# Patient Record
Sex: FEMALE | Race: WHITE | NOT HISPANIC OR LATINO | Employment: OTHER | URBAN - METROPOLITAN AREA
[De-identification: names, ages, dates, MRNs, and addresses within clinical notes are randomized per-mention and may not be internally consistent; named-entity substitution may affect disease eponyms.]

---

## 2017-08-25 ENCOUNTER — CONVERSION ENCOUNTER (OUTPATIENT)
Dept: GENERAL RADIOLOGY | Facility: HOSPITAL | Age: 72
End: 2017-08-25

## 2018-08-27 ENCOUNTER — CONVERSION ENCOUNTER (OUTPATIENT)
Dept: GENERAL RADIOLOGY | Facility: HOSPITAL | Age: 73
End: 2018-08-27

## 2019-01-02 ENCOUNTER — HOSPITAL ENCOUNTER (OUTPATIENT)
Dept: GENERAL RADIOLOGY | Facility: HOSPITAL | Age: 74
Discharge: HOME | End: 2019-01-02
Attending: INTERNAL MEDICINE

## 2019-01-21 ENCOUNTER — OFFICE VISIT CONVERTED (OUTPATIENT)
Dept: SURGERY | Facility: CLINIC | Age: 74
End: 2019-01-21
Attending: PHYSICIAN ASSISTANT

## 2019-01-21 ENCOUNTER — HOSPITAL ENCOUNTER (OUTPATIENT)
Dept: SURGERY | Facility: CLINIC | Age: 74
Discharge: HOME | End: 2019-01-21
Attending: PHYSICIAN ASSISTANT

## 2019-01-21 ENCOUNTER — CONVERSION ENCOUNTER (OUTPATIENT)
Dept: SURGERY | Facility: CLINIC | Age: 74
End: 2019-01-21

## 2019-01-23 LAB — BACTERIA UR CULT: NORMAL

## 2019-02-04 ENCOUNTER — HOSPITAL ENCOUNTER (OUTPATIENT)
Dept: LAB | Facility: HOSPITAL | Age: 74
Discharge: HOME | End: 2019-02-04
Attending: PHYSICIAN ASSISTANT

## 2019-02-04 LAB
APPEARANCE UR: CLEAR
BILIRUB UR QL: NEGATIVE
COLOR UR: YELLOW
CONV COLLECTION SOURCE (UA): NORMAL
CONV UROBILINOGEN IN URINE BY AUTOMATED TEST STRIP: 0.2 {EHRLICHU}/DL (ref 0.1–1)
GLUCOSE UR QL: NEGATIVE MG/DL
HGB UR QL STRIP: NEGATIVE
KETONES UR QL STRIP: NEGATIVE MG/DL
LEUKOCYTE ESTERASE UR QL STRIP: NEGATIVE
NITRITE UR QL STRIP: NEGATIVE
PH UR STRIP.AUTO: 6 [PH] (ref 5–8)
PROT UR QL: NEGATIVE MG/DL
SP GR UR: 1.01 (ref 1–1.03)

## 2019-03-19 ENCOUNTER — HOSPITAL ENCOUNTER (OUTPATIENT)
Dept: LAB | Facility: HOSPITAL | Age: 74
Discharge: HOME | End: 2019-03-19
Attending: INTERNAL MEDICINE

## 2019-03-19 LAB
25(OH)D3 SERPL-MCNC: 38.4 NG/ML (ref 30–100)
APPEARANCE UR: CLEAR
BASOPHILS # BLD AUTO: 0.05 10*3/UL (ref 0–0.2)
BASOPHILS NFR BLD AUTO: 0.7 % (ref 0–3)
BILIRUB UR QL: NEGATIVE
CHOLEST SERPL-MCNC: 121 MG/DL (ref 107–200)
CHOLEST/HDLC SERPL: 2.7 {RATIO} (ref 3–6)
COLOR UR: YELLOW
CONV ABS IMM GRAN: 0.06 10*3/UL (ref 0–0.2)
CONV COLLECTION SOURCE (UA): NORMAL
CONV CREATININE URINE, RANDOM: 48.6 MG/DL (ref 10–300)
CONV IMMATURE GRAN: 0.8 % (ref 0–1.8)
CONV MICROALBUM.,U,RANDOM: <12 MG/L (ref 0–20)
CONV UROBILINOGEN IN URINE BY AUTOMATED TEST STRIP: 0.2 {EHRLICHU}/DL (ref 0.1–1)
DEPRECATED RDW RBC AUTO: 57.4 FL (ref 36.4–46.3)
EOSINOPHIL # BLD AUTO: 0.19 10*3/UL (ref 0–0.7)
EOSINOPHIL # BLD AUTO: 2.6 % (ref 0–7)
ERYTHROCYTE [DISTWIDTH] IN BLOOD BY AUTOMATED COUNT: 15.9 % (ref 11.7–14.4)
EST. AVERAGE GLUCOSE BLD GHB EST-MCNC: 128 MG/DL
FOLATE SERPL-MCNC: 16.3 NG/ML (ref 4.8–20)
GLUCOSE UR QL: NEGATIVE MG/DL
HBA1C MFR BLD: 6.1 % (ref 3.5–5.7)
HBA1C MFR BLD: 9.9 G/DL (ref 12–16)
HCT VFR BLD AUTO: 32.4 % (ref 37–47)
HDLC SERPL-MCNC: 45 MG/DL (ref 40–60)
HGB UR QL STRIP: NEGATIVE
KETONES UR QL STRIP: NEGATIVE MG/DL
LDLC SERPL CALC-MCNC: 54 MG/DL (ref 70–100)
LEUKOCYTE ESTERASE UR QL STRIP: NEGATIVE
LYMPHOCYTES # BLD AUTO: 1.9 10*3/UL (ref 1–5)
MAGNESIUM SERPL-MCNC: 1.59 MG/DL (ref 1.6–2.3)
MCH RBC QN AUTO: 29.8 PG (ref 27–31)
MCHC RBC AUTO-ENTMCNC: 30.6 G/DL (ref 33–37)
MCV RBC AUTO: 97.6 FL (ref 81–99)
MICROALBUMIN/CREAT UR: 24.7 MG/G{CRE} (ref 0–35)
MONOCYTES # BLD AUTO: 0.93 10*3/UL (ref 0.2–1.2)
MONOCYTES NFR BLD AUTO: 12.6 % (ref 3–10)
NEUTROPHILS # BLD AUTO: 4.27 10*3/UL (ref 2–8)
NEUTROPHILS NFR BLD AUTO: 57.6 % (ref 30–85)
NITRITE UR QL STRIP: NEGATIVE
NRBC CBCN: 0 % (ref 0–0.7)
PH UR STRIP.AUTO: 6.5 [PH] (ref 5–8)
PLATELET # BLD AUTO: 262 10*3/UL (ref 130–400)
PMV BLD AUTO: 11.8 FL (ref 9.4–12.3)
PROT UR QL: NEGATIVE MG/DL
RBC # BLD AUTO: 3.32 10*6/UL (ref 4.2–5.4)
SP GR UR: 1.01 (ref 1–1.03)
TRIGL SERPL-MCNC: 111 MG/DL (ref 40–150)
URATE SERPL-MCNC: 5 MG/DL (ref 2.5–7.5)
VARIANT LYMPHS NFR BLD MANUAL: 25.7 % (ref 20–45)
VIT B12 SERPL-MCNC: 503 PG/ML (ref 211–911)
VLDLC SERPL-MCNC: 22 MG/DL (ref 5–37)
WBC # BLD AUTO: 7.4 10*3/UL (ref 4.8–10.8)

## 2019-03-22 ENCOUNTER — HOSPITAL ENCOUNTER (OUTPATIENT)
Dept: LAB | Facility: HOSPITAL | Age: 74
Discharge: HOME | End: 2019-03-22
Attending: INTERNAL MEDICINE

## 2019-03-22 LAB
ALBUMIN SERPL-MCNC: 4.2 G/DL (ref 3.5–5)
ALBUMIN/GLOB SERPL: 1.4 {RATIO} (ref 1.4–2.6)
ALP SERPL-CCNC: 39 U/L (ref 43–160)
ALT SERPL-CCNC: 12 U/L (ref 10–40)
ANION GAP SERPL CALC-SCNC: 20 MMOL/L (ref 8–19)
AST SERPL-CCNC: 17 U/L (ref 15–50)
BILIRUB SERPL-MCNC: 0.34 MG/DL (ref 0.2–1.3)
BUN SERPL-MCNC: 22 MG/DL (ref 5–25)
BUN/CREAT SERPL: 21 {RATIO} (ref 6–20)
CALCIUM SERPL-MCNC: 9.2 MG/DL (ref 8.7–10.4)
CHLORIDE SERPL-SCNC: 102 MMOL/L (ref 99–111)
CONV CO2: 19 MMOL/L (ref 22–32)
CONV TOTAL PROTEIN: 7.1 G/DL (ref 6.3–8.2)
CREAT UR-MCNC: 1.04 MG/DL (ref 0.5–0.9)
GFR SERPLBLD BASED ON 1.73 SQ M-ARVRAT: 53 ML/MIN/{1.73_M2}
GLOBULIN UR ELPH-MCNC: 2.9 G/DL (ref 2–3.5)
GLUCOSE SERPL-MCNC: 106 MG/DL (ref 65–99)
OSMOLALITY SERPL CALC.SUM OF ELEC: 286 MOSM/KG (ref 273–304)
POTASSIUM SERPL-SCNC: 4.7 MMOL/L (ref 3.5–5.3)
SODIUM SERPL-SCNC: 136 MMOL/L (ref 135–147)

## 2019-04-03 ENCOUNTER — HOSPITAL ENCOUNTER (OUTPATIENT)
Dept: CARDIOLOGY | Facility: HOSPITAL | Age: 74
Discharge: HOME | End: 2019-04-03
Attending: INTERNAL MEDICINE

## 2019-06-20 ENCOUNTER — HOSPITAL ENCOUNTER (OUTPATIENT)
Dept: LAB | Facility: HOSPITAL | Age: 74
Discharge: HOME | End: 2019-06-20
Attending: INTERNAL MEDICINE

## 2019-06-20 LAB
25(OH)D3 SERPL-MCNC: 36.7 NG/ML (ref 30–100)
ALBUMIN SERPL-MCNC: 4.4 G/DL (ref 3.5–5)
ALBUMIN/GLOB SERPL: 1.5 {RATIO} (ref 1.4–2.6)
ALP SERPL-CCNC: 47 U/L (ref 43–160)
ALT SERPL-CCNC: 13 U/L (ref 10–40)
ANION GAP SERPL CALC-SCNC: 17 MMOL/L (ref 8–19)
AST SERPL-CCNC: 17 U/L (ref 15–50)
BASOPHILS # BLD AUTO: 0.05 10*3/UL (ref 0–0.2)
BASOPHILS NFR BLD AUTO: 0.6 % (ref 0–3)
BILIRUB SERPL-MCNC: 0.4 MG/DL (ref 0.2–1.3)
BUN SERPL-MCNC: 33 MG/DL (ref 5–25)
BUN/CREAT SERPL: 28 {RATIO} (ref 6–20)
CALCIUM SERPL-MCNC: 9.4 MG/DL (ref 8.7–10.4)
CHLORIDE SERPL-SCNC: 100 MMOL/L (ref 99–111)
CHOLEST SERPL-MCNC: 127 MG/DL (ref 107–200)
CHOLEST/HDLC SERPL: 2.5 {RATIO} (ref 3–6)
CONV ABS IMM GRAN: 0.07 10*3/UL (ref 0–0.2)
CONV CO2: 22 MMOL/L (ref 22–32)
CONV IMMATURE GRAN: 0.8 % (ref 0–1.8)
CONV TOTAL PROTEIN: 7.3 G/DL (ref 6.3–8.2)
CREAT UR-MCNC: 1.2 MG/DL (ref 0.5–0.9)
DEPRECATED RDW RBC AUTO: 59.6 FL (ref 36.4–46.3)
EOSINOPHIL # BLD AUTO: 0.14 10*3/UL (ref 0–0.7)
EOSINOPHIL # BLD AUTO: 1.6 % (ref 0–7)
ERYTHROCYTE [DISTWIDTH] IN BLOOD BY AUTOMATED COUNT: 16.8 % (ref 11.7–14.4)
EST. AVERAGE GLUCOSE BLD GHB EST-MCNC: 123 MG/DL
FERRITIN SERPL-MCNC: 587 NG/ML (ref 10–200)
FOLATE SERPL-MCNC: 15.6 NG/ML (ref 4.8–20)
GFR SERPLBLD BASED ON 1.73 SQ M-ARVRAT: 44 ML/MIN/{1.73_M2}
GLOBULIN UR ELPH-MCNC: 2.9 G/DL (ref 2–3.5)
GLUCOSE SERPL-MCNC: 116 MG/DL (ref 65–99)
HBA1C MFR BLD: 10.1 G/DL (ref 12–16)
HBA1C MFR BLD: 5.9 % (ref 3.5–5.7)
HCT VFR BLD AUTO: 32.5 % (ref 37–47)
HDLC SERPL-MCNC: 51 MG/DL (ref 40–60)
IRON SATN MFR SERPL: 14 % (ref 20–55)
IRON SERPL-MCNC: 63 UG/DL (ref 60–170)
LDLC SERPL CALC-MCNC: 62 MG/DL (ref 70–100)
LYMPHOCYTES # BLD AUTO: 1.74 10*3/UL (ref 1–5)
MAGNESIUM SERPL-MCNC: 1.94 MG/DL (ref 1.6–2.3)
MCH RBC QN AUTO: 30.4 PG (ref 27–31)
MCHC RBC AUTO-ENTMCNC: 31.1 G/DL (ref 33–37)
MCV RBC AUTO: 97.9 FL (ref 81–99)
MONOCYTES # BLD AUTO: 0.94 10*3/UL (ref 0.2–1.2)
MONOCYTES NFR BLD AUTO: 11 % (ref 3–10)
NEUTROPHILS # BLD AUTO: 5.62 10*3/UL (ref 2–8)
NEUTROPHILS NFR BLD AUTO: 65.7 % (ref 30–85)
NRBC CBCN: 0 % (ref 0–0.7)
OSMOLALITY SERPL CALC.SUM OF ELEC: 286 MOSM/KG (ref 273–304)
PLATELET # BLD AUTO: 266 10*3/UL (ref 130–400)
PMV BLD AUTO: 10.7 FL (ref 9.4–12.3)
POTASSIUM SERPL-SCNC: 4.5 MMOL/L (ref 3.5–5.3)
RBC # BLD AUTO: 3.32 10*6/UL (ref 4.2–5.4)
SODIUM SERPL-SCNC: 134 MMOL/L (ref 135–147)
T4 FREE SERPL-MCNC: 1.4 NG/DL (ref 0.9–1.8)
TIBC SERPL-MCNC: 440 UG/DL (ref 245–450)
TRANSFERRIN SERPL-MCNC: 308 MG/DL (ref 250–380)
TRIGL SERPL-MCNC: 72 MG/DL (ref 40–150)
TSH SERPL-ACNC: 2.33 M[IU]/L (ref 0.27–4.2)
VARIANT LYMPHS NFR BLD MANUAL: 20.3 % (ref 20–45)
VIT B12 SERPL-MCNC: 554 PG/ML (ref 211–911)
VLDLC SERPL-MCNC: 14 MG/DL (ref 5–37)
WBC # BLD AUTO: 8.56 10*3/UL (ref 4.8–10.8)

## 2019-09-03 ENCOUNTER — HOSPITAL ENCOUNTER (OUTPATIENT)
Dept: GENERAL RADIOLOGY | Facility: HOSPITAL | Age: 74
Discharge: HOME | End: 2019-09-03
Attending: INTERNAL MEDICINE

## 2019-11-07 ENCOUNTER — HOSPITAL ENCOUNTER (OUTPATIENT)
Dept: LAB | Facility: HOSPITAL | Age: 74
Discharge: HOME | End: 2019-11-07
Attending: INTERNAL MEDICINE

## 2019-11-07 LAB
25(OH)D3 SERPL-MCNC: 38.8 NG/ML (ref 30–100)
ALBUMIN SERPL-MCNC: 4.5 G/DL (ref 3.5–5)
ALBUMIN/GLOB SERPL: 1.6 {RATIO} (ref 1.4–2.6)
ALP SERPL-CCNC: 45 U/L (ref 43–160)
ALT SERPL-CCNC: 14 U/L (ref 10–40)
ANION GAP SERPL CALC-SCNC: 21 MMOL/L (ref 8–19)
AST SERPL-CCNC: 20 U/L (ref 15–50)
BASOPHILS # BLD AUTO: 0.06 10*3/UL (ref 0–0.2)
BASOPHILS NFR BLD AUTO: 0.8 % (ref 0–3)
BILIRUB SERPL-MCNC: 0.44 MG/DL (ref 0.2–1.3)
BUN SERPL-MCNC: 22 MG/DL (ref 5–25)
BUN/CREAT SERPL: 22 {RATIO} (ref 6–20)
CALCIUM SERPL-MCNC: 10.1 MG/DL (ref 8.7–10.4)
CHLORIDE SERPL-SCNC: 100 MMOL/L (ref 99–111)
CHOLEST SERPL-MCNC: 130 MG/DL (ref 107–200)
CHOLEST/HDLC SERPL: 2.6 {RATIO} (ref 3–6)
CONV ABS IMM GRAN: 0.04 10*3/UL (ref 0–0.2)
CONV CO2: 19 MMOL/L (ref 22–32)
CONV IMMATURE GRAN: 0.5 % (ref 0–1.8)
CONV TOTAL PROTEIN: 7.4 G/DL (ref 6.3–8.2)
CREAT UR-MCNC: 1 MG/DL (ref 0.5–0.9)
DEPRECATED RDW RBC AUTO: 60.2 FL (ref 36.4–46.3)
EOSINOPHIL # BLD AUTO: 0.19 10*3/UL (ref 0–0.7)
EOSINOPHIL # BLD AUTO: 2.5 % (ref 0–7)
ERYTHROCYTE [DISTWIDTH] IN BLOOD BY AUTOMATED COUNT: 17.3 % (ref 11.7–14.4)
ERYTHROCYTE [SEDIMENTATION RATE] IN BLOOD: 25 MM/H (ref 0–30)
EST. AVERAGE GLUCOSE BLD GHB EST-MCNC: 131 MG/DL
FERRITIN SERPL-MCNC: 591 NG/ML (ref 10–200)
FOLATE SERPL-MCNC: 15 NG/ML (ref 4.8–20)
GFR SERPLBLD BASED ON 1.73 SQ M-ARVRAT: 55 ML/MIN/{1.73_M2}
GLOBULIN UR ELPH-MCNC: 2.9 G/DL (ref 2–3.5)
GLUCOSE SERPL-MCNC: 109 MG/DL (ref 65–99)
HBA1C MFR BLD: 6.2 % (ref 3.5–5.7)
HCT VFR BLD AUTO: 34.2 % (ref 37–47)
HDLC SERPL-MCNC: 50 MG/DL (ref 40–60)
HGB BLD-MCNC: 10.8 G/DL (ref 12–16)
IRON SATN MFR SERPL: 25 % (ref 20–55)
IRON SERPL-MCNC: 111 UG/DL (ref 60–170)
LDLC SERPL CALC-MCNC: 54 MG/DL (ref 70–100)
LYMPHOCYTES # BLD AUTO: 2.21 10*3/UL (ref 1–5)
LYMPHOCYTES NFR BLD AUTO: 29.5 % (ref 20–45)
MCH RBC QN AUTO: 30.3 PG (ref 27–31)
MCHC RBC AUTO-ENTMCNC: 31.6 G/DL (ref 33–37)
MCV RBC AUTO: 96.1 FL (ref 81–99)
MONOCYTES # BLD AUTO: 0.88 10*3/UL (ref 0.2–1.2)
MONOCYTES NFR BLD AUTO: 11.7 % (ref 3–10)
NEUTROPHILS # BLD AUTO: 4.12 10*3/UL (ref 2–8)
NEUTROPHILS NFR BLD AUTO: 55 % (ref 30–85)
NRBC CBCN: 0.3 % (ref 0–0.7)
OSMOLALITY SERPL CALC.SUM OF ELEC: 284 MOSM/KG (ref 273–304)
PLATELET # BLD AUTO: 253 10*3/UL (ref 130–400)
PMV BLD AUTO: 11.8 FL (ref 9.4–12.3)
POTASSIUM SERPL-SCNC: 5 MMOL/L (ref 3.5–5.3)
RBC # BLD AUTO: 3.56 10*6/UL (ref 4.2–5.4)
SODIUM SERPL-SCNC: 135 MMOL/L (ref 135–147)
T4 FREE SERPL-MCNC: 1.4 NG/DL (ref 0.9–1.8)
TIBC SERPL-MCNC: 443 UG/DL (ref 245–450)
TRANSFERRIN SERPL-MCNC: 310 MG/DL (ref 250–380)
TRIGL SERPL-MCNC: 129 MG/DL (ref 40–150)
TSH SERPL-ACNC: 1.88 M[IU]/L (ref 0.27–4.2)
VIT B12 SERPL-MCNC: 808 PG/ML (ref 211–911)
VLDLC SERPL-MCNC: 26 MG/DL (ref 5–37)
WBC # BLD AUTO: 7.5 10*3/UL (ref 4.8–10.8)

## 2020-05-19 ENCOUNTER — HOSPITAL ENCOUNTER (OUTPATIENT)
Dept: LAB | Facility: HOSPITAL | Age: 75
Discharge: HOME | End: 2020-05-19
Attending: INTERNAL MEDICINE

## 2020-05-19 LAB
25(OH)D3 SERPL-MCNC: 34 NG/ML (ref 30–100)
ALBUMIN SERPL-MCNC: 4.4 G/DL (ref 3.5–5)
ALBUMIN/GLOB SERPL: 1.5 {RATIO} (ref 1.4–2.6)
ALP SERPL-CCNC: 46 U/L (ref 43–160)
ALT SERPL-CCNC: 11 U/L (ref 10–40)
ANION GAP SERPL CALC-SCNC: 24 MMOL/L (ref 8–19)
APPEARANCE UR: CLEAR
AST SERPL-CCNC: 17 U/L (ref 15–50)
BASOPHILS # BLD AUTO: 0.06 10*3/UL (ref 0–0.2)
BASOPHILS NFR BLD AUTO: 0.7 % (ref 0–3)
BILIRUB SERPL-MCNC: 0.4 MG/DL (ref 0.2–1.3)
BILIRUB UR QL: NEGATIVE
BUN SERPL-MCNC: 26 MG/DL (ref 5–25)
BUN/CREAT SERPL: 22 {RATIO} (ref 6–20)
CALCIUM SERPL-MCNC: 9.7 MG/DL (ref 8.7–10.4)
CHLORIDE SERPL-SCNC: 104 MMOL/L (ref 99–111)
COLOR UR: YELLOW
CONV ABS IMM GRAN: 0.09 10*3/UL (ref 0–0.2)
CONV CO2: 17 MMOL/L (ref 22–32)
CONV COLLECTION SOURCE (UA): NORMAL
CONV CREATININE URINE, RANDOM: 60.8 MG/DL (ref 10–300)
CONV IMMATURE GRAN: 1 % (ref 0–1.8)
CONV MICROALBUM.,U,RANDOM: 66 MG/L (ref 0–20)
CONV TOTAL PROTEIN: 7.3 G/DL (ref 6.3–8.2)
CONV UROBILINOGEN IN URINE BY AUTOMATED TEST STRIP: 0.2 {EHRLICHU}/DL (ref 0.1–1)
CREAT UR-MCNC: 1.19 MG/DL (ref 0.5–0.9)
DEPRECATED RDW RBC AUTO: 60.8 FL (ref 36.4–46.3)
EOSINOPHIL # BLD AUTO: 0.17 10*3/UL (ref 0–0.7)
EOSINOPHIL # BLD AUTO: 1.9 % (ref 0–7)
ERYTHROCYTE [DISTWIDTH] IN BLOOD BY AUTOMATED COUNT: 17.6 % (ref 11.7–14.4)
EST. AVERAGE GLUCOSE BLD GHB EST-MCNC: 137 MG/DL
FERRITIN SERPL-MCNC: 497 NG/ML (ref 10–200)
FOLATE SERPL-MCNC: 14.5 NG/ML (ref 4.8–20)
GFR SERPLBLD BASED ON 1.73 SQ M-ARVRAT: 44 ML/MIN/{1.73_M2}
GLOBULIN UR ELPH-MCNC: 2.9 G/DL (ref 2–3.5)
GLUCOSE SERPL-MCNC: 131 MG/DL (ref 65–99)
GLUCOSE UR QL: NEGATIVE MG/DL
HBA1C MFR BLD: 6.4 % (ref 3.5–5.7)
HCT VFR BLD AUTO: 31.1 % (ref 37–47)
HGB BLD-MCNC: 9.6 G/DL (ref 12–16)
HGB UR QL STRIP: NEGATIVE
IRON SATN MFR SERPL: 20 % (ref 20–55)
IRON SERPL-MCNC: 82 UG/DL (ref 60–170)
KETONES UR QL STRIP: NEGATIVE MG/DL
LEUKOCYTE ESTERASE UR QL STRIP: NEGATIVE
LYMPHOCYTES # BLD AUTO: 2.08 10*3/UL (ref 1–5)
LYMPHOCYTES NFR BLD AUTO: 23.1 % (ref 20–45)
MCH RBC QN AUTO: 29.6 PG (ref 27–31)
MCHC RBC AUTO-ENTMCNC: 30.9 G/DL (ref 33–37)
MCV RBC AUTO: 96 FL (ref 81–99)
MICROALBUMIN/CREAT UR: 108.6 MG/G{CRE} (ref 0–35)
MONOCYTES # BLD AUTO: 1.06 10*3/UL (ref 0.2–1.2)
MONOCYTES NFR BLD AUTO: 11.8 % (ref 3–10)
NEUTROPHILS # BLD AUTO: 5.56 10*3/UL (ref 2–8)
NEUTROPHILS NFR BLD AUTO: 61.5 % (ref 30–85)
NITRITE UR QL STRIP: NEGATIVE
NRBC CBCN: 0.3 % (ref 0–0.7)
OSMOLALITY SERPL CALC.SUM OF ELEC: 297 MOSM/KG (ref 273–304)
PH UR STRIP.AUTO: 5.5 [PH] (ref 5–8)
PLATELET # BLD AUTO: 260 10*3/UL (ref 130–400)
PMV BLD AUTO: 11.2 FL (ref 9.4–12.3)
POTASSIUM SERPL-SCNC: 4.5 MMOL/L (ref 3.5–5.3)
PROT UR QL: NEGATIVE MG/DL
RBC # BLD AUTO: 3.24 10*6/UL (ref 4.2–5.4)
SODIUM SERPL-SCNC: 140 MMOL/L (ref 135–147)
SP GR UR: 1.01 (ref 1–1.03)
T4 FREE SERPL-MCNC: 1.3 NG/DL (ref 0.9–1.8)
TIBC SERPL-MCNC: 403 UG/DL (ref 245–450)
TRANSFERRIN SERPL-MCNC: 282 MG/DL (ref 250–380)
TSH SERPL-ACNC: 2.11 M[IU]/L (ref 0.27–4.2)
VIT B12 SERPL-MCNC: 595 PG/ML (ref 211–911)
WBC # BLD AUTO: 9.02 10*3/UL (ref 4.8–10.8)

## 2020-05-21 LAB — BACTERIA UR CULT: NORMAL

## 2020-08-06 ENCOUNTER — HOSPITAL ENCOUNTER (OUTPATIENT)
Dept: LAB | Facility: HOSPITAL | Age: 75
Discharge: HOME | End: 2020-08-06
Attending: INTERNAL MEDICINE

## 2020-08-06 LAB
APPEARANCE UR: ABNORMAL
BILIRUB UR QL: NEGATIVE
COLOR UR: YELLOW
CONV BACTERIA: ABNORMAL
CONV COLLECTION SOURCE (UA): ABNORMAL
CONV HYALINE CASTS IN URINE MICRO: ABNORMAL /[LPF]
CONV UROBILINOGEN IN URINE BY AUTOMATED TEST STRIP: 0.2 {EHRLICHU}/DL (ref 0.1–1)
GLUCOSE UR QL: NEGATIVE MG/DL
HGB UR QL STRIP: NEGATIVE
KETONES UR QL STRIP: NEGATIVE MG/DL
LEUKOCYTE ESTERASE UR QL STRIP: ABNORMAL
NITRITE UR QL STRIP: POSITIVE
PH UR STRIP.AUTO: 5 [PH] (ref 5–8)
PROT UR QL: 30 MG/DL
RBC #/AREA URNS HPF: ABNORMAL /[HPF]
SP GR UR: 1.02 (ref 1–1.03)
SQUAMOUS SPT QL MICRO: ABNORMAL /[HPF]
WBC #/AREA URNS HPF: ABNORMAL /[HPF]

## 2020-08-08 LAB
AMOXICILLIN+CLAV SUSC ISLT: >=32
AMPICILLIN SUSC ISLT: >=32
AMPICILLIN+SULBAC SUSC ISLT: >=32
BACTERIA UR CULT: ABNORMAL
CEFAZOLIN SUSC ISLT: <=4
CEFEPIME SUSC ISLT: <=1
CEFTAZIDIME SUSC ISLT: <=1
CEFTRIAXONE SUSC ISLT: <=1
CEFUROXIME ORAL SUSC ISLT: 16
CEFUROXIME PARENTER SUSC ISLT: 16
CIPROFLOXACIN SUSC ISLT: >=4
ERTAPENEM SUSC ISLT: <=0.5
GENTAMICIN SUSC ISLT: <=1
LEVOFLOXACIN SUSC ISLT: >=8
NITROFURANTOIN SUSC ISLT: <=16
TETRACYCLINE SUSC ISLT: 2
TMP SMX SUSC ISLT: <=20
TOBRAMYCIN SUSC ISLT: <=1

## 2020-09-17 ENCOUNTER — HOSPITAL ENCOUNTER (OUTPATIENT)
Dept: LAB | Facility: HOSPITAL | Age: 75
Discharge: HOME | End: 2020-09-17
Attending: INTERNAL MEDICINE

## 2020-09-17 LAB
25(OH)D3 SERPL-MCNC: 30 NG/ML (ref 30–100)
ALBUMIN SERPL-MCNC: 4.3 G/DL (ref 3.5–5)
ALBUMIN/GLOB SERPL: 1.7 {RATIO} (ref 1.4–2.6)
ALP SERPL-CCNC: 38 U/L (ref 43–160)
ALT SERPL-CCNC: 14 U/L (ref 10–40)
ANION GAP SERPL CALC-SCNC: 17 MMOL/L (ref 8–19)
AST SERPL-CCNC: 18 U/L (ref 15–50)
BASOPHILS # BLD AUTO: 0.05 10*3/UL (ref 0–0.2)
BASOPHILS NFR BLD AUTO: 0.6 % (ref 0–3)
BILIRUB SERPL-MCNC: 0.47 MG/DL (ref 0.2–1.3)
BUN SERPL-MCNC: 23 MG/DL (ref 5–25)
BUN/CREAT SERPL: 21 {RATIO} (ref 6–20)
CALCIUM SERPL-MCNC: 9.6 MG/DL (ref 8.7–10.4)
CHLORIDE SERPL-SCNC: 101 MMOL/L (ref 99–111)
CHOLEST SERPL-MCNC: 127 MG/DL (ref 107–200)
CHOLEST/HDLC SERPL: 2.5 {RATIO} (ref 3–6)
CONV ABS IMM GRAN: 0.06 10*3/UL (ref 0–0.2)
CONV CO2: 24 MMOL/L (ref 22–32)
CONV IMMATURE GRAN: 0.7 % (ref 0–1.8)
CONV TOTAL PROTEIN: 6.9 G/DL (ref 6.3–8.2)
CREAT UR-MCNC: 1.08 MG/DL (ref 0.5–0.9)
DEPRECATED RDW RBC AUTO: 62.4 FL (ref 36.4–46.3)
EOSINOPHIL # BLD AUTO: 0.23 10*3/UL (ref 0–0.7)
EOSINOPHIL # BLD AUTO: 2.7 % (ref 0–7)
ERYTHROCYTE [DISTWIDTH] IN BLOOD BY AUTOMATED COUNT: 17.7 % (ref 11.7–14.4)
EST. AVERAGE GLUCOSE BLD GHB EST-MCNC: 134 MG/DL
FOLATE SERPL-MCNC: 18.9 NG/ML (ref 4.8–20)
GFR SERPLBLD BASED ON 1.73 SQ M-ARVRAT: 50 ML/MIN/{1.73_M2}
GLOBULIN UR ELPH-MCNC: 2.6 G/DL (ref 2–3.5)
GLUCOSE SERPL-MCNC: 128 MG/DL (ref 65–99)
HBA1C MFR BLD: 6.3 % (ref 3.5–5.7)
HCT VFR BLD AUTO: 31.4 % (ref 37–47)
HDLC SERPL-MCNC: 51 MG/DL (ref 40–60)
HGB BLD-MCNC: 10.2 G/DL (ref 12–16)
LDLC SERPL CALC-MCNC: 57 MG/DL (ref 70–100)
LYMPHOCYTES # BLD AUTO: 2.41 10*3/UL (ref 1–5)
LYMPHOCYTES NFR BLD AUTO: 28.8 % (ref 20–45)
MAGNESIUM SERPL-MCNC: 1.53 MG/DL (ref 1.6–2.3)
MCH RBC QN AUTO: 31 PG (ref 27–31)
MCHC RBC AUTO-ENTMCNC: 32.5 G/DL (ref 33–37)
MCV RBC AUTO: 95.4 FL (ref 81–99)
MONOCYTES # BLD AUTO: 1.08 10*3/UL (ref 0.2–1.2)
MONOCYTES NFR BLD AUTO: 12.9 % (ref 3–10)
NEUTROPHILS # BLD AUTO: 4.54 10*3/UL (ref 2–8)
NEUTROPHILS NFR BLD AUTO: 54.3 % (ref 30–85)
NRBC CBCN: 0.6 % (ref 0–0.7)
OSMOLALITY SERPL CALC.SUM OF ELEC: 291 MOSM/KG (ref 273–304)
PLATELET # BLD AUTO: 268 10*3/UL (ref 130–400)
PMV BLD AUTO: 10.8 FL (ref 9.4–12.3)
POTASSIUM SERPL-SCNC: 4.4 MMOL/L (ref 3.5–5.3)
RBC # BLD AUTO: 3.29 10*6/UL (ref 4.2–5.4)
SODIUM SERPL-SCNC: 138 MMOL/L (ref 135–147)
T4 FREE SERPL-MCNC: 1.6 NG/DL (ref 0.9–1.8)
TRIGL SERPL-MCNC: 93 MG/DL (ref 40–150)
TSH SERPL-ACNC: 1.38 M[IU]/L (ref 0.27–4.2)
VIT B12 SERPL-MCNC: 721 PG/ML (ref 211–911)
VLDLC SERPL-MCNC: 19 MG/DL (ref 5–37)
WBC # BLD AUTO: 8.37 10*3/UL (ref 4.8–10.8)

## 2021-01-07 ENCOUNTER — HOSPITAL ENCOUNTER (OUTPATIENT)
Dept: LAB | Facility: HOSPITAL | Age: 76
Discharge: HOME | End: 2021-01-07
Attending: INTERNAL MEDICINE

## 2021-01-07 LAB
25(OH)D3 SERPL-MCNC: 32.1 NG/ML (ref 30–100)
BASOPHILS # BLD AUTO: 0.06 10*3/UL (ref 0–0.2)
BASOPHILS NFR BLD AUTO: 0.6 % (ref 0–3)
CHOLEST SERPL-MCNC: 111 MG/DL (ref 107–200)
CHOLEST/HDLC SERPL: 2.3 {RATIO} (ref 3–6)
CONV ABS IMM GRAN: 0.11 10*3/UL (ref 0–0.2)
CONV IMMATURE GRAN: 1.1 % (ref 0–1.8)
DEPRECATED RDW RBC AUTO: 60.6 FL (ref 36.4–46.3)
EOSINOPHIL # BLD AUTO: 0.15 10*3/UL (ref 0–0.7)
EOSINOPHIL # BLD AUTO: 1.5 % (ref 0–7)
ERYTHROCYTE [DISTWIDTH] IN BLOOD BY AUTOMATED COUNT: 17.8 % (ref 11.7–14.4)
FERRITIN SERPL-MCNC: 555 NG/ML (ref 10–200)
HCT VFR BLD AUTO: 31.9 % (ref 37–47)
HDLC SERPL-MCNC: 48 MG/DL (ref 40–60)
HGB BLD-MCNC: 10.3 G/DL (ref 12–16)
IRON SATN MFR SERPL: 25 % (ref 20–55)
IRON SERPL-MCNC: 89 UG/DL (ref 60–170)
LDLC SERPL CALC-MCNC: 44 MG/DL (ref 70–100)
LYMPHOCYTES # BLD AUTO: 1.79 10*3/UL (ref 1–5)
LYMPHOCYTES NFR BLD AUTO: 18.4 % (ref 20–45)
MAGNESIUM SERPL-MCNC: 1.47 MG/DL (ref 1.6–2.3)
MCH RBC QN AUTO: 30.5 PG (ref 27–31)
MCHC RBC AUTO-ENTMCNC: 32.3 G/DL (ref 33–37)
MCV RBC AUTO: 94.4 FL (ref 81–99)
MONOCYTES # BLD AUTO: 1.22 10*3/UL (ref 0.2–1.2)
MONOCYTES NFR BLD AUTO: 12.6 % (ref 3–10)
NEUTROPHILS # BLD AUTO: 6.38 10*3/UL (ref 2–8)
NEUTROPHILS NFR BLD AUTO: 65.8 % (ref 30–85)
NRBC CBCN: 0.9 % (ref 0–0.7)
PLATELET # BLD AUTO: 374 10*3/UL (ref 130–400)
PMV BLD AUTO: 10.5 FL (ref 9.4–12.3)
RBC # BLD AUTO: 3.38 10*6/UL (ref 4.2–5.4)
TIBC SERPL-MCNC: 358 UG/DL (ref 245–450)
TRANSFERRIN SERPL-MCNC: 250 MG/DL (ref 250–380)
TRIGL SERPL-MCNC: 95 MG/DL (ref 40–150)
VLDLC SERPL-MCNC: 19 MG/DL (ref 5–37)
WBC # BLD AUTO: 9.71 10*3/UL (ref 4.8–10.8)

## 2021-01-13 ENCOUNTER — HOSPITAL ENCOUNTER (OUTPATIENT)
Dept: GENERAL RADIOLOGY | Facility: HOSPITAL | Age: 76
Discharge: HOME | End: 2021-01-13
Attending: INTERNAL MEDICINE

## 2021-04-07 ENCOUNTER — HOSPITAL ENCOUNTER (OUTPATIENT)
Dept: LAB | Facility: HOSPITAL | Age: 76
Discharge: HOME | End: 2021-04-07
Attending: INTERNAL MEDICINE

## 2021-04-07 LAB
25(OH)D3 SERPL-MCNC: 32.2 NG/ML (ref 30–100)
ALBUMIN SERPL-MCNC: 4.2 G/DL (ref 3.5–5)
ALBUMIN/GLOB SERPL: 1.6 {RATIO} (ref 1.4–2.6)
ALP SERPL-CCNC: 58 U/L (ref 43–160)
ALT SERPL-CCNC: 16 U/L (ref 10–40)
ANION GAP SERPL CALC-SCNC: 19 MMOL/L (ref 8–19)
AST SERPL-CCNC: 19 U/L (ref 15–50)
BASOPHILS # BLD AUTO: 0.05 10*3/UL (ref 0–0.2)
BASOPHILS NFR BLD AUTO: 0.7 % (ref 0–3)
BILIRUB SERPL-MCNC: 0.5 MG/DL (ref 0.2–1.3)
BUN SERPL-MCNC: 19 MG/DL (ref 5–25)
BUN/CREAT SERPL: 24 {RATIO} (ref 6–20)
CALCIUM SERPL-MCNC: 9.4 MG/DL (ref 8.7–10.4)
CHLORIDE SERPL-SCNC: 97 MMOL/L (ref 99–111)
CHOLEST SERPL-MCNC: 106 MG/DL (ref 107–200)
CHOLEST/HDLC SERPL: 2.1 {RATIO} (ref 3–6)
CONV ABS IMM GRAN: 0.02 10*3/UL (ref 0–0.2)
CONV CO2: 20 MMOL/L (ref 22–32)
CONV IMMATURE GRAN: 0.3 % (ref 0–1.8)
CONV TOTAL PROTEIN: 6.8 G/DL (ref 6.3–8.2)
CREAT UR-MCNC: 0.8 MG/DL (ref 0.5–0.9)
DEPRECATED RDW RBC AUTO: 59.2 FL (ref 36.4–46.3)
EOSINOPHIL # BLD AUTO: 0.18 10*3/UL (ref 0–0.7)
EOSINOPHIL # BLD AUTO: 2.5 % (ref 0–7)
ERYTHROCYTE [DISTWIDTH] IN BLOOD BY AUTOMATED COUNT: 17.3 % (ref 11.7–14.4)
EST. AVERAGE GLUCOSE BLD GHB EST-MCNC: 120 MG/DL
FOLATE SERPL-MCNC: 18.3 NG/ML (ref 4.8–20)
GFR SERPLBLD BASED ON 1.73 SQ M-ARVRAT: >60 ML/MIN/{1.73_M2}
GLOBULIN UR ELPH-MCNC: 2.6 G/DL (ref 2–3.5)
GLUCOSE SERPL-MCNC: 90 MG/DL (ref 65–99)
HBA1C MFR BLD: 5.8 % (ref 3.5–5.7)
HCT VFR BLD AUTO: 33.1 % (ref 37–47)
HDLC SERPL-MCNC: 51 MG/DL (ref 40–60)
HGB BLD-MCNC: 10.8 G/DL (ref 12–16)
LDLC SERPL CALC-MCNC: 40 MG/DL (ref 70–100)
LYMPHOCYTES # BLD AUTO: 2.36 10*3/UL (ref 1–5)
LYMPHOCYTES NFR BLD AUTO: 32.2 % (ref 20–45)
MAGNESIUM SERPL-MCNC: 1.76 MG/DL (ref 1.6–2.3)
MCH RBC QN AUTO: 30.1 PG (ref 27–31)
MCHC RBC AUTO-ENTMCNC: 32.6 G/DL (ref 33–37)
MCV RBC AUTO: 92.2 FL (ref 81–99)
MONOCYTES # BLD AUTO: 1.03 10*3/UL (ref 0.2–1.2)
MONOCYTES NFR BLD AUTO: 14 % (ref 3–10)
NEUTROPHILS # BLD AUTO: 3.7 10*3/UL (ref 2–8)
NEUTROPHILS NFR BLD AUTO: 50.3 % (ref 30–85)
NRBC CBCN: 0 % (ref 0–0.7)
OSMOLALITY SERPL CALC.SUM OF ELEC: 274 MOSM/KG (ref 273–304)
PLATELET # BLD AUTO: 283 10*3/UL (ref 130–400)
PMV BLD AUTO: 10.5 FL (ref 9.4–12.3)
POTASSIUM SERPL-SCNC: 4.5 MMOL/L (ref 3.5–5.3)
RBC # BLD AUTO: 3.59 10*6/UL (ref 4.2–5.4)
SODIUM SERPL-SCNC: 131 MMOL/L (ref 135–147)
T4 FREE SERPL-MCNC: 1.5 NG/DL (ref 0.9–1.8)
TRIGL SERPL-MCNC: 73 MG/DL (ref 40–150)
TSH SERPL-ACNC: 2.06 M[IU]/L (ref 0.27–4.2)
URATE SERPL-MCNC: 5.7 MG/DL (ref 2.5–7.5)
VIT B12 SERPL-MCNC: 380 PG/ML (ref 211–911)
VLDLC SERPL-MCNC: 15 MG/DL (ref 5–37)
WBC # BLD AUTO: 7.34 10*3/UL (ref 4.8–10.8)

## 2021-05-16 VITALS — BODY MASS INDEX: 37.65 KG/M2 | WEIGHT: 226 LBS | HEIGHT: 65 IN | RESPIRATION RATE: 19 BRPM

## 2021-05-22 ENCOUNTER — TRANSCRIBE ORDERS (OUTPATIENT)
Dept: ADMINISTRATIVE | Facility: HOSPITAL | Age: 76
End: 2021-05-22

## 2021-05-22 DIAGNOSIS — Z12.31 VISIT FOR SCREENING MAMMOGRAM: Primary | ICD-10-CM

## 2021-07-06 ENCOUNTER — TRANSCRIBE ORDERS (OUTPATIENT)
Dept: LAB | Facility: HOSPITAL | Age: 76
End: 2021-07-06

## 2021-07-06 ENCOUNTER — LAB (OUTPATIENT)
Dept: LAB | Facility: HOSPITAL | Age: 76
End: 2021-07-06

## 2021-07-06 DIAGNOSIS — R53.83 FATIGUE, UNSPECIFIED TYPE: ICD-10-CM

## 2021-07-06 DIAGNOSIS — R53.83 FATIGUE, UNSPECIFIED TYPE: Primary | ICD-10-CM

## 2021-07-06 LAB — HEMOCCULT STL QL IA: NEGATIVE

## 2021-07-06 PROCEDURE — 82274 ASSAY TEST FOR BLOOD FECAL: CPT

## 2021-07-28 PROBLEM — I10 HYPERTENSION: Status: ACTIVE | Noted: 2021-07-28

## 2021-07-28 PROBLEM — M79.609 PAIN IN SOFT TISSUES OF LIMB: Status: ACTIVE | Noted: 2021-07-28

## 2021-07-28 PROBLEM — M25.50 PAIN IN JOINT, MULTIPLE SITES: Status: ACTIVE | Noted: 2021-07-28

## 2021-07-28 PROBLEM — G89.4 CHRONIC PAIN SYNDROME: Status: ACTIVE | Noted: 2021-07-28

## 2021-07-28 PROBLEM — N32.9 BLADDER DISORDER: Status: ACTIVE | Noted: 2021-07-28

## 2021-07-28 PROBLEM — M19.90 ARTHRITIS: Status: ACTIVE | Noted: 2021-07-28

## 2021-07-28 PROBLEM — E11.9 DIABETES: Status: ACTIVE | Noted: 2021-07-28

## 2021-07-28 PROBLEM — R06.02 SHORTNESS OF BREATH: Status: ACTIVE | Noted: 2021-07-28

## 2021-07-28 PROBLEM — F32.A DEPRESSION: Status: ACTIVE | Noted: 2021-07-28

## 2021-07-28 PROBLEM — K21.9 GASTRIC REFLUX: Status: ACTIVE | Noted: 2021-07-28

## 2021-07-28 PROBLEM — E11.9 DIABETES MELLITUS, TYPE II: Status: ACTIVE | Noted: 2021-07-28

## 2021-07-28 RX ORDER — FERROUS SULFATE 325(65) MG
65 TABLET ORAL DAILY
COMMUNITY
End: 2021-08-18

## 2021-07-28 RX ORDER — LEVOTHYROXINE SODIUM 0.07 MG/1
0.07 TABLET ORAL DAILY
COMMUNITY
End: 2022-07-11

## 2021-07-28 RX ORDER — ATORVASTATIN CALCIUM 10 MG/1
TABLET, FILM COATED ORAL
COMMUNITY
End: 2021-08-18

## 2021-07-28 RX ORDER — CELECOXIB 200 MG/1
CAPSULE ORAL
COMMUNITY
End: 2021-08-18

## 2021-07-28 RX ORDER — DIPHENOXYLATE HYDROCHLORIDE AND ATROPINE SULFATE 2.5; .025 MG/1; MG/1
1 TABLET ORAL DAILY
COMMUNITY
End: 2022-04-20

## 2021-07-28 RX ORDER — DILTIAZEM HYDROCHLORIDE 120 MG/1
CAPSULE, EXTENDED RELEASE ORAL
COMMUNITY
End: 2021-08-18

## 2021-07-28 RX ORDER — LOSARTAN POTASSIUM AND HYDROCHLOROTHIAZIDE 25; 100 MG/1; MG/1
TABLET ORAL
COMMUNITY
End: 2022-05-31

## 2021-07-28 RX ORDER — ATORVASTATIN CALCIUM 20 MG/1
20 TABLET, FILM COATED ORAL DAILY
COMMUNITY
End: 2022-04-20 | Stop reason: ALTCHOICE

## 2021-07-28 RX ORDER — LOSARTAN POTASSIUM 50 MG/1
50 TABLET ORAL DAILY
COMMUNITY
End: 2021-08-18

## 2021-07-28 RX ORDER — GABAPENTIN 300 MG/1
300 CAPSULE ORAL 3 TIMES DAILY
COMMUNITY
End: 2021-08-18

## 2021-07-28 RX ORDER — ASPIRIN 81 MG/1
TABLET ORAL
COMMUNITY
End: 2021-08-18

## 2021-07-28 RX ORDER — BUPROPION HYDROCHLORIDE 75 MG/1
300 TABLET ORAL DAILY
COMMUNITY
End: 2021-08-18

## 2021-07-28 RX ORDER — UBIDECARENONE 200 MG
CAPSULE ORAL
COMMUNITY
End: 2021-08-18

## 2021-07-28 RX ORDER — FENOFIBRATE 120 MG/1
TABLET ORAL
COMMUNITY
End: 2021-08-18

## 2021-07-28 RX ORDER — HYDROCODONE BITARTRATE AND ACETAMINOPHEN 7.5; 325 MG/1; MG/1
TABLET ORAL
COMMUNITY
End: 2021-08-18

## 2021-08-09 RX ORDER — METFORMIN HYDROCHLORIDE 500 MG/1
TABLET, EXTENDED RELEASE ORAL
Qty: 270 TABLET | Refills: 4 | Status: SHIPPED | OUTPATIENT
Start: 2021-08-09 | End: 2023-01-19

## 2021-08-11 ENCOUNTER — TRANSCRIBE ORDERS (OUTPATIENT)
Dept: LAB | Facility: HOSPITAL | Age: 76
End: 2021-08-11

## 2021-08-11 ENCOUNTER — LAB (OUTPATIENT)
Dept: LAB | Facility: HOSPITAL | Age: 76
End: 2021-08-11

## 2021-08-11 DIAGNOSIS — E11.9 DIABETES MELLITUS WITHOUT COMPLICATION (HCC): Primary | ICD-10-CM

## 2021-08-11 DIAGNOSIS — E55.9 VITAMIN D DEFICIENCY: ICD-10-CM

## 2021-08-11 DIAGNOSIS — I10 HYPERTENSION, UNSPECIFIED TYPE: ICD-10-CM

## 2021-08-11 DIAGNOSIS — Z79.899 ENCOUNTER FOR LONG-TERM (CURRENT) USE OF OTHER MEDICATIONS: ICD-10-CM

## 2021-08-11 DIAGNOSIS — D51.1 VITAMIN B12 DEFICIENCY ANEMIA DUE TO MALABSORPTION WITH PROTEINURIA: ICD-10-CM

## 2021-08-11 DIAGNOSIS — E11.9 DIABETES MELLITUS WITHOUT COMPLICATION (HCC): ICD-10-CM

## 2021-08-11 DIAGNOSIS — E83.42 HYPOMAGNESEMIA: ICD-10-CM

## 2021-08-11 DIAGNOSIS — E03.9 HYPOTHYROIDISM, UNSPECIFIED TYPE: ICD-10-CM

## 2021-08-11 LAB
25(OH)D3 SERPL-MCNC: 32.5 NG/ML (ref 30–100)
ALBUMIN SERPL-MCNC: 4.1 G/DL (ref 3.5–5.2)
ALBUMIN UR-MCNC: 14.8 MG/DL
ALBUMIN/GLOB SERPL: 1.5 G/DL
ALP SERPL-CCNC: 55 U/L (ref 39–117)
ALT SERPL W P-5'-P-CCNC: 15 U/L (ref 1–33)
ANION GAP SERPL CALCULATED.3IONS-SCNC: 10.3 MMOL/L (ref 5–15)
AST SERPL-CCNC: 15 U/L (ref 1–32)
BACTERIA UR QL AUTO: NORMAL /HPF
BASOPHILS # BLD AUTO: 0.04 10*3/MM3 (ref 0–0.2)
BASOPHILS NFR BLD AUTO: 0.6 % (ref 0–1.5)
BILIRUB SERPL-MCNC: 0.5 MG/DL (ref 0–1.2)
BILIRUB UR QL STRIP: NEGATIVE
BUN SERPL-MCNC: 25 MG/DL (ref 8–23)
BUN/CREAT SERPL: 26.6 (ref 7–25)
CALCIUM SPEC-SCNC: 8.8 MG/DL (ref 8.6–10.5)
CHLORIDE SERPL-SCNC: 97 MMOL/L (ref 98–107)
CHOLEST SERPL-MCNC: 105 MG/DL (ref 0–200)
CLARITY UR: CLEAR
CO2 SERPL-SCNC: 22.7 MMOL/L (ref 22–29)
COLOR UR: YELLOW
CREAT SERPL-MCNC: 0.94 MG/DL (ref 0.57–1)
DEPRECATED RDW RBC AUTO: 55 FL (ref 37–54)
EOSINOPHIL # BLD AUTO: 0.13 10*3/MM3 (ref 0–0.4)
EOSINOPHIL NFR BLD AUTO: 2 % (ref 0.3–6.2)
ERYTHROCYTE [DISTWIDTH] IN BLOOD BY AUTOMATED COUNT: 16.7 % (ref 12.3–15.4)
FOLATE SERPL-MCNC: 14.5 NG/ML (ref 4.78–24.2)
GFR SERPL CREATININE-BSD FRML MDRD: 58 ML/MIN/1.73
GLOBULIN UR ELPH-MCNC: 2.7 GM/DL
GLUCOSE SERPL-MCNC: 98 MG/DL (ref 65–99)
GLUCOSE UR STRIP-MCNC: NEGATIVE MG/DL
HBA1C MFR BLD: 5.3 % (ref 4.8–5.6)
HCT VFR BLD AUTO: 29.3 % (ref 34–46.6)
HDLC SERPL-MCNC: 53 MG/DL (ref 40–60)
HGB BLD-MCNC: 9.8 G/DL (ref 12–15.9)
HGB UR QL STRIP.AUTO: NEGATIVE
HYALINE CASTS UR QL AUTO: NORMAL /LPF
IMM GRANULOCYTES # BLD AUTO: 0.02 10*3/MM3 (ref 0–0.05)
IMM GRANULOCYTES NFR BLD AUTO: 0.3 % (ref 0–0.5)
KETONES UR QL STRIP: NEGATIVE
LDLC SERPL CALC-MCNC: 39 MG/DL (ref 0–100)
LDLC/HDLC SERPL: 0.77 {RATIO}
LEUKOCYTE ESTERASE UR QL STRIP.AUTO: NEGATIVE
LYMPHOCYTES # BLD AUTO: 1.63 10*3/MM3 (ref 0.7–3.1)
LYMPHOCYTES NFR BLD AUTO: 24.7 % (ref 19.6–45.3)
MAGNESIUM SERPL-MCNC: 1.9 MG/DL (ref 1.6–2.4)
MCH RBC QN AUTO: 30.5 PG (ref 26.6–33)
MCHC RBC AUTO-ENTMCNC: 33.4 G/DL (ref 31.5–35.7)
MCV RBC AUTO: 91.3 FL (ref 79–97)
MONOCYTES # BLD AUTO: 0.89 10*3/MM3 (ref 0.1–0.9)
MONOCYTES NFR BLD AUTO: 13.5 % (ref 5–12)
NEUTROPHILS NFR BLD AUTO: 3.9 10*3/MM3 (ref 1.7–7)
NEUTROPHILS NFR BLD AUTO: 58.9 % (ref 42.7–76)
NITRITE UR QL STRIP: NEGATIVE
NRBC BLD AUTO-RTO: 0 /100 WBC (ref 0–0.2)
PH UR STRIP.AUTO: 6.5 [PH] (ref 5–8)
PLATELET # BLD AUTO: 250 10*3/MM3 (ref 140–450)
PMV BLD AUTO: 10.8 FL (ref 6–12)
POTASSIUM SERPL-SCNC: 4.4 MMOL/L (ref 3.5–5.2)
PROT SERPL-MCNC: 6.8 G/DL (ref 6–8.5)
PROT UR QL STRIP: ABNORMAL
RBC # BLD AUTO: 3.21 10*6/MM3 (ref 3.77–5.28)
RBC # UR: NORMAL /HPF
REF LAB TEST METHOD: NORMAL
SODIUM SERPL-SCNC: 130 MMOL/L (ref 136–145)
SP GR UR STRIP: 1.01 (ref 1–1.03)
SQUAMOUS #/AREA URNS HPF: NORMAL /HPF
T4 FREE SERPL-MCNC: 1.5 NG/DL (ref 0.93–1.7)
TRIGL SERPL-MCNC: 55 MG/DL (ref 0–150)
TSH SERPL DL<=0.05 MIU/L-ACNC: 1.88 UIU/ML (ref 0.27–4.2)
UROBILINOGEN UR QL STRIP: ABNORMAL
VIT B12 BLD-MCNC: >2000 PG/ML (ref 211–946)
VLDLC SERPL-MCNC: 13 MG/DL (ref 5–40)
WBC # BLD AUTO: 6.61 10*3/MM3 (ref 3.4–10.8)
WBC UR QL AUTO: NORMAL /HPF

## 2021-08-11 PROCEDURE — 84443 ASSAY THYROID STIM HORMONE: CPT

## 2021-08-11 PROCEDURE — 83735 ASSAY OF MAGNESIUM: CPT

## 2021-08-11 PROCEDURE — 82306 VITAMIN D 25 HYDROXY: CPT

## 2021-08-11 PROCEDURE — 82607 VITAMIN B-12: CPT

## 2021-08-11 PROCEDURE — 83036 HEMOGLOBIN GLYCOSYLATED A1C: CPT

## 2021-08-11 PROCEDURE — 80061 LIPID PANEL: CPT

## 2021-08-11 PROCEDURE — 81001 URINALYSIS AUTO W/SCOPE: CPT

## 2021-08-11 PROCEDURE — 82746 ASSAY OF FOLIC ACID SERUM: CPT

## 2021-08-11 PROCEDURE — 36415 COLL VENOUS BLD VENIPUNCTURE: CPT

## 2021-08-11 PROCEDURE — 85025 COMPLETE CBC W/AUTO DIFF WBC: CPT

## 2021-08-11 PROCEDURE — 82043 UR ALBUMIN QUANTITATIVE: CPT

## 2021-08-11 PROCEDURE — 80053 COMPREHEN METABOLIC PANEL: CPT

## 2021-08-11 PROCEDURE — 84439 ASSAY OF FREE THYROXINE: CPT

## 2021-08-17 PROBLEM — Z87.891 HX OF SMOKING: Status: ACTIVE | Noted: 2021-08-17

## 2021-08-17 PROBLEM — E88.810 METABOLIC SYNDROME: Status: ACTIVE | Noted: 2021-08-17

## 2021-08-17 PROBLEM — L71.9 ACNE ROSACEA: Status: ACTIVE | Noted: 2021-08-17

## 2021-08-17 PROBLEM — N39.3 SUI (STRESS URINARY INCONTINENCE, FEMALE): Status: ACTIVE | Noted: 2021-08-17

## 2021-08-17 PROBLEM — E53.8 B12 DEFICIENCY: Status: ACTIVE | Noted: 2021-08-17

## 2021-08-17 PROBLEM — E78.5 HYPERLIPIDEMIA: Status: ACTIVE | Noted: 2021-08-17

## 2021-08-17 PROBLEM — N39.0 UTI (URINARY TRACT INFECTION): Status: ACTIVE | Noted: 2021-08-17

## 2021-08-17 PROBLEM — E55.9 VITAMIN D DEFICIENCY: Status: ACTIVE | Noted: 2021-08-17

## 2021-08-17 PROBLEM — E88.81 METABOLIC SYNDROME: Status: ACTIVE | Noted: 2021-08-17

## 2021-08-18 ENCOUNTER — OFFICE VISIT (OUTPATIENT)
Dept: INTERNAL MEDICINE | Facility: CLINIC | Age: 76
End: 2021-08-18

## 2021-08-18 VITALS
HEART RATE: 64 BPM | RESPIRATION RATE: 20 BRPM | WEIGHT: 201 LBS | BODY MASS INDEX: 34.31 KG/M2 | HEIGHT: 64 IN | TEMPERATURE: 97.5 F | DIASTOLIC BLOOD PRESSURE: 60 MMHG | SYSTOLIC BLOOD PRESSURE: 144 MMHG

## 2021-08-18 DIAGNOSIS — G89.29 CHRONIC MIDLINE LOW BACK PAIN WITHOUT SCIATICA: ICD-10-CM

## 2021-08-18 DIAGNOSIS — E78.5 HYPERLIPIDEMIA, UNSPECIFIED HYPERLIPIDEMIA TYPE: ICD-10-CM

## 2021-08-18 DIAGNOSIS — E53.8 B12 DEFICIENCY: Primary | ICD-10-CM

## 2021-08-18 DIAGNOSIS — E11.9 TYPE 2 DIABETES MELLITUS WITHOUT COMPLICATION, UNSPECIFIED WHETHER LONG TERM INSULIN USE (HCC): ICD-10-CM

## 2021-08-18 DIAGNOSIS — E11.9 TYPE 2 DIABETES MELLITUS WITHOUT COMPLICATION, WITHOUT LONG-TERM CURRENT USE OF INSULIN (HCC): ICD-10-CM

## 2021-08-18 DIAGNOSIS — Z79.899 ENCOUNTER FOR LONG-TERM (CURRENT) USE OF OTHER MEDICATIONS: ICD-10-CM

## 2021-08-18 DIAGNOSIS — E55.9 VITAMIN D DEFICIENCY: ICD-10-CM

## 2021-08-18 DIAGNOSIS — M54.50 CHRONIC MIDLINE LOW BACK PAIN WITHOUT SCIATICA: ICD-10-CM

## 2021-08-18 DIAGNOSIS — D50.8 IRON DEFICIENCY ANEMIA SECONDARY TO INADEQUATE DIETARY IRON INTAKE: ICD-10-CM

## 2021-08-18 DIAGNOSIS — E83.42 HYPOMAGNESEMIA: ICD-10-CM

## 2021-08-18 DIAGNOSIS — I10 HYPERTENSION, UNSPECIFIED TYPE: ICD-10-CM

## 2021-08-18 DIAGNOSIS — E03.9 HYPOTHYROIDISM, UNSPECIFIED TYPE: ICD-10-CM

## 2021-08-18 DIAGNOSIS — F33.40 RECURRENT MAJOR DEPRESSIVE DISORDER, IN REMISSION (HCC): ICD-10-CM

## 2021-08-18 PROCEDURE — 99214 OFFICE O/P EST MOD 30 MIN: CPT | Performed by: INTERNAL MEDICINE

## 2021-08-18 RX ORDER — MONTELUKAST SODIUM 10 MG/1
10 TABLET ORAL DAILY
COMMUNITY
Start: 2021-06-14 | End: 2022-06-20

## 2021-08-18 RX ORDER — DILTIAZEM HYDROCHLORIDE 240 MG/1
240 CAPSULE, COATED, EXTENDED RELEASE ORAL EVERY EVENING
COMMUNITY
Start: 2021-08-09 | End: 2022-02-07

## 2021-08-18 RX ORDER — HYDRALAZINE HYDROCHLORIDE 25 MG/1
25 TABLET, FILM COATED ORAL 3 TIMES DAILY
COMMUNITY
End: 2022-07-05

## 2021-08-18 RX ORDER — BUPROPION HYDROCHLORIDE 300 MG/1
300 TABLET ORAL DAILY
COMMUNITY
Start: 2021-08-09 | End: 2022-05-09

## 2021-08-18 NOTE — ASSESSMENT & PLAN NOTE
Hemoglobin hematocrit have come down slightly.  Stool 4 months ago was negative for occult blood x1  Last colonoscopy was about 10 years ago.  Patient is 75 years old.  B12 and folate are normal.  Assessment:  Anemia rule out iron deficiency due to inadequate dietary iron.  Plan: Repeat CBC iron TIBC ferritin and stools for occult blood x3.  May need colonoscopy plus/minus EGD

## 2021-08-18 NOTE — ASSESSMENT & PLAN NOTE
Mood is doing well on Wellbutrin  mg daily.  Assessment: Recurrent depression and at least partial remission and actually doing relatively well.  Plan: Continue Wellbutrin XL 3 mg daily

## 2021-08-18 NOTE — ASSESSMENT & PLAN NOTE
Hypertension is well controlled.  She sometimes finds it hard to take hydralazine 25 mg 3 times daily.  This morning her blood pressure initially was 160 systolic but now is down in the 140 range after she took her medicines.  Assessment hypertension: Adequately controlled.  Plan: Continue current meds

## 2021-08-18 NOTE — PROGRESS NOTES
"Chief Complaint  Labs Only    Subjective      Ann-Marie Hughes presents to [facility]  History of Present Illness  Patient remains on Nutrisystem diet.  Is added more fruits and vegetables since her and her garden.  Feeling well.  No falls.  No chest pain or shortness of breath.  Mood is good.  Objective   Vital Signs:   /60 (BP Location: Left arm, Patient Position: Sitting, Cuff Size: Large Adult)   Pulse 64   Temp 97.5 °F (36.4 °C) (Temporal)   Resp 20   Ht 162.6 cm (64\")   Wt 91.2 kg (201 lb)   BMI 34.50 kg/m²     Physical Exam  Vitals and nursing note reviewed.   Constitutional:       Appearance: Normal appearance.   HENT:      Head: Normocephalic.   Eyes:      Extraocular Movements: Extraocular movements intact.      Conjunctiva/sclera: Conjunctivae normal.   Cardiovascular:      Rate and Rhythm: Normal rate and regular rhythm.      Heart sounds: Normal heart sounds. No murmur heard.     Pulmonary:      Effort: Pulmonary effort is normal.      Breath sounds: Normal breath sounds. No wheezing or rales.   Abdominal:      General: Bowel sounds are normal.      Palpations: Abdomen is soft.      Tenderness: There is no abdominal tenderness. There is no guarding.   Musculoskeletal:         General: No swelling. Normal range of motion.   Skin:     General: Skin is warm and dry.   Neurological:      General: No focal deficit present.      Mental Status: She is alert. Mental status is at baseline.   Psychiatric:         Mood and Affect: Mood normal.         Thought Content: Thought content normal.        Result Review :  The following data was reviewed by: Moriah Michelle MD on 08/18/2021:  CMP    CMP 9/17/20 4/7/21 8/11/21   Glucose   98   Glucose 128 (A) 90    BUN 23 19 25 (A)   Creatinine 1.08 (A) 0.80 0.94   eGFR Non African Am   58 (A)   Sodium 138 131 (A) 130 (A)   Potassium 4.4 4.5 4.4   Chloride 101 97 (A) 97 (A)   Calcium 9.6 9.4 8.8   Albumin 4.3 4.2 4.10 "   Total Bilirubin 0.47 0.50 0.5   Alkaline Phosphatase 38 (A) 58 55   AST (SGOT) 18 19 15   ALT (SGPT) 14 16 15   (A) Abnormal value            CBC    CBC 1/7/21 4/7/21 8/11/21   WBC 9.71 7.34 6.61   RBC 3.38 (A) 3.59 (A) 3.21 (A)   Hemoglobin 10.3 (A) 10.8 (A) 9.8 (A)   Hematocrit 31.9 (A) 33.1 (A) 29.3 (A)   MCV 94.4 92.2 91.3   MCH 30.5 30.1 30.5   MCHC 32.3 (A) 32.6 (A) 33.4   RDW 17.8 (A) 17.3 (A) 16.7 (A)   Platelets 374 283 250   (A) Abnormal value            CBC w/diff    CBC w/Diff 1/7/21 4/7/21 8/11/21   WBC 9.71 7.34 6.61   RBC 3.38 (A) 3.59 (A) 3.21 (A)   Hemoglobin 10.3 (A) 10.8 (A) 9.8 (A)   Hematocrit 31.9 (A) 33.1 (A) 29.3 (A)   MCV 94.4 92.2 91.3   MCH 30.5 30.1 30.5   MCHC 32.3 (A) 32.6 (A) 33.4   RDW 17.8 (A) 17.3 (A) 16.7 (A)   Platelets 374 283 250   Neutrophil Rel % 65.8 50.3 58.9   Immature Granulocyte Rel %   0.3   Lymphocyte Rel % 18.4 (A) 32.2 24.7   Monocyte Rel % 12.6 (A) 14.0 (A) 13.5 (A)   Eosinophil Rel % 1.5 2.5 2.0   Basophil Rel % 0.6 0.7 0.6   (A) Abnormal value            Lipid Panel    Lipid Panel 1/7/21 4/7/21 8/11/21   Total Cholesterol   105   Total Cholesterol 111 106 (A)    Triglycerides 95 73 55   HDL Cholesterol 48 51 53   VLDL Cholesterol 19 15 13   LDL Cholesterol  44 (A) 40 (A) 39   LDL/HDL Ratio   0.77   (A) Abnormal value       Comments are available for some flowsheets but are not being displayed.           TSH    TSH 9/17/20 4/7/21 8/11/21   TSH 1.380 2.060 1.880           Most Recent A1C    HGBA1C Most Recent 8/11/21   Hemoglobin A1C 5.30           Microalbumin    Microalbumin 8/11/21   Microalbumin, Urine 14.8           UA    Urinalysis 8/11/21 8/11/21    0828 0828   Squamous Epithelial Cells, UA  0-2   Specific Gravity, UA 1.010    Ketones, UA Negative    Blood, UA Negative    Leukocytes, UA Negative    Nitrite, UA Negative    RBC, UA  0-2   WBC, UA  0-2   Bacteria, UA  None Seen                         Assessment and Plan   Diagnoses and all orders for this  visit:    1. B12 deficiency (Primary)  Assessment & Plan:  B12 level was low last time and she is on 1000 mcg daily.  Now B12 levels greater than 2000.  Assessment: B12 supplement slightly over done.  Plan: Reduce B12 1000 mcg to MWF.    Orders:  -     Vitamin B12; Future  -     Folate; Future    2. Encounter for long-term (current) use of other medications  -     CBC w AUTO Differential; Future    3. Hypertension, unspecified type  Assessment & Plan:  Hypertension is well controlled.  She sometimes finds it hard to take hydralazine 25 mg 3 times daily.  This morning her blood pressure initially was 160 systolic but now is down in the 140 range after she took her medicines.  Assessment hypertension: Adequately controlled.  Plan: Continue current meds    Orders:  -     Comprehensive metabolic panel; Future    4. Type 2 diabetes mellitus without complication, unspecified whether long term insulin use (CMS/[facility])  -     Hemoglobin A1c; Future    5. Hyperlipidemia, unspecified hyperlipidemia type  Assessment & Plan:  Hyperlipidemia is excellent.  Continue on atorvastatin 10 mg daily.    Orders:  -     Lipid panel; Future    6. Hypomagnesemia  -     Magnesium; Future    7. Hypothyroidism, unspecified type  Assessment & Plan:  Patient's thyroid functions are in the normal range.  She remains on Synthroid.  Assessment: Adequate control.  Plan continue Synthroid 0.075 mg daily    Orders:  -     TSH+Free T4; Future    8. Vitamin D deficiency  -     Vitamin D 25 hydroxy; Future    9. Type 2 diabetes mellitus without complication, without long-term current use of insulin (CMS/[facility])  Assessment & Plan:  Patient's blood sugar has been doing well at home.  Her hemoglobin A1c is in the 5 range here which is the best she has been in a couple years.  She is lost about 30 pounds over the last year.  She is on Nutrisystem diet.  She is been eating fresh vegetables [place].  Assessment: Diabetes type 2 much better controlled with  weight loss and diet control.  Plan: Continue Metformin extended release but reduced to just 500 mg daily.  Continue Nutrisystem and diet control.  May even be able to come off Metformin eventually.      10. Recurrent major depressive disorder, in remission (CMS/HCC)  Assessment & Plan:  Mood is doing well on Wellbutrin  mg daily.  Assessment: Recurrent depression and at least partial remission and actually doing relatively well.  Plan: Continue Wellbutrin XL 3 mg daily      11. Chronic midline low back pain without sciatica  Assessment & Plan:  Patient's low back pain is improved since she has lost weight.  Not needing Norco or gabapentin.  Chronic low back pain status post LS spine surgery.  Controlled pain.            Follow Up No follow-ups on file.  Patient was given instructions and counseling regarding her condition or for health maintenance advice. Please see specific information pulled into the AVS if appropriate.

## 2021-08-18 NOTE — ASSESSMENT & PLAN NOTE
Patient's blood sugar has been doing well at home.  Her hemoglobin A1c is in the 5 range here which is the best she has been in a couple years.  She is lost about 30 pounds over the last year.  She is on Nutrisystem diet.  She is been eating fresh vegetables [place].  Assessment: Diabetes type 2 much better controlled with weight loss and diet control.  Plan: Continue Metformin extended release but reduced to just 500 mg daily.  Continue Nutrisystem and diet control.  May even be able to come off Metformin eventually.

## 2021-08-18 NOTE — ASSESSMENT & PLAN NOTE
B12 level was low last time and she is on 1000 mcg daily.  Now B12 levels greater than 2000.  Assessment: B12 supplement slightly over done.  Plan: Reduce B12 1000 mcg to MWF.

## 2021-08-18 NOTE — ASSESSMENT & PLAN NOTE
Patient's low back pain is improved since she has lost weight.  Not needing Norco or gabapentin.  Chronic low back pain status post LS spine surgery.  Controlled pain.

## 2021-08-18 NOTE — ASSESSMENT & PLAN NOTE
Patient's thyroid functions are in the normal range.  She remains on Synthroid.  Assessment: Adequate control.  Plan continue Synthroid 0.075 mg daily

## 2021-09-09 ENCOUNTER — APPOINTMENT (OUTPATIENT)
Dept: MAMMOGRAPHY | Facility: HOSPITAL | Age: 76
End: 2021-09-09

## 2021-10-11 RX ORDER — ATORVASTATIN CALCIUM 10 MG/1
TABLET, FILM COATED ORAL
Qty: 90 TABLET | Refills: 3 | Status: SHIPPED | OUTPATIENT
Start: 2021-10-11 | End: 2022-10-17

## 2021-12-15 ENCOUNTER — LAB (OUTPATIENT)
Dept: LAB | Facility: HOSPITAL | Age: 76
End: 2021-12-15

## 2021-12-15 DIAGNOSIS — E03.9 HYPOTHYROIDISM, UNSPECIFIED TYPE: ICD-10-CM

## 2021-12-15 DIAGNOSIS — E55.9 VITAMIN D DEFICIENCY: ICD-10-CM

## 2021-12-15 DIAGNOSIS — I10 HYPERTENSION, UNSPECIFIED TYPE: ICD-10-CM

## 2021-12-15 DIAGNOSIS — D50.8 IRON DEFICIENCY ANEMIA SECONDARY TO INADEQUATE DIETARY IRON INTAKE: ICD-10-CM

## 2021-12-15 DIAGNOSIS — E11.9 TYPE 2 DIABETES MELLITUS WITHOUT COMPLICATION, UNSPECIFIED WHETHER LONG TERM INSULIN USE (HCC): ICD-10-CM

## 2021-12-15 DIAGNOSIS — E83.42 HYPOMAGNESEMIA: ICD-10-CM

## 2021-12-15 DIAGNOSIS — E53.8 B12 DEFICIENCY: ICD-10-CM

## 2021-12-15 DIAGNOSIS — E78.5 HYPERLIPIDEMIA, UNSPECIFIED HYPERLIPIDEMIA TYPE: ICD-10-CM

## 2021-12-15 LAB
25(OH)D3 SERPL-MCNC: 36.7 NG/ML
ALBUMIN SERPL-MCNC: 4.5 G/DL (ref 3.5–5.2)
ALBUMIN/GLOB SERPL: 1.6 G/DL
ALP SERPL-CCNC: 63 U/L (ref 39–117)
ALT SERPL W P-5'-P-CCNC: 12 U/L (ref 1–33)
ANION GAP SERPL CALCULATED.3IONS-SCNC: 10 MMOL/L (ref 5–15)
AST SERPL-CCNC: 18 U/L (ref 1–32)
BASOPHILS # BLD AUTO: 0.06 10*3/MM3 (ref 0–0.2)
BASOPHILS NFR BLD AUTO: 0.8 % (ref 0–1.5)
BILIRUB SERPL-MCNC: 0.6 MG/DL (ref 0–1.2)
BUN SERPL-MCNC: 23 MG/DL (ref 8–23)
BUN/CREAT SERPL: 28.8 (ref 7–25)
CALCIUM SPEC-SCNC: 9.7 MG/DL (ref 8.6–10.5)
CHLORIDE SERPL-SCNC: 98 MMOL/L (ref 98–107)
CHOLEST SERPL-MCNC: 120 MG/DL (ref 0–200)
CO2 SERPL-SCNC: 24 MMOL/L (ref 22–29)
CREAT SERPL-MCNC: 0.8 MG/DL (ref 0.57–1)
DEPRECATED RDW RBC AUTO: 56.5 FL (ref 37–54)
EOSINOPHIL # BLD AUTO: 0.15 10*3/MM3 (ref 0–0.4)
EOSINOPHIL NFR BLD AUTO: 1.9 % (ref 0.3–6.2)
ERYTHROCYTE [DISTWIDTH] IN BLOOD BY AUTOMATED COUNT: 17 % (ref 12.3–15.4)
FERRITIN SERPL-MCNC: 517 NG/ML (ref 13–150)
FOLATE SERPL-MCNC: 14.9 NG/ML (ref 4.78–24.2)
GFR SERPL CREATININE-BSD FRML MDRD: 70 ML/MIN/1.73
GLOBULIN UR ELPH-MCNC: 2.9 GM/DL
GLUCOSE SERPL-MCNC: 97 MG/DL (ref 65–99)
HBA1C MFR BLD: 5.24 % (ref 4.8–5.6)
HCT VFR BLD AUTO: 31.8 % (ref 34–46.6)
HDLC SERPL-MCNC: 53 MG/DL (ref 40–60)
HGB BLD-MCNC: 10.4 G/DL (ref 12–15.9)
IMM GRANULOCYTES # BLD AUTO: 0.03 10*3/MM3 (ref 0–0.05)
IMM GRANULOCYTES NFR BLD AUTO: 0.4 % (ref 0–0.5)
IRON 24H UR-MRATE: 95 MCG/DL (ref 37–145)
IRON SATN MFR SERPL: 27 % (ref 20–50)
LDLC SERPL CALC-MCNC: 52 MG/DL (ref 0–100)
LDLC/HDLC SERPL: 1 {RATIO}
LYMPHOCYTES # BLD AUTO: 2.04 10*3/MM3 (ref 0.7–3.1)
LYMPHOCYTES NFR BLD AUTO: 26.2 % (ref 19.6–45.3)
MAGNESIUM SERPL-MCNC: 1.8 MG/DL (ref 1.6–2.4)
MCH RBC QN AUTO: 30.1 PG (ref 26.6–33)
MCHC RBC AUTO-ENTMCNC: 32.7 G/DL (ref 31.5–35.7)
MCV RBC AUTO: 92.2 FL (ref 79–97)
MONOCYTES # BLD AUTO: 1.03 10*3/MM3 (ref 0.1–0.9)
MONOCYTES NFR BLD AUTO: 13.2 % (ref 5–12)
NEUTROPHILS NFR BLD AUTO: 4.49 10*3/MM3 (ref 1.7–7)
NEUTROPHILS NFR BLD AUTO: 57.5 % (ref 42.7–76)
NRBC BLD AUTO-RTO: 0.1 /100 WBC (ref 0–0.2)
PLATELET # BLD AUTO: 265 10*3/MM3 (ref 140–450)
PMV BLD AUTO: 10.3 FL (ref 6–12)
POTASSIUM SERPL-SCNC: 4.6 MMOL/L (ref 3.5–5.2)
PROT SERPL-MCNC: 7.4 G/DL (ref 6–8.5)
RBC # BLD AUTO: 3.45 10*6/MM3 (ref 3.77–5.28)
SODIUM SERPL-SCNC: 132 MMOL/L (ref 136–145)
T4 FREE SERPL-MCNC: 1.42 NG/DL (ref 0.93–1.7)
TIBC SERPL-MCNC: 346 MCG/DL (ref 298–536)
TRANSFERRIN SERPL-MCNC: 232 MG/DL (ref 200–360)
TRIGL SERPL-MCNC: 71 MG/DL (ref 0–150)
TSH SERPL DL<=0.05 MIU/L-ACNC: 1.32 UIU/ML (ref 0.27–4.2)
VIT B12 BLD-MCNC: >2000 PG/ML (ref 211–946)
VLDLC SERPL-MCNC: 15 MG/DL (ref 5–40)
WBC NRBC COR # BLD: 7.8 10*3/MM3 (ref 3.4–10.8)

## 2021-12-15 PROCEDURE — 82607 VITAMIN B-12: CPT

## 2021-12-15 PROCEDURE — 84466 ASSAY OF TRANSFERRIN: CPT

## 2021-12-15 PROCEDURE — 82728 ASSAY OF FERRITIN: CPT

## 2021-12-15 PROCEDURE — 84439 ASSAY OF FREE THYROXINE: CPT

## 2021-12-15 PROCEDURE — 80053 COMPREHEN METABOLIC PANEL: CPT

## 2021-12-15 PROCEDURE — 85025 COMPLETE CBC W/AUTO DIFF WBC: CPT

## 2021-12-15 PROCEDURE — 83735 ASSAY OF MAGNESIUM: CPT

## 2021-12-15 PROCEDURE — 36415 COLL VENOUS BLD VENIPUNCTURE: CPT

## 2021-12-15 PROCEDURE — 82746 ASSAY OF FOLIC ACID SERUM: CPT

## 2021-12-15 PROCEDURE — 80061 LIPID PANEL: CPT

## 2021-12-15 PROCEDURE — 83036 HEMOGLOBIN GLYCOSYLATED A1C: CPT

## 2021-12-15 PROCEDURE — 83540 ASSAY OF IRON: CPT

## 2021-12-15 PROCEDURE — 84443 ASSAY THYROID STIM HORMONE: CPT

## 2021-12-15 PROCEDURE — 82306 VITAMIN D 25 HYDROXY: CPT

## 2021-12-20 ENCOUNTER — OFFICE VISIT (OUTPATIENT)
Dept: INTERNAL MEDICINE | Facility: CLINIC | Age: 76
End: 2021-12-20

## 2021-12-20 VITALS
SYSTOLIC BLOOD PRESSURE: 146 MMHG | RESPIRATION RATE: 18 BRPM | DIASTOLIC BLOOD PRESSURE: 78 MMHG | HEIGHT: 64 IN | HEART RATE: 56 BPM | TEMPERATURE: 97.5 F | BODY MASS INDEX: 34.28 KG/M2 | WEIGHT: 200.8 LBS

## 2021-12-20 DIAGNOSIS — I70.0 ARTERIOSCLEROSIS OF ABDOMINAL AORTA (HCC): ICD-10-CM

## 2021-12-20 DIAGNOSIS — Z23 NEED FOR INFLUENZA VACCINATION: ICD-10-CM

## 2021-12-20 DIAGNOSIS — R79.89 ELEVATED FERRITIN: ICD-10-CM

## 2021-12-20 DIAGNOSIS — E83.42 HYPOMAGNESEMIA: ICD-10-CM

## 2021-12-20 DIAGNOSIS — E11.9 TYPE 2 DIABETES MELLITUS WITHOUT COMPLICATION, UNSPECIFIED WHETHER LONG TERM INSULIN USE (HCC): ICD-10-CM

## 2021-12-20 DIAGNOSIS — E53.8 B12 DEFICIENCY: ICD-10-CM

## 2021-12-20 DIAGNOSIS — Z13.9 SCREENING DUE: Primary | ICD-10-CM

## 2021-12-20 DIAGNOSIS — Z79.899 ENCOUNTER FOR LONG-TERM (CURRENT) USE OF OTHER MEDICATIONS: ICD-10-CM

## 2021-12-20 DIAGNOSIS — E03.9 HYPOTHYROIDISM, UNSPECIFIED TYPE: ICD-10-CM

## 2021-12-20 DIAGNOSIS — I10 PRIMARY HYPERTENSION: ICD-10-CM

## 2021-12-20 DIAGNOSIS — E56.9 VITAMIN DEFICIENCY: ICD-10-CM

## 2021-12-20 DIAGNOSIS — E55.9 VITAMIN D DEFICIENCY: ICD-10-CM

## 2021-12-20 DIAGNOSIS — E61.1 IRON DEFICIENCY: ICD-10-CM

## 2021-12-20 DIAGNOSIS — E11.9 TYPE 2 DIABETES MELLITUS WITHOUT COMPLICATION, WITHOUT LONG-TERM CURRENT USE OF INSULIN (HCC): ICD-10-CM

## 2021-12-20 DIAGNOSIS — E78.5 HYPERLIPIDEMIA, UNSPECIFIED HYPERLIPIDEMIA TYPE: ICD-10-CM

## 2021-12-20 PROCEDURE — G0008 ADMIN INFLUENZA VIRUS VAC: HCPCS | Performed by: INTERNAL MEDICINE

## 2021-12-20 PROCEDURE — 90662 IIV NO PRSV INCREASED AG IM: CPT | Performed by: INTERNAL MEDICINE

## 2021-12-20 PROCEDURE — 99214 OFFICE O/P EST MOD 30 MIN: CPT | Performed by: INTERNAL MEDICINE

## 2021-12-21 NOTE — PROGRESS NOTES
"Chief Complaint  Annual Exam and Gas (over the last month )    Subjective      Ann-Marie Hughes presents to [facility]  History of Present Illness  Patient does complain of flatus.  We discussed that and she is going to eliminate cruciferous vegetables which she has a lot of in her diet.  We talked about the different food groups that can cause that.  Probiotic did not help.  Diabetes mellitus type 2 controlled.  Situational depression controlled.  Hyperlipidemia.  Hypertension.  Hypothyroidism.  Chronic back pain and previous back surgery.  History of smoking in the distant past.  DJD.  History of iron deficiency due to inadequate dietary intake.  KAREN.  Obesity but has lost weight.  DJD.  Objective   Vital Signs:   /78 (BP Location: Left arm, Patient Position: Sitting, Cuff Size: Adult)   Pulse 56   Temp 97.5 °F (36.4 °C) (Temporal)   Resp 18   Ht 162.6 cm (64\")   Wt 91.1 kg (200 lb 12.8 oz)   BMI 34.47 kg/m²     Physical Exam  Vitals and nursing note reviewed.   Constitutional:       Appearance: Normal appearance.   HENT:      Head: Normocephalic.   Eyes:      Extraocular Movements: Extraocular movements intact.      Conjunctiva/sclera: Conjunctivae normal.   Cardiovascular:      Rate and Rhythm: Normal rate and regular rhythm.      Heart sounds: Normal heart sounds. No murmur heard.      Pulmonary:      Effort: Pulmonary effort is normal.      Breath sounds: Normal breath sounds. No wheezing or rales.   Abdominal:      General: Bowel sounds are normal.      Palpations: Abdomen is soft.      Tenderness: There is no abdominal tenderness. There is no guarding.   Musculoskeletal:         General: Deformity present. No swelling.   Skin:     General: Skin is warm and dry.   Neurological:      General: No focal deficit present.      Mental Status: She is alert. Mental status is at baseline.   Psychiatric:         Mood and Affect: Mood normal.         Thought Content: " Thought content normal.        Result Review :  The following data was reviewed by: Moriah Michelle MD on 12/20/2021:  CMP    CMP 4/7/21 8/11/21 12/15/21   Glucose 90 98 97   BUN 19 25 (A) 23   Creatinine 0.80 0.94 0.80   eGFR Non African Am  58 (A) 70   Sodium 131 (A) 130 (A) 132 (A)   Potassium 4.5 4.4 4.6   Chloride 97 (A) 97 (A) 98   Calcium 9.4 8.8 9.7   Albumin 4.2 4.10 4.50   Total Bilirubin 0.50 0.5 0.6   Alkaline Phosphatase 58 55 63   AST (SGOT) 19 15 18   ALT (SGPT) 16 15 12   (A) Abnormal value            CBC    CBC 4/7/21 8/11/21 12/15/21   WBC 7.34 6.61 7.80   RBC 3.59 (A) 3.21 (A) 3.45 (A)   Hemoglobin 10.8 (A) 9.8 (A) 10.4 (A)   Hematocrit 33.1 (A) 29.3 (A) 31.8 (A)   MCV 92.2 91.3 92.2   MCH 30.1 30.5 30.1   MCHC 32.6 (A) 33.4 32.7   RDW 17.3 (A) 16.7 (A) 17.0 (A)   Platelets 283 250 265   (A) Abnormal value            Lipid Panel    Lipid Panel 4/7/21 8/11/21 12/15/21   Total Cholesterol  105 120   Total Cholesterol 106 (A)     Triglycerides 73 55 71   HDL Cholesterol 51 53 53   VLDL Cholesterol 15 13 15   LDL Cholesterol  40 (A) 39 52   LDL/HDL Ratio  0.77 1.00   (A) Abnormal value       Comments are available for some flowsheets but are not being displayed.           TSH    TSH 4/7/21 8/11/21 12/15/21   TSH 2.060 1.880 1.320           Most Recent A1C    HGBA1C Most Recent 12/15/21   Hemoglobin A1C 5.24               A1C Last 3 Results    HGBA1C Last 3 Results 4/7/21 8/11/21 12/15/21   Hemoglobin A1C 5.8 (A) 5.30 5.24   (A) Abnormal value       Comments are available for some flowsheets but are not being displayed.           Microalbumin    Microalbumin 8/11/21   Microalbumin, Urine 14.8           UA    Urinalysis 8/11/21 8/11/21    0828 0828   Squamous Epithelial Cells, UA  0-2   Specific Gravity, UA 1.010    Ketones, UA Negative    Blood, UA Negative    Leukocytes, UA Negative    Nitrite, UA Negative    RBC, UA  0-2   WBC, UA  0-2   Bacteria, UA  None Seen                         Assessment and  Plan   Diagnoses and all orders for this visit:    1. Screening due (Primary)  -     Hepatitis C antibody; Future    2. B12 deficiency  -     Vitamin B12; Future  -     Folate; Future    3. Primary hypertension  Assessment & Plan:  Assessment: Hypertension adequately controlled.  Does well at home also.  Plan: Continue Cardizem CD 34 mg daily, Apresoline 25 mg 3 times daily, Hyzaar 100/25 daily    Orders:  -     Comprehensive metabolic panel; Future    4. Encounter for long-term (current) use of other medications  -     CBC w AUTO Differential; Future    5. Type 2 diabetes mellitus without complication, unspecified whether long term insulin use (HCC)  -     Hemoglobin A1c; Future    6. Hyperlipidemia, unspecified hyperlipidemia type  Assessment & Plan:  Hyperlipidemia adequately controlled.  History of atherosclerotic vascular disease seen on CT scan of the abdomen.  Plan: Continue atorvastatin 10 mg at at bedtime.  Continue aspirin 81 mg daily.  Lipid panel 4 months    Orders:  -     Lipid panel; Future    7. Hypomagnesemia  -     Magnesium; Future    8. Iron deficiency  -     Iron and TIBC; Future  -     Ferritin; Future    9. Vitamin deficiency  -     Vitamin D 25 hydroxy; Future    10. Hypothyroidism, unspecified type  -     TSH+Free T4; Future    11. Need for influenza vaccination  -     Fluzone High-Dose 65+yrs    12. Elevated ferritin  -     Hemochromatosis Mutation; Future    13. Vitamin D deficiency  -     Vitamin D 25 hydroxy; Future    14. Type 2 diabetes mellitus without complication, without long-term current use of insulin (HCC)  Assessment & Plan:  Type 2 diabetes mellitus well controlled.  Continue Metformin  mg the morning 200 mg at evening meal.  Hemoglobin A1c in 4 months.      15. Arteriosclerosis of abdominal aorta (HCC)  Assessment & Plan:  Atherosclerosis noted of the abdominal aorta on CT scan.  Assessment: Atherosclerosis.  Plan: Continue control of hypertension hyperlipidemia diabetes  mellitus.  Continue aspirin 80 mg daily          Follow Up Return in about 4 months (around 4/20/2022).  Patient was given instructions and counseling regarding her condition or for health maintenance advice. Please see specific information pulled into the AVS if appropriate.

## 2021-12-21 NOTE — ASSESSMENT & PLAN NOTE
Atherosclerosis noted of the abdominal aorta on CT scan.  Assessment: Atherosclerosis.  Plan: Continue control of hypertension hyperlipidemia diabetes mellitus.  Continue aspirin 80 mg daily

## 2021-12-21 NOTE — ASSESSMENT & PLAN NOTE
Assessment: Hypertension adequately controlled.  Does well at home also.  Plan: Continue Cardizem CD 34 mg daily, Apresoline 25 mg 3 times daily, Hyzaar 100/25 daily

## 2021-12-21 NOTE — ASSESSMENT & PLAN NOTE
Type 2 diabetes mellitus well controlled.  Continue Metformin  mg the morning 200 mg at evening meal.  Hemoglobin A1c in 4 months.

## 2021-12-21 NOTE — ASSESSMENT & PLAN NOTE
Hyperlipidemia adequately controlled.  History of atherosclerotic vascular disease seen on CT scan of the abdomen.  Plan: Continue atorvastatin 10 mg at at bedtime.  Continue aspirin 81 mg daily.  Lipid panel 4 months

## 2022-02-07 RX ORDER — DILTIAZEM HYDROCHLORIDE 240 MG/1
CAPSULE, COATED, EXTENDED RELEASE ORAL
Qty: 90 CAPSULE | Refills: 3 | Status: SHIPPED | OUTPATIENT
Start: 2022-02-07 | End: 2023-02-27

## 2022-04-13 ENCOUNTER — LAB (OUTPATIENT)
Dept: LAB | Facility: HOSPITAL | Age: 77
End: 2022-04-13

## 2022-04-13 DIAGNOSIS — Z79.899 ENCOUNTER FOR LONG-TERM (CURRENT) USE OF OTHER MEDICATIONS: ICD-10-CM

## 2022-04-13 DIAGNOSIS — E83.42 HYPOMAGNESEMIA: ICD-10-CM

## 2022-04-13 DIAGNOSIS — E78.5 HYPERLIPIDEMIA, UNSPECIFIED HYPERLIPIDEMIA TYPE: ICD-10-CM

## 2022-04-13 DIAGNOSIS — E55.9 VITAMIN D DEFICIENCY: ICD-10-CM

## 2022-04-13 DIAGNOSIS — R79.89 ELEVATED FERRITIN: ICD-10-CM

## 2022-04-13 DIAGNOSIS — E11.9 TYPE 2 DIABETES MELLITUS WITHOUT COMPLICATION, UNSPECIFIED WHETHER LONG TERM INSULIN USE: ICD-10-CM

## 2022-04-13 DIAGNOSIS — E56.9 VITAMIN DEFICIENCY: ICD-10-CM

## 2022-04-13 DIAGNOSIS — E03.9 HYPOTHYROIDISM, UNSPECIFIED TYPE: ICD-10-CM

## 2022-04-13 DIAGNOSIS — E61.1 IRON DEFICIENCY: ICD-10-CM

## 2022-04-13 DIAGNOSIS — E53.8 B12 DEFICIENCY: ICD-10-CM

## 2022-04-13 DIAGNOSIS — Z13.9 SCREENING DUE: ICD-10-CM

## 2022-04-13 DIAGNOSIS — I10 PRIMARY HYPERTENSION: ICD-10-CM

## 2022-04-13 LAB
25(OH)D3 SERPL-MCNC: 27.9 NG/ML (ref 30–100)
ALBUMIN SERPL-MCNC: 4.3 G/DL (ref 3.5–5.2)
ALBUMIN/GLOB SERPL: 1.7 G/DL
ALP SERPL-CCNC: 59 U/L (ref 39–117)
ALT SERPL W P-5'-P-CCNC: 15 U/L (ref 1–33)
ANION GAP SERPL CALCULATED.3IONS-SCNC: 9 MMOL/L (ref 5–15)
AST SERPL-CCNC: 20 U/L (ref 1–32)
BASOPHILS # BLD AUTO: 0.06 10*3/MM3 (ref 0–0.2)
BASOPHILS NFR BLD AUTO: 0.8 % (ref 0–1.5)
BILIRUB SERPL-MCNC: 0.6 MG/DL (ref 0–1.2)
BUN SERPL-MCNC: 21 MG/DL (ref 8–23)
BUN/CREAT SERPL: 26.3 (ref 7–25)
CALCIUM SPEC-SCNC: 9.9 MG/DL (ref 8.6–10.5)
CHLORIDE SERPL-SCNC: 99 MMOL/L (ref 98–107)
CHOLEST SERPL-MCNC: 123 MG/DL (ref 0–200)
CO2 SERPL-SCNC: 23 MMOL/L (ref 22–29)
CREAT SERPL-MCNC: 0.8 MG/DL (ref 0.57–1)
DEPRECATED RDW RBC AUTO: 57.9 FL (ref 37–54)
EGFRCR SERPLBLD CKD-EPI 2021: 76 ML/MIN/1.73
EOSINOPHIL # BLD AUTO: 0.12 10*3/MM3 (ref 0–0.4)
EOSINOPHIL NFR BLD AUTO: 1.5 % (ref 0.3–6.2)
ERYTHROCYTE [DISTWIDTH] IN BLOOD BY AUTOMATED COUNT: 17.2 % (ref 12.3–15.4)
FERRITIN SERPL-MCNC: 460 NG/ML (ref 13–150)
FOLATE SERPL-MCNC: 14.9 NG/ML (ref 4.78–24.2)
GLOBULIN UR ELPH-MCNC: 2.5 GM/DL
GLUCOSE SERPL-MCNC: 110 MG/DL (ref 65–99)
HBA1C MFR BLD: 6 % (ref 4.8–5.6)
HCT VFR BLD AUTO: 30.6 % (ref 34–46.6)
HCV AB SER DONR QL: NORMAL
HDLC SERPL-MCNC: 50 MG/DL (ref 40–60)
HGB BLD-MCNC: 9.9 G/DL (ref 12–15.9)
IMM GRANULOCYTES # BLD AUTO: 0.03 10*3/MM3 (ref 0–0.05)
IMM GRANULOCYTES NFR BLD AUTO: 0.4 % (ref 0–0.5)
IRON 24H UR-MRATE: 111 MCG/DL (ref 37–145)
IRON SATN MFR SERPL: 33 % (ref 20–50)
LDLC SERPL CALC-MCNC: 56 MG/DL (ref 0–100)
LDLC/HDLC SERPL: 1.11 {RATIO}
LYMPHOCYTES # BLD AUTO: 1.94 10*3/MM3 (ref 0.7–3.1)
LYMPHOCYTES NFR BLD AUTO: 24.3 % (ref 19.6–45.3)
MAGNESIUM SERPL-MCNC: 1.9 MG/DL (ref 1.6–2.4)
MCH RBC QN AUTO: 30.5 PG (ref 26.6–33)
MCHC RBC AUTO-ENTMCNC: 32.4 G/DL (ref 31.5–35.7)
MCV RBC AUTO: 94.2 FL (ref 79–97)
MONOCYTES # BLD AUTO: 0.94 10*3/MM3 (ref 0.1–0.9)
MONOCYTES NFR BLD AUTO: 11.8 % (ref 5–12)
NEUTROPHILS NFR BLD AUTO: 4.88 10*3/MM3 (ref 1.7–7)
NEUTROPHILS NFR BLD AUTO: 61.2 % (ref 42.7–76)
NRBC BLD AUTO-RTO: 0.5 /100 WBC (ref 0–0.2)
PLATELET # BLD AUTO: 264 10*3/MM3 (ref 140–450)
PMV BLD AUTO: 11.2 FL (ref 6–12)
POTASSIUM SERPL-SCNC: 4.5 MMOL/L (ref 3.5–5.2)
PROT SERPL-MCNC: 6.8 G/DL (ref 6–8.5)
RBC # BLD AUTO: 3.25 10*6/MM3 (ref 3.77–5.28)
SODIUM SERPL-SCNC: 131 MMOL/L (ref 136–145)
T4 FREE SERPL-MCNC: 1.32 NG/DL (ref 0.93–1.7)
TIBC SERPL-MCNC: 332 MCG/DL (ref 298–536)
TRANSFERRIN SERPL-MCNC: 223 MG/DL (ref 200–360)
TRIGL SERPL-MCNC: 87 MG/DL (ref 0–150)
TSH SERPL DL<=0.05 MIU/L-ACNC: 2.39 UIU/ML (ref 0.27–4.2)
VIT B12 BLD-MCNC: 1879 PG/ML (ref 211–946)
VLDLC SERPL-MCNC: 17 MG/DL (ref 5–40)
WBC NRBC COR # BLD: 7.97 10*3/MM3 (ref 3.4–10.8)

## 2022-04-13 PROCEDURE — 83540 ASSAY OF IRON: CPT

## 2022-04-13 PROCEDURE — 82728 ASSAY OF FERRITIN: CPT

## 2022-04-13 PROCEDURE — 82306 VITAMIN D 25 HYDROXY: CPT

## 2022-04-13 PROCEDURE — 84439 ASSAY OF FREE THYROXINE: CPT

## 2022-04-13 PROCEDURE — 82746 ASSAY OF FOLIC ACID SERUM: CPT

## 2022-04-13 PROCEDURE — 36415 COLL VENOUS BLD VENIPUNCTURE: CPT

## 2022-04-13 PROCEDURE — 83036 HEMOGLOBIN GLYCOSYLATED A1C: CPT

## 2022-04-13 PROCEDURE — 80061 LIPID PANEL: CPT

## 2022-04-13 PROCEDURE — 86803 HEPATITIS C AB TEST: CPT

## 2022-04-13 PROCEDURE — 83735 ASSAY OF MAGNESIUM: CPT

## 2022-04-13 PROCEDURE — 82607 VITAMIN B-12: CPT

## 2022-04-13 PROCEDURE — 84443 ASSAY THYROID STIM HORMONE: CPT

## 2022-04-13 PROCEDURE — 85025 COMPLETE CBC W/AUTO DIFF WBC: CPT

## 2022-04-13 PROCEDURE — 84466 ASSAY OF TRANSFERRIN: CPT

## 2022-04-13 PROCEDURE — 80053 COMPREHEN METABOLIC PANEL: CPT

## 2022-04-18 LAB — HFE GENE MUT ANL BLD/T: NORMAL

## 2022-04-20 ENCOUNTER — OFFICE VISIT (OUTPATIENT)
Dept: INTERNAL MEDICINE | Facility: CLINIC | Age: 77
End: 2022-04-20

## 2022-04-20 VITALS
BODY MASS INDEX: 34.76 KG/M2 | SYSTOLIC BLOOD PRESSURE: 137 MMHG | DIASTOLIC BLOOD PRESSURE: 60 MMHG | OXYGEN SATURATION: 97 % | HEART RATE: 55 BPM | HEIGHT: 64 IN | WEIGHT: 203.6 LBS | TEMPERATURE: 95.3 F

## 2022-04-20 DIAGNOSIS — E53.8 B12 DEFICIENCY: Primary | ICD-10-CM

## 2022-04-20 DIAGNOSIS — E03.9 HYPOTHYROIDISM, UNSPECIFIED TYPE: ICD-10-CM

## 2022-04-20 DIAGNOSIS — M21.70 LEG LENGTH DISCREPANCY: ICD-10-CM

## 2022-04-20 DIAGNOSIS — E61.1 IRON DEFICIENCY: ICD-10-CM

## 2022-04-20 DIAGNOSIS — E87.1 HYPONATREMIA: ICD-10-CM

## 2022-04-20 DIAGNOSIS — Z79.899 ENCOUNTER FOR LONG-TERM (CURRENT) USE OF OTHER MEDICATIONS: ICD-10-CM

## 2022-04-20 DIAGNOSIS — E83.42 HYPOMAGNESEMIA: ICD-10-CM

## 2022-04-20 DIAGNOSIS — E78.5 HYPERLIPIDEMIA, UNSPECIFIED HYPERLIPIDEMIA TYPE: ICD-10-CM

## 2022-04-20 DIAGNOSIS — J30.2 SEASONAL ALLERGIES: ICD-10-CM

## 2022-04-20 DIAGNOSIS — E55.9 VITAMIN D DEFICIENCY: ICD-10-CM

## 2022-04-20 DIAGNOSIS — I10 PRIMARY HYPERTENSION: ICD-10-CM

## 2022-04-20 DIAGNOSIS — R93.89 INFILTRATE NOTED ON IMAGING STUDY: ICD-10-CM

## 2022-04-20 DIAGNOSIS — E56.9 VITAMIN DEFICIENCY: ICD-10-CM

## 2022-04-20 DIAGNOSIS — E11.9 TYPE 2 DIABETES MELLITUS WITHOUT COMPLICATION, UNSPECIFIED WHETHER LONG TERM INSULIN USE: ICD-10-CM

## 2022-04-20 PROCEDURE — 99214 OFFICE O/P EST MOD 30 MIN: CPT | Performed by: INTERNAL MEDICINE

## 2022-04-20 NOTE — PROGRESS NOTES
"Chief Complaint  Labs Only (PATIENT STATES THAT THIS IS A ROUTINE VISIT, SHE STATES THAT OVERALL HER HEALTH IS GOOD AND SHE HAS NO NEW ISSUES.)    Subjective  Patient has been doing well.    Ann-Marie Hughes presents to [facility]  History of Present Illness  76-year-old lady who wants to skip mammogram this year because of difficulty getting it done last year.  We discussed that she should probably get next year if not sooner.  Her colonoscopy was 2011 and was normal.  She does not want to repeat that.  Diabetes mellitus type 2.  Hyperlipidemia.  Hypertension.  Depression doing well.  Leg length discrepancy.  Previous back surgery.  Has bibasilar infiltrates rule out interstitial lung disease.  Chest x-ray last year has some mild infiltrates at the bases.  Check a chest x-ray at today.  Osteoarthritis.  KAREN.  History of UTI occasionally.  Obesity but has lost weight and maintain it for several years.  B12 malabsorption.  Vitamin D malabsorption.  Hyponatremia due to excess water intake.  Does have chronic mild anemia that has not changed.  I do not believe that this needs work-up at this time.  We have discussed  Objective   Vital Signs:   /60 (BP Location: Left arm, Patient Position: Sitting, Cuff Size: Adult)   Pulse 55   Temp 95.3 °F (35.2 °C)   Ht 162.6 cm (64.02\")   Wt 92.4 kg (203 lb 9.6 oz)   SpO2 97%   BMI 34.93 kg/m²     Physical Exam   Result Review :               I reviewed all of her labs.  Hemoglobin A1c is 6.0.  Lipid panel is well controlled.  Renal function is normal.  CBC is normal except for mild anemia.  Normal white count platelets.  TFTs are normal.     Assessment and Plan   Diagnoses and all orders for this visit:    1. B12 deficiency (Primary)  Assessment & Plan:  Vitamin B12 level is now supranormal every other day.  Plan: Decrease vitamin B12 to 1000 MWF.  B12 level next visit.    Orders:  -     Vitamin B12; Future  -     Folate; " Future    2. Primary hypertension  Assessment & Plan:  Assessment: Hypertension well controlled.  Plan: Continue Cardizem  mg daily, hydralazine 25 mg 3 times daily, Hyzaar 100-25 daily    Orders:  -     Comprehensive metabolic panel; Future    3. Encounter for long-term (current) use of other medications  -     CBC w AUTO Differential; Future    4. Type 2 diabetes mellitus without complication, unspecified whether long term insulin use (HCC)  Assessment & Plan:  Hemoglobin A1c came up to 6.0.  I asked her to decrease metformin to 500 mg twice daily instead of 3 tablets a day last visit  Assessment: Type 2 diabetes mellitus doing well.  No diabetic retinopathy or nephropathy.  Plan: Continue weight control, diet control, metformin  mg twice daily.  Hemoglobin A1c and CMP next visit.  Urinalysis and urine for microalbumin next visit      Orders:  -     Hemoglobin A1c; Future  -     Urinalysis With Microscopic - Urine, Clean Catch; Future  -     Microalbumin / Creatinine Urine Ratio - Urine, Clean Catch; Future    5. Hyperlipidemia, unspecified hyperlipidemia type  Assessment & Plan:  Assessment: Hyperlipidemia well controlled.  Plan: Continue Lipitor 10 mg at bedtime.  Lipid panel on return to the office in 4 months    Orders:  -     Lipid panel; Future    6. Hypomagnesemia  -     Magnesium; Future    7. Iron deficiency  -     Iron and TIBC; Future    8. Vitamin deficiency    9. Hypothyroidism, unspecified type  Assessment & Plan:  TFTs are normal.  Assessment: Hypothyroid on adequate supplement.  Plan: Continue Synthroid 75 mcg daily.    Orders:  -     TSH+Free T4; Future    10. Infiltrate noted on imaging study  -     XR Chest PA & Lateral; Future    11. Vitamin D deficiency  Assessment & Plan:  Vitamin D level is a little lower than normal.  She is on 1000 units daily.  Assessment: Vitamin D deficiency.  Plan: Increase vitamin D to 2000 units daily.  Take with food.    Orders:  -     Vitamin D 25  hydroxy; Future    12. Seasonal allergies  Assessment & Plan:  Mild seasonal allergies.  She restarted Singulair 10 mg daily with good results this spring.      13. Hyponatremia  Assessment & Plan:  Has had mild hyponatremia for a year or more.  She does drink green tea and approximately 6 glasses of water a day.  Assessment: Hyponatremia probably due to excess fluid intake.  We discussed.  Plan: Decrease water to 4 or 5 glasses a day.  CMP next visit.      14. Leg length discrepancy  Assessment & Plan:  Right leg is shorter than the left and she has a limp.  She also had back surgery in the distant past.  Uses Rollator.  Has had no falls.  Assessment: Right leg shorter than the left.  Plan: Suggest that she see tab movement brace to get a shoe lift on the right.          Follow Up Return in about 4 months (around 8/20/2022).  Patient was given instructions and counseling regarding her condition or for health maintenance advice. Please see specific information pulled into the AVS if appropriate.

## 2022-04-20 NOTE — ASSESSMENT & PLAN NOTE
Hemoglobin A1c came up to 6.0.  I asked her to decrease metformin to 500 mg twice daily instead of 3 tablets a day last visit  Assessment: Type 2 diabetes mellitus doing well.  No diabetic retinopathy or nephropathy.  Plan: Continue weight control, diet control, metformin  mg twice daily.  Hemoglobin A1c and CMP next visit.  Urinalysis and urine for microalbumin next visit

## 2022-04-20 NOTE — ASSESSMENT & PLAN NOTE
TFTs are normal.  Assessment: Hypothyroid on adequate supplement.  Plan: Continue Synthroid 75 mcg daily.

## 2022-04-20 NOTE — ASSESSMENT & PLAN NOTE
Has had mild hyponatremia for a year or more.  She does drink green tea and approximately 6 glasses of water a day.  Assessment: Hyponatremia probably due to excess fluid intake.  We discussed.  Plan: Decrease water to 4 or 5 glasses a day.  CMP next visit.

## 2022-04-20 NOTE — ASSESSMENT & PLAN NOTE
Assessment: Hypertension well controlled.  Plan: Continue Cardizem  mg daily, hydralazine 25 mg 3 times daily, Hyzaar 100-25 daily

## 2022-04-20 NOTE — ASSESSMENT & PLAN NOTE
Vitamin B12 level is now supranormal every other day.  Plan: Decrease vitamin B12 to 1000 MWF.  B12 level next visit.

## 2022-04-20 NOTE — ASSESSMENT & PLAN NOTE
Vitamin D level is a little lower than normal.  She is on 1000 units daily.  Assessment: Vitamin D deficiency.  Plan: Increase vitamin D to 2000 units daily.  Take with food.

## 2022-04-20 NOTE — ASSESSMENT & PLAN NOTE
Right leg is shorter than the left and she has a limp.  She also had back surgery in the distant past.  Uses Rollator.  Has had no falls.  Assessment: Right leg shorter than the left.  Plan: Suggest that she see tab movement brace to get a shoe lift on the right.

## 2022-04-20 NOTE — ASSESSMENT & PLAN NOTE
Assessment: Hyperlipidemia well controlled.  Plan: Continue Lipitor 10 mg at bedtime.  Lipid panel on return to the office in 4 months

## 2022-05-09 RX ORDER — BUPROPION HYDROCHLORIDE 300 MG/1
TABLET ORAL
Qty: 90 TABLET | Refills: 1 | Status: SHIPPED | OUTPATIENT
Start: 2022-05-09 | End: 2022-11-07 | Stop reason: SDUPTHER

## 2022-05-31 RX ORDER — LOSARTAN POTASSIUM AND HYDROCHLOROTHIAZIDE 25; 100 MG/1; MG/1
TABLET ORAL
Qty: 90 TABLET | Refills: 3 | Status: SHIPPED | OUTPATIENT
Start: 2022-05-31

## 2022-06-20 RX ORDER — MONTELUKAST SODIUM 10 MG/1
TABLET ORAL
Qty: 90 TABLET | Refills: 3 | Status: SHIPPED | OUTPATIENT
Start: 2022-06-20

## 2022-07-05 RX ORDER — HYDRALAZINE HYDROCHLORIDE 25 MG/1
TABLET, FILM COATED ORAL
Qty: 270 TABLET | Refills: 3 | Status: SHIPPED | OUTPATIENT
Start: 2022-07-05 | End: 2022-09-27

## 2022-07-11 RX ORDER — LEVOTHYROXINE SODIUM 75 MCG
TABLET ORAL
Qty: 90 TABLET | Refills: 3 | Status: SHIPPED | OUTPATIENT
Start: 2022-07-11

## 2022-08-17 ENCOUNTER — LAB (OUTPATIENT)
Dept: LAB | Facility: HOSPITAL | Age: 77
End: 2022-08-17

## 2022-08-17 DIAGNOSIS — Z79.899 ENCOUNTER FOR LONG-TERM (CURRENT) USE OF OTHER MEDICATIONS: ICD-10-CM

## 2022-08-17 DIAGNOSIS — E03.9 HYPOTHYROIDISM, UNSPECIFIED TYPE: ICD-10-CM

## 2022-08-17 DIAGNOSIS — I10 PRIMARY HYPERTENSION: ICD-10-CM

## 2022-08-17 DIAGNOSIS — E55.9 VITAMIN D DEFICIENCY: ICD-10-CM

## 2022-08-17 DIAGNOSIS — E11.9 TYPE 2 DIABETES MELLITUS WITHOUT COMPLICATION, UNSPECIFIED WHETHER LONG TERM INSULIN USE: ICD-10-CM

## 2022-08-17 DIAGNOSIS — E61.1 IRON DEFICIENCY: ICD-10-CM

## 2022-08-17 DIAGNOSIS — E83.42 HYPOMAGNESEMIA: ICD-10-CM

## 2022-08-17 DIAGNOSIS — E53.8 B12 DEFICIENCY: ICD-10-CM

## 2022-08-17 DIAGNOSIS — E78.5 HYPERLIPIDEMIA, UNSPECIFIED HYPERLIPIDEMIA TYPE: ICD-10-CM

## 2022-08-17 LAB
25(OH)D3 SERPL-MCNC: 61.8 NG/ML (ref 30–100)
ALBUMIN SERPL-MCNC: 4.2 G/DL (ref 3.5–5.2)
ALBUMIN UR-MCNC: 74.4 MG/DL
ALBUMIN/GLOB SERPL: 1.6 G/DL
ALP SERPL-CCNC: 62 U/L (ref 39–117)
ALT SERPL W P-5'-P-CCNC: 11 U/L (ref 1–33)
ANION GAP SERPL CALCULATED.3IONS-SCNC: 12 MMOL/L (ref 5–15)
AST SERPL-CCNC: 16 U/L (ref 1–32)
BACTERIA UR QL AUTO: ABNORMAL /HPF
BASOPHILS # BLD AUTO: 0.07 10*3/MM3 (ref 0–0.2)
BASOPHILS NFR BLD AUTO: 0.7 % (ref 0–1.5)
BILIRUB SERPL-MCNC: 0.5 MG/DL (ref 0–1.2)
BILIRUB UR QL STRIP: NEGATIVE
BUN SERPL-MCNC: 20 MG/DL (ref 8–23)
BUN/CREAT SERPL: 23.3 (ref 7–25)
CALCIUM SPEC-SCNC: 9.4 MG/DL (ref 8.6–10.5)
CHLORIDE SERPL-SCNC: 100 MMOL/L (ref 98–107)
CHOLEST SERPL-MCNC: 115 MG/DL (ref 0–200)
CLARITY UR: CLEAR
CO2 SERPL-SCNC: 22 MMOL/L (ref 22–29)
COLOR UR: YELLOW
CREAT SERPL-MCNC: 0.86 MG/DL (ref 0.57–1)
CREAT UR-MCNC: 68.2 MG/DL
DEPRECATED RDW RBC AUTO: 57.3 FL (ref 37–54)
EGFRCR SERPLBLD CKD-EPI 2021: 69.7 ML/MIN/1.73
EOSINOPHIL # BLD AUTO: 0.25 10*3/MM3 (ref 0–0.4)
EOSINOPHIL NFR BLD AUTO: 2.5 % (ref 0.3–6.2)
ERYTHROCYTE [DISTWIDTH] IN BLOOD BY AUTOMATED COUNT: 16.9 % (ref 12.3–15.4)
FOLATE SERPL-MCNC: 16.5 NG/ML (ref 4.78–24.2)
GLOBULIN UR ELPH-MCNC: 2.6 GM/DL
GLUCOSE SERPL-MCNC: 105 MG/DL (ref 65–99)
GLUCOSE UR STRIP-MCNC: NEGATIVE MG/DL
HBA1C MFR BLD: 5.7 % (ref 4.8–5.6)
HCT VFR BLD AUTO: 29.7 % (ref 34–46.6)
HDLC SERPL-MCNC: 53 MG/DL (ref 40–60)
HGB BLD-MCNC: 9.6 G/DL (ref 12–15.9)
HGB UR QL STRIP.AUTO: NEGATIVE
HYALINE CASTS UR QL AUTO: ABNORMAL /LPF
IMM GRANULOCYTES # BLD AUTO: 0.05 10*3/MM3 (ref 0–0.05)
IMM GRANULOCYTES NFR BLD AUTO: 0.5 % (ref 0–0.5)
IRON 24H UR-MRATE: 85 MCG/DL (ref 37–145)
IRON SATN MFR SERPL: 24 % (ref 20–50)
KETONES UR QL STRIP: NEGATIVE
LDLC SERPL CALC-MCNC: 46 MG/DL (ref 0–100)
LDLC/HDLC SERPL: 0.88 {RATIO}
LEUKOCYTE ESTERASE UR QL STRIP.AUTO: ABNORMAL
LYMPHOCYTES # BLD AUTO: 2.17 10*3/MM3 (ref 0.7–3.1)
LYMPHOCYTES NFR BLD AUTO: 21.4 % (ref 19.6–45.3)
MAGNESIUM SERPL-MCNC: 1.6 MG/DL (ref 1.6–2.4)
MCH RBC QN AUTO: 30.8 PG (ref 26.6–33)
MCHC RBC AUTO-ENTMCNC: 32.3 G/DL (ref 31.5–35.7)
MCV RBC AUTO: 95.2 FL (ref 79–97)
MICROALBUMIN/CREAT UR: 1090.9 MG/G
MONOCYTES # BLD AUTO: 1.32 10*3/MM3 (ref 0.1–0.9)
MONOCYTES NFR BLD AUTO: 13 % (ref 5–12)
NEUTROPHILS NFR BLD AUTO: 6.3 10*3/MM3 (ref 1.7–7)
NEUTROPHILS NFR BLD AUTO: 61.9 % (ref 42.7–76)
NITRITE UR QL STRIP: NEGATIVE
NRBC BLD AUTO-RTO: 0.3 /100 WBC (ref 0–0.2)
PH UR STRIP.AUTO: 6 [PH] (ref 5–8)
PLATELET # BLD AUTO: 276 10*3/MM3 (ref 140–450)
PMV BLD AUTO: 10.2 FL (ref 6–12)
POTASSIUM SERPL-SCNC: 4.6 MMOL/L (ref 3.5–5.2)
PROT SERPL-MCNC: 6.8 G/DL (ref 6–8.5)
PROT UR QL STRIP: ABNORMAL
RBC # BLD AUTO: 3.12 10*6/MM3 (ref 3.77–5.28)
RBC # UR STRIP: ABNORMAL /HPF
REF LAB TEST METHOD: ABNORMAL
SODIUM SERPL-SCNC: 134 MMOL/L (ref 136–145)
SP GR UR STRIP: 1.02 (ref 1–1.03)
SQUAMOUS #/AREA URNS HPF: ABNORMAL /HPF
T4 FREE SERPL-MCNC: 1.27 NG/DL (ref 0.93–1.7)
TIBC SERPL-MCNC: 349 MCG/DL (ref 298–536)
TRANSFERRIN SERPL-MCNC: 234 MG/DL (ref 200–360)
TRIGL SERPL-MCNC: 77 MG/DL (ref 0–150)
TSH SERPL DL<=0.05 MIU/L-ACNC: 2.08 UIU/ML (ref 0.27–4.2)
UROBILINOGEN UR QL STRIP: ABNORMAL
VIT B12 BLD-MCNC: 1180 PG/ML (ref 211–946)
VLDLC SERPL-MCNC: 16 MG/DL (ref 5–40)
WBC # UR STRIP: ABNORMAL /HPF
WBC NRBC COR # BLD: 10.16 10*3/MM3 (ref 3.4–10.8)

## 2022-08-17 PROCEDURE — 84466 ASSAY OF TRANSFERRIN: CPT

## 2022-08-17 PROCEDURE — 80053 COMPREHEN METABOLIC PANEL: CPT

## 2022-08-17 PROCEDURE — 82570 ASSAY OF URINE CREATININE: CPT

## 2022-08-17 PROCEDURE — 82043 UR ALBUMIN QUANTITATIVE: CPT

## 2022-08-17 PROCEDURE — 82607 VITAMIN B-12: CPT

## 2022-08-17 PROCEDURE — 36415 COLL VENOUS BLD VENIPUNCTURE: CPT

## 2022-08-17 PROCEDURE — 84443 ASSAY THYROID STIM HORMONE: CPT

## 2022-08-17 PROCEDURE — 80061 LIPID PANEL: CPT

## 2022-08-17 PROCEDURE — 84439 ASSAY OF FREE THYROXINE: CPT

## 2022-08-17 PROCEDURE — 82306 VITAMIN D 25 HYDROXY: CPT

## 2022-08-17 PROCEDURE — 83735 ASSAY OF MAGNESIUM: CPT

## 2022-08-17 PROCEDURE — 81001 URINALYSIS AUTO W/SCOPE: CPT

## 2022-08-17 PROCEDURE — 85025 COMPLETE CBC W/AUTO DIFF WBC: CPT

## 2022-08-17 PROCEDURE — 83540 ASSAY OF IRON: CPT

## 2022-08-17 PROCEDURE — 83036 HEMOGLOBIN GLYCOSYLATED A1C: CPT

## 2022-08-17 PROCEDURE — 82746 ASSAY OF FOLIC ACID SERUM: CPT

## 2022-08-21 PROBLEM — E11.65 TYPE 2 DIABETES MELLITUS WITH HYPERGLYCEMIA: Status: ACTIVE | Noted: 2022-08-21

## 2022-08-21 PROBLEM — F32.9 REACTIVE DEPRESSION: Status: ACTIVE | Noted: 2022-08-21

## 2022-08-22 ENCOUNTER — OFFICE VISIT (OUTPATIENT)
Dept: INTERNAL MEDICINE | Facility: CLINIC | Age: 77
End: 2022-08-22

## 2022-08-22 VITALS
HEART RATE: 62 BPM | RESPIRATION RATE: 20 BRPM | OXYGEN SATURATION: 97 % | DIASTOLIC BLOOD PRESSURE: 70 MMHG | SYSTOLIC BLOOD PRESSURE: 172 MMHG | WEIGHT: 203.4 LBS | BODY MASS INDEX: 34.72 KG/M2 | HEIGHT: 64 IN | TEMPERATURE: 98 F

## 2022-08-22 DIAGNOSIS — E66.9 OBESITY (BMI 30.0-34.9): ICD-10-CM

## 2022-08-22 DIAGNOSIS — D50.8 IRON DEFICIENCY ANEMIA SECONDARY TO INADEQUATE DIETARY IRON INTAKE: ICD-10-CM

## 2022-08-22 DIAGNOSIS — F32.9 REACTIVE DEPRESSION: ICD-10-CM

## 2022-08-22 DIAGNOSIS — M21.70 LEG LENGTH DISCREPANCY: ICD-10-CM

## 2022-08-22 DIAGNOSIS — E56.9 VITAMIN DEFICIENCY: ICD-10-CM

## 2022-08-22 DIAGNOSIS — E11.65 TYPE 2 DIABETES MELLITUS WITH HYPERGLYCEMIA, WITHOUT LONG-TERM CURRENT USE OF INSULIN: Primary | ICD-10-CM

## 2022-08-22 DIAGNOSIS — I10 PRIMARY HYPERTENSION: ICD-10-CM

## 2022-08-22 DIAGNOSIS — E78.00 PURE HYPERCHOLESTEROLEMIA: ICD-10-CM

## 2022-08-22 DIAGNOSIS — E53.8 B12 DEFICIENCY: ICD-10-CM

## 2022-08-22 DIAGNOSIS — E55.9 VITAMIN D DEFICIENCY: ICD-10-CM

## 2022-08-22 PROCEDURE — 99214 OFFICE O/P EST MOD 30 MIN: CPT | Performed by: INTERNAL MEDICINE

## 2022-08-22 RX ORDER — DILTIAZEM HYDROCHLORIDE 120 MG/1
CAPSULE, COATED, EXTENDED RELEASE ORAL
Qty: 90 CAPSULE | Refills: 1 | Status: SHIPPED | OUTPATIENT
Start: 2022-08-22

## 2022-08-22 NOTE — PROGRESS NOTES
"CHIEF COMPLAINT  Ann-Marie Hughes presents to [facility] to Labs Only (Routine office visit with labs. Pt states overall wellness. No concerns at this time. )     HPI    76-year-old pleasant lady here for 4 month check up-  Patient now does not drive -- drove her to this visit.    No specific concerns except wondering if she could get off her Wellbutrin.  However patient has had problems with depression for years and was told by Dr. Michelle that she may need to continue on it currently patient is stable no feelings of depression.    She last saw Dr Michelle then with the following assessment-\"wants to skip mammogram this year because of difficulty getting it done last year.  We discussed that she should probably get next year if not sooner.  Her colonoscopy was 2011 and was normal.  She does not want to repeat that.  Diabetes mellitus type 2.  Hyperlipidemia.  Hypertension.  Depression doing well.  Leg length discrepancy.  Previous back surgery.  Has bibasilar infiltrates rule out interstitial lung disease.  Chest x-ray last year has some mild infiltrates at the bases.  Check a chest x-ray at today.  Osteoarthritis.  KAREN.  History of UTI occasionally.  Obesity but has lost weight and maintain it for several years.  B12 malabsorption.  Vitamin D malabsorption.  Hyponatremia due to excess water intake.  Does have chronic mild anemia that has not changed.  I do not believe that this needs work-up at this time.  We have discussed\"  Records reviewed, labs reviewed, medications reviewed in detail,. Plan of care is as follows below.    Past History:  Allergies: Penicillins   Medical History: has a past medical history of Acne rosacea (8/17/2021), Arteriosclerosis of abdominal aorta (HCC), B12 deficiency (8/17/2021), DJD (degenerative joint disease), smoking (8/17/2021), Hyperlipidemia (8/17/2021), Hypertension, Hypothyroidism, Metabolic syndrome (8/17/2021), KAREN (stress urinary " "incontinence, female) (8/17/2021), UTI (urinary tract infection) (8/17/2021), Vitamin D deficiency (8/17/2021), and Vitamin D deficiency.   Surgical History: has a past surgical history that includes Colonoscopy (2011).   Family History: family history is not on file.   Social History: reports that she quit smoking about 46 years ago. Her smoking use included cigarettes. She has a 12.00 pack-year smoking history. She has never used smokeless tobacco. She reports that she does not drink alcohol and does not use drugs.  Social History     Social History Narrative   • Not on file         OBJECTIVE  Vital Signs  Vitals:    08/22/22 1118 08/22/22 1159   BP: 158/73 172/70   BP Location: Left arm    Patient Position: Sitting Sitting   Cuff Size: Large Adult Adult   Pulse: 62    Resp: 20    Temp: 98 °F (36.7 °C)    TempSrc: Temporal    SpO2: 97%    Weight: 92.3 kg (203 lb 6.4 oz)    Height: 162.6 cm (64.02\")       Body mass index is 34.89 kg/m².    Review of Systems -nocturia x 1, no HA, no depression or anxiety    Physical Exam  Vitals and nursing note reviewed.   Constitutional:       Appearance: Normal appearance. She is obese.   HENT:      Head: Normocephalic and atraumatic.   Cardiovascular:      Rate and Rhythm: Normal rate and regular rhythm.   Pulmonary:      Effort: Pulmonary effort is normal.      Breath sounds: Normal breath sounds.   Abdominal:      Palpations: Abdomen is soft.   Musculoskeletal:      Cervical back: Normal range of motion and neck supple.   Neurological:      General: No focal deficit present.      Mental Status: She is alert and oriented to person, place, and time.         RESULTS REVIEW  No results found for: PROBNP, BNP  CMP    CMP 12/15/21 4/13/22 8/17/22   Glucose 97 110 (A) 105 (A)   BUN 23 21 20   Creatinine 0.80 0.80 0.86   eGFR Non African Am 70     Sodium 132 (A) 131 (A) 134 (A)   Potassium 4.6 4.5 4.6   Chloride 98 99 100   Calcium 9.7 9.9 9.4   Albumin 4.50 4.30 4.20   Total " Bilirubin 0.6 0.6 0.5   Alkaline Phosphatase 63 59 62   AST (SGOT) 18 20 16   ALT (SGPT) 12 15 11   (A) Abnormal value            CBC w/diff    CBC w/Diff 12/15/21 4/13/22 8/17/22   WBC 7.80 7.97 10.16   RBC 3.45 (A) 3.25 (A) 3.12 (A)   Hemoglobin 10.4 (A) 9.9 (A) 9.6 (A)   Hematocrit 31.8 (A) 30.6 (A) 29.7 (A)   MCV 92.2 94.2 95.2   MCH 30.1 30.5 30.8   MCHC 32.7 32.4 32.3   RDW 17.0 (A) 17.2 (A) 16.9 (A)   Platelets 265 264 276   Neutrophil Rel % 57.5 61.2 61.9   Immature Granulocyte Rel % 0.4 0.4 0.5   Lymphocyte Rel % 26.2 24.3 21.4   Monocyte Rel % 13.2 (A) 11.8 13.0 (A)   Eosinophil Rel % 1.9 1.5 2.5   Basophil Rel % 0.8 0.8 0.7   (A) Abnormal value             Lipid Panel    Lipid Panel 12/15/21 4/13/22 8/17/22   Total Cholesterol 120 123 115   Triglycerides 71 87 77   HDL Cholesterol 53 50 53   VLDL Cholesterol 15 17 16   LDL Cholesterol  52 56 46   LDL/HDL Ratio 1.00 1.11 0.88            Lab Results   Component Value Date    TSH 2.080 08/17/2022    TSH 2.390 04/13/2022    TSH 1.320 12/15/2021      Lab Results   Component Value Date    FREET4 1.27 08/17/2022    FREET4 1.32 04/13/2022    FREET4 1.42 12/15/2021      A1C Last 3 Results    HGBA1C Last 3 Results 12/15/21 4/13/22 8/17/22   Hemoglobin A1C 5.24 6.00 (A) 5.70 (A)   (A) Abnormal value                No Images in the past 120 days found..              ASSESSMENT & PLAN  Diagnoses and all orders for this visit:    1. Type 2 diabetes mellitus with hyperglycemia, without long-term current use of insulin (HCC) (Primary)  Comments:  If A1c is still good at the next visit and less than 6 may consider decreasing metformin XR to 500 mg once a day.  Assessment & Plan:  Hemoglobin A1c down to 5.7  Now on metformin XR  500 mg twice daily instead of 3 tablets a day  Assessment: Type 2 diabetes mellitus doing well.  No diabetic retinopathy or nephropathy.  Plan: Continue weight control, diet control, metformin  mg twice daily.  Hemoglobin A1c and CMP next  visit.  Urinalysis and urine for microalbumin next visit       Orders:  -     TSH+Free T4; Future  -     Comprehensive Metabolic Panel; Future  -     Iron Profile; Future  -     Ferritin; Future  -     Hemoglobin A1c; Future    2. Primary hypertension  Comments:  Diltiazem 120 mg in the evening continue to 40 mg in the a.m..  Assessment & Plan:  Not controlled-blood pressure at home systolic in the 160s-occasional 170s.  No headaches taking hydralazine 25 mg only twice a day because forgetting to take the lunchtime dose.  Plan.  Will increase diltiazem CD continue 240 mg daily but will add 120 mg capsule to take in the evening stop hydralazine at this time.  Continue losartan-HCTZ 100-25 mg once daily    Orders:  -     Vitamin B12; Future  -     Folate; Future  -     TSH+Free T4; Future  -     CBC & Differential; Future  -     Comprehensive Metabolic Panel; Future  -     Iron Profile; Future  -     Ferritin; Future  -     dilTIAZem CD (Cardizem CD) 120 MG 24 hr capsule; Take 1 capsule by mouth in the morning continue with the diltiazem CD turn 40 mg p.o. every a.m.  Dispense: 90 capsule; Refill: 1  -     Hemoglobin A1c; Future    3. Reactive depression  Comments:  stable-lives with -3 kids nearby-taper wellbutrin XL 300mg to every other day  Assessment & Plan:  Currently stable no symptoms of depression no SI no HI.  Patient has had depression for years currently wondering if she can get off the Wellbutrin XL.  Will have patient try taking Wellbutrin XL 1 tablet every other day and follow-up in 4 weeks if currently stable will consider giving prescription for Wellbutrin  mg once daily.    Orders:  -     TSH+Free T4; Future  -     Comprehensive Metabolic Panel; Future  -     Iron Profile; Future  -     Ferritin; Future  -     Hemoglobin A1c; Future    4. B12 deficiency  Assessment & Plan:  B12 levels greater than 1100 can decrease vitamin B12 2500 mcg from 3 times a week to 2 times per week    Orders:  -      TSH+Free T4; Future  -     Comprehensive Metabolic Panel; Future  -     Iron Profile; Future  -     Ferritin; Future  -     Hemoglobin A1c; Future    5. Pure hypercholesterolemia  Comments:  LDL <60-can take lipitor 10 mg MWFSat-no muscle cramps  Orders:  -     TSH+Free T4; Future  -     Comprehensive Metabolic Panel; Future  -     Lipid Panel; Future  -     Iron Profile; Future  -     Ferritin; Future  -     Hemoglobin A1c; Future    6. Iron deficiency anemia secondary to inadequate dietary iron intake  -     TSH+Free T4; Future  -     Comprehensive Metabolic Panel; Future  -     Iron Profile; Future  -     Ferritin; Future  -     Hemoglobin A1c; Future    7. Leg length discrepancy  Comments:  s/p 4 surgeries on back-go to orthotics dept   Assessment & Plan:  Right leg is shorter than the left and she has a limp.  She also had back surgery in the distant past.  Uses Rollator.  Has had no falls.  Assessment: Right leg shorter than the left.  Plan: Suggest that she  get a shoe lift on the right.       Orders:  -     TSH+Free T4; Future  -     Comprehensive Metabolic Panel; Future  -     Iron Profile; Future  -     Ferritin; Future  -     Hemoglobin A1c; Future    8. Vitamin D deficiency  Assessment & Plan:  Vitamin D level is a little lower than normal.  She is on 1000 units daily.  Assessment: Vitamin D deficiency.  Plan: Increase vitamin D to 2000 units daily.  Take with food.       Orders:  -     Vitamin D 25 Hydroxy; Future  -     TSH+Free T4; Future  -     Comprehensive Metabolic Panel; Future  -     Iron Profile; Future  -     Ferritin; Future  -     Hemoglobin A1c; Future    9. Vitamin deficiency  -     TSH+Free T4; Future  -     Comprehensive Metabolic Panel; Future  -     Iron Profile; Future  -     Ferritin; Future  -     Hemoglobin A1c; Future    10. Obesity (BMI 30.0-34.9)    Plan  Due for mammogram this year-unable to stand for long-  Declined repeating another colonoscopy-pt deferred  again    Patient Instructions   Add diltiazem  20 mg in the evening  Continue metformin XR 5 mg 1 twice a day  Can take Lipitor 10 mg Monday Wednesday Friday and Saturday  Can decrease vitamin B12 2500 mcg 2 times per week instead of 3  Can taper Wellbutrin  mg to 1 tablet every other day and monitor for any symptoms-then give prescription 450 mcg once daily at next visit.  Patient wants to try and get off this medication  Up-to-date with COVID vaccines  Go to orthotics in the front of this building and try to get a shoe lift.  On the right foot    FOLLOW UP  Return in about 4 weeks (around 9/19/2022) for Medicare Wellness.   Schedule appointment for 4-month checkup with labs prior    Patient was given instructions and counseling regarding her condition or for health maintenance advice. Please see specific information pulled into the AVS if appropriate.

## 2022-08-22 NOTE — ASSESSMENT & PLAN NOTE
Hemoglobin A1c down to 5.7  Now on metformin XR  500 mg twice daily instead of 3 tablets a day  Assessment: Type 2 diabetes mellitus doing well.  No diabetic retinopathy or nephropathy.  Plan: Continue weight control, diet control, metformin  mg twice daily.  Hemoglobin A1c and CMP next visit.  Urinalysis and urine for microalbumin next visit

## 2022-08-22 NOTE — ASSESSMENT & PLAN NOTE
Currently stable no symptoms of depression no SI no HI.  Patient has had depression for years currently wondering if she can get off the Wellbutrin XL.  Will have patient try taking Wellbutrin XL 1 tablet every other day and follow-up in 4 weeks if currently stable will consider giving prescription for Wellbutrin  mg once daily.

## 2022-08-22 NOTE — ASSESSMENT & PLAN NOTE
Right leg is shorter than the left and she has a limp.  She also had back surgery in the distant past.  Uses Rollator.  Has had no falls.  Assessment: Right leg shorter than the left.  Plan: Suggest that she  get a shoe lift on the right.

## 2022-08-22 NOTE — ASSESSMENT & PLAN NOTE
B12 levels greater than 1100 can decrease vitamin B12 2500 mcg from 3 times a week to 2 times per week

## 2022-08-22 NOTE — PATIENT INSTRUCTIONS
Add diltiazem  20 mg in the evening  Continue metformin XR 5 mg 1 twice a day  Can take Lipitor 10 mg Monday Wednesday Friday and Saturday  Can decrease vitamin B12 2500 mcg 2 times per week instead of 3  Can taper Wellbutrin  mg to 1 tablet every other day and monitor for any symptoms-then give prescription 450 mcg once daily at next visit.  Patient wants to try and get off this medication

## 2022-08-22 NOTE — ASSESSMENT & PLAN NOTE
Not controlled-blood pressure at home systolic in the 160s-occasional 170s.  No headaches taking hydralazine 25 mg only twice a day because forgetting to take the lunchtime dose.  Plan.  Will increase diltiazem CD continue 240 mg daily but will add 120 mg capsule to take in the evening stop hydralazine at this time.  Continue losartan-HCTZ 100-25 mg once daily

## 2022-09-26 PROBLEM — E11.65 TYPE 2 DIABETES MELLITUS WITH HYPERGLYCEMIA: Status: ACTIVE | Noted: 2021-07-28

## 2022-09-26 NOTE — ASSESSMENT & PLAN NOTE
Not controlled .BP at home with systolic blood pressure in the 160s over 80s .patient's hydralazine was stopped because she was forgetting to take the noontime dose and only taking it twice a day.  Assessment hypertension-needs better control plan-continue diltiazem  mg and 120 mg daily.losartan-HCTZ 100-25 mg once daily.  Add amlodipine 5 mg once a day.    Monitor for lower extremity swelling side effect.  Goal is blood pressure less than 140/90

## 2022-09-26 NOTE — PROGRESS NOTES
The ABCs of the Annual Wellness Visit  Subsequent Medicare Wellness Visit    Chief Complaint   Patient presents with   • Medicare Wellness-Initial Visit     MEDICARE wellness       Subjective    History of Present Illness:  Ann-Marie Hughes is a 76 y.o. female who presents for a Subsequent Medicare Wellness Visit.    Past medical history significant for depression patient at last visit was interested in trying to get off the Wellbutrin, also wanted to skip her mammogram because of difficulty getting transportation.-And compromises to get next year, colonoscopy in 2011 was normal and patient does not want to repeat that.  Other medical problems are diabetes, hyperlipidemia, hypertension, depression, leg length discrepancy,Previous back surgery.  Has bibasilar infiltrates rule out interstitial lung disease.  Chest x-ray last year has some mild infiltrates at the bases.  Check a chest x-ray at today.  Osteoarthritis.  KAREN.  History of UTI occasionally.  Obesity but has lost weight and maintain it for several years.  B12 malabsorption.  Vitamin D malabsorption.  Hyponatremia due to excess water intake.  Does have chronic mild anemia that has not changed.     The following portions of the patient's history were reviewed and   updated as appropriate: allergies, current medications, past family history, past medical history, past social history, past surgical history and problem list.    Compared to one year ago, the patient feels her physical   health is the same.    Compared to one year ago, the patient feels her mental   health is the same.    Recent Hospitalizations:  She was not admitted to the hospital during the last year.       Current Medical Providers:  Patient Care Team:  Roseanna Hernandez MD as PCP - General (Internal Medicine)    Outpatient Medications Prior to Visit   Medication Sig Dispense Refill   • Aspirin Buf,CaCarb-MgCarb-MgO, 81 MG tablet Take 81 mg by mouth Daily.     • atorvastatin  (LIPITOR) 10 MG tablet TAKE 1 TABLET BY MOUTH EVERY DAY 90 tablet 3   • AZO-CRANBERRY PO Take  by mouth Daily.     • buPROPion XL (WELLBUTRIN XL) 300 MG 24 hr tablet TAKE 1 TABLET BY MOUTH EVERY DAY 90 tablet 1   • Cholecalciferol 25 MCG (1000 UT) capsule Vitamin D3 1,000 unit oral capsule take 1 capsule by oral route daily   Active     • cyanocobalamin (VITAMIN B-12) 2500 MCG tablet tablet Take 2,500 mcg by mouth Daily.     • dilTIAZem CD (Cardizem CD) 120 MG 24 hr capsule Take 1 capsule by mouth in the morning continue with the diltiazem CD turn 40 mg p.o. every a.m. 90 capsule 1   • dilTIAZem CD (CARDIZEM CD) 240 MG 24 hr capsule TAKE 1 CAPSULE BY MOUTH EVERY EVENING 90 capsule 3   • Lactobacillus (AZO COMPLETE FEMININE BALANCE PO) Take  by mouth Daily. 2 a day     • losartan-hydrochlorothiazide (HYZAAR) 100-25 MG per tablet TAKE 1 TABLET BY MOUTH EVERY DAY 90 tablet 3   • MAGNESIUM PO Take  by mouth Daily. 2 a day     • metFORMIN ER (GLUCOPHAGE-XR) 500 MG 24 hr tablet TAKE 1 TABLET BY MOUTH EVERY MORNING AND 2 TABLETS AT SUPPER (Patient taking differently: Take 500 mg by mouth Every Night.) 270 tablet 4   • montelukast (SINGULAIR) 10 MG tablet TAKE 1 TABLET BY MOUTH EVERY DAY 90 tablet 3   • Probiotic Product (PROBIOTIC PO) Take  by mouth Daily.     • Synthroid 75 MCG tablet TAKE 1 TABLET BY MOUTH EVERY DAY 90 tablet 3   • TURMERIC PO Take  by mouth Daily.     • hydrALAZINE (APRESOLINE) 25 MG tablet TAKE 1 TABLET BY MOUTH THREE TIMES A  tablet 3     No facility-administered medications prior to visit.       No opioid medication identified on active medication list. I have reviewed chart for other potential  high risk medication/s and harmful drug interactions in the elderly.          Aspirin is on active medication list. Aspirin use is indicated based on review of current medical condition/s. Pros and cons of this therapy have been discussed today. Benefits of this medication outweigh potential harm.   "Patient has been encouraged to continue taking this medication.  .      Patient Active Problem List   Diagnosis   • Arthritis   • Bilateral leg pain   • Bladder disorder   • Chronic pain syndrome   • Depression   • Diabetes mellitus, type II (HCC)   • Type 2 diabetes mellitus with hyperglycemia (HCC)   • Gastric reflux   • Herniated disc   • Hypertension   • Hypothyroidism   • Low back pain   • Pain in joint, multiple sites   • Pain in soft tissues of limb   • Shortness of breath   • Hyperlipidemia   • B12 deficiency   • Vitamin D deficiency   • UTI (urinary tract infection)   • Metabolic syndrome   • Acne rosacea   • Hx of smoking   • Stress incontinence of urine   • Arteriosclerosis of abdominal aorta (HCC)   • Iron deficiency anemia secondary to inadequate dietary iron intake   • Seasonal allergies   • Hyponatremia   • Leg length discrepancy   • Type 2 diabetes mellitus with hyperglycemia (HCC)   • Reactive depression   • Encounter for subsequent annual wellness visit (AWV) in Medicare patient     Advance Care Planning  Advance Directive is on file.  ACP discussion was held with the patient during this visit. Patient has an advance directive in EMR which is still valid.      Review of systems significant for no constipation no lower extremity edema no nausea or vomiting, slight fatigue, and urine incontinence     Objective    Vitals:    09/27/22 1052 09/27/22 1208   BP: 158/72 162/70   BP Location: Left arm Right arm   Patient Position: Sitting Sitting   Cuff Size: Large Adult Large Adult   Pulse: 60    Resp: 20    Temp: 97.5 °F (36.4 °C)    TempSrc: Temporal    SpO2: 97%    Weight: 89.2 kg (196 lb 9.6 oz)    Height: 162.6 cm (64.02\")    PainSc: 0-No pain      Estimated body mass index is 33.73 kg/m² as calculated from the following:    Height as of this encounter: 162.6 cm (64.02\").    Weight as of this encounter: 89.2 kg (196 lb 9.6 oz).    BMI is >= 30 and <35. (Class 1 Obesity). The following options were " offered after discussion;: weight loss educational material (shared in after visit summary), exercise counseling/recommendations and nutrition counseling/recommendations      Does the patient have evidence of cognitive impairment? No    Physical Exam  Vitals and nursing note reviewed.   Constitutional:       Appearance: Normal appearance. She is obese.   HENT:      Head: Normocephalic and atraumatic.   Cardiovascular:      Rate and Rhythm: Normal rate and regular rhythm.   Pulmonary:      Effort: Pulmonary effort is normal.      Breath sounds: Normal breath sounds.   Abdominal:      Palpations: Abdomen is soft.   Musculoskeletal:      Cervical back: Normal range of motion and neck supple.      Right lower leg: No edema.      Left lower leg: No edema.   Neurological:      General: No focal deficit present.      Mental Status: She is alert and oriented to person, place, and time.       Lab Results   Component Value Date    TRIG 77 2022    HDL 53 2022    LDL 46 2022    VLDL 16 2022    HGBA1C 5.70 (H) 2022            HEALTH RISK ASSESSMENT    Smoking Status:  Social History     Tobacco Use   Smoking Status Former Smoker   • Packs/day: 1.00   • Years: 12.00   • Pack years: 12.00   • Types: Cigarettes   • Quit date:    • Years since quittin.7   Smokeless Tobacco Never Used     Alcohol Consumption:  Social History     Substance and Sexual Activity   Alcohol Use Never     Fall Risk Screen:    KOREY Fall Risk Assessment was completed, and patient is at LOW risk for falls.Assessment completed on:2022    Depression Screening:  PHQ-2/PHQ-9 Depression Screening 2022   Retired PHQ-9 Total Score -   Retired Total Score -   Little Interest or Pleasure in Doing Things 0-->not at all   Feeling Down, Depressed or Hopeless 0-->not at all   PHQ-9: Brief Depression Severity Measure Score 0       Health Habits and Functional and Cognitive Screening:  Functional & Cognitive Status 2022    Do you have difficulty preparing food and eating? No   Do you have difficulty bathing yourself, getting dressed or grooming yourself? No   Do you have difficulty using the toilet? No   Do you have difficulty moving around from place to place? Yes   Do you have trouble with steps or getting out of a bed or a chair? Yes   Current Diet Well Balanced Diet        Current Diet Comment OVERALL BALANCED    Dental Exam Up to date   Eye Exam Up to date   Exercise (times per week) 0 times per week   Current Exercises Include No Regular Exercise   Do you need help using the phone?  No   Are you deaf or do you have serious difficulty hearing?  Yes   Do you need help with transportation? Yes   Do you need help shopping? Yes   Do you need help preparing meals?  No   Do you need help with housework?  Yes   Do you need help with laundry? No   Do you need help taking your medications? No   Do you need help managing money? No   Do you ever drive or ride in a car without wearing a seat belt? No   Have you felt unusual stress, anger or loneliness in the last month? No   Who do you live with? Spouse   If you need help, do you have trouble finding someone available to you? No   Have you been bothered in the last four weeks by sexual problems? No   Do you have difficulty concentrating, remembering or making decisions? No       Age-appropriate Screening Schedule:  Refer to the list below for future screening recommendations based on patient's age, sex and/or medical conditions. Orders for these recommended tests are listed in the plan section. The patient has been provided with a written plan.    Health Maintenance   Topic Date Due   • TDAP/TD VACCINES (1 - Tdap) Never done   • ZOSTER VACCINE (2 of 3) 11/26/2013   • DXA SCAN  01/02/2021   • DIABETIC EYE EXAM  06/14/2022   • INFLUENZA VACCINE  10/01/2022   • HEMOGLOBIN A1C  02/17/2023   • LIPID PANEL  08/17/2023   • URINE MICROALBUMIN  08/17/2023              Assessment & Plan   CMS  Preventative Services Quick Reference  Risk Factors Identified During Encounter  Immunizations Discussed/Encouraged (specific Immunizations; Tdap and Shingrix  Obesity/Overweight   The above risks/problems have been discussed with the patient.  Follow up actions/plans if indicated are seen below in the Assessment/Plan Section.  Pertinent information has been shared with the patient in the After Visit Summary.    Diagnoses and all orders for this visit:    1. Encounter for subsequent annual wellness visit (AWV) in Medicare patient (Primary)  Comments:  DEXA scan normal done in 2019  Declined any further mammograms or colonoscopies.  Try Ditropan XL for urinary incontinence  Recommend Tdap and Shingrix vaccines  Assessment & Plan:  Add amlodipine 5 mg for better blood pressure control      2. Primary hypertension  Assessment & Plan:  Not controlled .BP at home with systolic blood pressure in the 160s over 80s .patient's hydralazine was stopped because she was forgetting to take the noontime dose and only taking it twice a day.  Assessment hypertension-needs better control plan-continue diltiazem  mg and 120 mg daily.losartan-HCTZ 100-25 mg once daily.  Add amlodipine 5 mg once a day.    Monitor for lower extremity swelling side effect.  Goal is blood pressure less than 140/90    Orders:  -     amLODIPine (NORVASC) 5 MG tablet; Take 1 tablet by mouth Daily.  Dispense: 90 tablet; Refill: 1    3. Stress incontinence of urine  Comments:  Try Ditropan XL 10 mg once daily monitor for dry mouth.  Also do Kegel exercises if that if not better consider urology consult  Orders:  -     oxybutynin XL (Ditropan XL) 10 MG 24 hr tablet; Take 1 tablet by mouth Daily.  Dispense: 90 tablet; Refill: 1    4. Type 2 diabetes mellitus with hyperglycemia, without long-term current use of insulin ([facility])  Assessment & Plan:  Hemoglobin A1c down to 5.7  Now on metformin XR  500 mg twice daily instead of 3 tablets a day  Assessment: Type 2  diabetes mellitus doing well.  No diabetic retinopathy or nephropathy.  Plan: Continue weight control, diet control, metformin  mg twice daily.  Hemoglobin A1c and CMP next visit.       Orders:  -     glucose blood test strip; USE TEST STRIP ONCE DAILY IN THE MORNING TO MONITOR BLOOD SUGAR.  Dispense: 200 each; Refill: 3    5. Iron deficiency anemia secondary to inadequate dietary iron intake  Assessment & Plan:  Hemoglobin hematocrit have come down slightly.  Stool 4 months ago was negative for occult blood x1  Last colonoscopy was about 10 years ago.  Patient is 75 years old.  B12 and folate are normal.  Assessment:  Anemia rule out iron deficiency due to inadequate dietary iron.  Plan:Hemoccult cards-neg for blood-pt declines repeat cscope-we will her CBC and iron studies and if declines will send to hematology.      6. Pure hypercholesterolemia  Assessment & Plan:  Assessment: Hyperlipidemia well controlled.  Plan: Continue Lipitor 10 mg at bedtime.  Lipid panel on return to the office in 4 months      7. Other specified hypothyroidism  Assessment & Plan:  TFTs are normal.  Assessment: Hypothyroid on adequate supplement.  Plan: Continue Synthroid 75 mcg daily.      8. Obesity (BMI 30-39.9)    Plan  DEXA scan normal done in 2019  Declined any further mammograms or colonoscopies  Try Ditropan XL for urinary incontinence  Recommend Tdap and Shingrix vaccines at pharmacy  Follow Up:   Return for Next scheduled follow up.     An After Visit Summary and PPPS were made available to the patient.

## 2022-09-27 ENCOUNTER — OFFICE VISIT (OUTPATIENT)
Dept: INTERNAL MEDICINE | Facility: CLINIC | Age: 77
End: 2022-09-27

## 2022-09-27 VITALS
TEMPERATURE: 97.5 F | HEART RATE: 60 BPM | BODY MASS INDEX: 33.57 KG/M2 | SYSTOLIC BLOOD PRESSURE: 162 MMHG | OXYGEN SATURATION: 97 % | WEIGHT: 196.6 LBS | HEIGHT: 64 IN | RESPIRATION RATE: 20 BRPM | DIASTOLIC BLOOD PRESSURE: 70 MMHG

## 2022-09-27 DIAGNOSIS — D50.8 IRON DEFICIENCY ANEMIA SECONDARY TO INADEQUATE DIETARY IRON INTAKE: ICD-10-CM

## 2022-09-27 DIAGNOSIS — E11.65 TYPE 2 DIABETES MELLITUS WITH HYPERGLYCEMIA, WITHOUT LONG-TERM CURRENT USE OF INSULIN: ICD-10-CM

## 2022-09-27 DIAGNOSIS — I10 PRIMARY HYPERTENSION: ICD-10-CM

## 2022-09-27 DIAGNOSIS — E78.00 PURE HYPERCHOLESTEROLEMIA: ICD-10-CM

## 2022-09-27 DIAGNOSIS — E03.8 OTHER SPECIFIED HYPOTHYROIDISM: ICD-10-CM

## 2022-09-27 DIAGNOSIS — E66.9 OBESITY (BMI 30-39.9): ICD-10-CM

## 2022-09-27 DIAGNOSIS — N39.3 STRESS INCONTINENCE OF URINE: ICD-10-CM

## 2022-09-27 DIAGNOSIS — Z00.00 ENCOUNTER FOR SUBSEQUENT ANNUAL WELLNESS VISIT (AWV) IN MEDICARE PATIENT: Primary | ICD-10-CM

## 2022-09-27 PROCEDURE — 1159F MED LIST DOCD IN RCRD: CPT | Performed by: INTERNAL MEDICINE

## 2022-09-27 PROCEDURE — 1126F AMNT PAIN NOTED NONE PRSNT: CPT | Performed by: INTERNAL MEDICINE

## 2022-09-27 PROCEDURE — 1170F FXNL STATUS ASSESSED: CPT | Performed by: INTERNAL MEDICINE

## 2022-09-27 PROCEDURE — G0439 PPPS, SUBSEQ VISIT: HCPCS | Performed by: INTERNAL MEDICINE

## 2022-09-27 PROCEDURE — 99214 OFFICE O/P EST MOD 30 MIN: CPT | Performed by: INTERNAL MEDICINE

## 2022-09-27 RX ORDER — OXYBUTYNIN CHLORIDE 10 MG/1
10 TABLET, EXTENDED RELEASE ORAL DAILY
Qty: 90 TABLET | Refills: 1 | Status: SHIPPED | OUTPATIENT
Start: 2022-09-27 | End: 2023-02-23

## 2022-09-27 RX ORDER — AMLODIPINE BESYLATE 5 MG/1
5 TABLET ORAL DAILY
Qty: 90 TABLET | Refills: 1 | Status: SHIPPED | OUTPATIENT
Start: 2022-09-27 | End: 2023-03-20

## 2022-09-27 NOTE — ASSESSMENT & PLAN NOTE
Hemoglobin A1c down to 5.7  Now on metformin XR  500 mg twice daily instead of 3 tablets a day  Assessment: Type 2 diabetes mellitus doing well.  No diabetic retinopathy or nephropathy.  Plan: Continue weight control, diet control, metformin  mg twice daily.  Hemoglobin A1c and CMP next visit.

## 2022-09-27 NOTE — PATIENT INSTRUCTIONS
DEXA scan normal done in 2019  Declined any further mammograms or colonoscopies  Try Ditropan XL for urinary incontinence  Recommend Tdap and Shingrix vaccines at pharmacy  Start amlodipine 5 mg once daily goal is blood pressure less than 140/90

## 2022-09-27 NOTE — ASSESSMENT & PLAN NOTE
Hemoglobin hematocrit have come down slightly.  Stool 4 months ago was negative for occult blood x1  Last colonoscopy was about 10 years ago.  Patient is 75 years old.  B12 and folate are normal.  Assessment:  Anemia rule out iron deficiency due to inadequate dietary iron.  Plan:Hemoccult cards-neg for blood-pt declines repeat cscope-we will her CBC and iron studies and if declines will send to hematology.

## 2022-10-17 RX ORDER — ATORVASTATIN CALCIUM 10 MG/1
TABLET, FILM COATED ORAL
Qty: 90 TABLET | Refills: 0 | Status: SHIPPED | OUTPATIENT
Start: 2022-10-17 | End: 2023-01-16

## 2022-11-07 RX ORDER — BUPROPION HYDROCHLORIDE 300 MG/1
300 TABLET ORAL DAILY
Qty: 90 TABLET | Refills: 1 | Status: SHIPPED | OUTPATIENT
Start: 2022-11-07

## 2023-01-11 ENCOUNTER — LAB (OUTPATIENT)
Dept: LAB | Facility: HOSPITAL | Age: 78
End: 2023-01-11
Payer: MEDICARE

## 2023-01-11 DIAGNOSIS — E11.65 TYPE 2 DIABETES MELLITUS WITH HYPERGLYCEMIA, WITHOUT LONG-TERM CURRENT USE OF INSULIN: ICD-10-CM

## 2023-01-11 DIAGNOSIS — I10 PRIMARY HYPERTENSION: ICD-10-CM

## 2023-01-11 DIAGNOSIS — M21.70 LEG LENGTH DISCREPANCY: ICD-10-CM

## 2023-01-11 DIAGNOSIS — D50.8 IRON DEFICIENCY ANEMIA SECONDARY TO INADEQUATE DIETARY IRON INTAKE: ICD-10-CM

## 2023-01-11 DIAGNOSIS — E55.9 VITAMIN D DEFICIENCY: ICD-10-CM

## 2023-01-11 DIAGNOSIS — E56.9 VITAMIN DEFICIENCY: ICD-10-CM

## 2023-01-11 DIAGNOSIS — E53.8 B12 DEFICIENCY: ICD-10-CM

## 2023-01-11 DIAGNOSIS — F32.9 REACTIVE DEPRESSION: ICD-10-CM

## 2023-01-11 DIAGNOSIS — E78.00 PURE HYPERCHOLESTEROLEMIA: ICD-10-CM

## 2023-01-11 LAB
25(OH)D3 SERPL-MCNC: 46.6 NG/ML (ref 30–100)
ALBUMIN SERPL-MCNC: 4.4 G/DL (ref 3.5–5.2)
ALBUMIN/GLOB SERPL: 1.7 G/DL
ALP SERPL-CCNC: 59 U/L (ref 39–117)
ALT SERPL W P-5'-P-CCNC: 12 U/L (ref 1–33)
ANION GAP SERPL CALCULATED.3IONS-SCNC: 13 MMOL/L (ref 5–15)
AST SERPL-CCNC: 19 U/L (ref 1–32)
BASOPHILS # BLD AUTO: 0.07 10*3/MM3 (ref 0–0.2)
BASOPHILS NFR BLD AUTO: 0.8 % (ref 0–1.5)
BILIRUB SERPL-MCNC: 0.7 MG/DL (ref 0–1.2)
BUN SERPL-MCNC: 33 MG/DL (ref 8–23)
BUN/CREAT SERPL: 36.3 (ref 7–25)
CALCIUM SPEC-SCNC: 9.6 MG/DL (ref 8.6–10.5)
CHLORIDE SERPL-SCNC: 105 MMOL/L (ref 98–107)
CHOLEST SERPL-MCNC: 140 MG/DL (ref 0–200)
CO2 SERPL-SCNC: 23 MMOL/L (ref 22–29)
CREAT SERPL-MCNC: 0.91 MG/DL (ref 0.57–1)
DEPRECATED RDW RBC AUTO: 59.9 FL (ref 37–54)
EGFRCR SERPLBLD CKD-EPI 2021: 64.7 ML/MIN/1.73
EOSINOPHIL # BLD AUTO: 0.15 10*3/MM3 (ref 0–0.4)
EOSINOPHIL NFR BLD AUTO: 1.6 % (ref 0.3–6.2)
ERYTHROCYTE [DISTWIDTH] IN BLOOD BY AUTOMATED COUNT: 17.3 % (ref 12.3–15.4)
FERRITIN SERPL-MCNC: 508 NG/ML (ref 13–150)
FOLATE SERPL-MCNC: 14.3 NG/ML (ref 4.78–24.2)
GLOBULIN UR ELPH-MCNC: 2.6 GM/DL
GLUCOSE SERPL-MCNC: 119 MG/DL (ref 65–99)
HBA1C MFR BLD: 6.2 % (ref 4.8–5.6)
HCT VFR BLD AUTO: 29.7 % (ref 34–46.6)
HDLC SERPL-MCNC: 56 MG/DL (ref 40–60)
HGB BLD-MCNC: 9.9 G/DL (ref 12–15.9)
IMM GRANULOCYTES # BLD AUTO: 0.05 10*3/MM3 (ref 0–0.05)
IMM GRANULOCYTES NFR BLD AUTO: 0.5 % (ref 0–0.5)
IRON 24H UR-MRATE: 153 MCG/DL (ref 37–145)
IRON SATN MFR SERPL: 42 % (ref 20–50)
LDLC SERPL CALC-MCNC: 64 MG/DL (ref 0–100)
LDLC/HDLC SERPL: 1.11 {RATIO}
LYMPHOCYTES # BLD AUTO: 2.4 10*3/MM3 (ref 0.7–3.1)
LYMPHOCYTES NFR BLD AUTO: 26.3 % (ref 19.6–45.3)
MCH RBC QN AUTO: 32.1 PG (ref 26.6–33)
MCHC RBC AUTO-ENTMCNC: 33.3 G/DL (ref 31.5–35.7)
MCV RBC AUTO: 96.4 FL (ref 79–97)
MONOCYTES # BLD AUTO: 0.99 10*3/MM3 (ref 0.1–0.9)
MONOCYTES NFR BLD AUTO: 10.8 % (ref 5–12)
NEUTROPHILS NFR BLD AUTO: 5.48 10*3/MM3 (ref 1.7–7)
NEUTROPHILS NFR BLD AUTO: 60 % (ref 42.7–76)
NRBC BLD AUTO-RTO: 0.8 /100 WBC (ref 0–0.2)
PLATELET # BLD AUTO: 271 10*3/MM3 (ref 140–450)
PMV BLD AUTO: 11.1 FL (ref 6–12)
POTASSIUM SERPL-SCNC: 4.4 MMOL/L (ref 3.5–5.2)
PROT SERPL-MCNC: 7 G/DL (ref 6–8.5)
RBC # BLD AUTO: 3.08 10*6/MM3 (ref 3.77–5.28)
SODIUM SERPL-SCNC: 141 MMOL/L (ref 136–145)
T4 FREE SERPL-MCNC: 1.31 NG/DL (ref 0.93–1.7)
TIBC SERPL-MCNC: 362 MCG/DL (ref 298–536)
TRANSFERRIN SERPL-MCNC: 243 MG/DL (ref 200–360)
TRIGL SERPL-MCNC: 110 MG/DL (ref 0–150)
TSH SERPL DL<=0.05 MIU/L-ACNC: 1.94 UIU/ML (ref 0.27–4.2)
VIT B12 BLD-MCNC: 984 PG/ML (ref 211–946)
VLDLC SERPL-MCNC: 20 MG/DL (ref 5–40)
WBC NRBC COR # BLD: 9.14 10*3/MM3 (ref 3.4–10.8)

## 2023-01-11 PROCEDURE — 84466 ASSAY OF TRANSFERRIN: CPT

## 2023-01-11 PROCEDURE — 85025 COMPLETE CBC W/AUTO DIFF WBC: CPT

## 2023-01-11 PROCEDURE — 82306 VITAMIN D 25 HYDROXY: CPT

## 2023-01-11 PROCEDURE — 84439 ASSAY OF FREE THYROXINE: CPT

## 2023-01-11 PROCEDURE — 83036 HEMOGLOBIN GLYCOSYLATED A1C: CPT

## 2023-01-11 PROCEDURE — 36415 COLL VENOUS BLD VENIPUNCTURE: CPT

## 2023-01-11 PROCEDURE — 80053 COMPREHEN METABOLIC PANEL: CPT

## 2023-01-11 PROCEDURE — 84443 ASSAY THYROID STIM HORMONE: CPT

## 2023-01-11 PROCEDURE — 82607 VITAMIN B-12: CPT

## 2023-01-11 PROCEDURE — 83540 ASSAY OF IRON: CPT

## 2023-01-11 PROCEDURE — 80061 LIPID PANEL: CPT

## 2023-01-11 PROCEDURE — 82746 ASSAY OF FOLIC ACID SERUM: CPT

## 2023-01-11 PROCEDURE — 82728 ASSAY OF FERRITIN: CPT

## 2023-01-16 ENCOUNTER — TELEPHONE (OUTPATIENT)
Dept: INTERNAL MEDICINE | Facility: CLINIC | Age: 78
End: 2023-01-16

## 2023-01-16 RX ORDER — ATORVASTATIN CALCIUM 10 MG/1
TABLET, FILM COATED ORAL
Qty: 90 TABLET | Refills: 0 | Status: SHIPPED | OUTPATIENT
Start: 2023-01-16

## 2023-01-19 RX ORDER — METFORMIN HYDROCHLORIDE 500 MG/1
TABLET, EXTENDED RELEASE ORAL
Qty: 270 TABLET | Refills: 4 | Status: SHIPPED | OUTPATIENT
Start: 2023-01-19

## 2023-02-23 ENCOUNTER — OFFICE VISIT (OUTPATIENT)
Dept: INTERNAL MEDICINE | Facility: CLINIC | Age: 78
End: 2023-02-23
Payer: MEDICARE

## 2023-02-23 VITALS
BODY MASS INDEX: 34.15 KG/M2 | RESPIRATION RATE: 18 BRPM | OXYGEN SATURATION: 99 % | WEIGHT: 200 LBS | HEIGHT: 64 IN | SYSTOLIC BLOOD PRESSURE: 165 MMHG | HEART RATE: 68 BPM | DIASTOLIC BLOOD PRESSURE: 72 MMHG | TEMPERATURE: 98 F

## 2023-02-23 DIAGNOSIS — I10 PRIMARY HYPERTENSION: Primary | ICD-10-CM

## 2023-02-23 DIAGNOSIS — E55.9 VITAMIN D DEFICIENCY: ICD-10-CM

## 2023-02-23 DIAGNOSIS — N39.3 STRESS INCONTINENCE OF URINE: ICD-10-CM

## 2023-02-23 DIAGNOSIS — E53.8 B12 DEFICIENCY: ICD-10-CM

## 2023-02-23 DIAGNOSIS — E03.8 OTHER SPECIFIED HYPOTHYROIDISM: ICD-10-CM

## 2023-02-23 DIAGNOSIS — F32.9 REACTIVE DEPRESSION: ICD-10-CM

## 2023-02-23 DIAGNOSIS — E78.00 PURE HYPERCHOLESTEROLEMIA: ICD-10-CM

## 2023-02-23 DIAGNOSIS — E11.65 TYPE 2 DIABETES MELLITUS WITH HYPERGLYCEMIA, WITHOUT LONG-TERM CURRENT USE OF INSULIN: ICD-10-CM

## 2023-02-23 PROCEDURE — 99214 OFFICE O/P EST MOD 30 MIN: CPT

## 2023-02-23 NOTE — ASSESSMENT & PLAN NOTE
Stable. A1C is less than 6.5%.  Patient is on metformin xr 500mg 1 tab in the morning and 2 in the evening.  Recommend healthy diet, weight, exercise as tolerated.  Continue current medication regimen at this time.

## 2023-02-23 NOTE — PROGRESS NOTES
Chief Complaint  Establish Care (Pt states that she is being seen to establish care with Rochelle. She states that she was seeing Dr. Brown. Pt states that over all she is doing well. )    History of Present Illness  SUBJECTIVE  Ann-Marie Hughes presents to [facility] to establish care with myself.  Medical history reviewed.  Health Maintenance   Topic Date Due   • TDAP/TD VACCINES (1 - Tdap) Never done   • ZOSTER VACCINE (2 of 3) 11/26/2013   • DXA SCAN  01/02/2021   • COVID-19 Vaccine (4 - Booster for Moderna series) 02/11/2022   • INFLUENZA VACCINE  08/01/2022   • DIABETIC EYE EXAM  06/27/2023   • HEMOGLOBIN A1C  07/11/2023   • URINE MICROALBUMIN  08/17/2023   • ANNUAL WELLNESS VISIT  09/27/2023   • LIPID PANEL  01/11/2024   • HEPATITIS C SCREENING  Completed   • Pneumococcal Vaccine 65+  Completed     Declines DEXA scan  She states she did get her flu vaccine.  COVID-19 booster-patient will go to [place] so she can get Moderna as we only have Pfizer.      Past Medical History:   Diagnosis Date   • Acne rosacea 8/17/2021    DR.JEFF MOON    • Arteriosclerosis of abdominal aorta (HCC)    • B12 deficiency 8/17/2021   • DJD (degenerative joint disease)    • Hx of smoking 8/17/2021    QUIT IN 1976 AT AGE 31   • Hyperlipidemia 8/17/2021   • Hypertension    • Hypothyroidism    • Metabolic syndrome 8/17/2021   • KAREN (stress urinary incontinence, female) 8/17/2021   • UTI (urinary tract infection) 8/17/2021   • Vitamin D deficiency 8/17/2021   • Vitamin D deficiency       History reviewed. No pertinent family history.   Past Surgical History:   Procedure Laterality Date   • COLONOSCOPY  2011        Current Outpatient Medications:   •  amLODIPine (NORVASC) 5 MG tablet, Take 1 tablet by mouth Daily., Disp: 90 tablet, Rfl: 1  •  Aspirin Buf,CaCarb-MgCarb-MgO, 81 MG tablet, Take 81 mg by mouth Daily., Disp: , Rfl:   •  atorvastatin (LIPITOR) 10 MG tablet, TAKE 1 TABLET BY MOUTH EVERY  "DAY, Disp: 90 tablet, Rfl: 0  •  AZO-CRANBERRY PO, Take  by mouth Daily., Disp: , Rfl:   •  buPROPion XL (WELLBUTRIN XL) 300 MG 24 hr tablet, Take 1 tablet by mouth Daily., Disp: 90 tablet, Rfl: 1  •  Cholecalciferol 25 MCG (1000 UT) capsule, Vitamin D3 1,000 unit oral capsule take 1 capsule by oral route daily   Active, Disp: , Rfl:   •  cyanocobalamin (VITAMIN B-12) 2500 MCG tablet tablet, Take 2,500 mcg by mouth Daily., Disp: , Rfl:   •  dilTIAZem CD (Cardizem CD) 120 MG 24 hr capsule, Take 1 capsule by mouth in the morning continue with the diltiazem CD turn 40 mg p.o. every a.m., Disp: 90 capsule, Rfl: 1  •  dilTIAZem CD (CARDIZEM CD) 240 MG 24 hr capsule, TAKE 1 CAPSULE BY MOUTH EVERY EVENING, Disp: 90 capsule, Rfl: 3  •  glucose blood test strip, USE TEST STRIP ONCE DAILY IN THE MORNING TO MONITOR BLOOD SUGAR., Disp: 200 each, Rfl: 3  •  Lactobacillus (AZO COMPLETE FEMININE BALANCE PO), Take  by mouth Daily. 2 a day, Disp: , Rfl:   •  losartan-hydrochlorothiazide (HYZAAR) 100-25 MG per tablet, TAKE 1 TABLET BY MOUTH EVERY DAY, Disp: 90 tablet, Rfl: 3  •  MAGNESIUM PO, Take  by mouth Daily. 2 a day, Disp: , Rfl:   •  metFORMIN ER (GLUCOPHAGE-XR) 500 MG 24 hr tablet, TAKE 1 TABLET BY MOUTH EVERY MORNING AND 2 TABLETS AT SUPPER, Disp: 270 tablet, Rfl: 4  •  montelukast (SINGULAIR) 10 MG tablet, TAKE 1 TABLET BY MOUTH EVERY DAY, Disp: 90 tablet, Rfl: 3  •  Probiotic Product (PROBIOTIC PO), Take  by mouth Daily., Disp: , Rfl:   •  Synthroid 75 MCG tablet, TAKE 1 TABLET BY MOUTH EVERY DAY, Disp: 90 tablet, Rfl: 3  •  TURMERIC PO, Take  by mouth Daily., Disp: , Rfl:   •  Mirabegron ER (Myrbetriq) 25 MG tablet sustained-release 24 hour 24 hr tablet, Take 1 tablet by mouth Daily., Disp: 90 tablet, Rfl: 1    OBJECTIVE  Vital Signs:   /72 (BP Location: Left arm)   Pulse 68   Temp 98 °F (36.7 °C) (Temporal)   Resp 18   Ht 162.6 cm (64.02\")   Wt 90.7 kg (200 lb)   LMP  (LMP Unknown)   SpO2 99%   BMI 34.31 " "kg/m²    Estimated body mass index is 34.31 kg/m² as calculated from the following:    Height as of this encounter: 162.6 cm (64.02\").    Weight as of this encounter: 90.7 kg (200 lb).     Wt Readings from Last 3 Encounters:   02/23/23 90.7 kg (200 lb)   09/27/22 89.2 kg (196 lb 9.6 oz)   08/22/22 92.3 kg (203 lb 6.4 oz)     BP Readings from Last 3 Encounters:   02/23/23 165/72   09/27/22 162/70   08/22/22 172/70       Physical Exam  Vitals and nursing note reviewed.   Constitutional:       Appearance: Normal appearance.   HENT:      Head: Normocephalic and atraumatic.   Eyes:      Extraocular Movements: Extraocular movements intact.      Conjunctiva/sclera: Conjunctivae normal.   Cardiovascular:      Rate and Rhythm: Normal rate and regular rhythm.      Heart sounds: Murmur heard.   Pulmonary:      Effort: Pulmonary effort is normal.      Breath sounds: Normal breath sounds.   Abdominal:      General: Abdomen is flat. Bowel sounds are normal. There is no distension.      Palpations: Abdomen is soft. There is no mass.      Tenderness: There is no abdominal tenderness. There is no right CVA tenderness, left CVA tenderness, guarding or rebound.      Hernia: No hernia is present.   Musculoskeletal:         General: Normal range of motion.      Cervical back: Normal range of motion.   Skin:     General: Skin is warm and dry.   Neurological:      General: No focal deficit present.      Mental Status: She is alert and oriented to person, place, and time. Mental status is at baseline.   Psychiatric:         Mood and Affect: Mood normal.         Behavior: Behavior normal.         Thought Content: Thought content normal.         Judgment: Judgment normal.          Result Review    CMP    CMP 4/13/22 8/17/22 1/11/23   Glucose 110 (A) 105 (A) 119 (A)   BUN 21 20 33 (A)   Creatinine 0.80 0.86 0.91   eGFR 76.0 69.7 64.7   Sodium 131 (A) 134 (A) 141   Potassium 4.5 4.6 4.4   Chloride 99 100 105   Calcium 9.9 9.4 9.6   Total " Protein 6.8 6.8 7.0   Albumin 4.30 4.20 4.4   Globulin 2.5 2.6 2.6   Total Bilirubin 0.6 0.5 0.7   Alkaline Phosphatase 59 62 59   AST (SGOT) 20 16 19   ALT (SGPT) 15 11 12   Albumin/Globulin Ratio 1.7 1.6 1.7   BUN/Creatinine Ratio 26.3 (A) 23.3 36.3 (A)   Anion Gap 9.0 12.0 13.0   (A) Abnormal value       Comments are available for some flowsheets but are not being displayed.           CBC w/diff    CBC w/Diff 4/13/22 8/17/22 1/11/23   WBC 7.97 10.16 9.14   RBC 3.25 (A) 3.12 (A) 3.08 (A)   Hemoglobin 9.9 (A) 9.6 (A) 9.9 (A)   Hematocrit 30.6 (A) 29.7 (A) 29.7 (A)   MCV 94.2 95.2 96.4   MCH 30.5 30.8 32.1   MCHC 32.4 32.3 33.3   RDW 17.2 (A) 16.9 (A) 17.3 (A)   Platelets 264 276 271   Neutrophil Rel % 61.2 61.9 60.0   Immature Granulocyte Rel % 0.4 0.5 0.5   Lymphocyte Rel % 24.3 21.4 26.3   Monocyte Rel % 11.8 13.0 (A) 10.8   Eosinophil Rel % 1.5 2.5 1.6   Basophil Rel % 0.8 0.7 0.8   (A) Abnormal value            Lipid Panel    Lipid Panel 4/13/22 8/17/22 1/11/23   Total Cholesterol 123 115 140   Triglycerides 87 77 110   HDL Cholesterol 50 53 56   VLDL Cholesterol 17 16 20   LDL Cholesterol  56 46 64   LDL/HDL Ratio 1.11 0.88 1.11           TSH    TSH 4/13/22 8/17/22 1/11/23   TSH 2.390 2.080 1.940           Lab Results   Component Value Date    WUDP17GE 46.6 01/11/2023     Lab Results   Component Value Date    FREET4 1.31 01/11/2023     Lab Results   Component Value Date    NTIECYQC72 984 (H) 01/11/2023     Lab Results   Component Value Date    RBCUA 0-2 08/17/2022    WBCUA 13-20 (A) 08/17/2022    BACTERIA None Seen 08/17/2022    SQUAMEPIUA 3-6 (A) 08/17/2022    HYALCASTU 0-2 08/17/2022    METHODOLOGY Automated Microscopy 08/17/2022    COLORU Yellow 08/17/2022    CLARITYU Clear 08/17/2022    PHUR 6.0 08/17/2022    SPECGRAVUR 1.016 08/17/2022    GLUCOSEU Negative 08/17/2022    KETONESU Negative 08/17/2022    BILIRUBINUR Negative 08/17/2022    BLOODU Negative 08/17/2022    PROTEINUA 100 mg/dL (2+) (A) 08/17/2022     LEUKOCYTESUR Moderate (2+) (A) 08/17/2022    NITRITEU Negative 08/17/2022    UROBILINOGEN 1.0 E.U./dL 08/17/2022       No Images in the past 120 days found..     The above data has been reviewed by LONA Rubi 02/23/2023 10:12 EST.          Patient Care Team:  Rochelle Brown APRN as PCP - General (Internal Medicine)         ASSESSMENT & PLAN    Diagnoses and all orders for this visit:    1. Primary hypertension (Primary)  Assessment & Plan:  Blood pressure has been elevated.  Blood pressure is 165/72 today in the office.  Patient states she has not been eating the healthiest or as active.  She is currently on diltiazem 360 mg daily, losartan-HCTZ 100- 25 mg daily and amlodipine once daily.  Patient does have chronic lower extremity swelling.  Patient states that she is not short of breath.  Would recommend patient to cut back on her salt intake, monitor blood pressure at home.  If readings are above 140/90 patient to let me know.    Orders:  -     CBC & Differential; Future  -     Comprehensive Metabolic Panel; Future  -     Lipid Panel; Future  -     Urinalysis With Microscopic - Urine, Clean Catch; Future  -     TSH Rfx On Abnormal To Free T4; Future  -     Magnesium; Future    2. Pure hypercholesterolemia  Assessment & Plan:  Lipids are very well controlled.  Continue current medication regimen including lipitor 10 mg qhs.  Recommend healthy diet and exercise as tolerated    Orders:  -     CBC & Differential; Future  -     Comprehensive Metabolic Panel; Future  -     Lipid Panel; Future  -     Urinalysis With Microscopic - Urine, Clean Catch; Future  -     TSH Rfx On Abnormal To Free T4; Future  -     Magnesium; Future    3. Vitamin D deficiency  Assessment & Plan:  Stable with supplementation.  Continue current.    Orders:  -     Vitamin D 25 hydroxy; Future    4. Type 2 diabetes mellitus with hyperglycemia, without long-term current use of insulin (HCC)  Assessment & Plan:  Stable. A1C is less than  6.5%.  Patient is on metformin xr 500mg 1 tab in the morning and 2 in the evening.  Recommend healthy diet, weight, exercise as tolerated.  Continue current medication regimen at this time.      Orders:  -     Hemoglobin A1c; Future    5. Reactive depression  Assessment & Plan:  Patient's depression is well controlled with Wellbutrin.  She has been on Wellbutrin for several years.  She feels like she is at a good dose and would like to just keep it the same.  Continue current regimen.      6. B12 deficiency  Assessment & Plan:  Adequate      7. Other specified hypothyroidism  Assessment & Plan:  Thyroid function tests are normal.  Continue current thyroid dose.      8. Stress incontinence of urine  Assessment & Plan:  She states that the ditropan caused lower extremity swelling. She is still having some stress incontinence.  We will add in myrbetriq.       Other orders  -     Mirabegron ER (Myrbetriq) 25 MG tablet sustained-release 24 hour 24 hr tablet; Take 1 tablet by mouth Daily.  Dispense: 90 tablet; Refill: 1       Tobacco Use: Medium Risk   • Smoking Tobacco Use: Former   • Smokeless Tobacco Use: Never   • Passive Exposure: Not on file       Follow Up     Return in about 4 months (around 6/23/2023).    Please note that portions of this note were completed with a voice recognition program.    Patient was given instructions and counseling regarding her condition or for health maintenance advice. Please see specific information pulled into the AVS if appropriate.   I have reviewed information obtained and documented by others and I have confirmed the accuracy of this documented note.    LONA Rubi

## 2023-02-23 NOTE — ASSESSMENT & PLAN NOTE
Lipids are very well controlled.  Continue current medication regimen including lipitor 10 mg qhs.  Recommend healthy diet and exercise as tolerated

## 2023-02-23 NOTE — ASSESSMENT & PLAN NOTE
Blood pressure has been elevated.  Blood pressure is 165/72 today in the office.  Patient states she has not been eating the healthiest or as active.  She is currently on diltiazem 360 mg daily, losartan-HCTZ 100- 25 mg daily and amlodipine once daily.  Patient does have chronic lower extremity swelling.  Patient states that she is not short of breath.  Would recommend patient to cut back on her salt intake, monitor blood pressure at home.  If readings are above 140/90 patient to let me know.

## 2023-02-23 NOTE — ASSESSMENT & PLAN NOTE
She states that the ditropan caused lower extremity swelling. She is still having some stress incontinence.  We will add in myrbetriq.

## 2023-02-23 NOTE — ASSESSMENT & PLAN NOTE
Patient's depression is well controlled with Wellbutrin.  She has been on Wellbutrin for several years.  She feels like she is at a good dose and would like to just keep it the same.  Continue current regimen.

## 2023-02-27 RX ORDER — DILTIAZEM HYDROCHLORIDE 240 MG/1
CAPSULE, COATED, EXTENDED RELEASE ORAL
Qty: 90 CAPSULE | Refills: 3 | Status: SHIPPED | OUTPATIENT
Start: 2023-02-27

## 2023-03-20 DIAGNOSIS — I10 PRIMARY HYPERTENSION: ICD-10-CM

## 2023-03-20 RX ORDER — AMLODIPINE BESYLATE 5 MG/1
TABLET ORAL
Qty: 90 TABLET | Refills: 0 | Status: SHIPPED | OUTPATIENT
Start: 2023-03-20

## 2023-04-18 RX ORDER — ATORVASTATIN CALCIUM 10 MG/1
TABLET, FILM COATED ORAL
Qty: 90 TABLET | Refills: 0 | Status: SHIPPED | OUTPATIENT
Start: 2023-04-18 | End: 2023-04-19 | Stop reason: SDUPTHER

## 2023-04-19 RX ORDER — ATORVASTATIN CALCIUM 10 MG/1
10 TABLET, FILM COATED ORAL DAILY
Qty: 90 TABLET | Refills: 1 | Status: SHIPPED | OUTPATIENT
Start: 2023-04-19

## 2023-04-19 NOTE — TELEPHONE ENCOUNTER
Rx Refill Note  Requested Prescriptions      No prescriptions requested or ordered in this encounter      Last office visit with prescribing clinician: 2/23/2023   Last telemedicine visit with prescribing clinician: 6/23/2023   Next office visit with prescribing clinician: 6/23/2023                         Would you like a call back once the refill request has been completed: [] Yes [] No    If the office needs to give you a call back, can they leave a voicemail: [] Yes [] No    Jennifer Witt MA  04/19/23, 15:22 EDT

## 2023-05-23 RX ORDER — BUPROPION HYDROCHLORIDE 300 MG/1
300 TABLET ORAL DAILY
Qty: 90 TABLET | Refills: 1 | Status: SHIPPED | OUTPATIENT
Start: 2023-05-23

## 2023-05-23 RX ORDER — LOSARTAN POTASSIUM AND HYDROCHLOROTHIAZIDE 25; 100 MG/1; MG/1
TABLET ORAL
Qty: 90 TABLET | Refills: 3 | Status: SHIPPED | OUTPATIENT
Start: 2023-05-23

## 2023-05-30 ENCOUNTER — APPOINTMENT (OUTPATIENT)
Dept: GENERAL RADIOLOGY | Facility: HOSPITAL | Age: 78
End: 2023-05-30
Payer: MEDICARE

## 2023-05-30 ENCOUNTER — HOSPITAL ENCOUNTER (EMERGENCY)
Facility: HOSPITAL | Age: 78
Discharge: HOME | End: 2023-05-30
Attending: EMERGENCY MEDICINE
Payer: MEDICARE

## 2023-05-30 VITALS
OXYGEN SATURATION: 94 % | DIASTOLIC BLOOD PRESSURE: 80 MMHG | BODY MASS INDEX: 30.9 KG/M2 | TEMPERATURE: 98.4 F | RESPIRATION RATE: 13 BRPM | HEART RATE: 79 BPM | SYSTOLIC BLOOD PRESSURE: 156 MMHG | HEIGHT: 64 IN | WEIGHT: 181 LBS

## 2023-05-30 DIAGNOSIS — J06.9 UPPER RESPIRATORY TRACT INFECTION, UNSPECIFIED TYPE: Primary | ICD-10-CM

## 2023-05-30 LAB
ALBUMIN SERPL-MCNC: 4.1 G/DL (ref 3.5–5.2)
ALBUMIN/GLOB SERPL: 1.5 G/DL
ALP SERPL-CCNC: 61 U/L (ref 39–117)
ALT SERPL W P-5'-P-CCNC: 8 U/L (ref 1–33)
ANION GAP SERPL CALCULATED.3IONS-SCNC: 12.4 MMOL/L (ref 5–15)
AST SERPL-CCNC: 14 U/L (ref 1–32)
BASOPHILS # BLD AUTO: 0.08 10*3/MM3 (ref 0–0.2)
BASOPHILS NFR BLD AUTO: 0.6 % (ref 0–1.5)
BILIRUB SERPL-MCNC: 0.6 MG/DL (ref 0–1.2)
BUN SERPL-MCNC: 15 MG/DL (ref 8–23)
BUN/CREAT SERPL: 19.5 (ref 7–25)
CALCIUM SPEC-SCNC: 9.6 MG/DL (ref 8.6–10.5)
CHLORIDE SERPL-SCNC: 100 MMOL/L (ref 98–107)
CO2 SERPL-SCNC: 24.6 MMOL/L (ref 22–29)
CREAT SERPL-MCNC: 0.77 MG/DL (ref 0.57–1)
DEPRECATED RDW RBC AUTO: 65.4 FL (ref 37–54)
EGFRCR SERPLBLD CKD-EPI 2021: 79.1 ML/MIN/1.73
EOSINOPHIL # BLD AUTO: 0.1 10*3/MM3 (ref 0–0.4)
EOSINOPHIL NFR BLD AUTO: 0.8 % (ref 0.3–6.2)
ERYTHROCYTE [DISTWIDTH] IN BLOOD BY AUTOMATED COUNT: 18.6 % (ref 12.3–15.4)
FLUAV AG NPH QL: NEGATIVE
FLUBV AG NPH QL IA: NEGATIVE
GEN 5 2HR TROPONIN T REFLEX: 55 NG/L
GLOBULIN UR ELPH-MCNC: 2.8 GM/DL
GLUCOSE SERPL-MCNC: 124 MG/DL (ref 65–99)
HCT VFR BLD AUTO: 31.1 % (ref 34–46.6)
HGB BLD-MCNC: 10.2 G/DL (ref 12–15.9)
HOLD SPECIMEN: NORMAL
HOLD SPECIMEN: NORMAL
IMM GRANULOCYTES # BLD AUTO: 0.09 10*3/MM3 (ref 0–0.05)
IMM GRANULOCYTES NFR BLD AUTO: 0.7 % (ref 0–0.5)
LYMPHOCYTES # BLD AUTO: 1.7 10*3/MM3 (ref 0.7–3.1)
LYMPHOCYTES NFR BLD AUTO: 13.4 % (ref 19.6–45.3)
MCH RBC QN AUTO: 31.9 PG (ref 26.6–33)
MCHC RBC AUTO-ENTMCNC: 32.8 G/DL (ref 31.5–35.7)
MCV RBC AUTO: 97.2 FL (ref 79–97)
MONOCYTES # BLD AUTO: 1.21 10*3/MM3 (ref 0.1–0.9)
MONOCYTES NFR BLD AUTO: 9.6 % (ref 5–12)
NEUTROPHILS NFR BLD AUTO: 74.9 % (ref 42.7–76)
NEUTROPHILS NFR BLD AUTO: 9.48 10*3/MM3 (ref 1.7–7)
NRBC BLD AUTO-RTO: 0.5 /100 WBC (ref 0–0.2)
NT-PROBNP SERPL-MCNC: 6067 PG/ML (ref 0–1800)
PLATELET # BLD AUTO: 286 10*3/MM3 (ref 140–450)
PMV BLD AUTO: 9.8 FL (ref 6–12)
POTASSIUM SERPL-SCNC: 3.9 MMOL/L (ref 3.5–5.2)
PROT SERPL-MCNC: 6.9 G/DL (ref 6–8.5)
RBC # BLD AUTO: 3.2 10*6/MM3 (ref 3.77–5.28)
S PYO AG THROAT QL: NEGATIVE
SODIUM SERPL-SCNC: 137 MMOL/L (ref 136–145)
TROPONIN T DELTA: 0 NG/L
TROPONIN T SERPL HS-MCNC: 55 NG/L
WBC NRBC COR # BLD: 12.66 10*3/MM3 (ref 3.4–10.8)
WHOLE BLOOD HOLD COAG: NORMAL
WHOLE BLOOD HOLD SPECIMEN: NORMAL

## 2023-05-30 PROCEDURE — 87081 CULTURE SCREEN ONLY: CPT | Performed by: EMERGENCY MEDICINE

## 2023-05-30 PROCEDURE — 87635 SARS-COV-2 COVID-19 AMP PRB: CPT

## 2023-05-30 PROCEDURE — 96374 THER/PROPH/DIAG INJ IV PUSH: CPT

## 2023-05-30 PROCEDURE — 71045 X-RAY EXAM CHEST 1 VIEW: CPT

## 2023-05-30 PROCEDURE — 99284 EMERGENCY DEPT VISIT MOD MDM: CPT

## 2023-05-30 PROCEDURE — 36415 COLL VENOUS BLD VENIPUNCTURE: CPT

## 2023-05-30 PROCEDURE — 94640 AIRWAY INHALATION TREATMENT: CPT

## 2023-05-30 PROCEDURE — 87804 INFLUENZA ASSAY W/OPTIC: CPT

## 2023-05-30 PROCEDURE — 83880 ASSAY OF NATRIURETIC PEPTIDE: CPT

## 2023-05-30 PROCEDURE — 84484 ASSAY OF TROPONIN QUANT: CPT | Performed by: EMERGENCY MEDICINE

## 2023-05-30 PROCEDURE — 80053 COMPREHEN METABOLIC PANEL: CPT

## 2023-05-30 PROCEDURE — 93005 ELECTROCARDIOGRAM TRACING: CPT | Performed by: EMERGENCY MEDICINE

## 2023-05-30 PROCEDURE — 87880 STREP A ASSAY W/OPTIC: CPT | Performed by: EMERGENCY MEDICINE

## 2023-05-30 PROCEDURE — 85025 COMPLETE CBC W/AUTO DIFF WBC: CPT

## 2023-05-30 PROCEDURE — 93005 ELECTROCARDIOGRAM TRACING: CPT

## 2023-05-30 PROCEDURE — 25010000002 FUROSEMIDE PER 20 MG: Performed by: EMERGENCY MEDICINE

## 2023-05-30 RX ORDER — FUROSEMIDE 10 MG/ML
40 INJECTION INTRAMUSCULAR; INTRAVENOUS ONCE
Status: COMPLETED | OUTPATIENT
Start: 2023-05-30 | End: 2023-05-30

## 2023-05-30 RX ORDER — ALBUTEROL SULFATE 90 UG/1
2 AEROSOL, METERED RESPIRATORY (INHALATION) EVERY 4 HOURS PRN
Qty: 18 G | Refills: 0 | Status: SHIPPED | OUTPATIENT
Start: 2023-05-30

## 2023-05-30 RX ORDER — IPRATROPIUM BROMIDE AND ALBUTEROL SULFATE 2.5; .5 MG/3ML; MG/3ML
3 SOLUTION RESPIRATORY (INHALATION) ONCE
Status: COMPLETED | OUTPATIENT
Start: 2023-05-30 | End: 2023-05-30

## 2023-05-30 RX ORDER — PREDNISONE 50 MG/1
50 TABLET ORAL DAILY
Qty: 5 TABLET | Refills: 0 | Status: SHIPPED | OUTPATIENT
Start: 2023-05-30

## 2023-05-30 RX ORDER — SODIUM CHLORIDE 0.9 % (FLUSH) 0.9 %
10 SYRINGE (ML) INJECTION AS NEEDED
Status: DISCONTINUED | OUTPATIENT
Start: 2023-05-30 | End: 2023-05-31 | Stop reason: HOSPADM

## 2023-05-30 RX ORDER — AZITHROMYCIN 250 MG/1
TABLET, FILM COATED ORAL
Qty: 6 TABLET | Refills: 0 | Status: SHIPPED | OUTPATIENT
Start: 2023-05-30

## 2023-05-30 RX ADMIN — IPRATROPIUM BROMIDE AND ALBUTEROL SULFATE 3 ML: .5; 2.5 SOLUTION RESPIRATORY (INHALATION) at 21:08

## 2023-05-30 RX ADMIN — FUROSEMIDE 40 MG: 10 INJECTION, SOLUTION INTRAMUSCULAR; INTRAVENOUS at 21:07

## 2023-05-30 NOTE — ED NOTES
Added pt on 2 L nasal cannula due to the fact that she was 89-90 RA. Pt tolerating well. 95% on 2L currently.

## 2023-05-30 NOTE — ED PROVIDER NOTES
Time: 6:10 PM EDT  Date of encounter:  5/30/2023  Independent Historian/Clinical History and Information was obtained by:   Patient  Chief Complaint   Patient presents with    Shortness of Breath    Cough       History is limited by: N/A    History of Present Illness:  Patient is a 77 y.o. year old female who presents to the emergency department for evaluation of cough, shortness of breath for 1 week.  Patient denies chest pain, fever/chills.  LONA Clayton    HPI    Patient Care Team  Primary Care Provider: Rochelle Brown APRN    Past Medical History:     Allergies   Allergen Reactions    Penicillins Palpitations     Past Medical History:   Diagnosis Date    Acne rosacea 8/17/2021    DR.JEFF MOON     Arteriosclerosis of abdominal aorta     B12 deficiency 8/17/2021    DJD (degenerative joint disease)     Hx of smoking 8/17/2021    QUIT IN 1976 AT AGE 31    Hyperlipidemia 8/17/2021    Hypertension     Hypothyroidism     Metabolic syndrome 8/17/2021    KAREN (stress urinary incontinence, female) 8/17/2021    UTI (urinary tract infection) 8/17/2021    Vitamin D deficiency 8/17/2021    Vitamin D deficiency      Past Surgical History:   Procedure Laterality Date    COLONOSCOPY  2011     History reviewed. No pertinent family history.    Home Medications:  Prior to Admission medications    Medication Sig Start Date End Date Taking? Authorizing Provider   amLODIPine (NORVASC) 5 MG tablet TAKE 1 TABLET BY MOUTH EVERY DAY 3/20/23   Roseanna Hernandez MD   Aspirin Buf,CaCarb-MgCarb-MgO, 81 MG tablet Take 81 mg by mouth Daily.    Provider, MD Kanwal   atorvastatin (LIPITOR) 10 MG tablet Take 1 tablet by mouth Daily. 4/19/23   Rochelle Brown APRN   AZO-CRANBERRY PO Take  by mouth Daily.    Provider, MD Kanwal   buPROPion XL (WELLBUTRIN XL) 300 MG 24 hr tablet TAKE 1 TABLET BY MOUTH DAILY 5/23/23   Rochelle Brown APRN   Cholecalciferol 25 MCG (1000 UT) capsule Vitamin D3 1,000 unit oral capsule take 1  capsule by oral route daily   Active    Kanwal Orozco MD   cyanocobalamin (VITAMIN B-12) 2500 MCG tablet tablet Take 2,500 mcg by mouth Daily.    Kanwal Orozco MD   dilTIAZem CD (Cardizem CD) 120 MG 24 hr capsule Take 1 capsule by mouth in the morning continue with the diltiazem CD turn 40 mg p.o. every a.m. 22   Roseanna Hernandez MD   dilTIAZem CD (CARDIZEM CD) 240 MG 24 hr capsule TAKE 1 CAPSULE BY MOUTH EVERY EVENING 23   Rochelle Brown APRN   glucose blood test strip USE TEST STRIP ONCE DAILY IN THE MORNING TO MONITOR BLOOD SUGAR. 22   Roseanna Hernandez MD   Lactobacillus (AZO COMPLETE FEMININE BALANCE PO) Take  by mouth Daily. 2 a day    Kanwal Orozco MD   losartan-hydrochlorothiazide (HYZAAR) 100-25 MG per tablet TAKE 1 TABLET BY MOUTH EVERY DAY 23   Rochelle Brown APRN   MAGNESIUM PO Take  by mouth Daily. 2 a day    Kanwal Orozco MD   metFORMIN ER (GLUCOPHAGE-XR) 500 MG 24 hr tablet TAKE 1 TABLET BY MOUTH EVERY MORNING AND 2 TABLETS AT SUPPER 23   Roseanna Hernandez MD   Mirabegron ER (Myrbetriq) 25 MG tablet sustained-release 24 hour 24 hr tablet Take 1 tablet by mouth Daily. 23   Rochelle Brown APRN   montelukast (SINGULAIR) 10 MG tablet TAKE 1 TABLET BY MOUTH EVERY DAY 22   Moriah Michelle MD   Probiotic Product (PROBIOTIC PO) Take  by mouth Daily.    Kanwal Orozco MD   Synthroid 75 MCG tablet TAKE 1 TABLET BY MOUTH EVERY DAY 22   Moriah Michelle MD   TURMERIC PO Take  by mouth Daily.    Kanwal Orozco MD        Social History:   Social History     Tobacco Use    Smoking status: Former     Packs/day: 1.00     Years: 12.00     Pack years: 12.00     Types: Cigarettes     Quit date:      Years since quittin.4    Smokeless tobacco: Never   Vaping Use    Vaping Use: Never used   Substance Use Topics    Alcohol use: Never    Drug use: Never         Review of Systems:  Review  "of Systems   Respiratory:  Positive for cough and shortness of breath.       Physical Exam:  /80   Pulse 79   Temp 98.4 °F (36.9 °C) (Oral)   Resp 13   Ht 162.6 cm (64\")   Wt 82.1 kg (181 lb)   LMP  (LMP Unknown)   SpO2 94%   BMI 31.07 kg/m²     Physical Exam  HENT:      Head: Normocephalic.      Mouth/Throat:      Mouth: Mucous membranes are moist.   Eyes:      Pupils: Pupils are equal, round, and reactive to light.   Pulmonary:      Effort: Pulmonary effort is normal.   Abdominal:      General: There is no distension.   Musculoskeletal:      Cervical back: Neck supple.   Skin:     General: Skin is warm and dry.   Neurological:      General: No focal deficit present.      Mental Status: She is alert and oriented to person, place, and time.   Psychiatric:         Mood and Affect: Mood normal.         Behavior: Behavior normal.                Procedures:  Procedures      Medical Decision Making:      Comorbidities that affect care:    Hypertension, Thyroid Disease    External Notes reviewed:    Previous Clinic Note: seen by new PCP on 2/23/23      The following orders were placed and all results were independently analyzed by me:  Orders Placed This Encounter   Procedures    COVID-19,APTIMA PANTHER(LUCIO),[facility], NP/OP SWAB IN UTM/VTM/SALINE TRANSPORT MEDIA,24 HR TAT - Swab, Nasal Cavity    Rapid Strep A Screen - Swab, Throat    Influenza Antigen, Rapid - Swab, Nasopharynx    Beta Strep Culture, Throat - Swab, Throat    XR Chest 1 View    [place] Draw    Comprehensive Metabolic Panel    BNP    Single High Sensitivity Troponin T    CBC Auto Differential    High Sensitivity Troponin T 2Hr    Undress & Gown    Continuous Pulse Oximetry    Vital Signs    Check Pulse Oximetry while ambulating    ECG 12 Lead ED Triage Standing Order; SOA    CBC & Differential    Green Top (Gel)    Lavender Top    Gold Top - SST    Light Blue Top       Medications Given in the Emergency Department:  Medications "   furosemide (LASIX) injection 40 mg (40 mg Intravenous Given 5/30/23 2107)   ipratropium-albuterol (DUO-NEB) nebulizer solution 3 mL (3 mL Nebulization Given 5/30/23 2108)        ED Course:    The patient was initially evaluated in the triage area where orders were placed. The patient was later dispositioned by Les Moreno DO.      The patient was advised to stay for completion of workup which includes but is not limited to communication of labs and radiological results, reassessment and plan. The patient was advised that leaving prior to disposition by a provider could result in critical findings that are not communicated to the patient.     ED Course as of 06/05/23 1639   Mon Jun 05, 2023   1614 EKG:    Rhythm: NSR  Rate: 87  Intervals: normal   T-wave: non-specific chnges  ST Segment: non-specific    EKG Comparison: not available    Interpreted by me   [NL]      ED Course User Index  [NL] Les Moreno DO       Labs:    Lab Results (last 24 hours)       ** No results found for the last 24 hours. **             Imaging:    No Radiology Exams Resulted Within Past 24 Hours      Differential Diagnosis and Discussion:      Dyspnea: Differential diagnosis includes but is not limited to metabolic acidosis, neurological disorders, psychogenic, asthma, pneumothorax, upper airway obstruction, COPD, pneumonia, noncardiogenic pulmonary edema, interstitial lung disease, anemia, congestive heart failure, and pulmonary embolism    All labs were reviewed and interpreted by me.  All X-rays impressions were independently interpreted by me.  EKG was interpreted by me.    MDM   Patient stable for discharge.        Patient Care Considerations:        Consultants/Shared Management Plan:    None    Social Determinants of Health:    Patient is independent, reliable, and has access to care.       Disposition and Care Coordination:    Discharged: The patient is suitable and stable for discharge with no need for consideration of  observation or admission.    I have explained discharge medications and the need for follow up with the patient/caretakers. This was also printed in the discharge instructions. Patient was discharged with the following medications and follow up:      Medication List        New Prescriptions      albuterol sulfate  (90 Base) MCG/ACT inhaler  Commonly known as: PROVENTIL HFA;VENTOLIN HFA;PROAIR HFA  Inhale 2 puffs Every 4 (Four) Hours As Needed for Wheezing.     azithromycin 250 MG tablet  Commonly known as: Zithromax Z-Saulo  Take 2 tablets by mouth on day 1, then 1 tablet daily on days 2-5     predniSONE 50 MG tablet  Commonly known as: DELTASONE  Take 1 tablet by mouth Daily.               Where to Get Your Medications        These medications were sent to [facility]/pharmacy #[id number] - [place], [place] - [address] - [phone]  - [phone]  [address] [place] [address] [place] [address]      Hours: 24-hours Phone: [phone]   albuterol sulfate  (90 Base) MCG/ACT inhaler  azithromycin 250 MG tablet  predniSONE 50 MG tablet      Rochelle Brown, LONA  [address]  [address]  [facility] [id number] [phone]    Schedule an appointment as soon as possible for a visit          Final diagnoses:   Upper respiratory tract infection, unspecified type        ED Disposition       ED Disposition   Discharge    Condition   Stable    Comment   --               This medical record created using voice recognition software.           Les Moreno DO  06/05/23 [id number]

## 2023-05-31 LAB
QT INTERVAL: 406 MS
SARS-COV-2 RNA RESP QL NAA+PROBE: NOT DETECTED

## 2023-05-31 NOTE — ED NOTES
Ambulated patient approximately  75 ft. Patients O2 stayed between 92-95%. Patient stated that she felt fine and was not short of breath during this time.

## 2023-06-01 LAB — BACTERIA SPEC AEROBE CULT: NORMAL

## 2023-06-12 DIAGNOSIS — I10 PRIMARY HYPERTENSION: ICD-10-CM

## 2023-06-13 RX ORDER — AMLODIPINE BESYLATE 5 MG/1
TABLET ORAL
Qty: 90 TABLET | Refills: 1 | Status: SHIPPED | OUTPATIENT
Start: 2023-06-13

## 2023-06-15 ENCOUNTER — LAB (OUTPATIENT)
Dept: LAB | Facility: HOSPITAL | Age: 78
End: 2023-06-15
Payer: MEDICARE

## 2023-06-15 DIAGNOSIS — E55.9 VITAMIN D DEFICIENCY: ICD-10-CM

## 2023-06-15 DIAGNOSIS — E78.00 PURE HYPERCHOLESTEROLEMIA: ICD-10-CM

## 2023-06-15 DIAGNOSIS — I10 PRIMARY HYPERTENSION: ICD-10-CM

## 2023-06-15 DIAGNOSIS — E11.65 TYPE 2 DIABETES MELLITUS WITH HYPERGLYCEMIA, WITHOUT LONG-TERM CURRENT USE OF INSULIN: ICD-10-CM

## 2023-06-15 LAB
25(OH)D3 SERPL-MCNC: 49.2 NG/ML (ref 30–100)
ALBUMIN SERPL-MCNC: 4.1 G/DL (ref 3.5–5.2)
ALBUMIN/GLOB SERPL: 1.4 G/DL
ALP SERPL-CCNC: 51 U/L (ref 39–117)
ALT SERPL W P-5'-P-CCNC: 14 U/L (ref 1–33)
ANION GAP SERPL CALCULATED.3IONS-SCNC: 10.4 MMOL/L (ref 5–15)
AST SERPL-CCNC: 14 U/L (ref 1–32)
BACTERIA UR QL AUTO: ABNORMAL /HPF
BASOPHILS # BLD AUTO: 0.06 10*3/MM3 (ref 0–0.2)
BASOPHILS NFR BLD AUTO: 0.4 % (ref 0–1.5)
BILIRUB SERPL-MCNC: 0.7 MG/DL (ref 0–1.2)
BILIRUB UR QL STRIP: NEGATIVE
BUN SERPL-MCNC: 20 MG/DL (ref 8–23)
BUN/CREAT SERPL: 22 (ref 7–25)
CALCIUM SPEC-SCNC: 10.1 MG/DL (ref 8.6–10.5)
CHLORIDE SERPL-SCNC: 102 MMOL/L (ref 98–107)
CHOLEST SERPL-MCNC: 122 MG/DL (ref 0–200)
CLARITY UR: CLEAR
CO2 SERPL-SCNC: 24.6 MMOL/L (ref 22–29)
COLOR UR: YELLOW
CREAT SERPL-MCNC: 0.91 MG/DL (ref 0.57–1)
DEPRECATED RDW RBC AUTO: 58.1 FL (ref 37–54)
EGFRCR SERPLBLD CKD-EPI 2021: 64.7 ML/MIN/1.73
EOSINOPHIL # BLD AUTO: 0.1 10*3/MM3 (ref 0–0.4)
EOSINOPHIL NFR BLD AUTO: 0.7 % (ref 0.3–6.2)
ERYTHROCYTE [DISTWIDTH] IN BLOOD BY AUTOMATED COUNT: 17.2 % (ref 12.3–15.4)
GLOBULIN UR ELPH-MCNC: 3 GM/DL
GLUCOSE SERPL-MCNC: 126 MG/DL (ref 65–99)
GLUCOSE UR STRIP-MCNC: NEGATIVE MG/DL
HBA1C MFR BLD: 6.1 % (ref 4.8–5.6)
HCT VFR BLD AUTO: 30.2 % (ref 34–46.6)
HDLC SERPL-MCNC: 62 MG/DL (ref 40–60)
HGB BLD-MCNC: 9.9 G/DL (ref 12–15.9)
HGB UR QL STRIP.AUTO: NEGATIVE
HYALINE CASTS UR QL AUTO: ABNORMAL /LPF
IMM GRANULOCYTES # BLD AUTO: 0.08 10*3/MM3 (ref 0–0.05)
IMM GRANULOCYTES NFR BLD AUTO: 0.6 % (ref 0–0.5)
KETONES UR QL STRIP: NEGATIVE
LDLC SERPL CALC-MCNC: 43 MG/DL (ref 0–100)
LDLC/HDLC SERPL: 0.68 {RATIO}
LEUKOCYTE ESTERASE UR QL STRIP.AUTO: ABNORMAL
LYMPHOCYTES # BLD AUTO: 2.07 10*3/MM3 (ref 0.7–3.1)
LYMPHOCYTES NFR BLD AUTO: 14.4 % (ref 19.6–45.3)
MAGNESIUM SERPL-MCNC: 1.9 MG/DL (ref 1.6–2.4)
MCH RBC QN AUTO: 30.9 PG (ref 26.6–33)
MCHC RBC AUTO-ENTMCNC: 32.8 G/DL (ref 31.5–35.7)
MCV RBC AUTO: 94.4 FL (ref 79–97)
MONOCYTES # BLD AUTO: 1.49 10*3/MM3 (ref 0.1–0.9)
MONOCYTES NFR BLD AUTO: 10.4 % (ref 5–12)
NEUTROPHILS NFR BLD AUTO: 10.58 10*3/MM3 (ref 1.7–7)
NEUTROPHILS NFR BLD AUTO: 73.5 % (ref 42.7–76)
NITRITE UR QL STRIP: NEGATIVE
NRBC BLD AUTO-RTO: 0.2 /100 WBC (ref 0–0.2)
PH UR STRIP.AUTO: 6.5 [PH] (ref 5–8)
PLATELET # BLD AUTO: 266 10*3/MM3 (ref 140–450)
PMV BLD AUTO: 11.4 FL (ref 6–12)
POTASSIUM SERPL-SCNC: 4.9 MMOL/L (ref 3.5–5.2)
PROT SERPL-MCNC: 7.1 G/DL (ref 6–8.5)
PROT UR QL STRIP: ABNORMAL
RBC # BLD AUTO: 3.2 10*6/MM3 (ref 3.77–5.28)
RBC # UR STRIP: ABNORMAL /HPF
REF LAB TEST METHOD: ABNORMAL
SODIUM SERPL-SCNC: 137 MMOL/L (ref 136–145)
SP GR UR STRIP: 1.02 (ref 1–1.03)
SQUAMOUS #/AREA URNS HPF: ABNORMAL /HPF
TRIGL SERPL-MCNC: 88 MG/DL (ref 0–150)
TSH SERPL DL<=0.05 MIU/L-ACNC: 1.13 UIU/ML (ref 0.27–4.2)
UROBILINOGEN UR QL STRIP: ABNORMAL
VLDLC SERPL-MCNC: 17 MG/DL (ref 5–40)
WBC # UR STRIP: ABNORMAL /HPF
WBC NRBC COR # BLD: 14.38 10*3/MM3 (ref 3.4–10.8)

## 2023-06-15 PROCEDURE — 83735 ASSAY OF MAGNESIUM: CPT

## 2023-06-15 PROCEDURE — 80053 COMPREHEN METABOLIC PANEL: CPT

## 2023-06-15 PROCEDURE — 81001 URINALYSIS AUTO W/SCOPE: CPT

## 2023-06-15 PROCEDURE — 84443 ASSAY THYROID STIM HORMONE: CPT

## 2023-06-15 PROCEDURE — 36415 COLL VENOUS BLD VENIPUNCTURE: CPT

## 2023-06-15 PROCEDURE — 85025 COMPLETE CBC W/AUTO DIFF WBC: CPT

## 2023-06-15 PROCEDURE — 82306 VITAMIN D 25 HYDROXY: CPT

## 2023-06-15 PROCEDURE — 80061 LIPID PANEL: CPT

## 2023-06-15 PROCEDURE — 83036 HEMOGLOBIN GLYCOSYLATED A1C: CPT

## 2023-06-28 PROBLEM — E66.2 CLASS 1 OBESITY WITH ALVEOLAR HYPOVENTILATION, SERIOUS COMORBIDITY, AND BODY MASS INDEX (BMI) OF 32.0 TO 32.9 IN ADULT: Status: ACTIVE | Noted: 2023-06-28

## 2023-06-28 PROBLEM — I50.31 ACUTE DIASTOLIC CHF (CONGESTIVE HEART FAILURE): Status: ACTIVE | Noted: 2023-06-28

## 2023-06-28 PROBLEM — E66.811 CLASS 1 OBESITY WITH ALVEOLAR HYPOVENTILATION, SERIOUS COMORBIDITY, AND BODY MASS INDEX (BMI) OF 32.0 TO 32.9 IN ADULT: Status: ACTIVE | Noted: 2023-06-28

## 2023-07-12 PROBLEM — I50.32 CHRONIC HEART FAILURE WITH PRESERVED EJECTION FRACTION: Status: ACTIVE | Noted: 2023-06-28

## 2023-07-12 PROBLEM — I25.118 CORONARY ARTERY DISEASE OF NATIVE ARTERY OF NATIVE HEART WITH STABLE ANGINA PECTORIS: Status: ACTIVE | Noted: 2023-07-12

## 2023-07-12 PROBLEM — I50.20 HEART FAILURE WITH REDUCED EJECTION FRACTION: Status: ACTIVE | Noted: 2023-06-28

## 2023-07-14 PROCEDURE — 80053 COMPREHEN METABOLIC PANEL: CPT

## 2023-07-14 PROCEDURE — 83880 ASSAY OF NATRIURETIC PEPTIDE: CPT

## 2023-07-16 ENCOUNTER — HOSPITAL ENCOUNTER (INPATIENT)
Facility: HOSPITAL | Age: 78
LOS: 2 days | Discharge: ANOTHER HOSPITAL | DRG: 280 | End: 2023-07-18
Attending: EMERGENCY MEDICINE | Admitting: HOSPITALIST
Payer: MEDICARE

## 2023-07-16 ENCOUNTER — APPOINTMENT (OUTPATIENT)
Dept: GENERAL RADIOLOGY | Facility: HOSPITAL | Age: 78
DRG: 280 | End: 2023-07-16
Payer: MEDICARE

## 2023-07-16 DIAGNOSIS — Z78.9 DECREASED ACTIVITIES OF DAILY LIVING (ADL): ICD-10-CM

## 2023-07-16 DIAGNOSIS — R07.9 CHEST PAIN, UNSPECIFIED TYPE: Primary | ICD-10-CM

## 2023-07-16 DIAGNOSIS — R07.2 PRECORDIAL PAIN: ICD-10-CM

## 2023-07-16 LAB
ALBUMIN SERPL-MCNC: 3.8 G/DL (ref 3.5–5.2)
ALBUMIN/GLOB SERPL: 1.5 G/DL
ALP SERPL-CCNC: 45 U/L (ref 39–117)
ALT SERPL W P-5'-P-CCNC: 12 U/L (ref 1–33)
ANION GAP SERPL CALCULATED.3IONS-SCNC: 10.9 MMOL/L (ref 5–15)
AST SERPL-CCNC: 19 U/L (ref 1–32)
BASOPHILS # BLD AUTO: 0.03 10*3/MM3 (ref 0–0.2)
BASOPHILS NFR BLD AUTO: 0.3 % (ref 0–1.5)
BILIRUB SERPL-MCNC: 0.5 MG/DL (ref 0–1.2)
BUN SERPL-MCNC: 35 MG/DL (ref 8–23)
BUN/CREAT SERPL: 25.5 (ref 7–25)
CALCIUM SPEC-SCNC: 8.7 MG/DL (ref 8.6–10.5)
CHLORIDE SERPL-SCNC: 104 MMOL/L (ref 98–107)
CHOLEST SERPL-MCNC: 97 MG/DL (ref 0–200)
CO2 SERPL-SCNC: 21.1 MMOL/L (ref 22–29)
CREAT SERPL-MCNC: 1.37 MG/DL (ref 0.57–1)
DEPRECATED RDW RBC AUTO: 66.9 FL (ref 37–54)
EGFRCR SERPLBLD CKD-EPI 2021: 39.6 ML/MIN/1.73
EOSINOPHIL # BLD AUTO: 0.13 10*3/MM3 (ref 0–0.4)
EOSINOPHIL NFR BLD AUTO: 1.5 % (ref 0.3–6.2)
ERYTHROCYTE [DISTWIDTH] IN BLOOD BY AUTOMATED COUNT: 18.8 % (ref 12.3–15.4)
FERRITIN SERPL-MCNC: 541.4 NG/ML (ref 13–150)
FOLATE SERPL-MCNC: 19 NG/ML (ref 4.78–24.2)
GEN 5 2HR TROPONIN T REFLEX: 82 NG/L
GLOBULIN UR ELPH-MCNC: 2.6 GM/DL
GLUCOSE BLDC GLUCOMTR-MCNC: 115 MG/DL (ref 70–99)
GLUCOSE BLDC GLUCOMTR-MCNC: 119 MG/DL (ref 70–99)
GLUCOSE BLDC GLUCOMTR-MCNC: 141 MG/DL (ref 70–99)
GLUCOSE SERPL-MCNC: 97 MG/DL (ref 65–99)
HCT VFR BLD AUTO: 27.6 % (ref 34–46.6)
HDLC SERPL-MCNC: 43 MG/DL (ref 40–60)
HEMOCCULT STL QL IA: NEGATIVE
HGB BLD-MCNC: 9.2 G/DL (ref 12–15.9)
HOLD SPECIMEN: NORMAL
HOLD SPECIMEN: NORMAL
IMM GRANULOCYTES # BLD AUTO: 0.02 10*3/MM3 (ref 0–0.05)
IMM GRANULOCYTES NFR BLD AUTO: 0.2 % (ref 0–0.5)
INR PPP: 1.21 (ref 0.86–1.15)
IRON 24H UR-MRATE: 100 MCG/DL (ref 37–145)
IRON SATN MFR SERPL: 34 % (ref 20–50)
LDLC SERPL CALC-MCNC: 38 MG/DL (ref 0–100)
LDLC/HDLC SERPL: 0.91 {RATIO}
LIPASE SERPL-CCNC: 20 U/L (ref 13–60)
LYMPHOCYTES # BLD AUTO: 2.46 10*3/MM3 (ref 0.7–3.1)
LYMPHOCYTES NFR BLD AUTO: 27.9 % (ref 19.6–45.3)
MAGNESIUM SERPL-MCNC: 1.6 MG/DL (ref 1.6–2.4)
MAGNESIUM SERPL-MCNC: 1.6 MG/DL (ref 1.6–2.4)
MAGNESIUM SERPL-MCNC: 2.1 MG/DL (ref 1.6–2.4)
MCH RBC QN AUTO: 32.1 PG (ref 26.6–33)
MCHC RBC AUTO-ENTMCNC: 33.3 G/DL (ref 31.5–35.7)
MCV RBC AUTO: 96.2 FL (ref 79–97)
MONOCYTES # BLD AUTO: 1.05 10*3/MM3 (ref 0.1–0.9)
MONOCYTES NFR BLD AUTO: 11.9 % (ref 5–12)
NEUTROPHILS NFR BLD AUTO: 5.12 10*3/MM3 (ref 1.7–7)
NEUTROPHILS NFR BLD AUTO: 58.2 % (ref 42.7–76)
NRBC BLD AUTO-RTO: 0 /100 WBC (ref 0–0.2)
NT-PROBNP SERPL-MCNC: 6199 PG/ML (ref 0–1800)
PHOSPHATE SERPL-MCNC: 4 MG/DL (ref 2.5–4.5)
PLATELET # BLD AUTO: 229 10*3/MM3 (ref 140–450)
PMV BLD AUTO: 9.9 FL (ref 6–12)
POTASSIUM SERPL-SCNC: 4.5 MMOL/L (ref 3.5–5.2)
PROT SERPL-MCNC: 6.4 G/DL (ref 6–8.5)
PROTHROMBIN TIME: 15.4 SECONDS (ref 11.8–14.9)
QT INTERVAL: 434 MS
QT INTERVAL: 439 MS
RBC # BLD AUTO: 2.87 10*6/MM3 (ref 3.77–5.28)
RETICS # AUTO: 0.03 10*6/MM3 (ref 0.02–0.13)
RETICS/RBC NFR AUTO: 1.12 % (ref 0.7–1.9)
SODIUM SERPL-SCNC: 136 MMOL/L (ref 136–145)
TIBC SERPL-MCNC: 291 MCG/DL (ref 298–536)
TRANSFERRIN SERPL-MCNC: 195 MG/DL (ref 200–360)
TRIGL SERPL-MCNC: 75 MG/DL (ref 0–150)
TROPONIN T DELTA: -3 NG/L
TROPONIN T SERPL HS-MCNC: 85 NG/L
VIT B12 BLD-MCNC: 855 PG/ML (ref 211–946)
VLDLC SERPL-MCNC: 16 MG/DL (ref 5–40)
WBC NRBC COR # BLD: 8.81 10*3/MM3 (ref 3.4–10.8)
WHOLE BLOOD HOLD COAG: NORMAL
WHOLE BLOOD HOLD SPECIMEN: NORMAL

## 2023-07-16 PROCEDURE — 83735 ASSAY OF MAGNESIUM: CPT | Performed by: FAMILY MEDICINE

## 2023-07-16 PROCEDURE — 84100 ASSAY OF PHOSPHORUS: CPT | Performed by: HOSPITALIST

## 2023-07-16 PROCEDURE — 84466 ASSAY OF TRANSFERRIN: CPT | Performed by: HOSPITALIST

## 2023-07-16 PROCEDURE — 82948 REAGENT STRIP/BLOOD GLUCOSE: CPT

## 2023-07-16 PROCEDURE — 25010000002 FUROSEMIDE PER 20 MG: Performed by: EMERGENCY MEDICINE

## 2023-07-16 PROCEDURE — 25010000002 ENOXAPARIN PER 10 MG: Performed by: HOSPITALIST

## 2023-07-16 PROCEDURE — 99222 1ST HOSP IP/OBS MODERATE 55: CPT | Performed by: INTERNAL MEDICINE

## 2023-07-16 PROCEDURE — 83540 ASSAY OF IRON: CPT | Performed by: HOSPITALIST

## 2023-07-16 PROCEDURE — 84484 ASSAY OF TROPONIN QUANT: CPT | Performed by: EMERGENCY MEDICINE

## 2023-07-16 PROCEDURE — 83690 ASSAY OF LIPASE: CPT | Performed by: EMERGENCY MEDICINE

## 2023-07-16 PROCEDURE — 93010 ELECTROCARDIOGRAM REPORT: CPT | Performed by: INTERNAL MEDICINE

## 2023-07-16 PROCEDURE — 93005 ELECTROCARDIOGRAM TRACING: CPT

## 2023-07-16 PROCEDURE — 25010000002 MAGNESIUM SULFATE 2 GM/50ML SOLUTION: Performed by: HOSPITALIST

## 2023-07-16 PROCEDURE — 83880 ASSAY OF NATRIURETIC PEPTIDE: CPT | Performed by: EMERGENCY MEDICINE

## 2023-07-16 PROCEDURE — 82274 ASSAY TEST FOR BLOOD FECAL: CPT | Performed by: HOSPITALIST

## 2023-07-16 PROCEDURE — 85045 AUTOMATED RETICULOCYTE COUNT: CPT | Performed by: HOSPITALIST

## 2023-07-16 PROCEDURE — 85025 COMPLETE CBC W/AUTO DIFF WBC: CPT | Performed by: EMERGENCY MEDICINE

## 2023-07-16 PROCEDURE — 85610 PROTHROMBIN TIME: CPT | Performed by: HOSPITALIST

## 2023-07-16 PROCEDURE — 82746 ASSAY OF FOLIC ACID SERUM: CPT | Performed by: HOSPITALIST

## 2023-07-16 PROCEDURE — 71045 X-RAY EXAM CHEST 1 VIEW: CPT

## 2023-07-16 PROCEDURE — 99285 EMERGENCY DEPT VISIT HI MDM: CPT

## 2023-07-16 PROCEDURE — 82728 ASSAY OF FERRITIN: CPT | Performed by: HOSPITALIST

## 2023-07-16 PROCEDURE — 83735 ASSAY OF MAGNESIUM: CPT | Performed by: EMERGENCY MEDICINE

## 2023-07-16 PROCEDURE — 83735 ASSAY OF MAGNESIUM: CPT | Performed by: HOSPITALIST

## 2023-07-16 PROCEDURE — 80053 COMPREHEN METABOLIC PANEL: CPT | Performed by: EMERGENCY MEDICINE

## 2023-07-16 PROCEDURE — 82607 VITAMIN B-12: CPT | Performed by: HOSPITALIST

## 2023-07-16 PROCEDURE — 80061 LIPID PANEL: CPT | Performed by: HOSPITALIST

## 2023-07-16 PROCEDURE — 99223 1ST HOSP IP/OBS HIGH 75: CPT | Performed by: HOSPITALIST

## 2023-07-16 PROCEDURE — 93005 ELECTROCARDIOGRAM TRACING: CPT | Performed by: EMERGENCY MEDICINE

## 2023-07-16 RX ORDER — DEXTROSE MONOHYDRATE 25 G/50ML
25 INJECTION, SOLUTION INTRAVENOUS
Status: DISCONTINUED | OUTPATIENT
Start: 2023-07-16 | End: 2023-07-18 | Stop reason: HOSPADM

## 2023-07-16 RX ORDER — SODIUM CHLORIDE 9 MG/ML
40 INJECTION, SOLUTION INTRAVENOUS AS NEEDED
Status: DISCONTINUED | OUTPATIENT
Start: 2023-07-16 | End: 2023-07-18 | Stop reason: HOSPADM

## 2023-07-16 RX ORDER — MAGNESIUM SULFATE HEPTAHYDRATE 40 MG/ML
2 INJECTION, SOLUTION INTRAVENOUS ONCE
Status: COMPLETED | OUTPATIENT
Start: 2023-07-16 | End: 2023-07-16

## 2023-07-16 RX ORDER — LEVOTHYROXINE SODIUM 0.07 MG/1
75 TABLET ORAL
Status: DISCONTINUED | OUTPATIENT
Start: 2023-07-16 | End: 2023-07-18 | Stop reason: HOSPADM

## 2023-07-16 RX ORDER — ONDANSETRON 4 MG/1
4 TABLET, FILM COATED ORAL EVERY 6 HOURS PRN
Status: DISCONTINUED | OUTPATIENT
Start: 2023-07-16 | End: 2023-07-17 | Stop reason: SDUPTHER

## 2023-07-16 RX ORDER — CARVEDILOL 6.25 MG/1
6.25 TABLET ORAL 2 TIMES DAILY
Status: DISCONTINUED | OUTPATIENT
Start: 2023-07-16 | End: 2023-07-18 | Stop reason: HOSPADM

## 2023-07-16 RX ORDER — BUPROPION HYDROCHLORIDE 150 MG/1
300 TABLET ORAL DAILY
Status: DISCONTINUED | OUTPATIENT
Start: 2023-07-16 | End: 2023-07-18 | Stop reason: HOSPADM

## 2023-07-16 RX ORDER — BISACODYL 5 MG/1
5 TABLET, DELAYED RELEASE ORAL DAILY PRN
Status: DISCONTINUED | OUTPATIENT
Start: 2023-07-16 | End: 2023-07-18 | Stop reason: HOSPADM

## 2023-07-16 RX ORDER — SODIUM CHLORIDE 9 MG/ML
75 INJECTION, SOLUTION INTRAVENOUS CONTINUOUS
Status: DISCONTINUED | OUTPATIENT
Start: 2023-07-16 | End: 2023-07-16

## 2023-07-16 RX ORDER — ALBUTEROL SULFATE 90 UG/1
2 AEROSOL, METERED RESPIRATORY (INHALATION) EVERY 4 HOURS PRN
Status: DISCONTINUED | OUTPATIENT
Start: 2023-07-16 | End: 2023-07-18 | Stop reason: HOSPADM

## 2023-07-16 RX ORDER — MONTELUKAST SODIUM 10 MG/1
10 TABLET ORAL DAILY
Status: DISCONTINUED | OUTPATIENT
Start: 2023-07-16 | End: 2023-07-18 | Stop reason: HOSPADM

## 2023-07-16 RX ORDER — NICOTINE POLACRILEX 4 MG
15 LOZENGE BUCCAL
Status: DISCONTINUED | OUTPATIENT
Start: 2023-07-16 | End: 2023-07-18 | Stop reason: HOSPADM

## 2023-07-16 RX ORDER — SODIUM CHLORIDE 0.9 % (FLUSH) 0.9 %
10 SYRINGE (ML) INJECTION EVERY 12 HOURS SCHEDULED
Status: DISCONTINUED | OUTPATIENT
Start: 2023-07-16 | End: 2023-07-18 | Stop reason: HOSPADM

## 2023-07-16 RX ORDER — INSULIN LISPRO 100 [IU]/ML
2-9 INJECTION, SOLUTION INTRAVENOUS; SUBCUTANEOUS
Status: DISCONTINUED | OUTPATIENT
Start: 2023-07-16 | End: 2023-07-18 | Stop reason: HOSPADM

## 2023-07-16 RX ORDER — ACETAMINOPHEN 160 MG/5ML
650 SOLUTION ORAL EVERY 4 HOURS PRN
Status: DISCONTINUED | OUTPATIENT
Start: 2023-07-16 | End: 2023-07-17 | Stop reason: SDUPTHER

## 2023-07-16 RX ORDER — FUROSEMIDE 10 MG/ML
60 INJECTION INTRAMUSCULAR; INTRAVENOUS ONCE
Status: COMPLETED | OUTPATIENT
Start: 2023-07-16 | End: 2023-07-16

## 2023-07-16 RX ORDER — NICOTINE POLACRILEX 4 MG
15 LOZENGE BUCCAL
Status: DISCONTINUED | OUTPATIENT
Start: 2023-07-16 | End: 2023-07-16

## 2023-07-16 RX ORDER — ENOXAPARIN SODIUM 100 MG/ML
40 INJECTION SUBCUTANEOUS DAILY
Status: DISCONTINUED | OUTPATIENT
Start: 2023-07-16 | End: 2023-07-18 | Stop reason: HOSPADM

## 2023-07-16 RX ORDER — ASPIRIN 81 MG/1
81 TABLET ORAL DAILY
COMMUNITY

## 2023-07-16 RX ORDER — ONDANSETRON 2 MG/ML
4 INJECTION INTRAMUSCULAR; INTRAVENOUS EVERY 6 HOURS PRN
Status: DISCONTINUED | OUTPATIENT
Start: 2023-07-16 | End: 2023-07-17 | Stop reason: SDUPTHER

## 2023-07-16 RX ORDER — ASPIRIN 81 MG/1
324 TABLET, CHEWABLE ORAL ONCE
Status: DISCONTINUED | OUTPATIENT
Start: 2023-07-16 | End: 2023-07-18 | Stop reason: HOSPADM

## 2023-07-16 RX ORDER — POLYETHYLENE GLYCOL 3350 17 G/17G
17 POWDER, FOR SOLUTION ORAL DAILY PRN
Status: DISCONTINUED | OUTPATIENT
Start: 2023-07-16 | End: 2023-07-18 | Stop reason: HOSPADM

## 2023-07-16 RX ORDER — METFORMIN HYDROCHLORIDE 500 MG/1
1000 TABLET, EXTENDED RELEASE ORAL
COMMUNITY

## 2023-07-16 RX ORDER — SODIUM CHLORIDE 0.9 % (FLUSH) 0.9 %
10 SYRINGE (ML) INJECTION AS NEEDED
Status: DISCONTINUED | OUTPATIENT
Start: 2023-07-16 | End: 2023-07-18 | Stop reason: HOSPADM

## 2023-07-16 RX ORDER — ATORVASTATIN CALCIUM 10 MG/1
10 TABLET, FILM COATED ORAL DAILY
Status: DISCONTINUED | OUTPATIENT
Start: 2023-07-16 | End: 2023-07-18 | Stop reason: HOSPADM

## 2023-07-16 RX ORDER — AMOXICILLIN 250 MG
2 CAPSULE ORAL 2 TIMES DAILY
Status: DISCONTINUED | OUTPATIENT
Start: 2023-07-16 | End: 2023-07-18 | Stop reason: HOSPADM

## 2023-07-16 RX ORDER — ACETAMINOPHEN 650 MG/1
650 SUPPOSITORY RECTAL EVERY 4 HOURS PRN
Status: DISCONTINUED | OUTPATIENT
Start: 2023-07-16 | End: 2023-07-17 | Stop reason: SDUPTHER

## 2023-07-16 RX ORDER — DEXTROSE MONOHYDRATE 25 G/50ML
25 INJECTION, SOLUTION INTRAVENOUS
Status: DISCONTINUED | OUTPATIENT
Start: 2023-07-16 | End: 2023-07-16

## 2023-07-16 RX ORDER — BISACODYL 10 MG
10 SUPPOSITORY, RECTAL RECTAL DAILY PRN
Status: DISCONTINUED | OUTPATIENT
Start: 2023-07-16 | End: 2023-07-18 | Stop reason: HOSPADM

## 2023-07-16 RX ORDER — ASPIRIN 81 MG/1
81 TABLET ORAL DAILY
Status: DISCONTINUED | OUTPATIENT
Start: 2023-07-17 | End: 2023-07-18 | Stop reason: HOSPADM

## 2023-07-16 RX ORDER — ACETAMINOPHEN 325 MG/1
650 TABLET ORAL EVERY 4 HOURS PRN
Status: DISCONTINUED | OUTPATIENT
Start: 2023-07-16 | End: 2023-07-17 | Stop reason: SDUPTHER

## 2023-07-16 RX ORDER — INSULIN LISPRO 100 [IU]/ML
3-14 INJECTION, SOLUTION INTRAVENOUS; SUBCUTANEOUS
Status: DISCONTINUED | OUTPATIENT
Start: 2023-07-16 | End: 2023-07-16

## 2023-07-16 RX ADMIN — ENOXAPARIN SODIUM 40 MG: 100 INJECTION SUBCUTANEOUS at 14:54

## 2023-07-16 RX ADMIN — MAGNESIUM SULFATE IN WATER 2 G: 40 INJECTION, SOLUTION INTRAVENOUS at 14:54

## 2023-07-16 RX ADMIN — MONTELUKAST 10 MG: 10 TABLET, FILM COATED ORAL at 14:54

## 2023-07-16 RX ADMIN — SACUBITRIL AND VALSARTAN 1 TABLET: 49; 51 TABLET, FILM COATED ORAL at 20:53

## 2023-07-16 RX ADMIN — Medication 10 ML: at 20:53

## 2023-07-16 RX ADMIN — EMPAGLIFLOZIN 10 MG: 10 TABLET, FILM COATED ORAL at 14:54

## 2023-07-16 RX ADMIN — CARVEDILOL 6.25 MG: 6.25 TABLET, FILM COATED ORAL at 14:55

## 2023-07-16 RX ADMIN — BUPROPION HYDROCHLORIDE 300 MG: 150 TABLET, EXTENDED RELEASE ORAL at 14:54

## 2023-07-16 RX ADMIN — ATORVASTATIN CALCIUM 10 MG: 10 TABLET, FILM COATED ORAL at 14:55

## 2023-07-16 RX ADMIN — LEVOTHYROXINE SODIUM 75 MCG: 75 TABLET ORAL at 14:55

## 2023-07-16 RX ADMIN — FUROSEMIDE 60 MG: 10 INJECTION, SOLUTION INTRAMUSCULAR; INTRAVENOUS at 08:34

## 2023-07-16 RX ADMIN — CARVEDILOL 6.25 MG: 6.25 TABLET, FILM COATED ORAL at 20:53

## 2023-07-16 RX ADMIN — Medication 10 ML: at 14:55

## 2023-07-16 NOTE — ED PROVIDER NOTES
Time: 3:00 PM EDT  Date of encounter:  7/16/2023  Independent Historian/Clinical History and Information was obtained by:   Patient    History is limited by: N/A    Chief Complaint: chest pain      History of Present Illness:  Patient is a 77 y.o. year old female who presents to the emergency department for evaluation of chest pain.  Patient denies fever and chills.  Patient has no shortness of breath.  Patient denies cough hemoptysis.  Chest pain is described as substernal pressure.  Patient reports nausea with no emesis.  Patient has no leg pain or swelling.  Patient denies abdominal pain.    HPI    Patient Care Team  Primary Care Provider: Rochelle Brown APRN    Past Medical History:     Allergies   Allergen Reactions    Penicillins Palpitations     Past Medical History:   Diagnosis Date    Acne rosacea 8/17/2021    DR.JEFF MOON     Arteriosclerosis of abdominal aorta     B12 deficiency 8/17/2021    DJD (degenerative joint disease)     Hx of smoking 8/17/2021    QUIT IN 1976 AT AGE 31    Hyperlipidemia 8/17/2021    Hypertension     Hypothyroidism     Metabolic syndrome 8/17/2021    KAREN (stress urinary incontinence, female) 8/17/2021    UTI (urinary tract infection) 8/17/2021    Vitamin D deficiency 8/17/2021    Vitamin D deficiency      Past Surgical History:   Procedure Laterality Date    COLONOSCOPY  2011     History reviewed. No pertinent family history.    Home Medications:  Prior to Admission medications    Medication Sig Start Date End Date Taking? Authorizing Provider   albuterol sulfate  (90 Base) MCG/ACT inhaler Inhale 2 puffs Every 4 (Four) Hours As Needed for Wheezing. 5/30/23  Yes Les Moreno,    amLODIPine (NORVASC) 5 MG tablet Take 1 tablet by mouth Daily As Needed (For systolic blood pressure 140 or greater). 6/28/23  Yes Valerie Valera MD   aspirin 81 MG EC tablet Take 1 tablet by mouth Daily.   Yes Provider, MD Kanwal   atorvastatin (LIPITOR) 10 MG tablet Take 1 tablet by  mouth Daily. 4/19/23  Yes Rochelle Brown APRN   AZO-CRANBERRY PO Take 1 tablet by mouth Daily.   Yes Kanwal Orozco MD   buPROPion XL (WELLBUTRIN XL) 300 MG 24 hr tablet TAKE 1 TABLET BY MOUTH DAILY 5/23/23  Yes Rochelle Brown APRN   carvedilol (COREG) 6.25 MG tablet Take 1 tablet by mouth 2 (Two) Times a Day. 6/28/23  Yes Valerie Valera MD   furosemide (Lasix) 40 MG tablet Take 1 tablet by mouth Daily. 7/14/23  Yes Rochelle Brown APRN   metFORMIN ER (GLUCOPHAGE-XR) 500 MG 24 hr tablet TAKE 1 TABLET BY MOUTH EVERY MORNING AND 2 TABLETS AT SUPPER  Patient taking differently: 1 tablet Daily With Breakfast. 1/19/23  Yes Roseanna Hernandez MD   metFORMIN ER (GLUCOPHAGE-XR) 500 MG 24 hr tablet Take 2 tablets by mouth Daily With Dinner.   Yes Kanwal Orozco MD   montelukast (SINGULAIR) 10 MG tablet Take 1 tablet by mouth Daily. 7/5/23  Yes Rochelle Brown APRN   Probiotic Product (PROBIOTIC PO) Take 1 capsule by mouth Daily.   Yes Kanwal Orozco MD   sacubitril-valsartan (Entresto)  MG tablet Take 1 tablet by mouth 2 (Two) Times a Day. 7/12/23  Yes Valerie Valera MD   spironolactone (ALDACTONE) 25 MG tablet Take 1 tablet by mouth Daily. 6/28/23  Yes Valerie Valera MD   Synthroid 75 MCG tablet Take 1 tablet by mouth Daily. 7/10/23  Yes Rochelle Brown APRN   dapagliflozin Propanediol (Farxiga) 10 MG tablet Take 10 mg by mouth Daily. 7/12/23   Valerie Valera MD   Aspirin Buf,CaCarb-MgCarb-MgO, 81 MG tablet Take 81 mg by mouth Daily.  7/16/23  Kanwal Orozco MD   Cholecalciferol 25 MCG (1000 UT) capsule Vitamin D3 1,000 unit oral capsule take 1 capsule by oral route daily   Active  Patient not taking: Reported on 7/14/2023 7/16/23  Kanwal Orozco MD   cyanocobalamin (VITAMIN B-12) 2500 MCG tablet tablet Take 1 tablet by mouth 2 (Two) Times a Week.  Patient not taking: Reported on 7/14/2023 7/16/23  Provider, Kanwal, MD   glucose blood test strip USE TEST STRIP ONCE  "DAILY IN THE MORNING TO MONITOR BLOOD SUGAR. 22  Roseanna Hernandez MD        Social History:   Social History     Tobacco Use    Smoking status: Former     Packs/day: 1.00     Years: 12.00     Pack years: 12.00     Types: Cigarettes     Quit date:      Years since quittin.5    Smokeless tobacco: Never   Vaping Use    Vaping Use: Never used   Substance Use Topics    Alcohol use: Never    Drug use: Never         Review of Systems:  Review of Systems   Constitutional:  Negative for chills and fever.   HENT:  Negative for congestion, rhinorrhea and sore throat.    Eyes:  Negative for pain and visual disturbance.   Respiratory:  Negative for apnea, cough, chest tightness and shortness of breath.    Cardiovascular:  Positive for chest pain. Negative for palpitations.   Gastrointestinal:  Negative for abdominal pain, diarrhea, nausea and vomiting.   Genitourinary:  Negative for difficulty urinating and dysuria.   Musculoskeletal:  Negative for joint swelling and myalgias.   Skin:  Negative for color change.   Neurological:  Negative for seizures and headaches.   Psychiatric/Behavioral: Negative.     All other systems reviewed and are negative.     Physical Exam:  /70 (BP Location: Right arm, Patient Position: Sitting)   Pulse 71   Temp 97.9 °F (36.6 °C) (Oral)   Resp 20   Ht 162.6 cm (64\")   Wt 92.1 kg (203 lb 0.7 oz)   LMP  (LMP Unknown)   SpO2 100%   BMI 34.85 kg/m²     Physical Exam  Vitals and nursing note reviewed.   Constitutional:       General: She is not in acute distress.     Appearance: Normal appearance. She is not toxic-appearing.   HENT:      Head: Normocephalic and atraumatic.      Jaw: There is normal jaw occlusion.   Eyes:      General: Lids are normal.      Extraocular Movements: Extraocular movements intact.      Conjunctiva/sclera: Conjunctivae normal.      Pupils: Pupils are equal, round, and reactive to light.   Cardiovascular:      Rate and Rhythm: " Normal rate and regular rhythm.      Pulses: Normal pulses.      Heart sounds: Normal heart sounds.   Pulmonary:      Effort: Pulmonary effort is normal. No respiratory distress.      Breath sounds: Normal breath sounds. No wheezing or rhonchi.   Abdominal:      General: Abdomen is flat.      Palpations: Abdomen is soft.      Tenderness: There is no abdominal tenderness. There is no guarding or rebound.   Musculoskeletal:         General: Normal range of motion.      Cervical back: Normal range of motion and neck supple.      Right lower leg: No edema.      Left lower leg: No edema.   Skin:     General: Skin is warm and dry.   Neurological:      Mental Status: She is alert and oriented to person, place, and time. Mental status is at baseline.   Psychiatric:         Mood and Affect: Mood normal.                Procedures:  Procedures      Medical Decision Making:      Comorbidities that affect care:    Diabetes, Hypertension    External Notes reviewed:    Previous Clinic Note: Patient was seen by Dr. Valera for diastolic congestive heart failure.      The following orders were placed and all results were independently analyzed by me:  Orders Placed This Encounter   Procedures    XR Chest 1 View    Parachute Draw    High Sensitivity Troponin T    Comprehensive Metabolic Panel    Lipase    BNP    Magnesium    CBC Auto Differential    High Sensitivity Troponin T 2Hr    Basic Metabolic Panel    Magnesium    Phosphorus    Protime-INR    Ferritin    Iron Profile    Reticulocytes    Vitamin B12    Folate    Occult Blood, Fecal By Immunoassay - Stool, Per Rectum    Lipid Panel    NPO Diet NPO Type: Strict NPO    Diet: Diabetic Diets; Consistent Carbohydrate; Texture: Regular Texture (IDDSI 7); Fluid Consistency: Thin (IDDSI 0)    Undress & Gown    Continuous Pulse Oximetry    Vital Signs    Intake & Output    Weigh Patient    Oral Care    Saline Lock & Maintain IV Access    Code Status and Medical Interventions:    Inpatient  Hospitalist Consult    Inpatient Case Management  Consult    Inpatient Cardiology Consult    OT Consult: Eval & Treat    PT Consult: Eval & Treat As Tolerated; Discharge Placement Assessment    Oxygen Therapy- Nasal Cannula; Titrate 1-6 LPM Per SpO2; 90 - 95%    POC Glucose 4x Daily AC & at Bedtime    ECG 12 Lead ED Triage Standing Order; Chest Pain    ECG 12 Lead ED Triage Standing Order; Chest Pain    Insert Peripheral IV    Insert Peripheral IV    Inpatient Admission    CBC & Differential    Green Top (Gel)    Lavender Top    Gold Top - SST    Light Blue Top    CBC & Differential       Medications Given in the Emergency Department:  Medications   sodium chloride 0.9 % flush 10 mL (has no administration in time range)   aspirin chewable tablet 324 mg (324 mg Oral Not Given 7/16/23 0722)   sodium chloride 0.9 % flush 10 mL (10 mL Intravenous Given 7/16/23 1455)   sodium chloride 0.9 % flush 10 mL (has no administration in time range)   sodium chloride 0.9 % infusion 40 mL (has no administration in time range)   sennosides-docusate (PERICOLACE) 8.6-50 MG per tablet 2 tablet (has no administration in time range)     And   polyethylene glycol (MIRALAX) packet 17 g (has no administration in time range)     And   bisacodyl (DULCOLAX) EC tablet 5 mg (has no administration in time range)     And   bisacodyl (DULCOLAX) suppository 10 mg (has no administration in time range)   Enoxaparin Sodium (LOVENOX) syringe 40 mg (40 mg Subcutaneous Given 7/16/23 9364)   Potassium Replacement - Follow Nurse / BPA Driven Protocol (has no administration in time range)   Magnesium Standard Dose Replacement - Follow Nurse / BPA Driven Protocol (has no administration in time range)   Phosphorus Replacement - Follow Nurse / BPA Driven Protocol (has no administration in time range)   Calcium Replacement - Follow Nurse / BPA Driven Protocol (has no administration in time range)   acetaminophen (TYLENOL) tablet 650 mg (has no  administration in time range)     Or   acetaminophen (TYLENOL) 160 MG/5ML solution 650 mg (has no administration in time range)     Or   acetaminophen (TYLENOL) suppository 650 mg (has no administration in time range)   ondansetron (ZOFRAN) tablet 4 mg (has no administration in time range)     Or   ondansetron (ZOFRAN) injection 4 mg (has no administration in time range)   albuterol sulfate HFA (PROVENTIL HFA;VENTOLIN HFA;PROAIR HFA) inhaler 2 puff (has no administration in time range)   atorvastatin (LIPITOR) tablet 10 mg (10 mg Oral Given 7/16/23 1455)   buPROPion XL (WELLBUTRIN XL) 24 hr tablet 300 mg (300 mg Oral Given 7/16/23 1454)   carvedilol (COREG) tablet 6.25 mg (6.25 mg Oral Given 7/16/23 1455)   dextrose (GLUTOSE) oral gel 15 g (has no administration in time range)   dextrose (D50W) (25 g/50 mL) IV injection 25 g (has no administration in time range)   Insulin Lispro (humaLOG) injection 2-9 Units (has no administration in time range)   montelukast (SINGULAIR) tablet 10 mg (10 mg Oral Given 7/16/23 1454)   levothyroxine (SYNTHROID, LEVOTHROID) tablet 75 mcg (75 mcg Oral Given 7/16/23 1455)   aspirin EC tablet 81 mg (has no administration in time range)   sacubitril-valsartan (ENTRESTO) 49-51 MG tablet 1 tablet (has no administration in time range)   furosemide (LASIX) injection 60 mg (60 mg Intravenous Given 7/16/23 0834)   magnesium sulfate 2g/50 mL (PREMIX) infusion (2 g Intravenous New Bag 7/16/23 1454)        ED Course:         Labs:    Lab Results (last 24 hours)       Procedure Component Value Units Date/Time    High Sensitivity Troponin T [[id number]]  (Abnormal) Collected: 07/16/23 0721    Specimen: Blood Updated: 07/16/23 0806     HS Troponin T 85 ng/L     Narrative:      High Sensitive Troponin T Reference Range:  <10.0 ng/L- Negative Female for AMI  <15.0 ng/L- Negative Male for AMI  >=10 - Abnormal Female indicating possible myocardial injury.  >=15 - Abnormal Male indicating possible  myocardial injury.   Clinicians would have to utilize clinical acumen, EKG, Troponin, and serial changes to determine if it is an Acute Myocardial Infarction or myocardial injury due to an underlying chronic condition.         CBC & Differential [[id number]]  (Abnormal) Collected: 07/16/23 0721    Specimen: Blood Updated: 07/16/23 0730    Narrative:      The following orders were created for panel order CBC & Differential.  Procedure                               Abnormality         Status                     ---------                               -----------         ------                     CBC Auto Differential[[id number]]        Abnormal            Final result                 Please view results for these tests on the individual orders.    Comprehensive Metabolic Panel [[id number]]  (Abnormal) Collected: 07/16/23 0721    Specimen: Blood Updated: 07/16/23 0807     Glucose 97 mg/dL      BUN 35 mg/dL      Creatinine 1.37 mg/dL      Sodium 136 mmol/L      Potassium 4.5 mmol/L      Comment: Slight hemolysis detected by analyzer. Results may be affected.        Chloride 104 mmol/L      CO2 21.1 mmol/L      Calcium 8.7 mg/dL      Total Protein 6.4 g/dL      Albumin 3.8 g/dL      ALT (SGPT) 12 U/L      AST (SGOT) 19 U/L      Comment: Slight hemolysis detected by analyzer. Results may be affected.        Alkaline Phosphatase 45 U/L      Total Bilirubin 0.5 mg/dL      Globulin 2.6 gm/dL      A/G Ratio 1.5 g/dL      BUN/Creatinine Ratio 25.5     Anion Gap 10.9 mmol/L      eGFR 39.6 mL/min/1.73     Narrative:      GFR Normal >60  Chronic Kidney Disease <60  Kidney Failure <15    The GFR formula is only valid for adults with stable renal function between ages 18 and 70.    Lipase [[id number]]  (Normal) Collected: 07/16/23 0721    Specimen: Blood Updated: 07/16/23 0755     Lipase 20 U/L     BNP [[id number]]  (Abnormal) Collected: 07/16/23 0721    Specimen: Blood Updated: 07/16/23 0752     proBNP 6,199.0 pg/mL     Narrative:       Among patients with dyspnea, NT-proBNP is highly sensitive for the detection of acute congestive heart failure. In addition NT-proBNP of <300 pg/ml effectively rules out acute congestive heart failure with 99% negative predictive value.      Magnesium [[id number]]  (Normal) Collected: 07/16/23 0721    Specimen: Blood Updated: 07/16/23 0807     Magnesium 1.6 mg/dL     CBC Auto Differential [[id number]]  (Abnormal) Collected: 07/16/23 0721    Specimen: Blood Updated: 07/16/23 0730     WBC 8.81 10*3/mm3      RBC 2.87 10*6/mm3      Hemoglobin 9.2 g/dL      Hematocrit 27.6 %      MCV 96.2 fL      MCH 32.1 pg      MCHC 33.3 g/dL      RDW 18.8 %      RDW-SD 66.9 fl      MPV 9.9 fL      Platelets 229 10*3/mm3      Neutrophil % 58.2 %      Lymphocyte % 27.9 %      Monocyte % 11.9 %      Eosinophil % 1.5 %      Basophil % 0.3 %      Immature Grans % 0.2 %      Neutrophils, Absolute 5.12 10*3/mm3      Lymphocytes, Absolute 2.46 10*3/mm3      Monocytes, Absolute 1.05 10*3/mm3      Eosinophils, Absolute 0.13 10*3/mm3      Basophils, Absolute 0.03 10*3/mm3      Immature Grans, Absolute 0.02 10*3/mm3      nRBC 0.0 /100 WBC     High Sensitivity Troponin T 2Hr [[id number]]  (Abnormal) Collected: 07/16/23 0919    Specimen: Blood Updated: 07/16/23 1003     HS Troponin T 82 ng/L      Troponin T Delta -3 ng/L     Narrative:      High Sensitive Troponin T Reference Range:  <10.0 ng/L- Negative Female for AMI  <15.0 ng/L- Negative Male for AMI  >=10 - Abnormal Female indicating possible myocardial injury.  >=15 - Abnormal Male indicating possible myocardial injury.   Clinicians would have to utilize clinical acumen, EKG, Troponin, and serial changes to determine if it is an Acute Myocardial Infarction or myocardial injury due to an underlying chronic condition.         Ferritin [[id number]]  (Abnormal) Collected: 07/16/23 0919    Specimen: Blood Updated: 07/16/23 1126     Ferritin 541.40 ng/mL     Narrative:      <12 ng/mL usually  associated with Iron Deficiency Anemia. Above normal range levels may be due to Hepatic and/or Chronic Inflammatory Disease.  Results may be falsely decreased if patient taking Biotin.      Iron Profile [[id number]]  (Abnormal) Collected: 07/16/23 0919    Specimen: Blood Updated: 07/16/23 1121     Iron 100 mcg/dL      Iron Saturation (TSAT) 34 %      Transferrin 195 mg/dL      TIBC 291 mcg/dL     Lipid Panel [[id number]] Collected: 07/16/23 0919    Specimen: Blood Updated: 07/16/23 1121     Total Cholesterol 97 mg/dL      Triglycerides 75 mg/dL      HDL Cholesterol 43 mg/dL      LDL Cholesterol  38 mg/dL      VLDL Cholesterol 16 mg/dL      LDL/HDL Ratio 0.91    Narrative:      Cholesterol Reference Ranges  (U.S. Department of Health and Human Services ATP III Classifications)    Desirable          <200 mg/dL  Borderline High    200-239 mg/dL  High Risk          >240 mg/dL      Triglyceride Reference Ranges  (U.S. Department of Health and Human Services ATP III Classifications)    Normal           <150 mg/dL  Borderline High  150-199 mg/dL  High             200-499 mg/dL  Very High        >500 mg/dL    HDL Reference Ranges  (U.S. Department of Health and Human Services ATP III Classifications)    Low     <40 mg/dl (major risk factor for CHD)  High    >60 mg/dl ('negative' risk factor for CHD)        LDL Reference Ranges  (U.S. Department of Health and Human Services ATP III Classifications)    Optimal          <100 mg/dL  Near Optimal     100-129 mg/dL  Borderline High  130-159 mg/dL  High             160-189 mg/dL  Very High        >189 mg/dL    Magnesium [[id number]]  (Normal) Collected: 07/16/23 1154    Specimen: Blood Updated: 07/16/23 1228     Magnesium 1.6 mg/dL     Phosphorus [[id number]]  (Normal) Collected: 07/16/23 1154    Specimen: Blood Updated: 07/16/23 1228     Phosphorus 4.0 mg/dL     Protime-INR [[id number]]  (Abnormal) Collected: 07/16/23 1154    Specimen: Blood Updated: 07/16/23 1217     Protime 15.4  Seconds      INR 1.21    Narrative:      Suggested Therapeutic Ranges For Oral Anticoagulant Therapy:  Level of Therapy                      INR Target Range  Standard Dose                            2.0-3.0  High Dose                                2.5-3.5  Patients not receiving anticoagulant  Therapy Normal Range                     0.86-1.15    Reticulocytes [[id number]]  (Normal) Collected: 07/16/23 1154    Specimen: Blood Updated: 07/16/23 1206     Reticulocyte % 1.12 %      Reticulocyte Absolute 0.0344 10*6/mm3     Vitamin B12 [[id number]] Collected: 07/16/23 1154    Specimen: Blood Updated: 07/16/23 1202    Folate [[id number]] Collected: 07/16/23 1154    Specimen: Blood Updated: 07/16/23 1202             Imaging:    XR Chest 1 View    Result Date: 7/16/2023  PROCEDURE: XR CHEST 1 VW  COMPARISON: [facility], CR, CHEST PA/AP & LAT 2V, 1/13/2021, 15:19.  [facility], CR, XR CHEST 1 VW, 5/30/2023, 18:45.  INDICATIONS: Chest pain, tightness  FINDINGS:  The heart is normal in size.  The lungs are well-expanded and free of infiltrates.  Bony structures appear intact.  Metal plate in the lower cervical spine and cervicothoracic junction is consistent with anterior fusion.        No active cardiopulmonary disease is seen.       MILLA WILSON MD       Electronically Signed and Approved By: MILLA WILSON MD on 7/16/2023 at 7:34                Differential Diagnosis and Discussion:    Chest Pain:  Based on the patient's signs and symptoms, I considered aortic dissection, myocardial infaction, pulmonary embolism, cardiac tamponade, pericarditis, pneumothorax, musculoskeletal chest pain and other differential diagnosis as an etiology of the patient's chest pain.     All labs were reviewed and interpreted by me.  All X-rays impressions were independently interpreted by me.  EKG was interpreted by me.    MDM    The patient´s CBC that was reviewed and interpreted by me shows no abnormalities  of critical concern. Of note, there is no anemia requiring a blood transfusion and the platelet count is acceptable.  The patient´s CMP that was reviewed and interpretted by me shows no abnormalities of critical concern. Of note, the patient´s sodium and potassium are acceptable. The patient´s liver enzymes are unremarkable. The patient´s renal function (creatinine) is preserved. The patient has a normal anion gap.  Troponin is negative.  Chest x-ray is negative for acute infiltrate.    Decision making regarding admission:    This 77-year-old female patient should be admitted to the hospital due to experiencing chest pain, a symptom that could indicate a serious heart condition such as angina or a heart attack. Given the potential severity of these conditions, it is crucial to promptly evaluate and manage her symptoms.    Hospital admission is necessary to provide immediate medical attention, perform diagnostic tests, closely monitor her condition, and administer appropriate treatments. The patient is also scheduled for a cardiac catheterization, a procedure that allows doctors to visualize the coronary arteries and assess the heart's function. This procedure can help identify blockages or other abnormalities that could be causing her chest pain.    Performing a cardiac catheterization requires a hospital setting due to the need for specialized equipment and personnel, as well as the potential for complications that may require immediate intervention. Furthermore, the patient will need to be monitored post-procedure to ensure her safety and the effectiveness of the treatment.        Patient Care Considerations:    PERC: I used the PERC score to risk stratify the patient for PE and a CT of the chest was considered but ultimately not indicated in today's visit.      Consultants/Shared Management Plan:    Hospitalist: I have discussed the case with Dr. Lua who agrees to accept the patient for admission.    Social  Determinants of Health:    Patient is independent, reliable, and has access to care.       Disposition and Care Coordination:    Admit:   Through independent evaluation of the patient's history, physical, and imperical data, the patient meets criteria for observation/admission to the hospital.        Final diagnoses:   Chest pain, unspecified type        ED Disposition       ED Disposition   Decision to Admit    Condition   --    Comment   Level of Care: Telemetry [5]   Diagnosis: Chest pain [133080]   Admitting Physician: AMRIT THOMAS [[id number]]   Attending Physician: AMRIT THOMAS [[id number]]   Certification: I Certify That Inpatient Hospital Services Are Medically Necessary For Greater Than 2 Midnights                 This medical record created using voice recognition software.             Bridgett Blancas MD  07/16/23 1015

## 2023-07-16 NOTE — CONSULTS
Cardiology Consult Note  [facility] 5TH FLOOR SURGICAL TELEMETRY UNIT          Patient Identification:  Ann-Marie Hughes      [id number]  77 y.o.        female  1945           Reason for Consultation: Chest pain    PCP: Rochelle Brown APRN    History of Present Illness:     Patient is a 77-year-old with recent diagnosis of new systolic congestive heart failure with an abnormal stress test, essential hypertension, dyslipidemia, who came in due to an episode that happened last night in which she felt like her body was vibrating this was also associated with some chest tightness in the substernal epigastric area the symptoms were intermittent but persistently happening overnight and so she came in this morning.  The patient's description of the chest pain is somewhat vague in nature she states when she got here and they started working on her her symptoms resolved.  She was not noted to have any obvious dysrhythmias so far on telemetry and her baseline EKG was unchanged.  Patient has been started on multiple heart failure medications recently to report some issues with Entresto costs in addition due to concerns over side effects from Farxiga she had not yet started on this    Past History:  Past Medical History:   Diagnosis Date    Acne rosacea 8/17/2021    DR.JEFF MOON     Arteriosclerosis of abdominal aorta     B12 deficiency 8/17/2021    DJD (degenerative joint disease)     Hx of smoking 8/17/2021    QUIT IN 1976 AT AGE 31    Hyperlipidemia 8/17/2021    Hypertension     Hypothyroidism     Metabolic syndrome 8/17/2021    KAREN (stress urinary incontinence, female) 8/17/2021    UTI (urinary tract infection) 8/17/2021    Vitamin D deficiency 8/17/2021    Vitamin D deficiency      Past Surgical History:   Procedure Laterality Date    COLONOSCOPY  2011     Allergies   Allergen Reactions    Penicillins Palpitations     Social History     Socioeconomic History    Marital status:    Tobacco  Use    Smoking status: Former     Packs/day: 1.00     Years: 12.00     Pack years: 12.00     Types: Cigarettes     Quit date:      Years since quittin.5    Smokeless tobacco: Never   Vaping Use    Vaping Use: Never used   Substance and Sexual Activity    Alcohol use: Never    Drug use: Never    Sexual activity: Defer     History reviewed. No pertinent family history.    Medications:  Prior to Admission medications    Medication Sig Start Date End Date Taking? Authorizing Provider   albuterol sulfate  (90 Base) MCG/ACT inhaler Inhale 2 puffs Every 4 (Four) Hours As Needed for Wheezing. 23  Yes Les Moreno DO   amLODIPine (NORVASC) 5 MG tablet Take 1 tablet by mouth Daily As Needed (For systolic blood pressure 140 or greater). 23  Yes Valerie Valera MD   aspirin 81 MG EC tablet Take 1 tablet by mouth Daily.   Yes ProviderKanwal MD   atorvastatin (LIPITOR) 10 MG tablet Take 1 tablet by mouth Daily. 23  Yes Rochelle Brown APRN   AZO-CRANBERRY PO Take 1 tablet by mouth Daily.   Yes Kanwal Orozco MD   buPROPion XL (WELLBUTRIN XL) 300 MG 24 hr tablet TAKE 1 TABLET BY MOUTH DAILY 23  Yes Rochelle Brown APRN   carvedilol (COREG) 6.25 MG tablet Take 1 tablet by mouth 2 (Two) Times a Day. 23  Yes Valerie Valera MD   furosemide (Lasix) 40 MG tablet Take 1 tablet by mouth Daily. 23  Yes Rochelle Brown APRN   metFORMIN ER (GLUCOPHAGE-XR) 500 MG 24 hr tablet TAKE 1 TABLET BY MOUTH EVERY MORNING AND 2 TABLETS AT SUPPER  Patient taking differently: 1 tablet Daily With Breakfast. 23  Yes Roseanna Hernandez MD   metFORMIN ER (GLUCOPHAGE-XR) 500 MG 24 hr tablet Take 2 tablets by mouth Daily With Dinner.   Yes Kanwal rOozco MD   montelukast (SINGULAIR) 10 MG tablet Take 1 tablet by mouth Daily. 23  Yes Rochelle Brown APRN   Probiotic Product (PROBIOTIC PO) Take 1 capsule by mouth Daily.   Yes Kanwal Orozco MD   sacubitril-valsartan  (Entresto)  MG tablet Take 1 tablet by mouth 2 (Two) Times a Day. 7/12/23  Yes Valerie Valera MD   spironolactone (ALDACTONE) 25 MG tablet Take 1 tablet by mouth Daily. 6/28/23  Yes Valerie Valera MD   Synthroid 75 MCG tablet Take 1 tablet by mouth Daily. 7/10/23  Yes Rochelle Brown APRN   dapagliflozin Propanediol (Farxiga) 10 MG tablet Take 10 mg by mouth Daily. 7/12/23   Valerie Valera MD   Aspirin Buf,CaCarb-MgCarb-MgO, 81 MG tablet Take 81 mg by mouth Daily.  7/16/23  Kanwal Orozco MD   Cholecalciferol 25 MCG (1000 UT) capsule Vitamin D3 1,000 unit oral capsule take 1 capsule by oral route daily   Active  Patient not taking: Reported on 7/14/2023 7/16/23  Kanwal Orozco MD   cyanocobalamin (VITAMIN B-12) 2500 MCG tablet tablet Take 1 tablet by mouth 2 (Two) Times a Week.  Patient not taking: Reported on 7/14/2023 7/16/23  Kanwal Orozco MD   glucose blood test strip USE TEST STRIP ONCE DAILY IN THE MORNING TO MONITOR BLOOD SUGAR. 9/27/22 7/16/23  Roseanna Hernandez MD      Current medications:  aspirin, 324 mg, Oral, Once  [START ON 7/17/2023] aspirin, 81 mg, Oral, Daily  atorvastatin, 10 mg, Oral, Daily  buPROPion XL, 300 mg, Oral, Daily  carvedilol, 6.25 mg, Oral, BID  enoxaparin, 40 mg, Subcutaneous, Daily  insulin lispro, 2-9 Units, Subcutaneous, 4x Daily AC & at Bedtime  levothyroxine, 75 mcg, Oral, Q AM  magnesium sulfate, 2 g, Intravenous, Once  montelukast, 10 mg, Oral, Daily  senna-docusate sodium, 2 tablet, Oral, BID  sodium chloride, 10 mL, Intravenous, Q12H      Current IV drips:       Review of Systems   Constitutional: Negative for chills, fever and weight loss.   HENT:  Negative for congestion and nosebleeds.    Cardiovascular:  Positive for chest pain. Negative for orthopnea and paroxysmal nocturnal dyspnea.   Respiratory:  Negative for cough and shortness of breath.    Endocrine: Negative for cold intolerance and heat intolerance.   Skin:  Negative  "for rash.   Musculoskeletal:  Negative for back pain and muscle weakness.   Gastrointestinal:  Negative for abdominal pain, nausea and vomiting.   Genitourinary:  Negative for dysuria and nocturia.   Neurological:  Negative for dizziness and light-headedness.   Psychiatric/Behavioral:  Negative for altered mental status and hallucinations.        Physical Exam    /70 (BP Location: Right arm, Patient Position: Sitting)   Pulse 71   Temp 97.9 °F (36.6 °C) (Oral)   Resp 20   Ht 162.6 cm (64\")   Wt 92.1 kg (203 lb 0.7 oz)   LMP  (LMP Unknown)   SpO2 100%   BMI 34.85 kg/m²  Body mass index is 34.85 kg/m².   Oxygen saturation   @FLOWAN(10::1)@ SpO2  Min: 98 %  Max: 100 %    General Appearance:   no acute distress  Alert and oriented x3  HENT:   lips not cyanotic  Atraumatic  Neck:  thyroid not enlarged  supple  Respiratory:  no respiratory distress  normal breath sounds  no rales  Cardiovascular:  no jugular venous distention  regular rhythm  apical impulse normal  S1 normal, S2 normal  no S3, no S4   no murmur  no rub, no thrill  no carotid bruit  pedal pulses normal  lower extremity edema: none    Gastrointestinal:   bowel sounds normal  non-tender  no hepatomegaly, no splenomegaly  Musculoskeletal:  no clubbing of fingers.   normocephalic, head atraumatic  Skin:   warm, dry  No rashes  Neuro/Psychiatric:  normal mood and affect  judgement and insight appropriate      Cardiographics:     ECG  (personally reviewed)    Telemetry:  (personally reviewed) normal sinus rhythm   Results for orders placed in visit on 07/06/23    Adult Transthoracic Echo Complete W/ Cont if Necessary Per Protocol    Interpretation Summary    Left ventricular systolic function is moderately decreased. Left ventricular ejection fraction appears to be 36 - 40%.    The left ventricular cavity is borderline dilated.    The following left ventricular wall segments are hypokinetic: mid inferolateral and mid inferior.    Left ventricular " diastolic function is consistent with (grade I) impaired relaxation.    Mild aortic valve stenosis is present.    Moderate mitral valve regurgitation is present.    Left ventricular global strain is abnormal at -9%     Results for orders placed during the hospital encounter of 07/10/23    Stress Test With Myocardial Perfusion One Day    Interpretation Summary    Diaphragmatic attenuation artifact is present.    Myocardial perfusion imaging indicates a large-sized, severe area of ischemia located in the inferior wall and lateral wall.    Abnormal LV wall motion consistent with severe hypokinesis of the lateral and inferior wall.    Left ventricular ejection fraction is moderately reduced (Calculated EF = 37%).    Impressions are consistent with a high risk study.      No results found for this or any previous visit.      Cardiolite (Tc-99m Sestamibi) stress test   Lab Review:       CBC          5/30/2023    18:20 6/15/2023    10:05 7/16/2023    07:21   CBC   WBC 12.66  14.38  8.81    RBC 3.20  3.20  2.87    Hemoglobin 10.2  9.9  9.2    Hematocrit 31.1  30.2  27.6    MCV 97.2  94.4  96.2    MCH 31.9  30.9  32.1    MCHC 32.8  32.8  33.3    RDW 18.6  17.2  18.8    Platelets 286  266  229        CMP          6/23/2023    08:47 7/14/2023    09:15 7/16/2023    07:21   CMP   Glucose 120  94  97    BUN 19  24  35    Creatinine 0.90  1.13  1.37    EGFR 65.6  49.9  39.6    Sodium 140  141  136    Potassium 4.7  5.3  4.5    Chloride 105  109  104    Calcium 9.6  9.3  8.7    Total Protein  6.4  6.4    Albumin  3.8  3.8    Globulin  2.6  2.6    Total Bilirubin  0.6  0.5    Alkaline Phosphatase  40  45    AST (SGOT)  17  19    ALT (SGPT)  10  12    Albumin/Globulin Ratio  1.5  1.5    BUN/Creatinine Ratio 21.1  21.2  25.5    Anion Gap 12.8  10.9  10.9         CARDIAC LABS:      Lab 07/16/23  1154 07/16/23  0919 07/16/23  0721 07/14/23  0915   PROBNP  --   --  6,199.0* 7,769.0*   HSTROP T  --  82* 85*  --    PROTIME 15.4*  --   --    --    INR 1.21*  --   --   --       No results found for: DIGOXIN   Lab Results   Component Value Date    TSH 1.130 06/15/2023     Results from last 7 days   Lab Units 07/16/23  0919   CHOLESTEROL mg/dL 97   HDL CHOL mg/dL 43     No results found for: POCTROP  No components found for: DDIMERQUAN  Lab Results   Component Value Date    MG 1.6 07/16/2023             CARDIAC LABS:      Lab 07/16/23  1154 07/16/23  0919 07/16/23  0721 07/14/23  0915   PROBNP  --   --  6,199.0* 7,769.0*   HSTROP T  --  82* 85*  --    PROTIME 15.4*  --   --   --    INR 1.21*  --   --   --         Imaging:  CXR    CONCLUSION: Airspace disease left lung base worrisome for pneumonia.      Assessment:    Chest pain      Chest pain somewhat atypical in nature troponins were mildly abnormal did not trend up and her EKG does not show acute changes.  Suspect troponin elevation more from chronic renal failure and type II patient though does have new onset of CHF and an abnormal stress test have been scheduled for heart catheterization would not be unreasonable to proceed while patient is here as long as her renal function remained stable and there is no evidence of infection    HFrEF acute on chronic increased proBNP level does have some left infiltrate feel this may be more heart failure related to pneumonia given her lack of fever cough and normal white blood cell count recommend a dose of IV Lasix today we will repeat labs in the morning to see how her kidney functions      Plan:  1.  Lasix 20 mg IV x1  2.  Continue with her home Coreg 6.25 twice daily  3.  We will decrease her home Entresto to 49/51 twice daily for now given mildly increased creatinine level  4.  Hold on Farxiga as she did not get started on this as an outpatient  5.  Possible left heart catheterization tomorrow      Thank you for allowing us to share in Ann-Marie Leanne Ellen   care.            Roderick Brand MD   7/16/2023    14:18 EDT

## 2023-07-16 NOTE — PLAN OF CARE
Goal Outcome Evaluation:      Pt admitted to floor. Given one run of Magnesium. Up with 1 assist. No concerns at this time.  Naomi Nicholas RN

## 2023-07-16 NOTE — H&P
[facility]  Date: [facility]   Patient Name: Ann-Marie Hughes  : 1945  MRN: [id number]  Primary Care Physician:  Rochelle Brown APRN  Date of admission: 2023    Subjective chest pain  Subjective     Chief Complaint: Chest pain    HPI: Patient is a 77-year-old female who was recently diagnosed with heart failure (mixed-EF 36% also has a diastolic grade 1 dysfunction).  She follows with Dr. Valera.  She is currently on Entresto.  She is supposed to have a heart cath on Thursday.  However,  patient came into the ER with chest pain.    On arrival to the ED, patient's temperature 97.8, pulse of 62, respiratory rate of 22, blood pressure 145/66, and she saturating 98% on room air.  Chest x-ray shows no cardiopulmonary disease today.    On labs, patient has a hemoglobin of 9.2.  Magnesium of 1.6.  proBNP of 6199.  Her creatinine is 1.37.  Troponin is 85.  Ferritin is 541.  Trans ferritin is 195.  TIBC is 291.    Personal History     Past Medical History:  Past Medical History:   Diagnosis Date    Acne rosacea 2021    DR.JEFF MOON     Arteriosclerosis of abdominal aorta     B12 deficiency 2021    DJD (degenerative joint disease)     Hx of smoking 2021    QUIT IN  AT AGE 31    Hyperlipidemia 2021    Hypertension     Hypothyroidism     Metabolic syndrome 2021    KAREN (stress urinary incontinence, female) 2021    UTI (urinary tract infection) 2021    Vitamin D deficiency 2021    Vitamin D deficiency          Past Surgical History:  Past Surgical History:   Procedure Laterality Date    COLONOSCOPY          Family History:   Breast Cancer-related family history is not on file.      Social History:   Social History     Socioeconomic History    Marital status:    Tobacco Use    Smoking status: Former     Packs/day: 1.00     Years: 12.00     Pack years: 12.00     Types: Cigarettes     Quit date:      Years since  quittin.5    Smokeless tobacco: Never   Vaping Use    Vaping Use: Never used   Substance and Sexual Activity    Alcohol use: Never    Drug use: Never    Sexual activity: Defer         Home Medications:  Aspirin Buf(CaCarb-MgCarb-MgO), Cholecalciferol, Cranberry, Probiotic Product, albuterol sulfate HFA, amLODIPine, atorvastatin, buPROPion XL, carvedilol, cyanocobalamin, dapagliflozin Propanediol, furosemide, glucose blood, levothyroxine, metFORMIN ER, montelukast, sacubitril-valsartan, and spironolactone    Allergies:  Allergies   Allergen Reactions    Penicillins Palpitations       Review of Systems   All systems were reviewed and negative except for: chest pain     Objective   Objective     Vitals:   Temp:  [97.8 °F (36.6 °C)] 97.8 °F (36.6 °C)  Heart Rate:  [60-62] 60  Resp:  [22] 22  BP: (133-145)/(63-66) 133/63    Physical Exam    Constitutional: Awake, alert, no acute distress   Eyes: Pupils equal, sclerae anicteric, no conjunctival injection   HENT: NCAT, mucous membranes moist   Neck: Supple, no thyromegaly, no lymphadenopathy, trachea midline   Respiratory: Clear to auscultation bilaterally, nonlabored respirations    Cardiovascular: RRR, no murmurs, rubs, or gallops, palpable pedal pulses bilaterally   Gastrointestinal: Positive bowel sounds, soft, nontender, nondistended   Musculoskeletal: ++ edema lower extremities    Psychiatric: Appropriate affect, cooperative   Neurologic: Oriented x 3, strength symmetric in all extremities, Cranial Nerves grossly intact to confrontation, speech clear   Skin: No rashes     Result Review    Result Review:  I have personally reviewed the results from the time of this admission to 2023 11:04 EDT and agree with these findings:  [x]  Laboratory  []  Microbiology  [x]  Radiology  []  EKG/Telemetry   []  Cardiology/Vascular   []  Pathology  [x]  Old records  []  Other:      Assessment & Plan   Assessment / Plan   Assessment/Plan:   #1 chest pain  -Patient on  aspirin, Lipitor, beta-blocker.  As needed, nitro.  -Troponin elevated; however flat; delta T -3.  -Plans for cardiac cath.  N.p.o. at midnight.  -Cardiology consulted.      #2 anemia  -We will check FOBT, iron panel, B12, folic acid.    #3 mild CHF exacerbation  -We will need to continue Lasix after cardiac cath.  -Goal-directed care: Patient on Entresto.  Has not yet started Farxiga because of cost.  Continue Lasix after cardiac cath.  Continue beta-blocker.    #4 DAVID  -Patient's baseline creatinine is 0.90.  Today, creatinine is 1.37.  Patient received IV Lasix in the ER.  We will hold off further diuresis because patient will be getting contrast for cath tomorrow.    -Entresto dose decreased    #5 hypothyroidism continue Synthroid    #6 allergic rhinitis continue montelukast    #7 non-insulin-dependent diabetes  -Hold metformin because patient will have cardiac cath.  -Insulin sliding scale    #8 replace magnesium    #9 depression continue wellbutrin.       DVT prophylaxis:  Medical DVT prophylaxis orders are present.    CODE STATUS:    Level Of Support Discussed With: Patient  Code Status (Patient has no pulse and is not breathing): CPR (Attempt to Resuscitate)  Medical Interventions (Patient has pulse or is breathing): Full Support      Admission Status:  I believe this patient meets inpatient status.    Electronically signed by Akira Lua DO, 07/16/23, 11:04 AM EDT.

## 2023-07-17 PROBLEM — I50.22 CHRONIC HFREF (HEART FAILURE WITH REDUCED EJECTION FRACTION): Status: ACTIVE | Noted: 2023-07-17

## 2023-07-17 PROBLEM — I21.4 NSTEMI (NON-ST ELEVATED MYOCARDIAL INFARCTION): Status: ACTIVE | Noted: 2023-07-17

## 2023-07-17 LAB
ANION GAP SERPL CALCULATED.3IONS-SCNC: 11.9 MMOL/L (ref 5–15)
BASOPHILS # BLD AUTO: 0.06 10*3/MM3 (ref 0–0.2)
BASOPHILS NFR BLD AUTO: 0.7 % (ref 0–1.5)
BUN SERPL-MCNC: 32 MG/DL (ref 8–23)
BUN/CREAT SERPL: 24.8 (ref 7–25)
CALCIUM SPEC-SCNC: 8.7 MG/DL (ref 8.6–10.5)
CHLORIDE SERPL-SCNC: 106 MMOL/L (ref 98–107)
CO2 SERPL-SCNC: 22.1 MMOL/L (ref 22–29)
CREAT SERPL-MCNC: 1.29 MG/DL (ref 0.57–1)
DEPRECATED RDW RBC AUTO: 65.3 FL (ref 37–54)
EGFRCR SERPLBLD CKD-EPI 2021: 42.6 ML/MIN/1.73
EOSINOPHIL # BLD AUTO: 0.12 10*3/MM3 (ref 0–0.4)
EOSINOPHIL NFR BLD AUTO: 1.4 % (ref 0.3–6.2)
ERYTHROCYTE [DISTWIDTH] IN BLOOD BY AUTOMATED COUNT: 18.7 % (ref 12.3–15.4)
GLUCOSE BLDC GLUCOMTR-MCNC: 101 MG/DL (ref 70–99)
GLUCOSE BLDC GLUCOMTR-MCNC: 102 MG/DL (ref 70–99)
GLUCOSE BLDC GLUCOMTR-MCNC: 113 MG/DL (ref 70–99)
GLUCOSE BLDC GLUCOMTR-MCNC: 85 MG/DL (ref 70–99)
GLUCOSE SERPL-MCNC: 95 MG/DL (ref 65–99)
HCT VFR BLD AUTO: 29.7 % (ref 34–46.6)
HGB BLD-MCNC: 9.9 G/DL (ref 12–15.9)
IMM GRANULOCYTES # BLD AUTO: 0.02 10*3/MM3 (ref 0–0.05)
IMM GRANULOCYTES NFR BLD AUTO: 0.2 % (ref 0–0.5)
INR PPP: 1.25 (ref 0.86–1.15)
LYMPHOCYTES # BLD AUTO: 2.99 10*3/MM3 (ref 0.7–3.1)
LYMPHOCYTES NFR BLD AUTO: 34.8 % (ref 19.6–45.3)
MAGNESIUM SERPL-MCNC: 2 MG/DL (ref 1.6–2.4)
MCH RBC QN AUTO: 31.8 PG (ref 26.6–33)
MCHC RBC AUTO-ENTMCNC: 33.3 G/DL (ref 31.5–35.7)
MCV RBC AUTO: 95.5 FL (ref 79–97)
MONOCYTES # BLD AUTO: 1.17 10*3/MM3 (ref 0.1–0.9)
MONOCYTES NFR BLD AUTO: 13.6 % (ref 5–12)
NEUTROPHILS NFR BLD AUTO: 4.22 10*3/MM3 (ref 1.7–7)
NEUTROPHILS NFR BLD AUTO: 49.3 % (ref 42.7–76)
NRBC BLD AUTO-RTO: 0 /100 WBC (ref 0–0.2)
PHOSPHATE SERPL-MCNC: 3.9 MG/DL (ref 2.5–4.5)
PLATELET # BLD AUTO: 227 10*3/MM3 (ref 140–450)
PMV BLD AUTO: 11 FL (ref 6–12)
POTASSIUM SERPL-SCNC: 3.8 MMOL/L (ref 3.5–5.2)
PROTHROMBIN TIME: 15.8 SECONDS (ref 11.8–14.9)
RBC # BLD AUTO: 3.11 10*6/MM3 (ref 3.77–5.28)
SODIUM SERPL-SCNC: 140 MMOL/L (ref 136–145)
WBC NRBC COR # BLD: 8.58 10*3/MM3 (ref 3.4–10.8)

## 2023-07-17 PROCEDURE — 25010000002 MIDAZOLAM PER 1MG: Performed by: INTERNAL MEDICINE

## 2023-07-17 PROCEDURE — 80048 BASIC METABOLIC PNL TOTAL CA: CPT | Performed by: HOSPITALIST

## 2023-07-17 PROCEDURE — 97165 OT EVAL LOW COMPLEX 30 MIN: CPT

## 2023-07-17 PROCEDURE — 25510000001 IOPAMIDOL PER 1 ML: Performed by: INTERNAL MEDICINE

## 2023-07-17 PROCEDURE — 84100 ASSAY OF PHOSPHORUS: CPT | Performed by: HOSPITALIST

## 2023-07-17 PROCEDURE — 85025 COMPLETE CBC W/AUTO DIFF WBC: CPT | Performed by: HOSPITALIST

## 2023-07-17 PROCEDURE — 36415 COLL VENOUS BLD VENIPUNCTURE: CPT | Performed by: HOSPITALIST

## 2023-07-17 PROCEDURE — 99152 MOD SED SAME PHYS/QHP 5/>YRS: CPT | Performed by: INTERNAL MEDICINE

## 2023-07-17 PROCEDURE — 94761 N-INVAS EAR/PLS OXIMETRY MLT: CPT

## 2023-07-17 PROCEDURE — B2111ZZ FLUOROSCOPY OF MULTIPLE CORONARY ARTERIES USING LOW OSMOLAR CONTRAST: ICD-10-PCS | Performed by: INTERNAL MEDICINE

## 2023-07-17 PROCEDURE — 93458 L HRT ARTERY/VENTRICLE ANGIO: CPT | Performed by: INTERNAL MEDICINE

## 2023-07-17 PROCEDURE — 0 NITROGLYCERIN 100-5 MCG/ML-% SOLUTION: Performed by: INTERNAL MEDICINE

## 2023-07-17 PROCEDURE — 4A023N7 MEASUREMENT OF CARDIAC SAMPLING AND PRESSURE, LEFT HEART, PERCUTANEOUS APPROACH: ICD-10-PCS | Performed by: INTERNAL MEDICINE

## 2023-07-17 PROCEDURE — C1769 GUIDE WIRE: HCPCS | Performed by: INTERNAL MEDICINE

## 2023-07-17 PROCEDURE — 99232 SBSQ HOSP IP/OBS MODERATE 35: CPT | Performed by: INTERNAL MEDICINE

## 2023-07-17 PROCEDURE — 83735 ASSAY OF MAGNESIUM: CPT | Performed by: HOSPITALIST

## 2023-07-17 PROCEDURE — 25010000002 FENTANYL CITRATE (PF) 50 MCG/ML SOLUTION: Performed by: INTERNAL MEDICINE

## 2023-07-17 PROCEDURE — 94799 UNLISTED PULMONARY SVC/PX: CPT

## 2023-07-17 PROCEDURE — 99239 HOSP IP/OBS DSCHRG MGMT >30: CPT | Performed by: INTERNAL MEDICINE

## 2023-07-17 PROCEDURE — C1894 INTRO/SHEATH, NON-LASER: HCPCS | Performed by: INTERNAL MEDICINE

## 2023-07-17 PROCEDURE — 82948 REAGENT STRIP/BLOOD GLUCOSE: CPT

## 2023-07-17 PROCEDURE — 85610 PROTHROMBIN TIME: CPT | Performed by: HOSPITALIST

## 2023-07-17 RX ORDER — SODIUM CHLORIDE 9 MG/ML
100 INJECTION, SOLUTION INTRAVENOUS CONTINUOUS
Status: ACTIVE | OUTPATIENT
Start: 2023-07-17 | End: 2023-07-17

## 2023-07-17 RX ORDER — ONDANSETRON 4 MG/1
4 TABLET, FILM COATED ORAL EVERY 6 HOURS PRN
Status: DISCONTINUED | OUTPATIENT
Start: 2023-07-17 | End: 2023-07-18 | Stop reason: HOSPADM

## 2023-07-17 RX ORDER — ACETAMINOPHEN 325 MG/1
650 TABLET ORAL EVERY 4 HOURS PRN
Status: DISCONTINUED | OUTPATIENT
Start: 2023-07-17 | End: 2023-07-18 | Stop reason: HOSPADM

## 2023-07-17 RX ORDER — ONDANSETRON 2 MG/ML
4 INJECTION INTRAMUSCULAR; INTRAVENOUS EVERY 6 HOURS PRN
Status: DISCONTINUED | OUTPATIENT
Start: 2023-07-17 | End: 2023-07-18 | Stop reason: HOSPADM

## 2023-07-17 RX ORDER — NITROGLYCERIN 0.4 MG/1
0.4 TABLET SUBLINGUAL
Status: DISCONTINUED | OUTPATIENT
Start: 2023-07-17 | End: 2023-07-18 | Stop reason: HOSPADM

## 2023-07-17 RX ORDER — VERAPAMIL HYDROCHLORIDE 2.5 MG/ML
INJECTION, SOLUTION INTRAVENOUS
Status: DISCONTINUED | OUTPATIENT
Start: 2023-07-17 | End: 2023-07-17 | Stop reason: HOSPADM

## 2023-07-17 RX ORDER — LIDOCAINE HYDROCHLORIDE 20 MG/ML
INJECTION, SOLUTION INFILTRATION; PERINEURAL
Status: DISCONTINUED | OUTPATIENT
Start: 2023-07-17 | End: 2023-07-17 | Stop reason: HOSPADM

## 2023-07-17 RX ORDER — MORPHINE SULFATE 2 MG/ML
1 INJECTION, SOLUTION INTRAMUSCULAR; INTRAVENOUS EVERY 4 HOURS PRN
Status: DISCONTINUED | OUTPATIENT
Start: 2023-07-17 | End: 2023-07-18 | Stop reason: HOSPADM

## 2023-07-17 RX ORDER — SODIUM CHLORIDE 9 MG/ML
75 INJECTION, SOLUTION INTRAVENOUS CONTINUOUS
Status: DISCONTINUED | OUTPATIENT
Start: 2023-07-17 | End: 2023-07-18 | Stop reason: HOSPADM

## 2023-07-17 RX ORDER — FENTANYL CITRATE 50 UG/ML
INJECTION, SOLUTION INTRAMUSCULAR; INTRAVENOUS
Status: DISCONTINUED | OUTPATIENT
Start: 2023-07-17 | End: 2023-07-17 | Stop reason: HOSPADM

## 2023-07-17 RX ORDER — MIDAZOLAM HYDROCHLORIDE 2 MG/2ML
INJECTION, SOLUTION INTRAMUSCULAR; INTRAVENOUS
Status: DISCONTINUED | OUTPATIENT
Start: 2023-07-17 | End: 2023-07-17 | Stop reason: HOSPADM

## 2023-07-17 RX ORDER — SPIRONOLACTONE 25 MG/1
25 TABLET ORAL DAILY
Status: DISCONTINUED | OUTPATIENT
Start: 2023-07-17 | End: 2023-07-18 | Stop reason: HOSPADM

## 2023-07-17 RX ORDER — NALOXONE HCL 0.4 MG/ML
0.4 VIAL (ML) INJECTION
Status: DISCONTINUED | OUTPATIENT
Start: 2023-07-17 | End: 2023-07-18 | Stop reason: HOSPADM

## 2023-07-17 RX ADMIN — CARVEDILOL 6.25 MG: 6.25 TABLET, FILM COATED ORAL at 09:14

## 2023-07-17 RX ADMIN — SODIUM CHLORIDE 75 ML/HR: 9 INJECTION, SOLUTION INTRAVENOUS at 09:18

## 2023-07-17 RX ADMIN — Medication 10 ML: at 09:15

## 2023-07-17 RX ADMIN — ASPIRIN 81 MG: 81 TABLET, COATED ORAL at 09:12

## 2023-07-17 RX ADMIN — LEVOTHYROXINE SODIUM 75 MCG: 75 TABLET ORAL at 05:20

## 2023-07-17 RX ADMIN — CARVEDILOL 6.25 MG: 6.25 TABLET, FILM COATED ORAL at 20:51

## 2023-07-17 RX ADMIN — ATORVASTATIN CALCIUM 10 MG: 10 TABLET, FILM COATED ORAL at 09:13

## 2023-07-17 RX ADMIN — MONTELUKAST 10 MG: 10 TABLET, FILM COATED ORAL at 09:14

## 2023-07-17 RX ADMIN — BUPROPION HYDROCHLORIDE 300 MG: 150 TABLET, EXTENDED RELEASE ORAL at 09:13

## 2023-07-17 RX ADMIN — SACUBITRIL AND VALSARTAN 1 TABLET: 49; 51 TABLET, FILM COATED ORAL at 09:13

## 2023-07-17 RX ADMIN — SACUBITRIL AND VALSARTAN 1 TABLET: 49; 51 TABLET, FILM COATED ORAL at 20:51

## 2023-07-17 RX ADMIN — SPIRONOLACTONE 25 MG: 25 TABLET ORAL at 10:01

## 2023-07-17 RX ADMIN — Medication 10 ML: at 20:52

## 2023-07-17 NOTE — PRE-SEDATION DOCUMENTATION
Sedation Plan    ASA 3     Mallampati class: II.    Risks, benefits, and alternatives discussed with patient.

## 2023-07-17 NOTE — PROGRESS NOTES
CARDIOLOGY  INPATIENT PROGRESS NOTE                [facility] 5TH FLOOR SURGICAL TELEMETRY UNIT    7/17/2023      PATIENT IDENTIFICATION:   Name:  Ann-Marie Hughes      MRN:  [id number]     77 y.o.  female                 SUBJECTIVE:   Patient with no events overnight no complaints this morning.  OBJECTIVE:  Vitals:    07/16/23 1921 07/16/23 2309 07/17/23 0329 07/17/23 0700   BP: 133/73 125/57 131/52 137/88   BP Location: Right arm Right arm Right arm Right arm   Patient Position: Lying Lying Lying Lying   Pulse: 69 63 56 67   Resp: 16 16 16 18   Temp: 98.1 °F (36.7 °C) 97.7 °F (36.5 °C) 97.6 °F (36.4 °C) 98.3 °F (36.8 °C)   TempSrc: Oral Oral Oral Oral   SpO2: 98% 96% 98% 96%   Weight:       Height:               Body mass index is 34.85 kg/m².    Intake/Output Summary (Last 24 hours) at 7/17/2023 0920  Last data filed at 7/17/2023 0529  Gross per 24 hour   Intake 360 ml   Output 1300 ml   Net -940 ml       Telemetry: Normal sinus rhythm    Physical Exam  General Appearance:   no acute distress  Alert and oriented x3  HENT:   lips not cyanotic  Atraumatic  Neck:  No jvd   supple  Respiratory:  no respiratory distress  normal breath sounds  no rales  Cardiovascular:    regular rhythm  no S3, no S4   no murmur  no rub  lower extremity edema: none    Skin:   warm, dry  No rashes      Allergies   Allergen Reactions    Penicillins Palpitations     Scheduled meds:  aspirin, 324 mg, Oral, Once  aspirin, 81 mg, Oral, Daily  atorvastatin, 10 mg, Oral, Daily  buPROPion XL, 300 mg, Oral, Daily  carvedilol, 6.25 mg, Oral, BID  enoxaparin, 40 mg, Subcutaneous, Daily  insulin lispro, 2-9 Units, Subcutaneous, 4x Daily AC & at Bedtime  levothyroxine, 75 mcg, Oral, Q AM  montelukast, 10 mg, Oral, Daily  sacubitril-valsartan, 1 tablet, Oral, Q12H  senna-docusate sodium, 2 tablet, Oral, BID  sodium chloride, 10 mL, Intravenous, Q12H      IV meds:                      sodium chloride, 75 mL/hr, Last Rate: 75 mL/hr  (07/17/23 0918)      Data Review:  CBC          6/15/2023    10:05 7/16/2023    07:21 7/17/2023    04:16   CBC   WBC 14.38  8.81  8.58    RBC 3.20  2.87  3.11    Hemoglobin 9.9  9.2  9.9    Hematocrit 30.2  27.6  29.7    MCV 94.4  96.2  95.5    MCH 30.9  32.1  31.8    MCHC 32.8  33.3  33.3    RDW 17.2  18.8  18.7    Platelets 266  229  227      CMP          7/14/2023    09:15 7/16/2023    07:21 7/17/2023    04:16   CMP   Glucose 94  97  95    BUN 24  35  32    Creatinine 1.13  1.37  1.29    EGFR 49.9  39.6  42.6    Sodium 141  136  140    Potassium 5.3  4.5  3.8    Chloride 109  104  106    Calcium 9.3  8.7  8.7    Total Protein 6.4  6.4     Albumin 3.8  3.8     Globulin 2.6  2.6     Total Bilirubin 0.6  0.5     Alkaline Phosphatase 40  45     AST (SGOT) 17  19     ALT (SGPT) 10  12     Albumin/Globulin Ratio 1.5  1.5     BUN/Creatinine Ratio 21.2  25.5  24.8    Anion Gap 10.9  10.9  11.9       CARDIAC LABS:      Lab 07/17/23  0416 07/16/23  1154 07/16/23  0919 07/16/23  0721 07/14/23  0915   PROBNP  --   --   --  6,199.0* 7,769.0*   HSTROP T  --   --  82* 85*  --    PROTIME 15.8* 15.4*  --   --   --    INR 1.25* 1.21*  --   --   --         No results found for: DIGOXIN   Lab Results   Component Value Date    TSH 1.130 06/15/2023     Results from last 7 days   Lab Units 07/16/23  0919   CHOLESTEROL mg/dL 97   HDL CHOL mg/dL 43     No results found for: POCTROP  Lab Results   Component Value Date    TROPONINT 82 (C) 07/16/2023   (  Lab Results   Component Value Date    MG 2.0 07/17/2023     Results for orders placed in visit on 07/06/23    Adult Transthoracic Echo Complete W/ Cont if Necessary Per Protocol    Interpretation Summary    Left ventricular systolic function is moderately decreased. Left ventricular ejection fraction appears to be 36 - 40%.    The left ventricular cavity is borderline dilated.    The following left ventricular wall segments are hypokinetic: mid inferolateral and mid inferior.    Left  ventricular diastolic function is consistent with (grade I) impaired relaxation.    Mild aortic valve stenosis is present.    Moderate mitral valve regurgitation is present.    Left ventricular global strain is abnormal at -9%           ASSESSMENT:    Chest pain    Chest pain atypical symptoms with mild nontrending troponins previous chest test showing defect consistent with ischemic heart disease and new onset of CHF patient had already been set up for evaluation with heart catheterization recommend to go ahead and proceeding with this discussed respites alternatives including risk of bleeding, infection, kidney failure, death, heart attack, and stroke patient was proceeding    HFrEF acute on chronic we will restart her Aldactone therapy today and monitor her blood pressure and renal function      PLAN:  1.  Aldactone 25 mg once a day  2.  Continue with her current dose of Coreg and Entresto  3.  Left heart catheterization today  4.  Repeat BMP in am            Roderick Brand MD  7/17/2023    09:20 EDT

## 2023-07-17 NOTE — THERAPY EVALUATION
Patient Name: Ann-Marie Hughes  : 1945    MRN: [id number]                              Today's Date: 2023       Admit Date: 2023    Visit Dx:     ICD-10-CM ICD-9-CM   1. Chest pain, unspecified type  R07.9 786.50   2. Precordial pain  R07.2 786.51   3. Decreased activities of daily living (ADL)  Z78.9 V49.89     Patient Active Problem List   Diagnosis    Arthritis    Bilateral leg pain    Bladder disorder    Chronic pain syndrome    Depression    Diabetes mellitus, type II    Type 2 diabetes mellitus with hyperglycemia    Gastric reflux    Herniated disc    Hypertension    Hypothyroidism    Low back pain    Pain in joint, multiple sites    Pain in soft tissues of limb    Shortness of breath    Hyperlipidemia    B12 deficiency    Vitamin D deficiency    UTI (urinary tract infection)    Metabolic syndrome    Acne rosacea    Hx of smoking    Stress incontinence of urine    Arteriosclerosis of abdominal aorta    Iron deficiency anemia secondary to inadequate dietary iron intake    Seasonal allergies    Hyponatremia    Leg length discrepancy    Type 2 diabetes mellitus with hyperglycemia    Reactive depression    Encounter for subsequent annual wellness visit (AWV) in Medicare patient    Class 1 obesity with alveolar hypoventilation, serious comorbidity, and body mass index (BMI) of 32.0 to 32.9 in adult    Heart failure with reduced ejection fraction    Coronary artery disease of native artery of native heart with stable angina pectoris    Chest pain    Chronic HFrEF (heart failure with reduced ejection fraction)    NSTEMI (non-ST elevated myocardial infarction)     Past Medical History:   Diagnosis Date    Acne rosacea 2021    DR.JEFF MOON     Arteriosclerosis of abdominal aorta     B12 deficiency 2021    DJD (degenerative joint disease)     Hx of smoking 2021    QUIT IN  AT AGE 31    Hyperlipidemia 2021    Hypertension     Hypothyroidism     Metabolic syndrome  8/17/2021    KAREN (stress urinary incontinence, female) 8/17/2021    UTI (urinary tract infection) 8/17/2021    Vitamin D deficiency 8/17/2021    Vitamin D deficiency      Past Surgical History:   Procedure Laterality Date    COLONOSCOPY 2011      General Information       Row Name 07/17/23 1304          OT Time and Intention    Document Type evaluation  -     Mode of Treatment individual therapy;occupational therapy  -       Row Name 07/17/23 1304          General Information    Patient Profile Reviewed yes  -     Prior Level of Function --  (I) with ADLs, ambulated with a rollator, has a step over tub with shower chair/grab bars/handheld shower head, elevated commode, stands to groom, and no home O2.  -     Existing Precautions/Restrictions no known precautions/restrictions  -     Barriers to Rehab none identified  -       Row Name 07/17/23 1304          Occupational Profile    Reason for Services/Referral (Occupational Profile) Patient is a 77 year old female admitted to [facility] for chest pain on July 16th, 2023. Occupational therapy consulted due to recent decline in ADLs/functional transfers. No previous occupational therapy services for current condition.  -       Row Name 07/17/23 1304          Living Environment    People in Home spouse  -       Row Name 07/17/23 1304          Home Main Entrance    Number of Stairs, Main Entrance three  -LF     Stair Railings, Main Entrance railings on both sides of stairs  -       Row Name 07/17/23 1304          Cognition    Orientation Status (Cognition) oriented x 4  -       Row Name 07/17/23 1304          Safety Issues, Functional Mobility    Impairments Affecting Function (Mobility) balance;endurance/activity tolerance  -               User Key  (r) = Recorded By, (t) = Taken By, (c) = Cosigned By      Initials Name Provider Type     Jess Belle OT Occupational Therapist                     Mobility/ADL's       Row Name  07/17/23 1306          Bed Mobility    Bed Mobility supine-sit;sit-supine  -LF     Supine-Sit [place] (Bed Mobility) standby assist  -LF     Sit-Supine [place] (Bed Mobility) standby assist  -LF     Bed Mobility, Safety Issues decreased use of arms for pushing/pulling;decreased use of legs for bridging/pushing  -       Row Name 07/17/23 1306          Transfers    Transfers sit-stand transfer;stand-sit transfer  -       Row Name 07/17/23 1306          Sit-Stand Transfer    Sit-Stand [place] (Transfers) contact guard  -LF     Assistive Device (Sit-Stand Transfers) walker, front-wheeled  -LF       Row Name 07/17/23 1306          Stand-Sit Transfer    Stand-Sit [place] (Transfers) contact guard  -LF     Assistive Device (Stand-Sit Transfers) walker, front-wheeled  -LF       Row Name 07/17/23 1306          Activities of Daily Living    BADL Assessment/Intervention bathing;upper body dressing;lower body dressing;grooming;feeding;toileting  -       Row Name 07/17/23 1306          Bathing Assessment/Intervention    [place] Level (Bathing) bathing skills;upper body;set up;lower body;minimum assist (75% patient effort)  -       Row Name 07/17/23 1306          Upper Body Dressing Assessment/Training    [place] Level (Upper Body Dressing) upper body dressing skills;set up  -       Row Name 07/17/23 1306          Lower Body Dressing Assessment/Training    [place] Level (Lower Body Dressing) lower body dressing skills;minimum assist (75% patient effort)  -       Row Name 07/17/23 1306          Grooming Assessment/Training    [place] Level (Grooming) grooming skills;set up  -       Row Name 07/17/23 1306          Self-Feeding Assessment/Training    [place] Level (Feeding) feeding skills;set up  -       Row Name 07/17/23 1306          Toileting Assessment/Training    [place] Level (Toileting) toileting skills;minimum assist (75% patient effort)  -                User Key  (r) = Recorded By, (t) = Taken By, (c) = Cosigned By      Initials Name Provider Type     Jess Belle OT Occupational Therapist                   Obj/Interventions       [facility] Name 07/17/23 1308          Sensory Assessment (Somatosensory)    Sensory Assessment (Somatosensory) UE sensation intact  -LF       Row Name 07/17/23 1308          Vision Assessment/Intervention    Visual Impairment/Limitations WFL;corrective lenses full-time  -LF       [facility] Name 07/17/23 1308          Range of Motion Comprehensive    General Range of Motion bilateral upper extremity ROM WFL  -LF       Row Name 07/17/23 1308          Strength Comprehensive (MMT)    Comment, General Manual Muscle Testing (MMT) Assessment 4/5 BUEs  [facility] Name 07/17/23 1308          Motor Skills    Motor Skills coordination;functional endurance  -LF     Coordination WFL  -LF     Functional Endurance Fair  -LF       Row Name 07/17/23 1308          Balance    Balance Assessment sitting dynamic balance;standing dynamic balance  -LF     Dynamic Sitting Balance supervision  -LF     Position, Sitting Balance unsupported;sitting edge of bed  -LF     Dynamic Standing Balance contact guard  -LF     Position/Device Used, Standing Balance supported;walker, front-wheeled  -LF               User Key  (r) = Recorded By, (t) = Taken By, (c) = Cosigned By      Initials Name Provider Type    LF Jess Belle OT Occupational Therapist                   Goals/Plan       Row Name 07/17/23 1322          Bed Mobility Goal 1 (OT)    Activity/Assistive Device (Bed Mobility Goal 1, OT) bed mobility activities, all  -LF     [place] Level/Cues Needed (Bed Mobility Goal 1, OT) modified independence  -LF     Time Frame (Bed Mobility Goal 1, OT) long term goal (LTG);10 days  [facility] Name 07/17/23 1322          Transfer Goal 1 (OT)    Activity/Assistive Device (Transfer Goal 1, OT) transfers, all;walker, rolling  -LF     [place] Level/Cues Needed  (Transfer Goal 1, OT) modified independence  -LF     Time Frame (Transfer Goal 1, OT) long term goal (LTG);10 days  -       Row Name 07/17/23 1322          Bathing Goal 1 (OT)    Activity/Device (Bathing Goal 1, OT) bathing skills, all  -LF     [place] Level/Cues Needed (Bathing Goal 1, OT) modified independence  -LF     Time Frame (Bathing Goal 1, OT) long term goal (LTG);10 days  -       Row Name 07/17/23 1322          Dressing Goal 1 (OT)    Activity/Device (Dressing Goal 1, OT) dressing skills, all  -LF     [place]/Cues Needed (Dressing Goal 1, OT) modified independence  -LF     Time Frame (Dressing Goal 1, OT) long term goal (LTG);10 days  -       Row Name 07/17/23 1322          Toileting Goal 1 (OT)    Activity/Device (Toileting Goal 1, OT) toileting skills, all  -LF     [place] Level/Cues Needed (Toileting Goal 1, OT) modified independence  -LF     Time Frame (Toileting Goal 1, OT) long term goal (LTG);10 days  -LF       Row Name 07/17/23 1322          Problem Specific Goal 1 (OT)    Problem Specific Goal 1 (OT) Patient will demonstrate good endurance to support ADLs/functional transfers.  -LF     Time Frame (Problem Specific Goal 1, OT) long term goal (LTG);10 days  -LF       Row Name 07/17/23 1322          Therapy Assessment/Plan (OT)    Planned Therapy Interventions (OT) activity tolerance training;patient/caregiver education/training;BADL retraining;functional balance retraining;occupation/activity based interventions;strengthening exercise;transfer/mobility retraining  -               User Key  (r) = Recorded By, (t) = Taken By, (c) = Cosigned By      Initials Name Provider Type    LF Jess Belle OT Occupational Therapist                   Clinical Impression       Row Name 07/17/23 1321          Pain Assessment    Additional Documentation Pain Scale: FACES Pre/Post-Treatment (Group)  -LF       Row Name 07/17/23 1321          Pain Scale: FACES Pre/Post-Treatment    Pain:  FACES Scale, Pretreatment 0-->no hurt  -LF     Posttreatment Pain Rating 0-->no hurt  -LF       Row Name 07/17/23 1321          Plan of Care Review    Plan of Care Reviewed With patient;spouse  -LF     Progress no change  -     Outcome Evaluation Patient presents with limitations in self-care, functional transfers, balance, and endurance. She would benefit from continued skilled occupational therapy services to maximize independence with ADLs/functional transfers.  -       Row Name 07/17/23 1321          Therapy Assessment/Plan (OT)    Patient/Family Therapy Goal Statement (OT) To maximize independence.  -     Rehab Potential (OT) good, to achieve stated therapy goals  -     Criteria for Skilled Therapeutic Interventions Met (OT) yes;skilled treatment is necessary;meets criteria  -     Therapy Frequency (OT) 5 times/wk  -       Row Name 07/17/23 1321          Therapy Plan Review/Discharge Plan (OT)    Anticipated Discharge Disposition (OT) home with assist  -       Row Name 07/17/23 1321          Vital Signs    O2 Delivery Pre Treatment room air  -LF     O2 Delivery Intra Treatment room air  -LF     O2 Delivery Post Treatment room air  -LF               User Key  (r) = Recorded By, (t) = Taken By, (c) = Cosigned By      Initials Name Provider Type    LF Jess Belle, OT Occupational Therapist                   Outcome Measures       Row Name 07/17/23 1323          How much help from another is currently needed...    Putting on and taking off regular lower body clothing? 3  -LF     Bathing (including washing, rinsing, and drying) 3  -LF     Toileting (which includes using toilet bed pan or urinal) 3  -LF     Putting on and taking off regular upper body clothing 4  -LF     Taking care of personal grooming (such as brushing teeth) 4  -LF     Eating meals 4  -LF     AM-PAC 6 Clicks Score (OT) 21  -LF       Row Name 07/17/23 1323          Functional Assessment    Outcome Measure Options AM-PAC 6 Clicks  Daily Activity (OT);Optimal Instrument  -LF       Row Name 07/17/23 1323          Optimal Instrument    Optimal Instrument Optimal - 3  -LF     Bending/Stooping 2  -LF     Standing 2  -LF     Reaching 1  -LF     From the list, choose the 3 activities you would most like to be able to do without any difficulty Bending/stooping;Reaching;Standing  -LF     Total Score Optimal - 3 5  -LF               User Key  (r) = Recorded By, (t) = Taken By, (c) = Cosigned By      Initials Name Provider Type     Jess Belle OT Occupational Therapist                    Occupational Therapy Education       Title: PT OT SLP Therapies (Done)       Topic: Occupational Therapy (Done)       Point: ADL training (Done)       Description:   Instruct learner(s) on proper safety adaptation and remediation techniques during self care or transfers.   Instruct in proper use of assistive devices.                  Learning Progress Summary             Patient Acceptance, E,TB, VU by LF at 7/17/2023 1325   Significant Other Acceptance, E,TB, VU by LF at 7/17/2023 1325                         Point: Precautions (Done)       Description:   Instruct learner(s) on prescribed precautions during self-care and functional transfers.                  Learning Progress Summary             Patient Acceptance, E,TB, VU by LF at 7/17/2023 1325   Significant Other Acceptance, E,TB, VU by LF at 7/17/2023 1325                         Point: Body mechanics (Done)       Description:   Instruct learner(s) on proper positioning and spine alignment during self-care, functional mobility activities and/or exercises.                  Learning Progress Summary             Patient Acceptance, E,TB, VU by LF at 7/17/2023 1325   Significant Other Acceptance, E,TB, VU by LF at 7/17/2023 1325                                         User Key       Initials Effective Dates Name Provider Type Discipline    LF 06/16/21 -  Jess Belle OT Occupational Therapist OT                   OT Recommendation and Plan  Planned Therapy Interventions (OT): activity tolerance training, patient/caregiver education/training, BADL retraining, functional balance retraining, occupation/activity based interventions, strengthening exercise, transfer/mobility retraining  Therapy Frequency (OT): 5 times/wk  Plan of Care Review  Plan of Care Reviewed With: patient, spouse  Progress: no change  Outcome Evaluation: Patient presents with limitations in self-care, functional transfers, balance, and endurance. She would benefit from continued skilled occupational therapy services to maximize independence with ADLs/functional transfers.     Time Calculation:    Time Calculation- OT       Row Name 07/17/23 1325             Time Calculation- OT    OT Received On 07/17/23  -LF      OT Goal Re-Cert Due Date 07/26/23  -LF         Untimed Charges    OT Eval/Re-eval Minutes 34  -LF         Total Minutes    Untimed Charges Total Minutes 34  -LF       Total Minutes 34  -LF                User Key  (r) = Recorded By, (t) = Taken By, (c) = Cosigned By      Initials Name Provider Type    LF Jess Belle OT Occupational Therapist                  Therapy Charges for Today       Code Description Service Date Service Provider Modifiers Qty    [id number] HC OT EVAL LOW COMPLEXITY 3 7/17/2023 Jess Belle OT GO 1                 Jess Belle OT  7/17/2023

## 2023-07-17 NOTE — H&P
Name: Ann-Marie Hughes ADMIT: (Not on file)   : 1945  PCP: Rochelle Brown APRN    MRN: [id number] LOS: 0 days   AGE/SEX: 77 y.o. female  ROOM: Room/bed info not found     Subjective     History of Present Illness  Patient is a 77 y.o. female patient with a history of hypertension, hyperlipidemia, and hypothyroidism, who presented to the emergency department at [facility] on  with complaints of chest discomfort. She follows with Dr. 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 She had a stress test for similar complaints of vague chest discomfort on 7/10 which showed a large-sized, severe area of ischemia in the inferior wall and lateral wall with abnormal LV wall motion consistent with severe hypokinesis of the lateral and inferior wall, and a LVEF of 37%. Her echocardiogram on  showed moderate mitral valve regurgitation with eccentric jet. She was scheduled for a cardiac cath this Thursday, but given her worsening symptoms she underwent LHC today which revealed severe, multivessel coronary artery disease with a subtotal occlusion of mRCA, 95% stenosis of the mLCX, and 70% of the mLAD.    Past Medical History:   Diagnosis Date    Acne rosacea 2021    DR.JEFF MOON     Arteriosclerosis of abdominal aorta     B12 deficiency 2021    DJD (degenerative joint disease)     Hx of smoking 2021    QUIT IN  AT AGE 31    Hyperlipidemia 2021    Hypertension     Hypothyroidism     Metabolic syndrome 2021    KAREN (stress urinary incontinence, female) 2021    UTI (urinary tract infection) 2021    Vitamin D deficiency 2021    Vitamin D deficiency      Past Surgical History:   Procedure Laterality Date    COLONOSCOPY       No family history on file.  Social History     Tobacco Use    Smoking status: Former     Packs/day: 1.00     Years:  12.00     Pack years: 12.00     Types: Cigarettes     Quit date:      Years since quittin.5    Smokeless tobacco: Never   Vaping Use    Vaping Use: Never used   Substance Use Topics    Alcohol use: Never    Drug use: Never     Medications Prior to Admission   Medication Sig Dispense Refill Last Dose    albuterol sulfate  (90 Base) MCG/ACT inhaler Inhale 2 puffs Every 4 (Four) Hours As Needed for Wheezing. 18 g 0     amLODIPine (NORVASC) 5 MG tablet Take 1 tablet by mouth Daily As Needed (For systolic blood pressure 140 or greater). 90 tablet 1     aspirin 81 MG EC tablet Take 1 tablet by mouth Daily.       atorvastatin (LIPITOR) 10 MG tablet Take 1 tablet by mouth Daily. 90 tablet 1     AZO-CRANBERRY PO Take 1 tablet by mouth Daily.       buPROPion XL (WELLBUTRIN XL) 300 MG 24 hr tablet TAKE 1 TABLET BY MOUTH DAILY 90 tablet 1     carvedilol (COREG) 6.25 MG tablet Take 1 tablet by mouth 2 (Two) Times a Day. 180 tablet 3     furosemide (Lasix) 40 MG tablet Take 1 tablet by mouth Daily. 30 tablet 0     metFORMIN ER (GLUCOPHAGE-XR) 500 MG 24 hr tablet TAKE 1 TABLET BY MOUTH EVERY MORNING AND 2 TABLETS AT SUPPER (Patient taking differently: 1 tablet Daily With Breakfast.) 270 tablet 4     metFORMIN ER (GLUCOPHAGE-XR) 500 MG 24 hr tablet Take 2 tablets by mouth Daily With Dinner.       montelukast (SINGULAIR) 10 MG tablet Take 1 tablet by mouth Daily. 90 tablet 3     Probiotic Product (PROBIOTIC PO) Take 1 capsule by mouth Daily.       sacubitril-valsartan (Entresto)  MG tablet Take 1 tablet by mouth 2 (Two) Times a Day. 180 tablet 3     spironolactone (ALDACTONE) 25 MG tablet Take 1 tablet by mouth Daily. 90 tablet 3     Synthroid 75 MCG tablet Take 1 tablet by mouth Daily. 90 tablet 3     dapagliflozin Propanediol (Farxiga) 10 MG tablet Take 10 mg by mouth Daily. 30 tablet 3      Allergies:  Penicillins    Review of Systems   Constitutional:  Positive for activity change.   HENT:  Negative for  dental problem.    Respiratory:  Positive for shortness of breath.    Cardiovascular:  Positive for chest pain and leg swelling.   Musculoskeletal:  Positive for arthralgias and back pain.      Objective    Vital Signs  Temp:  [97.6 °F (36.4 °C)-98.3 °F (36.8 °C)] 98.3 °F (36.8 °C)  Heart Rate:  [56-79] 72  Resp:  [16-24] 24  BP: (125-174)/(52-88) 146/67  SpO2:  [96 %-100 %] 99 %  on  Flow (L/min):  [2] 2;   Device (Oxygen Therapy): room air  There is no height or weight on file to calculate BMI.    Physical Exam  Constitutional:       General: She is not in acute distress.     Appearance: She is obese.   HENT:      Head: Normocephalic.      Nose: Nose normal.      Mouth/Throat:      Mouth: Mucous membranes are dry.   Eyes:      Pupils: Pupils are equal, round, and reactive to light.   Cardiovascular:      Rate and Rhythm: Normal rate and regular rhythm.      Pulses: Normal pulses.   Pulmonary:      Effort: Pulmonary effort is normal.      Breath sounds: Normal breath sounds.   Abdominal:      General: Abdomen is flat. Bowel sounds are normal.      Palpations: Abdomen is soft.   Musculoskeletal:         General: Normal range of motion.      Cervical back: Normal range of motion.   Skin:     General: Skin is warm and dry.      Capillary Refill: Capillary refill takes 2 to 3 seconds.   Neurological:      General: No focal deficit present.      Mental Status: She is alert and oriented to person, place, and time.   Psychiatric:         Mood and Affect: Mood normal.       Results Review:  I reviewed the patient's new clinical results.    Assessment & Plan    -Severe multivessel CAD  -Moderate MR  -Ischemic cardiomyopathy---EF 36%  -HTN  -HLD  -DM II  -Hypothyroidism   -Former tobacco abuse     Dr. Alexander has reviewed the case and recommends surgical CABG and possible MVR. Preop orders placed. She is scheduled for second case on Thursday with Dr. Alexander. Hold heparin on call to OR.    I discussed the  patients findings and my recommendations with patient.      Emi Britt, APRN  07/18/23  09:30

## 2023-07-17 NOTE — CASE MANAGEMENT/SOCIAL WORK
Inpatient 7/16/23-DC 7/17/23/Met    Patient to transfer to [facility] for cardiothoracic surgery

## 2023-07-17 NOTE — PLAN OF CARE
Goal Outcome Evaluation:  Plan of Care Reviewed With: patient, spouse        Progress: no change  Outcome Evaluation: Patient presents with limitations in self-care, functional transfers, balance, and endurance. She would benefit from continued skilled occupational therapy services to maximize independence with ADLs/functional transfers.      Anticipated Discharge Disposition (OT): home with assist

## 2023-07-17 NOTE — H&P (VIEW-ONLY)
Name: Ann-Marie Hughes ADMIT: (Not on file)   : 1945  PCP: Rochelle Brown APRN    MRN: [id number] LOS: 0 days   AGE/SEX: 77 y.o. female  ROOM: Room/bed info not found     Subjective     History of Present Illness  Patient is a 77 y.o. female patient with a history of hypertension, hyperlipidemia, and hypothyroidism, who presented to the emergency department at [facility] on  with complaints of chest discomfort. She follows with Dr. 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 She had a stress test for similar complaints of vague chest discomfort on 7/10 which showed a large-sized, severe area of ischemia in the inferior wall and lateral wall with abnormal LV wall motion consistent with severe hypokinesis of the lateral and inferior wall, and a LVEF of 37%. Her echocardiogram on  showed moderate mitral valve regurgitation with eccentric jet. She was scheduled for a cardiac cath this Thursday, but given her worsening symptoms she underwent LHC today which revealed severe, multivessel coronary artery disease with a subtotal occlusion of mRCA, 95% stenosis of the mLCX, and 70% of the mLAD.    Past Medical History:   Diagnosis Date    Acne rosacea 2021    DR.JEFF MOON     Arteriosclerosis of abdominal aorta     B12 deficiency 2021    DJD (degenerative joint disease)     Hx of smoking 2021    QUIT IN  AT AGE 31    Hyperlipidemia 2021    Hypertension     Hypothyroidism     Metabolic syndrome 2021    KAREN (stress urinary incontinence, female) 2021    UTI (urinary tract infection) 2021    Vitamin D deficiency 2021    Vitamin D deficiency      Past Surgical History:   Procedure Laterality Date    COLONOSCOPY       No family history on file.  Social History     Tobacco Use    Smoking status: Former     Packs/day: 1.00     Years:  12.00     Pack years: 12.00     Types: Cigarettes     Quit date:      Years since quittin.5    Smokeless tobacco: Never   Vaping Use    Vaping Use: Never used   Substance Use Topics    Alcohol use: Never    Drug use: Never     Medications Prior to Admission   Medication Sig Dispense Refill Last Dose    albuterol sulfate  (90 Base) MCG/ACT inhaler Inhale 2 puffs Every 4 (Four) Hours As Needed for Wheezing. 18 g 0     amLODIPine (NORVASC) 5 MG tablet Take 1 tablet by mouth Daily As Needed (For systolic blood pressure 140 or greater). 90 tablet 1     aspirin 81 MG EC tablet Take 1 tablet by mouth Daily.       atorvastatin (LIPITOR) 10 MG tablet Take 1 tablet by mouth Daily. 90 tablet 1     AZO-CRANBERRY PO Take 1 tablet by mouth Daily.       buPROPion XL (WELLBUTRIN XL) 300 MG 24 hr tablet TAKE 1 TABLET BY MOUTH DAILY 90 tablet 1     carvedilol (COREG) 6.25 MG tablet Take 1 tablet by mouth 2 (Two) Times a Day. 180 tablet 3     furosemide (Lasix) 40 MG tablet Take 1 tablet by mouth Daily. 30 tablet 0     metFORMIN ER (GLUCOPHAGE-XR) 500 MG 24 hr tablet TAKE 1 TABLET BY MOUTH EVERY MORNING AND 2 TABLETS AT SUPPER (Patient taking differently: 1 tablet Daily With Breakfast.) 270 tablet 4     metFORMIN ER (GLUCOPHAGE-XR) 500 MG 24 hr tablet Take 2 tablets by mouth Daily With Dinner.       montelukast (SINGULAIR) 10 MG tablet Take 1 tablet by mouth Daily. 90 tablet 3     Probiotic Product (PROBIOTIC PO) Take 1 capsule by mouth Daily.       sacubitril-valsartan (Entresto)  MG tablet Take 1 tablet by mouth 2 (Two) Times a Day. 180 tablet 3     spironolactone (ALDACTONE) 25 MG tablet Take 1 tablet by mouth Daily. 90 tablet 3     Synthroid 75 MCG tablet Take 1 tablet by mouth Daily. 90 tablet 3     dapagliflozin Propanediol (Farxiga) 10 MG tablet Take 10 mg by mouth Daily. 30 tablet 3      Allergies:  Penicillins    Review of Systems   Constitutional:  Positive for activity change.   HENT:  Negative for  dental problem.    Respiratory:  Positive for shortness of breath.    Cardiovascular:  Positive for chest pain and leg swelling.   Musculoskeletal:  Positive for arthralgias and back pain.      Objective    Vital Signs  Temp:  [97.6 øF (36.4 øC)-98.3 øF (36.8 øC)] 98.3 øF (36.8 øC)  Heart Rate:  [56-79] 72  Resp:  [16-24] 24  BP: (125-174)/(52-88) 146/67  SpO2:  [96 %-100 %] 99 %  on  Flow (L/min):  [2] 2;   Device (Oxygen Therapy): room air  There is no height or weight on file to calculate BMI.    Physical Exam  Constitutional:       General: She is not in acute distress.     Appearance: She is obese.   HENT:      Head: Normocephalic.      Nose: Nose normal.      Mouth/Throat:      Mouth: Mucous membranes are dry.   Eyes:      Pupils: Pupils are equal, round, and reactive to light.   Cardiovascular:      Rate and Rhythm: Normal rate and regular rhythm.      Pulses: Normal pulses.   Pulmonary:      Effort: Pulmonary effort is normal.      Breath sounds: Normal breath sounds.   Abdominal:      General: Abdomen is flat. Bowel sounds are normal.      Palpations: Abdomen is soft.   Musculoskeletal:         General: Normal range of motion.      Cervical back: Normal range of motion.   Skin:     General: Skin is warm and dry.      Capillary Refill: Capillary refill takes 2 to 3 seconds.   Neurological:      General: No focal deficit present.      Mental Status: She is alert and oriented to person, place, and time.   Psychiatric:         Mood and Affect: Mood normal.       Results Review:  I reviewed the patient's new clinical results.    Assessment & Plan    -Severe multivessel CAD  -Moderate MR  -Ischemic cardiomyopathy---EF 36%  -HTN  -HLD  -DM II  -Hypothyroidism   -Former tobacco abuse     Dr. Alexander has reviewed the case and recommends surgical CABG and possible MVR. Preop orders placed. She is scheduled for second case on Thursday with Dr. Alexander. Hold heparin on call to OR.    I discussed the  patients findings and my recommendations with patient.      Emi Britt, APRN  07/18/23  09:30

## 2023-07-17 NOTE — PLAN OF CARE
Goal Outcome Evaluation:      Education complete. Patient is discharging to [facility].   Filomean Louis RN

## 2023-07-17 NOTE — SIGNIFICANT NOTE
07/17/23 0935   Plan   Plan ARBEN, RN met with patient and spouse at bedside.  Patient will have a heart cath this am.  Patient reports being on Entresto that cost $318.00 monthly.  Reports good family support.  Spouse provides transportation and can assist patient is needed.  Facesheet verified.  Will continue to monitor for possible discharge needs.  [facility]

## 2023-07-17 NOTE — DISCHARGE SUMMARY
[facility]         HOSPITALIST  DISCHARGE SUMMARY    Patient Name: Ann-Marie Hughes  : 1945  MRN: [id number]    Date of Admission: 2023  Date of Discharge:  2023    Primary Care Physician: Rochelle Brown APRN    Consults       Date and Time Order Name Status Description    2023 12:35 PM Inpatient Cardiology Consult      2023 10:53 AM Inpatient Hospitalist Consult              Active and Resolved Hospital Problems:  Active Hospital Problems    Diagnosis POA    **Chest pain [R07.9] Yes    Chronic HFrEF (heart failure with reduced ejection fraction) [I50.22] Unknown    NSTEMI (non-ST elevated myocardial infarction) [I21.4] Unknown      Resolved Hospital Problems   No resolved problems to display.       Hospital Course     Hospital Course:  Ann-Marie Hughes is a 77 y.o. female with history of systolic congestive heart failure, abnormal stress test hypertension, hyperlipidemia who came to the ER for chest tightness.  Patient's EKG was unremarkable her troponins were slightly elevated.  Patient was admitted to hospital for cardiology work-up.  Patient did undergo cardiac cath today which showed severe multivessel disease therefore patient will need urgent transfer to [facility] for cardiothoracic surgery.  This has been arranged by cardiology with Dr. Reyes.  Patient to discharge today in stable condition.      Day of Discharge     Vital Signs:  Temp:  [97.6 °F (36.4 °C)-98.3 °F (36.8 °C)] 98.3 °F (36.8 °C)  Heart Rate:  [56-79] 72  Resp:  [16-24] 24  BP: (125-174)/(52-88) 146/67  Flow (L/min):  [2] 2  Physical Exam:          Constitutional: Awake, alert, no acute distress resting in bed post cath               Eyes: Pupils equal, sclerae anicteric, no conjunctival injection              HENT: NCAT, mucous membranes moist              Neck: Supple,               Respiratory: Clear to auscultation bilaterally, nonlabored respirations                Cardiovascular: RRR, no murmurs, rubs, or gallops, palpable pedal pulses bilaterally              Gastrointestinal: Positive bowel sounds, soft, nontender, nondistended              Musculoskeletal: Full rom               Psychiatric: Appropriate affect, cooperative              Neurologic: Oriented x 3, strength symmetric in all extremities, Cranial Nerves grossly intact to confrontation, speech       Discharge Details        Discharge Medications        Changes to Medications        Instructions Start Date   metFORMIN  MG 24 hr tablet  Commonly known as: GLUCOPHAGE-XR  What changed: Another medication with the same name was changed. Make sure you understand how and when to take each.   1,000 mg, Oral, Daily With Dinner      metFORMIN  MG 24 hr tablet  Commonly known as: GLUCOPHAGE-XR  What changed: See the new instructions.   TAKE 1 TABLET BY MOUTH EVERY MORNING AND 2 TABLETS AT SUPPER             Continue These Medications        Instructions Start Date   albuterol sulfate  (90 Base) MCG/ACT inhaler  Commonly known as: PROVENTIL HFA;VENTOLIN HFA;PROAIR HFA   2 puffs, Inhalation, Every 4 Hours PRN      amLODIPine 5 MG tablet  Commonly known as: NORVASC   5 mg, Oral, Daily PRN      aspirin 81 MG EC tablet   81 mg, Oral, Daily      atorvastatin 10 MG tablet  Commonly known as: LIPITOR   10 mg, Oral, Daily      AZO-CRANBERRY PO   1 tablet, Oral, Daily      buPROPion  MG 24 hr tablet  Commonly known as: WELLBUTRIN XL   300 mg, Oral, Daily      carvedilol 6.25 MG tablet  Commonly known as: COREG   6.25 mg, Oral, 2 Times Daily      Entresto  MG tablet  Generic drug: sacubitril-valsartan   1 tablet, Oral, 2 Times Daily      Farxiga 10 MG tablet  Generic drug: dapagliflozin Propanediol   10 mg, Oral, Daily      furosemide 40 MG tablet  Commonly known as: Lasix   40 mg, Oral, Daily      montelukast 10 MG tablet  Commonly known as: SINGULAIR   10 mg, Oral, Daily      PROBIOTIC PO   1 capsule,  Oral, Daily      spironolactone 25 MG tablet  Commonly known as: ALDACTONE   25 mg, Oral, Daily      Synthroid 75 MCG tablet  Generic drug: levothyroxine   75 mcg, Oral, Daily               Allergies   Allergen Reactions    Penicillins Palpitations       Discharge Disposition:  Short Term Hospital (DC - External)    Diet:  Hospital:  Diet Order   Procedures    Diet: Diabetic Diets; Consistent Carbohydrate; Texture: Regular Texture (IDDSI 7); Fluid Consistency: Thin (IDDSI 0)             CODE STATUS:  Code Status and Medical Interventions:   Ordered at: 23 1102     Level Of Support Discussed With:    Patient     Code Status (Patient has no pulse and is not breathing):    CPR (Attempt to Resuscitate)     Medical Interventions (Patient has pulse or is breathing):    Full Support         Future Appointments   Date Time Provider Department Center   2023  9:00 AM Valerie Valera MD [facility]RT[facility] None   2023 10:15 AM Romario Valdez DO [facility] PC [facility]           Pertinent  and/or Most Recent Results     PROCEDURES:   Results for orders placed during the hospital encounter of 23    Cardiac Catheterization/Vascular Study    [facility]  CARDIAC CATHETERIZATION PROCEDURE REPORT    Patient: Ann-Marie Hughes  : 1945  MRN: [id number]    Procedure Date:  23    Referring Physician:  Zuleyka Brand MD    Interventional Cardiologist:  Dinh Andres MD    Indication:  Angina pectoris, other  Cardiomyopathy with LV ejection fraction of 30 to 35%  Positive stress test showing large inferolateral wall ischemia    Clinical Presentation:  Ms. Hughes was recently diagnosed with cardiomyopathy and congestive heart failure with LV ejection fraction 30 to 35%.  She had a SPECT stress test done recently which showed large inferolateral wall ischemia.  Overnight, she came because of chest pain.  She was noted to have mildly elevated troponin.  Today, she was brought to the Cath  Lab to identify ischemic culprits.    Procedure performed:  Left heart catheterization  Selective coronary angiography      Access Sites:  Right radial artery    Findings:  1. Coronary Artery Anatomy:  Dominance: Right  Left Main: Long segment which is free of any stenosis.  Left Anterior Descending Artery: Medium caliber vessel giving rise to various diagonal and septal  branches.  Proximal LAD is free of any stenosis.  Mid LAD has a focal lesion which is angiographically 70% severity.  Some calcifications noted in this lesion.  Further down LAD has nonobstructive lesion which is angiographically 40% severity with calcification.  First large diagonal branch has a 50% stenosis in its ostium.  Distal and apical LAD are free of any stenosis.  Left Circumflex Artery: Nondominant vessel giving rise to 1 large OM branch.  Proximal left circumflex artery has luminal irregularities with calcification.  Mid left circumflex was artery has a 95% focal lesion which compensates blood supply to the terminal OM branch.  Right Coronary Artery: Large dominant vessel giving rise to right PDA and PLV branches.  Proximal RCA has a focal lesion which is angiographically 80% severity.  Mid RCA is subtotally occluded with a lesion angiographically 99% severity with heavy calcification.  ANEUDY I flow is noted in distal RCA, PDA and PLV branches.  Some branches of the PDA were noted to be filled by left-to-right collaterals.    2. Hemodynamics:  The opening aortic pressure is 128/61 with a mean of 85 mmHg.  The left ventricular end-diastolic pressure is 7 mmHg.  There was no gradient across aortic valve on pullback  The closing aortic pressure is 143/54 with a mean of 89 mmHg    3. Left Ventriculogram: Not performed due to chronic kidney disease      Conclusions:  Multivessel coronary artery disease including subtotal occlusion of mid right coronary artery, 95% stenosis of the mid left circumflex artery and 70% lesion involving  mid LAD artery  Ischemic cardiomyopathy LV ejection fraction of 30 to 35% per echocardiogram with normal LVEDP.    Recommendations:  We will discuss angiograms with cardiothoracic surgeons to consider coronary artery bypass grafting due to multivessel coronary artery disease, decreased LV systolic function and diabetes mellitus      Procedure Details:  Informed consent was obtained with an explanation of the risks, benefits and alternatives of the procedure. The patient was brought to the Cardiac Catheterization Laboratory and was prepped and draped in a standard sterile fashion. Moderate sedation with Fentanyl and Versed was administered by the circulating nurse. Lidocaine 2% was used to anesthetize the right radial artery and a 5/6 Slender sheath was placed.  A 5 F TGR catheter was then advanced over a 0.035 guidewire into the ascending aorta.  This catheter was used to engage the right and left coronary arteries with diagnostic angiography obtained in all appropriate projections by injection of non-ionic contrast.  The same catheter was advanced over a wire into the left ventricle.  Left ventricular hemodynamics were documented.  Left ventriculography was not performed due to chronic kidney disease, to conserve the amount of contrast dye used.  Gradient across aortic valve as documented by pullback technique.  The catheter was then removed over a guidewire. The radial artery sheath was removed without difficulty and TR Band was placed over the access site with excellent hemostasis.    Patient tolerated the procedure well without any complications, and transferred to the post procedure area for recovery in a stable condition.    Cumulative fluoroscopy time: 4 min    Cumulative air kerma: 695 mGy    Total amount of contrast used: 50 ml of Isovue      Complications:  None.    Estimated Blood Loss:  Minimal.    Dinh Andres MD    07/17/23  11:54 EDT      LAB RESULTS:      Lab 07/17/23  0416 07/16/23  1154  07/16/23  0721   WBC 8.58  --  8.81   HEMOGLOBIN 9.9*  --  9.2*   HEMATOCRIT 29.7*  --  27.6*   PLATELETS 227  --  229   NEUTROS ABS 4.22  --  5.12   IMMATURE GRANS (ABS) 0.02  --  0.02   LYMPHS ABS 2.99  --  2.46   MONOS ABS 1.17*  --  1.05*   EOS ABS 0.12  --  0.13   MCV 95.5  --  96.2   PROTIME 15.8* 15.4*  --          Lab 07/17/23  0416 07/16/23  2209 07/16/23  1154 07/16/23 0721 07/14/23  0915   SODIUM 140  --   --  136 141   POTASSIUM 3.8  --   --  4.5 5.3*   CHLORIDE 106  --   --  104 109*   CO2 22.1  --   --  21.1* 21.1*   ANION GAP 11.9  --   --  10.9 10.9   BUN 32*  --   --  35* 24*   CREATININE 1.29*  --   --  1.37* 1.13*   EGFR 42.6*  --   --  39.6* 49.9*   GLUCOSE 95  --   --  97 94   CALCIUM 8.7  --   --  8.7 9.3   MAGNESIUM 2.0 2.1 1.6 1.6  --    PHOSPHORUS 3.9  --  4.0  --   --          Lab 07/16/23 0721 07/14/23  0915   TOTAL PROTEIN 6.4 6.4   ALBUMIN 3.8 3.8   GLOBULIN 2.6 2.6   ALT (SGPT) 12 10   AST (SGOT) 19 17   BILIRUBIN 0.5 0.6   ALK PHOS 45 40   LIPASE 20  --          Lab 07/17/23  0416 07/16/23  1154 07/16/23  0919 07/16/23 0721 07/14/23  0915   PROBNP  --   --   --  6,199.0* 7,769.0*   HSTROP T  --   --  82* 85*  --    PROTIME 15.8* 15.4*  --   --   --    INR 1.25* 1.21*  --   --   --          Lab 07/16/23  0919   CHOLESTEROL 97   LDL CHOL 38   HDL CHOL 43   TRIGLYCERIDES 75         Lab 07/16/23  1154 07/16/23  0919   IRON  --  100   IRON SATURATION (TSAT)  --  34   TIBC  --  291*   TRANSFERRIN  --  195*   FERRITIN  --  541.40*   FOLATE 19.00  --    VITAMIN B 12 855  --          Brief Urine Lab Results  (Last result in the past 365 days)        Color   Clarity   Blood   Leuk Est   Nitrite   Protein   CREAT   Urine HCG        06/15/23 1010 Yellow   Clear   Negative   Trace   Negative   >=300 mg/dL (3+)                 Microbiology Results (last 10 days)       ** No results found for the last 240 hours. **            XR Chest 1 View    Result Date: 7/16/2023  Impression:   No active  cardiopulmonary disease is seen.       MILLA WILSON MD       Electronically Signed and Approved By: MILLA WILSON MD on 7/16/2023 at 7:34                      Results for orders placed in visit on 07/06/23    Adult Transthoracic Echo Complete W/ Cont if Necessary Per Protocol    Interpretation Summary    Left ventricular systolic function is moderately decreased. Left ventricular ejection fraction appears to be 36 - 40%.    The left ventricular cavity is borderline dilated.    The following left ventricular wall segments are hypokinetic: mid inferolateral and mid inferior.    Left ventricular diastolic function is consistent with (grade I) impaired relaxation.    Mild aortic valve stenosis is present.    Moderate mitral valve regurgitation is present.    Left ventricular global strain is abnormal at -9%      Labs Pending at Discharge:        Time spent on Discharge including face to face service:  more than 35 minutes    Electronically signed by Enrique Kaba DO, 07/17/23, 12:34 PM EDT.

## 2023-07-18 ENCOUNTER — APPOINTMENT (OUTPATIENT)
Dept: CARDIOLOGY | Facility: HOSPITAL | Age: 78
DRG: 236 | End: 2023-07-18
Payer: MEDICARE

## 2023-07-18 ENCOUNTER — HOSPITAL ENCOUNTER (INPATIENT)
Facility: HOSPITAL | Age: 78
LOS: 10 days | Discharge: HOME HEALTH | DRG: 236 | End: 2023-07-28
Attending: THORACIC SURGERY (CARDIOTHORACIC VASCULAR SURGERY) | Admitting: THORACIC SURGERY (CARDIOTHORACIC VASCULAR SURGERY)
Payer: MEDICARE

## 2023-07-18 VITALS
SYSTOLIC BLOOD PRESSURE: 160 MMHG | BODY MASS INDEX: 34.66 KG/M2 | TEMPERATURE: 97.9 F | DIASTOLIC BLOOD PRESSURE: 71 MMHG | OXYGEN SATURATION: 96 % | HEIGHT: 64 IN | HEART RATE: 76 BPM | RESPIRATION RATE: 18 BRPM | WEIGHT: 203.04 LBS

## 2023-07-18 DIAGNOSIS — I25.118 CORONARY ARTERY DISEASE OF NATIVE ARTERY OF NATIVE HEART WITH STABLE ANGINA PECTORIS: ICD-10-CM

## 2023-07-18 DIAGNOSIS — I50.22 CHRONIC HFREF (HEART FAILURE WITH REDUCED EJECTION FRACTION): ICD-10-CM

## 2023-07-18 DIAGNOSIS — N18.31 CHRONIC KIDNEY DISEASE, STAGE 3A: ICD-10-CM

## 2023-07-18 DIAGNOSIS — Z95.1 S/P CABG (CORONARY ARTERY BYPASS GRAFT): ICD-10-CM

## 2023-07-18 DIAGNOSIS — R07.9 CHEST PAIN, UNSPECIFIED TYPE: ICD-10-CM

## 2023-07-18 DIAGNOSIS — R93.1 ABNORMAL FINDINGS ON DIAGNOSTIC IMAGING OF HEART AND CORONARY CIRCULATION: ICD-10-CM

## 2023-07-18 DIAGNOSIS — I21.4 NSTEMI (NON-ST ELEVATED MYOCARDIAL INFARCTION): Primary | ICD-10-CM

## 2023-07-18 PROBLEM — I25.10 CORONARY HEART DISEASE: Status: ACTIVE | Noted: 2023-07-18

## 2023-07-18 LAB
ABO GROUP BLD: NORMAL
ALBUMIN SERPL-MCNC: 3.3 G/DL (ref 3.5–5.2)
ALBUMIN/GLOB SERPL: 1.4 G/DL
ALP SERPL-CCNC: 39 U/L (ref 39–117)
ALT SERPL W P-5'-P-CCNC: 13 U/L (ref 1–33)
ANION GAP SERPL CALCULATED.3IONS-SCNC: 10 MMOL/L (ref 5–15)
ANION GAP SERPL CALCULATED.3IONS-SCNC: 14.2 MMOL/L (ref 5–15)
APTT PPP: 107.3 SECONDS (ref 22.7–35.4)
APTT PPP: 33.6 SECONDS (ref 22.7–35.4)
APTT PPP: 36.5 SECONDS (ref 22.7–35.4)
ARTERIAL PATENCY WRIST A: POSITIVE
AST SERPL-CCNC: 19 U/L (ref 1–32)
ATMOSPHERIC PRESS: 753.9 MMHG
BACTERIA UR QL AUTO: NORMAL /HPF
BASE EXCESS BLDA CALC-SCNC: -2.4 MMOL/L (ref 0–2)
BASOPHILS # BLD AUTO: 0.06 10*3/MM3 (ref 0–0.2)
BASOPHILS NFR BLD AUTO: 0.6 % (ref 0–1.5)
BDY SITE: ABNORMAL
BH CV XLRA MEAS - DIST GSV CALF DIST LEFT: 0.15 CM
BH CV XLRA MEAS - DIST GSV CALF DIST RIGHT: 0.15 CM
BH CV XLRA MEAS - DIST GSV THIGH DIST LEFT: 0.31 CM
BH CV XLRA MEAS - DIST GSV THIGH DIST RIGHT: 0.23 CM
BH CV XLRA MEAS - DIST LSV CALF DIST LEFT: 0.09 CM
BH CV XLRA MEAS - DIST LSV CALF DIST RIGHT: 0.19 CM
BH CV XLRA MEAS - GSV ANKLE DIST LEFT: 0.17 CM
BH CV XLRA MEAS - GSV ANKLE DIST RIGHT: 0.14 CM
BH CV XLRA MEAS - GSV KNEE DIST LEFT: 0.27 CM
BH CV XLRA MEAS - GSV KNEE DIST RIGHT: 0.21 CM
BH CV XLRA MEAS - GSV ORIGIN DIST LEFT: 0.55 CM
BH CV XLRA MEAS - GSV ORIGIN DIST RIGHT: 0.72 CM
BH CV XLRA MEAS - MID GSV CALF LEFT: 0.19 CM
BH CV XLRA MEAS - MID GSV CALF RIGHT: 0.18 CM
BH CV XLRA MEAS - MID GSV THIGH  LEFT: 0.31 CM
BH CV XLRA MEAS - MID GSV THIGH  RIGHT: 0.25 CM
BH CV XLRA MEAS - MID LSV CALF DIST LEFT: 0.16 CM
BH CV XLRA MEAS - MID LSV CALF DIST RIGHT: 0.16 CM
BH CV XLRA MEAS - PROX GSV CALF DIST LEFT: 0.24 CM
BH CV XLRA MEAS - PROX GSV CALF DIST RIGHT: 0.25 CM
BH CV XLRA MEAS - PROX GSV THIGH  LEFT: 0.22 CM
BH CV XLRA MEAS - PROX GSV THIGH  RIGHT: 0.3 CM
BH CV XLRA MEAS - PROX LSV CALF DIST LEFT: 0.14 CM
BH CV XLRA MEAS - PROX LSV CALF DIST RIGHT: 0.12 CM
BH CV XLRA MEAS LEFT DIST CCA EDV: 10.7 CM/SEC
BH CV XLRA MEAS LEFT DIST CCA PSV: 42.6 CM/SEC
BH CV XLRA MEAS LEFT DIST ICA EDV: -24.6 CM/SEC
BH CV XLRA MEAS LEFT DIST ICA PSV: -80.1 CM/SEC
BH CV XLRA MEAS LEFT ICA/CCA RATIO: 2
BH CV XLRA MEAS LEFT MID ICA EDV: -22.6 CM/SEC
BH CV XLRA MEAS LEFT MID ICA PSV: -85 CM/SEC
BH CV XLRA MEAS LEFT PROX CCA EDV: 7.4 CM/SEC
BH CV XLRA MEAS LEFT PROX CCA PSV: 58.4 CM/SEC
BH CV XLRA MEAS LEFT PROX ECA EDV: 3.5 CM/SEC
BH CV XLRA MEAS LEFT PROX ECA PSV: 38.7 CM/SEC
BH CV XLRA MEAS LEFT PROX ICA EDV: 23.9 CM/SEC
BH CV XLRA MEAS LEFT PROX ICA PSV: 64.7 CM/SEC
BH CV XLRA MEAS LEFT PROX SCLA PSV: 114.5 CM/SEC
BH CV XLRA MEAS LEFT VERTEBRAL A EDV: -8.4 CM/SEC
BH CV XLRA MEAS LEFT VERTEBRAL A PSV: -34.1 CM/SEC
BH CV XLRA MEAS RIGHT DIST CCA EDV: 11 CM/SEC
BH CV XLRA MEAS RIGHT DIST CCA PSV: 33.9 CM/SEC
BH CV XLRA MEAS RIGHT DIST ICA EDV: -26 CM/SEC
BH CV XLRA MEAS RIGHT DIST ICA PSV: -90.4 CM/SEC
BH CV XLRA MEAS RIGHT ICA/CCA RATIO: 2.94
BH CV XLRA MEAS RIGHT MID ICA EDV: -31.1 CM/SEC
BH CV XLRA MEAS RIGHT MID ICA PSV: -91.3 CM/SEC
BH CV XLRA MEAS RIGHT PROX CCA EDV: -9.1 CM/SEC
BH CV XLRA MEAS RIGHT PROX CCA PSV: -43.6 CM/SEC
BH CV XLRA MEAS RIGHT PROX ECA PSV: 132.7 CM/SEC
BH CV XLRA MEAS RIGHT PROX ICA EDV: 32.1 CM/SEC
BH CV XLRA MEAS RIGHT PROX ICA PSV: 99.6 CM/SEC
BH CV XLRA MEAS RIGHT PROX SCLA PSV: 99.4 CM/SEC
BH CV XLRA MEAS RIGHT VERTEBRAL A EDV: -16.1 CM/SEC
BH CV XLRA MEAS RIGHT VERTEBRAL A PSV: -62.3 CM/SEC
BILIRUB SERPL-MCNC: 0.6 MG/DL (ref 0–1.2)
BILIRUB UR QL STRIP: NEGATIVE
BLD GP AB SCN SERPL QL: NEGATIVE
BUN SERPL-MCNC: 22 MG/DL (ref 8–23)
BUN SERPL-MCNC: 22 MG/DL (ref 8–23)
BUN/CREAT SERPL: 21.8 (ref 7–25)
BUN/CREAT SERPL: 22.4 (ref 7–25)
CALCIUM SPEC-SCNC: 8.9 MG/DL (ref 8.6–10.5)
CALCIUM SPEC-SCNC: 9 MG/DL (ref 8.6–10.5)
CHLORIDE SERPL-SCNC: 108 MMOL/L (ref 98–107)
CHLORIDE SERPL-SCNC: 109 MMOL/L (ref 98–107)
CHOLEST SERPL-MCNC: 93 MG/DL (ref 0–200)
CLARITY UR: CLEAR
CLOSE TME COLL+ADP + EPINEP PNL BLD: 94 % (ref 86–100)
CO2 SERPL-SCNC: 17.8 MMOL/L (ref 22–29)
CO2 SERPL-SCNC: 21 MMOL/L (ref 22–29)
COLOR UR: YELLOW
CREAT SERPL-MCNC: 0.98 MG/DL (ref 0.57–1)
CREAT SERPL-MCNC: 1.01 MG/DL (ref 0.57–1)
DEPRECATED RDW RBC AUTO: 59.8 FL (ref 37–54)
EGFRCR SERPLBLD CKD-EPI 2021: 57.1 ML/MIN/1.73
EGFRCR SERPLBLD CKD-EPI 2021: 59.2 ML/MIN/1.73
EOSINOPHIL # BLD AUTO: 0.15 10*3/MM3 (ref 0–0.4)
EOSINOPHIL NFR BLD AUTO: 1.4 % (ref 0.3–6.2)
ERYTHROCYTE [DISTWIDTH] IN BLOOD BY AUTOMATED COUNT: 17.5 % (ref 12.3–15.4)
GLOBULIN UR ELPH-MCNC: 2.3 GM/DL
GLUCOSE BLDC GLUCOMTR-MCNC: 103 MG/DL (ref 70–130)
GLUCOSE BLDC GLUCOMTR-MCNC: 122 MG/DL (ref 70–130)
GLUCOSE BLDC GLUCOMTR-MCNC: 123 MG/DL (ref 70–130)
GLUCOSE BLDC GLUCOMTR-MCNC: 194 MG/DL (ref 70–130)
GLUCOSE SERPL-MCNC: 94 MG/DL (ref 65–99)
GLUCOSE SERPL-MCNC: 97 MG/DL (ref 65–99)
GLUCOSE UR STRIP-MCNC: ABNORMAL MG/DL
HBA1C MFR BLD: 5.9 % (ref 4.8–5.6)
HCO3 BLDA-SCNC: 21.6 MMOL/L (ref 22–28)
HCT VFR BLD AUTO: 28.5 % (ref 34–46.6)
HDLC SERPL-MCNC: 39 MG/DL (ref 40–60)
HGB BLD-MCNC: 9.7 G/DL (ref 12–15.9)
HGB UR QL STRIP.AUTO: NEGATIVE
HYALINE CASTS UR QL AUTO: NORMAL /LPF
IMM GRANULOCYTES # BLD AUTO: 0.05 10*3/MM3 (ref 0–0.05)
IMM GRANULOCYTES NFR BLD AUTO: 0.5 % (ref 0–0.5)
INHALED O2 CONCENTRATION: 21 %
INR PPP: 1.16 (ref 0.9–1.1)
KETONES UR QL STRIP: ABNORMAL
LDLC SERPL CALC-MCNC: 39 MG/DL (ref 0–100)
LDLC/HDLC SERPL: 1.02 {RATIO}
LEUKOCYTE ESTERASE UR QL STRIP.AUTO: NEGATIVE
LYMPHOCYTES # BLD AUTO: 2.82 10*3/MM3 (ref 0.7–3.1)
LYMPHOCYTES NFR BLD AUTO: 26.4 % (ref 19.6–45.3)
MAGNESIUM SERPL-MCNC: 2 MG/DL (ref 1.6–2.4)
MCH RBC QN AUTO: 32.1 PG (ref 26.6–33)
MCHC RBC AUTO-ENTMCNC: 34 G/DL (ref 31.5–35.7)
MCV RBC AUTO: 94.4 FL (ref 79–97)
MODALITY: ABNORMAL
MONOCYTES # BLD AUTO: 1.34 10*3/MM3 (ref 0.1–0.9)
MONOCYTES NFR BLD AUTO: 12.6 % (ref 5–12)
NEUTROPHILS NFR BLD AUTO: 58.5 % (ref 42.7–76)
NEUTROPHILS NFR BLD AUTO: 6.25 10*3/MM3 (ref 1.7–7)
NITRITE UR QL STRIP: NEGATIVE
NRBC BLD AUTO-RTO: 0 /100 WBC (ref 0–0.2)
NT-PROBNP SERPL-MCNC: ABNORMAL PG/ML (ref 0–1800)
O2 A-A PPRESDIFF RESPIRATORY: 0.6 MMHG
PCO2 BLDA: 33.1 MM HG (ref 35–45)
PH BLDA: 7.42 PH UNITS (ref 7.35–7.45)
PH UR STRIP.AUTO: 5.5 [PH] (ref 5–8)
PLATELET # BLD AUTO: 226 10*3/MM3 (ref 140–450)
PMV BLD AUTO: 10.5 FL (ref 6–12)
PO2 BLDA: 68.6 MM HG (ref 80–100)
POTASSIUM SERPL-SCNC: 4.2 MMOL/L (ref 3.5–5.2)
POTASSIUM SERPL-SCNC: 4.2 MMOL/L (ref 3.5–5.2)
PROT SERPL-MCNC: 5.6 G/DL (ref 6–8.5)
PROT UR QL STRIP: ABNORMAL
PROTHROMBIN TIME: 15 SECONDS (ref 11.7–14.2)
RBC # BLD AUTO: 3.02 10*6/MM3 (ref 3.77–5.28)
RBC # UR STRIP: NORMAL /HPF
REF LAB TEST METHOD: NORMAL
RH BLD: POSITIVE
SAO2 % BLDCOA: 94.1 % (ref 92–99)
SODIUM SERPL-SCNC: 140 MMOL/L (ref 136–145)
SODIUM SERPL-SCNC: 140 MMOL/L (ref 136–145)
SP GR UR STRIP: 1.02 (ref 1–1.03)
SQUAMOUS #/AREA URNS HPF: NORMAL /HPF
T&S EXPIRATION DATE: NORMAL
TOTAL RATE: 18 BREATHS/MINUTE
TRIGL SERPL-MCNC: 71 MG/DL (ref 0–150)
UROBILINOGEN UR QL STRIP: ABNORMAL
VLDLC SERPL-MCNC: 15 MG/DL (ref 5–40)
WBC # UR STRIP: NORMAL /HPF
WBC NRBC COR # BLD: 10.67 10*3/MM3 (ref 3.4–10.8)

## 2023-07-18 PROCEDURE — 93970 EXTREMITY STUDY: CPT

## 2023-07-18 PROCEDURE — 86850 RBC ANTIBODY SCREEN: CPT

## 2023-07-18 PROCEDURE — 86920 COMPATIBILITY TEST SPIN: CPT

## 2023-07-18 PROCEDURE — 82803 BLOOD GASES ANY COMBINATION: CPT

## 2023-07-18 PROCEDURE — 86922 COMPATIBILITY TEST ANTIGLOB: CPT

## 2023-07-18 PROCEDURE — 80061 LIPID PANEL: CPT

## 2023-07-18 PROCEDURE — 85610 PROTHROMBIN TIME: CPT | Performed by: THORACIC SURGERY (CARDIOTHORACIC VASCULAR SURGERY)

## 2023-07-18 PROCEDURE — 85025 COMPLETE CBC W/AUTO DIFF WBC: CPT

## 2023-07-18 PROCEDURE — 85576 BLOOD PLATELET AGGREGATION: CPT

## 2023-07-18 PROCEDURE — 25010000002 HEPARIN (PORCINE) 25000-0.45 UT/250ML-% SOLUTION: Performed by: THORACIC SURGERY (CARDIOTHORACIC VASCULAR SURGERY)

## 2023-07-18 PROCEDURE — 82948 REAGENT STRIP/BLOOD GLUCOSE: CPT

## 2023-07-18 PROCEDURE — 86900 BLOOD TYPING SEROLOGIC ABO: CPT

## 2023-07-18 PROCEDURE — 93880 EXTRACRANIAL BILAT STUDY: CPT

## 2023-07-18 PROCEDURE — 85730 THROMBOPLASTIN TIME PARTIAL: CPT | Performed by: THORACIC SURGERY (CARDIOTHORACIC VASCULAR SURGERY)

## 2023-07-18 PROCEDURE — 36600 WITHDRAWAL OF ARTERIAL BLOOD: CPT

## 2023-07-18 PROCEDURE — 83880 ASSAY OF NATRIURETIC PEPTIDE: CPT

## 2023-07-18 PROCEDURE — 86901 BLOOD TYPING SEROLOGIC RH(D): CPT

## 2023-07-18 PROCEDURE — 83735 ASSAY OF MAGNESIUM: CPT

## 2023-07-18 PROCEDURE — 63710000001 INSULIN LISPRO (HUMAN) PER 5 UNITS: Performed by: THORACIC SURGERY (CARDIOTHORACIC VASCULAR SURGERY)

## 2023-07-18 PROCEDURE — 83036 HEMOGLOBIN GLYCOSYLATED A1C: CPT

## 2023-07-18 PROCEDURE — 81001 URINALYSIS AUTO W/SCOPE: CPT

## 2023-07-18 PROCEDURE — 80053 COMPREHEN METABOLIC PANEL: CPT

## 2023-07-18 RX ORDER — CHLORHEXIDINE GLUCONATE 500 MG/1
1 CLOTH TOPICAL EVERY 12 HOURS
Status: DISCONTINUED | OUTPATIENT
Start: 2023-07-18 | End: 2023-07-20

## 2023-07-18 RX ORDER — SODIUM CHLORIDE 0.9 % (FLUSH) 0.9 %
10 SYRINGE (ML) INJECTION AS NEEDED
Status: DISCONTINUED | OUTPATIENT
Start: 2023-07-18 | End: 2023-07-20 | Stop reason: HOSPADM

## 2023-07-18 RX ORDER — ACETAMINOPHEN 325 MG/1
650 TABLET ORAL EVERY 4 HOURS PRN
Status: DISCONTINUED | OUTPATIENT
Start: 2023-07-18 | End: 2023-07-20

## 2023-07-18 RX ORDER — ASPIRIN 81 MG/1
81 TABLET ORAL DAILY
Status: DISCONTINUED | OUTPATIENT
Start: 2023-07-18 | End: 2023-07-20

## 2023-07-18 RX ORDER — NICOTINE POLACRILEX 4 MG
15 LOZENGE BUCCAL
Status: DISCONTINUED | OUTPATIENT
Start: 2023-07-18 | End: 2023-07-18 | Stop reason: SDUPTHER

## 2023-07-18 RX ORDER — LEVOTHYROXINE SODIUM 0.07 MG/1
75 TABLET ORAL
Status: DISCONTINUED | OUTPATIENT
Start: 2023-07-18 | End: 2023-07-28 | Stop reason: HOSPADM

## 2023-07-18 RX ORDER — BISACODYL 5 MG/1
5 TABLET, DELAYED RELEASE ORAL DAILY PRN
Status: DISCONTINUED | OUTPATIENT
Start: 2023-07-18 | End: 2023-07-20

## 2023-07-18 RX ORDER — INSULIN LISPRO 100 [IU]/ML
2-9 INJECTION, SOLUTION INTRAVENOUS; SUBCUTANEOUS
Status: DISCONTINUED | OUTPATIENT
Start: 2023-07-18 | End: 2023-07-20

## 2023-07-18 RX ORDER — HEPARIN SODIUM 10000 [USP'U]/100ML
12 INJECTION, SOLUTION INTRAVENOUS
Status: DISCONTINUED | OUTPATIENT
Start: 2023-07-18 | End: 2023-07-20

## 2023-07-18 RX ORDER — MONTELUKAST SODIUM 10 MG/1
10 TABLET ORAL DAILY
Status: DISCONTINUED | OUTPATIENT
Start: 2023-07-18 | End: 2023-07-20

## 2023-07-18 RX ORDER — CHLORHEXIDINE GLUCONATE 0.12 MG/ML
15 RINSE ORAL EVERY 12 HOURS SCHEDULED
Status: COMPLETED | OUTPATIENT
Start: 2023-07-18 | End: 2023-07-20

## 2023-07-18 RX ORDER — SODIUM CHLORIDE 9 MG/ML
40 INJECTION, SOLUTION INTRAVENOUS AS NEEDED
Status: DISCONTINUED | OUTPATIENT
Start: 2023-07-18 | End: 2023-07-20 | Stop reason: HOSPADM

## 2023-07-18 RX ORDER — SODIUM CHLORIDE 9 MG/ML
40 INJECTION, SOLUTION INTRAVENOUS AS NEEDED
Status: DISCONTINUED | OUTPATIENT
Start: 2023-07-18 | End: 2023-07-20

## 2023-07-18 RX ORDER — SODIUM CHLORIDE 0.9 % (FLUSH) 0.9 %
10 SYRINGE (ML) INJECTION EVERY 12 HOURS SCHEDULED
Status: DISCONTINUED | OUTPATIENT
Start: 2023-07-18 | End: 2023-07-20

## 2023-07-18 RX ORDER — CARVEDILOL 6.25 MG/1
6.25 TABLET ORAL 2 TIMES DAILY
Status: DISCONTINUED | OUTPATIENT
Start: 2023-07-18 | End: 2023-07-19

## 2023-07-18 RX ORDER — NICOTINE POLACRILEX 4 MG
15 LOZENGE BUCCAL
Status: DISCONTINUED | OUTPATIENT
Start: 2023-07-18 | End: 2023-07-20

## 2023-07-18 RX ORDER — ONDANSETRON 2 MG/ML
4 INJECTION INTRAMUSCULAR; INTRAVENOUS EVERY 6 HOURS PRN
Status: DISCONTINUED | OUTPATIENT
Start: 2023-07-18 | End: 2023-07-20

## 2023-07-18 RX ORDER — CEFAZOLIN SODIUM 2 G/100ML
2000 INJECTION, SOLUTION INTRAVENOUS ONCE
Status: DISCONTINUED | OUTPATIENT
Start: 2023-07-18 | End: 2023-07-18

## 2023-07-18 RX ORDER — CEFAZOLIN SODIUM 2 G/100ML
2000 INJECTION, SOLUTION INTRAVENOUS
Status: COMPLETED | OUTPATIENT
Start: 2023-07-20 | End: 2023-07-20

## 2023-07-18 RX ORDER — ATORVASTATIN CALCIUM 20 MG/1
10 TABLET, FILM COATED ORAL DAILY
Status: DISCONTINUED | OUTPATIENT
Start: 2023-07-18 | End: 2023-07-20

## 2023-07-18 RX ORDER — BUPROPION HYDROCHLORIDE 300 MG/1
300 TABLET ORAL DAILY
Status: DISCONTINUED | OUTPATIENT
Start: 2023-07-18 | End: 2023-07-20

## 2023-07-18 RX ORDER — NITROGLYCERIN 0.4 MG/1
0.4 TABLET SUBLINGUAL
Status: DISCONTINUED | OUTPATIENT
Start: 2023-07-18 | End: 2023-07-20

## 2023-07-18 RX ORDER — AMOXICILLIN 250 MG
2 CAPSULE ORAL 2 TIMES DAILY
Status: DISCONTINUED | OUTPATIENT
Start: 2023-07-18 | End: 2023-07-20

## 2023-07-18 RX ORDER — POLYETHYLENE GLYCOL 3350 17 G/17G
17 POWDER, FOR SOLUTION ORAL DAILY PRN
Status: DISCONTINUED | OUTPATIENT
Start: 2023-07-18 | End: 2023-07-20

## 2023-07-18 RX ORDER — BISACODYL 10 MG
10 SUPPOSITORY, RECTAL RECTAL DAILY PRN
Status: DISCONTINUED | OUTPATIENT
Start: 2023-07-18 | End: 2023-07-20

## 2023-07-18 RX ORDER — DEXTROSE MONOHYDRATE 25 G/50ML
25 INJECTION, SOLUTION INTRAVENOUS
Status: DISCONTINUED | OUTPATIENT
Start: 2023-07-18 | End: 2023-07-20

## 2023-07-18 RX ORDER — CHLORHEXIDINE GLUCONATE 500 MG/1
1 CLOTH TOPICAL EVERY 12 HOURS
Status: DISCONTINUED | OUTPATIENT
Start: 2023-07-18 | End: 2023-07-18

## 2023-07-18 RX ORDER — AMLODIPINE BESYLATE 5 MG/1
5 TABLET ORAL DAILY PRN
Status: DISCONTINUED | OUTPATIENT
Start: 2023-07-18 | End: 2023-07-20

## 2023-07-18 RX ORDER — CHLORHEXIDINE GLUCONATE 0.12 MG/ML
15 RINSE ORAL EVERY 12 HOURS SCHEDULED
Status: DISCONTINUED | OUTPATIENT
Start: 2023-07-18 | End: 2023-07-18

## 2023-07-18 RX ORDER — IBUPROFEN 600 MG/1
1 TABLET ORAL
Status: DISCONTINUED | OUTPATIENT
Start: 2023-07-18 | End: 2023-07-20

## 2023-07-18 RX ORDER — SODIUM CHLORIDE 0.9 % (FLUSH) 0.9 %
10 SYRINGE (ML) INJECTION EVERY 12 HOURS SCHEDULED
Status: DISCONTINUED | OUTPATIENT
Start: 2023-07-18 | End: 2023-07-20 | Stop reason: HOSPADM

## 2023-07-18 RX ORDER — ALBUTEROL SULFATE 2.5 MG/3ML
2.5 SOLUTION RESPIRATORY (INHALATION) EVERY 6 HOURS PRN
Status: DISCONTINUED | OUTPATIENT
Start: 2023-07-18 | End: 2023-07-20

## 2023-07-18 RX ORDER — SODIUM CHLORIDE 0.9 % (FLUSH) 0.9 %
10 SYRINGE (ML) INJECTION AS NEEDED
Status: DISCONTINUED | OUTPATIENT
Start: 2023-07-18 | End: 2023-07-20

## 2023-07-18 RX ADMIN — ATORVASTATIN CALCIUM 10 MG: 20 TABLET, FILM COATED ORAL at 08:42

## 2023-07-18 RX ADMIN — BUPROPION HYDROCHLORIDE 300 MG: 300 TABLET, EXTENDED RELEASE ORAL at 08:42

## 2023-07-18 RX ADMIN — Medication 10 ML: at 08:44

## 2023-07-18 RX ADMIN — EMPAGLIFLOZIN 25 MG: 25 TABLET, FILM COATED ORAL at 08:43

## 2023-07-18 RX ADMIN — INSULIN LISPRO 2 UNITS: 100 INJECTION, SOLUTION INTRAVENOUS; SUBCUTANEOUS at 21:36

## 2023-07-18 RX ADMIN — MONTELUKAST SODIUM 10 MG: 10 TABLET, FILM COATED ORAL at 08:42

## 2023-07-18 RX ADMIN — HEPARIN SODIUM 12 UNITS/KG/HR: 10000 INJECTION, SOLUTION INTRAVENOUS at 06:24

## 2023-07-18 RX ADMIN — ACETAMINOPHEN 650 MG: 325 TABLET ORAL at 05:09

## 2023-07-18 RX ADMIN — CARVEDILOL 6.25 MG: 12.5 TABLET, FILM COATED ORAL at 08:43

## 2023-07-18 RX ADMIN — LEVOTHYROXINE SODIUM 75 MCG: 0.07 TABLET ORAL at 08:43

## 2023-07-18 RX ADMIN — CARVEDILOL 6.25 MG: 12.5 TABLET, FILM COATED ORAL at 20:27

## 2023-07-18 RX ADMIN — ASPIRIN 81 MG: 81 TABLET, COATED ORAL at 08:43

## 2023-07-18 NOTE — CASE MANAGEMENT/SOCIAL WORK
Discharge Planning Assessment  [facility]     Patient Name: Ann-Marie Hughes  MRN: [id number]  Today's Date: 7/18/2023    Admit Date: 7/18/2023    Plan: Pending CABG 7/19/2023   Discharge Needs Assessment       Row Name 07/18/23 1240       Living Environment    People in Home spouse    Name(s) of People in Home Dinh Hughes  [phone]    Current Living Arrangements home    Potentially Unsafe Housing Conditions none    Primary Care Provided by self    Provides Primary Care For no one    Family Caregiver if Needed spouse    Family Caregiver Names Dinh Hughes  [phone]    Quality of Family Relationships supportive       Resource/Environmental Concerns    Resource/Environmental Concerns none    Transportation Concerns none       Transition Planning    Patient/Family Anticipates Transition to home with family;home with help/services    Patient/Family Anticipated Services at Transition home health care    Transportation Anticipated family or friend will provide       Discharge Needs Assessment    Readmission Within the Last 30 Days no previous admission in last 30 days    Equipment Currently Used at Home rollator;glucometer    Concerns to be Addressed discharge planning    Anticipated Changes Related to Illness inability to care for self    Provided Post Acute Provider List? N/A    N/A Provider List Comment Will need to evaluate after CABG tomorrow.    Current Discharge Risk chronically ill                   Discharge Plan       Row Name 07/18/23 1248       Plan    Plan Pending CABG 7/19/2023    Patient/Family in Agreement with Plan yes    Plan Comments IMM 7/18/2023. Met with patient and family at bedside. Patient granted me permission to speak with her while family remained in the room. Face sheet verified, updated son and daughter contact information. Prior to admission patient was living with her  in their own home. Patient states that she uses a rollator and is assisted by her   with ADL’s. Patient also has glucometer. Patient uses [facility], denies any issues remembering to take her medications but states Entresto was over $300 per month, so far able to manage but did take a bit in their budget.  Patient is agreeable to using Meds to Beds at discharge. Patient has a living will on file and Dinh Hughes  [phone] is her health care surrogate. Discharge plans are to return home, but will monitor closely for additional discharge needs post-surgery. Family should be able to transport. Will continue to monitor for new or changing discharge needs. Ina WILLAMS RN CCP                  Continued Care and Services - Admitted Since 7/18/2023    Coordination has not been started for this encounter.       Expected Discharge Date and Time       Expected Discharge Date Expected Discharge Time    Jul 26, 2023            Demographic Summary       Row Name 07/18/23 1240       General Information    Admission Type inpatient    Arrived From hospital    Required Notices Provided Important Message from Medicare    Referral Source admission list    Reason for Consult discharge planning    Preferred Language English       Contact Information    Permission Granted to Share Info With family/designee  Dinh Hughes  [phone]                   Functional Status       Row Name 07/18/23 1240       Functional Status    Usual Activity Tolerance moderate    Current Activity Tolerance moderate       Functional Status, IADL    Medications independent    Meal Preparation assistive person    Housekeeping assistive person    Laundry assistive person    Shopping assistive person       Mental Status    General Appearance WDL WDL       Mental Status Summary    Recent Changes in Mental Status/Cognitive Functioning no changes       Employment/    Employment Status retired                   Psychosocial    No documentation.                  Abuse/Neglect    No documentation.                   Legal    No documentation.                  Substance Abuse    No documentation.                  Patient Forms    No documentation.                     Ina Giordano RN

## 2023-07-19 LAB
ANION GAP SERPL CALCULATED.3IONS-SCNC: 10.8 MMOL/L (ref 5–15)
APTT PPP: 110.1 SECONDS (ref 22.7–35.4)
APTT PPP: 64.1 SECONDS (ref 22.7–35.4)
BASOPHILS # BLD AUTO: 0.06 10*3/MM3 (ref 0–0.2)
BASOPHILS NFR BLD AUTO: 0.7 % (ref 0–1.5)
BUN SERPL-MCNC: 27 MG/DL (ref 8–23)
BUN/CREAT SERPL: 24.1 (ref 7–25)
CALCIUM SPEC-SCNC: 8.9 MG/DL (ref 8.6–10.5)
CHLORIDE SERPL-SCNC: 107 MMOL/L (ref 98–107)
CO2 SERPL-SCNC: 22.2 MMOL/L (ref 22–29)
CREAT SERPL-MCNC: 1.12 MG/DL (ref 0.57–1)
DEPRECATED RDW RBC AUTO: 59.9 FL (ref 37–54)
EGFRCR SERPLBLD CKD-EPI 2021: 50.4 ML/MIN/1.73
EOSINOPHIL # BLD AUTO: 0.24 10*3/MM3 (ref 0–0.4)
EOSINOPHIL NFR BLD AUTO: 2.7 % (ref 0.3–6.2)
ERYTHROCYTE [DISTWIDTH] IN BLOOD BY AUTOMATED COUNT: 17.7 % (ref 12.3–15.4)
GLUCOSE BLDC GLUCOMTR-MCNC: 115 MG/DL (ref 70–130)
GLUCOSE BLDC GLUCOMTR-MCNC: 119 MG/DL (ref 70–130)
GLUCOSE BLDC GLUCOMTR-MCNC: 173 MG/DL (ref 70–130)
GLUCOSE BLDC GLUCOMTR-MCNC: 185 MG/DL (ref 70–130)
GLUCOSE SERPL-MCNC: 103 MG/DL (ref 65–99)
HCT VFR BLD AUTO: 27.4 % (ref 34–46.6)
HGB BLD-MCNC: 9.2 G/DL (ref 12–15.9)
IMM GRANULOCYTES # BLD AUTO: 0.02 10*3/MM3 (ref 0–0.05)
IMM GRANULOCYTES NFR BLD AUTO: 0.2 % (ref 0–0.5)
INR PPP: 1.16 (ref 0.9–1.1)
IRON 24H UR-MRATE: 91 MCG/DL (ref 37–145)
IRON SATN MFR SERPL: 34 % (ref 20–50)
LYMPHOCYTES # BLD AUTO: 3.08 10*3/MM3 (ref 0.7–3.1)
LYMPHOCYTES NFR BLD AUTO: 34.8 % (ref 19.6–45.3)
MCH RBC QN AUTO: 31.3 PG (ref 26.6–33)
MCHC RBC AUTO-ENTMCNC: 33.6 G/DL (ref 31.5–35.7)
MCV RBC AUTO: 93.2 FL (ref 79–97)
MONOCYTES # BLD AUTO: 1.28 10*3/MM3 (ref 0.1–0.9)
MONOCYTES NFR BLD AUTO: 14.5 % (ref 5–12)
NEUTROPHILS NFR BLD AUTO: 4.16 10*3/MM3 (ref 1.7–7)
NEUTROPHILS NFR BLD AUTO: 47.1 % (ref 42.7–76)
NRBC BLD AUTO-RTO: 0.2 /100 WBC (ref 0–0.2)
PLATELET # BLD AUTO: 212 10*3/MM3 (ref 140–450)
PMV BLD AUTO: 10.5 FL (ref 6–12)
POTASSIUM SERPL-SCNC: 4.2 MMOL/L (ref 3.5–5.2)
PROTHROMBIN TIME: 15 SECONDS (ref 11.7–14.2)
QT INTERVAL: 343 MS
QT INTERVAL: 463 MS
RBC # BLD AUTO: 2.94 10*6/MM3 (ref 3.77–5.28)
SARS-COV-2 RNA RESP QL NAA+PROBE: NOT DETECTED
SODIUM SERPL-SCNC: 140 MMOL/L (ref 136–145)
TIBC SERPL-MCNC: 268 MCG/DL (ref 298–536)
TRANSFERRIN SERPL-MCNC: 180 MG/DL (ref 200–360)
WBC NRBC COR # BLD: 8.84 10*3/MM3 (ref 3.4–10.8)

## 2023-07-19 PROCEDURE — 93010 ELECTROCARDIOGRAM REPORT: CPT | Performed by: INTERNAL MEDICINE

## 2023-07-19 PROCEDURE — 63710000001 INSULIN LISPRO (HUMAN) PER 5 UNITS: Performed by: THORACIC SURGERY (CARDIOTHORACIC VASCULAR SURGERY)

## 2023-07-19 PROCEDURE — 83540 ASSAY OF IRON: CPT | Performed by: NURSE PRACTITIONER

## 2023-07-19 PROCEDURE — 25010000002 CALCIUM GLUCONATE-NACL 1-0.675 GM/50ML-% SOLUTION: Performed by: NURSE PRACTITIONER

## 2023-07-19 PROCEDURE — 87635 SARS-COV-2 COVID-19 AMP PRB: CPT

## 2023-07-19 PROCEDURE — 93005 ELECTROCARDIOGRAM TRACING: CPT | Performed by: NURSE PRACTITIONER

## 2023-07-19 PROCEDURE — 25010000002 MAGNESIUM SULFATE 2 GM/50ML SOLUTION: Performed by: NURSE PRACTITIONER

## 2023-07-19 PROCEDURE — 93005 ELECTROCARDIOGRAM TRACING: CPT

## 2023-07-19 PROCEDURE — 82948 REAGENT STRIP/BLOOD GLUCOSE: CPT

## 2023-07-19 PROCEDURE — 85730 THROMBOPLASTIN TIME PARTIAL: CPT

## 2023-07-19 PROCEDURE — 25010000002 AMIODARONE IN DEXTROSE 5% 150-4.21 MG/100ML-% SOLUTION: Performed by: NURSE PRACTITIONER

## 2023-07-19 PROCEDURE — 25010000002 HEPARIN (PORCINE) 25000-0.45 UT/250ML-% SOLUTION: Performed by: THORACIC SURGERY (CARDIOTHORACIC VASCULAR SURGERY)

## 2023-07-19 PROCEDURE — 80048 BASIC METABOLIC PNL TOTAL CA: CPT

## 2023-07-19 PROCEDURE — 84466 ASSAY OF TRANSFERRIN: CPT | Performed by: NURSE PRACTITIONER

## 2023-07-19 PROCEDURE — 85025 COMPLETE CBC W/AUTO DIFF WBC: CPT

## 2023-07-19 PROCEDURE — 94799 UNLISTED PULMONARY SVC/PX: CPT

## 2023-07-19 PROCEDURE — 25010000002 MORPHINE PER 10 MG: Performed by: NURSE PRACTITIONER

## 2023-07-19 PROCEDURE — 85610 PROTHROMBIN TIME: CPT

## 2023-07-19 RX ORDER — MAGNESIUM SULFATE HEPTAHYDRATE 40 MG/ML
2 INJECTION, SOLUTION INTRAVENOUS ONCE
Status: COMPLETED | OUTPATIENT
Start: 2023-07-19 | End: 2023-07-19

## 2023-07-19 RX ORDER — CARVEDILOL 6.25 MG/1
6.25 TABLET ORAL 2 TIMES DAILY
Status: DISCONTINUED | OUTPATIENT
Start: 2023-07-19 | End: 2023-07-20

## 2023-07-19 RX ORDER — AMIODARONE HYDROCHLORIDE 200 MG/1
200 TABLET ORAL EVERY 12 HOURS SCHEDULED
Status: DISCONTINUED | OUTPATIENT
Start: 2023-07-19 | End: 2023-07-20

## 2023-07-19 RX ORDER — CALCIUM GLUCONATE 20 MG/ML
1000 INJECTION, SOLUTION INTRAVENOUS ONCE
Status: COMPLETED | OUTPATIENT
Start: 2023-07-19 | End: 2023-07-19

## 2023-07-19 RX ORDER — MORPHINE SULFATE 2 MG/ML
1 INJECTION, SOLUTION INTRAMUSCULAR; INTRAVENOUS
Status: DISCONTINUED | OUTPATIENT
Start: 2023-07-19 | End: 2023-07-20

## 2023-07-19 RX ORDER — CARVEDILOL 3.12 MG/1
3.12 TABLET ORAL 2 TIMES DAILY
Status: DISCONTINUED | OUTPATIENT
Start: 2023-07-19 | End: 2023-07-19

## 2023-07-19 RX ADMIN — INSULIN LISPRO 2 UNITS: 100 INJECTION, SOLUTION INTRAVENOUS; SUBCUTANEOUS at 21:09

## 2023-07-19 RX ADMIN — CARVEDILOL 6.25 MG: 12.5 TABLET, FILM COATED ORAL at 06:10

## 2023-07-19 RX ADMIN — AMIODARONE HYDROCHLORIDE 200 MG: 200 TABLET ORAL at 21:09

## 2023-07-19 RX ADMIN — HEPARIN SODIUM 9 UNITS/KG/HR: 10000 INJECTION, SOLUTION INTRAVENOUS at 06:35

## 2023-07-19 RX ADMIN — Medication 10 ML: at 08:22

## 2023-07-19 RX ADMIN — MUPIROCIN 1 APPLICATION: 20 OINTMENT TOPICAL at 08:18

## 2023-07-19 RX ADMIN — AMIODARONE HYDROCHLORIDE 150 MG: 1.5 INJECTION, SOLUTION INTRAVENOUS at 08:18

## 2023-07-19 RX ADMIN — CHLORHEXIDINE GLUCONATE 1 APPLICATION: 500 CLOTH TOPICAL at 23:14

## 2023-07-19 RX ADMIN — MAGNESIUM SULFATE HEPTAHYDRATE 2 G: 2 INJECTION, SOLUTION INTRAVENOUS at 08:05

## 2023-07-19 RX ADMIN — MONTELUKAST SODIUM 10 MG: 10 TABLET, FILM COATED ORAL at 08:25

## 2023-07-19 RX ADMIN — CHLORHEXIDINE GLUCONATE 15 ML: 1.2 SOLUTION ORAL at 08:18

## 2023-07-19 RX ADMIN — MUPIROCIN 1 APPLICATION: 20 OINTMENT TOPICAL at 21:04

## 2023-07-19 RX ADMIN — ATORVASTATIN CALCIUM 10 MG: 20 TABLET, FILM COATED ORAL at 06:09

## 2023-07-19 RX ADMIN — MORPHINE SULFATE 1 MG: 2 INJECTION, SOLUTION INTRAMUSCULAR; INTRAVENOUS at 07:53

## 2023-07-19 RX ADMIN — NITROGLYCERIN 1 INCH: 20 OINTMENT TOPICAL at 13:51

## 2023-07-19 RX ADMIN — CARVEDILOL 6.25 MG: 6.25 TABLET, FILM COATED ORAL at 21:03

## 2023-07-19 RX ADMIN — CHLORHEXIDINE GLUCONATE 15 ML: 1.2 SOLUTION ORAL at 17:03

## 2023-07-19 RX ADMIN — CHLORHEXIDINE GLUCONATE 15 ML: 1.2 SOLUTION ORAL at 21:04

## 2023-07-19 RX ADMIN — Medication 10 ML: at 21:10

## 2023-07-19 RX ADMIN — MUPIROCIN 1 APPLICATION: 20 OINTMENT TOPICAL at 17:03

## 2023-07-19 RX ADMIN — INSULIN LISPRO 2 UNITS: 100 INJECTION, SOLUTION INTRAVENOUS; SUBCUTANEOUS at 17:03

## 2023-07-19 RX ADMIN — LEVOTHYROXINE SODIUM 75 MCG: 0.07 TABLET ORAL at 08:24

## 2023-07-19 RX ADMIN — CALCIUM GLUCONATE 1000 MG: 20 INJECTION, SOLUTION INTRAVENOUS at 10:13

## 2023-07-19 RX ADMIN — BUPROPION HYDROCHLORIDE 300 MG: 300 TABLET, EXTENDED RELEASE ORAL at 08:18

## 2023-07-19 RX ADMIN — EMPAGLIFLOZIN 25 MG: 25 TABLET, FILM COATED ORAL at 08:18

## 2023-07-19 RX ADMIN — ASPIRIN 81 MG: 81 TABLET, COATED ORAL at 08:18

## 2023-07-19 NOTE — NURSING NOTE
"Rapid response called for patient experiencing vision changes and \"tingling in arms\" after a heart rhythm change. Nurse arrived to patient's room. Patient alert, oriented, and appropriate. NIH performed. Patient NIH score 0 at the time of rapid response.No acute deficits noted. Prior to arrival, patient's primary nurse had notified cardiology about rhythm change and received orders.No additional interventions needed by rapid response team.   "

## 2023-07-19 NOTE — PROGRESS NOTES
" LOS: 1 day   Patient Care Team:  Rochelle Brown APRN as PCP - General (Internal Medicine)    Chief Complaint: preop CABG    Subjective:  Symptoms:  She reports chest pain.  No shortness of breath.    Diet:  No nausea or vomiting.        Vital Signs  Temp:  [97.4 °F (36.3 °C)-98 °F (36.7 °C)] 97.7 °F (36.5 °C)  Heart Rate:  [] 60  Resp:  [18-20] 18  BP: ()/(46-76) 90/50  Body mass index is 33.78 kg/m².    Intake/Output Summary (Last 24 hours) at 7/19/2023 0925  Last data filed at 7/18/2023 1732  Gross per 24 hour   Intake 0 ml   Output --   Net 0 ml     No intake/output data recorded.    Chest tube drainage last 8 hours         07/18/23  0410   Weight: 89.3 kg (196 lb 12.8 oz)         Objective:  Vital signs: (most recent): Blood pressure 90/50, pulse 60, temperature 97.7 °F (36.5 °C), temperature source Oral, resp. rate 18, height 162.6 cm (64\"), weight 89.3 kg (196 lb 12.8 oz), SpO2 97 %, not currently breastfeeding.              Results Review:        WBC WBC   Date Value Ref Range Status   07/19/2023 8.84 3.40 - 10.80 10*3/mm3 Final   07/18/2023 10.67 3.40 - 10.80 10*3/mm3 Final   07/17/2023 8.58 3.40 - 10.80 10*3/mm3 Final      HGB Hemoglobin   Date Value Ref Range Status   07/19/2023 9.2 (L) 12.0 - 15.9 g/dL Final   07/18/2023 9.7 (L) 12.0 - 15.9 g/dL Final   07/17/2023 9.9 (L) 12.0 - 15.9 g/dL Final      HCT Hematocrit   Date Value Ref Range Status   07/19/2023 27.4 (L) 34.0 - 46.6 % Final   07/18/2023 28.5 (L) 34.0 - 46.6 % Final   07/17/2023 29.7 (L) 34.0 - 46.6 % Final      Platelets Platelets   Date Value Ref Range Status   07/19/2023 212 140 - 450 10*3/mm3 Final   07/18/2023 226 140 - 450 10*3/mm3 Final   07/17/2023 227 140 - 450 10*3/mm3 Final        PT/INR:    Protime   Date Value Ref Range Status   07/19/2023 15.0 (H) 11.7 - 14.2 Seconds Final   07/18/2023 15.0 (H) 11.7 - 14.2 Seconds Final   07/17/2023 15.8 (H) 11.8 - 14.9 Seconds Final   07/16/2023 15.4 (H) 11.8 - 14.9 Seconds Final "   /  INR   Date Value Ref Range Status   07/19/2023 1.16 (H) 0.90 - 1.10 Final   07/18/2023 1.16 (H) 0.90 - 1.10 Final   07/17/2023 1.25 (H) 0.86 - 1.15 Final   07/16/2023 1.21 (H) 0.86 - 1.15 Final       Sodium Sodium   Date Value Ref Range Status   07/19/2023 140 136 - 145 mmol/L Final   07/18/2023 140 136 - 145 mmol/L Final   07/18/2023 140 136 - 145 mmol/L Final   07/17/2023 140 136 - 145 mmol/L Final      Potassium Potassium   Date Value Ref Range Status   07/19/2023 4.2 3.5 - 5.2 mmol/L Final   07/18/2023 4.2 3.5 - 5.2 mmol/L Final   07/18/2023 4.2 3.5 - 5.2 mmol/L Final   07/17/2023 3.8 3.5 - 5.2 mmol/L Final      Chloride Chloride   Date Value Ref Range Status   07/19/2023 107 98 - 107 mmol/L Final   07/18/2023 109 (H) 98 - 107 mmol/L Final   07/18/2023 108 (H) 98 - 107 mmol/L Final   07/17/2023 106 98 - 107 mmol/L Final      Bicarbonate CO2   Date Value Ref Range Status   07/19/2023 22.2 22.0 - 29.0 mmol/L Final   07/18/2023 21.0 (L) 22.0 - 29.0 mmol/L Final   07/18/2023 17.8 (L) 22.0 - 29.0 mmol/L Final   07/17/2023 22.1 22.0 - 29.0 mmol/L Final      BUN BUN   Date Value Ref Range Status   07/19/2023 27 (H) 8 - 23 mg/dL Final   07/18/2023 22 8 - 23 mg/dL Final   07/18/2023 22 8 - 23 mg/dL Final   07/17/2023 32 (H) 8 - 23 mg/dL Final      Creatinine Creatinine   Date Value Ref Range Status   07/19/2023 1.12 (H) 0.57 - 1.00 mg/dL Final   07/18/2023 1.01 (H) 0.57 - 1.00 mg/dL Final   07/18/2023 0.98 0.57 - 1.00 mg/dL Final   07/17/2023 1.29 (H) 0.57 - 1.00 mg/dL Final      Calcium Calcium   Date Value Ref Range Status   07/19/2023 8.9 8.6 - 10.5 mg/dL Final   07/18/2023 9.0 8.6 - 10.5 mg/dL Final   07/18/2023 8.9 8.6 - 10.5 mg/dL Final   07/17/2023 8.7 8.6 - 10.5 mg/dL Final      Magnesium Magnesium   Date Value Ref Range Status   07/18/2023 2.0 1.6 - 2.4 mg/dL Final   07/17/2023 2.0 1.6 - 2.4 mg/dL Final   07/16/2023 2.1 1.6 - 2.4 mg/dL Final   07/16/2023 1.6 1.6 - 2.4 mg/dL Final          aspirin, 81 mg,  Oral, Daily  atorvastatin, 10 mg, Oral, Daily  buPROPion XL, 300 mg, Oral, Daily  calcium gluconate, 1,000 mg, Intravenous, Once  carvedilol, 6.25 mg, Oral, BID  [START ON 7/20/2023] ceFAZolin, 2,000 mg, Intravenous, On Call to OR  chlorhexidine, 15 mL, Mouth/Throat, Q12H  Chlorhexidine Gluconate Cloth, 1 application , Topical, Q12H  empagliflozin, 25 mg, Oral, Daily  insulin lispro, 2-9 Units, Subcutaneous, 4x Daily AC & at Bedtime  levothyroxine, 75 mcg, Oral, Q AM  [START ON 7/20/2023] metoprolol tartrate, 12.5 mg, Oral, On Call to OR  montelukast, 10 mg, Oral, Daily  mupirocin, 1 application , Each Nare, Q12H  senna-docusate sodium, 2 tablet, Oral, BID  sodium chloride, 10 mL, Intravenous, Q12H  sodium chloride, 10 mL, Intravenous, Q12H      heparin, 12 Units/kg/hr, Last Rate: 9 Units/kg/hr (07/19/23 0635)              Coronary heart disease    NSTEMI (non-ST elevated myocardial infarction)    Abnormal findings on diagnostic imaging of heart and coronary circulation      Assessment & Plan    -Severe multivessel CAD  -Moderate MR  -Atrial fibrillation  -Ischemic cardiomyopathy---EF 36%  -HTN  -HLD  -DM II  -Hypothyroidism   -Former tobacco abuse   -anemia-- hgb 9.2     Rapid overnight for atrial fibrillation and some stroke like symptoms  These have resolved  She had rapid afib and complaints of pressure-- amiodarone loading dose,2G of mag-- she  converted to sinus rhythm rate 60s chest pressure improved once heart rate controlled  Blood pressure is soft but did improve after she converted -- will decrease coreg to 3.125  Plan for OR tomorrow with LONA Caballero  07/19/23  09:25 EDT

## 2023-07-20 ENCOUNTER — APPOINTMENT (OUTPATIENT)
Dept: GENERAL RADIOLOGY | Facility: HOSPITAL | Age: 78
DRG: 236 | End: 2023-07-20
Payer: MEDICARE

## 2023-07-20 ENCOUNTER — ANCILLARY PROCEDURE (OUTPATIENT)
Dept: PERIOP | Facility: HOSPITAL | Age: 78
DRG: 236 | End: 2023-07-20
Payer: MEDICARE

## 2023-07-20 LAB
ACT BLD: 125 SECONDS (ref 82–152)
ACT BLD: 131 SECONDS (ref 82–152)
ACT BLD: 269 SECONDS (ref 82–152)
ACT BLD: 275 SECONDS (ref 82–152)
ACT BLD: 281 SECONDS (ref 82–152)
ACT BLD: 317 SECONDS (ref 82–152)
ACT BLD: 329 SECONDS (ref 82–152)
ALBUMIN SERPL-MCNC: 2.3 G/DL (ref 3.5–5.2)
ALBUMIN SERPL-MCNC: 3.4 G/DL (ref 3.5–5.2)
ANION GAP SERPL CALCULATED.3IONS-SCNC: 10 MMOL/L (ref 5–15)
ANION GAP SERPL CALCULATED.3IONS-SCNC: 10 MMOL/L (ref 5–15)
ANION GAP SERPL CALCULATED.3IONS-SCNC: 11.2 MMOL/L (ref 5–15)
ANION GAP SERPL CALCULATED.3IONS-SCNC: 12 MMOL/L (ref 5–15)
APTT PPP: 31.3 SECONDS (ref 22.7–35.4)
APTT PPP: 35.7 SECONDS (ref 22.7–35.4)
APTT PPP: 58.6 SECONDS (ref 22.7–35.4)
ARTERIAL PATENCY WRIST A: ABNORMAL
ARTERIAL PATENCY WRIST A: ABNORMAL
ATMOSPHERIC PRESS: 744.5 MMHG
ATMOSPHERIC PRESS: 744.7 MMHG
BASE EXCESS BLDA CALC-SCNC: -3.9 MMOL/L (ref 0–2)
BASE EXCESS BLDA CALC-SCNC: -5 MMOL/L (ref -5–5)
BASE EXCESS BLDA CALC-SCNC: -5 MMOL/L (ref -5–5)
BASE EXCESS BLDA CALC-SCNC: -6 MMOL/L (ref -5–5)
BASE EXCESS BLDA CALC-SCNC: -7 MMOL/L (ref -5–5)
BASE EXCESS BLDA CALC-SCNC: -7.3 MMOL/L (ref 0–2)
BASE EXCESS BLDA CALC-SCNC: -9 MMOL/L (ref -5–5)
BASOPHILS # BLD AUTO: 0.05 10*3/MM3 (ref 0–0.2)
BASOPHILS # BLD AUTO: 0.1 10*3/MM3 (ref 0–0.2)
BASOPHILS NFR BLD AUTO: 0.4 % (ref 0–1.5)
BASOPHILS NFR BLD AUTO: 0.5 % (ref 0–1.5)
BDY SITE: ABNORMAL
BDY SITE: ABNORMAL
BUN SERPL-MCNC: 24 MG/DL (ref 8–23)
BUN SERPL-MCNC: 34 MG/DL (ref 8–23)
BUN/CREAT SERPL: 24.5 (ref 7–25)
BUN/CREAT SERPL: 25.8 (ref 7–25)
BUN/CREAT SERPL: 26.7 (ref 7–25)
BUN/CREAT SERPL: 26.7 (ref 7–25)
CA-I BLD-MCNC: 5.4 MG/DL (ref 4.6–5.4)
CA-I BLDA-SCNC: ABNORMAL MMOL/L
CA-I SERPL ISE-MCNC: 1.36 MMOL/L (ref 1.15–1.35)
CALCIUM SPEC-SCNC: 8.5 MG/DL (ref 8.6–10.5)
CALCIUM SPEC-SCNC: 9.2 MG/DL (ref 8.6–10.5)
CALCIUM SPEC-SCNC: 9.8 MG/DL (ref 8.6–10.5)
CALCIUM SPEC-SCNC: 9.8 MG/DL (ref 8.6–10.5)
CHLORIDE SERPL-SCNC: 109 MMOL/L (ref 98–107)
CHLORIDE SERPL-SCNC: 118 MMOL/L (ref 98–107)
CHLORIDE SERPL-SCNC: 119 MMOL/L (ref 98–107)
CHLORIDE SERPL-SCNC: 119 MMOL/L (ref 98–107)
CO2 BLDA-SCNC: 19 MMOL/L (ref 24–29)
CO2 BLDA-SCNC: 20 MMOL/L (ref 24–29)
CO2 BLDA-SCNC: 21 MMOL/L (ref 24–29)
CO2 BLDA-SCNC: 22 MMOL/L (ref 24–29)
CO2 BLDA-SCNC: 22 MMOL/L (ref 24–29)
CO2 SERPL-SCNC: 18 MMOL/L (ref 22–29)
CO2 SERPL-SCNC: 18 MMOL/L (ref 22–29)
CO2 SERPL-SCNC: 19 MMOL/L (ref 22–29)
CO2 SERPL-SCNC: 19.8 MMOL/L (ref 22–29)
CREAT SERPL-MCNC: 0.9 MG/DL (ref 0.57–1)
CREAT SERPL-MCNC: 0.9 MG/DL (ref 0.57–1)
CREAT SERPL-MCNC: 0.93 MG/DL (ref 0.57–1)
CREAT SERPL-MCNC: 1.39 MG/DL (ref 0.57–1)
DEPRECATED RDW RBC AUTO: 51.5 FL (ref 37–54)
DEPRECATED RDW RBC AUTO: 51.7 FL (ref 37–54)
DEPRECATED RDW RBC AUTO: 53.3 FL (ref 37–54)
DEPRECATED RDW RBC AUTO: 60 FL (ref 37–54)
EGFRCR SERPLBLD CKD-EPI 2021: 38.9 ML/MIN/1.73
EGFRCR SERPLBLD CKD-EPI 2021: 63 ML/MIN/1.73
EGFRCR SERPLBLD CKD-EPI 2021: 65.6 ML/MIN/1.73
EGFRCR SERPLBLD CKD-EPI 2021: 65.6 ML/MIN/1.73
EOSINOPHIL # BLD AUTO: 0.11 10*3/MM3 (ref 0–0.4)
EOSINOPHIL # BLD AUTO: 0.38 10*3/MM3 (ref 0–0.4)
EOSINOPHIL NFR BLD AUTO: 0.4 % (ref 0.3–6.2)
EOSINOPHIL NFR BLD AUTO: 3.9 % (ref 0.3–6.2)
ERYTHROCYTE [DISTWIDTH] IN BLOOD BY AUTOMATED COUNT: 16 % (ref 12.3–15.4)
ERYTHROCYTE [DISTWIDTH] IN BLOOD BY AUTOMATED COUNT: 16 % (ref 12.3–15.4)
ERYTHROCYTE [DISTWIDTH] IN BLOOD BY AUTOMATED COUNT: 16.3 % (ref 12.3–15.4)
ERYTHROCYTE [DISTWIDTH] IN BLOOD BY AUTOMATED COUNT: 18 % (ref 12.3–15.4)
FIBRINOGEN PPP-MCNC: 194 MG/DL (ref 219–464)
FIBRINOGEN PPP-MCNC: 216 MG/DL (ref 219–464)
GLUCOSE BLDC GLUCOMTR-MCNC: 104 MG/DL (ref 70–130)
GLUCOSE BLDC GLUCOMTR-MCNC: 104 MG/DL (ref 70–130)
GLUCOSE BLDC GLUCOMTR-MCNC: 140 MG/DL (ref 70–130)
GLUCOSE BLDC GLUCOMTR-MCNC: 150 MG/DL (ref 70–130)
GLUCOSE BLDC GLUCOMTR-MCNC: 156 MG/DL (ref 70–130)
GLUCOSE BLDC GLUCOMTR-MCNC: 158 MG/DL (ref 70–130)
GLUCOSE BLDC GLUCOMTR-MCNC: 163 MG/DL (ref 70–130)
GLUCOSE BLDC GLUCOMTR-MCNC: 169 MG/DL (ref 70–130)
GLUCOSE BLDC GLUCOMTR-MCNC: 180 MG/DL (ref 70–130)
GLUCOSE BLDC GLUCOMTR-MCNC: 183 MG/DL (ref 70–130)
GLUCOSE BLDC GLUCOMTR-MCNC: 183 MG/DL (ref 70–130)
GLUCOSE BLDC GLUCOMTR-MCNC: 189 MG/DL (ref 70–130)
GLUCOSE BLDC GLUCOMTR-MCNC: 194 MG/DL (ref 70–130)
GLUCOSE BLDC GLUCOMTR-MCNC: 85 MG/DL (ref 70–130)
GLUCOSE SERPL-MCNC: 159 MG/DL (ref 65–99)
GLUCOSE SERPL-MCNC: 193 MG/DL (ref 65–99)
GLUCOSE SERPL-MCNC: 193 MG/DL (ref 65–99)
GLUCOSE SERPL-MCNC: 91 MG/DL (ref 65–99)
HCO3 BLDA-SCNC: 17.7 MMOL/L (ref 22–26)
HCO3 BLDA-SCNC: 18.9 MMOL/L (ref 22–26)
HCO3 BLDA-SCNC: 19.2 MMOL/L (ref 22–28)
HCO3 BLDA-SCNC: 19.4 MMOL/L (ref 22–26)
HCO3 BLDA-SCNC: 19.7 MMOL/L (ref 22–26)
HCO3 BLDA-SCNC: 20.1 MMOL/L (ref 22–26)
HCO3 BLDA-SCNC: 20.7 MMOL/L (ref 22–26)
HCO3 BLDA-SCNC: 21.1 MMOL/L (ref 22–26)
HCO3 BLDA-SCNC: 21.5 MMOL/L (ref 22–28)
HCT VFR BLD AUTO: 24.3 % (ref 34–46.6)
HCT VFR BLD AUTO: 29 % (ref 34–46.6)
HCT VFR BLD AUTO: 30.2 % (ref 34–46.6)
HCT VFR BLD AUTO: 36.4 % (ref 34–46.6)
HCT VFR BLDA CALC: 21 % (ref 38–51)
HCT VFR BLDA CALC: 21 % (ref 38–51)
HCT VFR BLDA CALC: 22 % (ref 38–51)
HCT VFR BLDA CALC: 22 % (ref 38–51)
HCT VFR BLDA CALC: 24 % (ref 38–51)
HCT VFR BLDA CALC: 27 % (ref 38–51)
HCT VFR BLDA CALC: 27 % (ref 38–51)
HGB BLD-MCNC: 10.3 G/DL (ref 12–15.9)
HGB BLD-MCNC: 12.4 G/DL (ref 12–15.9)
HGB BLD-MCNC: 8.3 G/DL (ref 12–15.9)
HGB BLD-MCNC: 9.7 G/DL (ref 12–15.9)
HGB BLDA-MCNC: 7.1 G/DL (ref 12–17)
HGB BLDA-MCNC: 7.1 G/DL (ref 12–17)
HGB BLDA-MCNC: 7.5 G/DL (ref 12–17)
HGB BLDA-MCNC: 7.5 G/DL (ref 12–17)
HGB BLDA-MCNC: 8.2 G/DL (ref 12–17)
HGB BLDA-MCNC: 9.2 G/DL (ref 12–17)
HGB BLDA-MCNC: 9.2 G/DL (ref 12–17)
IMM GRANULOCYTES # BLD AUTO: 0.05 10*3/MM3 (ref 0–0.05)
IMM GRANULOCYTES # BLD AUTO: 0.4 10*3/MM3 (ref 0–0.05)
IMM GRANULOCYTES NFR BLD AUTO: 0.5 % (ref 0–0.5)
IMM GRANULOCYTES NFR BLD AUTO: 1.4 % (ref 0–0.5)
INHALED O2 CONCENTRATION: 100 %
INHALED O2 CONCENTRATION: 40 %
INR PPP: 1.39 (ref 0.9–1.1)
INR PPP: 1.63 (ref 0.9–1.1)
LYMPHOCYTES # BLD AUTO: 2.41 10*3/MM3 (ref 0.7–3.1)
LYMPHOCYTES # BLD AUTO: 3.43 10*3/MM3 (ref 0.7–3.1)
LYMPHOCYTES NFR BLD AUTO: 35.1 % (ref 19.6–45.3)
LYMPHOCYTES NFR BLD AUTO: 8.7 % (ref 19.6–45.3)
MAGNESIUM SERPL-MCNC: 1.5 MG/DL (ref 1.6–2.4)
MAGNESIUM SERPL-MCNC: 1.8 MG/DL (ref 1.6–2.4)
MCH RBC QN AUTO: 30.3 PG (ref 26.6–33)
MCH RBC QN AUTO: 30.5 PG (ref 26.6–33)
MCH RBC QN AUTO: 30.6 PG (ref 26.6–33)
MCH RBC QN AUTO: 31.8 PG (ref 26.6–33)
MCHC RBC AUTO-ENTMCNC: 33.4 G/DL (ref 31.5–35.7)
MCHC RBC AUTO-ENTMCNC: 34.1 G/DL (ref 31.5–35.7)
MCHC RBC AUTO-ENTMCNC: 34.1 G/DL (ref 31.5–35.7)
MCHC RBC AUTO-ENTMCNC: 34.2 G/DL (ref 31.5–35.7)
MCV RBC AUTO: 89.4 FL (ref 79–97)
MCV RBC AUTO: 89.6 FL (ref 79–97)
MCV RBC AUTO: 90.6 FL (ref 79–97)
MCV RBC AUTO: 93.1 FL (ref 79–97)
MODALITY: ABNORMAL
MODALITY: ABNORMAL
MONOCYTES # BLD AUTO: 1.2 10*3/MM3 (ref 0.1–0.9)
MONOCYTES # BLD AUTO: 1.53 10*3/MM3 (ref 0.1–0.9)
MONOCYTES NFR BLD AUTO: 12.3 % (ref 5–12)
MONOCYTES NFR BLD AUTO: 5.5 % (ref 5–12)
NEUTROPHILS NFR BLD AUTO: 23.13 10*3/MM3 (ref 1.7–7)
NEUTROPHILS NFR BLD AUTO: 4.66 10*3/MM3 (ref 1.7–7)
NEUTROPHILS NFR BLD AUTO: 47.7 % (ref 42.7–76)
NEUTROPHILS NFR BLD AUTO: 83.6 % (ref 42.7–76)
NRBC BLD AUTO-RTO: 0.1 /100 WBC (ref 0–0.2)
NRBC BLD AUTO-RTO: 0.4 /100 WBC (ref 0–0.2)
O2 A-A PPRESDIFF RESPIRATORY: 0.5 MMHG
O2 A-A PPRESDIFF RESPIRATORY: 0.6 MMHG
PCO2 BLDA: 35.2 MM HG (ref 35–45)
PCO2 BLDA: 35.9 MM HG (ref 35–45)
PCO2 BLDA: 37.1 MM HG (ref 35–45)
PCO2 BLDA: 37.9 MM HG (ref 35–45)
PCO2 BLDA: 39.6 MM HG (ref 35–45)
PCO2 BLDA: 39.9 MM HG (ref 35–45)
PCO2 BLDA: 40.8 MM HG (ref 35–45)
PCO2 BLDA: 41.6 MM HG (ref 35–45)
PCO2 BLDA: 46.4 MM HG (ref 35–45)
PEEP RESPIRATORY: 3 CM[H2O]
PEEP RESPIRATORY: 3 CM[H2O]
PH BLDA: 7.26 PH UNITS (ref 7.35–7.6)
PH BLDA: 7.27 PH UNITS (ref 7.35–7.45)
PH BLDA: 7.31 PH UNITS (ref 7.35–7.6)
PH BLDA: 7.34 PH UNITS (ref 7.35–7.45)
PH BLDA: 7.34 PH UNITS (ref 7.35–7.6)
PH BLDA: 7.34 PH UNITS (ref 7.35–7.6)
PH BLDA: 7.35 PH UNITS (ref 7.35–7.6)
PH BLDA: 7.38 PH UNITS (ref 7.35–7.6)
PH BLDA: 7.38 PH UNITS (ref 7.35–7.6)
PHOSPHATE SERPL-MCNC: 4.4 MG/DL (ref 2.5–4.5)
PHOSPHATE SERPL-MCNC: 4.7 MG/DL (ref 2.5–4.5)
PLATELET # BLD AUTO: 109 10*3/MM3 (ref 140–450)
PLATELET # BLD AUTO: 200 10*3/MM3 (ref 140–450)
PLATELET # BLD AUTO: 70 10*3/MM3 (ref 140–450)
PLATELET # BLD AUTO: 85 10*3/MM3 (ref 140–450)
PMV BLD AUTO: 10.3 FL (ref 6–12)
PMV BLD AUTO: 10.5 FL (ref 6–12)
PMV BLD AUTO: 10.8 FL (ref 6–12)
PMV BLD AUTO: 11 FL (ref 6–12)
PO2 BLDA: 115.6 MM HG (ref 80–100)
PO2 BLDA: 291 MMHG (ref 80–105)
PO2 BLDA: 316 MMHG (ref 80–105)
PO2 BLDA: 354 MMHG (ref 80–105)
PO2 BLDA: 369 MMHG (ref 80–105)
PO2 BLDA: 372 MMHG (ref 80–105)
PO2 BLDA: 380 MMHG (ref 80–105)
PO2 BLDA: 423 MMHG (ref 80–105)
PO2 BLDA: 429.6 MM HG (ref 80–100)
POTASSIUM BLDA-SCNC: 3.3 MMOL/L (ref 3.5–4.9)
POTASSIUM BLDA-SCNC: 3.4 MMOL/L (ref 3.5–4.9)
POTASSIUM BLDA-SCNC: 3.5 MMOL/L (ref 3.5–4.9)
POTASSIUM BLDA-SCNC: 3.6 MMOL/L (ref 3.5–4.9)
POTASSIUM BLDA-SCNC: 4.7 MMOL/L (ref 3.5–4.9)
POTASSIUM BLDA-SCNC: 4.7 MMOL/L (ref 3.5–4.9)
POTASSIUM BLDA-SCNC: 4.8 MMOL/L (ref 3.5–4.9)
POTASSIUM SERPL-SCNC: 4.1 MMOL/L (ref 3.5–5.2)
POTASSIUM SERPL-SCNC: 4.2 MMOL/L (ref 3.5–5.2)
POTASSIUM SERPL-SCNC: 4.4 MMOL/L (ref 3.5–5.2)
POTASSIUM SERPL-SCNC: 4.4 MMOL/L (ref 3.5–5.2)
PROTHROMBIN TIME: 17.3 SECONDS (ref 11.7–14.2)
PROTHROMBIN TIME: 19.6 SECONDS (ref 11.7–14.2)
RBC # BLD AUTO: 2.61 10*6/MM3 (ref 3.77–5.28)
RBC # BLD AUTO: 3.2 10*6/MM3 (ref 3.77–5.28)
RBC # BLD AUTO: 3.37 10*6/MM3 (ref 3.77–5.28)
RBC # BLD AUTO: 4.07 10*6/MM3 (ref 3.77–5.28)
SAO2 % BLDA: 100 % (ref 95–98)
SAO2 % BLDCOA: 100 % (ref 92–99)
SAO2 % BLDCOA: 98.3 % (ref 92–99)
SET MECH RESP RATE: 18
SET MECH RESP RATE: 18
SODIUM SERPL-SCNC: 140 MMOL/L (ref 136–145)
SODIUM SERPL-SCNC: 147 MMOL/L (ref 136–145)
SODIUM SERPL-SCNC: 147 MMOL/L (ref 136–145)
SODIUM SERPL-SCNC: 149 MMOL/L (ref 136–145)
TOTAL RATE: 18 BREATHS/MINUTE
TOTAL RATE: 18 BREATHS/MINUTE
VENTILATOR MODE: ABNORMAL
VENTILATOR MODE: ABNORMAL
VT ON VENT VENT: 500 ML
VT ON VENT VENT: 500 ML
WBC NRBC COR # BLD: 17.76 10*3/MM3 (ref 3.4–10.8)
WBC NRBC COR # BLD: 20.7 10*3/MM3 (ref 3.4–10.8)
WBC NRBC COR # BLD: 27.68 10*3/MM3 (ref 3.4–10.8)
WBC NRBC COR # BLD: 9.77 10*3/MM3 (ref 3.4–10.8)

## 2023-07-20 PROCEDURE — 33533 CABG ARTERIAL SINGLE: CPT

## 2023-07-20 PROCEDURE — 36430 TRANSFUSION BLD/BLD COMPNT: CPT

## 2023-07-20 PROCEDURE — P9100 PATHOGEN TEST FOR PLATELETS: HCPCS

## 2023-07-20 PROCEDURE — 25010000002 ALBUMIN HUMAN 5% PER 50 ML: Performed by: NURSE PRACTITIONER

## 2023-07-20 PROCEDURE — P9041 ALBUMIN (HUMAN),5%, 50ML: HCPCS | Performed by: NURSE PRACTITIONER

## 2023-07-20 PROCEDURE — 82330 ASSAY OF CALCIUM: CPT | Performed by: NURSE PRACTITIONER

## 2023-07-20 PROCEDURE — 93010 ELECTROCARDIOGRAM REPORT: CPT | Performed by: INTERNAL MEDICINE

## 2023-07-20 PROCEDURE — C1713 ANCHOR/SCREW BN/BN,TIS/BN: HCPCS

## 2023-07-20 PROCEDURE — 85730 THROMBOPLASTIN TIME PARTIAL: CPT

## 2023-07-20 PROCEDURE — 85347 COAGULATION TIME ACTIVATED: CPT

## 2023-07-20 PROCEDURE — P9037 PLATE PHERES LEUKOREDU IRRAD: HCPCS

## 2023-07-20 PROCEDURE — 94799 UNLISTED PULMONARY SVC/PX: CPT

## 2023-07-20 PROCEDURE — 83735 ASSAY OF MAGNESIUM: CPT | Performed by: NURSE PRACTITIONER

## 2023-07-20 PROCEDURE — 86900 BLOOD TYPING SEROLOGIC ABO: CPT

## 2023-07-20 PROCEDURE — 25010000002 MIDAZOLAM PER 1 MG: Performed by: ANESTHESIOLOGY

## 2023-07-20 PROCEDURE — P9059 PLASMA, FRZ BETWEEN 8-24HOUR: HCPCS

## 2023-07-20 PROCEDURE — 25010000002 ACETAMINOPHEN 10 MG/ML SOLUTION: Performed by: NURSE PRACTITIONER

## 2023-07-20 PROCEDURE — 85025 COMPLETE CBC W/AUTO DIFF WBC: CPT

## 2023-07-20 PROCEDURE — 80048 BASIC METABOLIC PNL TOTAL CA: CPT

## 2023-07-20 PROCEDURE — 85384 FIBRINOGEN ACTIVITY: CPT | Performed by: NURSE PRACTITIONER

## 2023-07-20 PROCEDURE — A4648 IMPLANTABLE TISSUE MARKER: HCPCS

## 2023-07-20 PROCEDURE — 85014 HEMATOCRIT: CPT

## 2023-07-20 PROCEDURE — 85730 THROMBOPLASTIN TIME PARTIAL: CPT | Performed by: FAMILY MEDICINE

## 2023-07-20 PROCEDURE — 85027 COMPLETE CBC AUTOMATED: CPT

## 2023-07-20 PROCEDURE — 80069 RENAL FUNCTION PANEL: CPT | Performed by: NURSE PRACTITIONER

## 2023-07-20 PROCEDURE — 71045 X-RAY EXAM CHEST 1 VIEW: CPT

## 2023-07-20 PROCEDURE — 33508 ENDOSCOPIC VEIN HARVEST: CPT

## 2023-07-20 PROCEDURE — 85384 FIBRINOGEN ACTIVITY: CPT

## 2023-07-20 PROCEDURE — 93005 ELECTROCARDIOGRAM TRACING: CPT | Performed by: NURSE PRACTITIONER

## 2023-07-20 PROCEDURE — 25010000002 HEPARIN (PORCINE) PER 1000 UNITS

## 2023-07-20 PROCEDURE — 85027 COMPLETE CBC AUTOMATED: CPT | Performed by: NURSE PRACTITIONER

## 2023-07-20 PROCEDURE — P9016 RBC LEUKOCYTES REDUCED: HCPCS

## 2023-07-20 PROCEDURE — 33518 CABG ARTERY-VEIN TWO: CPT

## 2023-07-20 PROCEDURE — 82948 REAGENT STRIP/BLOOD GLUCOSE: CPT

## 2023-07-20 PROCEDURE — 85025 COMPLETE CBC W/AUTO DIFF WBC: CPT | Performed by: FAMILY MEDICINE

## 2023-07-20 PROCEDURE — 94761 N-INVAS EAR/PLS OXIMETRY MLT: CPT

## 2023-07-20 PROCEDURE — C2618 PROBE/NEEDLE, CRYO: HCPCS

## 2023-07-20 PROCEDURE — 82803 BLOOD GASES ANY COMBINATION: CPT

## 2023-07-20 PROCEDURE — 25010000002 FENTANYL CITRATE (PF) 50 MCG/ML SOLUTION: Performed by: ANESTHESIOLOGY

## 2023-07-20 PROCEDURE — 82947 ASSAY GLUCOSE BLOOD QUANT: CPT

## 2023-07-20 PROCEDURE — 94002 VENT MGMT INPAT INIT DAY: CPT

## 2023-07-20 PROCEDURE — 85610 PROTHROMBIN TIME: CPT | Performed by: FAMILY MEDICINE

## 2023-07-20 PROCEDURE — 85610 PROTHROMBIN TIME: CPT

## 2023-07-20 PROCEDURE — 25010000002 CEFAZOLIN IN DEXTROSE 2-4 GM/100ML-% SOLUTION

## 2023-07-20 PROCEDURE — 25010000002 MAGNESIUM SULFATE IN D5W 1G/100ML (PREMIX) 1-5 GM/100ML-% SOLUTION: Performed by: NURSE PRACTITIONER

## 2023-07-20 PROCEDURE — 86927 PLASMA FRESH FROZEN: CPT

## 2023-07-20 PROCEDURE — 25010000002 PAPAVERINE PER 60 MG

## 2023-07-20 PROCEDURE — 85018 HEMOGLOBIN: CPT

## 2023-07-20 PROCEDURE — 85730 THROMBOPLASTIN TIME PARTIAL: CPT | Performed by: THORACIC SURGERY (CARDIOTHORACIC VASCULAR SURGERY)

## 2023-07-20 PROCEDURE — 33268 EXCL LAA OPN OTH PX ANY METH: CPT

## 2023-07-20 DEVICE — SS SUTURE, 3 PER SLEEVE
Type: IMPLANTABLE DEVICE | Site: STERNUM | Status: FUNCTIONAL
Brand: MYO/WIRE II

## 2023-07-20 DEVICE — SS SUTURE, 4 PER SLEEVE
Type: IMPLANTABLE DEVICE | Site: STERNUM | Status: FUNCTIONAL
Brand: MYO/WIRE II

## 2023-07-20 DEVICE — APPL CLIP LAA ATRICLIP FLX 40MM: Type: IMPLANTABLE DEVICE | Site: STERNUM | Status: FUNCTIONAL

## 2023-07-20 RX ORDER — PHENYLEPHRINE HCL IN 0.9% NACL 0.5 MG/5ML
.2-2 SYRINGE (ML) INTRAVENOUS CONTINUOUS PRN
Status: DISCONTINUED | OUTPATIENT
Start: 2023-07-20 | End: 2023-07-21

## 2023-07-20 RX ORDER — IBUPROFEN 600 MG/1
1 TABLET ORAL
Status: DISCONTINUED | OUTPATIENT
Start: 2023-07-20 | End: 2023-07-21

## 2023-07-20 RX ORDER — MEPERIDINE HYDROCHLORIDE 25 MG/ML
25 INJECTION INTRAMUSCULAR; INTRAVENOUS; SUBCUTANEOUS EVERY 4 HOURS PRN
Status: ACTIVE | OUTPATIENT
Start: 2023-07-20 | End: 2023-07-21

## 2023-07-20 RX ORDER — ACETAMINOPHEN 10 MG/ML
1000 INJECTION, SOLUTION INTRAVENOUS EVERY 8 HOURS
Status: COMPLETED | OUTPATIENT
Start: 2023-07-20 | End: 2023-07-21

## 2023-07-20 RX ORDER — ATORVASTATIN CALCIUM 20 MG/1
40 TABLET, FILM COATED ORAL NIGHTLY
Status: DISCONTINUED | OUTPATIENT
Start: 2023-07-20 | End: 2023-07-28 | Stop reason: HOSPADM

## 2023-07-20 RX ORDER — CEFAZOLIN SODIUM 2 G/100ML
2 INJECTION, SOLUTION INTRAVENOUS EVERY 8 HOURS
Status: COMPLETED | OUTPATIENT
Start: 2023-07-21 | End: 2023-07-22

## 2023-07-20 RX ORDER — NITROGLYCERIN 20 MG/100ML
5-200 INJECTION INTRAVENOUS
Status: DISCONTINUED | OUTPATIENT
Start: 2023-07-20 | End: 2023-07-22

## 2023-07-20 RX ORDER — SODIUM CHLORIDE 0.9 % (FLUSH) 0.9 %
3 SYRINGE (ML) INJECTION EVERY 12 HOURS SCHEDULED
Status: DISCONTINUED | OUTPATIENT
Start: 2023-07-20 | End: 2023-07-20 | Stop reason: HOSPADM

## 2023-07-20 RX ORDER — NOREPINEPHRINE BITARTRATE 0.03 MG/ML
.02-.2 INJECTION, SOLUTION INTRAVENOUS CONTINUOUS PRN
Status: DISCONTINUED | OUTPATIENT
Start: 2023-07-20 | End: 2023-07-22

## 2023-07-20 RX ORDER — CHLORHEXIDINE GLUCONATE 0.12 MG/ML
15 RINSE ORAL EVERY 12 HOURS
Status: DISCONTINUED | OUTPATIENT
Start: 2023-07-20 | End: 2023-07-28 | Stop reason: HOSPADM

## 2023-07-20 RX ORDER — SODIUM CHLORIDE 0.9 % (FLUSH) 0.9 %
10 SYRINGE (ML) INJECTION EVERY 12 HOURS SCHEDULED
Status: DISCONTINUED | OUTPATIENT
Start: 2023-07-20 | End: 2023-07-28 | Stop reason: HOSPADM

## 2023-07-20 RX ORDER — DOPAMINE HYDROCHLORIDE 160 MG/100ML
2-20 INJECTION, SOLUTION INTRAVENOUS CONTINUOUS PRN
Status: DISCONTINUED | OUTPATIENT
Start: 2023-07-20 | End: 2023-07-21

## 2023-07-20 RX ORDER — METOCLOPRAMIDE HYDROCHLORIDE 5 MG/ML
10 INJECTION INTRAMUSCULAR; INTRAVENOUS EVERY 6 HOURS
Status: DISPENSED | OUTPATIENT
Start: 2023-07-20 | End: 2023-07-21

## 2023-07-20 RX ORDER — SODIUM CHLORIDE 0.9 % (FLUSH) 0.9 %
3-10 SYRINGE (ML) INJECTION AS NEEDED
Status: DISCONTINUED | OUTPATIENT
Start: 2023-07-20 | End: 2023-07-20 | Stop reason: HOSPADM

## 2023-07-20 RX ORDER — MORPHINE SULFATE 2 MG/ML
4 INJECTION, SOLUTION INTRAMUSCULAR; INTRAVENOUS
Status: DISCONTINUED | OUTPATIENT
Start: 2023-07-20 | End: 2023-07-21

## 2023-07-20 RX ORDER — NITROGLYCERIN 0.4 MG/1
0.4 TABLET SUBLINGUAL
Status: DISCONTINUED | OUTPATIENT
Start: 2023-07-20 | End: 2023-07-28 | Stop reason: HOSPADM

## 2023-07-20 RX ORDER — SODIUM CHLORIDE 9 MG/ML
75 INJECTION, SOLUTION INTRAVENOUS CONTINUOUS
Status: DISCONTINUED | OUTPATIENT
Start: 2023-07-20 | End: 2023-07-24

## 2023-07-20 RX ORDER — ASPIRIN 81 MG/1
81 TABLET ORAL DAILY
Status: DISCONTINUED | OUTPATIENT
Start: 2023-07-21 | End: 2023-07-28 | Stop reason: HOSPADM

## 2023-07-20 RX ORDER — NICOTINE POLACRILEX 4 MG
15 LOZENGE BUCCAL
Status: DISCONTINUED | OUTPATIENT
Start: 2023-07-20 | End: 2023-07-21

## 2023-07-20 RX ORDER — HEPARIN SODIUM 5000 [USP'U]/ML
INJECTION, SOLUTION INTRAVENOUS; SUBCUTANEOUS AS NEEDED
Status: DISCONTINUED | OUTPATIENT
Start: 2023-07-20 | End: 2023-07-20 | Stop reason: HOSPADM

## 2023-07-20 RX ORDER — OXYCODONE HYDROCHLORIDE 5 MG/1
10 TABLET ORAL EVERY 4 HOURS PRN
Status: DISCONTINUED | OUTPATIENT
Start: 2023-07-20 | End: 2023-07-21

## 2023-07-20 RX ORDER — ACETAMINOPHEN 160 MG/5ML
650 SOLUTION ORAL EVERY 4 HOURS PRN
Status: DISCONTINUED | OUTPATIENT
Start: 2023-07-21 | End: 2023-07-28 | Stop reason: HOSPADM

## 2023-07-20 RX ORDER — BISACODYL 5 MG/1
10 TABLET, DELAYED RELEASE ORAL DAILY PRN
Status: DISCONTINUED | OUTPATIENT
Start: 2023-07-20 | End: 2023-07-28 | Stop reason: HOSPADM

## 2023-07-20 RX ORDER — SODIUM CHLORIDE 9 MG/ML
30 INJECTION, SOLUTION INTRAVENOUS CONTINUOUS
Status: DISCONTINUED | OUTPATIENT
Start: 2023-07-20 | End: 2023-07-24

## 2023-07-20 RX ORDER — BISACODYL 10 MG
10 SUPPOSITORY, RECTAL RECTAL DAILY PRN
Status: DISCONTINUED | OUTPATIENT
Start: 2023-07-21 | End: 2023-07-28 | Stop reason: HOSPADM

## 2023-07-20 RX ORDER — SODIUM CHLORIDE, SODIUM LACTATE, POTASSIUM CHLORIDE, CALCIUM CHLORIDE 600; 310; 30; 20 MG/100ML; MG/100ML; MG/100ML; MG/100ML
9 INJECTION, SOLUTION INTRAVENOUS CONTINUOUS
Status: DISCONTINUED | OUTPATIENT
Start: 2023-07-20 | End: 2023-07-20

## 2023-07-20 RX ORDER — MORPHINE SULFATE 2 MG/ML
1 INJECTION, SOLUTION INTRAMUSCULAR; INTRAVENOUS EVERY 4 HOURS PRN
Status: DISPENSED | OUTPATIENT
Start: 2023-07-20 | End: 2023-07-27

## 2023-07-20 RX ORDER — ACETAMINOPHEN 160 MG/5ML
650 SOLUTION ORAL EVERY 4 HOURS
Status: ACTIVE | OUTPATIENT
Start: 2023-07-20 | End: 2023-07-21

## 2023-07-20 RX ORDER — ACETAMINOPHEN 650 MG/1
650 SUPPOSITORY RECTAL EVERY 4 HOURS PRN
Status: DISCONTINUED | OUTPATIENT
Start: 2023-07-21 | End: 2023-07-28 | Stop reason: HOSPADM

## 2023-07-20 RX ORDER — POLYETHYLENE GLYCOL 3350 17 G/17G
17 POWDER, FOR SOLUTION ORAL DAILY PRN
Status: DISCONTINUED | OUTPATIENT
Start: 2023-07-20 | End: 2023-07-28 | Stop reason: HOSPADM

## 2023-07-20 RX ORDER — FENTANYL CITRATE 50 UG/ML
25 INJECTION, SOLUTION INTRAMUSCULAR; INTRAVENOUS ONCE AS NEEDED
Status: COMPLETED | OUTPATIENT
Start: 2023-07-20 | End: 2023-07-20

## 2023-07-20 RX ORDER — LIDOCAINE HYDROCHLORIDE 10 MG/ML
0.5 INJECTION, SOLUTION EPIDURAL; INFILTRATION; INTRACAUDAL; PERINEURAL ONCE AS NEEDED
Status: DISCONTINUED | OUTPATIENT
Start: 2023-07-20 | End: 2023-07-20 | Stop reason: HOSPADM

## 2023-07-20 RX ORDER — SODIUM CHLORIDE 0.9 % (FLUSH) 0.9 %
10 SYRINGE (ML) INJECTION AS NEEDED
Status: DISCONTINUED | OUTPATIENT
Start: 2023-07-20 | End: 2023-07-28 | Stop reason: HOSPADM

## 2023-07-20 RX ORDER — ALBUMIN, HUMAN INJ 5% 5 %
1500 SOLUTION INTRAVENOUS AS NEEDED
Status: DISPENSED | OUTPATIENT
Start: 2023-07-20 | End: 2023-07-21

## 2023-07-20 RX ORDER — PANTOPRAZOLE SODIUM 40 MG/10ML
40 INJECTION, POWDER, LYOPHILIZED, FOR SOLUTION INTRAVENOUS ONCE
Status: COMPLETED | OUTPATIENT
Start: 2023-07-20 | End: 2023-07-20

## 2023-07-20 RX ORDER — MIDAZOLAM HYDROCHLORIDE 1 MG/ML
2 INJECTION INTRAMUSCULAR; INTRAVENOUS
Status: DISCONTINUED | OUTPATIENT
Start: 2023-07-20 | End: 2023-07-21

## 2023-07-20 RX ORDER — DEXMEDETOMIDINE HYDROCHLORIDE 4 UG/ML
.2-1.5 INJECTION, SOLUTION INTRAVENOUS
Status: DISCONTINUED | OUTPATIENT
Start: 2023-07-20 | End: 2023-07-21

## 2023-07-20 RX ORDER — MAGNESIUM SULFATE 1 G/100ML
1 INJECTION INTRAVENOUS EVERY 8 HOURS
Status: COMPLETED | OUTPATIENT
Start: 2023-07-20 | End: 2023-07-21

## 2023-07-20 RX ORDER — CYCLOBENZAPRINE HCL 10 MG
10 TABLET ORAL EVERY 8 HOURS PRN
Status: DISCONTINUED | OUTPATIENT
Start: 2023-07-21 | End: 2023-07-28 | Stop reason: HOSPADM

## 2023-07-20 RX ORDER — SODIUM CHLORIDE 9 MG/ML
40 INJECTION, SOLUTION INTRAVENOUS AS NEEDED
Status: DISCONTINUED | OUTPATIENT
Start: 2023-07-20 | End: 2023-07-20 | Stop reason: HOSPADM

## 2023-07-20 RX ORDER — ALPRAZOLAM 0.25 MG/1
0.25 TABLET ORAL EVERY 8 HOURS PRN
Status: DISCONTINUED | OUTPATIENT
Start: 2023-07-20 | End: 2023-07-28 | Stop reason: HOSPADM

## 2023-07-20 RX ORDER — MILRINONE LACTATE 0.2 MG/ML
.25-.375 INJECTION, SOLUTION INTRAVENOUS CONTINUOUS PRN
Status: DISCONTINUED | OUTPATIENT
Start: 2023-07-20 | End: 2023-07-22

## 2023-07-20 RX ORDER — NALOXONE HCL 0.4 MG/ML
0.4 VIAL (ML) INJECTION
Status: DISCONTINUED | OUTPATIENT
Start: 2023-07-20 | End: 2023-07-28 | Stop reason: HOSPADM

## 2023-07-20 RX ORDER — MIDAZOLAM HYDROCHLORIDE 1 MG/ML
0.5 INJECTION INTRAMUSCULAR; INTRAVENOUS
Status: DISCONTINUED | OUTPATIENT
Start: 2023-07-20 | End: 2023-07-20 | Stop reason: HOSPADM

## 2023-07-20 RX ORDER — PAPAVERINE HYDROCHLORIDE 30 MG/ML
INJECTION INTRAMUSCULAR; INTRAVENOUS AS NEEDED
Status: DISCONTINUED | OUTPATIENT
Start: 2023-07-20 | End: 2023-07-20 | Stop reason: HOSPADM

## 2023-07-20 RX ORDER — ACETAMINOPHEN 325 MG/1
650 TABLET ORAL EVERY 4 HOURS
Status: ACTIVE | OUTPATIENT
Start: 2023-07-20 | End: 2023-07-21

## 2023-07-20 RX ORDER — HYDROCODONE BITARTRATE AND ACETAMINOPHEN 5; 325 MG/1; MG/1
2 TABLET ORAL EVERY 4 HOURS PRN
Status: DISCONTINUED | OUTPATIENT
Start: 2023-07-20 | End: 2023-07-28 | Stop reason: HOSPADM

## 2023-07-20 RX ORDER — ENOXAPARIN SODIUM 100 MG/ML
40 INJECTION SUBCUTANEOUS DAILY
Status: DISCONTINUED | OUTPATIENT
Start: 2023-07-21 | End: 2023-07-23

## 2023-07-20 RX ORDER — ONDANSETRON 2 MG/ML
4 INJECTION INTRAMUSCULAR; INTRAVENOUS EVERY 6 HOURS PRN
Status: DISCONTINUED | OUTPATIENT
Start: 2023-07-20 | End: 2023-07-28 | Stop reason: HOSPADM

## 2023-07-20 RX ORDER — ACETAMINOPHEN 325 MG/1
650 TABLET ORAL EVERY 4 HOURS PRN
Status: DISCONTINUED | OUTPATIENT
Start: 2023-07-21 | End: 2023-07-28 | Stop reason: HOSPADM

## 2023-07-20 RX ORDER — ACETAMINOPHEN 650 MG/1
650 SUPPOSITORY RECTAL EVERY 4 HOURS
Status: ACTIVE | OUTPATIENT
Start: 2023-07-20 | End: 2023-07-21

## 2023-07-20 RX ORDER — PANTOPRAZOLE SODIUM 40 MG/1
40 TABLET, DELAYED RELEASE ORAL EVERY MORNING
Status: DISCONTINUED | OUTPATIENT
Start: 2023-07-21 | End: 2023-07-28 | Stop reason: HOSPADM

## 2023-07-20 RX ORDER — DEXTROSE MONOHYDRATE 25 G/50ML
10-50 INJECTION, SOLUTION INTRAVENOUS
Status: DISCONTINUED | OUTPATIENT
Start: 2023-07-20 | End: 2023-07-21

## 2023-07-20 RX ORDER — AMOXICILLIN 250 MG
2 CAPSULE ORAL NIGHTLY
Status: DISCONTINUED | OUTPATIENT
Start: 2023-07-21 | End: 2023-07-28 | Stop reason: HOSPADM

## 2023-07-20 RX ADMIN — ALBUMIN (HUMAN) 250 ML: 12.5 INJECTION, SOLUTION INTRAVENOUS at 20:55

## 2023-07-20 RX ADMIN — CARVEDILOL 6.25 MG: 6.25 TABLET, FILM COATED ORAL at 08:44

## 2023-07-20 RX ADMIN — CHLORHEXIDINE GLUCONATE 15 ML: 1.2 SOLUTION ORAL at 06:46

## 2023-07-20 RX ADMIN — PANTOPRAZOLE SODIUM 40 MG: 40 INJECTION, POWDER, FOR SOLUTION INTRAVENOUS at 22:49

## 2023-07-20 RX ADMIN — METOPROLOL TARTRATE 12.5 MG: 25 TABLET, FILM COATED ORAL at 06:47

## 2023-07-20 RX ADMIN — CHLORHEXIDINE GLUCONATE 1 APPLICATION: 500 CLOTH TOPICAL at 06:47

## 2023-07-20 RX ADMIN — ALBUMIN (HUMAN) 250 ML: 12.5 INJECTION, SOLUTION INTRAVENOUS at 22:17

## 2023-07-20 RX ADMIN — MIDAZOLAM 0.5 MG: 1 INJECTION INTRAMUSCULAR; INTRAVENOUS at 10:09

## 2023-07-20 RX ADMIN — ALBUMIN (HUMAN) 250 ML: 12.5 INJECTION, SOLUTION INTRAVENOUS at 20:00

## 2023-07-20 RX ADMIN — SODIUM CHLORIDE 30 ML/HR: 9 INJECTION, SOLUTION INTRAVENOUS at 20:30

## 2023-07-20 RX ADMIN — MAGNESIUM SULFATE IN DEXTROSE 1 G: 10 INJECTION, SOLUTION INTRAVENOUS at 20:09

## 2023-07-20 RX ADMIN — Medication 10 ML: at 21:57

## 2023-07-20 RX ADMIN — MUPIROCIN 1 APPLICATION: 20 OINTMENT TOPICAL at 06:47

## 2023-07-20 RX ADMIN — DEXMEDETOMIDINE HYDROCHLORIDE 0.5 MCG/KG/HR: 4 INJECTION, SOLUTION INTRAVENOUS at 21:29

## 2023-07-20 RX ADMIN — FENTANYL CITRATE 25 MCG: 0.05 INJECTION, SOLUTION INTRAMUSCULAR; INTRAVENOUS at 10:09

## 2023-07-20 RX ADMIN — INSULIN HUMAN 2.6 UNITS/HR: 1 INJECTION, SOLUTION INTRAVENOUS at 20:18

## 2023-07-20 RX ADMIN — CEFAZOLIN SODIUM 2000 MG: 2 INJECTION, SOLUTION INTRAVENOUS at 08:44

## 2023-07-20 RX ADMIN — ALBUMIN (HUMAN) 250 ML: 12.5 INJECTION, SOLUTION INTRAVENOUS at 19:00

## 2023-07-20 RX ADMIN — CHLORHEXIDINE GLUCONATE 15 ML: 1.2 SOLUTION ORAL at 20:09

## 2023-07-20 RX ADMIN — ACETAMINOPHEN 1000 MG: 10 INJECTION, SOLUTION INTRAVENOUS at 20:10

## 2023-07-20 RX ADMIN — MUPIROCIN 1 APPLICATION: 20 OINTMENT TOPICAL at 20:09

## 2023-07-20 NOTE — PLAN OF CARE
Goal Outcome Evaluation:                      Patient ready for surgery, NPO after midnight, Afib on the monitor, Pt on room air, family at bedside, pt had shower x 2, denial chest pain , cont to monitor.

## 2023-07-21 ENCOUNTER — APPOINTMENT (OUTPATIENT)
Dept: GENERAL RADIOLOGY | Facility: HOSPITAL | Age: 78
DRG: 236 | End: 2023-07-21
Payer: MEDICARE

## 2023-07-21 LAB
ALBUMIN SERPL-MCNC: 3.2 G/DL (ref 3.5–5.2)
ANION GAP SERPL CALCULATED.3IONS-SCNC: 10 MMOL/L (ref 5–15)
ANION GAP SERPL CALCULATED.3IONS-SCNC: 8 MMOL/L (ref 5–15)
ARTERIAL PATENCY WRIST A: ABNORMAL
ATMOSPHERIC PRESS: 745.5 MMHG
BASE EXCESS BLDA CALC-SCNC: -3.1 MMOL/L (ref 0–2)
BASOPHILS # BLD AUTO: 0.03 10*3/MM3 (ref 0–0.2)
BASOPHILS NFR BLD AUTO: 0.2 % (ref 0–1.5)
BDY SITE: ABNORMAL
BH BB BLOOD EXPIRATION DATE: NORMAL
BH BB BLOOD TYPE BARCODE: 5100
BH BB BLOOD TYPE BARCODE: 6200
BH BB DISPENSE STATUS: NORMAL
BH BB PRODUCT CODE: NORMAL
BH BB UNIT NUMBER: NORMAL
BUN SERPL-MCNC: 22 MG/DL (ref 8–23)
BUN SERPL-MCNC: 23 MG/DL (ref 8–23)
BUN/CREAT SERPL: 19.1 (ref 7–25)
BUN/CREAT SERPL: 23.5 (ref 7–25)
CA-I BLD-MCNC: 5.2 MG/DL (ref 4.6–5.4)
CA-I SERPL ISE-MCNC: 1.31 MMOL/L (ref 1.15–1.35)
CALCIUM SPEC-SCNC: 9.1 MG/DL (ref 8.6–10.5)
CALCIUM SPEC-SCNC: 9.1 MG/DL (ref 8.6–10.5)
CHLORIDE SERPL-SCNC: 109 MMOL/L (ref 98–107)
CHLORIDE SERPL-SCNC: 118 MMOL/L (ref 98–107)
CO2 SERPL-SCNC: 22 MMOL/L (ref 22–29)
CO2 SERPL-SCNC: 23 MMOL/L (ref 22–29)
CREAT SERPL-MCNC: 0.98 MG/DL (ref 0.57–1)
CREAT SERPL-MCNC: 1.15 MG/DL (ref 0.57–1)
CROSSMATCH INTERPRETATION: NORMAL
DEPRECATED RDW RBC AUTO: 52.8 FL (ref 37–54)
EGFRCR SERPLBLD CKD-EPI 2021: 48.9 ML/MIN/1.73
EGFRCR SERPLBLD CKD-EPI 2021: 59.2 ML/MIN/1.73
EOSINOPHIL # BLD AUTO: 0.01 10*3/MM3 (ref 0–0.4)
EOSINOPHIL NFR BLD AUTO: 0.1 % (ref 0.3–6.2)
ERYTHROCYTE [DISTWIDTH] IN BLOOD BY AUTOMATED COUNT: 16.3 % (ref 12.3–15.4)
GLUCOSE BLDC GLUCOMTR-MCNC: 114 MG/DL (ref 70–130)
GLUCOSE BLDC GLUCOMTR-MCNC: 114 MG/DL (ref 70–130)
GLUCOSE BLDC GLUCOMTR-MCNC: 115 MG/DL (ref 70–130)
GLUCOSE BLDC GLUCOMTR-MCNC: 119 MG/DL (ref 70–130)
GLUCOSE BLDC GLUCOMTR-MCNC: 129 MG/DL (ref 70–130)
GLUCOSE BLDC GLUCOMTR-MCNC: 130 MG/DL (ref 70–130)
GLUCOSE BLDC GLUCOMTR-MCNC: 141 MG/DL (ref 70–130)
GLUCOSE BLDC GLUCOMTR-MCNC: 143 MG/DL (ref 70–130)
GLUCOSE BLDC GLUCOMTR-MCNC: 163 MG/DL (ref 70–130)
GLUCOSE BLDC GLUCOMTR-MCNC: 90 MG/DL (ref 70–130)
GLUCOSE SERPL-MCNC: 113 MG/DL (ref 65–99)
GLUCOSE SERPL-MCNC: 137 MG/DL (ref 65–99)
HCO3 BLDA-SCNC: 22.7 MMOL/L (ref 22–28)
HCT VFR BLD AUTO: 27.3 % (ref 34–46.6)
HGB BLD-MCNC: 9.5 G/DL (ref 12–15.9)
INHALED O2 CONCENTRATION: 40 %
INR PPP: 1.33 (ref 0.9–1.1)
LYMPHOCYTES # BLD AUTO: 1.75 10*3/MM3 (ref 0.7–3.1)
LYMPHOCYTES NFR BLD AUTO: 11.8 % (ref 19.6–45.3)
MAGNESIUM SERPL-MCNC: 1.8 MG/DL (ref 1.6–2.4)
MAGNESIUM SERPL-MCNC: 2.2 MG/DL (ref 1.6–2.4)
MCH RBC QN AUTO: 30.8 PG (ref 26.6–33)
MCHC RBC AUTO-ENTMCNC: 34.8 G/DL (ref 31.5–35.7)
MCV RBC AUTO: 88.6 FL (ref 79–97)
MODALITY: ABNORMAL
MONOCYTES # BLD AUTO: 1.25 10*3/MM3 (ref 0.1–0.9)
MONOCYTES NFR BLD AUTO: 8.5 % (ref 5–12)
NEUTROPHILS NFR BLD AUTO: 11.6 10*3/MM3 (ref 1.7–7)
NEUTROPHILS NFR BLD AUTO: 78.5 % (ref 42.7–76)
O2 A-A PPRESDIFF RESPIRATORY: 0.5 MMHG
PCO2 BLDA: 43 MM HG (ref 35–45)
PEEP RESPIRATORY: 3 CM[H2O]
PH BLDA: 7.33 PH UNITS (ref 7.35–7.45)
PHOSPHATE SERPL-MCNC: 5.1 MG/DL (ref 2.5–4.5)
PLATELET # BLD AUTO: 77 10*3/MM3 (ref 140–450)
PMV BLD AUTO: 12.1 FL (ref 6–12)
PO2 BLDA: 115.8 MM HG (ref 80–100)
POTASSIUM SERPL-SCNC: 4.1 MMOL/L (ref 3.5–5.2)
POTASSIUM SERPL-SCNC: 4.3 MMOL/L (ref 3.5–5.2)
PROTHROMBIN TIME: 16.6 SECONDS (ref 11.7–14.2)
PSV: 8 CMH2O
QT INTERVAL: 360 MS
QT INTERVAL: 409 MS
RBC # BLD AUTO: 3.08 10*6/MM3 (ref 3.77–5.28)
SAO2 % BLDCOA: 98.2 % (ref 92–99)
SODIUM SERPL-SCNC: 142 MMOL/L (ref 136–145)
SODIUM SERPL-SCNC: 148 MMOL/L (ref 136–145)
TOTAL RATE: 23 BREATHS/MINUTE
UNIT  ABO: NORMAL
UNIT  RH: NORMAL
VENTILATOR MODE: ABNORMAL
WBC NRBC COR # BLD: 14.77 10*3/MM3 (ref 3.4–10.8)

## 2023-07-21 PROCEDURE — 83735 ASSAY OF MAGNESIUM: CPT | Performed by: NURSE PRACTITIONER

## 2023-07-21 PROCEDURE — 82330 ASSAY OF CALCIUM: CPT | Performed by: NURSE PRACTITIONER

## 2023-07-21 PROCEDURE — 97166 OT EVAL MOD COMPLEX 45 MIN: CPT

## 2023-07-21 PROCEDURE — 82948 REAGENT STRIP/BLOOD GLUCOSE: CPT

## 2023-07-21 PROCEDURE — 80048 BASIC METABOLIC PNL TOTAL CA: CPT | Performed by: NURSE PRACTITIONER

## 2023-07-21 PROCEDURE — 82803 BLOOD GASES ANY COMBINATION: CPT

## 2023-07-21 PROCEDURE — 85610 PROTHROMBIN TIME: CPT | Performed by: NURSE PRACTITIONER

## 2023-07-21 PROCEDURE — 85025 COMPLETE CBC W/AUTO DIFF WBC: CPT | Performed by: NURSE PRACTITIONER

## 2023-07-21 PROCEDURE — 94799 UNLISTED PULMONARY SVC/PX: CPT

## 2023-07-21 PROCEDURE — 25010000002 FUROSEMIDE PER 20 MG: Performed by: NURSE PRACTITIONER

## 2023-07-21 PROCEDURE — 63710000001 INSULIN LISPRO (HUMAN) PER 5 UNITS: Performed by: NURSE PRACTITIONER

## 2023-07-21 PROCEDURE — 80069 RENAL FUNCTION PANEL: CPT | Performed by: NURSE PRACTITIONER

## 2023-07-21 PROCEDURE — 94003 VENT MGMT INPAT SUBQ DAY: CPT

## 2023-07-21 PROCEDURE — 25010000002 CEFAZOLIN IN DEXTROSE 2-4 GM/100ML-% SOLUTION: Performed by: NURSE PRACTITIONER

## 2023-07-21 PROCEDURE — 94761 N-INVAS EAR/PLS OXIMETRY MLT: CPT

## 2023-07-21 PROCEDURE — 25010000002 MAGNESIUM SULFATE IN D5W 1G/100ML (PREMIX) 1-5 GM/100ML-% SOLUTION: Performed by: NURSE PRACTITIONER

## 2023-07-21 PROCEDURE — 93005 ELECTROCARDIOGRAM TRACING: CPT | Performed by: NURSE PRACTITIONER

## 2023-07-21 PROCEDURE — 25010000002 ACETAMINOPHEN 10 MG/ML SOLUTION: Performed by: NURSE PRACTITIONER

## 2023-07-21 PROCEDURE — 97162 PT EVAL MOD COMPLEX 30 MIN: CPT

## 2023-07-21 PROCEDURE — 25010000002 ENOXAPARIN PER 10 MG: Performed by: NURSE PRACTITIONER

## 2023-07-21 PROCEDURE — 25010000002 METOCLOPRAMIDE PER 10 MG: Performed by: NURSE PRACTITIONER

## 2023-07-21 PROCEDURE — 0 MILRINONE LACTATE IN DEXTROSE 20-5 MG/100ML-% SOLUTION: Performed by: NURSE PRACTITIONER

## 2023-07-21 PROCEDURE — 71045 X-RAY EXAM CHEST 1 VIEW: CPT

## 2023-07-21 PROCEDURE — 93010 ELECTROCARDIOGRAM REPORT: CPT | Performed by: INTERNAL MEDICINE

## 2023-07-21 PROCEDURE — 97530 THERAPEUTIC ACTIVITIES: CPT

## 2023-07-21 PROCEDURE — 25010000002 MORPHINE PER 10 MG: Performed by: NURSE PRACTITIONER

## 2023-07-21 RX ORDER — NICOTINE POLACRILEX 4 MG
15 LOZENGE BUCCAL
Status: DISCONTINUED | OUTPATIENT
Start: 2023-07-21 | End: 2023-07-28 | Stop reason: HOSPADM

## 2023-07-21 RX ORDER — IBUPROFEN 600 MG/1
1 TABLET ORAL
Status: DISCONTINUED | OUTPATIENT
Start: 2023-07-21 | End: 2023-07-28 | Stop reason: HOSPADM

## 2023-07-21 RX ORDER — INSULIN LISPRO 100 [IU]/ML
2-9 INJECTION, SOLUTION INTRAVENOUS; SUBCUTANEOUS
Status: DISCONTINUED | OUTPATIENT
Start: 2023-07-21 | End: 2023-07-28 | Stop reason: HOSPADM

## 2023-07-21 RX ORDER — FUROSEMIDE 10 MG/ML
40 INJECTION INTRAMUSCULAR; INTRAVENOUS ONCE
Status: DISCONTINUED | OUTPATIENT
Start: 2023-07-21 | End: 2023-07-21

## 2023-07-21 RX ORDER — BUPROPION HYDROCHLORIDE 300 MG/1
300 TABLET ORAL DAILY
Status: DISCONTINUED | OUTPATIENT
Start: 2023-07-21 | End: 2023-07-28 | Stop reason: HOSPADM

## 2023-07-21 RX ORDER — FUROSEMIDE 10 MG/ML
40 INJECTION INTRAMUSCULAR; INTRAVENOUS EVERY 12 HOURS
Status: COMPLETED | OUTPATIENT
Start: 2023-07-21 | End: 2023-07-21

## 2023-07-21 RX ORDER — GUAIFENESIN 600 MG/1
1200 TABLET, EXTENDED RELEASE ORAL EVERY 12 HOURS SCHEDULED
Status: DISCONTINUED | OUTPATIENT
Start: 2023-07-21 | End: 2023-07-28 | Stop reason: HOSPADM

## 2023-07-21 RX ORDER — DEXTROSE MONOHYDRATE 25 G/50ML
25 INJECTION, SOLUTION INTRAVENOUS
Status: DISCONTINUED | OUTPATIENT
Start: 2023-07-21 | End: 2023-07-28 | Stop reason: HOSPADM

## 2023-07-21 RX ADMIN — MUPIROCIN 1 APPLICATION: 20 OINTMENT TOPICAL at 08:08

## 2023-07-21 RX ADMIN — MAGNESIUM SULFATE IN DEXTROSE 1 G: 10 INJECTION, SOLUTION INTRAVENOUS at 10:30

## 2023-07-21 RX ADMIN — MORPHINE SULFATE 4 MG: 2 INJECTION, SOLUTION INTRAMUSCULAR; INTRAVENOUS at 00:25

## 2023-07-21 RX ADMIN — ACETAMINOPHEN 1000 MG: 10 INJECTION, SOLUTION INTRAVENOUS at 10:30

## 2023-07-21 RX ADMIN — MILRINONE LACTATE 0.25 MCG/KG/MIN: 0.2 INJECTION, SOLUTION INTRAVENOUS at 02:40

## 2023-07-21 RX ADMIN — CHLORHEXIDINE GLUCONATE 15 ML: 1.2 SOLUTION ORAL at 06:53

## 2023-07-21 RX ADMIN — SENNOSIDES AND DOCUSATE SODIUM 2 TABLET: 50; 8.6 TABLET ORAL at 21:10

## 2023-07-21 RX ADMIN — ASPIRIN 81 MG: 81 TABLET, COATED ORAL at 08:07

## 2023-07-21 RX ADMIN — GUAIFENESIN 1200 MG: 600 TABLET, EXTENDED RELEASE ORAL at 21:10

## 2023-07-21 RX ADMIN — HYDROCODONE BITARTRATE AND ACETAMINOPHEN 2 TABLET: 5; 325 TABLET ORAL at 21:10

## 2023-07-21 RX ADMIN — HYDROCODONE BITARTRATE AND ACETAMINOPHEN 2 TABLET: 5; 325 TABLET ORAL at 08:08

## 2023-07-21 RX ADMIN — CEFAZOLIN SODIUM 2 G: 2 INJECTION, SOLUTION INTRAVENOUS at 08:08

## 2023-07-21 RX ADMIN — HYDROCODONE BITARTRATE AND ACETAMINOPHEN 1 TABLET: 5; 325 TABLET ORAL at 17:30

## 2023-07-21 RX ADMIN — MUPIROCIN 1 APPLICATION: 20 OINTMENT TOPICAL at 21:09

## 2023-07-21 RX ADMIN — METOCLOPRAMIDE HYDROCHLORIDE 10 MG: 5 INJECTION INTRAMUSCULAR; INTRAVENOUS at 06:53

## 2023-07-21 RX ADMIN — ATORVASTATIN CALCIUM 40 MG: 20 TABLET, FILM COATED ORAL at 21:09

## 2023-07-21 RX ADMIN — ACETAMINOPHEN 1000 MG: 10 INJECTION, SOLUTION INTRAVENOUS at 04:23

## 2023-07-21 RX ADMIN — CEFAZOLIN SODIUM 2 G: 2 INJECTION, SOLUTION INTRAVENOUS at 17:20

## 2023-07-21 RX ADMIN — CYCLOBENZAPRINE 10 MG: 10 TABLET, FILM COATED ORAL at 13:12

## 2023-07-21 RX ADMIN — Medication 10 ML: at 09:05

## 2023-07-21 RX ADMIN — MORPHINE SULFATE 4 MG: 2 INJECTION, SOLUTION INTRAMUSCULAR; INTRAVENOUS at 01:05

## 2023-07-21 RX ADMIN — BUPROPION HYDROCHLORIDE 300 MG: 300 TABLET, EXTENDED RELEASE ORAL at 08:59

## 2023-07-21 RX ADMIN — GUAIFENESIN 1200 MG: 600 TABLET, EXTENDED RELEASE ORAL at 08:08

## 2023-07-21 RX ADMIN — INSULIN LISPRO 2 UNITS: 100 INJECTION, SOLUTION INTRAVENOUS; SUBCUTANEOUS at 21:09

## 2023-07-21 RX ADMIN — METOCLOPRAMIDE HYDROCHLORIDE 10 MG: 5 INJECTION INTRAMUSCULAR; INTRAVENOUS at 01:06

## 2023-07-21 RX ADMIN — MAGNESIUM SULFATE IN DEXTROSE 1 G: 10 INJECTION, SOLUTION INTRAVENOUS at 04:23

## 2023-07-21 RX ADMIN — METOCLOPRAMIDE HYDROCHLORIDE 10 MG: 5 INJECTION INTRAMUSCULAR; INTRAVENOUS at 13:10

## 2023-07-21 RX ADMIN — PANTOPRAZOLE SODIUM 40 MG: 40 TABLET, DELAYED RELEASE ORAL at 06:23

## 2023-07-21 RX ADMIN — FUROSEMIDE 40 MG: 10 INJECTION, SOLUTION INTRAMUSCULAR; INTRAVENOUS at 22:18

## 2023-07-21 RX ADMIN — SODIUM BICARBONATE 50 MEQ: 84 INJECTION, SOLUTION INTRAVENOUS at 00:58

## 2023-07-21 RX ADMIN — CHLORHEXIDINE GLUCONATE 15 ML: 1.2 SOLUTION ORAL at 21:09

## 2023-07-21 RX ADMIN — LEVOTHYROXINE SODIUM 75 MCG: 0.07 TABLET ORAL at 06:22

## 2023-07-21 RX ADMIN — CEFAZOLIN SODIUM 2 G: 2 INJECTION, SOLUTION INTRAVENOUS at 00:25

## 2023-07-21 RX ADMIN — OXYCODONE HYDROCHLORIDE 10 MG: 5 TABLET ORAL at 02:50

## 2023-07-21 RX ADMIN — FUROSEMIDE 40 MG: 10 INJECTION, SOLUTION INTRAMUSCULAR; INTRAVENOUS at 07:53

## 2023-07-21 RX ADMIN — ENOXAPARIN SODIUM 40 MG: 100 INJECTION SUBCUTANEOUS at 21:09

## 2023-07-21 NOTE — THERAPY TREATMENT NOTE
Patient Name: Ann-Marie Hughes  : 1945    MRN: [id number]                              Today's Date: 2023       Admit Date: 2023    Visit Dx:     ICD-10-CM ICD-9-CM   1. NSTEMI (non-ST elevated myocardial infarction)  I21.4 410.70   2. Chest pain, unspecified type  R07.9 786.50   3. Abnormal findings on diagnostic imaging of heart and coronary circulation  R93.1 794.39   4. Coronary artery disease of native artery of native heart with stable angina pectoris  I25.118 414.01     413.9     Patient Active Problem List   Diagnosis    Arthritis    Bilateral leg pain    Bladder disorder    Chronic pain syndrome    Depression    Diabetes mellitus, type II    Type 2 diabetes mellitus with hyperglycemia    Gastric reflux    Herniated disc    Hypertension    Hypothyroidism    Low back pain    Pain in joint, multiple sites    Pain in soft tissues of limb    Shortness of breath    Hyperlipidemia    B12 deficiency    Vitamin D deficiency    UTI (urinary tract infection)    Metabolic syndrome    Acne rosacea    Hx of smoking    Stress incontinence of urine    Arteriosclerosis of abdominal aorta    Iron deficiency anemia secondary to inadequate dietary iron intake    Seasonal allergies    Hyponatremia    Leg length discrepancy    Type 2 diabetes mellitus with hyperglycemia    Reactive depression    Encounter for subsequent annual wellness visit (AWV) in Medicare patient    Class 1 obesity with alveolar hypoventilation, serious comorbidity, and body mass index (BMI) of 32.0 to 32.9 in adult    Heart failure with reduced ejection fraction    Coronary artery disease of native artery of native heart with stable angina pectoris    Chest pain    Chronic HFrEF (heart failure with reduced ejection fraction)    NSTEMI (non-ST elevated myocardial infarction)    Coronary heart disease    Abnormal findings on diagnostic imaging of heart and coronary circulation     Past Medical History:   Diagnosis Date    Acne rosacea  08/17/2021    DR.JEFF MOON     Arteriosclerosis of abdominal aorta     B12 deficiency 08/17/2021    DJD (degenerative joint disease)     Hx of smoking 08/17/2021    QUIT IN 1976 AT AGE 31    Hyperlipidemia 08/17/2021    Hypertension     Hypothyroidism     Metabolic syndrome 08/17/2021    KAREN (stress urinary incontinence, female) 08/17/2021    UTI (urinary tract infection) 08/17/2021    Vitamin D deficiency 08/17/2021    Vitamin D deficiency      Past Surgical History:   Procedure Laterality Date    CARDIAC CATHETERIZATION N/A 7/17/2023    Procedure: Left Heart Cath;  Surgeon: Dinh Andres MD;  Location: [facility] CATH INVASIVE LOCATION;  Service: Cardiovascular;  Laterality: N/A;    COLONOSCOPY  2011    CORONARY ARTERY BYPASS GRAFT WITH MITRAL VALVE REPAIR/REPLACEMENT N/A 7/20/2023    Procedure: REZA STERNOTOMY OFF-PUMP CORONARY ARTERY BYPASS GRAFT TIMES        USING LEFFT INTERNAL MAMMARY ARTERY AND     GREATER SAPHENOUS VEIN GRAFT PER EMDOSCOPIC VEIN HARVESTING, MAZE PROCEDURE AND PRP;  Surgeon: Shane Alexander MD;  [facility];  Service: Cardiothoracic;  Laterality: N/A;      General Information       Row Name 07/21/23 1326          OT Time and Intention    Document Type evaluation  CVR, chest tubes, IV, O2, high acuity  -RB     Mode of Treatment co-treatment;physical therapy;occupational therapy  -RB       Row Name 07/21/23 1326          General Information    Existing Precautions/Restrictions fall;sternal;cardiac  -RB     Barriers to Rehab medically complex  -RB       Row Name 07/21/23 1326          Living Environment    People in Home spouse  -RB       Row Name 07/21/23 1326          Home Main Entrance    Number of Stairs, Main Entrance one  -RB       Row Name 07/21/23 1326          Cognition    Orientation Status (Cognition) oriented x 4  -RB       Row Name 07/21/23 1326          Safety Issues, Functional Mobility    Safety Issues Affecting Function (Mobility) safety precautions  follow-through/compliance;safety precaution awareness;awareness of need for assistance;insight into deficits/self-awareness;positioning of assistive device  -RB     Impairments Affecting Function (Mobility) balance;shortness of breath;endurance/activity tolerance;pain;strength  -RB               User Key  (r) = Recorded By, (t) = Taken By, (c) = Cosigned By      Initials Name Provider Type    RB Kayley Wade OT Occupational Therapist                     Mobility/ADL's       Row Name 07/21/23 1327          Bed Mobility    Comment, (Bed Mobility) Up in the bedside chair  -RB       Row Name 07/21/23 1327          Transfers    Transfers sit-stand transfer;stand-sit transfer  -RB     Comment, (Transfers) stood from her recliner chair several times  -RB       Row Name 07/21/23 1327          Sit-Stand Transfer    Sit-Stand [place] (Transfers) minimum assist (75% patient effort);moderate assist (50% patient effort);2 person assist;verbal cues  -     Assistive Device (Sit-Stand Transfers) walker, front-wheeled  -RB     Comment, (Sit-Stand Transfer) Mod A for HHA, Min A for rwx  -RB       Row Name 07/21/23 1327          Stand-Sit Transfer    Stand-Sit [place] (Transfers) minimum assist (75% patient effort);moderate assist (50% patient effort);2 person assist;verbal cues;nonverbal cues (demo/gesture)  -     Assistive Device (Stand-Sit Transfers) walker, front-wheeled  -RB       Row Name 07/21/23 1327          Functional Mobility    Functional Mobility- Ind. Level minimum assist (75% patient effort);moderate assist (50% patient effort);2 person assist required;verbal cues required  -     Functional Mobility- Device walker, front-wheeled  -RB     Functional Mobility- Safety Issues balance decreased during turns;sequencing ability decreased;step length decreased;loses balance backward  -     Functional Mobility- Comment forward flexed posture  -RB       Row Name 07/21/23 1327          Activities of Daily  Living    BADL Assessment/Intervention lower body dressing;toileting  -RB       Row Name 07/21/23 1327          Lower Body Dressing Assessment/Training    [place] Level (Lower Body Dressing) lower body dressing skills;dependent (less than 25% patient effort)  -RB       Row Name 07/21/23 1327          Toileting Assessment/Training    [place] Level (Toileting) toileting skills;dependent (less than 25% patient effort)  -RB     Assistive Devices (Toileting) other (see comments)  cortez  -RB     Position (Toileting) supported sitting  -RB               User Key  (r) = Recorded By, (t) = Taken By, (c) = Cosigned By      Initials Name Provider Type    Kayley Evans OT Occupational Therapist                   Obj/Interventions       Row Name 07/21/23 1329          Sensory Assessment (Somatosensory)    Sensory Assessment (Somatosensory) sensation intact  -RB       Row Name 07/21/23 1329          Vision Assessment/Intervention    Visual Impairment/Limitations WFL  -RB       Row Name 07/21/23 1329          Range of Motion Comprehensive    General Range of Motion no range of motion deficits identified  -RB       Row Name 07/21/23 1329          Strength Comprehensive (MMT)    Comment, General Manual Muscle Testing (MMT) Assessment Post op BUE/BLE weakness, grossly 3/5  -RB       Row Name 07/21/23 1329          Motor Skills    Therapeutic Exercise --  The pt was able to complete her cardiac protocol BUE/BLE exercises as tolerated  -RB       Row Name 07/21/23 1329          Balance    Comment, Balance CGA/Min A sitting balance, Min-Mod A for standing balance  -RB               User Key  (r) = Recorded By, (t) = Taken By, (c) = Cosigned By      Initials Name Provider Type    Kayley Evans OT Occupational Therapist                   Goals/Plan       Row Name 07/21/23 1330          Bed Mobility Goal 1 (OT)    Activity/Assistive Device (Bed Mobility Goal 1, OT) bed mobility activities, all  -RB     [place]  Level/Cues Needed (Bed Mobility Goal 1, OT) supervision required  -RB     Time Frame (Bed Mobility Goal 1, OT) short term goal (STG);2 weeks  -RB     Progress/Outcomes (Bed Mobility Goal 1, OT) continuing progress toward goal  -RB       Row Name 07/21/23 1330          Transfer Goal 1 (OT)    Activity/Assistive Device (Transfer Goal 1, OT) transfers, all  -RB     [place] Level/Cues Needed (Transfer Goal 1, OT) supervision required  -RB     Time Frame (Transfer Goal 1, OT) short term goal (STG);2 weeks  -RB     Progress/Outcome (Transfer Goal 1, OT) continuing progress toward goal  -RB       Row Name 07/21/23 1330          Bathing Goal 1 (OT)    Activity/Device (Bathing Goal 1, OT) bathing skills, all  -RB     [place] Level/Cues Needed (Bathing Goal 1, OT) supervision required  -RB     Time Frame (Bathing Goal 1, OT) short term goal (STG);2 weeks  -RB     Progress/Outcomes (Bathing Goal 1, OT) continuing progress toward goal  -RB       Row Name 07/21/23 1330          Dressing Goal 1 (OT)    Activity/Device (Dressing Goal 1, OT) dressing skills, all  -RB     [place]/Cues Needed (Dressing Goal 1, OT) supervision required  -RB     Time Frame (Dressing Goal 1, OT) short term goal (STG);2 weeks  -RB     Progress/Outcome (Dressing Goal 1, OT) continuing progress toward goal  -RB       Row Name 07/21/23 1330          Toileting Goal 1 (OT)    Activity/Device (Toileting Goal 1, OT) toileting skills, all  -RB     [place] Level/Cues Needed (Toileting Goal 1, OT) supervision required  -RB     Time Frame (Toileting Goal 1, OT) short term goal (STG);2 weeks  -RB     Progress/Outcome (Toileting Goal 1, OT) continuing progress toward goal  -RB       Row Name 07/21/23 1330          Grooming Goal 1 (OT)    Activity/Device (Grooming Goal 1, OT) grooming skills, all  -RB     [place] (Grooming Goal 1, OT) supervision required  -RB     Time Frame (Grooming Goal 1, OT) short term goal (STG);2 weeks  -RB      Progress/Outcome (Grooming Goal 1, OT) continuing progress toward goal  -RB       Row Name 07/21/23 1330          Therapy Assessment/Plan (OT)    Planned Therapy Interventions (OT) transfer/mobility retraining;strengthening exercise;ROM/therapeutic exercise;activity tolerance training;adaptive equipment training;BADL retraining;functional balance retraining;occupation/activity based interventions;patient/caregiver education/training  -RB               User Key  (r) = Recorded By, (t) = Taken By, (c) = Cosigned By      Initials Name Provider Type    RB Kayley Wade, PHANI Occupational Therapist                   Clinical Impression       Row Name 07/21/23 1329          Pain Assessment    Pretreatment Pain Rating 4/10  -RB     Posttreatment Pain Rating 4/10  -RB     Pain Location incisional  -RB     Pain Intervention(s) Repositioned;Ambulation/increased activity  -RB       Row Name 07/21/23 1329          Plan of Care Review    Plan of Care Reviewed With patient  -RB     Progress no change  -RB       Row Name 07/21/23 1327          Therapy Assessment/Plan (OT)    Rehab Potential (OT) good, to achieve stated therapy goals  -RB     Criteria for Skilled Therapeutic Interventions Met (OT) yes;skilled treatment is necessary  -RB     Therapy Frequency (OT) 5 times/wk  -RB       Row Name 07/21/23 1329          Therapy Plan Review/Discharge Plan (OT)    Anticipated Discharge Disposition (OT) skilled nursing facility;home with 24/7 care;home with home health;other (see comments)  pending progress  -RB       Row Name 07/21/23 1329          Vital Signs    Pre SpO2 (%) 94  -RB     O2 Delivery Pre Treatment supplemental O2  -RB     O2 Delivery Intra Treatment supplemental O2  -RB     Post SpO2 (%) 95  -RB     O2 Delivery Post Treatment supplemental O2  -RB     Pre Patient Position Sitting  -RB     Intra Patient Position Standing  -RB     Post Patient Position Sitting  -RB       Row Name 07/21/23 1326          Positioning and  Restraints    Pre-Treatment Position sitting in chair/recliner  -RB     Post Treatment Position chair  -RB     In Chair notified nsg;reclined;sitting;call light within reach;encouraged to call for assist;exit alarm on;legs elevated  -RB               User Key  (r) = Recorded By, (t) = Taken By, (c) = Cosigned By      Initials Name Provider Type    RB Kayley Wade OT Occupational Therapist                   Outcome Measures       Row Name 07/21/23 1331          How much help from another is currently needed...    Putting on and taking off regular lower body clothing? 1  -RB     Bathing (including washing, rinsing, and drying) 2  -RB     Toileting (which includes using toilet bed pan or urinal) 1  -RB     Putting on and taking off regular upper body clothing 2  -RB     Taking care of personal grooming (such as brushing teeth) 2  -RB     Eating meals 2  -RB     AM-PAC 6 Clicks Score (OT) 10  -RB       Row Name 07/21/23 1149          How much help from another person do you currently need...    Turning from your back to your side while in flat bed without using bedrails? 2  -DB     Moving from lying on back to sitting on the side of a flat bed without bedrails? 2  -DB     Moving to and from a bed to a chair (including a wheelchair)? 2  -DB     Standing up from a chair using your arms (e.g., wheelchair, bedside chair)? 2  -DB     Climbing 3-5 steps with a railing? 1  -DB     To walk in hospital room? 2  -DB     AM-PAC 6 Clicks Score (PT) 11  -DB     Highest level of mobility 4 --> Transferred to chair/commode  -DB       Row Name 07/21/23 1331          Modified Joshua Scale    Modified [place] Scale 5 - Severe disability.  Bedridden, incontinent, and requiring constant nursing care and attention.  -RB       Row Name 07/21/23 1331 07/21/23 1149       Functional Assessment    Outcome Measure Options AM-PAC 6 Clicks Daily Activity (OT);Modified Joshua  -RB AM-PAC 6 Clicks Basic Mobility (PT)  -DB              User Key   (r) = Recorded By, (t) = Taken By, (c) = Cosigned By      Initials Name Provider Type    Hannah Nava, PT Physical Therapist    RB Kayley Wade, OT Occupational Therapist                    Occupational Therapy Education       Title: PT OT SLP Therapies (In Progress)       Topic: Occupational Therapy (Not Started)       Point: ADL training (Not Started)       Description:   Instruct learner(s) on proper safety adaptation and remediation techniques during self care or transfers.   Instruct in proper use of assistive devices.                  Learner Progress:  Not documented in this visit.              Point: Home exercise program (Not Started)       Description:   Instruct learner(s) on appropriate technique for monitoring, assisting and/or progressing therapeutic exercises/activities.                  Learner Progress:  Not documented in this visit.              Point: Precautions (Not Started)       Description:   Instruct learner(s) on prescribed precautions during self-care and functional transfers.                  Learner Progress:  Not documented in this visit.              Point: Body mechanics (Not Started)       Description:   Instruct learner(s) on proper positioning and spine alignment during self-care, functional mobility activities and/or exercises.                  Learner Progress:  Not documented in this visit.                                  OT Recommendation and Plan  Planned Therapy Interventions (OT): transfer/mobility retraining, strengthening exercise, ROM/therapeutic exercise, activity tolerance training, adaptive equipment training, BADL retraining, functional balance retraining, occupation/activity based interventions, patient/caregiver education/training  Therapy Frequency (OT): 5 times/wk  Plan of Care Review  Plan of Care Reviewed With: patient  Progress: no change     Time Calculation:   Evaluation Complexity (OT)  Review Occupational Profile/Medical/Therapy History  Complexity: expanded/moderate complexity  Assessment, Occupational Performance/Identification of Deficit Complexity: 3-5 performance deficits  Clinical Decision Making Complexity (OT): detailed assessment/moderate complexity  Overall Complexity of Evaluation (OT): moderate complexity     Time Calculation- OT       Row Name 07/21/23 1331             Time Calculation- OT    OT Start Time 0827  -RB      OT Stop Time 0853  -RB      OT Time Calculation (min) 26 min  -RB      Total Timed Code Minutes- OT 8 minute(s)  -RB      OT Received On 07/21/23  -RB      OT - Next Appointment 07/24/23  -RB      OT Goal Re-Cert Due Date 08/04/23  -RB         Timed Charges    11838 - OT Therapeutic Activity Minutes 8  -RB         Untimed Charges    OT Eval/Re-eval Minutes 18  -RB         Total Minutes    Timed Charges Total Minutes 8  -RB      Untimed Charges Total Minutes 18  -RB       Total Minutes 26  -RB                User Key  (r) = Recorded By, (t) = Taken By, (c) = Cosigned By      Initials Name Provider Type    RB Kayley Wade OT Occupational Therapist                  Therapy Charges for Today       Code Description Service Date Service Provider Modifiers Qty    36211326689 HC OT EVAL MOD COMPLEXITY 3 7/21/2023 Kayley Wade OT GO 1    15451669889 HC OT THERAPEUTIC ACT EA 15 MIN 7/21/2023 Kayley Wade OT GO 1                 Kayley Wade OT  7/21/2023

## 2023-07-21 NOTE — OP NOTE
Operative Note    Date of Procedure: 07/20/23    Preoperative diagnosis: NSTEMI and Multivessel CAD    Postoperative diagnosis: Same    Procedure:   1. CABG x 3 (LIMA to LAD, SVG to OM, SVG to PDA) off pump  2. EVH of the right legs  3. Left atrial appendage ligation with Atriclip #40    Surgeon: Shane Alexander MD     Assistants: NIRMALA Ramirez was responsible for performing the following activities: Cardiac Surgery First assist, Endoscopic Vein [place] for CABG, surgical wound closure and their skilled assistance was necessary for the success of this case.     Anesthesia: General endotracheal anesthesia and REZA    Findings:  The saphenous vein was harvested endoscopically form the right  leg. The vein had a diameter of 4 mm and was of fair quality. The coronaries had a diameter of 1.5 mm and were of fair quality.      Estimated Blood Loss:  500 mL of Cell Saver returned.    STS Data: The STS Risk and all risks and alternatives were discussed with the patient and family.  Counseling was done regarding abuse of tobacco, alcohol and drugs as needed. They understand and wish to proceed. The antibiotics and b blockers were given in the STS required window.          Description of the procedure:     The patient was placed supine on the operative table. General anesthesia was given and lines placed. The patient was prepped and draped using the usual sterile technique. A median sternotomy was performed with a scalpel and the layers carried down to the sternum using the electrocautery. The sternum was split in the midline using a vertical oscillating saw. Hemostasis was achieved. The LIMA was harvested skeletonized and prepared with papaverine. It was of good quality. 300 units/kg of IV heparin was given to an ACT over 400. A [place] retractor was placed and the mediastinum exposed, the pericardium was opened and the edges tacked to the wound. The LIMA was sewn to the distal LAD with 7-0 Prolene off pump using  a [place] device. 2veins were anastomosed to the ascending aorta with 6-0 Prolene using a Heartstring device. The first vein was sewn to the obtuse marginal one of the circunflex artery with 7-0 Prolene.  The next vein was sewn to posterior descending artery of the right coronary artery with 7-0 Prolene.  A #40 Atriclip was applied to exclude the LANE. All anastomoses were checked and had good flow and morphology. The left and right pleural space was suctioned and the lungs ventilated. The matching dose of protamine was given  AV temporary wires and pleural and mediastinal chest tubes were placed and the wound sprayed with platelet rich plasma. The sternum was closed with single and double wires and soft tissue in layers of reabsorbable material. The wounds were covered with sterile dressings.       Specimen removed:  none    Complications:  none           Disposition: Cardiovascular recovery room           Condition: Critical but stable.

## 2023-07-21 NOTE — PLAN OF CARE
Goal Outcome Evaluation:  Plan of Care Reviewed With: patient         Pt is a 76 y/o F POD1 CABG x3 off pump. Pt is (I) with use of a rollator at baseline, states she has 1 GLORIA and no stairs once inside her home. Pt today was seated UIC at stat of the session, agreeable to work with PT/OT. Initially attempted stand with HHA x2, pt req's modA x2 and has difficulty with weight shift to perform MIP. Pt returned to seated position and performs STS to RW with improved mechanics and less A req'd, Ana x2. Pt was able to ambulate 8' with RW Ana and chair follow, fatigues quickly and gait speed significantly dec'd. Pt demo's strength, endurance, and balance deficits and would benefit from skilled PT intervention.          Anticipated Discharge Disposition (PT): skilled nursing facility, home with 24/7 care, home with home health (pending progress, will update)

## 2023-07-21 NOTE — PLAN OF CARE
The pt was admitted to [facility] 2/2 to CABG x3 (POD 1, [place]). Pmhx significant for CAD, MR, Afib, cardiomyopathy, HTN, HLD, DM2, former tobacco use, anemia. The pt lives with her  and reports independence at baseline using a rollator. She was sitting in her bedside chair on arrival. She stood with Mod A x2 via HHA and took steps forward towards the CVR hallway with slow pacing, a forward flexed posture present. After a seated rest break her balance improved using a rwx. She fatigued after mobilizing several feet and she was pushed back into her room via recliner chair. Max-total A required for most ADL's at this time during her acute recovery phase. She would benefit from continued skilled OT with a d/c to SNF pending progress.

## 2023-07-21 NOTE — THERAPY EVALUATION
Patient Name: Ann-Marie Hughes  : 1945    MRN: [id number]                              Today's Date: 2023       Admit Date: 2023    Visit Dx:     ICD-10-CM ICD-9-CM   1. NSTEMI (non-ST elevated myocardial infarction)  I21.4 410.70   2. Chest pain, unspecified type  R07.9 786.50   3. Abnormal findings on diagnostic imaging of heart and coronary circulation  R93.1 794.39   4. Coronary artery disease of native artery of native heart with stable angina pectoris  I25.118 414.01     413.9     Patient Active Problem List   Diagnosis    Arthritis    Bilateral leg pain    Bladder disorder    Chronic pain syndrome    Depression    Diabetes mellitus, type II    Type 2 diabetes mellitus with hyperglycemia    Gastric reflux    Herniated disc    Hypertension    Hypothyroidism    Low back pain    Pain in joint, multiple sites    Pain in soft tissues of limb    Shortness of breath    Hyperlipidemia    B12 deficiency    Vitamin D deficiency    UTI (urinary tract infection)    Metabolic syndrome    Acne rosacea    Hx of smoking    Stress incontinence of urine    Arteriosclerosis of abdominal aorta    Iron deficiency anemia secondary to inadequate dietary iron intake    Seasonal allergies    Hyponatremia    Leg length discrepancy    Type 2 diabetes mellitus with hyperglycemia    Reactive depression    Encounter for subsequent annual wellness visit (AWV) in Medicare patient    Class 1 obesity with alveolar hypoventilation, serious comorbidity, and body mass index (BMI) of 32.0 to 32.9 in adult    Heart failure with reduced ejection fraction    Coronary artery disease of native artery of native heart with stable angina pectoris    Chest pain    Chronic HFrEF (heart failure with reduced ejection fraction)    NSTEMI (non-ST elevated myocardial infarction)    Coronary heart disease    Abnormal findings on diagnostic imaging of heart and coronary circulation     Past Medical History:   Diagnosis Date    Acne rosacea  08/17/2021    DR.JEFF MOON     Arteriosclerosis of abdominal aorta     B12 deficiency 08/17/2021    DJD (degenerative joint disease)     Hx of smoking 08/17/2021    QUIT IN 1976 AT AGE 31    Hyperlipidemia 08/17/2021    Hypertension     Hypothyroidism     Metabolic syndrome 08/17/2021    KAREN (stress urinary incontinence, female) 08/17/2021    UTI (urinary tract infection) 08/17/2021    Vitamin D deficiency 08/17/2021    Vitamin D deficiency      Past Surgical History:   Procedure Laterality Date    CARDIAC CATHETERIZATION N/A 7/17/2023    Procedure: Left Heart Cath;  Surgeon: Dinh Andres MD;  Location: [facility] CATH INVASIVE LOCATION;  Service: Cardiovascular;  Laterality: N/A;    COLONOSCOPY  2011    CORONARY ARTERY BYPASS GRAFT WITH MITRAL VALVE REPAIR/REPLACEMENT N/A 7/20/2023    Procedure: REZA STERNOTOMY OFF-PUMP CORONARY ARTERY BYPASS GRAFT TIMES        USING LEFFT INTERNAL MAMMARY ARTERY AND     GREATER SAPHENOUS VEIN GRAFT PER EMDOSCOPIC VEIN HARVESTING, MAZE PROCEDURE AND PRP;  Surgeon: Shane Alexander MD;  Location: [facility];  Service: Cardiothoracic;  Laterality: N/A;      General Information       Row Name 07/21/23 1133          Physical Therapy Time and Intention    Document Type evaluation  -DB     Mode of Treatment co-treatment;physical therapy;occupational therapy  -DB       Row Name 07/21/23 1133          General Information    Patient Profile Reviewed yes  -DB     Prior Level of Function independent:  uses a rollator at baseline  -DB     Existing Precautions/Restrictions fall;sternal;cardiac  -DB     Barriers to Rehab medically complex  -DB       Row Name 07/21/23 1133          Living Environment    People in Home spouse  -DB       Row Name 07/21/23 1133          Home Main Entrance    Number of Stairs, Main Entrance one  -DB     Stair Railings, Main Entrance railings safe and in good condition  -DB       Row Name 07/21/23 1133          Stairs Within Home, Primary    Number of  Stairs, Within Home, Primary none  -DB       Row Name 07/21/23 1133          Cognition    Orientation Status (Cognition) oriented x 4  -DB       Row Name 07/21/23 1133          Safety Issues, Functional Mobility    Impairments Affecting Function (Mobility) balance;endurance/activity tolerance;strength;pain  -DB               User Key  (r) = Recorded By, (t) = Taken By, (c) = Cosigned By      Initials Name Provider Type    DB Hannah Ramírez, RIOS Physical Therapist                   Mobility       Row Name 07/21/23 1140          Bed Mobility    Comment, (Bed Mobility) NT, UIC  -DB       Row Name 07/21/23 1140          Sit-Stand Transfer    Sit-Stand [place] (Transfers) minimum assist (75% patient effort);moderate assist (50% patient effort);2 person assist  -DB     Comment, (Sit-Stand Transfer) modA x2 for STS with HHA, Ana x2 for STS with RW  -DB       Row Name 07/21/23 1140          Gait/Stairs (Locomotion)    [place] Level (Gait) minimum assist (75% patient effort)  -DB     Assistive Device (Gait) walker, front-wheeled  -DB     Distance in Feet (Gait) 8'  -DB     Deviations/Abnormal Patterns (Gait) festinating/shuffling;gait speed decreased;stride length decreased;rogerio decreased  -DB     Bilateral Gait Deviations forward flexed posture;heel strike decreased  -DB               User Key  (r) = Recorded By, (t) = Taken By, (c) = Cosigned By      Initials Name Provider Type    DB Hannah Ramírez, RIOS Physical Therapist                   Obj/Interventions       Row Name 07/21/23 1146          Strength Comprehensive (MMT)    Comment, General Manual Muscle Testing (MMT) Assessment post op weakness  -DB       Row Name 07/21/23 1146          Balance    Balance Assessment sitting static balance;sitting dynamic balance;standing static balance;standing dynamic balance  -DB     Static Sitting Balance standby assist  -DB     Dynamic Sitting Balance standby assist  -DB     Position, Sitting Balance sitting in chair   -DB     Static Standing Balance minimal assist  -DB     Dynamic Standing Balance minimal assist;2-person assist  -DB     Position/Device Used, Standing Balance supported;walker, front-wheeled  -DB     Balance Interventions sitting;standing;sit to stand  -DB               User Key  (r) = Recorded By, (t) = Taken By, (c) = Cosigned By      Initials Name Provider Type    DB Hannah Ramírez PT Physical Therapist                   Goals/Plan       Row Name 07/21/23 1148          Problem Specific Goal 1 (PT)    Problem Specific Goal 1 (PT) cardiac level 3  -DB     Time Frame (Problem Specific Goal 1, PT) 2 weeks  -DB       Row Name 07/21/23 1148          Therapy Assessment/Plan (PT)    Planned Therapy Interventions (PT) balance training;bed mobility training;gait training;home exercise program;patient/family education;neuromuscular re-education;stair training;strengthening;postural re-education;transfer training  -DB               User Key  (r) = Recorded By, (t) = Taken By, (c) = Cosigned By      Initials Name Provider Type    Hannah Nava PT Physical Therapist                   Clinical Impression       Row Name 07/21/23 1147          Pain    Pain Intervention(s) Repositioned;Ambulation/increased activity  -DB       Row Name 07/21/23 1147          Plan of Care Review    Plan of Care Reviewed With patient  -DB     Outcome Evaluation Pt is a 78 y/o F POD1 CABG x3 off pump. Pt is (I) with use of a rollator at baseline, states she has 1 GLORIA and no stairs once inside her home. Pt today was seated UIC at stat of the session, agreeable to work with PT/OT. Initially attempted stand with HHA x2, pt req's modA x2 and has difficulty with weight shift to perform MIP. Pt returned to seated position and performs STS to RW with improved mechanics and less A req'd, Ana x2. Pt was able to ambulate 8' with RW Ana and chair follow, fatigues quickly and gait speed significantly dec'd. Pt demo's strength, endurance, and balance  deficits and would benefit from skilled PT intervention.  -DB       Row Name 07/21/23 1147          Therapy Assessment/Plan (PT)    Rehab Potential (PT) good, to achieve stated therapy goals  -DB     Criteria for Skilled Interventions Met (PT) yes  -DB     Therapy Frequency (PT) daily  -DB       Row Name 07/21/23 1147          Vital Signs    O2 Delivery Pre Treatment supplemental O2  -DB     O2 Delivery Intra Treatment supplemental O2  -DB     O2 Delivery Post Treatment supplemental O2  -DB     Pre Patient Position Sitting  -DB     Intra Patient Position Standing  -DB     Post Patient Position Sitting  -DB       Row Name 07/21/23 1147          Positioning and Restraints    Pre-Treatment Position sitting in chair/recliner  -DB     Post Treatment Position chair  -DB     In Chair reclined;call light within reach;with nsg  -DB               User Key  (r) = Recorded By, (t) = Taken By, (c) = Cosigned By      Initials Name Provider Type    Hannah Nava, RIOS Physical Therapist                   Outcome Measures       Row Name 07/21/23 1149          How much help from another person do you currently need...    Turning from your back to your side while in flat bed without using bedrails? 2  -DB     Moving from lying on back to sitting on the side of a flat bed without bedrails? 2  -DB     Moving to and from a bed to a chair (including a wheelchair)? 2  -DB     Standing up from a chair using your arms (e.g., wheelchair, bedside chair)? 2  -DB     Climbing 3-5 steps with a railing? 1  -DB     To walk in hospital room? 2  -DB     AM-PAC 6 Clicks Score (PT) 11  -DB     Highest level of mobility 4 --> Transferred to chair/commode  -DB       Row Name 07/21/23 1149          Functional Assessment    Outcome Measure Options AM-PAC 6 Clicks Basic Mobility (PT)  -DB               User Key  (r) = Recorded By, (t) = Taken By, (c) = Cosigned By      Initials Name Provider Type    Hannah Nava PT Physical Therapist                                  Physical Therapy Education       Title: PT OT SLP Therapies (Done)       Topic: Physical Therapy (Done)       Point: Mobility training (Done)       Learning Progress Summary             Patient Acceptance, E, VU,NR by DB at 7/21/2023 1149                         Point: Home exercise program (Done)       Learning Progress Summary             Patient Acceptance, E, VU,NR by DB at 7/21/2023 1149                         Point: Body mechanics (Done)       Learning Progress Summary             Patient Acceptance, E, VU,NR by DB at 7/21/2023 1149                         Point: Precautions (Done)       Learning Progress Summary             Patient Acceptance, E, VU,NR by DB at 7/21/2023 1149                                         User Key       Initials Effective Dates Name Provider Type Discipline    DB 06/16/21 -  Hannah Ramírez, RIOS Physical Therapist PT                  PT Recommendation and Plan  Planned Therapy Interventions (PT): balance training, bed mobility training, gait training, home exercise program, patient/family education, neuromuscular re-education, stair training, strengthening, postural re-education, transfer training  Plan of Care Reviewed With: patient  Outcome Evaluation: Pt is a 78 y/o F POD1 CABG x3 off pump. Pt is (I) with use of a rollator at baseline, states she has 1 GLORIA and no stairs once inside her home. Pt today was seated UIC at stat of the session, agreeable to work with PT/OT. Initially attempted stand with HHA x2, pt req's modA x2 and has difficulty with weight shift to perform MIP. Pt returned to seated position and performs STS to RW with improved mechanics and less A req'd, Ana x2. Pt was able to ambulate 8' with RW Ana and chair follow, fatigues quickly and gait speed significantly dec'd. Pt demo's strength, endurance, and balance deficits and would benefit from skilled PT intervention.     Time Calculation:         PT Charges       Row Name 07/21/23 5526              Time Calculation    Start Time 0828  -DB      Stop Time 0853  -DB      Time Calculation (min) 25 min  -DB      PT Received On 07/21/23  -DB      PT - Next Appointment 07/22/23  -DB      PT Goal Re-Cert Due Date 08/04/23  -DB         Time Calculation- PT    Total Timed Code Minutes- PT 0 minute(s)  -DB                User Key  (r) = Recorded By, (t) = Taken By, (c) = Cosigned By      Initials Name Provider Type    DB Hannah Ramírez, PT Physical Therapist                  Therapy Charges for Today       Code Description Service Date Service Provider Modifiers Qty    [id number] HC PT EVAL MOD COMPLEXITY 4 7/21/2023 Hannah Ramírez, PT GP 1            PT G-Codes  Outcome Measure Options: AM-PAC 6 Clicks Basic Mobility (PT)  AM-PAC 6 Clicks Score (PT): 11  PT Discharge Summary  Anticipated Discharge Disposition (PT): skilled nursing facility, home with 24/7 care, home with home health (pending progress, will update)    Hannah Ramírez PT  7/21/2023

## 2023-07-21 NOTE — PROGRESS NOTES
" LOS: 3 days   Patient Care Team:  Rochelle Brown APRN as PCP - General (Internal Medicine)    Chief Complaint: post op    Subjective:  Symptoms:  She reports shortness of breath.  No cough or chest pain.    Diet:  Adequate intake.  No nausea or vomiting.    Activity level: Impaired due to weakness.    Pain:  She complains of pain that is mild.  Pain is well controlled.        Vital Signs  Temp:  [95.4 °F (35.2 °C)-98.7 °F (37.1 °C)] 98.1 °F (36.7 °C)  Heart Rate:  [54-86] 85  Resp:  [10-16] 12  BP: ()/(47-75) 102/51  FiO2 (%):  [40 %-100 %] 40 %  Body mass index is 36.37 kg/m².    Intake/Output Summary (Last 24 hours) at 7/21/2023 0734  Last data filed at 7/21/2023 0700  Gross per 24 hour   Intake 9965.87 ml   Output 2510 ml   Net 7455.87 ml     No intake/output data recorded.    Chest tube drainage last 8 hours: 330/120        07/20/23  0532 07/20/23  0937 07/21/23  0437   Weight: 90.4 kg (199 lb 3.2 oz) 90 kg (198 lb 6.6 oz) 96.1 kg (211 lb 13.8 oz)       Objective:  General Appearance:  Comfortable and in no acute distress.    Vital signs: (most recent): Blood pressure 102/51, pulse 85, temperature 98.1 °F (36.7 °C), resp. rate 12, height 162.6 cm (64\"), weight 96.1 kg (211 lb 13.8 oz), SpO2 95 %, not currently breastfeeding.  Vital signs are normal.  No fever.    Output: Producing urine and no stool output.    Lungs:  Normal effort and normal respiratory rate.  There are rales and decreased breath sounds.    Heart: Normal rate.  Regular rhythm.  (100% A paced at 86 for BP support  SR in 60s underlying)  Abdomen: Abdomen is soft.  Hypoactive bowel sounds.     Extremities: There is dependent edema.    Pulses: Distal pulses are intact.    Neurological: Patient is alert and oriented to person, place and time.    Skin:  Warm and dry.  (Sternal dressing clean, dry, and intact)        Results Review:        WBC WBC   Date Value Ref Range Status   07/21/2023 14.77 (H) 3.40 - 10.80 10*3/mm3 Final   07/20/2023 20.70 " (H) 3.40 - 10.80 10*3/mm3 Final   07/20/2023 27.68 (H) 3.40 - 10.80 10*3/mm3 Final   07/20/2023 17.76 (H) 3.40 - 10.80 10*3/mm3 Final   07/20/2023 9.77 3.40 - 10.80 10*3/mm3 Final   07/19/2023 8.84 3.40 - 10.80 10*3/mm3 Final      HGB Hemoglobin   Date Value Ref Range Status   07/21/2023 9.5 (L) 12.0 - 15.9 g/dL Final   07/20/2023 9.7 (L) 12.0 - 15.9 g/dL Final   07/20/2023 12.4 12.0 - 15.9 g/dL Final   07/20/2023 10.3 (L) 12.0 - 15.9 g/dL Final   07/20/2023 9.2 (L) 12.0 - 17.0 g/dL Final   07/20/2023 9.2 (L) 12.0 - 17.0 g/dL Final   07/20/2023 7.1 (C) 12.0 - 17.0 g/dL Final   07/20/2023 8.2 (L) 12.0 - 17.0 g/dL Final   07/20/2023 7.5 (C) 12.0 - 17.0 g/dL Final   07/20/2023 7.1 (C) 12.0 - 17.0 g/dL Final   07/20/2023 7.5 (C) 12.0 - 17.0 g/dL Final   07/20/2023 8.3 (L) 12.0 - 15.9 g/dL Final   07/19/2023 9.2 (L) 12.0 - 15.9 g/dL Final      HCT Hematocrit   Date Value Ref Range Status   07/21/2023 27.3 (L) 34.0 - 46.6 % Final   07/20/2023 29.0 (L) 34.0 - 46.6 % Final   07/20/2023 36.4 34.0 - 46.6 % Final   07/20/2023 30.2 (L) 34.0 - 46.6 % Final   07/20/2023 27 (L) 38 - 51 % Final   07/20/2023 27 (L) 38 - 51 % Final   07/20/2023 21 (L) 38 - 51 % Final   07/20/2023 24 (L) 38 - 51 % Final   07/20/2023 22 (L) 38 - 51 % Final   07/20/2023 21 (L) 38 - 51 % Final   07/20/2023 22 (L) 38 - 51 % Final   07/20/2023 24.3 (L) 34.0 - 46.6 % Final   07/19/2023 27.4 (L) 34.0 - 46.6 % Final      Platelets Platelets   Date Value Ref Range Status   07/21/2023 77 (L) 140 - 450 10*3/mm3 Final   07/20/2023 70 (L) 140 - 450 10*3/mm3 Final   07/20/2023 85 (L) 140 - 450 10*3/mm3 Final   07/20/2023 109 (L) 140 - 450 10*3/mm3 Final   07/20/2023 200 140 - 450 10*3/mm3 Final   07/19/2023 212 140 - 450 10*3/mm3 Final        PT/INR:    Protime   Date Value Ref Range Status   07/21/2023 16.6 (H) 11.7 - 14.2 Seconds Final   07/20/2023 17.3 (H) 11.7 - 14.2 Seconds Final   07/20/2023 19.6 (H) 11.7 - 14.2 Seconds Final   07/19/2023 15.0 (H) 11.7 - 14.2  Seconds Final   /  INR   Date Value Ref Range Status   07/21/2023 1.33 (H) 0.90 - 1.10 Final   07/20/2023 1.39 (H) 0.90 - 1.10 Final   07/20/2023 1.63 (H) 0.90 - 1.10 Final   07/19/2023 1.16 (H) 0.90 - 1.10 Final       Sodium Sodium   Date Value Ref Range Status   07/21/2023 148 (H) 136 - 145 mmol/L Final   07/20/2023 149 (H) 136 - 145 mmol/L Final   07/20/2023 147 (H) 136 - 145 mmol/L Final   07/20/2023 147 (H) 136 - 145 mmol/L Final   07/20/2023 140 136 - 145 mmol/L Final   07/19/2023 140 136 - 145 mmol/L Final      Potassium Potassium   Date Value Ref Range Status   07/21/2023 4.3 3.5 - 5.2 mmol/L Final     Comment:     Slight hemolysis detected by analyzer. Results may be affected.   07/20/2023 4.1 3.5 - 5.2 mmol/L Final     Comment:     Slight hemolysis detected by analyzer. Results may be affected.   07/20/2023 4.4 3.5 - 5.2 mmol/L Final   07/20/2023 4.4 3.5 - 5.2 mmol/L Final   07/20/2023 4.2 3.5 - 5.2 mmol/L Final   07/19/2023 4.2 3.5 - 5.2 mmol/L Final      Chloride Chloride   Date Value Ref Range Status   07/21/2023 118 (H) 98 - 107 mmol/L Final   07/20/2023 118 (H) 98 - 107 mmol/L Final   07/20/2023 119 (H) 98 - 107 mmol/L Final   07/20/2023 119 (H) 98 - 107 mmol/L Final   07/20/2023 109 (H) 98 - 107 mmol/L Final   07/19/2023 107 98 - 107 mmol/L Final      Bicarbonate CO2   Date Value Ref Range Status   07/21/2023 22.0 22.0 - 29.0 mmol/L Final   07/20/2023 19.0 (L) 22.0 - 29.0 mmol/L Final   07/20/2023 18.0 (L) 22.0 - 29.0 mmol/L Final   07/20/2023 18.0 (L) 22.0 - 29.0 mmol/L Final   07/20/2023 19.8 (L) 22.0 - 29.0 mmol/L Final   07/19/2023 22.2 22.0 - 29.0 mmol/L Final      BUN BUN   Date Value Ref Range Status   07/21/2023 23 8 - 23 mg/dL Final   07/20/2023 24 (H) 8 - 23 mg/dL Final   07/20/2023 24 (H) 8 - 23 mg/dL Final   07/20/2023 24 (H) 8 - 23 mg/dL Final   07/20/2023 34 (H) 8 - 23 mg/dL Final   07/19/2023 27 (H) 8 - 23 mg/dL Final      Creatinine Creatinine   Date Value Ref Range Status    07/21/2023 0.98 0.57 - 1.00 mg/dL Final   07/20/2023 0.93 0.57 - 1.00 mg/dL Final   07/20/2023 0.90 0.57 - 1.00 mg/dL Final   07/20/2023 0.90 0.57 - 1.00 mg/dL Final   07/20/2023 1.39 (H) 0.57 - 1.00 mg/dL Final   07/19/2023 1.12 (H) 0.57 - 1.00 mg/dL Final      Calcium Calcium   Date Value Ref Range Status   07/21/2023 9.1 8.6 - 10.5 mg/dL Final   07/20/2023 9.2 8.6 - 10.5 mg/dL Final   07/20/2023 9.8 8.6 - 10.5 mg/dL Final   07/20/2023 9.8 8.6 - 10.5 mg/dL Final   07/20/2023 8.5 (L) 8.6 - 10.5 mg/dL Final   07/19/2023 8.9 8.6 - 10.5 mg/dL Final      Magnesium Magnesium   Date Value Ref Range Status   07/21/2023 1.8 1.6 - 2.4 mg/dL Final   07/20/2023 1.5 (L) 1.6 - 2.4 mg/dL Final   07/20/2023 1.8 1.6 - 2.4 mg/dL Final          acetaminophen, 1,000 mg, Intravenous, Q8H  acetaminophen, 650 mg, Oral, Q4H   Or  acetaminophen, 650 mg, Oral, Q4H   Or  acetaminophen, 650 mg, Rectal, Q4H  aspirin, 81 mg, Oral, Daily  atorvastatin, 40 mg, Oral, Nightly  ceFAZolin, 2 g, Intravenous, Q8H  chlorhexidine, 15 mL, Mouth/Throat, Q12H  enoxaparin, 40 mg, Subcutaneous, Daily  furosemide, 40 mg, Intravenous, Once  levothyroxine, 75 mcg, Oral, Q AM  magnesium sulfate, 1 g, Intravenous, Q8H  metoclopramide, 10 mg, Intravenous, Q6H  metoprolol tartrate, 12.5 mg, Oral, Q12H  mupirocin, , Each Nare, BID  pantoprazole, 40 mg, Oral, QAM  senna-docusate sodium, 2 tablet, Oral, Nightly  sodium chloride, 10 mL, Intravenous, Q12H      clevidipine, 2-32 mg/hr  dexmedetomidine, 0.2-1.5 mcg/kg/hr, Last Rate: Stopped (07/21/23 0100)  DOPamine, 2-20 mcg/kg/min  EPINEPHrine, 0.02-0.1 mcg/kg/min  insulin, 0-100 Units/hr, Last Rate: 0.8 Units/hr (07/21/23 0649)  milrinone, 0.25-0.375 mcg/kg/min, Last Rate: 0.125 mcg/kg/min (07/21/23 0731)  niCARdipine, 5-15 mg/hr  nitroglycerin, 5-200 mcg/min  norepinephrine, 0.02-0.2 mcg/kg/min, Last Rate: Stopped (07/21/23 0300)  phenylephrine, 0.2-2 mcg/kg/min  propofol, 5-50 mcg/kg/min, Last Rate: Stopped  (07/20/23 2330)  sodium chloride, 30 mL/hr, Last Rate: 30 mL/hr (07/20/23 2030)  sodium chloride, 75 mL/hr, Last Rate: Stopped (07/20/23 2135)  vasopressin, 0.01-0.04 Units/min, Last Rate: Stopped (07/21/23 0055)        Coronary heart disease    NSTEMI (non-ST elevated myocardial infarction)    Abnormal findings on diagnostic imaging of heart and coronary circulation      Assessment & Plan  -Severe multivessel CAD s/p off-pump CABG x3 POD#1 [place]  -Moderate MR  -Atrial fibrillation  -Ischemic cardiomyopathy---EF 36%  -HTN  -HLD  -DM II  -Hypothyroidism   -Former tobacco abuse   - chronic anemia worsened by post op ABL   - TCP--consumptive  - post-op leukocytosis--reactive    Looks good this morning, sitting up in the chair  Weaned off of pressors. On milrinone at 0.25. Last CI 2.7. Will decrease to 0.125 and repeat an index. If stable, will remove swan  Receive a lot of blood products yesterday and is positive nearly 2L. IV diurese. NA is a little elevated this morning. Will recheck chemistry at noon to follow up  On 2L NC--wean as able  Mobilize/encourage pulmonary toilet--add mucinex/flutter valve  SR in the 60s. Remains atrially paced for BP support  Anticipate transfer to stepdown this afternoon  Continue routine care    LONA Mosqueda  07/21/23  07:34 EDT

## 2023-07-22 ENCOUNTER — APPOINTMENT (OUTPATIENT)
Dept: GENERAL RADIOLOGY | Facility: HOSPITAL | Age: 78
DRG: 236 | End: 2023-07-22
Payer: MEDICARE

## 2023-07-22 LAB
ANION GAP SERPL CALCULATED.3IONS-SCNC: 11.3 MMOL/L (ref 5–15)
BH BB BLOOD EXPIRATION DATE: NORMAL
BH BB BLOOD EXPIRATION DATE: NORMAL
BH BB BLOOD TYPE BARCODE: 5100
BH BB BLOOD TYPE BARCODE: 5100
BH BB DISPENSE STATUS: NORMAL
BH BB DISPENSE STATUS: NORMAL
BH BB PRODUCT CODE: NORMAL
BH BB PRODUCT CODE: NORMAL
BH BB UNIT NUMBER: NORMAL
BH BB UNIT NUMBER: NORMAL
BUN SERPL-MCNC: 20 MG/DL (ref 8–23)
BUN/CREAT SERPL: 17.7 (ref 7–25)
CALCIUM SPEC-SCNC: 8.7 MG/DL (ref 8.6–10.5)
CHLORIDE SERPL-SCNC: 107 MMOL/L (ref 98–107)
CO2 SERPL-SCNC: 21.7 MMOL/L (ref 22–29)
CREAT SERPL-MCNC: 1.13 MG/DL (ref 0.57–1)
CROSSMATCH INTERPRETATION: NORMAL
CROSSMATCH INTERPRETATION: NORMAL
DEPRECATED RDW RBC AUTO: 50.9 FL (ref 37–54)
EGFRCR SERPLBLD CKD-EPI 2021: 49.9 ML/MIN/1.73
ERYTHROCYTE [DISTWIDTH] IN BLOOD BY AUTOMATED COUNT: 16 % (ref 12.3–15.4)
GLUCOSE BLDC GLUCOMTR-MCNC: 114 MG/DL (ref 70–130)
GLUCOSE BLDC GLUCOMTR-MCNC: 159 MG/DL (ref 70–130)
GLUCOSE BLDC GLUCOMTR-MCNC: 169 MG/DL (ref 70–130)
GLUCOSE BLDC GLUCOMTR-MCNC: 171 MG/DL (ref 70–130)
GLUCOSE SERPL-MCNC: 123 MG/DL (ref 65–99)
HCT VFR BLD AUTO: 26 % (ref 34–46.6)
HGB BLD-MCNC: 8.8 G/DL (ref 12–15.9)
MCH RBC QN AUTO: 30 PG (ref 26.6–33)
MCHC RBC AUTO-ENTMCNC: 33.8 G/DL (ref 31.5–35.7)
MCV RBC AUTO: 88.7 FL (ref 79–97)
PLATELET # BLD AUTO: 98 10*3/MM3 (ref 140–450)
PMV BLD AUTO: 11.3 FL (ref 6–12)
POTASSIUM SERPL-SCNC: 3.9 MMOL/L (ref 3.5–5.2)
POTASSIUM SERPL-SCNC: 4.4 MMOL/L (ref 3.5–5.2)
RBC # BLD AUTO: 2.93 10*6/MM3 (ref 3.77–5.28)
SODIUM SERPL-SCNC: 140 MMOL/L (ref 136–145)
UNIT  ABO: NORMAL
UNIT  ABO: NORMAL
UNIT  RH: NORMAL
UNIT  RH: NORMAL
WBC NRBC COR # BLD: 17.46 10*3/MM3 (ref 3.4–10.8)

## 2023-07-22 PROCEDURE — 84132 ASSAY OF SERUM POTASSIUM: CPT

## 2023-07-22 PROCEDURE — 0 POTASSIUM CHLORIDE PER 2 MEQ

## 2023-07-22 PROCEDURE — 80048 BASIC METABOLIC PNL TOTAL CA: CPT | Performed by: NURSE PRACTITIONER

## 2023-07-22 PROCEDURE — 25010000002 MORPHINE PER 10 MG: Performed by: NURSE PRACTITIONER

## 2023-07-22 PROCEDURE — 93005 ELECTROCARDIOGRAM TRACING: CPT

## 2023-07-22 PROCEDURE — 71045 X-RAY EXAM CHEST 1 VIEW: CPT

## 2023-07-22 PROCEDURE — 85027 COMPLETE CBC AUTOMATED: CPT | Performed by: NURSE PRACTITIONER

## 2023-07-22 PROCEDURE — 25010000002 AMIODARONE IN DEXTROSE 5% 360-4.14 MG/200ML-% SOLUTION: Performed by: THORACIC SURGERY (CARDIOTHORACIC VASCULAR SURGERY)

## 2023-07-22 PROCEDURE — 93010 ELECTROCARDIOGRAM REPORT: CPT | Performed by: INTERNAL MEDICINE

## 2023-07-22 PROCEDURE — 25010000002 AMIODARONE IN DEXTROSE 5% 150-4.21 MG/100ML-% SOLUTION: Performed by: THORACIC SURGERY (CARDIOTHORACIC VASCULAR SURGERY)

## 2023-07-22 PROCEDURE — 63710000001 INSULIN LISPRO (HUMAN) PER 5 UNITS: Performed by: NURSE PRACTITIONER

## 2023-07-22 PROCEDURE — 82948 REAGENT STRIP/BLOOD GLUCOSE: CPT

## 2023-07-22 PROCEDURE — 25010000002 CEFAZOLIN IN DEXTROSE 2-4 GM/100ML-% SOLUTION: Performed by: NURSE PRACTITIONER

## 2023-07-22 PROCEDURE — 93005 ELECTROCARDIOGRAM TRACING: CPT | Performed by: NURSE PRACTITIONER

## 2023-07-22 PROCEDURE — 25010000002 ENOXAPARIN PER 10 MG: Performed by: NURSE PRACTITIONER

## 2023-07-22 RX ORDER — POTASSIUM CHLORIDE 750 MG/1
20 TABLET, FILM COATED, EXTENDED RELEASE ORAL 2 TIMES DAILY WITH MEALS
Status: DISCONTINUED | OUTPATIENT
Start: 2023-07-22 | End: 2023-07-24

## 2023-07-22 RX ORDER — POTASSIUM CHLORIDE 750 MG/1
20 TABLET, FILM COATED, EXTENDED RELEASE ORAL ONCE
Status: COMPLETED | OUTPATIENT
Start: 2023-07-22 | End: 2023-07-22

## 2023-07-22 RX ORDER — MONTELUKAST SODIUM 10 MG/1
10 TABLET ORAL DAILY
Status: DISCONTINUED | OUTPATIENT
Start: 2023-07-22 | End: 2023-07-28 | Stop reason: HOSPADM

## 2023-07-22 RX ORDER — POTASSIUM CHLORIDE 29.8 MG/ML
20 INJECTION INTRAVENOUS ONCE
Status: DISCONTINUED | OUTPATIENT
Start: 2023-07-22 | End: 2023-07-24

## 2023-07-22 RX ADMIN — CEFAZOLIN SODIUM 2 G: 2 INJECTION, SOLUTION INTRAVENOUS at 00:27

## 2023-07-22 RX ADMIN — ATORVASTATIN CALCIUM 40 MG: 20 TABLET, FILM COATED ORAL at 21:04

## 2023-07-22 RX ADMIN — Medication 10 ML: at 08:24

## 2023-07-22 RX ADMIN — SENNOSIDES AND DOCUSATE SODIUM 2 TABLET: 50; 8.6 TABLET ORAL at 21:05

## 2023-07-22 RX ADMIN — INSULIN LISPRO 2 UNITS: 100 INJECTION, SOLUTION INTRAVENOUS; SUBCUTANEOUS at 21:04

## 2023-07-22 RX ADMIN — POTASSIUM CHLORIDE 20 MEQ: 750 TABLET, EXTENDED RELEASE ORAL at 10:36

## 2023-07-22 RX ADMIN — AMIODARONE HYDROCHLORIDE 0.5 MG/MIN: 1.8 INJECTION, SOLUTION INTRAVENOUS at 16:25

## 2023-07-22 RX ADMIN — POTASSIUM CHLORIDE 20 MEQ: 750 TABLET, EXTENDED RELEASE ORAL at 05:24

## 2023-07-22 RX ADMIN — HYDROCODONE BITARTRATE AND ACETAMINOPHEN 2 TABLET: 5; 325 TABLET ORAL at 05:21

## 2023-07-22 RX ADMIN — METOPROLOL TARTRATE 12.5 MG: 25 TABLET, FILM COATED ORAL at 21:03

## 2023-07-22 RX ADMIN — MUPIROCIN 1 APPLICATION: 20 OINTMENT TOPICAL at 21:04

## 2023-07-22 RX ADMIN — POTASSIUM CHLORIDE 20 MEQ: 750 TABLET, EXTENDED RELEASE ORAL at 17:29

## 2023-07-22 RX ADMIN — CHLORHEXIDINE GLUCONATE 15 ML: 1.2 SOLUTION ORAL at 05:28

## 2023-07-22 RX ADMIN — MORPHINE SULFATE 1 MG: 2 INJECTION, SOLUTION INTRAMUSCULAR; INTRAVENOUS at 01:22

## 2023-07-22 RX ADMIN — CYCLOBENZAPRINE 10 MG: 10 TABLET, FILM COATED ORAL at 01:16

## 2023-07-22 RX ADMIN — AMIODARONE HYDROCHLORIDE 1 MG/MIN: 1.8 INJECTION, SOLUTION INTRAVENOUS at 08:20

## 2023-07-22 RX ADMIN — CEFAZOLIN SODIUM 2 G: 2 INJECTION, SOLUTION INTRAVENOUS at 10:35

## 2023-07-22 RX ADMIN — INSULIN LISPRO 2 UNITS: 100 INJECTION, SOLUTION INTRAVENOUS; SUBCUTANEOUS at 11:31

## 2023-07-22 RX ADMIN — MONTELUKAST SODIUM 10 MG: 10 TABLET, FILM COATED ORAL at 10:35

## 2023-07-22 RX ADMIN — GUAIFENESIN 1200 MG: 600 TABLET, EXTENDED RELEASE ORAL at 08:16

## 2023-07-22 RX ADMIN — LEVOTHYROXINE SODIUM 75 MCG: 0.07 TABLET ORAL at 05:24

## 2023-07-22 RX ADMIN — AMIODARONE HYDROCHLORIDE 150 MG: 1.5 INJECTION, SOLUTION INTRAVENOUS at 08:06

## 2023-07-22 RX ADMIN — CHLORHEXIDINE GLUCONATE 15 ML: 1.2 SOLUTION ORAL at 21:04

## 2023-07-22 RX ADMIN — HYDROCODONE BITARTRATE AND ACETAMINOPHEN 2 TABLET: 5; 325 TABLET ORAL at 10:36

## 2023-07-22 RX ADMIN — GUAIFENESIN 1200 MG: 600 TABLET, EXTENDED RELEASE ORAL at 21:04

## 2023-07-22 RX ADMIN — BUPROPION HYDROCHLORIDE 300 MG: 300 TABLET, EXTENDED RELEASE ORAL at 08:16

## 2023-07-22 RX ADMIN — MUPIROCIN 1 APPLICATION: 20 OINTMENT TOPICAL at 08:16

## 2023-07-22 RX ADMIN — ASPIRIN 81 MG: 81 TABLET, COATED ORAL at 08:16

## 2023-07-22 RX ADMIN — INSULIN LISPRO 2 UNITS: 100 INJECTION, SOLUTION INTRAVENOUS; SUBCUTANEOUS at 16:25

## 2023-07-22 RX ADMIN — METOPROLOL TARTRATE 12.5 MG: 25 TABLET, FILM COATED ORAL at 08:16

## 2023-07-22 RX ADMIN — HYDROCODONE BITARTRATE AND ACETAMINOPHEN 2 TABLET: 5; 325 TABLET ORAL at 21:04

## 2023-07-22 RX ADMIN — HYDROCODONE BITARTRATE AND ACETAMINOPHEN 2 TABLET: 5; 325 TABLET ORAL at 14:48

## 2023-07-22 RX ADMIN — PANTOPRAZOLE SODIUM 40 MG: 40 TABLET, DELAYED RELEASE ORAL at 05:24

## 2023-07-22 NOTE — PLAN OF CARE
Goal Outcome Evaluation:                  Outcome Evaluation: POD3 s/p cabgx3. NSR - ST on monitor, room air, blood pressures 120s/70s. Chest tubes and AV wires discontinued today. Sill with some shortness of air with exertion but not requiring oxygen. Urinal at bedside and pain controlled per MAR. Aambulating in [place] with family and progressing well. IS use encourgaed. Hopefully home in 1-2 days.      ** patient is COVID positive- Family and patient educated today about COVID protocol while hospitalized. Family and patient verbalized understanding.

## 2023-07-22 NOTE — SIGNIFICANT NOTE
07/22/23 1436   OTHER   Discipline physical therapist   Rehab Time/Intention   Session Not Performed patient/family declined treatment  (Pt reports she just walked over to the chair from bed and is too fatigued to do therapy today. Requests PT return tomorrow.)   Recommendation   PT - Next Appointment 07/23/23

## 2023-07-22 NOTE — PROGRESS NOTES
She looks well.  She is in A-fib this morning and amiodarone was started.  Rate controlled at 110.  Chest tube drainage still high.  She is on IV Lasix twice daily.

## 2023-07-22 NOTE — PLAN OF CARE
Goal Outcome Evaluation:  Plan of Care Reviewed With: patient, family        Progress: improving    Outcome Evaluation: POD2 s/p cabg x2. Patient went into atrial fib this AM with a rate in the 160s. Amio protocol initiated and infuisng per MAR. Pateint has now converetd to NSR, on room air and blood pressures stable. Chest tubes, wires and IJ intact. Plueral chest tube with 400 cc out throuhgout shift. Morgan removed and voiding per toilet. IS use and ambulation encouraged. Care ongoing.

## 2023-07-23 ENCOUNTER — APPOINTMENT (OUTPATIENT)
Dept: GENERAL RADIOLOGY | Facility: HOSPITAL | Age: 78
DRG: 236 | End: 2023-07-23
Payer: MEDICARE

## 2023-07-23 LAB
ANION GAP SERPL CALCULATED.3IONS-SCNC: 10 MMOL/L (ref 5–15)
BACTERIA UR QL AUTO: ABNORMAL /HPF
BILIRUB UR QL STRIP: NEGATIVE
BUN SERPL-MCNC: 28 MG/DL (ref 8–23)
BUN/CREAT SERPL: 17.9 (ref 7–25)
CALCIUM SPEC-SCNC: 8.4 MG/DL (ref 8.6–10.5)
CHLORIDE SERPL-SCNC: 103 MMOL/L (ref 98–107)
CLARITY UR: ABNORMAL
CO2 SERPL-SCNC: 22 MMOL/L (ref 22–29)
COLOR UR: ABNORMAL
CREAT SERPL-MCNC: 1.56 MG/DL (ref 0.57–1)
CREAT UR-MCNC: 96.4 MG/DL
DEPRECATED RDW RBC AUTO: 52 FL (ref 37–54)
EGFRCR SERPLBLD CKD-EPI 2021: 33.9 ML/MIN/1.73
ERYTHROCYTE [DISTWIDTH] IN BLOOD BY AUTOMATED COUNT: 15.8 % (ref 12.3–15.4)
GLUCOSE BLDC GLUCOMTR-MCNC: 118 MG/DL (ref 70–130)
GLUCOSE BLDC GLUCOMTR-MCNC: 126 MG/DL (ref 70–130)
GLUCOSE BLDC GLUCOMTR-MCNC: 144 MG/DL (ref 70–130)
GLUCOSE BLDC GLUCOMTR-MCNC: 150 MG/DL (ref 70–130)
GLUCOSE SERPL-MCNC: 118 MG/DL (ref 65–99)
GLUCOSE UR STRIP-MCNC: NEGATIVE MG/DL
HCT VFR BLD AUTO: 25.2 % (ref 34–46.6)
HGB BLD-MCNC: 8.4 G/DL (ref 12–15.9)
HGB UR QL STRIP.AUTO: ABNORMAL
HYALINE CASTS UR QL AUTO: ABNORMAL /LPF
KETONES UR QL STRIP: NEGATIVE
LEUKOCYTE ESTERASE UR QL STRIP.AUTO: ABNORMAL
MCH RBC QN AUTO: 30.1 PG (ref 26.6–33)
MCHC RBC AUTO-ENTMCNC: 33.3 G/DL (ref 31.5–35.7)
MCV RBC AUTO: 90.3 FL (ref 79–97)
NITRITE UR QL STRIP: NEGATIVE
PH UR STRIP.AUTO: <=5 [PH] (ref 5–8)
PLATELET # BLD AUTO: 116 10*3/MM3 (ref 140–450)
PMV BLD AUTO: 11 FL (ref 6–12)
POTASSIUM SERPL-SCNC: 4.6 MMOL/L (ref 3.5–5.2)
PROT ?TM UR-MCNC: 55.1 MG/DL
PROT UR QL STRIP: ABNORMAL
QT INTERVAL: 268 MS
QT INTERVAL: 323 MS
QT INTERVAL: 358 MS
QT INTERVAL: 446 MS
RBC # BLD AUTO: 2.79 10*6/MM3 (ref 3.77–5.28)
RBC # UR STRIP: ABNORMAL /HPF
REF LAB TEST METHOD: ABNORMAL
SODIUM SERPL-SCNC: 135 MMOL/L (ref 136–145)
SODIUM UR-SCNC: 28 MMOL/L
SP GR UR STRIP: 1.02 (ref 1–1.03)
SQUAMOUS #/AREA URNS HPF: ABNORMAL /HPF
UROBILINOGEN UR QL STRIP: ABNORMAL
WBC # UR STRIP: ABNORMAL /HPF
WBC NRBC COR # BLD: 18.06 10*3/MM3 (ref 3.4–10.8)
YEAST URNS QL MICRO: ABNORMAL /HPF

## 2023-07-23 PROCEDURE — 93005 ELECTROCARDIOGRAM TRACING: CPT | Performed by: THORACIC SURGERY (CARDIOTHORACIC VASCULAR SURGERY)

## 2023-07-23 PROCEDURE — 25010000002 ALBUMIN HUMAN 25% PER 50 ML: Performed by: INTERNAL MEDICINE

## 2023-07-23 PROCEDURE — 81001 URINALYSIS AUTO W/SCOPE: CPT | Performed by: INTERNAL MEDICINE

## 2023-07-23 PROCEDURE — 82570 ASSAY OF URINE CREATININE: CPT | Performed by: INTERNAL MEDICINE

## 2023-07-23 PROCEDURE — 25010000002 ONDANSETRON PER 1 MG: Performed by: NURSE PRACTITIONER

## 2023-07-23 PROCEDURE — 93010 ELECTROCARDIOGRAM REPORT: CPT | Performed by: INTERNAL MEDICINE

## 2023-07-23 PROCEDURE — 84156 ASSAY OF PROTEIN URINE: CPT | Performed by: INTERNAL MEDICINE

## 2023-07-23 PROCEDURE — 80048 BASIC METABOLIC PNL TOTAL CA: CPT | Performed by: NURSE PRACTITIONER

## 2023-07-23 PROCEDURE — 25010000002 ENOXAPARIN PER 10 MG: Performed by: NURSE PRACTITIONER

## 2023-07-23 PROCEDURE — P9047 ALBUMIN (HUMAN), 25%, 50ML: HCPCS | Performed by: INTERNAL MEDICINE

## 2023-07-23 PROCEDURE — 25010000002 FUROSEMIDE PER 20 MG: Performed by: THORACIC SURGERY (CARDIOTHORACIC VASCULAR SURGERY)

## 2023-07-23 PROCEDURE — 85027 COMPLETE CBC AUTOMATED: CPT | Performed by: NURSE PRACTITIONER

## 2023-07-23 PROCEDURE — 87086 URINE CULTURE/COLONY COUNT: CPT | Performed by: NURSE PRACTITIONER

## 2023-07-23 PROCEDURE — 71046 X-RAY EXAM CHEST 2 VIEWS: CPT

## 2023-07-23 PROCEDURE — 82948 REAGENT STRIP/BLOOD GLUCOSE: CPT

## 2023-07-23 PROCEDURE — 84300 ASSAY OF URINE SODIUM: CPT | Performed by: INTERNAL MEDICINE

## 2023-07-23 RX ORDER — ALBUMIN (HUMAN) 12.5 G/50ML
25 SOLUTION INTRAVENOUS ONCE
Status: COMPLETED | OUTPATIENT
Start: 2023-07-23 | End: 2023-07-23

## 2023-07-23 RX ORDER — MIDODRINE HYDROCHLORIDE 5 MG/1
5 TABLET ORAL
Status: DISCONTINUED | OUTPATIENT
Start: 2023-07-23 | End: 2023-07-25

## 2023-07-23 RX ORDER — AMIODARONE HYDROCHLORIDE 200 MG/1
300 TABLET ORAL EVERY 8 HOURS
Status: DISCONTINUED | OUTPATIENT
Start: 2023-07-23 | End: 2023-07-24

## 2023-07-23 RX ORDER — FUROSEMIDE 10 MG/ML
40 INJECTION INTRAMUSCULAR; INTRAVENOUS EVERY 12 HOURS
Status: DISCONTINUED | OUTPATIENT
Start: 2023-07-23 | End: 2023-07-24

## 2023-07-23 RX ORDER — ENOXAPARIN SODIUM 100 MG/ML
30 INJECTION SUBCUTANEOUS DAILY
Status: DISCONTINUED | OUTPATIENT
Start: 2023-07-24 | End: 2023-07-27

## 2023-07-23 RX ORDER — POTASSIUM CHLORIDE 750 MG/1
20 TABLET, FILM COATED, EXTENDED RELEASE ORAL 2 TIMES DAILY WITH MEALS
Status: DISCONTINUED | OUTPATIENT
Start: 2023-07-23 | End: 2023-07-23 | Stop reason: SDUPTHER

## 2023-07-23 RX ADMIN — ONDANSETRON 4 MG: 2 INJECTION INTRAMUSCULAR; INTRAVENOUS at 00:48

## 2023-07-23 RX ADMIN — MUPIROCIN 1 APPLICATION: 20 OINTMENT TOPICAL at 08:56

## 2023-07-23 RX ADMIN — FUROSEMIDE 40 MG: 10 INJECTION, SOLUTION INTRAMUSCULAR; INTRAVENOUS at 08:54

## 2023-07-23 RX ADMIN — ATORVASTATIN CALCIUM 40 MG: 20 TABLET, FILM COATED ORAL at 20:10

## 2023-07-23 RX ADMIN — GUAIFENESIN 1200 MG: 600 TABLET, EXTENDED RELEASE ORAL at 20:11

## 2023-07-23 RX ADMIN — LEVOTHYROXINE SODIUM 75 MCG: 0.07 TABLET ORAL at 06:16

## 2023-07-23 RX ADMIN — HYDROCODONE BITARTRATE AND ACETAMINOPHEN 2 TABLET: 5; 325 TABLET ORAL at 10:51

## 2023-07-23 RX ADMIN — GUAIFENESIN 1200 MG: 600 TABLET, EXTENDED RELEASE ORAL at 08:55

## 2023-07-23 RX ADMIN — MUPIROCIN 1 APPLICATION: 20 OINTMENT TOPICAL at 20:11

## 2023-07-23 RX ADMIN — Medication 10 ML: at 08:58

## 2023-07-23 RX ADMIN — HYDROCODONE BITARTRATE AND ACETAMINOPHEN 2 TABLET: 5; 325 TABLET ORAL at 16:07

## 2023-07-23 RX ADMIN — MONTELUKAST SODIUM 10 MG: 10 TABLET, FILM COATED ORAL at 08:56

## 2023-07-23 RX ADMIN — MIDODRINE HYDROCHLORIDE 5 MG: 5 TABLET ORAL at 17:11

## 2023-07-23 RX ADMIN — ASPIRIN 81 MG: 81 TABLET, COATED ORAL at 08:56

## 2023-07-23 RX ADMIN — MIDODRINE HYDROCHLORIDE 5 MG: 5 TABLET ORAL at 10:51

## 2023-07-23 RX ADMIN — AMIODARONE HYDROCHLORIDE 300 MG: 200 TABLET ORAL at 17:25

## 2023-07-23 RX ADMIN — Medication 10 ML: at 20:11

## 2023-07-23 RX ADMIN — HYDROCODONE BITARTRATE AND ACETAMINOPHEN 2 TABLET: 5; 325 TABLET ORAL at 04:43

## 2023-07-23 RX ADMIN — ENOXAPARIN SODIUM 40 MG: 100 INJECTION SUBCUTANEOUS at 08:56

## 2023-07-23 RX ADMIN — ACETAMINOPHEN 650 MG: 325 TABLET, FILM COATED ORAL at 02:55

## 2023-07-23 RX ADMIN — POTASSIUM CHLORIDE 20 MEQ: 750 TABLET, EXTENDED RELEASE ORAL at 17:27

## 2023-07-23 RX ADMIN — POTASSIUM CHLORIDE 20 MEQ: 750 TABLET, EXTENDED RELEASE ORAL at 08:55

## 2023-07-23 RX ADMIN — PANTOPRAZOLE SODIUM 40 MG: 40 TABLET, DELAYED RELEASE ORAL at 06:17

## 2023-07-23 RX ADMIN — FUROSEMIDE 40 MG: 10 INJECTION, SOLUTION INTRAMUSCULAR; INTRAVENOUS at 20:12

## 2023-07-23 RX ADMIN — ALBUMIN (HUMAN) 25 G: 0.25 INJECTION, SOLUTION INTRAVENOUS at 13:26

## 2023-07-23 RX ADMIN — BUPROPION HYDROCHLORIDE 300 MG: 300 TABLET, EXTENDED RELEASE ORAL at 08:56

## 2023-07-23 RX ADMIN — SENNOSIDES AND DOCUSATE SODIUM 2 TABLET: 50; 8.6 TABLET ORAL at 20:11

## 2023-07-23 NOTE — CONSULTS
[facility]                                                                                             Nephrology Initial Consult Note    Patient Identification:  Name: Ann-Marie Hughes MRN: [id number]  Age: 77 y.o. : 1945  Sex: female  Date:2023    Requesting Physician: As per consult order.  Reason for Consultation: Postop CABG DAVID  Information from:patient/ family/ chart      History of Present Illness: This is a 77 y.o. year old female with no prior established history of chronic kidney disease but her baseline creatinine does look to be around 1.0 to perhaps 1.2.  Her creatinine was 1.1 the day of admission on  and has been fairly stable between 0.9 and 1.3 for most of this admission but was up to 1.56 today.  Has combined systolic and diastolic heart failure was on Entresto prior to admission presented with chest pain and mild CHF exacerbation treated with Lasix.  Cardiac catheterization showed multivessel disease and she was transferred to [facility] for CT surgery evaluation.  Status post CABG x3, surgery went well there was no listed complication, 500 mL blood loss was returned via Cell Saver.  Her Entresto dose was decreased earlier this admission.  She did have some hypotensive episodes on  with blood pressure as low as 82/47.  O2 sats mid 90s on 2 L.  Initial bladder scan showed only low volumes but repeat showed over 1300 cc after Morgan was removed yesterday.      The following medical history and medications personally reviewed by me:    Problem List:     Coronary heart disease    NSTEMI (non-ST elevated myocardial infarction)    Abnormal findings on diagnostic imaging of heart and coronary circulation      Past Medical History:  Past Medical History:   Diagnosis Date    Acne rosacea 2021    DR.JEFF MOON     Arteriosclerosis of abdominal aorta      B12 deficiency 08/17/2021    DJD (degenerative joint disease)     Hx of smoking 08/17/2021    QUIT IN 1976 AT AGE 31    Hyperlipidemia 08/17/2021    Hypertension     Hypothyroidism     Metabolic syndrome 08/17/2021    KAREN (stress urinary incontinence, female) 08/17/2021    UTI (urinary tract infection) 08/17/2021    Vitamin D deficiency 08/17/2021    Vitamin D deficiency        Past Surgical History:  Past Surgical History:   Procedure Laterality Date    CARDIAC CATHETERIZATION N/A 7/17/2023    Procedure: Left Heart Cath;  Surgeon: Dinh Andres MD;  Location:  [facility];  Service: Cardiovascular;  Laterality: N/A;    COLONOSCOPY  2011    CORONARY ARTERY BYPASS GRAFT WITH MITRAL VALVE REPAIR/REPLACEMENT N/A 7/20/2023    Procedure: REZA STERNOTOMY OFF-PUMP CORONARY ARTERY BYPASS GRAFT TIMES        USING LEFFT INTERNAL MAMMARY ARTERY AND     GREATER SAPHENOUS VEIN GRAFT PER EMDOSCOPIC VEIN HARVESTING, MAZE PROCEDURE AND PRP;  Surgeon: Shane Alexander MD;  Location: [facility];  Service: Cardiothoracic;  Laterality: N/A;        Home Meds:   Medications Prior to Admission   Medication Sig Dispense Refill Last Dose    aspirin 81 MG EC tablet Take 1 tablet by mouth Daily.   7/17/2023    albuterol sulfate  (90 Base) MCG/ACT inhaler Inhale 2 puffs Every 4 (Four) Hours As Needed for Wheezing. 18 g 0     amLODIPine (NORVASC) 5 MG tablet Take 1 tablet by mouth Daily As Needed (For systolic blood pressure 140 or greater). 90 tablet 1     atorvastatin (LIPITOR) 10 MG tablet Take 1 tablet by mouth Daily. 90 tablet 1     AZO-CRANBERRY PO Take 1 tablet by mouth Daily.       buPROPion XL (WELLBUTRIN XL) 300 MG 24 hr tablet TAKE 1 TABLET BY MOUTH DAILY 90 tablet 1     carvedilol (COREG) 6.25 MG tablet Take 1 tablet by mouth 2 (Two) Times a Day. 180 tablet 3     dapagliflozin Propanediol (Farxiga) 10 MG tablet Take 10 mg by mouth Daily. 30 tablet 3     furosemide (Lasix) 40 MG tablet Take 1  tablet by mouth Daily. 30 tablet 0     metFORMIN ER (GLUCOPHAGE-XR) 500 MG 24 hr tablet TAKE 1 TABLET BY MOUTH EVERY MORNING AND 2 TABLETS AT SUPPER (Patient taking differently: 1 tablet Daily With Breakfast.) 270 tablet 4 7/16/2023    metFORMIN ER (GLUCOPHAGE-XR) 500 MG 24 hr tablet Take 2 tablets by mouth Daily With Dinner.       montelukast (SINGULAIR) 10 MG tablet Take 1 tablet by mouth Daily. 90 tablet 3     Probiotic Product (PROBIOTIC PO) Take 1 capsule by mouth Daily.       sacubitril-valsartan (Entresto)  MG tablet Take 1 tablet by mouth 2 (Two) Times a Day. 180 tablet 3     spironolactone (ALDACTONE) 25 MG tablet Take 1 tablet by mouth Daily. 90 tablet 3     Synthroid 75 MCG tablet Take 1 tablet by mouth Daily. 90 tablet 3        Current Meds:   Current Facility-Administered Medications   Medication Dose Route Frequency Provider Last Rate Last Admin    acetaminophen (TYLENOL) tablet 650 mg  650 mg Oral Q4H PRN Anjelica Chase APRN   650 mg at 07/23/23 0255    Or    acetaminophen (TYLENOL) 160 MG/5ML solution 650 mg  650 mg Oral Q4H PRN Anjelica Chase APRN        Or    acetaminophen (TYLENOL) suppository 650 mg  650 mg Rectal Q4H PRN Anjelica Chase APRN        ALPRAZolam (XANAX) tablet 0.25 mg  0.25 mg Oral Q8H PRN Anjelica Chase APRN        aspirin EC tablet 81 mg  81 mg Oral Daily Anjelica Chase APRN   81 mg at 07/23/23 0856    atorvastatin (LIPITOR) tablet 40 mg  40 mg Oral Nightly Anjelica Chase APRN   40 mg at 07/22/23 2104    bisacodyl (DULCOLAX) EC tablet 10 mg  10 mg Oral Daily PRN Anjelica Chase APRN        bisacodyl (DULCOLAX) suppository 10 mg  10 mg Rectal Daily PRN Anjelica Chase APRN        buPROPion XL (WELLBUTRIN XL) 24 hr tablet 300 mg  300 mg Oral Daily Anjelica Chase APRN   300 mg at 07/23/23 0856    chlorhexidine (PERIDEX) 0.12 % solution 15 mL  15 mL Mouth/Throat Q12H Anjelica Chase, APRN   15 mL at  07/22/23 2104    cyclobenzaprine (FLEXERIL) tablet 10 mg  10 mg Oral Q8H PRN Anjelica Chase APRN   10 mg at 07/22/23 0116    dextrose (D50W) (25 g/50 mL) IV injection 25 g  25 g Intravenous Q15 Min PRN Anjelica Chase APRN        dextrose (GLUTOSE) oral gel 15 g  15 g Oral Q15 Min PRN Anjelica Chase APRN        [START ON 7/24/2023] Enoxaparin Sodium (LOVENOX) syringe 30 mg  30 mg Subcutaneous Daily Jr Jay Gaspar MD        furosemide (LASIX) injection 40 mg  40 mg Intravenous Q12H Jr Jay Gaspar MD   40 mg at 07/23/23 0854    glucagon (GLUCAGEN) injection 1 mg  1 mg Intramuscular Q15 Min PRN Anjelica Chase APRN        guaiFENesin (MUCINEX) 12 hr tablet 1,200 mg  1,200 mg Oral Q12H Anjelica Chase APRN   1,200 mg at 07/23/23 0855    HYDROcodone-acetaminophen (NORCO) 5-325 MG per tablet 2 tablet  2 tablet Oral Q4H PRN Anjelica Chase APRN   2 tablet at 07/23/23 0443    insulin lispro (HUMALOG/ADMELOG) injection 2-9 Units  2-9 Units Subcutaneous 4x Daily AC & at Bedtime Anjelica Chase APRN   2 Units at 07/22/23 2104    levothyroxine (SYNTHROID, LEVOTHROID) tablet 75 mcg  75 mcg Oral Q AM Anjelica Chase APRN   75 mcg at 07/23/23 0616    magnesium hydroxide (MILK OF MAGNESIA) 400 MG/5ML suspension 15 mL  15 mL Oral Daily PRN Anjelica Chase APRN        metoprolol tartrate (LOPRESSOR) tablet 12.5 mg  12.5 mg Oral Q12H Anjelica Chase APRN   12.5 mg at 07/22/23 2103    midodrine (PROAMATINE) tablet 5 mg  5 mg Oral TID AC Jr Jay Gaspar MD        montelukast (SINGULAIR) tablet 10 mg  10 mg Oral Daily Anjelica Chase APRN   10 mg at 07/23/23 0856    morphine injection 1 mg  1 mg Intravenous Q4H PRN Anjelica Chase APRN   1 mg at 07/22/23 0122    And    naloxone (NARCAN) injection 0.4 mg  0.4 mg Intravenous Q5 Min PRN Anjelica Chase APRN        mupirocin (BACTROBAN) 2 % nasal ointment   Each Nare BID  Anjelica Chase APRN   1 application  at 23 0856    nitroglycerin (NITROSTAT) SL tablet 0.4 mg  0.4 mg Sublingual Q5 Min PRN Anjelica Chase APRN        nitroglycerin (NITROSTAT) SL tablet 0.4 mg  0.4 mg Sublingual Q5 Min PRN Anjelica Chase APRN        ondansetron (ZOFRAN) injection 4 mg  4 mg Intravenous Q6H PRN Anjelica Chase APRN   4 mg at 23 0048    pantoprazole (PROTONIX) EC tablet 40 mg  40 mg Oral QAM Anjelica Chase APRN   40 mg at 23 0617    polyethylene glycol (MIRALAX) packet 17 g  17 g Oral Daily PRN Anjelica Chase APRN        potassium chloride (K-DUR,KLOR-CON) ER tablet 20 mEq  20 mEq Oral BID With Meals Jr Jay Gaspar MD   20 mEq at 23 0855    potassium chloride 20 mEq in 50 mL IVPB  20 mEq Intravenous Once Shane Alexander MD        Potassium Replacement - Follow Nurse / BPA Driven Protocol   Does not apply PRN Anjelica Chase APRN        sennosides-docusate (PERICOLACE) 8.6-50 MG per tablet 2 tablet  2 tablet Oral Nightly Anjelica Chase APRN   2 tablet at 23    sodium chloride 0.9 % flush 10 mL  10 mL Intravenous Q12H Anjelica Chase APRN   10 mL at 23 0858    sodium chloride 0.9 % flush 10 mL  10 mL Intravenous PRN Anjelica Chase APRN        sodium chloride 0.9 % infusion  30 mL/hr Intravenous Continuous Anjelica Chase APRN 30 mL/hr at 23 30 mL/hr at 23    sodium chloride 0.9 % infusion  75 mL/hr Intravenous Continuous Anjelica Chase APRN   Held at 23 213       Allergies:  Allergies   Allergen Reactions    Penicillins Palpitations       Social History:   Social History     Socioeconomic History    Marital status:    Tobacco Use    Smoking status: Former     Packs/day: 1.00     Years: 12.00     Pack years: 12.00     Types: Cigarettes     Quit date:      Years since quittin.5    Smokeless tobacco: Never   Vaping  "Use    Vaping Use: Never used   Substance and Sexual Activity    Alcohol use: Never    Drug use: Never    Sexual activity: Defer        Family History:  History reviewed. No pertinent family history.     Review of Systems: as per HPI, in addition:    General:      Complains of weakness / fatigue,                       No fevers / chills                       no weight loss  HEENT:       no dysphagia / odynophagia  Neck:           normal range of motion, no swelling  Respiratory: no cough / congestion                      Mild shortness of air                       No wheezing  CV:              No chest pain                       No palpitations  Abdomen/GI: no nausea / vomiting                      No diarrhea / constipation                      No abdominal pain  :             no dysuria / urinary frequency                       No urgency, normal output  Endocrine:   no polyuria / polydipsia,                      No heat or cold intolerance  Skin:           no rashes or skin breakdown   Vascular:   No edema                     No claudication  Psych:        no depression/ anxiety  Neuro:        no focal weakness, no seizures  Musculoskeletal: no joint pain or deformities      Physical Exam:  Vitals:   Temp (24hrs), Av.8 °F (36.6 °C), Min:97.2 °F (36.2 °C), Max:98.6 °F (37 °C)    BP 98/52 (BP Location: Right arm, Patient Position: Sitting)   Pulse 59   Temp 97.9 °F (36.6 °C) (Oral)   Resp 16   Ht 162.6 cm (64\")   Wt 96.3 kg (212 lb 4.9 oz)   LMP  (LMP Unknown)   SpO2 96%   BMI 36.44 kg/m²   Intake/Output:     Intake/Output Summary (Last 24 hours) at 2023 1040  Last data filed at 2023 0817  Gross per 24 hour   Intake 360 ml   Output 875 ml   Net -515 ml        Wt Readings from Last 1 Encounters:   23 0610 96.3 kg (212 lb 4.9 oz)   23 0656 103 kg (226 lb 6.6 oz)   23 0437 96.1 kg (211 lb 13.8 oz)   23 0937 90 kg (198 lb 6.6 oz)   23 0532 90.4 kg (199 lb 3.2 oz) "   07/18/23 0410 89.3 kg (196 lb 12.8 oz)       Exam:    General Appearance:  Awake, alert, oriented x3, no acute distress  Mildly ill-appearing   Head and Face:  Normocephalic, atraumatic, mucus membranes moist, oropharynx clear   Eyes:  No icterus, pupils equal round and reactive to light, extraocular movements intact    ENMT: Moist mucosa, tongue symmetric    Neck: Supple  no jugular venous distention  no thyromegaly   Pulmonary:  Respiratory effort: Normal  Auscultation of lungs: Clear bilaterally  No wheezes  No rhonchi  Good air movement, good expansion   Chest wall:  No tenderness or deformity   Cardiovascular:  Auscultation of the heart: Normal rhythm, no murmurs  Trace edema of bilateral lower extremities   Abdomen:  Abdomen: soft, non-tender, normal bowel sounds all four quadrants, no masses   Liver and spleen: no hepatosplenomegaly   Musculoskeletal: Digits and nails: normal  Normal range of motion  No joint swelling or gross deformities    Skin: Skin inspection: color normal, no visible rashes or lesions  Skin palpation: texture, turgor normal, no palpable lesions   Lymphatic:  no cervical lymphadenopathy    Psychiatric: Judgement and insight: normal  Orientation to person place and time: normal  Mood and affect: normal       DATA:  Radiology and Labs:  The following labs independently reviewed by me, additional AM labs ordered  Old records independently reviewed showing baseline creatinine around 1.0  The following radiologic studies independently viewed by me, findings chest x-ray shows vascular congestion with atelectasis  Interval notes, chart personally reviewed by me.  I have reviewed and summarized old records as detailed above  Plan of care discussed with patient herself at bedside  New problems include hypotension and DAVID        Risk/ complexity of medical care/ medical decision making: High risk, DAVID post op CABG  Chronic illness with severe exacerbation or progression      Labs:   Recent  Results (from the past 24 hour(s))   POC Glucose Once    Collection Time: 07/22/23 11:17 AM    Specimen: Blood   Result Value Ref Range    Glucose 171 (H) 70 - 130 mg/dL   POC Glucose Once    Collection Time: 07/22/23  4:12 PM    Specimen: Blood   Result Value Ref Range    Glucose 159 (H) 70 - 130 mg/dL   Potassium    Collection Time: 07/22/23  5:29 PM    Specimen: Blood   Result Value Ref Range    Potassium 4.4 3.5 - 5.2 mmol/L   POC Glucose Once    Collection Time: 07/22/23  8:56 PM    Specimen: Blood   Result Value Ref Range    Glucose 169 (H) 70 - 130 mg/dL   CBC (No Diff)    Collection Time: 07/23/23  3:02 AM    Specimen: Blood   Result Value Ref Range    WBC 18.06 (H) 3.40 - 10.80 10*3/mm3    RBC 2.79 (L) 3.77 - 5.28 10*6/mm3    Hemoglobin 8.4 (L) 12.0 - 15.9 g/dL    Hematocrit 25.2 (L) 34.0 - 46.6 %    MCV 90.3 79.0 - 97.0 fL    MCH 30.1 26.6 - 33.0 pg    MCHC 33.3 31.5 - 35.7 g/dL    RDW 15.8 (H) 12.3 - 15.4 %    RDW-SD 52.0 37.0 - 54.0 fl    MPV 11.0 6.0 - 12.0 fL    Platelets 116 (L) 140 - 450 10*3/mm3   Basic Metabolic Panel    Collection Time: 07/23/23  3:02 AM    Specimen: Blood   Result Value Ref Range    Glucose 118 (H) 65 - 99 mg/dL    BUN 28 (H) 8 - 23 mg/dL    Creatinine 1.56 (H) 0.57 - 1.00 mg/dL    Sodium 135 (L) 136 - 145 mmol/L    Potassium 4.6 3.5 - 5.2 mmol/L    Chloride 103 98 - 107 mmol/L    CO2 22.0 22.0 - 29.0 mmol/L    Calcium 8.4 (L) 8.6 - 10.5 mg/dL    BUN/Creatinine Ratio 17.9 7.0 - 25.0    Anion Gap 10.0 5.0 - 15.0 mmol/L    eGFR 33.9 (L) >60.0 mL/min/1.73   ECG 12 Lead Drug Monitoring; Amiodarone    Collection Time: 07/23/23  3:11 AM   Result Value Ref Range    QT Interval 446 ms   POC Glucose Once    Collection Time: 07/23/23  6:46 AM    Specimen: Blood   Result Value Ref Range    Glucose 126 70 - 130 mg/dL       Radiology:  Imaging Results (Last 24 Hours)       Procedure Component Value Units Date/Time    XR Chest PA & Lateral [[id number]] Resulted: 07/23/23 0929     Updated:  07/23/23 0940                 ASSESSMENT:   New DAVID postop.  On top of CKD stage IIIa.  Baseline creatinine around 1.0.  Likely due to hypotension with decreased renal perfusion.  She does have's vascular congestion on chest x-ray and agree with continued IV diuretics.  Hypotension on midodrine.  Entresto is currently on hold  CAD status post three-vessel CABG  NSTEMI  Combined systolic and diastolic CHF  Diabetes mellitus type 2  Hypothyroidism      DISCUSSION/PLAN:   Agree with continued IV diuresis given chest x-ray findings  Recheck labs in a.m.  Check urine studies  We will give a one-time dose of IV albumin to help increase renal perfusion  Continue midodrine and monitor BP closely  Continue on scheduled potassium twice daily dosing.  Monitor magnesium and phosphorus levels daily  Follow-up a.m. chest x-ray  Bladder scan shows 1300 cc.  She still not very mobile after surgery so we will anchor her Morgan catheter.  Voiding trial in 1 to 2 days    Continue to monitor electrolytes and volume closely, avoid IV contrast and nephrotoxic medications     I appreciate the consult request  Please send me a secure chat message if any questions regarding patient care.      Anderson Parker MD  [facility]  Office phone number: [phone]  Answering service phone number: [phone]      7/23/2023        Dictation via Dragon dictation software

## 2023-07-23 NOTE — SIGNIFICANT NOTE
07/23/23 1219   OTHER   Discipline physical therapist   Rehab Time/Intention   Session Not Performed patient/family declined treatment  (Pt reports she has been up moving around room all day. She requests PB to return tomorrow.)   Recommendation   PT - Next Appointment 07/24/23

## 2023-07-23 NOTE — NURSING NOTE
Pt unable to void this am, pt bladder scanned x 3  nephrology consulted and updated . Morgan placed due to pt urine retention and inability to void. Pt 's sbp 90 s with hr 50-60s ct surgery aware orders for midodrine received. Pt converted to a-fib with hr 90-140s ct surgery notified orders for po amio pt .   Pt converting back to sr this  evening.     Daily Care Plan Summary: Heart Failure    Diuretic in use (IV or PO):   lasix        Daily weight (up or down):          Output > Intake (yes/no):yes      O2 Requirements (current, any change?): 1lnc      Symptoms noted with Activity (Respiratory Tolerance, functional state):    2 person assit      Anticipated Discharge Plans:    tbd

## 2023-07-23 NOTE — PROGRESS NOTES
She is doing well.  As best I can tell she had quite a bit of chest tube drainage still she needs diuresis which we will do with IV Lasix today.  Otherwise doing well.  She had A-fib last night and now sinus in the 60s.

## 2023-07-24 ENCOUNTER — APPOINTMENT (OUTPATIENT)
Dept: GENERAL RADIOLOGY | Facility: HOSPITAL | Age: 78
DRG: 236 | End: 2023-07-24
Payer: MEDICARE

## 2023-07-24 LAB
ABO GROUP BLD: NORMAL
ALBUMIN SERPL-MCNC: 3.1 G/DL (ref 3.5–5.2)
ANION GAP SERPL CALCULATED.3IONS-SCNC: 10.6 MMOL/L (ref 5–15)
BLD GP AB SCN SERPL QL: NEGATIVE
BUN SERPL-MCNC: 38 MG/DL (ref 8–23)
BUN/CREAT SERPL: 20.5 (ref 7–25)
CALCIUM SPEC-SCNC: 8.2 MG/DL (ref 8.6–10.5)
CHLORIDE SERPL-SCNC: 102 MMOL/L (ref 98–107)
CO2 SERPL-SCNC: 20.4 MMOL/L (ref 22–29)
CREAT SERPL-MCNC: 1.85 MG/DL (ref 0.57–1)
DEPRECATED RDW RBC AUTO: 51.4 FL (ref 37–54)
EGFRCR SERPLBLD CKD-EPI 2021: 27.6 ML/MIN/1.73
ERYTHROCYTE [DISTWIDTH] IN BLOOD BY AUTOMATED COUNT: 15.6 % (ref 12.3–15.4)
GLUCOSE BLDC GLUCOMTR-MCNC: 106 MG/DL (ref 70–130)
GLUCOSE BLDC GLUCOMTR-MCNC: 108 MG/DL (ref 70–130)
GLUCOSE BLDC GLUCOMTR-MCNC: 126 MG/DL (ref 70–130)
GLUCOSE BLDC GLUCOMTR-MCNC: 130 MG/DL (ref 70–130)
GLUCOSE SERPL-MCNC: 97 MG/DL (ref 65–99)
HCT VFR BLD AUTO: 23.7 % (ref 34–46.6)
HGB BLD-MCNC: 7.9 G/DL (ref 12–15.9)
MCH RBC QN AUTO: 30.6 PG (ref 26.6–33)
MCHC RBC AUTO-ENTMCNC: 33.3 G/DL (ref 31.5–35.7)
MCV RBC AUTO: 91.9 FL (ref 79–97)
PHOSPHATE SERPL-MCNC: 3.5 MG/DL (ref 2.5–4.5)
PLATELET # BLD AUTO: 155 10*3/MM3 (ref 140–450)
PMV BLD AUTO: 10.7 FL (ref 6–12)
POTASSIUM SERPL-SCNC: 4.6 MMOL/L (ref 3.5–5.2)
QT INTERVAL: 439 MS
RBC # BLD AUTO: 2.58 10*6/MM3 (ref 3.77–5.28)
RH BLD: POSITIVE
SODIUM SERPL-SCNC: 133 MMOL/L (ref 136–145)
T&S EXPIRATION DATE: NORMAL
URATE SERPL-MCNC: 9.5 MG/DL (ref 2.4–5.7)
WBC NRBC COR # BLD: 16.11 10*3/MM3 (ref 3.4–10.8)

## 2023-07-24 PROCEDURE — P9016 RBC LEUKOCYTES REDUCED: HCPCS

## 2023-07-24 PROCEDURE — 93005 ELECTROCARDIOGRAM TRACING: CPT | Performed by: THORACIC SURGERY (CARDIOTHORACIC VASCULAR SURGERY)

## 2023-07-24 PROCEDURE — 86900 BLOOD TYPING SEROLOGIC ABO: CPT

## 2023-07-24 PROCEDURE — 82948 REAGENT STRIP/BLOOD GLUCOSE: CPT

## 2023-07-24 PROCEDURE — 86922 COMPATIBILITY TEST ANTIGLOB: CPT

## 2023-07-24 PROCEDURE — 36430 TRANSFUSION BLD/BLD COMPNT: CPT

## 2023-07-24 PROCEDURE — 97116 GAIT TRAINING THERAPY: CPT

## 2023-07-24 PROCEDURE — 80069 RENAL FUNCTION PANEL: CPT | Performed by: INTERNAL MEDICINE

## 2023-07-24 PROCEDURE — 86901 BLOOD TYPING SEROLOGIC RH(D): CPT | Performed by: NURSE PRACTITIONER

## 2023-07-24 PROCEDURE — 93010 ELECTROCARDIOGRAM REPORT: CPT | Performed by: INTERNAL MEDICINE

## 2023-07-24 PROCEDURE — 86920 COMPATIBILITY TEST SPIN: CPT

## 2023-07-24 PROCEDURE — 25010000002 CEFTRIAXONE PER 250 MG: Performed by: NURSE PRACTITIONER

## 2023-07-24 PROCEDURE — 85027 COMPLETE CBC AUTOMATED: CPT | Performed by: NURSE PRACTITIONER

## 2023-07-24 PROCEDURE — 84550 ASSAY OF BLOOD/URIC ACID: CPT | Performed by: INTERNAL MEDICINE

## 2023-07-24 PROCEDURE — 71045 X-RAY EXAM CHEST 1 VIEW: CPT

## 2023-07-24 PROCEDURE — 86900 BLOOD TYPING SEROLOGIC ABO: CPT | Performed by: NURSE PRACTITIONER

## 2023-07-24 PROCEDURE — 25010000002 ALBUMIN HUMAN 25% PER 50 ML: Performed by: INTERNAL MEDICINE

## 2023-07-24 PROCEDURE — P9047 ALBUMIN (HUMAN), 25%, 50ML: HCPCS | Performed by: INTERNAL MEDICINE

## 2023-07-24 PROCEDURE — 86850 RBC ANTIBODY SCREEN: CPT | Performed by: NURSE PRACTITIONER

## 2023-07-24 PROCEDURE — 25010000002 ENOXAPARIN PER 10 MG: Performed by: THORACIC SURGERY (CARDIOTHORACIC VASCULAR SURGERY)

## 2023-07-24 PROCEDURE — 25010000002 CALCIUM GLUCONATE-NACL 1-0.675 GM/50ML-% SOLUTION: Performed by: NURSE PRACTITIONER

## 2023-07-24 RX ORDER — ALBUMIN (HUMAN) 12.5 G/50ML
25 SOLUTION INTRAVENOUS ONCE
Status: COMPLETED | OUTPATIENT
Start: 2023-07-24 | End: 2023-07-24

## 2023-07-24 RX ORDER — CALCIUM GLUCONATE 20 MG/ML
1000 INJECTION, SOLUTION INTRAVENOUS ONCE
Status: COMPLETED | OUTPATIENT
Start: 2023-07-24 | End: 2023-07-24

## 2023-07-24 RX ORDER — AMIODARONE HYDROCHLORIDE 200 MG/1
300 TABLET ORAL EVERY 12 HOURS SCHEDULED
Status: DISCONTINUED | OUTPATIENT
Start: 2023-07-24 | End: 2023-07-25

## 2023-07-24 RX ADMIN — CEFTRIAXONE SODIUM 1000 MG: 1 INJECTION, POWDER, FOR SOLUTION INTRAMUSCULAR; INTRAVENOUS at 10:30

## 2023-07-24 RX ADMIN — BUPROPION HYDROCHLORIDE 300 MG: 300 TABLET, EXTENDED RELEASE ORAL at 09:28

## 2023-07-24 RX ADMIN — ACETAMINOPHEN 650 MG: 325 TABLET, FILM COATED ORAL at 17:34

## 2023-07-24 RX ADMIN — ASPIRIN 81 MG: 81 TABLET, COATED ORAL at 09:28

## 2023-07-24 RX ADMIN — ENOXAPARIN SODIUM 30 MG: 100 INJECTION SUBCUTANEOUS at 09:27

## 2023-07-24 RX ADMIN — MIDODRINE HYDROCHLORIDE 5 MG: 5 TABLET ORAL at 17:31

## 2023-07-24 RX ADMIN — CALCIUM GLUCONATE 1000 MG: 20 INJECTION, SOLUTION INTRAVENOUS at 15:20

## 2023-07-24 RX ADMIN — MIDODRINE HYDROCHLORIDE 5 MG: 5 TABLET ORAL at 12:03

## 2023-07-24 RX ADMIN — AMIODARONE HYDROCHLORIDE 300 MG: 200 TABLET ORAL at 02:54

## 2023-07-24 RX ADMIN — AMIODARONE HYDROCHLORIDE 300 MG: 200 TABLET ORAL at 10:34

## 2023-07-24 RX ADMIN — MUPIROCIN 1 APPLICATION: 20 OINTMENT TOPICAL at 09:26

## 2023-07-24 RX ADMIN — AMIODARONE HYDROCHLORIDE 300 MG: 200 TABLET ORAL at 20:02

## 2023-07-24 RX ADMIN — GUAIFENESIN 1200 MG: 600 TABLET, EXTENDED RELEASE ORAL at 09:28

## 2023-07-24 RX ADMIN — LEVOTHYROXINE SODIUM 75 MCG: 0.07 TABLET ORAL at 06:07

## 2023-07-24 RX ADMIN — SENNOSIDES AND DOCUSATE SODIUM 2 TABLET: 50; 8.6 TABLET ORAL at 20:02

## 2023-07-24 RX ADMIN — MUPIROCIN 1 APPLICATION: 20 OINTMENT TOPICAL at 20:02

## 2023-07-24 RX ADMIN — Medication 10 ML: at 20:03

## 2023-07-24 RX ADMIN — GUAIFENESIN 1200 MG: 600 TABLET, EXTENDED RELEASE ORAL at 20:02

## 2023-07-24 RX ADMIN — CHLORHEXIDINE GLUCONATE 15 ML: 1.2 SOLUTION ORAL at 17:31

## 2023-07-24 RX ADMIN — Medication 10 ML: at 09:26

## 2023-07-24 RX ADMIN — PANTOPRAZOLE SODIUM 40 MG: 40 TABLET, DELAYED RELEASE ORAL at 06:07

## 2023-07-24 RX ADMIN — ATORVASTATIN CALCIUM 40 MG: 20 TABLET, FILM COATED ORAL at 20:02

## 2023-07-24 RX ADMIN — ALBUMIN (HUMAN) 25 G: 0.25 INJECTION, SOLUTION INTRAVENOUS at 12:02

## 2023-07-24 RX ADMIN — MONTELUKAST SODIUM 10 MG: 10 TABLET, FILM COATED ORAL at 09:28

## 2023-07-24 RX ADMIN — ACETAMINOPHEN 650 MG: 325 TABLET, FILM COATED ORAL at 12:52

## 2023-07-24 NOTE — PLAN OF CARE
Goal Outcome Evaluation:  Plan of Care Reviewed With: patient, family        Progress: improving  Outcome Evaluation: Pt seen for PT this AM and able to progress mobility overall. Pt sitting UIC on arrival and states she has been up to the bathroom with nsg this AM. Pt tolerated cardiac rehab exercises and stood with CGA. Pt ambulated 35ft with CGA and rwx - additional assist 2/2 equipment. Pt ambulated on RA, satting 86% on return to room and recovered quickly applying 1L O2. Pt reported feeling fatigued following, but encouraged by progress. Encouraged pt to continue getting up to bathroom with nsg and progressing gait distance into hallway slightly more with each walk. PT will continue to follow to progress mobility as tolerated. Anticipate DC home with HHPT/24/7 assist vs SNF pending progress.      Anticipated Discharge Disposition (PT): skilled nursing facility, home with 24/7 care, home with home health (Pending progress)

## 2023-07-24 NOTE — PROGRESS NOTES
" LOS: 6 days   Patient Care Team:  Rochelle Brown APRN as PCP - General (Internal Medicine)    Chief Complaint: post op    Subjective:  Symptoms:  No shortness of breath, cough or chest pain.    Diet:  Adequate intake.  No nausea or vomiting.    Activity level: Impaired due to weakness.    Pain:  She reports no pain.      Vital Signs  Temp:  [97.7 °F (36.5 °C)-98.3 °F (36.8 °C)] 98.3 °F (36.8 °C)  Heart Rate:  [] 65  Resp:  [16-17] 16  BP: ()/(42-62) 109/62  Body mass index is 36.71 kg/m².    Intake/Output Summary (Last 24 hours) at 7/24/2023 0852  Last data filed at 7/24/2023 0748  Gross per 24 hour   Intake 650 ml   Output 848 ml   Net -198 ml     I/O this shift:  In: 240 [P.O.:240]  Out: -     Chest tube drainage last 8 hours: 140/10        07/22/23  0656 07/23/23  0610 07/24/23  0500   Weight: 103 kg (226 lb 6.6 oz) (pt refused to get up this morning) 96.3 kg (212 lb 4.9 oz) 97 kg (213 lb 13.5 oz)         Objective:  General Appearance:  Comfortable and in no acute distress.    Vital signs: (most recent): Blood pressure 109/62, pulse 65, temperature 98.3 °F (36.8 °C), temperature source Oral, resp. rate 16, height 162.6 cm (64\"), weight 97 kg (213 lb 13.5 oz), SpO2 100 %, not currently breastfeeding.  Vital signs are normal.  No fever.    Output: Producing urine and no stool output.    Lungs:  Normal effort and normal respiratory rate.  There are decreased breath sounds.    Heart: Normal rate.  Regular rhythm.    Abdomen: Abdomen is soft.  Bowel sounds are normal.     Extremities: There is no dependent edema.    Pulses: Distal pulses are intact.    Neurological: Patient is alert and oriented to person, place and time.    Skin:  Warm and dry.  (Sternal dressing clean, dry, and intact)        Results Review:        WBC WBC   Date Value Ref Range Status   07/24/2023 16.11 (H) 3.40 - 10.80 10*3/mm3 Final   07/23/2023 18.06 (H) 3.40 - 10.80 10*3/mm3 Final   07/22/2023 17.46 (H) 3.40 - 10.80 10*3/mm3 Final "      HGB Hemoglobin   Date Value Ref Range Status   07/24/2023 7.9 (L) 12.0 - 15.9 g/dL Final   07/23/2023 8.4 (L) 12.0 - 15.9 g/dL Final   07/22/2023 8.8 (L) 12.0 - 15.9 g/dL Final      HCT Hematocrit   Date Value Ref Range Status   07/24/2023 23.7 (L) 34.0 - 46.6 % Final   07/23/2023 25.2 (L) 34.0 - 46.6 % Final   07/22/2023 26.0 (L) 34.0 - 46.6 % Final      Platelets Platelets   Date Value Ref Range Status   07/24/2023 155 140 - 450 10*3/mm3 Final   07/23/2023 116 (L) 140 - 450 10*3/mm3 Final   07/22/2023 98 (L) 140 - 450 10*3/mm3 Final        PT/INR:  No results found for: PROTIME/No results found for: INR    Sodium Sodium   Date Value Ref Range Status   07/24/2023 133 (L) 136 - 145 mmol/L Final   07/23/2023 135 (L) 136 - 145 mmol/L Final   07/22/2023 140 136 - 145 mmol/L Final   07/21/2023 142 136 - 145 mmol/L Final      Potassium Potassium   Date Value Ref Range Status   07/24/2023 4.6 3.5 - 5.2 mmol/L Final   07/23/2023 4.6 3.5 - 5.2 mmol/L Final   07/22/2023 4.4 3.5 - 5.2 mmol/L Final   07/22/2023 3.9 3.5 - 5.2 mmol/L Final   07/21/2023 4.1 3.5 - 5.2 mmol/L Final      Chloride Chloride   Date Value Ref Range Status   07/24/2023 102 98 - 107 mmol/L Final   07/23/2023 103 98 - 107 mmol/L Final   07/22/2023 107 98 - 107 mmol/L Final   07/21/2023 109 (H) 98 - 107 mmol/L Final      Bicarbonate CO2   Date Value Ref Range Status   07/24/2023 20.4 (L) 22.0 - 29.0 mmol/L Final   07/23/2023 22.0 22.0 - 29.0 mmol/L Final   07/22/2023 21.7 (L) 22.0 - 29.0 mmol/L Final   07/21/2023 23.0 22.0 - 29.0 mmol/L Final      BUN BUN   Date Value Ref Range Status   07/24/2023 38 (H) 8 - 23 mg/dL Final   07/23/2023 28 (H) 8 - 23 mg/dL Final   07/22/2023 20 8 - 23 mg/dL Final   07/21/2023 22 8 - 23 mg/dL Final      Creatinine Creatinine   Date Value Ref Range Status   07/24/2023 1.85 (H) 0.57 - 1.00 mg/dL Final   07/23/2023 1.56 (H) 0.57 - 1.00 mg/dL Final   07/22/2023 1.13 (H) 0.57 - 1.00 mg/dL Final   07/21/2023 1.15 (H) 0.57 -  1.00 mg/dL Final      Calcium Calcium   Date Value Ref Range Status   07/24/2023 8.2 (L) 8.6 - 10.5 mg/dL Final   07/23/2023 8.4 (L) 8.6 - 10.5 mg/dL Final   07/22/2023 8.7 8.6 - 10.5 mg/dL Final   07/21/2023 9.1 8.6 - 10.5 mg/dL Final      Magnesium Magnesium   Date Value Ref Range Status   07/21/2023 2.2 1.6 - 2.4 mg/dL Final          amiodarone, 300 mg, Oral, Q12H  aspirin, 81 mg, Oral, Daily  atorvastatin, 40 mg, Oral, Nightly  buPROPion XL, 300 mg, Oral, Daily  calcium gluconate, 1,000 mg, Intravenous, Once  cefTRIAXone, 1,000 mg, Intravenous, Q24H  chlorhexidine, 15 mL, Mouth/Throat, Q12H  enoxaparin, 30 mg, Subcutaneous, Daily  guaiFENesin, 1,200 mg, Oral, Q12H  insulin lispro, 2-9 Units, Subcutaneous, 4x Daily AC & at Bedtime  levothyroxine, 75 mcg, Oral, Q AM  metoprolol tartrate, 12.5 mg, Oral, Q12H  midodrine, 5 mg, Oral, TID AC  montelukast, 10 mg, Oral, Daily  mupirocin, , Each Nare, BID  pantoprazole, 40 mg, Oral, QAM  potassium chloride, 20 mEq, Intravenous, Once  senna-docusate sodium, 2 tablet, Oral, Nightly  sodium chloride, 10 mL, Intravenous, Q12H      sodium chloride, 30 mL/hr, Last Rate: 30 mL/hr (07/20/23 2030)  sodium chloride, 75 mL/hr, Last Rate: Stopped (07/20/23 2135)        Coronary heart disease    NSTEMI (non-ST elevated myocardial infarction)    Abnormal findings on diagnostic imaging of heart and coronary circulation      Assessment & Plan  -Severe multivessel CAD s/p off-pump CABG x3 POD#4 [place]  -Moderate MR  -Atrial fibrillation  -Ischemic cardiomyopathy---EF 36%; GDMT as tolerated  -HTN  -HLD  -DM II  -Hypothyroidism   -Former tobacco abuse   -chronic anemia worsened by post op ABL   -TCP--consumptive  -post-op leukocytosis--reactive; trending down     Feeling better this morning, sitting up in the chair  BP has been soft. Midodrine started yesterday. Hgb down to 7.9. will transfuse 1 unit  On 1L NC--wean as able  With urinary retention yesterday. Morgan catheter replaced.  UA suggestive of UTI--will start rocephin. Consult urology  Creatinine continuing to increase. Will hold diuretics this morning until seen by nephrology  Has remained in SR. Continue oral amiodarone and low dose beta blocker  Mobilize/encourage pulmonary toilet--continue mucinex/flutter valves  No BM since surgery. Will give suppository this afternoon if unable to have BM  Discussed chest tubes and wires with Dr. Alexander. Discontinue chest tubes and epicardial wires  Continue routine care    LONA Mosqueda  07/24/23  08:52 EDT

## 2023-07-24 NOTE — CONSULTS
FIRST UROLOGY CONSULT      Patient Identification:  NAME:  Ann-Marie Hughes  Age:  77 y.o.   Sex:  female   :  1945   MRN:  [id number]       Chief complaint: Retention    History of present illness:    Ann-Marie Hughes is a 77 y.o. patient with:    1. Urinary retention- reports long hx of difficulty voiding, cortez placed 23 after bladder scan showing 1300 cc    2. DAVID- sCr 1.85 (baseline 0.7-1.0), episode of hypotension    3. UTI- UA w/ blood and leuks, last culture proven UTI in system E. Coli 20    H/o constipation, last BM was yesterday  No GH   UA w/ blood and leuks, Ucx pending        Lab Results   Component Value Date    CREATININE 1.85 (H) 2023     Lab Results   Component Value Date    WBC 16.11 (H) 2023             Past medical history:  Past Medical History:   Diagnosis Date    Acne rosacea 2021    DR.JEFF MOON     Arteriosclerosis of abdominal aorta     B12 deficiency 2021    DJD (degenerative joint disease)     Hx of smoking 2021    QUIT IN  AT AGE 31    Hyperlipidemia 2021    Hypertension     Hypothyroidism     Metabolic syndrome 2021    KAREN (stress urinary incontinence, female) 2021    UTI (urinary tract infection) 2021    Vitamin D deficiency 2021    Vitamin D deficiency        Past surgical history:  Past Surgical History:   Procedure Laterality Date    CARDIAC CATHETERIZATION N/A 2023    Procedure: Left Heart Cath;  Surgeon: Dinh Andres MD;  Location: [facility] INVASIVE LOCATION;  Service: Cardiovascular;  Laterality: N/A;    COLONOSCOPY      CORONARY ARTERY BYPASS GRAFT WITH MITRAL VALVE REPAIR/REPLACEMENT N/A 2023    Procedure: REZA STERNOTOMY OFF-PUMP CORONARY ARTERY BYPASS GRAFT TIMES        USING LEFFT INTERNAL MAMMARY ARTERY AND     GREATER SAPHENOUS VEIN GRAFT PER EMDOSCOPIC VEIN HARVESTING, MAZE PROCEDURE AND PRP;  Surgeon: Shane Alexander MD;  Location: [facility]  CV[facility];  Service: Cardiothoracic;  Laterality: N/A;       Allergies:  Penicillins    Home medications:  Medications Prior to Admission   Medication Sig Dispense Refill Last Dose    aspirin 81 MG EC tablet Take 1 tablet by mouth Daily.   7/17/2023    albuterol sulfate  (90 Base) MCG/ACT inhaler Inhale 2 puffs Every 4 (Four) Hours As Needed for Wheezing. 18 g 0     amLODIPine (NORVASC) 5 MG tablet Take 1 tablet by mouth Daily As Needed (For systolic blood pressure 140 or greater). 90 tablet 1     atorvastatin (LIPITOR) 10 MG tablet Take 1 tablet by mouth Daily. 90 tablet 1     AZO-CRANBERRY PO Take 1 tablet by mouth Daily.       buPROPion XL (WELLBUTRIN XL) 300 MG 24 hr tablet TAKE 1 TABLET BY MOUTH DAILY 90 tablet 1     carvedilol (COREG) 6.25 MG tablet Take 1 tablet by mouth 2 (Two) Times a Day. 180 tablet 3     dapagliflozin Propanediol (Farxiga) 10 MG tablet Take 10 mg by mouth Daily. 30 tablet 3     furosemide (Lasix) 40 MG tablet Take 1 tablet by mouth Daily. 30 tablet 0     metFORMIN ER (GLUCOPHAGE-XR) 500 MG 24 hr tablet TAKE 1 TABLET BY MOUTH EVERY MORNING AND 2 TABLETS AT SUPPER (Patient taking differently: 1 tablet Daily With Breakfast.) 270 tablet 4 7/16/2023    metFORMIN ER (GLUCOPHAGE-XR) 500 MG 24 hr tablet Take 2 tablets by mouth Daily With Dinner.       montelukast (SINGULAIR) 10 MG tablet Take 1 tablet by mouth Daily. 90 tablet 3     Probiotic Product (PROBIOTIC PO) Take 1 capsule by mouth Daily.       sacubitril-valsartan (Entresto)  MG tablet Take 1 tablet by mouth 2 (Two) Times a Day. 180 tablet 3     spironolactone (ALDACTONE) 25 MG tablet Take 1 tablet by mouth Daily. 90 tablet 3     Synthroid 75 MCG tablet Take 1 tablet by mouth Daily. 90 tablet 3         Hospital medications:  amiodarone, 300 mg, Oral, Q12H  aspirin, 81 mg, Oral, Daily  atorvastatin, 40 mg, Oral, Nightly  buPROPion XL, 300 mg, Oral, Daily  calcium gluconate, 1,000 mg, Intravenous, Once  cefTRIAXone, 1,000 mg,  Intravenous, Q24H  chlorhexidine, 15 mL, Mouth/Throat, Q12H  enoxaparin, 30 mg, Subcutaneous, Daily  guaiFENesin, 1,200 mg, Oral, Q12H  insulin lispro, 2-9 Units, Subcutaneous, 4x Daily AC & at Bedtime  levothyroxine, 75 mcg, Oral, Q AM  midodrine, 5 mg, Oral, TID AC  montelukast, 10 mg, Oral, Daily  mupirocin, , Each Nare, BID  pantoprazole, 40 mg, Oral, QAM  senna-docusate sodium, 2 tablet, Oral, Nightly  sodium chloride, 10 mL, Intravenous, Q12H           acetaminophen **OR** acetaminophen **OR** acetaminophen    ALPRAZolam    bisacodyl    bisacodyl    cyclobenzaprine    dextrose    dextrose    glucagon (human recombinant)    HYDROcodone-acetaminophen    magnesium hydroxide    Morphine **AND** naloxone    nitroglycerin    nitroglycerin    ondansetron    polyethylene glycol    Potassium Replacement - Follow Nurse / BPA Driven Protocol    sodium chloride    Family history:  History reviewed. No pertinent family history.    Social history:  Social History     Tobacco Use    Smoking status: Former     Packs/day: 1.00     Years: 12.00     Pack years: 12.00     Types: Cigarettes     Quit date:      Years since quittin.5    Smokeless tobacco: Never   Vaping Use    Vaping Use: Never used   Substance Use Topics    Alcohol use: Never    Drug use: Never       REVIEW OF SYSTEMS:  Constitutional - Negative for fevers/chills  Eyes/Ears/Nose/Mouth/Throat - Negative for changes in vision  Cardiovascular - Negative for chest pain, dysrhythmia  Respiratory - Negative for dyspnea  Gastrointestinal - Negative for nausea or vomiting  Genitourinary - Negative for dysuria  Hematologic/Lymphatic - Negative for bruising  Skin - Negative for erythema  Endocrine - Negative for polyuria    Objective:  TMax 24 hours:   Temp (24hrs), Av.1 °F (36.7 °C), Min:97.7 °F (36.5 °C), Max:98.7 °F (37.1 °C)      Vitals Ranges:   Temp:  [97.7 °F (36.5 °C)-98.7 °F (37.1 °C)] 98 °F (36.7 °C)  Heart Rate:  [] 65  Resp:  [16-17] 16  BP:  (91109)/(4376) 107/59    Intake/Output Last 3 shifts:  I/O last 3 completed shifts:  In: 530 [P.O.:530]  Out: 1253 [Urine:550; Chest Tube:703]       Results review:   I reviewed the patient's new clinical results.    Data review:  Lab Results (last 24 hours)       Procedure Component Value Units Date/Time    POC Glucose Once [[id number]]  (Normal) Collected: 07/24/23 1104    Specimen: Blood Updated: 07/24/23 1105     Glucose 130 mg/dL      Comment: Meter: [id number] : [id number] Heather HURD       Urine Culture - Urine, Urine, Clean Catch [[id number]] Collected: 07/23/23 1346    Specimen: Urine, Clean Catch Updated: 07/24/23 0911    POC Glucose Once [[id number]]  (Normal) Collected: 07/24/23 0650    Specimen: Blood Updated: 07/24/23 0651     Glucose 108 mg/dL      Comment: Meter: [id number] : [id number] Pierre HURD       Renal Function Panel [[id number]]  (Abnormal) Collected: 07/24/23 0249    Specimen: Blood Updated: 07/24/23 0346     Glucose 97 mg/dL      BUN 38 mg/dL      Creatinine 1.85 mg/dL      Sodium 133 mmol/L      Potassium 4.6 mmol/L      Chloride 102 mmol/L      CO2 20.4 mmol/L      Calcium 8.2 mg/dL      Albumin 3.1 g/dL      Phosphorus 3.5 mg/dL      Anion Gap 10.6 mmol/L      BUN/Creatinine Ratio 20.5     eGFR 27.6 mL/min/1.73     Narrative:      GFR Normal >60  Chronic Kidney Disease <60  Kidney Failure <15    The GFR formula is only valid for adults with stable renal function between ages 18 and 70.    Uric Acid [[id number]]  (Abnormal) Collected: 07/24/23 0249    Specimen: Blood Updated: 07/24/23 0343     Uric Acid 9.5 mg/dL     CBC (No Diff) [[id number]]  (Abnormal) Collected: 07/24/23 0249    Specimen: Blood Updated: 07/24/23 0325     WBC 16.11 10*3/mm3      RBC 2.58 10*6/mm3      Hemoglobin 7.9 g/dL      Hematocrit 23.7 %      MCV 91.9 fL      MCH 30.6 pg      MCHC 33.3 g/dL      RDW 15.6 %      RDW-SD 51.4 fl      MPV 10.7 fL      Platelets 155 10*3/mm3     POC Glucose Once  [[id number]]  (Abnormal) Collected: 07/23/23 2015    Specimen: Blood Updated: 07/23/23 2015     Glucose 150 mg/dL      Comment: Meter: [id number] : [id number] Pierre HURD       POC Glucose Once [[id number]]  (Normal) Collected: 07/23/23 1610    Specimen: Blood Updated: 07/23/23 1611     Glucose 118 mg/dL      Comment: Meter: [id number] : [id number] [place] PCA                Imaging:  Imaging Results (Last 24 Hours)       Procedure Component Value Units Date/Time    XR Chest 1 View [[id number]] Collected: 07/24/23 1426     Updated: 07/24/23 1426    Narrative:      PORTABLE CHEST X-RAY     HISTORY: Chest tube removal.     TECHNIQUE: Portable chest x-ray 1:20 PM 07/24/2023 correlated with the  chest x-ray from yesterday morning.     FINDINGS: There are sternal wires with an atrial appendage occlusion  device and cervical fusion hardware. Right IJ introducer and mediastinal  drains have been removed. No significant pneumothorax. Cardiac  silhouette is enlarged but unchanged. Vascular volume is normal.       Impression:      No significant pneumothorax is present following chest tube  removal.                  Assessment:       Coronary heart disease    NSTEMI (non-ST elevated myocardial infarction)    Abnormal findings on diagnostic imaging of heart and coronary circulation      1. Urinary retention- reports long hx of difficulty voiding, cortez placed 7/23/23 after bladder scan showing 1300 cc    2. DAVID- sCr 1.85 (baseline 0.7-1.0), episode of hypotension    3. UTI- UA w/ blood and leuks, last culture proven UTI in system E. Coli 8/6/20    Plan:  - Cont Rocephin until Ucx has turned over  - Urinary retention retention is common for hospitalized patients due to the effects of recumbency and polypharmacy  - Continue indwelling catheter for now. Plan voiding trial once patient is ambulatory. Place an indwelling catheter upon discharge if patient unable to urinate or PVR > 250.  - If the patient is  discharged with an indwelling catheter, please arrange an outpatient consult in the urology office: [phone].  - Please call with questions or concerns.      Leo Coulter MD  07/24/23  14:51 EDT

## 2023-07-24 NOTE — PROGRESS NOTES
"   LOS: 6 days     Chief Complaint/ Reason for encounter: Postop DAVID    Subjective   07/24/23 : Patient is doing well today with no new complaints  Good appetite with no nausea or vomiting  No shortness of breath chest pain or edema  Voiding well with no dysuria  Morgan replaced yesterday.  UA abnormal, urine culture sent and antibiotics started  Lines and tubes to be removed later today  Urine output remained low overnight        Medical history reviewed:  History of Present Illness    Subjective    History taken from: Patient and chart    Vital Signs  Temp:  [97.7 °F (36.5 °C)-98.3 °F (36.8 °C)] 98.3 °F (36.8 °C)  Heart Rate:  [] 65  Resp:  [16-17] 16  BP: ()/(42-62) 109/62       Wt Readings from Last 1 Encounters:   07/24/23 0500 97 kg (213 lb 13.5 oz)   07/23/23 0610 96.3 kg (212 lb 4.9 oz)   07/22/23 0656 103 kg (226 lb 6.6 oz)   07/21/23 0437 96.1 kg (211 lb 13.8 oz)   07/20/23 0937 90 kg (198 lb 6.6 oz)   07/20/23 0532 90.4 kg (199 lb 3.2 oz)   07/18/23 0410 89.3 kg (196 lb 12.8 oz)       Objective:  Vital signs: (most recent): Blood pressure 109/62, pulse 65, temperature 98.3 °F (36.8 °C), temperature source Oral, resp. rate 16, height 162.6 cm (64\"), weight 97 kg (213 lb 13.5 oz), SpO2 100 %, not currently breastfeeding.              Objective:  General Appearance:  Comfortable, mildly ill-appearing, in no acute distress and not in pain.  Awake, alert, oriented  HEENT: Mucous membranes moist, no injury, oropharynx clear  Lungs:  Normal effort and normal respiratory rate.  Breath sounds clear to auscultation.  No  respiratory distress.  No rales, decreased breath sounds or rhonchi.    Heart: Normal rate.  Regular rhythm.  S1, S2 normal.  No murmur.   Abdomen: Abdomen is soft.  Bowel sounds are normal, no abdominal tenderness.  There is no rebound or guarding  Extremities: Trace edema of bilateral lower extremities  Neurological: No focal motor or sensory deficits, pupils reactive  Skin:  Warm and " dry.  No rash or cyanosis.       Results Review:    Intake/Output:     Intake/Output Summary (Last 24 hours) at 7/24/2023 0931  Last data filed at 7/24/2023 0748  Gross per 24 hour   Intake 650 ml   Output 848 ml   Net -198 ml         DATA:  Radiology and Labs:  The following labs independently reviewed by me. Additional labs ordered for tomorrow a.m.  Interval notes, chart personally reviewed by me.   Old records independently reviewed showing normal baseline creatinine around 1.0  The following radiologic studies independently viewed by me, findings chest x-ray yesterday with some atelectasis that was improved on exam  New problems include DAVID postop CABG  Discussed with patient herself at bedside    Risk/ complexity of medical care/ medical decision making high complexity, postop acute renal failure    Labs:   Recent Results (from the past 24 hour(s))   POC Glucose Once    Collection Time: 07/23/23 11:04 AM    Specimen: Blood   Result Value Ref Range    Glucose 144 (H) 70 - 130 mg/dL   Urinalysis With Microscopic If Indicated (No Culture) - Urine, Clean Catch    Collection Time: 07/23/23  1:46 PM    Specimen: Urine, Clean Catch   Result Value Ref Range    Color, UA Dark Yellow (A) Yellow, Straw    Appearance, UA Turbid (A) Clear    pH, UA <=5.0 5.0 - 8.0    Specific Gravity, UA 1.020 1.005 - 1.030    Glucose, UA Negative Negative    Ketones, UA Negative Negative    Bilirubin, UA Negative Negative    Blood, UA Large (3+) (A) Negative    Protein,  mg/dL (2+) (A) Negative    Leuk Esterase, UA Large (3+) (A) Negative    Nitrite, UA Negative Negative    Urobilinogen, UA 0.2 E.U./dL 0.2 - 1.0 E.U./dL   Sodium, Urine, Random - Urine, Clean Catch    Collection Time: 07/23/23  1:46 PM    Specimen: Urine, Clean Catch   Result Value Ref Range    Sodium, Urine 28 mmol/L   Protein, Urine, Random - Urine, Clean Catch    Collection Time: 07/23/23  1:46 PM    Specimen: Urine, Clean Catch   Result Value Ref Range    Total  Protein, Urine 55.1 mg/dL   Creatinine Urine Random (kidney function) GFR component - Urine, Clean Catch    Collection Time: 07/23/23  1:46 PM    Specimen: Urine, Clean Catch   Result Value Ref Range    Creatinine, Urine 96.4 mg/dL   Urinalysis, Microscopic Only - Urine, Clean Catch    Collection Time: 07/23/23  1:46 PM    Specimen: Urine, Clean Catch   Result Value Ref Range    RBC, UA Too Numerous to Count (A) None Seen, 0-2 /HPF    WBC, UA Too Numerous to Count (A) None Seen, 0-2 /HPF    Bacteria, UA 1+ (A) None Seen /HPF    Squamous Epithelial Cells, UA 0-2 None Seen, 0-2 /HPF    Yeast, UA Large/3+ Budding Yeast w/Hyphae None Seen /HPF    Hyaline Casts, UA None Seen None Seen /LPF    Methodology Manual Light Microscopy    POC Glucose Once    Collection Time: 07/23/23  4:10 PM    Specimen: Blood   Result Value Ref Range    Glucose 118 70 - 130 mg/dL   ECG 12 Lead Rhythm Change    Collection Time: 07/23/23  5:18 PM   Result Value Ref Range    QT Interval 358 ms   POC Glucose Once    Collection Time: 07/23/23  8:15 PM    Specimen: Blood   Result Value Ref Range    Glucose 150 (H) 70 - 130 mg/dL   CBC (No Diff)    Collection Time: 07/24/23  2:49 AM    Specimen: Blood   Result Value Ref Range    WBC 16.11 (H) 3.40 - 10.80 10*3/mm3    RBC 2.58 (L) 3.77 - 5.28 10*6/mm3    Hemoglobin 7.9 (L) 12.0 - 15.9 g/dL    Hematocrit 23.7 (L) 34.0 - 46.6 %    MCV 91.9 79.0 - 97.0 fL    MCH 30.6 26.6 - 33.0 pg    MCHC 33.3 31.5 - 35.7 g/dL    RDW 15.6 (H) 12.3 - 15.4 %    RDW-SD 51.4 37.0 - 54.0 fl    MPV 10.7 6.0 - 12.0 fL    Platelets 155 140 - 450 10*3/mm3   Renal Function Panel    Collection Time: 07/24/23  2:49 AM    Specimen: Blood   Result Value Ref Range    Glucose 97 65 - 99 mg/dL    BUN 38 (H) 8 - 23 mg/dL    Creatinine 1.85 (H) 0.57 - 1.00 mg/dL    Sodium 133 (L) 136 - 145 mmol/L    Potassium 4.6 3.5 - 5.2 mmol/L    Chloride 102 98 - 107 mmol/L    CO2 20.4 (L) 22.0 - 29.0 mmol/L    Calcium 8.2 (L) 8.6 - 10.5 mg/dL     Albumin 3.1 (L) 3.5 - 5.2 g/dL    Phosphorus 3.5 2.5 - 4.5 mg/dL    Anion Gap 10.6 5.0 - 15.0 mmol/L    BUN/Creatinine Ratio 20.5 7.0 - 25.0    eGFR 27.6 (L) >60.0 mL/min/1.73   Uric Acid    Collection Time: 07/24/23  2:49 AM    Specimen: Blood   Result Value Ref Range    Uric Acid 9.5 (H) 2.4 - 5.7 mg/dL   ECG 12 Lead Drug Monitoring; Amiodarone    Collection Time: 07/24/23  3:40 AM   Result Value Ref Range    QT Interval 439 ms   POC Glucose Once    Collection Time: 07/24/23  6:50 AM    Specimen: Blood   Result Value Ref Range    Glucose 108 70 - 130 mg/dL       Radiology:  Pertinent radiology studies were reviewed as described above      Medications have been reviewed separately in chart overview      ASSESSMENT:  DAVID postop. On top of CKD stage IIIa.  Baseline creatinine around 1.0.  Likely due to hypotension with decreased renal perfusion.  She does have's vascular congestion on chest x-ray and agree with continued IV diuretics.  Hypotension on midodrine.  Entresto is currently on hold  CAD status post three-vessel CABG  NSTEMI  Combined systolic and diastolic CHF  Diabetes mellitus type 2  Hypothyroidism      DISCUSSION/PLAN:  Renal function a bit worse today  Agree with holding diuretics  Will repeat IV albumin today  Calcium corrects to normal with low albumin  Urine studies seem more consistent with ATN with low urine creatinine.  Likely from recent hypotension    Continue midodrine and monitor BP closely  Hold potassium while off diuretics  Bladder scan seem to be inaccurate as only about 300 cc of urine were obtained after Morgan was placed  Defer Morgan management to urology    Continue to monitor electrolytes and volume closely, avoid IV contrast and nephrotoxic medications      Anderson Parker MD  [facility]  Office phone number: [phone]  Answering service phone number: [phone]    07/24/23  09:31 EDT    Dictation performed using Dragon dictation software

## 2023-07-24 NOTE — THERAPY TREATMENT NOTE
Patient Name: Ann-Marie Hughes  : 1945    MRN: [id number]                              Today's Date: 2023       Admit Date: 2023    Visit Dx:     ICD-10-CM ICD-9-CM   1. NSTEMI (non-ST elevated myocardial infarction)  I21.4 410.70   2. Chest pain, unspecified type  R07.9 786.50   3. Abnormal findings on diagnostic imaging of heart and coronary circulation  R93.1 794.39   4. Coronary artery disease of native artery of native heart with stable angina pectoris  I25.118 414.01     413.9   5. S/P CABG (coronary artery bypass graft)  Z95.1 V45.81     Patient Active Problem List   Diagnosis    Arthritis    Bilateral leg pain    Bladder disorder    Chronic pain syndrome    Depression    Diabetes mellitus, type II    Type 2 diabetes mellitus with hyperglycemia    Gastric reflux    Herniated disc    Hypertension    Hypothyroidism    Low back pain    Pain in joint, multiple sites    Pain in soft tissues of limb    Shortness of breath    Hyperlipidemia    B12 deficiency    Vitamin D deficiency    UTI (urinary tract infection)    Metabolic syndrome    Acne rosacea    Hx of smoking    Stress incontinence of urine    Arteriosclerosis of abdominal aorta    Iron deficiency anemia secondary to inadequate dietary iron intake    Seasonal allergies    Hyponatremia    Leg length discrepancy    Type 2 diabetes mellitus with hyperglycemia    Reactive depression    Encounter for subsequent annual wellness visit (AWV) in Medicare patient    Class 1 obesity with alveolar hypoventilation, serious comorbidity, and body mass index (BMI) of 32.0 to 32.9 in adult    Heart failure with reduced ejection fraction    Coronary artery disease of native artery of native heart with stable angina pectoris    Chest pain    Chronic HFrEF (heart failure with reduced ejection fraction)    NSTEMI (non-ST elevated myocardial infarction)    Coronary heart disease    Abnormal findings on diagnostic imaging of heart and coronary circulation      Past Medical History:   Diagnosis Date    Acne rosacea 08/17/2021    DR.JEFF MOON     Arteriosclerosis of abdominal aorta     B12 deficiency 08/17/2021    DJD (degenerative joint disease)     Hx of smoking 08/17/2021    QUIT IN 1976 AT AGE 31    Hyperlipidemia 08/17/2021    Hypertension     Hypothyroidism     Metabolic syndrome 08/17/2021    KAREN (stress urinary incontinence, female) 08/17/2021    UTI (urinary tract infection) 08/17/2021    Vitamin D deficiency 08/17/2021    Vitamin D deficiency      Past Surgical History:   Procedure Laterality Date    CARDIAC CATHETERIZATION N/A 7/17/2023    Procedure: Left Heart Cath;  Surgeon: Dinh Andres MD;  Location: [facility] CATH INVASIVE LOCATION;  Service: Cardiovascular;  Laterality: N/A;    COLONOSCOPY  2011    CORONARY ARTERY BYPASS GRAFT WITH MITRAL VALVE REPAIR/REPLACEMENT N/A 7/20/2023    Procedure: REZA STERNOTOMY OFF-PUMP CORONARY ARTERY BYPASS GRAFT TIMES        USING LEFFT INTERNAL MAMMARY ARTERY AND     GREATER SAPHENOUS VEIN GRAFT PER EMDOSCOPIC VEIN HARVESTING, MAZE PROCEDURE AND PRP;  Surgeon: Shane Alexander MD;  Location: [facility];  Service: Cardiothoracic;  Laterality: N/A;      General Information       Row Name 07/24/23 1345          Physical Therapy Time and Intention    Document Type therapy note (daily note)  -     Mode of Treatment individual therapy;physical therapy  -       Row Name 07/24/23 8915          General Information    Patient Profile Reviewed yes  -     Existing Precautions/Restrictions fall;sternal;cardiac  -       Row Name 07/24/23 1345          Cognition    Orientation Status (Cognition) oriented x 4  -       Row Name 07/24/23 1345          Safety Issues, Functional Mobility    Impairments Affecting Function (Mobility) balance;shortness of breath;endurance/activity tolerance;pain;strength  -               User Key  (r) = Recorded By, (t) = Taken By, (c) = Cosigned By      Initials Name Provider Type      Yaquelin Johnson PT Physical Therapist                   Mobility       Row Name 07/24/23 1345          Bed Mobility    Supine-Sit Chaves (Bed Mobility) not tested  -     Sit-Supine Chaves (Bed Mobility) not tested  -     Comment, (Bed Mobility) NT - UIC  -       Row Name 07/24/23 1345          Sit-Stand Transfer    Sit-Stand Chaves (Transfers) verbal cues;contact guard  -     Assistive Device (Sit-Stand Transfers) walker, front-wheeled  -       Row Name 07/24/23 1345          Gait/Stairs (Locomotion)    Chaves Level (Gait) contact guard;verbal cues  -     Assistive Device (Gait) walker, front-wheeled  -     Distance in Feet (Gait) 35ft  -     Deviations/Abnormal Patterns (Gait) festinating/shuffling;gait speed decreased;stride length decreased;rogerio decreased  -     Bilateral Gait Deviations forward flexed posture;heel strike decreased  -               User Key  (r) = Recorded By, (t) = Taken By, (c) = Cosigned By      Initials Name Provider Type    Yaquelin Hernandez PT Physical Therapist                   Obj/Interventions       Row Name 07/24/23 1340          Motor Skills    Therapeutic Exercise --  10 reps cardiac rehab protocol  -               User Key  (r) = Recorded By, (t) = Taken By, (c) = Cosigned By      Initials Name Provider Type    Yaquelin Hernandez PT Physical Therapist                   Goals/Plan    No documentation.                  Clinical Impression       Row Name 07/24/23 1343          Plan of Care Review    Plan of Care Reviewed With patient;family  -     Progress improving  -     Outcome Evaluation Pt seen for PT this AM and able to progress mobility overall. Pt sitting UIC on arrival and states she has been up to the bathroom with nsg this AM. Pt tolerated cardiac rehab exercises and stood with CGA. Pt ambulated 35ft with CGA and rwx - additional assist 2/2 equipment. Pt ambulated on RA, satting 86% on return to room and  recovered quickly applying 1L O2. Pt reported feeling fatigued following, but encouraged by progress. Encouraged pt to continue getting up to bathroom with nsg and progressing gait distance into hallway slightly more with each walk. PT will continue to follow to progress mobility as tolerated. Anticipate DC home with PT/24/7 assist vs SNF pending progress.  -[facility] Name 07/24/23 1346          Vital Signs    Pre SpO2 (%) 94  -     O2 Delivery Pre Treatment room air  -     Intra SpO2 (%) 86  -     O2 Delivery Intra Treatment supplemental O2  -     Post SpO2 (%) 93  -     O2 Delivery Post Treatment room air  -       Row Name 07/24/23 1346          Positioning and Restraints    Pre-Treatment Position sitting in chair/recliner  -     Post Treatment Position chair  -     In Chair reclined;call light within reach;encouraged to call for assist;with family/caregiver  -               User Key  (r) = Recorded By, (t) = Taken By, (c) = Cosigned By      Initials Name Provider Type     Yaquelin Johnson, PT Physical Therapist                   Outcome Measures       Row Name 07/24/23 1350          How much help from another person do you currently need...    Turning from your back to your side while in flat bed without using bedrails? 3  -BH     Moving from lying on back to sitting on the side of a flat bed without bedrails? 3  -BH     Moving to and from a bed to a chair (including a wheelchair)? 3  -BH     Standing up from a chair using your arms (e.g., wheelchair, bedside chair)? 3  -BH     Climbing 3-5 steps with a railing? 3  -BH     To walk in hospital room? 3  -     AM-PAC 6 Clicks Score (PT) 18  -     Highest level of mobility 6 --> Walked 10 steps or more  -       Row Name 07/24/23 1350          Functional Assessment    Outcome Measure Options AM-PAC 6 Clicks Basic Mobility (PT)  -               User Key  (r) = Recorded By, (t) = Taken By, (c) = Cosigned By      Initials Name Provider  Type     Yaquelin Johnson PT Physical Therapist                                 Physical Therapy Education       Title: PT OT SLP Therapies (In Progress)       Topic: Physical Therapy (Done)       Point: Mobility training (Done)       Learning Progress Summary             Patient Acceptance, E,TB,D, VU,NR by  at 7/24/2023 1350    Acceptance, E, VU,NR by  at 7/21/2023 1149                         Point: Home exercise program (Done)       Learning Progress Summary             Patient Acceptance, E,TB,D, VU,NR by  at 7/24/2023 1350    Acceptance, E, VU,NR by  at 7/21/2023 1149                         Point: Body mechanics (Done)       Learning Progress Summary             Patient Acceptance, E,TB,D, VU,NR by  at 7/24/2023 1350    Acceptance, E, VU,NR by  at 7/21/2023 1149                         Point: Precautions (Done)       Learning Progress Summary             Patient Acceptance, E,TB,D, VU,NR by  at 7/24/2023 1350    Acceptance, E, VU,NR by  at 7/21/2023 1149                                         User Key       Initials Effective Dates Name Provider Type Discipline     06/16/21 -  Hannah Ramírez, PT Physical Therapist PT     04/08/22 -  Yaquelin Johnson PT Physical Therapist PT                  PT Recommendation and Plan     Plan of Care Reviewed With: patient, family  Progress: improving  Outcome Evaluation: Pt seen for PT this AM and able to progress mobility overall. Pt sitting UIC on arrival and states she has been up to the bathroom with nsg this AM. Pt tolerated cardiac rehab exercises and stood with CGA. Pt ambulated 35ft with CGA and rwx - additional assist 2/2 equipment. Pt ambulated on RA, satting 86% on return to room and recovered quickly applying 1L O2. Pt reported feeling fatigued following, but encouraged by progress. Encouraged pt to continue getting up to bathroom with nsg and progressing gait distance into hallway slightly more with each walk. PT will continue to  follow to progress mobility as tolerated. Anticipate DC home with HHPT/24/7 assist vs SNF pending progress.     Time Calculation:         PT Charges       Row Name 07/24/23 1351             Time Calculation    Start Time 0932  -      Stop Time 0944  -      Time Calculation (min) 12 min  -      PT Received On 07/24/23  -      PT - Next Appointment 07/25/23  -         Time Calculation- PT    Total Timed Code Minutes- PT 12 minute(s)  -         Timed Charges    50912 - Gait Training Minutes  8  -      78590 - PT Therapeutic Activity Minutes 4  -         Total Minutes    Timed Charges Total Minutes 12  -       Total Minutes 12  -                User Key  (r) = Recorded By, (t) = Taken By, (c) = Cosigned By      Initials Name Provider Type     Yaquelin Johnson, PT Physical Therapist                  Therapy Charges for Today       Code Description Service Date Service Provider Modifiers Qty    [id number] HC GAIT TRAINING EA 15 MIN 7/24/2023 Yaquelin Johnson, PT GP 1            PT G-Codes  Outcome Measure Options: AM-PAC 6 Clicks Basic Mobility (PT)  AM-PAC 6 Clicks Score (PT): 18  AM-PAC 6 Clicks Score (OT): 10  Modified Joshua Scale: 5 - Severe disability.  Bedridden, incontinent, and requiring constant nursing care and attention.  PT Discharge Summary  Anticipated Discharge Disposition (PT): skilled nursing facility, home with 24/7 care, home with home health (Pending progress)    Yaquelin Johnson, PT  7/24/2023

## 2023-07-24 NOTE — CASE MANAGEMENT/SOCIAL WORK
Continued Stay Note  [facility]     Patient Name: Ann-Marie Hughes  MRN: [id number]  Today's Date: 7/24/2023    Admit Date: 7/18/2023    Plan: Home; Family needs to pick HH agency (they have the list)   Discharge Plan       Row Name 07/24/23 1716       Plan    Plan Home; Family needs to pick HH agency (they have the list)    Provided Post Acute Provider Quality & Resource List? Yes    Delivered To Support Person    Support Person edwin Wren    Method of Delivery In person    Plan Comments CCP spoke with Pt and her son Holger at bedside to discuss Pt d/c plan.  Pt reports she plans to go home at discharge.  CCP gave son Holger a CMS compare home [facility] (HH), list for [facility].  Family to give CCP their HH choice tomorrow.  CCP following............Denice GONZÁLES/FELICIA CM                   Discharge Codes    No documentation.                 Expected Discharge Date and Time       Expected Discharge Date Expected Discharge Time    Jul 26, 2023               Denice Pablo RN

## 2023-07-24 NOTE — PLAN OF CARE
Goal Outcome Evaluation:  Plan of Care Reviewed With: patient, daughter        Progress: improving  Outcome Evaluation: Pt S/P CABGx3,POD#4 and on 1L of O2, All VSS, SR 60s - 70s in the monitor. Pt had 1 unit of PRBCs  this shift, toletated well. Pain treated per MAR. CT and pacer wires d/c per order. Up with 1 assist. Cortez still in place, cortez care completed. NO other issues this shift.

## 2023-07-24 NOTE — CONSULTS
Met with patient and her son discussed benefits of cardiac rehab. Provided phase II information along with the contact information for cardiac rehab at [facility]. Requested for referral to be sent. Explained if receiving home health would not be able to attend cardiac rehab until finished with home health.

## 2023-07-25 ENCOUNTER — APPOINTMENT (OUTPATIENT)
Dept: GENERAL RADIOLOGY | Facility: HOSPITAL | Age: 78
DRG: 236 | End: 2023-07-25
Payer: MEDICARE

## 2023-07-25 LAB
ALBUMIN SERPL-MCNC: 3.1 G/DL (ref 3.5–5.2)
ANION GAP SERPL CALCULATED.3IONS-SCNC: 9 MMOL/L (ref 5–15)
BACTERIA SPEC AEROBE CULT: ABNORMAL
BH BB BLOOD EXPIRATION DATE: NORMAL
BH BB BLOOD TYPE BARCODE: 5100
BH BB DISPENSE STATUS: NORMAL
BH BB PRODUCT CODE: NORMAL
BH BB UNIT NUMBER: NORMAL
BUN SERPL-MCNC: 42 MG/DL (ref 8–23)
BUN/CREAT SERPL: 27.5 (ref 7–25)
CALCIUM SPEC-SCNC: 8.1 MG/DL (ref 8.6–10.5)
CHLORIDE SERPL-SCNC: 102 MMOL/L (ref 98–107)
CO2 SERPL-SCNC: 20 MMOL/L (ref 22–29)
CREAT SERPL-MCNC: 1.53 MG/DL (ref 0.57–1)
CROSSMATCH INTERPRETATION: NORMAL
DEPRECATED RDW RBC AUTO: 52.1 FL (ref 37–54)
EGFRCR SERPLBLD CKD-EPI 2021: 34.7 ML/MIN/1.73
ERYTHROCYTE [DISTWIDTH] IN BLOOD BY AUTOMATED COUNT: 16.5 % (ref 12.3–15.4)
GLUCOSE BLDC GLUCOMTR-MCNC: 106 MG/DL (ref 70–130)
GLUCOSE BLDC GLUCOMTR-MCNC: 109 MG/DL (ref 70–130)
GLUCOSE BLDC GLUCOMTR-MCNC: 143 MG/DL (ref 70–130)
GLUCOSE BLDC GLUCOMTR-MCNC: 158 MG/DL (ref 70–130)
GLUCOSE SERPL-MCNC: 104 MG/DL (ref 65–99)
HCT VFR BLD AUTO: 26.3 % (ref 34–46.6)
HGB BLD-MCNC: 8.8 G/DL (ref 12–15.9)
MCH RBC QN AUTO: 29.9 PG (ref 26.6–33)
MCHC RBC AUTO-ENTMCNC: 33.5 G/DL (ref 31.5–35.7)
MCV RBC AUTO: 89.5 FL (ref 79–97)
PHOSPHATE SERPL-MCNC: 2.9 MG/DL (ref 2.5–4.5)
PLATELET # BLD AUTO: 207 10*3/MM3 (ref 140–450)
PMV BLD AUTO: 10.7 FL (ref 6–12)
POTASSIUM SERPL-SCNC: 4.3 MMOL/L (ref 3.5–5.2)
QT INTERVAL: 437 MS
RBC # BLD AUTO: 2.94 10*6/MM3 (ref 3.77–5.28)
SODIUM SERPL-SCNC: 131 MMOL/L (ref 136–145)
UNIT  ABO: NORMAL
UNIT  RH: NORMAL
WBC NRBC COR # BLD: 12.29 10*3/MM3 (ref 3.4–10.8)

## 2023-07-25 PROCEDURE — 93005 ELECTROCARDIOGRAM TRACING: CPT | Performed by: THORACIC SURGERY (CARDIOTHORACIC VASCULAR SURGERY)

## 2023-07-25 PROCEDURE — 85027 COMPLETE CBC AUTOMATED: CPT | Performed by: NURSE PRACTITIONER

## 2023-07-25 PROCEDURE — 82948 REAGENT STRIP/BLOOD GLUCOSE: CPT

## 2023-07-25 PROCEDURE — 25010000002 ENOXAPARIN PER 10 MG: Performed by: THORACIC SURGERY (CARDIOTHORACIC VASCULAR SURGERY)

## 2023-07-25 PROCEDURE — 80069 RENAL FUNCTION PANEL: CPT | Performed by: INTERNAL MEDICINE

## 2023-07-25 PROCEDURE — 97530 THERAPEUTIC ACTIVITIES: CPT

## 2023-07-25 PROCEDURE — 25010000002 FUROSEMIDE PER 20 MG: Performed by: INTERNAL MEDICINE

## 2023-07-25 PROCEDURE — 97116 GAIT TRAINING THERAPY: CPT

## 2023-07-25 PROCEDURE — 63710000001 INSULIN LISPRO (HUMAN) PER 5 UNITS: Performed by: NURSE PRACTITIONER

## 2023-07-25 PROCEDURE — 93010 ELECTROCARDIOGRAM REPORT: CPT | Performed by: INTERNAL MEDICINE

## 2023-07-25 PROCEDURE — 71045 X-RAY EXAM CHEST 1 VIEW: CPT

## 2023-07-25 PROCEDURE — 25010000002 CEFTRIAXONE PER 250 MG: Performed by: NURSE PRACTITIONER

## 2023-07-25 RX ORDER — MIDODRINE HYDROCHLORIDE 2.5 MG/1
2.5 TABLET ORAL
Status: DISCONTINUED | OUTPATIENT
Start: 2023-07-25 | End: 2023-07-26

## 2023-07-25 RX ORDER — METOPROLOL SUCCINATE 25 MG/1
12.5 TABLET, EXTENDED RELEASE ORAL
Status: DISCONTINUED | OUTPATIENT
Start: 2023-07-25 | End: 2023-07-26

## 2023-07-25 RX ORDER — AMIODARONE HYDROCHLORIDE 200 MG/1
200 TABLET ORAL EVERY 12 HOURS SCHEDULED
Status: DISCONTINUED | OUTPATIENT
Start: 2023-07-25 | End: 2023-07-27

## 2023-07-25 RX ORDER — FUROSEMIDE 40 MG/1
40 TABLET ORAL DAILY
Status: DISCONTINUED | OUTPATIENT
Start: 2023-07-25 | End: 2023-07-25

## 2023-07-25 RX ORDER — BISACODYL 10 MG
10 SUPPOSITORY, RECTAL RECTAL ONCE
Status: COMPLETED | OUTPATIENT
Start: 2023-07-25 | End: 2023-07-25

## 2023-07-25 RX ORDER — FUROSEMIDE 10 MG/ML
40 INJECTION INTRAMUSCULAR; INTRAVENOUS EVERY 12 HOURS
Status: DISCONTINUED | OUTPATIENT
Start: 2023-07-25 | End: 2023-07-26

## 2023-07-25 RX ADMIN — METOPROLOL SUCCINATE 12.5 MG: 25 TABLET, EXTENDED RELEASE ORAL at 09:25

## 2023-07-25 RX ADMIN — FUROSEMIDE 40 MG: 10 INJECTION, SOLUTION INTRAMUSCULAR; INTRAVENOUS at 11:50

## 2023-07-25 RX ADMIN — MONTELUKAST SODIUM 10 MG: 10 TABLET, FILM COATED ORAL at 09:25

## 2023-07-25 RX ADMIN — INSULIN LISPRO 2 UNITS: 100 INJECTION, SOLUTION INTRAVENOUS; SUBCUTANEOUS at 22:21

## 2023-07-25 RX ADMIN — ENOXAPARIN SODIUM 30 MG: 100 INJECTION SUBCUTANEOUS at 09:27

## 2023-07-25 RX ADMIN — MIDODRINE HYDROCHLORIDE 2.5 MG: 2.5 TABLET ORAL at 16:38

## 2023-07-25 RX ADMIN — CEFTRIAXONE SODIUM 1000 MG: 1 INJECTION, POWDER, FOR SOLUTION INTRAMUSCULAR; INTRAVENOUS at 09:28

## 2023-07-25 RX ADMIN — SENNOSIDES AND DOCUSATE SODIUM 2 TABLET: 50; 8.6 TABLET ORAL at 22:19

## 2023-07-25 RX ADMIN — ACETAMINOPHEN 650 MG: 325 TABLET, FILM COATED ORAL at 16:37

## 2023-07-25 RX ADMIN — ATORVASTATIN CALCIUM 40 MG: 20 TABLET, FILM COATED ORAL at 22:20

## 2023-07-25 RX ADMIN — GUAIFENESIN 1200 MG: 600 TABLET, EXTENDED RELEASE ORAL at 09:25

## 2023-07-25 RX ADMIN — Medication 10 ML: at 22:22

## 2023-07-25 RX ADMIN — GUAIFENESIN 1200 MG: 600 TABLET, EXTENDED RELEASE ORAL at 22:20

## 2023-07-25 RX ADMIN — BISACODYL 10 MG: 10 SUPPOSITORY RECTAL at 12:32

## 2023-07-25 RX ADMIN — ACETAMINOPHEN 650 MG: 325 TABLET, FILM COATED ORAL at 09:25

## 2023-07-25 RX ADMIN — Medication 10 ML: at 09:27

## 2023-07-25 RX ADMIN — CHLORHEXIDINE GLUCONATE 15 ML: 1.2 SOLUTION ORAL at 16:37

## 2023-07-25 RX ADMIN — BUPROPION HYDROCHLORIDE 300 MG: 300 TABLET, EXTENDED RELEASE ORAL at 09:25

## 2023-07-25 RX ADMIN — PANTOPRAZOLE SODIUM 40 MG: 40 TABLET, DELAYED RELEASE ORAL at 06:28

## 2023-07-25 RX ADMIN — MUPIROCIN 1 APPLICATION: 20 OINTMENT TOPICAL at 22:21

## 2023-07-25 RX ADMIN — ASPIRIN 81 MG: 81 TABLET, COATED ORAL at 09:25

## 2023-07-25 RX ADMIN — LEVOTHYROXINE SODIUM 75 MCG: 0.07 TABLET ORAL at 06:28

## 2023-07-25 RX ADMIN — EMPAGLIFLOZIN 25 MG: 25 TABLET, FILM COATED ORAL at 11:51

## 2023-07-25 RX ADMIN — MUPIROCIN: 20 OINTMENT TOPICAL at 09:27

## 2023-07-25 RX ADMIN — AMIODARONE HYDROCHLORIDE 200 MG: 200 TABLET ORAL at 09:25

## 2023-07-25 RX ADMIN — AMIODARONE HYDROCHLORIDE 200 MG: 200 TABLET ORAL at 22:20

## 2023-07-25 NOTE — THERAPY TREATMENT NOTE
Patient Name: Ann-Marie Hughes  : 1945    MRN: [id number]                              Today's Date: 2023       Admit Date: 2023    Visit Dx:     ICD-10-CM ICD-9-CM   1. NSTEMI (non-ST elevated myocardial infarction)  I21.4 410.70   2. Chest pain, unspecified type  R07.9 786.50   3. Abnormal findings on diagnostic imaging of heart and coronary circulation  R93.1 794.39   4. Coronary artery disease of native artery of native heart with stable angina pectoris  I25.118 414.01     413.9   5. S/P CABG (coronary artery bypass graft)  Z95.1 V45.81     Patient Active Problem List   Diagnosis    Arthritis    Bilateral leg pain    Bladder disorder    Chronic pain syndrome    Depression    Diabetes mellitus, type II    Type 2 diabetes mellitus with hyperglycemia    Gastric reflux    Herniated disc    Hypertension    Hypothyroidism    Low back pain    Pain in joint, multiple sites    Pain in soft tissues of limb    Shortness of breath    Hyperlipidemia    B12 deficiency    Vitamin D deficiency    UTI (urinary tract infection)    Metabolic syndrome    Acne rosacea    Hx of smoking    Stress incontinence of urine    Arteriosclerosis of abdominal aorta    Iron deficiency anemia secondary to inadequate dietary iron intake    Seasonal allergies    Hyponatremia    Leg length discrepancy    Type 2 diabetes mellitus with hyperglycemia    Reactive depression    Encounter for subsequent annual wellness visit (AWV) in Medicare patient    Class 1 obesity with alveolar hypoventilation, serious comorbidity, and body mass index (BMI) of 32.0 to 32.9 in adult    Heart failure with reduced ejection fraction    Coronary artery disease of native artery of native heart with stable angina pectoris    Chest pain    Chronic HFrEF (heart failure with reduced ejection fraction)    NSTEMI (non-ST elevated myocardial infarction)    Coronary heart disease    Abnormal findings on diagnostic imaging of heart and coronary circulation      Past Medical History:   Diagnosis Date    Acne rosacea 08/17/2021    DR.JEFF MOON     Arteriosclerosis of abdominal aorta     B12 deficiency 08/17/2021    DJD (degenerative joint disease)     Hx of smoking 08/17/2021    QUIT IN 1976 AT AGE 31    Hyperlipidemia 08/17/2021    Hypertension     Hypothyroidism     Metabolic syndrome 08/17/2021    KAREN (stress urinary incontinence, female) 08/17/2021    UTI (urinary tract infection) 08/17/2021    Vitamin D deficiency 08/17/2021    Vitamin D deficiency      Past Surgical History:   Procedure Laterality Date    CARDIAC CATHETERIZATION N/A 7/17/2023    Procedure: Left Heart Cath;  Surgeon: Dinh Andres MD;  Location: [facility] CATH INVASIVE LOCATION;  Service: Cardiovascular;  Laterality: N/A;    COLONOSCOPY  2011    CORONARY ARTERY BYPASS GRAFT WITH MITRAL VALVE REPAIR/REPLACEMENT N/A 7/20/2023    Procedure: REZA STERNOTOMY OFF-PUMP CORONARY ARTERY BYPASS GRAFT TIMES        USING LEFFT INTERNAL MAMMARY ARTERY AND     GREATER SAPHENOUS VEIN GRAFT PER EMDOSCOPIC VEIN HARVESTING, MAZE PROCEDURE AND PRP;  Surgeon: Shane Alexander MD;  Location: [facility];  Service: Cardiothoracic;  Laterality: N/A;      General Information       Row Name 07/25/23 1138          Physical Therapy Time and Intention    Document Type therapy note (daily note)  -     Mode of Treatment individual therapy;physical therapy;occupational therapy  -       Row Name 07/25/23 1138          General Information    Patient Profile Reviewed yes  -     Existing Precautions/Restrictions fall;sternal;cardiac  -       Row Name 07/25/23 1138          Cognition    Orientation Status (Cognition) oriented x 4  -       Row Name 07/25/23 1138          Safety Issues, Functional Mobility    Impairments Affecting Function (Mobility) balance;shortness of breath;endurance/activity tolerance;pain;strength  -               User Key  (r) = Recorded By, (t) = Taken By, (c) = Cosigned By       Initials Name Provider Type     Yaquelin Johnson, PT Physical Therapist                   Mobility       Row Name 07/25/23 1138          Bed Mobility    Supine-Sit [place] (Bed Mobility) not tested  -     Sit-Supine [place] (Bed Mobility) not tested  -     Comment, (Bed Mobility) NT - UIC  -[facility] Name 07/25/23 1138          Sit-Stand Transfer    Sit-Stand [place] (Transfers) verbal cues;standby assist  -     Assistive Device (Sit-Stand Transfers) walker, front-wheeled  -       Row Name 07/25/23 1138          Gait/Stairs (Locomotion)    [place] Level (Gait) contact guard;verbal cues;standby assist  -     Assistive Device (Gait) walker, front-wheeled  -     Distance in Feet (Gait) 40ft  -     Deviations/Abnormal Patterns (Gait) gait speed decreased;stride length decreased;rogerio decreased  -     Bilateral Gait Deviations forward flexed posture;heel strike decreased  -     Comment, (Gait/Stairs) Fatigues quickly - needing chair pulled up at end with B knee giving but no overt buckling  -               User Key  (r) = Recorded By, (t) = Taken By, (c) = Cosigned By      Initials Name Provider Type     Yaquelin Johnson PT Physical Therapist                   Obj/Interventions       Row Name 07/25/23 1140          Motor Skills    Therapeutic Exercise --  10 reps cardiac rehab protocol  -               User Key  (r) = Recorded By, (t) = Taken By, (c) = Cosigned By      Initials Name Provider Type     Yaquelin Johnson PT Physical Therapist                   Goals/Plan    No documentation.                  Clinical Impression       Row Name 07/25/23 1140          Pain    Pretreatment Pain Rating 0/10 - no pain  -     Posttreatment Pain Rating 0/10 - no pain  -BH       Row Name 07/25/23 1140          Plan of Care Review    Plan of Care Reviewed With patient;son  -     Progress improving  -     Outcome Evaluation Pt seen for PT/OT co-tx this AM and tolerated  mobility well. Pt sitting UIC and reports plan remains home with her spouse - son also planning to stay and assist for a few weeks. Noted chest tubes removed and pt feeling much better today, reports she was able to walk in the hallway last night. Pt tolerated cardiac rehab exercises and stood with SBA. Pt ambulated 40ft with rwx and SBA initially - improved gait speed and overall steadiness. Pt fatigues quickly though, requesting return to room and unable to ambulate full distance with chair needing to be pulled up. Noted B knee giving without overt buckling but concern for fall with rapid fatigue. Pt assisted to chair with heavy cues for completing turn before sitting. Pt assisted with repositioning in chair and left with needs met. PT will continue to follow to progress mobility as tolerated. Anticipate DC home with HHPT and 24/7 care.  -       Row Name 07/25/23 1140          Vital Signs    O2 Delivery Pre Treatment room air  -     O2 Delivery Intra Treatment room air  -     O2 Delivery Post Treatment room air  -       Row Name 07/25/23 1140          Positioning and Restraints    Pre-Treatment Position sitting in chair/recliner  -     Post Treatment Position chair  -     In Chair reclined;call light within reach;encouraged to call for assist;exit alarm on;with family/caregiver  -               User Key  (r) = Recorded By, (t) = Taken By, (c) = Cosigned By      Initials Name Provider Type     Yaquelin Johnson, PT Physical Therapist                   Outcome Measures       Row Name 07/25/23 1144          How much help from another person do you currently need...    Turning from your back to your side while in flat bed without using bedrails? 3  -BH     Moving from lying on back to sitting on the side of a flat bed without bedrails? 3  -BH     Moving to and from a bed to a chair (including a wheelchair)? 3  -BH     Standing up from a chair using your arms (e.g., wheelchair, bedside chair)? 3  -BH      Climbing 3-5 steps with a railing? 2  -     To walk in hospital room? 3  -     AM-PAC 6 Clicks Score (PT) 17  -     Highest level of mobility 5 --> Static standing  -       Row Name 07/25/23 1143          Functional Assessment    Outcome Measure Options AM-PAC 6 Clicks Basic Mobility (PT)  -               User Key  (r) = Recorded By, (t) = Taken By, (c) = Cosigned By      Initials Name Provider Type     Yaquelin Johnson, PT Physical Therapist                                 Physical Therapy Education       Title: PT OT SLP Therapies (In Progress)       Topic: Physical Therapy (Done)       Point: Mobility training (Done)       Learning Progress Summary             Patient Acceptance, E,TB,D, VU,NR by  at 7/25/2023 1143    Acceptance, E,TB,D, VU,NR by  at 7/24/2023 1350    Acceptance, E, VU,NR by  at 7/21/2023 1149                         Point: Home exercise program (Done)       Learning Progress Summary             Patient Acceptance, E,TB,D, VU,NR by  at 7/25/2023 1143    Acceptance, E,TB,D, VU,NR by  at 7/24/2023 1350    Acceptance, E, VU,NR by  at 7/21/2023 1149                         Point: Body mechanics (Done)       Learning Progress Summary             Patient Acceptance, E,TB,D, VU,NR by  at 7/25/2023 1143    Acceptance, E,TB,D, VU,NR by  at 7/24/2023 1350    Acceptance, E, VU,NR by  at 7/21/2023 1149                         Point: Precautions (Done)       Learning Progress Summary             Patient Acceptance, E,TB,D, VU,NR by  at 7/25/2023 1143    Acceptance, E,TB,D, VU,NR by  at 7/24/2023 1350    Acceptance, E, VU,NR by  at 7/21/2023 1149                                         User Key       Initials Effective Dates Name Provider Type [facility] 06/16/21 -  Hannah Ramírez, PT Physical Therapist PT     04/08/22 -  Yaquelin Johnson PT Physical Therapist PT                  PT Recommendation and Plan     Plan of Care Reviewed With: patient, son  Progress:  improving  Outcome Evaluation: Pt seen for PT/OT co-tx this AM and tolerated mobility well. Pt sitting UIC and reports plan remains home with her spouse - son also planning to stay and assist for a few weeks. Noted chest tubes removed and pt feeling much better today, reports she was able to walk in the hallway last night. Pt tolerated cardiac rehab exercises and stood with SBA. Pt ambulated 40ft with rwx and SBA initially - improved gait speed and overall steadiness. Pt fatigues quickly though, requesting return to room and unable to ambulate full distance with chair needing to be pulled up. Noted B knee giving without overt buckling but concern for fall with rapid fatigue. Pt assisted to chair with heavy cues for completing turn before sitting. Pt assisted with repositioning in chair and left with needs met. PT will continue to follow to progress mobility as tolerated. Anticipate DC home with HHPT and 24/7 care.     Time Calculation:         PT Charges       Row Name 07/25/23 1143             Time Calculation    Start Time 0949  -      Stop Time 1003  -      Time Calculation (min) 14 min  -      PT Received On 07/25/23  -      PT - Next Appointment 07/26/23  -         Time Calculation- PT    Total Timed Code Minutes- PT 14 minute(s)  -         Timed Charges    25816 - PT Therapeutic Exercise Minutes 4  -      32568 - Gait Training Minutes  8  -      99813 - PT Therapeutic Activity Minutes 2  -         Total Minutes    Timed Charges Total Minutes 14  -       Total Minutes 14  -                User Key  (r) = Recorded By, (t) = Taken By, (c) = Cosigned By      Initials Name Provider Type     Yaquelin Johnson PT Physical Therapist                  Therapy Charges for Today       Code Description Service Date Service Provider Modifiers Qty    12250336343 HC GAIT TRAINING EA 15 MIN 7/24/2023 Yaquelin Johnson, PT GP 1    [id number] HC GAIT TRAINING EA 15 MIN 7/25/2023 Yaquelin Johnson, PT GP 1             PT G-Codes  Outcome Measure Options: AM-PAC 6 Clicks Basic Mobility (PT)  AM-PAC 6 Clicks Score (PT): 17  AM-PAC 6 Clicks Score (OT): 10  Modified Joshua Scale: 5 - Severe disability.  Bedridden, incontinent, and requiring constant nursing care and attention.  PT Discharge Summary  Anticipated Discharge Disposition (PT): home with 24/7 care, home with home health, home with assist    Yaquelin Johnson, PT  7/25/2023

## 2023-07-25 NOTE — PROGRESS NOTES
"   LOS: 7 days     Chief Complaint/ Reason for encounter: Postop DAVID    Subjective   07/24/23 : Patient is doing well today with no new complaints  Good appetite with no nausea or vomiting  No shortness of breath chest pain or edema  Voiding well with no dysuria  Morgan replaced yesterday.  UA abnormal, urine culture sent and antibiotics started  Lines and tubes to be removed later today  Urine output remained low overnight    7/25: Doing well.  Weight is up by 15 pounds since admission  Legs somewhat edematous but she is off oxygen  She denies any shortness of breath or chest pain  Morgan remains in place per urology  On IV antibiotics    Medical history reviewed:  History of Present Illness    Subjective    History taken from: Patient and chart    Vital Signs  Temp:  [97.5 °F (36.4 °C)-98.8 °F (37.1 °C)] 98 °F (36.7 °C)  Heart Rate:  [62-68] 65  Resp:  [16-61] 16  BP: ()/(44-65) 125/62       Wt Readings from Last 1 Encounters:   07/25/23 0345 97.7 kg (215 lb 8 oz)   07/24/23 0500 97 kg (213 lb 13.5 oz)   07/23/23 0610 96.3 kg (212 lb 4.9 oz)   07/22/23 0656 103 kg (226 lb 6.6 oz)   07/21/23 0437 96.1 kg (211 lb 13.8 oz)   07/20/23 0937 90 kg (198 lb 6.6 oz)   07/20/23 0532 90.4 kg (199 lb 3.2 oz)   07/18/23 0410 89.3 kg (196 lb 12.8 oz)       Objective:  Vital signs: (most recent): Blood pressure 125/62, pulse 65, temperature 98 °F (36.7 °C), temperature source Oral, resp. rate 16, height 162.6 cm (64\"), weight 97.7 kg (215 lb 8 oz), SpO2 99 %, not currently breastfeeding.              Objective:  General Appearance:  Comfortable, mildly ill-appearing, in no acute distress and not in pain.  Awake, alert, oriented  HEENT: Mucous membranes moist, no injury, oropharynx clear  Lungs:  Normal effort and normal respiratory rate.  Breath sounds clear to auscultation.  No  respiratory distress.  No rales, decreased breath sounds or rhonchi.    Heart: Normal rate.  Regular rhythm.  S1, S2 normal.  No murmur.   Abdomen: " Abdomen is soft.  Bowel sounds are normal, no abdominal tenderness.  There is no rebound or guarding  Extremities: Trace edema of bilateral lower extremities  Neurological: No focal motor or sensory deficits, pupils reactive  Skin:  Warm and dry.  No rash or cyanosis.       Results Review:    Intake/Output:     Intake/Output Summary (Last 24 hours) at 7/25/2023 1115  Last data filed at 7/25/2023 0839  Gross per 24 hour   Intake 1455.83 ml   Output 535 ml   Net 920.83 ml           DATA:  Radiology and Labs:  The following labs independently reviewed by me. Additional labs ordered for tomorrow a.m.  Interval notes, chart personally reviewed by me.   Old records independently reviewed showing normal baseline creatinine around 1.0  Discussed with patient herself at bedside    Risk/ complexity of medical care/ medical decision making high complexity, postop acute renal failure    Labs:   Recent Results (from the past 24 hour(s))   POC Glucose Once    Collection Time: 07/24/23  4:00 PM    Specimen: Blood   Result Value Ref Range    Glucose 106 70 - 130 mg/dL   POC Glucose Once    Collection Time: 07/24/23  8:10 PM    Specimen: Blood   Result Value Ref Range    Glucose 126 70 - 130 mg/dL   Renal Function Panel    Collection Time: 07/25/23  2:36 AM    Specimen: Blood   Result Value Ref Range    Glucose 104 (H) 65 - 99 mg/dL    BUN 42 (H) 8 - 23 mg/dL    Creatinine 1.53 (H) 0.57 - 1.00 mg/dL    Sodium 131 (L) 136 - 145 mmol/L    Potassium 4.3 3.5 - 5.2 mmol/L    Chloride 102 98 - 107 mmol/L    CO2 20.0 (L) 22.0 - 29.0 mmol/L    Calcium 8.1 (L) 8.6 - 10.5 mg/dL    Albumin 3.1 (L) 3.5 - 5.2 g/dL    Phosphorus 2.9 2.5 - 4.5 mg/dL    Anion Gap 9.0 5.0 - 15.0 mmol/L    BUN/Creatinine Ratio 27.5 (H) 7.0 - 25.0    eGFR 34.7 (L) >60.0 mL/min/1.73   CBC (No Diff)    Collection Time: 07/25/23  3:19 AM    Specimen: Blood   Result Value Ref Range    WBC 12.29 (H) 3.40 - 10.80 10*3/mm3    RBC 2.94 (L) 3.77 - 5.28 10*6/mm3    Hemoglobin  8.8 (L) 12.0 - 15.9 g/dL    Hematocrit 26.3 (L) 34.0 - 46.6 %    MCV 89.5 79.0 - 97.0 fL    MCH 29.9 26.6 - 33.0 pg    MCHC 33.5 31.5 - 35.7 g/dL    RDW 16.5 (H) 12.3 - 15.4 %    RDW-SD 52.1 37.0 - 54.0 fl    MPV 10.7 6.0 - 12.0 fL    Platelets 207 140 - 450 10*3/mm3   ECG 12 Lead Drug Monitoring; Amiodarone    Collection Time: 07/25/23  3:29 AM   Result Value Ref Range    QT Interval 437 ms   Prepare RBC, 1 Units    Collection Time: 07/25/23  6:00 AM   Result Value Ref Range    Product Code U0242Q99     Unit Number [id number]     UNIT  ABO O     UNIT  RH POS     Crossmatch Interpretation Compatible     Dispense Status PT     Blood Expiration Date [id number]     Blood Type Barcode 5100    POC Glucose Once    Collection Time: 07/25/23  6:13 AM    Specimen: Blood   Result Value Ref Range    Glucose 109 70 - 130 mg/dL   POC Glucose Once    Collection Time: 07/25/23 11:04 AM    Specimen: Blood   Result Value Ref Range    Glucose 143 (H) 70 - 130 mg/dL       Radiology:  Pertinent radiology studies were reviewed as described above      Medications have been reviewed separately in chart overview      ASSESSMENT:  DAVID postop. On top of CKD stage IIIa.  Baseline creatinine around 1.0.  Likely due to hypotension with decreased renal perfusion.  She did have vascular congestion on chest x-ray   Hypotension, Entresto on hold.  Improved.  Midodrine dose reduced  CAD status post three-vessel CABG  NSTEMI  Combined systolic and diastolic CHF  Diabetes mellitus type 2  Hypothyroidism      DISCUSSION/PLAN:  Her blood pressures been running much better in the 120s to 130s  He remains volume overloaded on exam though off oxygen  Weight up about 15 pounds since admission  She would benefit from continued diuresis and I think she will respond better now that she has better perfusing pressure  We will resume Lasix 40 mg IV twice daily today  Continue to monitor urine output  Defer Morgan catheter to urology  Await urine culture and  continue IV antibiotics in the meantime    Agree with holding diuretics  Will repeat IV albumin today  Calcium corrects to normal with low albumin  Urine studies seem more consistent with ATN with low urine creatinine.  Likely from recent hypotension  Wean midodrine  Monitor potassium and electrolytes    Continue to monitor electrolytes and volume closely, avoid IV contrast and nephrotoxic medications      Anderson Parker MD  [facility]  Office phone number: [phone]  Answering service phone number: [phone]    07/25/23  11:15 EDT    Dictation performed using Dragon dictation software

## 2023-07-25 NOTE — PLAN OF CARE
The pt participated in OT/PT this AM. She was pleasant and agreeable to treatment. Her chest tubes were removed prior to her therapy session. She completed her cardiac protocol BUE/BLE exercises. She mobilized x6 minutes with slow pacing and a forward flexed posture. She became very fatigued quickly and a chair was pulled up for her to sit.  + 24/7 assistance is planned. Her  is a farmer and not always available but her son ensures someone will be with her all the time.

## 2023-07-25 NOTE — CASE MANAGEMENT/SOCIAL WORK
Continued Stay Note  [facility]     Patient Name: Ann-Marie Hughes  MRN: [id number]  Today's Date: 7/25/2023    Admit Date: 7/18/2023    Plan: Home; [facility]  referral pending   Discharge Plan       Row Name 07/25/23 1655       Plan    Plan Home; [facility]  referral pending    Plan Comments CCP followed up with Pt son/Holger at bedside and he advised their first choice for Home health is [facility] and second choice is [facility].  Referral sent to [facility]/Sandra  and pending.  CCP following...............Denice GONZÁLES/FELICIA CM                   Discharge Codes    No documentation.                 Expected Discharge Date and Time       Expected Discharge Date Expected Discharge Time    Jul 26, 2023               Denice Pablo RN

## 2023-07-25 NOTE — PLAN OF CARE
Goal Outcome Evaluation:  Plan of Care Reviewed With: patient, son        Progress: improving  Outcome Evaluation: Pt seen for PT/OT co-tx this AM and tolerated mobility well. Pt sitting UIC and reports plan remains home with her spouse - son also planning to stay and assist for a few weeks. Noted chest tubes removed and pt feeling much better today, reports she was able to walk in the hallway last night. Pt tolerated cardiac rehab exercises and stood with SBA. Pt ambulated 40ft with rwx and SBA initially - improved gait speed and overall steadiness. Pt fatigues quickly though, requesting return to room and unable to ambulate full distance with chair needing to be pulled up. Noted B knee giving without overt buckling but concern for fall with rapid fatigue. Pt assisted to chair with heavy cues for completing turn before sitting. Pt assisted with repositioning in chair and left with needs met. PT will continue to follow to progress mobility as tolerated. Anticipate DC home with HHPT and 24/7 care.      Anticipated Discharge Disposition (PT): home with 24/7 care, home with home health, home with assist

## 2023-07-25 NOTE — THERAPY TREATMENT NOTE
Patient Name: Ann-Marie Hughes  : 1945    MRN: [id number]                              Today's Date: 2023       Admit Date: 2023    Visit Dx:     ICD-10-CM ICD-9-CM   1. NSTEMI (non-ST elevated myocardial infarction)  I21.4 410.70   2. Chest pain, unspecified type  R07.9 786.50   3. Abnormal findings on diagnostic imaging of heart and coronary circulation  R93.1 794.39   4. Coronary artery disease of native artery of native heart with stable angina pectoris  I25.118 414.01     413.9   5. S/P CABG (coronary artery bypass graft)  Z95.1 V45.81     Patient Active Problem List   Diagnosis    Arthritis    Bilateral leg pain    Bladder disorder    Chronic pain syndrome    Depression    Diabetes mellitus, type II    Type 2 diabetes mellitus with hyperglycemia    Gastric reflux    Herniated disc    Hypertension    Hypothyroidism    Low back pain    Pain in joint, multiple sites    Pain in soft tissues of limb    Shortness of breath    Hyperlipidemia    B12 deficiency    Vitamin D deficiency    UTI (urinary tract infection)    Metabolic syndrome    Acne rosacea    Hx of smoking    Stress incontinence of urine    Arteriosclerosis of abdominal aorta    Iron deficiency anemia secondary to inadequate dietary iron intake    Seasonal allergies    Hyponatremia    Leg length discrepancy    Type 2 diabetes mellitus with hyperglycemia    Reactive depression    Encounter for subsequent annual wellness visit (AWV) in Medicare patient    Class 1 obesity with alveolar hypoventilation, serious comorbidity, and body mass index (BMI) of 32.0 to 32.9 in adult    Heart failure with reduced ejection fraction    Coronary artery disease of native artery of native heart with stable angina pectoris    Chest pain    Chronic HFrEF (heart failure with reduced ejection fraction)    NSTEMI (non-ST elevated myocardial infarction)    Coronary heart disease    Abnormal findings on diagnostic imaging of heart and coronary circulation      Past Medical History:   Diagnosis Date    Acne rosacea 08/17/2021    DR.JEFF MOON     Arteriosclerosis of abdominal aorta     B12 deficiency 08/17/2021    DJD (degenerative joint disease)     Hx of smoking 08/17/2021    QUIT IN 1976 AT AGE 31    Hyperlipidemia 08/17/2021    Hypertension     Hypothyroidism     Metabolic syndrome 08/17/2021    KAREN (stress urinary incontinence, female) 08/17/2021    UTI (urinary tract infection) 08/17/2021    Vitamin D deficiency 08/17/2021    Vitamin D deficiency      Past Surgical History:   Procedure Laterality Date    CARDIAC CATHETERIZATION N/A 7/17/2023    Procedure: Left Heart Cath;  Surgeon: Dinh Andres MD;  Location: [facility] CATH INVASIVE LOC[facility];  Service: Cardiovascular;  Laterality: N/A;    COLONOSCOPY  2011    CORONARY ARTERY BYPASS GRAFT WITH MITRAL VALVE REPAIR/REPLACEMENT N/A 7/20/2023    Procedure: REZA STERNOTOMY OFF-PUMP CORONARY ARTERY BYPASS GRAFT TIMES        USING LEFFT INTERNAL MAMMARY ARTERY AND     GREATER SAPHENOUS VEIN GRAFT PER EMDOSCOPIC VEIN HARVESTING, MAZE PROCEDURE AND PRP;  Surgeon: Shane Aleaxnder MD;  Location: [facility] CVOR;  Service: Cardiothoracic;  Laterality: N/A;      General Information       Row Name 07/25/23 1532          OT Time and Intention    Document Type therapy note (daily note)  -RB     Mode of Treatment occupational therapy;physical therapy  -RB       Row Name 07/25/23 1532          General Information    Patient Profile Reviewed yes  -RB       Row Name 07/25/23 1532          Cognition    Orientation Status (Cognition) oriented x 4  -RB               User Key  (r) = Recorded By, (t) = Taken By, (c) = Cosigned By      Initials Name Provider Type    RB Kayley Wade OT Occupational Therapist                     Mobility/ADL's       Row Name 07/25/23 1532          Bed Mobility    Comment, (Bed Mobility) UI  -RB       Row Name 07/25/23 1532          Transfers    Transfers sit-stand transfer;stand-sit  transfer  -RB       Row Name 07/25/23 1532          Sit-Stand Transfer    Sit-Stand Webster (Transfers) standby assist  -RB     Assistive Device (Sit-Stand Transfers) walker, front-wheeled  -RB       Row Name 07/25/23 1532          Stand-Sit Transfer    Stand-Sit Webster (Transfers) standby assist  -RB     Assistive Device (Stand-Sit Transfers) walker, front-wheeled  -RB       Row Name 07/25/23 1532          Functional Mobility    Functional Mobility- Ind. Level contact guard assist;minimum assist (75% patient effort);1 person + 1 person to manage equipment;verbal cues required  -RB     Functional Mobility- Device walker, front-wheeled  -RB     Functional Mobility- Comment became very fatigued very quickly and a recliner chair was pulled up behind her  -RB       Row Name 07/25/23 1532          Activities of Daily Living    BADL Assessment/Intervention --  declined ADL's today  -RB               User Key  (r) = Recorded By, (t) = Taken By, (c) = Cosigned By      Initials Name Provider Type    Kayley Evans OT Occupational Therapist                   Obj/Interventions       Row Name 07/25/23 1533          Motor Skills    Therapeutic Exercise shoulder  x10 reps x1 set of cardiac protocol BUE/BLE exercises  -RB       Row Name 07/25/23 1533          Balance    Comment, Balance Forward flexed posture with knees more bemt with standing when fatigued at the end of her mobility  -RB               User Key  (r) = Recorded By, (t) = Taken By, (c) = Cosigned By      Initials Name Provider Type    Kayley Evans OT Occupational Therapist                   Goals/Plan    No documentation.                  Clinical Impression       Row Name 07/25/23 1534          Pain Assessment    Pretreatment Pain Rating 0/10 - no pain  -RB     Posttreatment Pain Rating 0/10 - no pain  -RB       Row Name 07/25/23 1534          Plan of Care Review    Plan of Care Reviewed With patient;son  -RB     Progress improving  -RB      Outcome Evaluation Signed     The pt participated in OT/PT this AM. She was pleasant and agreeable to treatment. Her chest tubes were removed prior to her therapy session. She completed her cardiac protocol BUE/BLE exercises. She mobilized x6 minutes with slow pacing and a forward flexed posture. She became very fatigued quickly and a chair was pulled up for her to sit.  + 24/7 assistance is planned. Her  is a farmer and not always available but her son ensures someone will be with her all the time.  -RB       Row Name 07/25/23 1534          Therapy Assessment/Plan (OT)    Rehab Potential (OT) good, to achieve stated therapy goals  -RB     Criteria for Skilled Therapeutic Interventions Met (OT) yes;skilled treatment is necessary  -RB     Therapy Frequency (OT) 5 times/wk  -RB       Row Name 07/25/23 1534          Therapy Plan Review/Discharge Plan (OT)    Anticipated Discharge Disposition (OT) skilled nursing facility;home with 24/7 care;home with home health  -RB       Row Name 07/25/23 1534          Vital Signs    Pre SpO2 (%) 94  -RB     O2 Delivery Pre Treatment room air  -RB     O2 Delivery Intra Treatment room air  -RB     Post SpO2 (%) 94  -RB     O2 Delivery Post Treatment room air  -RB     Pre Patient Position Sitting  -RB     Intra Patient Position Standing  -RB     Post Patient Position Sitting  -RB       Row Name 07/25/23 1536          Positioning and Restraints    Pre-Treatment Position sitting in chair/recliner  -RB     Post Treatment Position chair  -RB     In Chair notified nsg;reclined;sitting;call light within reach;encouraged to call for assist;exit alarm on;legs elevated  -RB               User Key  (r) = Recorded By, (t) = Taken By, (c) = Cosigned By      Initials Name Provider Type    Kayley Evans, OT Occupational Therapist                   Outcome Measures       Row Name 07/25/23 9507          How much help from another person do you currently need...    Turning from your  back to your side while in flat bed without using bedrails? 3  -BH     Moving from lying on back to sitting on the side of a flat bed without bedrails? 3  -BH     Moving to and from a bed to a chair (including a wheelchair)? 3  -BH     Standing up from a chair using your arms (e.g., wheelchair, bedside chair)? 3  -BH     Climbing 3-5 steps with a railing? 2  -BH     To walk in hospital room? 3  -BH     AM-PAC 6 Clicks Score (PT) 17  -BH     Highest level of mobility 5 --> Static standing  -       Row Name 07/25/23 1143          Functional Assessment    Outcome Measure Options AM-PAC 6 Clicks Basic Mobility (PT)  -               User Key  (r) = Recorded By, (t) = Taken By, (c) = Cosigned By      Initials Name Provider Type    Yaquelin Hernandez, PT Physical Therapist                    Occupational Therapy Education       Title: PT OT SLP Therapies (In Progress)       Topic: Occupational Therapy (Not Started)       Point: ADL training (Not Started)       Description:   Instruct learner(s) on proper safety adaptation and remediation techniques during self care or transfers.   Instruct in proper use of assistive devices.                  Learner Progress:  Not documented in this visit.              Point: Home exercise program (Not Started)       Description:   Instruct learner(s) on appropriate technique for monitoring, assisting and/or progressing therapeutic exercises/activities.                  Learner Progress:  Not documented in this visit.              Point: Precautions (Not Started)       Description:   Instruct learner(s) on prescribed precautions during self-care and functional transfers.                  Learner Progress:  Not documented in this visit.              Point: Body mechanics (Not Started)       Description:   Instruct learner(s) on proper positioning and spine alignment during self-care, functional mobility activities and/or exercises.                  Learner Progress:  Not documented in  this visit.                                  OT Recommendation and Plan  Planned Therapy Interventions (OT): transfer/mobility retraining, strengthening exercise, ROM/therapeutic exercise, activity tolerance training, adaptive equipment training, BADL retraining, functional balance retraining, occupation/activity based interventions, patient/caregiver education/training  Therapy Frequency (OT): 5 times/wk  Plan of Care Review  Plan of Care Reviewed With: patient, son  Progress: improving  Outcome Evaluation: Signed     The pt participated in OT/PT this AM. She was pleasant and agreeable to treatment. Her chest tubes were removed prior to her therapy session. She completed her cardiac protocol BUE/BLE exercises. She mobilized x6 minutes with slow pacing and a forward flexed posture. She became very fatigued quickly and a chair was pulled up for her to sit.  + 24/7 assistance is planned. Her  is a farmer and not always available but her son ensures someone will be with her all the time.     Time Calculation:   Evaluation Complexity (OT)  Review Occupational Profile/Medical/Therapy History Complexity: expanded/moderate complexity  Assessment, Occupational Performance/Identification of Deficit Complexity: 3-5 performance deficits  Clinical Decision Making Complexity (OT): detailed assessment/moderate complexity  Overall Complexity of Evaluation (OT): moderate complexity     Time Calculation- OT       Row Name 07/25/23 1531 07/25/23 1143          Time Calculation- OT    OT Start Time 0950  -RB --     OT Stop Time 1005  -RB --     OT Time Calculation (min) 15 min  -RB --     Total Timed Code Minutes- OT 15 minute(s)  -RB --     OT Received On 07/25/23  -RB --     OT - Next Appointment 07/26/23  -RB --        Timed Charges    16267 - Gait Training Minutes  -- 8  -BH     44082 - OT Therapeutic Activity Minutes 15  -RB --        Total Minutes    Timed Charges Total Minutes 15  -RB 8  -BH      Total Minutes 15  -RB 8   -               User Key  (r) = Recorded By, (t) = Taken By, (c) = Cosigned By      Initials Name Provider Type    RB Kayley Wade, PHANI Occupational Therapist    Yaquelin Hernandez, PT Physical Therapist                  Therapy Charges for Today       Code Description Service Date Service Provider Modifiers Qty    98240116690  OT THERAPEUTIC ACT EA 15 MIN 7/25/2023 Kayley Wade OT GO 1                 Kayley Wade OT  7/25/2023

## 2023-07-25 NOTE — PLAN OF CARE
Goal Outcome Evaluation:  Plan of Care Reviewed With: patient, daughter        Progress: improving  Outcome Evaluation: Pt S/P CABGx3,POD#5 and on RA, All VSS, SR 60s - 70s in the monitor.  Pain treated per MAR. . Up with 1 assist. Cortez still in place, cortez d/c today no issues, voided after cortez removal. NO other issues this shift.

## 2023-07-25 NOTE — PROGRESS NOTES
" LOS: 7 days   Patient Care Team:  Rochelle Brown APRN as PCP - General (Internal Medicine)    Chief Complaint: post op    Subjective:  Symptoms:  No shortness of breath, cough or chest pain.    Diet:  Adequate intake.  No nausea or vomiting.    Activity level: Impaired due to weakness.    Pain:  She reports no pain.      Vital Signs  Temp:  [97.5 °F (36.4 °C)-98.8 °F (37.1 °C)] 98 °F (36.7 °C)  Heart Rate:  [61-68] 65  Resp:  [16-61] 16  BP: ()/(44-76) 125/62  Body mass index is 36.99 kg/m².    Intake/Output Summary (Last 24 hours) at 7/25/2023 0815  Last data filed at 7/25/2023 0350  Gross per 24 hour   Intake 1235.83 ml   Output 535 ml   Net 700.83 ml       No intake/output data recorded.          07/23/23  0610 07/24/23  0500 07/25/23  0345   Weight: 96.3 kg (212 lb 4.9 oz) 97 kg (213 lb 13.5 oz) 97.7 kg (215 lb 8 oz)     Objective:  General Appearance:  Comfortable and in no acute distress.    Vital signs: (most recent): Blood pressure 125/62, pulse 65, temperature 98 °F (36.7 °C), temperature source Oral, resp. rate 16, height 162.6 cm (64\"), weight 97.7 kg (215 lb 8 oz), SpO2 99 %, not currently breastfeeding.  Vital signs are normal.  No fever.    Output: Producing urine and no stool output.    Lungs:  Normal effort and normal respiratory rate.  There are decreased breath sounds.    Heart: Normal rate.  Regular rhythm.    Abdomen: Abdomen is soft.  Bowel sounds are normal.     Extremities: There is dependent edema (moderate bilateral LE edema).    Pulses: Distal pulses are intact.    Neurological: Patient is alert and oriented to person, place and time.    Skin:  Warm and dry.  (Sternal with clean, dry, and intact with mild erythema/ecchymosis  Left LE incision clean and dry with sutures intact)        Results Review:        WBC WBC   Date Value Ref Range Status   07/25/2023 12.29 (H) 3.40 - 10.80 10*3/mm3 Final   07/24/2023 16.11 (H) 3.40 - 10.80 10*3/mm3 Final   07/23/2023 18.06 (H) 3.40 - 10.80 " 10*3/mm3 Final      HGB Hemoglobin   Date Value Ref Range Status   07/25/2023 8.8 (L) 12.0 - 15.9 g/dL Final   07/24/2023 7.9 (L) 12.0 - 15.9 g/dL Final   07/23/2023 8.4 (L) 12.0 - 15.9 g/dL Final      HCT Hematocrit   Date Value Ref Range Status   07/25/2023 26.3 (L) 34.0 - 46.6 % Final   07/24/2023 23.7 (L) 34.0 - 46.6 % Final   07/23/2023 25.2 (L) 34.0 - 46.6 % Final      Platelets Platelets   Date Value Ref Range Status   07/25/2023 207 140 - 450 10*3/mm3 Final   07/24/2023 155 140 - 450 10*3/mm3 Final   07/23/2023 116 (L) 140 - 450 10*3/mm3 Final        PT/INR:  No results found for: PROTIME/No results found for: INR    Sodium Sodium   Date Value Ref Range Status   07/25/2023 131 (L) 136 - 145 mmol/L Final   07/24/2023 133 (L) 136 - 145 mmol/L Final   07/23/2023 135 (L) 136 - 145 mmol/L Final      Potassium Potassium   Date Value Ref Range Status   07/25/2023 4.3 3.5 - 5.2 mmol/L Final   07/24/2023 4.6 3.5 - 5.2 mmol/L Final   07/23/2023 4.6 3.5 - 5.2 mmol/L Final   07/22/2023 4.4 3.5 - 5.2 mmol/L Final      Chloride Chloride   Date Value Ref Range Status   07/25/2023 102 98 - 107 mmol/L Final   07/24/2023 102 98 - 107 mmol/L Final   07/23/2023 103 98 - 107 mmol/L Final      Bicarbonate CO2   Date Value Ref Range Status   07/25/2023 20.0 (L) 22.0 - 29.0 mmol/L Final   07/24/2023 20.4 (L) 22.0 - 29.0 mmol/L Final   07/23/2023 22.0 22.0 - 29.0 mmol/L Final      BUN BUN   Date Value Ref Range Status   07/25/2023 42 (H) 8 - 23 mg/dL Final   07/24/2023 38 (H) 8 - 23 mg/dL Final   07/23/2023 28 (H) 8 - 23 mg/dL Final      Creatinine Creatinine   Date Value Ref Range Status   07/25/2023 1.53 (H) 0.57 - 1.00 mg/dL Final   07/24/2023 1.85 (H) 0.57 - 1.00 mg/dL Final   07/23/2023 1.56 (H) 0.57 - 1.00 mg/dL Final      Calcium Calcium   Date Value Ref Range Status   07/25/2023 8.1 (L) 8.6 - 10.5 mg/dL Final   07/24/2023 8.2 (L) 8.6 - 10.5 mg/dL Final   07/23/2023 8.4 (L) 8.6 - 10.5 mg/dL Final      Magnesium No results  found for: MG         amiodarone, 200 mg, Oral, Q12H  aspirin, 81 mg, Oral, Daily  atorvastatin, 40 mg, Oral, Nightly  buPROPion XL, 300 mg, Oral, Daily  cefTRIAXone, 1,000 mg, Intravenous, Q24H  chlorhexidine, 15 mL, Mouth/Throat, Q12H  enoxaparin, 30 mg, Subcutaneous, Daily  guaiFENesin, 1,200 mg, Oral, Q12H  insulin lispro, 2-9 Units, Subcutaneous, 4x Daily AC & at Bedtime  levothyroxine, 75 mcg, Oral, Q AM  metoprolol succinate XL, 12.5 mg, Oral, Q24H  midodrine, 2.5 mg, Oral, BID AC  montelukast, 10 mg, Oral, Daily  mupirocin, , Each Nare, BID  pantoprazole, 40 mg, Oral, QAM  senna-docusate sodium, 2 tablet, Oral, Nightly  sodium chloride, 10 mL, Intravenous, Q12H             Coronary heart disease    NSTEMI (non-ST elevated myocardial infarction)    Abnormal findings on diagnostic imaging of heart and coronary circulation      Assessment & Plan  -Severe multivessel CAD s/p off-pump CABG x3/ LANE clipping POD#4 [place]  -Moderate MR  -paroxsymal Atrial fibrillation  -Ischemic cardiomyopathy---EF 36%; GDMT as tolerated  -HTN  -HLD  -DM II  -Hypothyroidism   -Former tobacco abuse   -chronic anemia worsened by post op ABL   -TCP--consumptive  -post-op leukocytosis--reactive; trending down     Looks better this morning  BP is much improved today. Will back down on the midodrine  On 1L NC on exam, but oxygen saturations in the upper 90s. Will place on room air and see how she does  Leave crotez catheter for now per urology. Continue antibiotics, and await urine culture  Creatinine improved this morning. Diuretic management per nephrology  Has remained in SR. Continue oral amiodarone and start low dose Toprol  Mobilize/encourage pulmonary toilet--continue mucinex/flutter valves  No BM since surgery, suppository ordered  Incision with mild erythema. Nursing to paint with betadine twice daily  Only walked 35 feet with PT yesterday, will see how she does today  Continue routine care    Anjelica Chase  APRN  07/25/23  08:15 EDT

## 2023-07-25 NOTE — CASE MANAGEMENT/SOCIAL WORK
Continued Stay Note  [facility]     Patient Name: Ann-Marie Hughes  MRN: [id number]  Today's Date: 7/25/2023    Admit Date: 7/18/2023    Plan: Home w/ [facility]   Discharge Plan       Row Name 07/25/23 1806       Plan    Plan Home w/ [facility]    Plan Comments Rochelle/[facility]  accepted.  Pt plans to d/c home with several family members assisting.  Family will transport home.  CCP following............Denice GONZÁLES/FELICIA CM      Row Name 07/25/23 1655       Plan    Plan Home; [facility] referral pending    Plan Comments CCP followed up with Pt son/Holger at bedside and he advised their first choice for Home health is [facility] and second choice is [facility].  Referral sent to Rochelle/[facility]  and pending.  CCP following...............Denice GONZÁLES/FELICIA CM                   Discharge Codes    No documentation.                 Expected Discharge Date and Time       Expected Discharge Date Expected Discharge Time    Jul 26, 2023               Denice Pablo RN

## 2023-07-25 NOTE — DISCHARGE PLACEMENT REQUEST
"EllenCammieth Karlie (77 y.o. Female)       Date of Birth   1945    Social Security Number       Address   [address] [place] [address]    Home Phone   [phone]    MRN   [id number]       Confucianism   Scientologist    Marital Status                               Admission Date   7/18/23    Admission Type   Urgent    Admitting Provider   Jr Jay Gaspar MD    Attending Provider   Shane Alexander MD    Department, Room/Bed   [facility] CARDIOVASC UNIT, 2223/1       Discharge Date       Discharge Disposition       Discharge Destination                                 Attending Provider: Shane Alexander MD    Allergies: Penicillins    Isolation: None   Infection: None   Code Status: CPR    Ht: 162.6 cm (64\")   Wt: 97.7 kg (215 lb 8 oz)    Admission Cmt: None   Principal Problem: Coronary heart disease [I25.10]                   Active Insurance as of 7/18/2023       Primary Coverage       Payor Plan Insurance Group Employer/Plan Group    MEDICARE MEDICARE A & B        Payor Plan Address Payor Plan Phone Number Payor Plan Fax Number Effective Dates    [address] [phone]  11/1/2009 - None Entered    [facility] [id number]         Subscriber Name Subscriber Birth Date Member ID       ANN-MARIE HUGHES KARLIE 1945 [id number]               Secondary Coverage       Payor Plan Insurance Group Employer/Plan Group    [place] [id number]       Payor Plan Address Payor Plan Phone Number Payor Plan Fax Number Effective Dates    [address]   1/1/2010 - None Entered    [facility] [id number]         Subscriber Name Subscriber Birth Date Member ID       ANN-MARIE HUGHES KARLIE 1945 [id number]                     Emergency Contacts        (Rel.) Home Phone Work Phone Mobile Phone    JAMEL HUGHES (Spouse) [phone] -- [phone]    Maria EOlga (Daughter) [phone] -- [phone]    Joby Hughes (Son) [phone] -- " [phone]

## 2023-07-26 LAB
ALBUMIN SERPL-MCNC: 3 G/DL (ref 3.5–5.2)
ANION GAP SERPL CALCULATED.3IONS-SCNC: 12.1 MMOL/L (ref 5–15)
BUN SERPL-MCNC: 36 MG/DL (ref 8–23)
BUN/CREAT SERPL: 25.5 (ref 7–25)
CALCIUM SPEC-SCNC: 8.2 MG/DL (ref 8.6–10.5)
CHLORIDE SERPL-SCNC: 102 MMOL/L (ref 98–107)
CO2 SERPL-SCNC: 17.9 MMOL/L (ref 22–29)
CREAT SERPL-MCNC: 1.41 MG/DL (ref 0.57–1)
EGFRCR SERPLBLD CKD-EPI 2021: 38.3 ML/MIN/1.73
GLUCOSE BLDC GLUCOMTR-MCNC: 136 MG/DL (ref 70–130)
GLUCOSE BLDC GLUCOMTR-MCNC: 148 MG/DL (ref 70–130)
GLUCOSE BLDC GLUCOMTR-MCNC: 154 MG/DL (ref 70–130)
GLUCOSE BLDC GLUCOMTR-MCNC: 154 MG/DL (ref 70–130)
GLUCOSE SERPL-MCNC: 107 MG/DL (ref 65–99)
PHOSPHATE SERPL-MCNC: 2 MG/DL (ref 2.5–4.5)
POTASSIUM SERPL-SCNC: 3.9 MMOL/L (ref 3.5–5.2)
SODIUM SERPL-SCNC: 132 MMOL/L (ref 136–145)

## 2023-07-26 PROCEDURE — 97530 THERAPEUTIC ACTIVITIES: CPT

## 2023-07-26 PROCEDURE — 80069 RENAL FUNCTION PANEL: CPT | Performed by: INTERNAL MEDICINE

## 2023-07-26 PROCEDURE — 97110 THERAPEUTIC EXERCISES: CPT

## 2023-07-26 PROCEDURE — 94762 N-INVAS EAR/PLS OXIMTRY CONT: CPT

## 2023-07-26 PROCEDURE — 25010000002 ENOXAPARIN PER 10 MG: Performed by: THORACIC SURGERY (CARDIOTHORACIC VASCULAR SURGERY)

## 2023-07-26 PROCEDURE — 25010000002 CEFTRIAXONE PER 250 MG: Performed by: NURSE PRACTITIONER

## 2023-07-26 PROCEDURE — 82948 REAGENT STRIP/BLOOD GLUCOSE: CPT

## 2023-07-26 PROCEDURE — 63710000001 INSULIN LISPRO (HUMAN) PER 5 UNITS: Performed by: NURSE PRACTITIONER

## 2023-07-26 PROCEDURE — 25010000002 FUROSEMIDE PER 20 MG: Performed by: INTERNAL MEDICINE

## 2023-07-26 RX ORDER — FUROSEMIDE 40 MG/1
40 TABLET ORAL DAILY
Status: DISCONTINUED | OUTPATIENT
Start: 2023-07-26 | End: 2023-07-28 | Stop reason: HOSPADM

## 2023-07-26 RX ORDER — BISACODYL 10 MG
10 SUPPOSITORY, RECTAL RECTAL ONCE
Status: COMPLETED | OUTPATIENT
Start: 2023-07-26 | End: 2023-07-26

## 2023-07-26 RX ORDER — FLUCONAZOLE 100 MG/1
100 TABLET ORAL ONCE
Status: COMPLETED | OUTPATIENT
Start: 2023-07-26 | End: 2023-07-26

## 2023-07-26 RX ORDER — SODIUM BICARBONATE 650 MG/1
650 TABLET ORAL 2 TIMES DAILY
Status: COMPLETED | OUTPATIENT
Start: 2023-07-26 | End: 2023-07-27

## 2023-07-26 RX ORDER — METOPROLOL SUCCINATE 25 MG/1
25 TABLET, EXTENDED RELEASE ORAL
Status: DISCONTINUED | OUTPATIENT
Start: 2023-07-26 | End: 2023-07-27

## 2023-07-26 RX ADMIN — CHLORHEXIDINE GLUCONATE 15 ML: 1.2 SOLUTION ORAL at 06:41

## 2023-07-26 RX ADMIN — FUROSEMIDE 40 MG: 10 INJECTION, SOLUTION INTRAMUSCULAR; INTRAVENOUS at 01:31

## 2023-07-26 RX ADMIN — MONTELUKAST SODIUM 10 MG: 10 TABLET, FILM COATED ORAL at 09:57

## 2023-07-26 RX ADMIN — FLUCONAZOLE 100 MG: 100 TABLET ORAL at 12:36

## 2023-07-26 RX ADMIN — FUROSEMIDE 40 MG: 40 TABLET ORAL at 09:57

## 2023-07-26 RX ADMIN — ATORVASTATIN CALCIUM 40 MG: 20 TABLET, FILM COATED ORAL at 20:26

## 2023-07-26 RX ADMIN — BISACODYL 10 MG: 10 SUPPOSITORY RECTAL at 09:58

## 2023-07-26 RX ADMIN — BUPROPION HYDROCHLORIDE 300 MG: 300 TABLET, EXTENDED RELEASE ORAL at 09:57

## 2023-07-26 RX ADMIN — MUPIROCIN 1 APPLICATION: 20 OINTMENT TOPICAL at 09:57

## 2023-07-26 RX ADMIN — AMIODARONE HYDROCHLORIDE 200 MG: 200 TABLET ORAL at 09:57

## 2023-07-26 RX ADMIN — LEVOTHYROXINE SODIUM 75 MCG: 0.07 TABLET ORAL at 06:42

## 2023-07-26 RX ADMIN — ENOXAPARIN SODIUM 30 MG: 100 INJECTION SUBCUTANEOUS at 09:58

## 2023-07-26 RX ADMIN — AMIODARONE HYDROCHLORIDE 200 MG: 200 TABLET ORAL at 20:26

## 2023-07-26 RX ADMIN — GUAIFENESIN 1200 MG: 600 TABLET, EXTENDED RELEASE ORAL at 20:27

## 2023-07-26 RX ADMIN — PANTOPRAZOLE SODIUM 40 MG: 40 TABLET, DELAYED RELEASE ORAL at 06:40

## 2023-07-26 RX ADMIN — Medication 10 ML: at 19:48

## 2023-07-26 RX ADMIN — Medication 12.5 MG: at 12:36

## 2023-07-26 RX ADMIN — ASPIRIN 81 MG: 81 TABLET, COATED ORAL at 09:57

## 2023-07-26 RX ADMIN — CEFTRIAXONE SODIUM 1000 MG: 1 INJECTION, POWDER, FOR SOLUTION INTRAMUSCULAR; INTRAVENOUS at 09:59

## 2023-07-26 RX ADMIN — MUPIROCIN 1 APPLICATION: 20 OINTMENT TOPICAL at 20:27

## 2023-07-26 RX ADMIN — METOPROLOL SUCCINATE 25 MG: 25 TABLET, EXTENDED RELEASE ORAL at 09:57

## 2023-07-26 RX ADMIN — EMPAGLIFLOZIN 25 MG: 25 TABLET, FILM COATED ORAL at 09:58

## 2023-07-26 RX ADMIN — GUAIFENESIN 1200 MG: 600 TABLET, EXTENDED RELEASE ORAL at 09:57

## 2023-07-26 RX ADMIN — SODIUM BICARBONATE 650 MG: 650 TABLET ORAL at 20:26

## 2023-07-26 RX ADMIN — SODIUM BICARBONATE 650 MG: 650 TABLET ORAL at 09:57

## 2023-07-26 RX ADMIN — INSULIN LISPRO 2 UNITS: 100 INJECTION, SOLUTION INTRAVENOUS; SUBCUTANEOUS at 18:53

## 2023-07-26 RX ADMIN — Medication 10 ML: at 10:01

## 2023-07-26 NOTE — PROGRESS NOTES
" LOS: 8 days   Patient Care Team:  Rochelle Brown APRN as PCP - General (Internal Medicine)    Chief Complaint: post op    Subjective:  Symptoms:  No shortness of breath, cough or chest pain.    Diet:  Adequate intake.  No nausea or vomiting.    Activity level: Impaired due to weakness.    Pain:  She reports no pain.      Vital Signs  Temp:  [97.6 °F (36.4 °C)-98.5 °F (36.9 °C)] 98.4 °F (36.9 °C)  Heart Rate:  [61-74] 74  Resp:  [16-17] 17  BP: (131-154)/(54-91) 140/91  Body mass index is 31.15 kg/m².    Intake/Output Summary (Last 24 hours) at 7/26/2023 0816  Last data filed at 7/26/2023 0425  Gross per 24 hour   Intake 820 ml   Output 4500 ml   Net -3680 ml       No intake/output data recorded.          07/24/23  0500 07/25/23  0345 07/26/23  0640   Weight: 97 kg (213 lb 13.5 oz) 97.7 kg (215 lb 8 oz) 82.3 kg (181 lb 8 oz)     Objective:  General Appearance:  Comfortable and in no acute distress.    Vital signs: (most recent): Blood pressure 140/91, pulse 74, temperature 98.4 °F (36.9 °C), temperature source Oral, resp. rate 17, height 162.6 cm (64\"), weight 82.3 kg (181 lb 8 oz), SpO2 95 %, not currently breastfeeding.  Vital signs are normal.  No fever.    Output: Producing urine and no stool output.    Lungs:  Normal effort and normal respiratory rate.  There are decreased breath sounds.    Heart: Normal rate.  Regular rhythm.    Abdomen: Abdomen is soft.  Bowel sounds are normal.     Extremities: There is dependent edema (moderate bilateral LE edema).    Pulses: Distal pulses are intact.    Neurological: Patient is alert and oriented to person, place and time.    Skin:  Warm and dry.  (Sternal with clean, dry, and intact with mild erythema/ecchymosis  Left LE incision clean and dry with sutures intact)        Results Review:        WBC WBC   Date Value Ref Range Status   07/25/2023 12.29 (H) 3.40 - 10.80 10*3/mm3 Final   07/24/2023 16.11 (H) 3.40 - 10.80 10*3/mm3 Final      HGB Hemoglobin   Date Value Ref Range " Status   07/25/2023 8.8 (L) 12.0 - 15.9 g/dL Final   07/24/2023 7.9 (L) 12.0 - 15.9 g/dL Final      HCT Hematocrit   Date Value Ref Range Status   07/25/2023 26.3 (L) 34.0 - 46.6 % Final   07/24/2023 23.7 (L) 34.0 - 46.6 % Final      Platelets Platelets   Date Value Ref Range Status   07/25/2023 207 140 - 450 10*3/mm3 Final   07/24/2023 155 140 - 450 10*3/mm3 Final        PT/INR:  No results found for: PROTIME/No results found for: INR    Sodium Sodium   Date Value Ref Range Status   07/26/2023 132 (L) 136 - 145 mmol/L Final   07/25/2023 131 (L) 136 - 145 mmol/L Final   07/24/2023 133 (L) 136 - 145 mmol/L Final      Potassium Potassium   Date Value Ref Range Status   07/26/2023 3.9 3.5 - 5.2 mmol/L Final     Comment:     Slight hemolysis detected by analyzer. Results may be affected.   07/25/2023 4.3 3.5 - 5.2 mmol/L Final   07/24/2023 4.6 3.5 - 5.2 mmol/L Final      Chloride Chloride   Date Value Ref Range Status   07/26/2023 102 98 - 107 mmol/L Final   07/25/2023 102 98 - 107 mmol/L Final   07/24/2023 102 98 - 107 mmol/L Final      Bicarbonate CO2   Date Value Ref Range Status   07/26/2023 17.9 (L) 22.0 - 29.0 mmol/L Final   07/25/2023 20.0 (L) 22.0 - 29.0 mmol/L Final   07/24/2023 20.4 (L) 22.0 - 29.0 mmol/L Final      BUN BUN   Date Value Ref Range Status   07/26/2023 36 (H) 8 - 23 mg/dL Final   07/25/2023 42 (H) 8 - 23 mg/dL Final   07/24/2023 38 (H) 8 - 23 mg/dL Final      Creatinine Creatinine   Date Value Ref Range Status   07/26/2023 1.41 (H) 0.57 - 1.00 mg/dL Final   07/25/2023 1.53 (H) 0.57 - 1.00 mg/dL Final   07/24/2023 1.85 (H) 0.57 - 1.00 mg/dL Final      Calcium Calcium   Date Value Ref Range Status   07/26/2023 8.2 (L) 8.6 - 10.5 mg/dL Final   07/25/2023 8.1 (L) 8.6 - 10.5 mg/dL Final   07/24/2023 8.2 (L) 8.6 - 10.5 mg/dL Final      Magnesium No results found for: MG         amiodarone, 200 mg, Oral, Q12H  aspirin, 81 mg, Oral, Daily  atorvastatin, 40 mg, Oral, Nightly  buPROPion XL, 300 mg, Oral,  Daily  cefTRIAXone, 1,000 mg, Intravenous, Q24H  chlorhexidine, 15 mL, Mouth/Throat, Q12H  empagliflozin, 25 mg, Oral, Daily  enoxaparin, 30 mg, Subcutaneous, Daily  furosemide, 40 mg, Intravenous, Q12H  guaiFENesin, 1,200 mg, Oral, Q12H  insulin lispro, 2-9 Units, Subcutaneous, 4x Daily AC & at Bedtime  levothyroxine, 75 mcg, Oral, Q AM  metoprolol succinate XL, 25 mg, Oral, Q24H  montelukast, 10 mg, Oral, Daily  mupirocin, , Each Nare, BID  pantoprazole, 40 mg, Oral, QAM  senna-docusate sodium, 2 tablet, Oral, Nightly  sodium bicarbonate, 650 mg, Oral, BID  sodium chloride, 10 mL, Intravenous, Q12H             Coronary heart disease    NSTEMI (non-ST elevated myocardial infarction)    Abnormal findings on diagnostic imaging of heart and coronary circulation      Assessment & Plan  -Severe multivessel CAD s/p off-pump CABG x3/ LANE clipping POD#6 Catherine  -Moderate MR  -paroxsymal Atrial fibrillation  -Ischemic cardiomyopathy---EF 36%; GDMT as tolerated  -HTN  -HLD  -DM II  -Hypothyroidism   -Former tobacco abuse   -chronic anemia worsened by post op ABL   -TCP--consumptive; resolved  -post-op leukocytosis--reactive; trending down     Looks better this morning  Hypertensive overnight. Will discontinue midodrine and increase beta blocker  Weaned to RA and tolerating, but requiring oxygen with sleep. Check overnight oximetry tonight  Morgan catheter removed yesterday and patient voiding without issues. Urine culture with yeast. Last dose of Rocephin today, and then will give a dose of Diflucan. Check EKG in the morning to re-evaluate QTc  Creatinine continuing to improve. Large volume diuresis overnight. Will transition to oral diuretics and resume home aldactone  Has remained in SR. Continue oral amiodarone and beta blocker. No AC at this time per Dr. Alexander. Consider starting if she has recurrence  Mobilize/encourage pulmonary toilet--continue mucinex/flutter valves  No BM since surgery, will give  another suppository this morning. Consider enema if no result  Incision with mild erythema. Nursing to paint with betadine twice daily  Mobility is slowly improving. Plan for home with home health/assistance of family in the next 1-2 days pending progress  Continue routine care    LONA Mosqueda  07/26/23  08:16 EDT

## 2023-07-26 NOTE — PLAN OF CARE
Goal Outcome Evaluation:  Plan of Care Reviewed With: patient, family        Progress: improving  Outcome Evaluation: Pt seen for PT this AM and demo'd overall improved mobility tolerance. Sitting UIC on arrival performed cardiac rehab exercises. Pt stood with SBA and ambulated 70ft with rwx and SBA. Pt with overall improved steadiness and independence with mobility today, endorses mild SOA and fatigues quickly but tolerated progressed gait distance well. Pt returned to room and left sitting UIC. Pt reports plan remains DC home with family assist and HHPT. Will continue to follow to progress mobility as tolerated.      Anticipated Discharge Disposition (PT): home with 24/7 care, home with home health, home with assist

## 2023-07-26 NOTE — THERAPY TREATMENT NOTE
Patient Name: Ann-Marie Hughes  : 1945    MRN: [id number]                              Today's Date: 2023       Admit Date: 2023    Visit Dx:     ICD-10-CM ICD-9-CM   1. NSTEMI (non-ST elevated myocardial infarction)  I21.4 410.70   2. Chest pain, unspecified type  R07.9 786.50   3. Abnormal findings on diagnostic imaging of heart and coronary circulation  R93.1 794.39   4. Coronary artery disease of native artery of native heart with stable angina pectoris  I25.118 414.01     413.9   5. S/P CABG (coronary artery bypass graft)  Z95.1 V45.81     Patient Active Problem List   Diagnosis    Arthritis    Bilateral leg pain    Bladder disorder    Chronic pain syndrome    Depression    Diabetes mellitus, type II    Type 2 diabetes mellitus with hyperglycemia    Gastric reflux    Herniated disc    Hypertension    Hypothyroidism    Low back pain    Pain in joint, multiple sites    Pain in soft tissues of limb    Shortness of breath    Hyperlipidemia    B12 deficiency    Vitamin D deficiency    UTI (urinary tract infection)    Metabolic syndrome    Acne rosacea    Hx of smoking    Stress incontinence of urine    Arteriosclerosis of abdominal aorta    Iron deficiency anemia secondary to inadequate dietary iron intake    Seasonal allergies    Hyponatremia    Leg length discrepancy    Type 2 diabetes mellitus with hyperglycemia    Reactive depression    Encounter for subsequent annual wellness visit (AWV) in Medicare patient    Class 1 obesity with alveolar hypoventilation, serious comorbidity, and body mass index (BMI) of 32.0 to 32.9 in adult    Heart failure with reduced ejection fraction    Coronary artery disease of native artery of native heart with stable angina pectoris    Chest pain    Chronic HFrEF (heart failure with reduced ejection fraction)    NSTEMI (non-ST elevated myocardial infarction)    Coronary heart disease    Abnormal findings on diagnostic imaging of heart and coronary circulation      Past Medical History:   Diagnosis Date    Acne rosacea 08/17/2021    DR.JEFF MOON     Arteriosclerosis of abdominal aorta     B12 deficiency 08/17/2021    DJD (degenerative joint disease)     Hx of smoking 08/17/2021    QUIT IN 1976 AT AGE 31    Hyperlipidemia 08/17/2021    Hypertension     Hypothyroidism     Metabolic syndrome 08/17/2021    KAREN (stress urinary incontinence, female) 08/17/2021    UTI (urinary tract infection) 08/17/2021    Vitamin D deficiency 08/17/2021    Vitamin D deficiency      Past Surgical History:   Procedure Laterality Date    CARDIAC CATHETERIZATION N/A 7/17/2023    Procedure: Left Heart Cath;  Surgeon: Dinh Andres MD;  Location: [facility] CATH INVASIVE LOCATION;  Service: Cardiovascular;  Laterality: N/A;    COLONOSCOPY  2011    CORONARY ARTERY BYPASS GRAFT WITH MITRAL VALVE REPAIR/REPLACEMENT N/A 7/20/2023    Procedure: REZA STERNOTOMY OFF-PUMP CORONARY ARTERY BYPASS GRAFT TIMES        USING LEFFT INTERNAL MAMMARY ARTERY AND     GREATER SAPHENOUS VEIN GRAFT PER EMDOSCOPIC VEIN HARVESTING, MAZE PROCEDURE AND PRP;  Surgeon: Shane Alexander MD;  Location: [facility];  Service: Cardiothoracic;  Laterality: N/A;      General Information       Row Name 07/26/23 1330          Physical Therapy Time and Intention    Document Type therapy note (daily note)  -     Mode of Treatment physical therapy  -       Row Name 07/26/23 1330          General Information    Patient Profile Reviewed yes  -     Existing Precautions/Restrictions fall;sternal;cardiac  -       Row Name 07/26/23 1330          Cognition    Orientation Status (Cognition) oriented x 4  -       Row Name 07/26/23 1330          Safety Issues, Functional Mobility    Impairments Affecting Function (Mobility) balance;shortness of breath;endurance/activity tolerance;pain;strength  -               User Key  (r) = Recorded By, (t) = Taken By, (c) = Cosigned By      Initials Name Provider Type     Alex  Yaquelin ALEJANDRA PT Physical Therapist                   Mobility       Row Name 07/26/23 1330          Bed Mobility    Supine-Sit [place] (Bed Mobility) not tested  -     Sit-Supine [place] (Bed Mobility) not tested  -     Comment, (Bed Mobility) NT - UIC  -       Row Name 07/26/23 1330          Sit-Stand Transfer    Sit-Stand [place] (Transfers) standby assist  -     Assistive Device (Sit-Stand Transfers) walker, front-wheeled  -       Row Name 07/26/23 1330          Gait/Stairs (Locomotion)    [place] Level (Gait) standby assist;contact guard  -     Assistive Device (Gait) walker, front-wheeled  -     Distance in Feet (Gait) 70ft  -     Deviations/Abnormal Patterns (Gait) gait speed decreased;stride length decreased;rogerio decreased  -     Bilateral Gait Deviations forward flexed posture;heel strike decreased  -               User Key  (r) = Recorded By, (t) = Taken By, (c) = Cosigned By      Initials Name Provider Type     Yaquelin Johnson PT Physical Therapist                   Obj/Interventions       Row Name 07/26/23 1331          Motor Skills    Therapeutic Exercise --  10 reps cardiac rehab protocol  -               User Key  (r) = Recorded By, (t) = Taken By, (c) = Cosigned By      Initials Name Provider Type     Yaquelin Johnson PT Physical Therapist                   Goals/Plan    No documentation.                  Clinical Impression       [facility] Name 07/26/23 1331          Pain    Pretreatment Pain Rating 0/10 - no pain  -     Posttreatment Pain Rating 0/10 - no pain  -BH       Row Name 07/26/23 1331          Plan of Care Review    Plan of Care Reviewed With patient;family  -     Progress improving  -     Outcome Evaluation Pt seen for PT this AM and demo'd overall improved mobility tolerance. Sitting UIC on arrival performed cardiac rehab exercises. Pt stood with SBA and ambulated 70ft with rwx and SBA. Pt with overall improved steadiness and independence  with mobility today, endorses mild SOA and fatigues quickly but tolerated progressed gait distance well. Pt returned to room and left sitting [facility]. Pt reports plan remains DC home with family assist and HHPT. Will continue to follow to progress mobility as tolerated.  -       Row Name 07/26/23 1331          Vital Signs    O2 Delivery Pre Treatment room air  -     O2 Delivery Intra Treatment room air  -     O2 Delivery Post Treatment room air  -       Row Name 07/26/23 1331          Positioning and Restraints    Pre-Treatment Position sitting in chair/recliner  -     Post Treatment Position chair  -     In Chair notified nsg;reclined;call light within reach;encouraged to call for assist;exit alarm on;with family/caregiver  -               User Key  (r) = Recorded By, (t) = Taken By, (c) = Cosigned By      Initials Name Provider Type     Yaquelin Johnson, PT Physical Therapist                   Outcome Measures       Row Name 07/26/23 1335 07/26/23 0957       How much help from another person do you currently need...    Turning from your back to your side while in flat bed without using bedrails? 4  - 4  -CB    Moving from lying on back to sitting on the side of a flat bed without bedrails? 4  - 4  -CB    Moving to and from a bed to a chair (including a wheelchair)? 4  - 4  -CB    Standing up from a chair using your arms (e.g., wheelchair, bedside chair)? 3  - 3  -CB    Climbing 3-5 steps with a railing? 3  - 4  -CB    To walk in hospital room? 3  - 3  -CB    AM-PAC 6 Clicks Score (PT) 21  - 22  -    Highest level of mobility 6 --> Walked 10 steps or more  - 7 --> Walked 25 feet or more  -      Row Name 07/26/23 1335          Functional Assessment    Outcome Measure Options AM-PAC 6 Clicks Basic Mobility (PT)  -               User Key  (r) = Recorded By, (t) = Taken By, (c) = Cosigned By      Initials Name Provider Type    Rosey Mccarthy, RN Registered Nurse    Yaquelin Hernandez  [place], PT Physical Therapist                                 Physical Therapy Education       Title: PT OT SLP Therapies (In Progress)       Topic: Physical Therapy (Done)       Point: Mobility training (Done)       Learning Progress Summary             Patient Acceptance, E,TB,D, VU,NR by  at 7/26/2023 1335    Acceptance, E,TB,D, VU,NR by  at 7/25/2023 1143    Acceptance, E,TB,D, VU,NR by  at 7/24/2023 1350    Acceptance, E, VU,NR by  at 7/21/2023 1149                         Point: Home exercise program (Done)       Learning Progress Summary             Patient Acceptance, E,TB,D, VU,NR by  at 7/26/2023 1335    Acceptance, E,TB,D, VU,NR by  at 7/25/2023 1143    Acceptance, E,TB,D, VU,NR by  at 7/24/2023 1350    Acceptance, E, VU,NR by  at 7/21/2023 1149                         Point: Body mechanics (Done)       Learning Progress Summary             Patient Acceptance, E,TB,D, VU,NR by  at 7/26/2023 1335    Acceptance, E,TB,D, VU,NR by  at 7/25/2023 1143    Acceptance, E,TB,D, VU,NR by  at 7/24/2023 1350    Acceptance, E, VU,NR by  at 7/21/2023 1149                         Point: Precautions (Done)       Learning Progress Summary             Patient Acceptance, E,TB,D, VU,NR by  at 7/26/2023 1335    Acceptance, E,TB,D, VU,NR by  at 7/25/2023 1143    Acceptance, E,TB,D, VU,NR by  at 7/24/2023 1350    Acceptance, E, VU,NR by  at 7/21/2023 1149                                         User Key       Initials Effective Dates Name Provider Type Discipline     06/16/21 -  Hannah Ramírez, PT Physical Therapist PT     04/08/22 -  Yaquelin Johnson PT Physical Therapist PT                  PT Recommendation and Plan     Plan of Care Reviewed With: patient, family  Progress: improving  Outcome Evaluation: Pt seen for PT this AM and demo'd overall improved mobility tolerance. Sitting UIC on arrival performed cardiac rehab exercises. Pt stood with SBA and ambulated 70ft with rwx and SBA. Pt  with overall improved steadiness and independence with mobility today, endorses mild SOA and fatigues quickly but tolerated progressed gait distance well. Pt returned to room and left sitting [facility]. Pt reports plan remains DC home with family assist and HHPT. Will continue to follow to progress mobility as tolerated.     Time Calculation:         PT Charges       Row Name 07/26/23 1335             Time Calculation    Start Time 0938  -      Stop Time 0947  -      Time Calculation (min) 9 min  -      PT Received On 07/26/23  -      PT - Next Appointment 07/27/23  -         Time Calculation- PT    Total Timed Code Minutes- PT 9 minute(s)  -         Timed Charges    25742 - PT Therapeutic Activity Minutes 9  -BH         Total Minutes    Timed Charges Total Minutes 9  -       Total Minutes 9  -                User Key  (r) = Recorded By, (t) = Taken By, (c) = Cosigned By      Initials Name Provider Type     Yaquelin Johnson, PT Physical Therapist                  Therapy Charges for Today       Code Description Service Date Service Provider Modifiers Qty    42366379180 HC GAIT TRAINING EA 15 MIN 7/25/2023 Yaquelin Johnson, PT GP 1    [id number] HC PT THERAPEUTIC ACT EA 15 MIN 7/26/2023 Yaquelin Johnson, PT GP 1            PT G-Codes  Outcome Measure Options: AM-PAC 6 Clicks Basic Mobility (PT)  AM-PAC 6 Clicks Score (PT): 21  AM-PAC 6 Clicks Score (OT): 10  Modified [place] Scale: 5 - Severe disability.  Bedridden, incontinent, and requiring constant nursing care and attention.  PT Discharge Summary  Anticipated Discharge Disposition (PT): home with 24/7 care, home with home health, home with assist    Yaquelin Johnson PT  7/26/2023

## 2023-07-26 NOTE — PROGRESS NOTES
"   LOS: 8 days     Chief Complaint/ Reason for encounter: Postop DAVID    Subjective   07/24/23 : Patient is doing well today with no new complaints  Good appetite with no nausea or vomiting  No shortness of breath chest pain or edema  Voiding well with no dysuria  Morgan replaced yesterday.  UA abnormal, urine culture sent and antibiotics started  Lines and tubes to be removed later today  Urine output remained low overnight    7/25: Doing well.  Weight is up by 15 pounds since admission  Legs somewhat edematous but she is off oxygen  She denies any shortness of breath or chest pain  Morgan remains in place per urology  On IV antibiotics    7/26: She is doing much better today.  I do not think today's weight is accurate but she has been diuresing very well, over 5 L since yesterday with IV Lasix  BP has improved and midodrine was stopped  She is eating and drinking well denies new complaints    Medical history reviewed:  History of Present Illness    Subjective    History taken from: Patient and chart    Vital Signs  Temp:  [97.6 °F (36.4 °C)-98.5 °F (36.9 °C)] 98.4 °F (36.9 °C)  Heart Rate:  [63-74] 72  Resp:  [16-17] 17  BP: (133-154)/(54-91) 140/91       Wt Readings from Last 1 Encounters:   07/26/23 0640 82.3 kg (181 lb 8 oz)   07/25/23 0345 97.7 kg (215 lb 8 oz)   07/24/23 0500 97 kg (213 lb 13.5 oz)   07/23/23 0610 96.3 kg (212 lb 4.9 oz)   07/22/23 0656 103 kg (226 lb 6.6 oz)   07/21/23 0437 96.1 kg (211 lb 13.8 oz)   07/20/23 0937 90 kg (198 lb 6.6 oz)   07/20/23 0532 90.4 kg (199 lb 3.2 oz)   07/18/23 0410 89.3 kg (196 lb 12.8 oz)       Objective:  Vital signs: (most recent): Blood pressure 140/91, pulse 72, temperature 98.4 °F (36.9 °C), temperature source Oral, resp. rate 17, height 162.6 cm (64\"), weight 82.3 kg (181 lb 8 oz), SpO2 95 %, not currently breastfeeding.              Objective:  General Appearance:  Comfortable, mildly ill-appearing, in no acute distress and not in pain.  Awake, alert, " oriented  HEENT: Mucous membranes moist, no injury, oropharynx clear  Lungs:  Normal effort and normal respiratory rate.  Breath sounds clear to auscultation.  No  respiratory distress.  No rales, decreased breath sounds or rhonchi.    Heart: Normal rate.  Regular rhythm.  S1, S2 normal.  No murmur.   Abdomen: Abdomen is soft.  Bowel sounds are normal, no abdominal tenderness.  There is no rebound or guarding  Extremities: Trace edema of bilateral lower extremities  Neurological: No focal motor or sensory deficits, pupils reactive  Skin:  Warm and dry.  No rash or cyanosis.       Results Review:    Intake/Output:     Intake/Output Summary (Last 24 hours) at 7/26/2023 1225  Last data filed at 7/26/2023 0922  Gross per 24 hour   Intake 810 ml   Output 4900 ml   Net -4090 ml           DATA:  Radiology and Labs:  The following labs independently reviewed by me. Additional labs ordered for tomorrow a.m.  Interval notes, chart personally reviewed by me.   Old records independently reviewed showing normal baseline creatinine around 1.0  Discussed with patient herself at bedside    Risk/ complexity of medical care/ medical decision making moderate complexity, DAVID and diuretic management postop CABG    Labs:   Recent Results (from the past 24 hour(s))   POC Glucose Once    Collection Time: 07/25/23  4:10 PM    Specimen: Blood   Result Value Ref Range    Glucose 106 70 - 130 mg/dL   POC Glucose Once    Collection Time: 07/25/23  8:24 PM    Specimen: Blood   Result Value Ref Range    Glucose 158 (H) 70 - 130 mg/dL   Renal Function Panel    Collection Time: 07/26/23  3:35 AM    Specimen: Blood   Result Value Ref Range    Glucose 107 (H) 65 - 99 mg/dL    BUN 36 (H) 8 - 23 mg/dL    Creatinine 1.41 (H) 0.57 - 1.00 mg/dL    Sodium 132 (L) 136 - 145 mmol/L    Potassium 3.9 3.5 - 5.2 mmol/L    Chloride 102 98 - 107 mmol/L    CO2 17.9 (L) 22.0 - 29.0 mmol/L    Calcium 8.2 (L) 8.6 - 10.5 mg/dL    Albumin 3.0 (L) 3.5 - 5.2 g/dL     Phosphorus 2.0 (L) 2.5 - 4.5 mg/dL    Anion Gap 12.1 5.0 - 15.0 mmol/L    BUN/Creatinine Ratio 25.5 (H) 7.0 - 25.0    eGFR 38.3 (L) >60.0 mL/min/1.73   POC Glucose Once    Collection Time: 07/26/23 11:17 AM    Specimen: Blood   Result Value Ref Range    Glucose 136 (H) 70 - 130 mg/dL       Radiology:  Pertinent radiology studies were reviewed as described above      Medications have been reviewed separately in chart overview      ASSESSMENT:  DAVID postop. On top of CKD stage IIIa.  Baseline creatinine around 1.0.  Likely due to hypotension with decreased renal perfusion.  Excellent urine output, slowly improving  Hypotension, much better, midodrine discontinued  CAD status post three-vessel CABG  NSTEMI  Combined systolic and diastolic CHF  Diabetes mellitus type 2  Hypothyroidism      DISCUSSION/PLAN:  BP trends much better, midodrine discontinued  Excellent response to IV Lasix yesterday  Transition to oral Lasix  I do not think today's weight was accurate but regardless she has had 5 L of urine output since yesterday  Continue to hold Entresto.  Continue Jardiance  Acidosis little worse.  Increase sodium bicarb      Continue to monitor urine output.  Voiding well after Morgan was removed  Defer antimicrobials to primary team  Calcium corrects to normal with low albumin  Monitor potassium and electrolytes with diuretics    Continue to monitor electrolytes and volume closely, avoid IV contrast and nephrotoxic medications  Discharge planning.  Discussed with family      Anderson Parker MD  [facility]  Office phone number: [phone]  Answering service phone number: [phone]    07/26/23  12:25 EDT    Dictation performed using Dragon dictation software

## 2023-07-26 NOTE — THERAPY TREATMENT NOTE
Patient Name: Ann-Marie Hughes  : 1945    MRN: [id number]                              Today's Date: 2023       Admit Date: 2023    Visit Dx:     ICD-10-CM ICD-9-CM   1. NSTEMI (non-ST elevated myocardial infarction)  I21.4 410.70   2. Chest pain, unspecified type  R07.9 786.50   3. Abnormal findings on diagnostic imaging of heart and coronary circulation  R93.1 794.39   4. Coronary artery disease of native artery of native heart with stable angina pectoris  I25.118 414.01     413.9   5. S/P CABG (coronary artery bypass graft)  Z95.1 V45.81     Patient Active Problem List   Diagnosis    Arthritis    Bilateral leg pain    Bladder disorder    Chronic pain syndrome    Depression    Diabetes mellitus, type II    Type 2 diabetes mellitus with hyperglycemia    Gastric reflux    Herniated disc    Hypertension    Hypothyroidism    Low back pain    Pain in joint, multiple sites    Pain in soft tissues of limb    Shortness of breath    Hyperlipidemia    B12 deficiency    Vitamin D deficiency    UTI (urinary tract infection)    Metabolic syndrome    Acne rosacea    Hx of smoking    Stress incontinence of urine    Arteriosclerosis of abdominal aorta    Iron deficiency anemia secondary to inadequate dietary iron intake    Seasonal allergies    Hyponatremia    Leg length discrepancy    Type 2 diabetes mellitus with hyperglycemia    Reactive depression    Encounter for subsequent annual wellness visit (AWV) in Medicare patient    Class 1 obesity with alveolar hypoventilation, serious comorbidity, and body mass index (BMI) of 32.0 to 32.9 in adult    Heart failure with reduced ejection fraction    Coronary artery disease of native artery of native heart with stable angina pectoris    Chest pain    Chronic HFrEF (heart failure with reduced ejection fraction)    NSTEMI (non-ST elevated myocardial infarction)    Coronary heart disease    Abnormal findings on diagnostic imaging of heart and coronary circulation      Past Medical History:   Diagnosis Date    Acne rosacea 08/17/2021    DR.JEFF MOON     Arteriosclerosis of abdominal aorta     B12 deficiency 08/17/2021    DJD (degenerative joint disease)     Hx of smoking 08/17/2021    QUIT IN 1976 AT AGE 31    Hyperlipidemia 08/17/2021    Hypertension     Hypothyroidism     Metabolic syndrome 08/17/2021    KAREN (stress urinary incontinence, female) 08/17/2021    UTI (urinary tract infection) 08/17/2021    Vitamin D deficiency 08/17/2021    Vitamin D deficiency      Past Surgical History:   Procedure Laterality Date    CARDIAC CATHETERIZATION N/A 7/17/2023    Procedure: Left Heart Cath;  Surgeon: Dinh Andres MD;  Location: [facility] CATH INVASIVE LOCATION;  Service: Cardiovascular;  Laterality: N/A;    COLONOSCOPY  2011    CORONARY ARTERY BYPASS GRAFT WITH MITRAL VALVE REPAIR/REPLACEMENT N/A 7/20/2023    Procedure: REZA STERNOTOMY OFF-PUMP CORONARY ARTERY BYPASS GRAFT TIMES        USING LEFFT INTERNAL MAMMARY ARTERY AND     GREATER SAPHENOUS VEIN GRAFT PER EMDOSCOPIC VEIN HARVESTING, MAZE PROCEDURE AND PRP;  Surgeon: Shane Alexander MD;  Location: [facility] CVOR;  Service: Cardiothoracic;  Laterality: N/A;      General Information       Row Name 07/26/23 1016          OT Time and Intention    Document Type therapy note (daily note)  -RB     Mode of Treatment occupational therapy  -RB       Row Name 07/26/23 1016          General Information    Patient Profile Reviewed yes  -RB       Row Name 07/26/23 1016          Cognition    Orientation Status (Cognition) oriented x 4  -RB               User Key  (r) = Recorded By, (t) = Taken By, (c) = Cosigned By      Initials Name Provider Type    RB Kayley Wade OT Occupational Therapist                     Mobility/ADL's       Row Name 07/26/23 1016          Bed Mobility    Comment, (Bed Mobility) UI  -RB       Row Name 07/26/23 1016          Transfers    Transfers sit-stand transfer;stand-sit transfer  -RB       Row  Name 07/26/23 1016          Sit-Stand Transfer    Sit-Stand [place] (Transfers) contact guard;standby assist  -RB     Assistive Device (Sit-Stand Transfers) walker, front-wheeled  -RB       Row Name 07/26/23 1016          Stand-Sit Transfer    Stand-Sit [place] (Transfers) contact guard;standby assist  -RB     Assistive Device (Stand-Sit Transfers) walker, front-wheeled  -RB       Row Name 07/26/23 1016          Functional Mobility    Functional Mobility- Ind. Level standby assist  -RB     Functional Mobility- Device walker, front-wheeled  -RB       Row Name 07/26/23 1016          Activities of Daily Living    BADL Assessment/Intervention --  Politely declined  -RB               User Key  (r) = Recorded By, (t) = Taken By, (c) = Cosigned By      Initials Name Provider Type    Kayley Evans OT Occupational Therapist                   Obj/Interventions       Row Name 07/26/23 1017          Shoulder (Therapeutic Exercise)    Shoulder (Therapeutic Exercise) AROM (active range of motion)  -RB     Shoulder AROM (Therapeutic Exercise) bilateral;flexion;extension;aDduction;aBduction;10 repetitions;2 sets;sitting  -RB       Row Name 07/26/23 1017          Elbow/Forearm (Therapeutic Exercise)    Elbow/Forearm (Therapeutic Exercise) AROM (active range of motion)  -RB     Elbow/Forearm AROM (Therapeutic Exercise) bilateral;flexion;extension;10 repetitions;sitting  -RB       Row Name 07/26/23 1017          Motor Skills    Therapeutic Exercise elbow/forearm;shoulder  -RB       Row Name 07/26/23 1017          Balance    Comment, Balance SBA/CGA for sitting balance. Improvements with positioning of the walker  -RB               User Key  (r) = Recorded By, (t) = Taken By, (c) = Cosigned By      Initials Name Provider Type    Kayley Evans OT Occupational Therapist                   Goals/Plan    No documentation.                  Clinical Impression       Row Name 07/26/23 1018          Pain Assessment     Pretreatment Pain Rating 0/10 - no pain  -RB     Posttreatment Pain Rating 0/10 - no pain  -RB       Row Name 07/26/23 1018          Plan of Care Review    Plan of Care Reviewed With patient;family  -RB     Progress improving  -RB       Row Name 07/26/23 1018          Therapy Assessment/Plan (OT)    Rehab Potential (OT) good, to achieve stated therapy goals  -RB     Criteria for Skilled Therapeutic Interventions Met (OT) skilled treatment is necessary  -RB     Therapy Frequency (OT) 5 times/wk  -RB       Row Name 07/26/23 1018          Therapy Plan Review/Discharge Plan (OT)    Anticipated Discharge Disposition (OT) home with assist;home with home health  -RB       Row Name 07/26/23 1018          Vital Signs    O2 Delivery Pre Treatment room air  -RB     O2 Delivery Intra Treatment room air  -RB     O2 Delivery Post Treatment room air  -RB     Pre Patient Position Sitting  -RB     Intra Patient Position Standing  -RB     Post Patient Position Sitting  -RB       Row Name 07/26/23 1018          Positioning and Restraints    Pre-Treatment Position sitting in chair/recliner  -RB     Post Treatment Position chair  -RB     In Chair notified nsg;reclined;sitting;call light within reach;encouraged to call for assist;exit alarm on;legs elevated  -RB               User Key  (r) = Recorded By, (t) = Taken By, (c) = Cosigned By      Initials Name Provider Type    Kayley Evans OT Occupational Therapist                   Outcome Measures    No documentation.                   Occupational Therapy Education       Title: PT OT SLP Therapies (In Progress)       Topic: Occupational Therapy (Not Started)       Point: ADL training (Not Started)       Description:   Instruct learner(s) on proper safety adaptation and remediation techniques during self care or transfers.   Instruct in proper use of assistive devices.                  Learner Progress:  Not documented in this visit.              Point: Home exercise program (Not  Started)       Description:   Instruct learner(s) on appropriate technique for monitoring, assisting and/or progressing therapeutic exercises/activities.                  Learner Progress:  Not documented in this visit.              Point: Precautions (Not Started)       Description:   Instruct learner(s) on prescribed precautions during self-care and functional transfers.                  Learner Progress:  Not documented in this visit.              Point: Body mechanics (Not Started)       Description:   Instruct learner(s) on proper positioning and spine alignment during self-care, functional mobility activities and/or exercises.                  Learner Progress:  Not documented in this visit.                                  OT Recommendation and Plan  Planned Therapy Interventions (OT): transfer/mobility retraining, strengthening exercise, ROM/therapeutic exercise, activity tolerance training, adaptive equipment training, BADL retraining, functional balance retraining, occupation/activity based interventions, patient/caregiver education/training  Therapy Frequency (OT): 5 times/wk  Plan of Care Review  Plan of Care Reviewed With: patient, family  Progress: improving  Outcome Evaluation: Signed     The pt participated in OT/PT this AM. She was pleasant and agreeable to treatment. Her chest tubes were removed prior to her therapy session. She completed her cardiac protocol BUE/BLE exercises. She mobilized x6 minutes with slow pacing and a forward flexed posture. She became very fatigued quickly and a chair was pulled up for her to sit.  + 24/7 assistance is planned. Her  is a farmer and not always available but her son ensures someone will be with her all the time.     Time Calculation:   Evaluation Complexity (OT)  Review Occupational Profile/Medical/Therapy History Complexity: expanded/moderate complexity  Assessment, Occupational Performance/Identification of Deficit Complexity: 3-5 performance  deficits  Clinical Decision Making Complexity (OT): detailed assessment/moderate complexity  Overall Complexity of Evaluation (OT): moderate complexity     Time Calculation- OT       Row Name 07/26/23 1015             Time Calculation- OT    OT Start Time 0937  -RB      OT Stop Time 0947  -RB      OT Time Calculation (min) 10 min  -RB      Total Timed Code Minutes- OT 10 minute(s)  -RB      OT Received On 07/26/23  -RB      OT - Next Appointment 07/27/23  -RB         Timed Charges    48951 - OT Therapeutic Exercise Minutes 10  -RB         Total Minutes    Timed Charges Total Minutes 10  -RB       Total Minutes 10  -RB                User Key  (r) = Recorded By, (t) = Taken By, (c) = Cosigned By      Initials Name Provider Type    RB Kayley Wade OT Occupational Therapist                  Therapy Charges for Today       Code Description Service Date Service Provider Modifiers Qty    12902199659  OT THERAPEUTIC ACT EA 15 MIN 7/25/2023 Kayley Wade OT GO 1    [id number] HC OT THER PROC EA 15 MIN 7/26/2023 Kayley Wade OT GO 1                 Kayley Wade OT  7/26/2023

## 2023-07-26 NOTE — PLAN OF CARE
The pt participated in OT/PT this AM. She completed her cardiac protocol exercises without difficulty or pain. She stood with SBA and mobilized into the hallway with a walker. She returned to her bedside chair without LOB. Politely declined ADL's. She plans to d/c home with HH.

## 2023-07-27 ENCOUNTER — APPOINTMENT (OUTPATIENT)
Dept: CARDIOLOGY | Facility: HOSPITAL | Age: 78
DRG: 236 | End: 2023-07-27
Payer: MEDICARE

## 2023-07-27 LAB
ALBUMIN SERPL-MCNC: 3 G/DL (ref 3.5–5.2)
ANION GAP SERPL CALCULATED.3IONS-SCNC: 12.8 MMOL/L (ref 5–15)
BH CV LOW VAS LEFT LESSER SAPH VESSEL: 1
BH CV LOWER VASCULAR LEFT COMMON FEMORAL AUGMENT: NORMAL
BH CV LOWER VASCULAR LEFT COMMON FEMORAL COMPETENT: NORMAL
BH CV LOWER VASCULAR LEFT COMMON FEMORAL COMPRESS: NORMAL
BH CV LOWER VASCULAR LEFT COMMON FEMORAL PHASIC: NORMAL
BH CV LOWER VASCULAR LEFT COMMON FEMORAL SPONT: NORMAL
BH CV LOWER VASCULAR LEFT DISTAL FEMORAL COMPRESS: NORMAL
BH CV LOWER VASCULAR LEFT GASTRONEMIUS COMPRESS: NORMAL
BH CV LOWER VASCULAR LEFT GREATER SAPH AK COMPRESS: NORMAL
BH CV LOWER VASCULAR LEFT GREATER SAPH BK COMPRESS: NORMAL
BH CV LOWER VASCULAR LEFT LESSER SAPH COMPRESS: NORMAL
BH CV LOWER VASCULAR LEFT LESSER SAPH THROMBUS: NORMAL
BH CV LOWER VASCULAR LEFT MID FEMORAL AUGMENT: NORMAL
BH CV LOWER VASCULAR LEFT MID FEMORAL COMPETENT: NORMAL
BH CV LOWER VASCULAR LEFT MID FEMORAL COMPRESS: NORMAL
BH CV LOWER VASCULAR LEFT MID FEMORAL PHASIC: NORMAL
BH CV LOWER VASCULAR LEFT MID FEMORAL SPONT: NORMAL
BH CV LOWER VASCULAR LEFT PERONEAL COMPRESS: NORMAL
BH CV LOWER VASCULAR LEFT POPLITEAL AUGMENT: NORMAL
BH CV LOWER VASCULAR LEFT POPLITEAL COMPETENT: NORMAL
BH CV LOWER VASCULAR LEFT POPLITEAL COMPRESS: NORMAL
BH CV LOWER VASCULAR LEFT POPLITEAL PHASIC: NORMAL
BH CV LOWER VASCULAR LEFT POPLITEAL SPONT: NORMAL
BH CV LOWER VASCULAR LEFT POSTERIOR TIBIAL COMPRESS: NORMAL
BH CV LOWER VASCULAR LEFT PROFUNDA FEMORAL COMPRESS: NORMAL
BH CV LOWER VASCULAR LEFT PROXIMAL FEMORAL COMPRESS: NORMAL
BH CV LOWER VASCULAR LEFT SAPHENOFEMORAL JUNCTION COMPRESS: NORMAL
BH CV LOWER VASCULAR RIGHT COMMON FEMORAL AUGMENT: NORMAL
BH CV LOWER VASCULAR RIGHT COMMON FEMORAL COMPETENT: NORMAL
BH CV LOWER VASCULAR RIGHT COMMON FEMORAL COMPRESS: NORMAL
BH CV LOWER VASCULAR RIGHT COMMON FEMORAL PHASIC: NORMAL
BH CV LOWER VASCULAR RIGHT COMMON FEMORAL SPONT: NORMAL
BH CV LOWER VASCULAR RIGHT DISTAL FEMORAL COMPRESS: NORMAL
BH CV LOWER VASCULAR RIGHT GASTRONEMIUS COMPRESS: NORMAL
BH CV LOWER VASCULAR RIGHT GREATER SAPH BK COMPRESS: NORMAL
BH CV LOWER VASCULAR RIGHT LESSER SAPH COMPRESS: NORMAL
BH CV LOWER VASCULAR RIGHT MID FEMORAL AUGMENT: NORMAL
BH CV LOWER VASCULAR RIGHT MID FEMORAL COMPETENT: NORMAL
BH CV LOWER VASCULAR RIGHT MID FEMORAL COMPRESS: NORMAL
BH CV LOWER VASCULAR RIGHT MID FEMORAL PHASIC: NORMAL
BH CV LOWER VASCULAR RIGHT MID FEMORAL SPONT: NORMAL
BH CV LOWER VASCULAR RIGHT PERONEAL COMPRESS: NORMAL
BH CV LOWER VASCULAR RIGHT POPLITEAL AUGMENT: NORMAL
BH CV LOWER VASCULAR RIGHT POPLITEAL COMPETENT: NORMAL
BH CV LOWER VASCULAR RIGHT POPLITEAL COMPRESS: NORMAL
BH CV LOWER VASCULAR RIGHT POPLITEAL PHASIC: NORMAL
BH CV LOWER VASCULAR RIGHT POPLITEAL SPONT: NORMAL
BH CV LOWER VASCULAR RIGHT POSTERIOR TIBIAL COMPRESS: NORMAL
BH CV LOWER VASCULAR RIGHT PROFUNDA FEMORAL COMPRESS: NORMAL
BH CV LOWER VASCULAR RIGHT PROXIMAL FEMORAL COMPRESS: NORMAL
BH CV LOWER VASCULAR RIGHT SAPHENOFEMORAL JUNCTION COMPRESS: NORMAL
BUN SERPL-MCNC: 35 MG/DL (ref 8–23)
BUN/CREAT SERPL: 29.7 (ref 7–25)
CALCIUM SPEC-SCNC: 8.1 MG/DL (ref 8.6–10.5)
CHLORIDE SERPL-SCNC: 104 MMOL/L (ref 98–107)
CO2 SERPL-SCNC: 21.2 MMOL/L (ref 22–29)
CREAT SERPL-MCNC: 1.18 MG/DL (ref 0.57–1)
DEPRECATED RDW RBC AUTO: 51.4 FL (ref 37–54)
EGFRCR SERPLBLD CKD-EPI 2021: 47.4 ML/MIN/1.73
ERYTHROCYTE [DISTWIDTH] IN BLOOD BY AUTOMATED COUNT: 16 % (ref 12.3–15.4)
GLUCOSE BLDC GLUCOMTR-MCNC: 118 MG/DL (ref 70–130)
GLUCOSE BLDC GLUCOMTR-MCNC: 144 MG/DL (ref 70–130)
GLUCOSE BLDC GLUCOMTR-MCNC: 158 MG/DL (ref 70–130)
GLUCOSE BLDC GLUCOMTR-MCNC: 207 MG/DL (ref 70–130)
GLUCOSE SERPL-MCNC: 129 MG/DL (ref 65–99)
HCT VFR BLD AUTO: 28.3 % (ref 34–46.6)
HGB BLD-MCNC: 9.2 G/DL (ref 12–15.9)
MAGNESIUM SERPL-MCNC: 1.9 MG/DL (ref 1.6–2.4)
MCH RBC QN AUTO: 29 PG (ref 26.6–33)
MCHC RBC AUTO-ENTMCNC: 32.5 G/DL (ref 31.5–35.7)
MCV RBC AUTO: 89.3 FL (ref 79–97)
PHOSPHATE SERPL-MCNC: 2.4 MG/DL (ref 2.5–4.5)
PLATELET # BLD AUTO: 306 10*3/MM3 (ref 140–450)
PMV BLD AUTO: 9.8 FL (ref 6–12)
POTASSIUM SERPL-SCNC: 3.5 MMOL/L (ref 3.5–5.2)
POTASSIUM SERPL-SCNC: 3.7 MMOL/L (ref 3.5–5.2)
POTASSIUM SERPL-SCNC: 4.2 MMOL/L (ref 3.5–5.2)
QT INTERVAL: 396 MS
QT INTERVAL: 415 MS
RBC # BLD AUTO: 3.17 10*6/MM3 (ref 3.77–5.28)
SODIUM SERPL-SCNC: 138 MMOL/L (ref 136–145)
WBC NRBC COR # BLD: 13.64 10*3/MM3 (ref 3.4–10.8)

## 2023-07-27 PROCEDURE — 80069 RENAL FUNCTION PANEL: CPT | Performed by: INTERNAL MEDICINE

## 2023-07-27 PROCEDURE — 93970 EXTREMITY STUDY: CPT

## 2023-07-27 PROCEDURE — 93010 ELECTROCARDIOGRAM REPORT: CPT | Performed by: INTERNAL MEDICINE

## 2023-07-27 PROCEDURE — 93005 ELECTROCARDIOGRAM TRACING: CPT | Performed by: NURSE PRACTITIONER

## 2023-07-27 PROCEDURE — 25010000002 CALCIUM GLUCONATE-NACL 1-0.675 GM/50ML-% SOLUTION: Performed by: NURSE PRACTITIONER

## 2023-07-27 PROCEDURE — 93005 ELECTROCARDIOGRAM TRACING: CPT

## 2023-07-27 PROCEDURE — 84132 ASSAY OF SERUM POTASSIUM: CPT | Performed by: NURSE PRACTITIONER

## 2023-07-27 PROCEDURE — 97530 THERAPEUTIC ACTIVITIES: CPT

## 2023-07-27 PROCEDURE — 83735 ASSAY OF MAGNESIUM: CPT | Performed by: NURSE PRACTITIONER

## 2023-07-27 PROCEDURE — 85027 COMPLETE CBC AUTOMATED: CPT | Performed by: NURSE PRACTITIONER

## 2023-07-27 PROCEDURE — 25010000002 MAGNESIUM SULFATE 2 GM/50ML SOLUTION: Performed by: NURSE PRACTITIONER

## 2023-07-27 PROCEDURE — 84132 ASSAY OF SERUM POTASSIUM: CPT

## 2023-07-27 PROCEDURE — 82948 REAGENT STRIP/BLOOD GLUCOSE: CPT

## 2023-07-27 PROCEDURE — 25010000002 ONDANSETRON PER 1 MG: Performed by: NURSE PRACTITIONER

## 2023-07-27 PROCEDURE — 25010000002 AMIODARONE IN DEXTROSE 5% 150-4.21 MG/100ML-% SOLUTION: Performed by: NURSE PRACTITIONER

## 2023-07-27 PROCEDURE — 99222 1ST HOSP IP/OBS MODERATE 55: CPT

## 2023-07-27 RX ORDER — METOPROLOL SUCCINATE 25 MG/1
25 TABLET, EXTENDED RELEASE ORAL EVERY 12 HOURS SCHEDULED
Status: DISCONTINUED | OUTPATIENT
Start: 2023-07-27 | End: 2023-07-28

## 2023-07-27 RX ORDER — DOXYCYCLINE 100 MG/1
100 CAPSULE ORAL EVERY 12 HOURS SCHEDULED
Status: DISCONTINUED | OUTPATIENT
Start: 2023-07-27 | End: 2023-07-28 | Stop reason: HOSPADM

## 2023-07-27 RX ORDER — POTASSIUM CHLORIDE 750 MG/1
40 TABLET, FILM COATED, EXTENDED RELEASE ORAL EVERY 4 HOURS
Status: COMPLETED | OUTPATIENT
Start: 2023-07-27 | End: 2023-07-27

## 2023-07-27 RX ORDER — AMIODARONE HYDROCHLORIDE 200 MG/1
200 TABLET ORAL EVERY 12 HOURS SCHEDULED
Status: DISCONTINUED | OUTPATIENT
Start: 2023-07-27 | End: 2023-07-28

## 2023-07-27 RX ORDER — MAGNESIUM SULFATE HEPTAHYDRATE 40 MG/ML
2 INJECTION, SOLUTION INTRAVENOUS ONCE
Status: COMPLETED | OUTPATIENT
Start: 2023-07-27 | End: 2023-07-27

## 2023-07-27 RX ORDER — CALCIUM GLUCONATE 20 MG/ML
1000 INJECTION, SOLUTION INTRAVENOUS ONCE
Status: COMPLETED | OUTPATIENT
Start: 2023-07-27 | End: 2023-07-27

## 2023-07-27 RX ADMIN — ATORVASTATIN CALCIUM 40 MG: 20 TABLET, FILM COATED ORAL at 20:37

## 2023-07-27 RX ADMIN — FUROSEMIDE 40 MG: 40 TABLET ORAL at 09:11

## 2023-07-27 RX ADMIN — CALCIUM GLUCONATE 1000 MG: 20 INJECTION, SOLUTION INTRAVENOUS at 09:24

## 2023-07-27 RX ADMIN — Medication 12.5 MG: at 09:12

## 2023-07-27 RX ADMIN — ONDANSETRON 4 MG: 2 INJECTION INTRAMUSCULAR; INTRAVENOUS at 23:14

## 2023-07-27 RX ADMIN — MONTELUKAST SODIUM 10 MG: 10 TABLET, FILM COATED ORAL at 09:12

## 2023-07-27 RX ADMIN — GUAIFENESIN 1200 MG: 600 TABLET, EXTENDED RELEASE ORAL at 09:12

## 2023-07-27 RX ADMIN — Medication 10 ML: at 09:58

## 2023-07-27 RX ADMIN — Medication 10 ML: at 20:39

## 2023-07-27 RX ADMIN — AMIODARONE HYDROCHLORIDE 200 MG: 200 TABLET ORAL at 20:37

## 2023-07-27 RX ADMIN — EMPAGLIFLOZIN 25 MG: 25 TABLET, FILM COATED ORAL at 09:12

## 2023-07-27 RX ADMIN — MUPIROCIN: 20 OINTMENT TOPICAL at 20:38

## 2023-07-27 RX ADMIN — LEVOTHYROXINE SODIUM 75 MCG: 0.07 TABLET ORAL at 06:33

## 2023-07-27 RX ADMIN — METOPROLOL SUCCINATE 25 MG: 25 TABLET, EXTENDED RELEASE ORAL at 09:12

## 2023-07-27 RX ADMIN — PANTOPRAZOLE SODIUM 40 MG: 40 TABLET, DELAYED RELEASE ORAL at 06:33

## 2023-07-27 RX ADMIN — AMIODARONE HYDROCHLORIDE 150 MG: 1.5 INJECTION, SOLUTION INTRAVENOUS at 09:07

## 2023-07-27 RX ADMIN — DOXYCYCLINE 100 MG: 100 CAPSULE ORAL at 20:37

## 2023-07-27 RX ADMIN — SODIUM BICARBONATE 650 MG: 650 TABLET ORAL at 09:12

## 2023-07-27 RX ADMIN — DOXYCYCLINE 100 MG: 100 CAPSULE ORAL at 09:11

## 2023-07-27 RX ADMIN — SODIUM BICARBONATE 650 MG: 650 TABLET ORAL at 20:37

## 2023-07-27 RX ADMIN — POTASSIUM CHLORIDE 40 MEQ: 750 TABLET, EXTENDED RELEASE ORAL at 06:33

## 2023-07-27 RX ADMIN — BUPROPION HYDROCHLORIDE 300 MG: 300 TABLET, EXTENDED RELEASE ORAL at 09:12

## 2023-07-27 RX ADMIN — APIXABAN 5 MG: 5 TABLET, FILM COATED ORAL at 20:37

## 2023-07-27 RX ADMIN — ASPIRIN 81 MG: 81 TABLET, COATED ORAL at 09:12

## 2023-07-27 RX ADMIN — METOPROLOL SUCCINATE 25 MG: 25 TABLET, EXTENDED RELEASE ORAL at 20:37

## 2023-07-27 RX ADMIN — POTASSIUM CHLORIDE 30 MEQ: 750 TABLET, EXTENDED RELEASE ORAL at 12:34

## 2023-07-27 RX ADMIN — AMIODARONE HYDROCHLORIDE 200 MG: 200 TABLET ORAL at 11:48

## 2023-07-27 RX ADMIN — MAGNESIUM SULFATE HEPTAHYDRATE 2 G: 2 INJECTION, SOLUTION INTRAVENOUS at 11:37

## 2023-07-27 RX ADMIN — APIXABAN 5 MG: 5 TABLET, FILM COATED ORAL at 09:12

## 2023-07-27 RX ADMIN — GUAIFENESIN 1200 MG: 600 TABLET, EXTENDED RELEASE ORAL at 20:37

## 2023-07-27 NOTE — THERAPY TREATMENT NOTE
Patient Name: Ann-Marie Hughes  : 1945    MRN: [id number]                              Today's Date: 2023       Admit Date: 2023    Visit Dx:     ICD-10-CM ICD-9-CM   1. NSTEMI (non-ST elevated myocardial infarction)  I21.4 410.70   2. Chest pain, unspecified type  R07.9 786.50   3. Abnormal findings on diagnostic imaging of heart and coronary circulation  R93.1 794.39   4. Coronary artery disease of native artery of native heart with stable angina pectoris  I25.118 414.01     413.9   5. S/P CABG (coronary artery bypass graft)  Z95.1 V45.81     Patient Active Problem List   Diagnosis    Arthritis    Bilateral leg pain    Bladder disorder    Chronic pain syndrome    Depression    Diabetes mellitus, type II    Type 2 diabetes mellitus with hyperglycemia    Gastric reflux    Herniated disc    Hypertension    Hypothyroidism    Low back pain    Pain in joint, multiple sites    Pain in soft tissues of limb    Shortness of breath    Hyperlipidemia    B12 deficiency    Vitamin D deficiency    UTI (urinary tract infection)    Metabolic syndrome    Acne rosacea    Hx of smoking    Stress incontinence of urine    Arteriosclerosis of abdominal aorta    Iron deficiency anemia secondary to inadequate dietary iron intake    Seasonal allergies    Hyponatremia    Leg length discrepancy    Type 2 diabetes mellitus with hyperglycemia    Reactive depression    Encounter for subsequent annual wellness visit (AWV) in Medicare patient    Class 1 obesity with alveolar hypoventilation, serious comorbidity, and body mass index (BMI) of 32.0 to 32.9 in adult    Heart failure with reduced ejection fraction    Coronary artery disease of native artery of native heart with stable angina pectoris    Chest pain    Chronic HFrEF (heart failure with reduced ejection fraction)    NSTEMI (non-ST elevated myocardial infarction)    Coronary heart disease    Abnormal findings on diagnostic imaging of heart and coronary circulation      Past Medical History:   Diagnosis Date    Acne rosacea 08/17/2021    DR.JEFF MOON     Arteriosclerosis of abdominal aorta     B12 deficiency 08/17/2021    DJD (degenerative joint disease)     Hx of smoking 08/17/2021    QUIT IN 1976 AT AGE 31    Hyperlipidemia 08/17/2021    Hypertension     Hypothyroidism     Metabolic syndrome 08/17/2021    KAREN (stress urinary incontinence, female) 08/17/2021    UTI (urinary tract infection) 08/17/2021    Vitamin D deficiency 08/17/2021    Vitamin D deficiency      Past Surgical History:   Procedure Laterality Date    CARDIAC CATHETERIZATION N/A 7/17/2023    Procedure: Left Heart Cath;  Surgeon: Dinh Andres MD;  Location: [facility] CATH INVASIVE LOCATION;  Service: Cardiovascular;  Laterality: N/A;    COLONOSCOPY  2011    CORONARY ARTERY BYPASS GRAFT WITH MITRAL VALVE REPAIR/REPLACEMENT N/A 7/20/2023    Procedure: REZA STERNOTOMY OFF-PUMP CORONARY ARTERY BYPASS GRAFT TIMES        USING LEFFT INTERNAL MAMMARY ARTERY AND     GREATER SAPHENOUS VEIN GRAFT PER EMDOSCOPIC VEIN HARVESTING, MAZE PROCEDURE AND PRP;  Surgeon: Shane Alexander MD;  Location: [facility] CVOR;  Service: Cardiothoracic;  Laterality: N/A;      General Information       Row Name 07/27/23 8464          Physical Therapy Time and Intention    Document Type therapy note (daily note)  -DB     Mode of Treatment individual therapy;physical therapy  -DB       Row Name 07/27/23 6922          General Information    Patient Profile Reviewed yes  -DB               User Key  (r) = Recorded By, (t) = Taken By, (c) = Cosigned By      Initials Name Provider Type    DB Hannah Ramírez PT Physical Therapist                   Mobility       Row Name 07/27/23 7400          Sit-Stand Transfer    Sit-Stand [place] (Transfers) standby assist  -DB     Assistive Device (Sit-Stand Transfers) walker, front-wheeled  -DB       Row Name 07/27/23 9679          Gait/Stairs (Locomotion)    [place] Level (Gait) standby  assist;contact guard  -DB     Assistive Device (Gait) walker, front-wheeled  -DB     Distance in Feet (Gait) 100'  -DB     Deviations/Abnormal Patterns (Gait) gait speed decreased;stride length decreased;rogerio decreased  -DB     Bilateral Gait Deviations forward flexed posture;heel strike decreased  -DB               User Key  (r) = Recorded By, (t) = Taken By, (c) = Cosigned By      Initials Name Provider Type    Hannah Nava PT Physical Therapist                   Obj/Interventions       Row Name 07/27/23 1352          Motor Skills    Therapeutic Exercise other (see comments)  cardiac ther ex x 5  -DB       Row Name 07/27/23 1354          Balance    Balance Assessment sitting static balance;sitting dynamic balance;standing static balance;standing dynamic balance  -DB     Static Sitting Balance standby assist  -DB     Dynamic Sitting Balance standby assist  -DB     Position, Sitting Balance sitting in chair  -DB     Static Standing Balance standby assist  -DB     Dynamic Standing Balance contact guard  -DB     Position/Device Used, Standing Balance supported;walker, front-wheeled  -DB     Balance Interventions sitting;standing;sit to stand  -DB               User Key  (r) = Recorded By, (t) = Taken By, (c) = Cosigned By      Initials Name Provider Type    Hannah Nava, RIOS Physical Therapist                   Goals/Plan    No documentation.                  Clinical Impression       Row Name 07/27/23 1357          Pain    Pain Intervention(s) Repositioned;Ambulation/increased activity  -DB       Row Name 07/27/23 1355          Plan of Care Review    Plan of Care Reviewed With patient  -DB     Progress improving  -DB     Outcome Evaluation Pt seated UIC at start of the session and agreeable to work with PT. Pt was able to inc ambulation distance to 100' with RW and CGA/SBA. Pt demo's improvement in balance and overall endurance. She returns to room and is seated UIC. She contineus to benefit from  skilled PT.  -DB       Row Name 07/27/23 1356          Vital Signs    Pre SpO2 (%) 97  -DB     O2 Delivery Pre Treatment room air  -DB     O2 Delivery Intra Treatment room air  -DB     Post SpO2 (%) 97  -DB     O2 Delivery Post Treatment room air  -DB     Pre Patient Position Sitting  -DB     Intra Patient Position Standing  -DB     Post Patient Position Sitting  -DB       Row Name 07/27/23 1356          Positioning and Restraints    Pre-Treatment Position sitting in chair/recliner  -DB     Post Treatment Position chair  -DB     In Chair sitting;call light within reach;encouraged to call for assist;exit alarm on;notified nsg  -DB               User Key  (r) = Recorded By, (t) = Taken By, (c) = Cosigned By      Initials Name Provider Type    Hannah Nava PT Physical Therapist                   Outcome Measures       Row Name 07/27/23 1357          How much help from another person do you currently need...    Turning from your back to your side while in flat bed without using bedrails? 4  -DB     Moving from lying on back to sitting on the side of a flat bed without bedrails? 4  -DB     Moving to and from a bed to a chair (including a wheelchair)? 3  -DB     Standing up from a chair using your arms (e.g., wheelchair, bedside chair)? 3  -DB     Climbing 3-5 steps with a railing? 3  -DB     To walk in hospital room? 3  -DB     AM-PAC 6 Clicks Score (PT) 20  -DB     Highest level of mobility 6 --> Walked 10 steps or more  -DB       Row Name 07/27/23 1355          Functional Assessment    Outcome Measure Options AM-PAC 6 Clicks Basic Mobility (PT)  -DB               User Key  (r) = Recorded By, (t) = Taken By, (c) = Cosigned By      Initials Name Provider Type    Hannah Nava PT Physical Therapist                                 Physical Therapy Education       Title: PT OT SLP Therapies (In Progress)       Topic: Physical Therapy (Done)       Point: Mobility training (Done)       Learning Progress Summary              Patient Acceptance, E, VU by  at 7/27/2023 1357    Acceptance, E,TB,D, VU,NR by  at 7/26/2023 1335    Acceptance, E,TB,D, VU,NR by  at 7/25/2023 1143    Acceptance, E,TB,D, VU,NR by  at 7/24/2023 1350    Acceptance, E, VU,NR by  at 7/21/2023 1149                         Point: Home exercise program (Done)       Learning Progress Summary             Patient Acceptance, E, VU by DB at 7/27/2023 1357    Acceptance, E,TB,D, VU,NR by  at 7/26/2023 1335    Acceptance, E,TB,D, VU,NR by  at 7/25/2023 1143    Acceptance, E,TB,D, VU,NR by  at 7/24/2023 1350    Acceptance, E, VU,NR by  at 7/21/2023 1149                         Point: Body mechanics (Done)       Learning Progress Summary             Patient Acceptance, E, VU by  at 7/27/2023 1357    Acceptance, E,TB,D, VU,NR by  at 7/26/2023 1335    Acceptance, E,TB,D, VU,NR by  at 7/25/2023 1143    Acceptance, E,TB,D, VU,NR by  at 7/24/2023 1350    Acceptance, E, VU,NR by  at 7/21/2023 1149                         Point: Precautions (Done)       Learning Progress Summary             Patient Acceptance, E, VU by  at 7/27/2023 1357    Acceptance, E,TB,D, VU,NR by  at 7/26/2023 1335    Acceptance, E,TB,D, VU,NR by  at 7/25/2023 1143    Acceptance, E,TB,D, VU,NR by  at 7/24/2023 1350    Acceptance, E, VU,NR by  at 7/21/2023 1149                                         User Key       Initials Effective Dates Name Provider Type Discipline     06/16/21 -  Hannah Ramírez, PT Physical Therapist PT     04/08/22 -  Yaquelin Johnson PT Physical Therapist PT                  PT Recommendation and Plan  Planned Therapy Interventions (PT): balance training, bed mobility training, gait training, home exercise program, patient/family education, neuromuscular re-education, stair training, strengthening, postural re-education, transfer training  Plan of Care Reviewed With: patient  Progress: improving  Outcome Evaluation: Pt seated UIC at  start of the session and agreeable to work with PT. Pt was able to inc ambulation distance to 100' with RW and CGA/SBA. Pt demo's improvement in balance and overall endurance. She returns to room and is seated UIC. She contineus to benefit from skilled PT.     Time Calculation:         PT Charges       Row Name 07/27/23 1358             Time Calculation    Start Time 0953  -DB      Stop Time 1005  -DB      Time Calculation (min) 12 min  -DB      PT Received On 07/27/23  -DB      PT - Next Appointment 07/28/23  -DB         Time Calculation- PT    Total Timed Code Minutes- PT 12 minute(s)  -DB                User Key  (r) = Recorded By, (t) = Taken By, (c) = Cosigned By      Initials Name Provider Type    DB Hannah Ramírez PT Physical Therapist                  Therapy Charges for Today       Code Description Service Date Service Provider Modifiers Qty    29639379330  PT THERAPEUTIC ACT EA 15 MIN 7/27/2023 Hannah Ramírez PT GP 1            PT G-Codes  Outcome Measure Options: AM-PAC 6 Clicks Basic Mobility (PT)  AM-PAC 6 Clicks Score (PT): 20  AM-PAC 6 Clicks Score (OT): 10  Modified [place] Scale: 5 - Severe disability.  Bedridden, incontinent, and requiring constant nursing care and attention.  PT Discharge Summary  Anticipated Discharge Disposition (PT): skilled nursing facility, home with 24/7 care, home with home health (pending progress, will update)    Hannah Ramírez PT  7/27/2023

## 2023-07-27 NOTE — PLAN OF CARE
Goal Outcome Evaluation:      Pt is POD 7 from a CABG x3, denies pain or discomfort. Some soa noted with activity. Ambulates well with assist of 1 with walker. Requires assist off and on the toilet, resfuses potty hat, good output this shift. Remains in afib 110's at rest but up to 130's with ambulation. Other VSS

## 2023-07-27 NOTE — CONSULTS
Electrophysiology Hospital Consult            Patient Name: Ann-Marie Hughes  Age/Sex: 77 y.o. female  : 1945  MRN: [id number]    Date of Admission: 2023  Date of Encounter Visit: 23  Encounter Provider: LONA Ayala  Referring Provider: Jr Jay Gaspar MD  Place of Service: [facility] CARDIOLOGY  Patient Care Team:  Rochelle Brown APRN as PCP - General (Internal Medicine)      Subjective:   EP Consultation for: atrial fibrillation     Chief Complaint: Post op CABG    History of Present Illness:  Ann-Marie Hughes is a 77 y.o. female who follows with Dr. Valera in [facility].     She saw him for the first time last month with complaint of dyspnea for one month prior.     She had elevated BNP subsequent echo showed LVEF of 36-40%.  Stress test was indicative of high risk study.     She had left heart cath on  showing multi vessel CAD.     She was transferred to [facility] and underwent CABGx3 and LANE clip with Dr. Alexander on .    Postoperatively she has been having episodes of atrial fibrillation. She was started on amiodaorne and seemed to be doing better but went back in AF last night.     EP has been asked to see.     Dr. Desir and I saw her this morning. She denies any history of atrial fibrillation but says she has noted some palpitations for the past few months. With episodes during admission she has some awareness of her AF and complains of worsening dyspnea, although she has some dyspnea all the time according to her. Currently she has converted to NSR. She was started on apixaban.     Past Medical History:  Past Medical History:   Diagnosis Date    Acne rosacea 2021    DR.JEFF MOON     Arteriosclerosis of abdominal aorta     B12 deficiency 2021    DJD (degenerative joint disease)     Hx of smoking 2021    QUIT IN  AT AGE 31    Hyperlipidemia 2021    Hypertension     Hypothyroidism     Metabolic  syndrome 08/17/2021    KAREN (stress urinary incontinence, female) 08/17/2021    UTI (urinary tract infection) 08/17/2021    Vitamin D deficiency 08/17/2021    Vitamin D deficiency        Past Surgical History:   Procedure Laterality Date    CARDIAC CATHETERIZATION N/A 7/17/2023    Procedure: Left Heart Cath;  Surgeon: Dinh Andres MD;  Location: [facility] CATH INVASIVE LOCATION;  Service: Cardiovascular;  Laterality: N/A;    COLONOSCOPY  2011    CORONARY ARTERY BYPASS GRAFT WITH MITRAL VALVE REPAIR/REPLACEMENT N/A 7/20/2023    Procedure: REZA STERNOTOMY OFF-PUMP CORONARY ARTERY BYPASS GRAFT TIMES        USING LEFFT INTERNAL MAMMARY ARTERY AND     GREATER SAPHENOUS VEIN GRAFT PER EMDOSCOPIC VEIN HARVESTING, MAZE PROCEDURE AND PRP;  Surgeon: Shane Alexander MD;  Location: [facility];  Service: Cardiothoracic;  Laterality: N/A;       Home Medications:   Medications Prior to Admission   Medication Sig Dispense Refill Last Dose    aspirin 81 MG EC tablet Take 1 tablet by mouth Daily.   7/17/2023    albuterol sulfate  (90 Base) MCG/ACT inhaler Inhale 2 puffs Every 4 (Four) Hours As Needed for Wheezing. 18 g 0     amLODIPine (NORVASC) 5 MG tablet Take 1 tablet by mouth Daily As Needed (For systolic blood pressure 140 or greater). 90 tablet 1     atorvastatin (LIPITOR) 10 MG tablet Take 1 tablet by mouth Daily. 90 tablet 1     AZO-CRANBERRY PO Take 1 tablet by mouth Daily.       buPROPion XL (WELLBUTRIN XL) 300 MG 24 hr tablet TAKE 1 TABLET BY MOUTH DAILY 90 tablet 1     carvedilol (COREG) 6.25 MG tablet Take 1 tablet by mouth 2 (Two) Times a Day. 180 tablet 3     dapagliflozin Propanediol (Farxiga) 10 MG tablet Take 10 mg by mouth Daily. 30 tablet 3     furosemide (Lasix) 40 MG tablet Take 1 tablet by mouth Daily. 30 tablet 0     metFORMIN ER (GLUCOPHAGE-XR) 500 MG 24 hr tablet TAKE 1 TABLET BY MOUTH EVERY MORNING AND 2 TABLETS AT SUPPER (Patient taking differently: 1 tablet Daily With Breakfast.) 270  tablet 4 2023    metFORMIN ER (GLUCOPHAGE-XR) 500 MG 24 hr tablet Take 2 tablets by mouth Daily With Dinner.       montelukast (SINGULAIR) 10 MG tablet Take 1 tablet by mouth Daily. 90 tablet 3     Probiotic Product (PROBIOTIC PO) Take 1 capsule by mouth Daily.       sacubitril-valsartan (Entresto)  MG tablet Take 1 tablet by mouth 2 (Two) Times a Day. 180 tablet 3     spironolactone (ALDACTONE) 25 MG tablet Take 1 tablet by mouth Daily. 90 tablet 3     Synthroid 75 MCG tablet Take 1 tablet by mouth Daily. 90 tablet 3        Allergies:  Allergies   Allergen Reactions    Penicillins Palpitations       Past Social History:  Social History     Socioeconomic History    Marital status:    Tobacco Use    Smoking status: Former     Packs/day: 1.00     Years: 12.00     Pack years: 12.00     Types: Cigarettes     Quit date:      Years since quittin.6    Smokeless tobacco: Never   Vaping Use    Vaping Use: Never used   Substance and Sexual Activity    Alcohol use: Never    Drug use: Never    Sexual activity: Defer       Past Family History:  History reviewed. No pertinent family history.    Review of Systems: All systems reviewed. Pertinent positives identified in HPI. All other systems are negative.     14 point ROS was performed and is negative except as outlined in HPI.     Objective:     Objective:  Vital Signs (last 24 hours)          0700   0659  07 1633   Most Recent      Temp (°F) 97.7 -  98.4      97.6     97.6 (36.4)  1229    Heart Rate 67 -  104    106 -  140     116  0936    Resp 16 -  18      20     20  0724    /72 -  140/91    82/66 -  125/70     125/70  1229    SpO2 (%)   95    92 -  99     97  09          Temp:  [97.6 °F (36.4 °C)-98.1 °F (36.7 °C)] 97.6 °F (36.4 °C)  Heart Rate:  [104-140] 116  Resp:  [16-20] 20  BP: ()/(48-86) 125/70  Body mass index is 31.15 kg/m².        Physical Exam:     General Appearance: No acute  distress, well developed and well nourished.   Eyes: Conjunctiva and lids: No erythema, swelling, or discharge. Sclera non-icteric.   HENT: Atraumatic, normocephalic. External eyes, ears, and nose normal.   Respiratory: No signs of respiratory distress. Respiration rhythm and depth normal.   Clear to auscultation. No rales, crackles, rhonchi, or wheezing auscultated.   Cardiovascular:  Heart Rate and Rhythm: Normal, Heart Sounds: Normal S1 and S2. No S3 or S4 note  Gastrointestinal:  Abdomen soft, non-distended, non-tender.  Musculoskeletal: Normal movement of extremities  Skin: Warm and dry.   Psychiatric: Patient alert and oriented to person, place, and time. Speech and behavior appropriate. Normal mood and affect.    Labs:   Lab Review:     Results from last 7 days   Lab Units 07/27/23  1027 07/27/23  0305 07/26/23  0335 07/25/23  0236 07/24/23  0249 07/23/23  0302 07/22/23  1729 07/22/23  0317 07/21/23  1248   SODIUM mmol/L  --  138 132* 131* 133* 135*  --  140 142   POTASSIUM mmol/L 3.7 3.5 3.9 4.3 4.6 4.6 4.4 3.9 4.1   CHLORIDE mmol/L  --  104 102 102 102 103  --  107 109*   CO2 mmol/L  --  21.2* 17.9* 20.0* 20.4* 22.0  --  21.7* 23.0   BUN mg/dL  --  35* 36* 42* 38* 28*  --  20 22   CREATININE mg/dL  --  1.18* 1.41* 1.53* 1.85* 1.56*  --  1.13* 1.15*   GLUCOSE mg/dL  --  129* 107* 104* 97 118*  --  123* 137*   CALCIUM mg/dL  --  8.1* 8.2* 8.1* 8.2* 8.4*  --  8.7 9.1         Results from last 7 days   Lab Units 07/27/23  0305   WBC 10*3/mm3 13.64*   HEMOGLOBIN g/dL 9.2*   HEMATOCRIT % 28.3*   PLATELETS 10*3/mm3 306     Results from last 7 days   Lab Units 07/21/23  0308 07/20/23  1845 07/20/23  1709   INR  1.33* 1.39* 1.63*   APTT seconds  --  31.3 35.7*         Results from last 7 days   Lab Units 07/27/23  0305   MAGNESIUM mg/dL 1.9                       EKG:               I personally viewed and interpreted the patient's EKG/Telemetry tracings.    Assessment:       Coronary heart disease    NSTEMI (non-ST  elevated myocardial infarction)    Abnormal findings on diagnostic imaging of heart and coronary circulation        Plan:   Dr. Meyers and I saw this patient.      She had CABG last week and now developed atrial fibrillation. She has PAF which is likely from inflammation. She will likely have more but she is not severely symptomatic.     Recommend continuing amiodarone (200mg daily), metoprolol, and apixaban.     Would continue amiodarone for 1 month. We can see her in the office around that time to reassess her AF and consider further treatment of her AF but it may go away.     Thank you for allowing me to participate in the care of Ann-Marie Hughes. Feel free to contact me directly with any further questions or concerns.    LONA Ayala  [facility]  07/27/23  16:33 EDT

## 2023-07-27 NOTE — PLAN OF CARE
Goal Outcome Evaluation:           Progress: improving  Outcome Evaluation: Duplex completed on legs. Patient converted with NSR at 1417. EP consulted but will follow up with patient as outpatient. Amiodarone bolus given today. BP was lower but then stabalized. IV Calcium and Magnesium also given today.

## 2023-07-27 NOTE — PROGRESS NOTES
"   LOS: 9 days     Chief Complaint/ Reason for encounter: Postop DAVID    Subjective   07/24/23 : Patient is doing well today with no new complaints  Good appetite with no nausea or vomiting  No shortness of breath chest pain or edema  Voiding well with no dysuria  Morgan replaced yesterday.  UA abnormal, urine culture sent and antibiotics started  Lines and tubes to be removed later today  Urine output remained low overnight    7/25: Doing well.  Weight is up by 15 pounds since admission  Legs somewhat edematous but she is off oxygen  She denies any shortness of breath or chest pain  Morgan remains in place per urology  On IV antibiotics    7/26: She is doing much better today.  I do not think today's weight is accurate but she has been diuresing very well, over 5 L since yesterday with IV Lasix  BP has improved and midodrine was stopped  She is eating and drinking well denies new complaints    7/27: She looks and feels well today.  Went back into [facility] overnight and was briefly hypotensive but BP stable  Edema improved, no shortness of breath.  Off oxygen  Good appetite no nausea no dyspnea  No chest pain    Medical history reviewed:  History of Present Illness    Subjective    History taken from: Patient and chart    Vital Signs  Temp:  [97.6 °F (36.4 °C)-98.1 °F (36.7 °C)] 97.6 °F (36.4 °C)  Heart Rate:  [104-140] 116  Resp:  [16-20] 20  BP: ()/(48-86) 125/70       Wt Readings from Last 1 Encounters:   07/26/23 0640 82.3 kg (181 lb 8 oz)   07/25/23 0345 97.7 kg (215 lb 8 oz)   07/24/23 0500 97 kg (213 lb 13.5 oz)   07/23/23 0610 96.3 kg (212 lb 4.9 oz)   07/22/23 0656 103 kg (226 lb 6.6 oz)   07/21/23 0437 96.1 kg (211 lb 13.8 oz)   07/20/23 0937 90 kg (198 lb 6.6 oz)   07/20/23 0532 90.4 kg (199 lb 3.2 oz)   07/18/23 0410 89.3 kg (196 lb 12.8 oz)       Objective:  Vital signs: (most recent): Blood pressure 125/70, pulse 116, temperature 97.6 °F (36.4 °C), resp. rate 20, height 162.6 cm (64\"), weight 82.3 kg " (181 lb 8 oz), SpO2 97 %, not currently breastfeeding.              Objective:  General Appearance:  Comfortable, mildly ill-appearing, in no acute distress and not in pain.  Awake, alert, oriented  HEENT: Mucous membranes moist, no injury, oropharynx clear  Lungs:  Normal effort and normal respiratory rate.  Breath sounds clear to auscultation.  No  respiratory distress.  No rales, decreased breath sounds or rhonchi.    Heart: Normal rate.  Regular rhythm.  S1, S2 normal.  No murmur.   Abdomen: Abdomen is soft.  Bowel sounds are normal, no abdominal tenderness.  There is no rebound or guarding  Extremities: Trace edema of bilateral lower extremities  Neurological: No focal motor or sensory deficits, pupils reactive  Skin:  Warm and dry.  No rash or cyanosis.       Results Review:    Intake/Output:     Intake/Output Summary (Last 24 hours) at 7/27/2023 1408  Last data filed at 7/27/2023 1140  Gross per 24 hour   Intake 497 ml   Output --   Net 497 ml           DATA:  Radiology and Labs:  The following labs independently reviewed by me. Additional labs ordered for tomorrow a.m.  Interval notes, chart personally reviewed by me.   Old records independently reviewed showing normal baseline creatinine around 1.0  Discussed with patient herself at bedside    Risk/ complexity of medical care/ medical decision making moderate complexity, DAVID and diuretic management postop CABG    Labs:   Recent Results (from the past 24 hour(s))   POC Glucose Once    Collection Time: 07/26/23  4:33 PM    Specimen: Blood   Result Value Ref Range    Glucose 154 (H) 70 - 130 mg/dL   POC Glucose Once    Collection Time: 07/26/23  6:50 PM    Specimen: Blood   Result Value Ref Range    Glucose 154 (H) 70 - 130 mg/dL   POC Glucose Once    Collection Time: 07/26/23  9:18 PM    Specimen: Blood   Result Value Ref Range    Glucose 148 (H) 70 - 130 mg/dL   Renal Function Panel    Collection Time: 07/27/23  3:05 AM    Specimen: Blood   Result Value Ref  Range    Glucose 129 (H) 65 - 99 mg/dL    BUN 35 (H) 8 - 23 mg/dL    Creatinine 1.18 (H) 0.57 - 1.00 mg/dL    Sodium 138 136 - 145 mmol/L    Potassium 3.5 3.5 - 5.2 mmol/L    Chloride 104 98 - 107 mmol/L    CO2 21.2 (L) 22.0 - 29.0 mmol/L    Calcium 8.1 (L) 8.6 - 10.5 mg/dL    Albumin 3.0 (L) 3.5 - 5.2 g/dL    Phosphorus 2.4 (L) 2.5 - 4.5 mg/dL    Anion Gap 12.8 5.0 - 15.0 mmol/L    BUN/Creatinine Ratio 29.7 (H) 7.0 - 25.0    eGFR 47.4 (L) >60.0 mL/min/1.73   CBC (No Diff)    Collection Time: 07/27/23  3:05 AM    Specimen: Blood   Result Value Ref Range    WBC 13.64 (H) 3.40 - 10.80 10*3/mm3    RBC 3.17 (L) 3.77 - 5.28 10*6/mm3    Hemoglobin 9.2 (L) 12.0 - 15.9 g/dL    Hematocrit 28.3 (L) 34.0 - 46.6 %    MCV 89.3 79.0 - 97.0 fL    MCH 29.0 26.6 - 33.0 pg    MCHC 32.5 31.5 - 35.7 g/dL    RDW 16.0 (H) 12.3 - 15.4 %    RDW-SD 51.4 37.0 - 54.0 fl    MPV 9.8 6.0 - 12.0 fL    Platelets 306 140 - 450 10*3/mm3   Magnesium    Collection Time: 07/27/23  3:05 AM    Specimen: Blood   Result Value Ref Range    Magnesium 1.9 1.6 - 2.4 mg/dL   ECG 12 Lead QT Measurement    Collection Time: 07/27/23  3:34 AM   Result Value Ref Range    QT Interval 396 ms   POC Glucose Once    Collection Time: 07/27/23  6:39 AM    Specimen: Blood   Result Value Ref Range    Glucose 118 70 - 130 mg/dL   Potassium    Collection Time: 07/27/23 10:27 AM    Specimen: Blood   Result Value Ref Range    Potassium 3.7 3.5 - 5.2 mmol/L   POC Glucose Once    Collection Time: 07/27/23 10:56 AM    Specimen: Blood   Result Value Ref Range    Glucose 207 (H) 70 - 130 mg/dL       Radiology:  Pertinent radiology studies were reviewed as described above      Medications have been reviewed separately in chart overview      ASSESSMENT:  DAVID postop. On top of CKD stage IIIa.  Baseline creatinine around 1.0.  Likely due to hypotension with decreased renal perfusion.  Excellent urine output, slowly improving  Hypotension, much better, midodrine discontinued  CAD status  post three-vessel CABG  NSTEMI  Combined systolic and diastolic CHF  Diabetes mellitus type 2  Hypothyroidism      DISCUSSION/PLAN:  Renal function continues to slowly improve and near baseline  Volume acceptable on exam  Continue current dose of oral Lasix/spironolactone  Phosphorus slightly low but that should improve with better nutrition  Maintain current low-dose sodium bicarb  Rate control per cardiology  BP borderline low.  Would hold off on starting ACE inhibitor or ARB right now  Trazodone also remains on hold but okay to continue Jardiance    Defer antimicrobials to primary team  Calcium corrects to normal with low albumin    Continue to monitor electrolytes and volume closely, avoid IV contrast and nephrotoxic medications  Discharge planning.  Discussed with family      Anderson Parker MD  [facility]  Office phone number: [phone]  Answering service phone number: [phone]    07/27/23  14:08 EDT    Dictation performed using Dragon dictation software

## 2023-07-27 NOTE — PLAN OF CARE
Goal Outcome Evaluation:  Plan of Care Reviewed With: patient        Progress: improving  Outcome Evaluation: Pt seated UIC at start of the session and agreeable to work with PT. Pt was able to inc ambulation distance to 100' with RW and CGA/SBA. Pt demo's improvement in balance and overall endurance. She returns to room and is seated UIC. She contineus to benefit from skilled PT.      Anticipated Discharge Disposition (PT): skilled nursing facility, home with 24/7 care, home with home health (pending progress, will update)

## 2023-07-27 NOTE — PROGRESS NOTES
" LOS: 9 days   Patient Care Team:  Rochelle Brown APRN as PCP - General (Internal Medicine)    Chief Complaint: post op    Subjective:  Symptoms:  She reports shortness of breath.  No cough or chest pain.    Diet:  Adequate intake.  No nausea or vomiting.    Activity level: Impaired due to weakness.    Pain:  She reports no pain.      Vital Signs  Temp:  [97.7 °F (36.5 °C)-98.4 °F (36.9 °C)] 98 °F (36.7 °C)  Heart Rate:  [] 104  Resp:  [16-18] 16  BP: (102-122)/(61-76) 102/72  Body mass index is 31.15 kg/m².    Intake/Output Summary (Last 24 hours) at 7/27/2023 0756  Last data filed at 7/26/2023 2027  Gross per 24 hour   Intake 540 ml   Output 750 ml   Net -210 ml       No intake/output data recorded.        07/24/23  0500 07/25/23  0345 07/26/23  0640   Weight: 97 kg (213 lb 13.5 oz) 97.7 kg (215 lb 8 oz) 82.3 kg (181 lb 8 oz)     Objective:  General Appearance:  Comfortable and in no acute distress.    Vital signs: (most recent): Blood pressure 102/72, pulse 104, temperature 98 °F (36.7 °C), temperature source Oral, resp. rate 16, height 162.6 cm (64\"), weight 82.3 kg (181 lb 8 oz), SpO2 95 %, not currently breastfeeding.  Vital signs are normal.  No fever.    Output: Producing urine and no stool output.    Lungs:  Normal effort and normal respiratory rate.  There are decreased breath sounds.    Heart: Tachycardia.  Irregular rhythm.  (Atrial fibrillation on tele monitor)  Abdomen: Abdomen is soft.  Bowel sounds are normal.     Extremities: There is dependent edema (moderate bilateral LE edema).    Pulses: Distal pulses are intact.    Neurological: Patient is alert and oriented to person, place and time.    Skin:  Warm and dry.  (Sternal with clean, dry, and intact with mild erythema/ecchymosis  Left LE incision clean and dry with sutures intact. Mild ecchymosis and erythema  )        Results Review:        WBC WBC   Date Value Ref Range Status   07/27/2023 13.64 (H) 3.40 - 10.80 10*3/mm3 Final   07/25/2023 " 12.29 (H) 3.40 - 10.80 10*3/mm3 Final      HGB Hemoglobin   Date Value Ref Range Status   07/27/2023 9.2 (L) 12.0 - 15.9 g/dL Final   07/25/2023 8.8 (L) 12.0 - 15.9 g/dL Final      HCT Hematocrit   Date Value Ref Range Status   07/27/2023 28.3 (L) 34.0 - 46.6 % Final   07/25/2023 26.3 (L) 34.0 - 46.6 % Final      Platelets Platelets   Date Value Ref Range Status   07/27/2023 306 140 - 450 10*3/mm3 Final   07/25/2023 207 140 - 450 10*3/mm3 Final        PT/INR:  No results found for: PROTIME/No results found for: INR    Sodium Sodium   Date Value Ref Range Status   07/27/2023 138 136 - 145 mmol/L Final   07/26/2023 132 (L) 136 - 145 mmol/L Final   07/25/2023 131 (L) 136 - 145 mmol/L Final      Potassium Potassium   Date Value Ref Range Status   07/27/2023 3.5 3.5 - 5.2 mmol/L Final   07/26/2023 3.9 3.5 - 5.2 mmol/L Final     Comment:     Slight hemolysis detected by analyzer. Results may be affected.   07/25/2023 4.3 3.5 - 5.2 mmol/L Final      Chloride Chloride   Date Value Ref Range Status   07/27/2023 104 98 - 107 mmol/L Final   07/26/2023 102 98 - 107 mmol/L Final   07/25/2023 102 98 - 107 mmol/L Final      Bicarbonate CO2   Date Value Ref Range Status   07/27/2023 21.2 (L) 22.0 - 29.0 mmol/L Final   07/26/2023 17.9 (L) 22.0 - 29.0 mmol/L Final   07/25/2023 20.0 (L) 22.0 - 29.0 mmol/L Final      BUN BUN   Date Value Ref Range Status   07/27/2023 35 (H) 8 - 23 mg/dL Final   07/26/2023 36 (H) 8 - 23 mg/dL Final   07/25/2023 42 (H) 8 - 23 mg/dL Final      Creatinine Creatinine   Date Value Ref Range Status   07/27/2023 1.18 (H) 0.57 - 1.00 mg/dL Final   07/26/2023 1.41 (H) 0.57 - 1.00 mg/dL Final   07/25/2023 1.53 (H) 0.57 - 1.00 mg/dL Final      Calcium Calcium   Date Value Ref Range Status   07/27/2023 8.1 (L) 8.6 - 10.5 mg/dL Final   07/26/2023 8.2 (L) 8.6 - 10.5 mg/dL Final   07/25/2023 8.1 (L) 8.6 - 10.5 mg/dL Final      Magnesium No results found for: MG         amiodarone, 150 mg, Intravenous, Once  aspirin,  81 mg, Oral, Daily  atorvastatin, 40 mg, Oral, Nightly  buPROPion XL, 300 mg, Oral, Daily  calcium gluconate, 1,000 mg, Intravenous, Once  chlorhexidine, 15 mL, Mouth/Throat, Q12H  doxycycline, 100 mg, Oral, Q12H  empagliflozin, 25 mg, Oral, Daily  enoxaparin, 30 mg, Subcutaneous, Daily  furosemide, 40 mg, Oral, Daily  guaiFENesin, 1,200 mg, Oral, Q12H  insulin lispro, 2-9 Units, Subcutaneous, 4x Daily AC & at Bedtime  levothyroxine, 75 mcg, Oral, Q AM  metoprolol succinate XL, 25 mg, Oral, Q12H  montelukast, 10 mg, Oral, Daily  mupirocin, , Each Nare, BID  pantoprazole, 40 mg, Oral, QAM  potassium chloride ER, 40 mEq, Oral, Q4H  senna-docusate sodium, 2 tablet, Oral, Nightly  sodium bicarbonate, 650 mg, Oral, BID  sodium chloride, 10 mL, Intravenous, Q12H  spironolactone, 12.5 mg, Oral, Daily             Coronary heart disease    NSTEMI (non-ST elevated myocardial infarction)    Abnormal findings on diagnostic imaging of heart and coronary circulation      Assessment & Plan  -Severe multivessel CAD s/p off-pump CABG x3/ LANE clipping POD#7 [place]  -Moderate MR  -paroxsymal Atrial fibrillation  -Ischemic cardiomyopathy---EF 36%; GDMT as tolerated  -HTN  -HLD  -DM II  -Hypothyroidism   -Former tobacco abuse   -chronic anemia worsened by post op ABL   -TCP--consumptive; resolved  -post-op leukocytosis--reactive     Back in atrial fibrillation this morning with rates in the 110s. Re-bolus with amiodarone, increase beta blocker, and repeat electrolytes. Start Eliquis given recurrence  Creatinine continuing to improve. Continue oral diuretics  Weaned to RA and tolerating. Overnight oximetry with 12 minutes of desaturation. Will need oxygen with sleep  Mobilize/encourage pulmonary toilet--continue mucinex/flutter valve  Sternal and lower extremity Incisions with mild erythema. Nursing to paint with betadine twice daily. WBC count trending back up. Will start a course of doxycycline and check a venous doppler. Will  discuss suture removal with Dr. Alexander  Mobility is slowly improving. Plan for home with home health/assistance of family, hopefully in the next few days pending progress  Continue routine care    LONA Mosqueda  07/27/23  07:56 EDT

## 2023-07-27 NOTE — PROGRESS NOTES
"Nutrition Services    Patient Name:  Ann-Marie Hughes  YOB: 1945  MRN: [id number]  Admit Date:  7/18/2023    Assessment Date:  07/27/23    NUTRITION SCREENING      Reason for Encounter LOS x 8 days  Pt reports good appetite, eating 25-75% of meals. RD reviewed HH/DM diet with pt and addressed questions. Encouraged adequate PO intake. No additional nutrition related needs at this time.    Diagnosis/Problem Coronary heart disease       PO Diet Diet: Cardiac Diets, Diabetic Diets; Healthy Heart (2-3 Na+); Consistent Carbohydrate; Texture: Regular Texture (IDDSI 7); Fluid Consistency: Thin (IDDSI 0)   PO Supplements/Snacks    PO Intake % 25-75%       Labs  Listed below, reviewed   Physical Findings Alert, oriented, teeth missing, room air   GI Function Last BM 7/27   Skin Status Sx incision       Height:  Weight:  BMI:   Category  Weight Trend Height: 162.6 cm (64\")  Weight: 82.3 kg (181 lb 8 oz) (07/26/23 0640)  Body mass index is 31.15 kg/m².  Obese, Class I (30 - 34.9)  Stable       Intervention/Plan No additional nutrition related intervention warranted at this time. RD will follow up PRN.          Results from last 7 days   Lab Units 07/27/23  1027 07/27/23  0305 07/26/23  0335 07/25/23  0236   SODIUM mmol/L  --  138 132* 131*   POTASSIUM mmol/L 3.7 3.5 3.9 4.3   CHLORIDE mmol/L  --  104 102 102   CO2 mmol/L  --  21.2* 17.9* 20.0*   BUN mg/dL  --  35* 36* 42*   CREATININE mg/dL  --  1.18* 1.41* 1.53*   CALCIUM mg/dL  --  8.1* 8.2* 8.1*   GLUCOSE mg/dL  --  129* 107* 104*     Results from last 7 days   Lab Units 07/27/23  0305 07/22/23  0317 07/21/23  1248 07/21/23  0308   MAGNESIUM mg/dL 1.9  --  2.2 1.8   PHOSPHORUS mg/dL 2.4*   < >  --  5.1*   HEMOGLOBIN g/dL 9.2*   < >  --  9.5*   HEMATOCRIT % 28.3*   < >  --  27.3*    < > = values in this interval not displayed.     COVID19   Date Value Ref Range Status   07/19/2023 Not Detected Not Detected - Ref. Range Final     Lab Results   Component " Value Date    HGBA1C 5.90 (H) 07/18/2023       RD to follow up per protocol.    Electronically signed by:  Stephanie Craig RD  07/27/23 15:08 EDT

## 2023-07-28 ENCOUNTER — READMISSION MANAGEMENT (OUTPATIENT)
Dept: CALL CENTER | Facility: HOSPITAL | Age: 78
End: 2023-07-28
Payer: MEDICARE

## 2023-07-28 VITALS
DIASTOLIC BLOOD PRESSURE: 49 MMHG | WEIGHT: 207.23 LBS | SYSTOLIC BLOOD PRESSURE: 121 MMHG | RESPIRATION RATE: 18 BRPM | HEART RATE: 61 BPM | BODY MASS INDEX: 35.38 KG/M2 | OXYGEN SATURATION: 97 % | HEIGHT: 64 IN | TEMPERATURE: 97.7 F

## 2023-07-28 LAB
ALBUMIN SERPL-MCNC: 3.1 G/DL (ref 3.5–5.2)
ANION GAP SERPL CALCULATED.3IONS-SCNC: 12 MMOL/L (ref 5–15)
BUN SERPL-MCNC: 34 MG/DL (ref 8–23)
BUN/CREAT SERPL: 31.5 (ref 7–25)
CALCIUM SPEC-SCNC: 8.3 MG/DL (ref 8.6–10.5)
CHLORIDE SERPL-SCNC: 105 MMOL/L (ref 98–107)
CO2 SERPL-SCNC: 20 MMOL/L (ref 22–29)
CREAT SERPL-MCNC: 1.08 MG/DL (ref 0.57–1)
EGFRCR SERPLBLD CKD-EPI 2021: 52.7 ML/MIN/1.73
GLUCOSE BLDC GLUCOMTR-MCNC: 136 MG/DL (ref 70–130)
GLUCOSE SERPL-MCNC: 126 MG/DL (ref 65–99)
PHOSPHATE SERPL-MCNC: 2.4 MG/DL (ref 2.5–4.5)
POTASSIUM SERPL-SCNC: 4.2 MMOL/L (ref 3.5–5.2)
QT INTERVAL: 603 MS
SODIUM SERPL-SCNC: 137 MMOL/L (ref 136–145)

## 2023-07-28 PROCEDURE — 93010 ELECTROCARDIOGRAM REPORT: CPT | Performed by: INTERNAL MEDICINE

## 2023-07-28 PROCEDURE — 021109W BYPASS CORONARY ARTERY, TWO ARTERIES FROM AORTA WITH AUTOLOGOUS VENOUS TISSUE, OPEN APPROACH: ICD-10-PCS

## 2023-07-28 PROCEDURE — 06BP4ZZ EXCISION OF RIGHT SAPHENOUS VEIN, PERCUTANEOUS ENDOSCOPIC APPROACH: ICD-10-PCS

## 2023-07-28 PROCEDURE — B24BZZ4 ULTRASONOGRAPHY OF HEART WITH AORTA, TRANSESOPHAGEAL: ICD-10-PCS

## 2023-07-28 PROCEDURE — 82948 REAGENT STRIP/BLOOD GLUCOSE: CPT

## 2023-07-28 PROCEDURE — 02100Z9 BYPASS CORONARY ARTERY, ONE ARTERY FROM LEFT INTERNAL MAMMARY, OPEN APPROACH: ICD-10-PCS

## 2023-07-28 PROCEDURE — 93005 ELECTROCARDIOGRAM TRACING: CPT | Performed by: NURSE PRACTITIONER

## 2023-07-28 PROCEDURE — 02L70CK OCCLUSION OF LEFT ATRIAL APPENDAGE WITH EXTRALUMINAL DEVICE, OPEN APPROACH: ICD-10-PCS

## 2023-07-28 PROCEDURE — 80069 RENAL FUNCTION PANEL: CPT | Performed by: INTERNAL MEDICINE

## 2023-07-28 RX ORDER — DOXYCYCLINE 100 MG/1
100 CAPSULE ORAL EVERY 12 HOURS SCHEDULED
Qty: 11 CAPSULE | Refills: 0 | Status: SHIPPED | OUTPATIENT
Start: 2023-07-28 | End: 2023-08-06 | Stop reason: HOSPADM

## 2023-07-28 RX ORDER — SPIRONOLACTONE 25 MG/1
25 TABLET ORAL DAILY
Status: DISCONTINUED | OUTPATIENT
Start: 2023-07-28 | End: 2023-07-28 | Stop reason: HOSPADM

## 2023-07-28 RX ORDER — ACETAMINOPHEN 325 MG/1
650 TABLET ORAL EVERY 4 HOURS PRN
Start: 2023-07-28

## 2023-07-28 RX ORDER — ATORVASTATIN CALCIUM 40 MG/1
40 TABLET, FILM COATED ORAL NIGHTLY
Qty: 90 TABLET | Refills: 0 | Status: SHIPPED | OUTPATIENT
Start: 2023-07-28 | End: 2023-10-26

## 2023-07-28 RX ORDER — METOPROLOL SUCCINATE 25 MG/1
12.5 TABLET, EXTENDED RELEASE ORAL EVERY 12 HOURS SCHEDULED
Status: DISCONTINUED | OUTPATIENT
Start: 2023-07-28 | End: 2023-07-28 | Stop reason: HOSPADM

## 2023-07-28 RX ORDER — AMIODARONE HYDROCHLORIDE 200 MG/1
200 TABLET ORAL
Status: DISCONTINUED | OUTPATIENT
Start: 2023-07-28 | End: 2023-07-28

## 2023-07-28 RX ORDER — METOPROLOL SUCCINATE 25 MG/1
12.5 TABLET, EXTENDED RELEASE ORAL EVERY 12 HOURS SCHEDULED
Qty: 30 TABLET | Refills: 2 | Status: SHIPPED | OUTPATIENT
Start: 2023-07-28 | End: 2023-10-26

## 2023-07-28 RX ADMIN — GUAIFENESIN 1200 MG: 600 TABLET, EXTENDED RELEASE ORAL at 09:08

## 2023-07-28 RX ADMIN — AMIODARONE HYDROCHLORIDE 200 MG: 200 TABLET ORAL at 09:07

## 2023-07-28 RX ADMIN — APIXABAN 5 MG: 5 TABLET, FILM COATED ORAL at 09:15

## 2023-07-28 RX ADMIN — LEVOTHYROXINE SODIUM 75 MCG: 0.07 TABLET ORAL at 06:37

## 2023-07-28 RX ADMIN — MONTELUKAST SODIUM 10 MG: 10 TABLET, FILM COATED ORAL at 09:07

## 2023-07-28 RX ADMIN — BUPROPION HYDROCHLORIDE 300 MG: 300 TABLET, EXTENDED RELEASE ORAL at 09:07

## 2023-07-28 RX ADMIN — Medication 10 ML: at 09:16

## 2023-07-28 RX ADMIN — PANTOPRAZOLE SODIUM 40 MG: 40 TABLET, DELAYED RELEASE ORAL at 06:37

## 2023-07-28 RX ADMIN — EMPAGLIFLOZIN 25 MG: 25 TABLET, FILM COATED ORAL at 09:07

## 2023-07-28 RX ADMIN — DOXYCYCLINE 100 MG: 100 CAPSULE ORAL at 09:08

## 2023-07-28 RX ADMIN — ASPIRIN 81 MG: 81 TABLET, COATED ORAL at 09:07

## 2023-07-28 RX ADMIN — METOPROLOL SUCCINATE 12.5 MG: 25 TABLET, EXTENDED RELEASE ORAL at 09:08

## 2023-07-28 NOTE — PROGRESS NOTES
[facility] Clinical Pharmacy Services: National Cardiology Data Registry (NCDR) Medication Review    Ann-Marie Hughes is s/p CABG x3 for severe multivessel CAD . Pharmacy to review discharge medications to make sure appropriate medications have been prescribed.    Patient has been discharged on the following:  Aspirin EC 81 mg daily  High Intensity Statin: Atorvastatin 40 mg nightly  Beta-blocker: Metoprolol XL 12.5 mg twice daily  LVEF=36-40%: stopped    Provider stopped home Entresto due to soft blood pressures after surgery. Will revisit in the outpatient setting.    These medications meet the requirements for NCDR discharge medication for chest pain.    Martha Milian, Pharm.D., [facility]   Clinical Pharmacist   Phone Extension #[phone]

## 2023-07-28 NOTE — OUTREACH NOTE
Prep Survey      Flowsheet Row Responses   [facility] facility patient discharged from? [place]   Is LACE score < 7 ? No   Eligibility [facility]   Date of Admission 07/18/23   Date of Discharge 07/28/23   Discharge Disposition Home-Health Care Svc   Discharge diagnosis NSTEMI, CAD, CABG x 3 using right leg vein harvest, MAZE procedure   Does the patient have one of the following disease processes/diagnoses(primary or secondary)? Cardiothoracic surgery   Does the patient have Home health ordered? Yes   What is the Home health agency?  [facility]    Is there a DME ordered? Yes   What DME was ordered? nocturnal O2 from [employer]   Prep survey completed? Yes            Sulema TORRES - Registered Nurse

## 2023-07-28 NOTE — PROGRESS NOTES
LOS: 10 days     Chief Complaint/ Reason for encounter: Postop DAVID    Subjective   07/24/23 : Patient is doing well today with no new complaints  Good appetite with no nausea or vomiting  No shortness of breath chest pain or edema  Voiding well with no dysuria  Morgan replaced yesterday.  UA abnormal, urine culture sent and antibiotics started  Lines and tubes to be removed later today  Urine output remained low overnight    7/25: Doing well.  Weight is up by 15 pounds since admission  Legs somewhat edematous but she is off oxygen  She denies any shortness of breath or chest pain  Morgan remains in place per urology  On IV antibiotics    7/26: She is doing much better today.  I do not think today's weight is accurate but she has been diuresing very well, over 5 L since yesterday with IV Lasix  BP has improved and midodrine was stopped  She is eating and drinking well denies new complaints    7/27: She looks and feels well today.  Went back into [facility] overnight and was briefly hypotensive but BP stable  Edema improved, no shortness of breath.  Off oxygen  Good appetite no nausea no dyspnea  No chest pain    7/28: No new complaints or events  She is ambulating well denies shortness of breath chest pain or edema and is anxious to go home    Medical history reviewed:  History of Present Illness    Subjective    History taken from: Patient and chart    Vital Signs  Temp:  [97.6 °F (36.4 °C)-97.9 °F (36.6 °C)] 97.7 °F (36.5 °C)  Heart Rate:  [61-62] 61  Resp:  [15-18] 18  BP: (121-151)/(49-70) 121/49       Wt Readings from Last 1 Encounters:   07/28/23 0700 94 kg (207 lb 3.7 oz)   07/26/23 0640 82.3 kg (181 lb 8 oz)   07/25/23 0345 97.7 kg (215 lb 8 oz)   07/24/23 0500 97 kg (213 lb 13.5 oz)   07/23/23 0610 96.3 kg (212 lb 4.9 oz)   07/22/23 0656 103 kg (226 lb 6.6 oz)   07/21/23 0437 96.1 kg (211 lb 13.8 oz)   07/20/23 0937 90 kg (198 lb 6.6 oz)   07/20/23 0532 90.4 kg (199 lb 3.2 oz)   07/18/23 0410 89.3 kg (196 lb  "12.8 oz)       Objective:  Vital signs: (most recent): Blood pressure 121/49, pulse 61, temperature 97.7 °F (36.5 °C), temperature source Oral, resp. rate 18, height 162.6 cm (64\"), weight 94 kg (207 lb 3.7 oz), SpO2 97 %, not currently breastfeeding.              Objective:  General Appearance:  Comfortable, mildly ill-appearing, in no acute distress and not in pain.  Awake, alert, oriented  HEENT: Mucous membranes moist, no injury, oropharynx clear  Lungs:  Normal effort and normal respiratory rate.  Breath sounds clear to auscultation.  No  respiratory distress.  No rales, decreased breath sounds or rhonchi.    Heart: Normal rate.  Regular rhythm.  S1, S2 normal.  No murmur.   Abdomen: Abdomen is soft.  Bowel sounds are normal, no abdominal tenderness.  There is no rebound or guarding  Extremities: Trace edema of bilateral lower extremities  Neurological: No focal motor or sensory deficits, pupils reactive  Skin:  Warm and dry.  No rash or cyanosis.       Results Review:    Intake/Output:     Intake/Output Summary (Last 24 hours) at 7/28/2023 1227  Last data filed at 7/28/2023 0700  Gross per 24 hour   Intake 240 ml   Output 420 ml   Net -180 ml           DATA:  Radiology and Labs:  The following labs independently reviewed by me. Additional labs ordered for tomorrow a.m.  Interval notes, chart personally reviewed by me.   Old records independently reviewed showing normal baseline creatinine around 1.0  Discussed with patient herself at bedside    Risk/ complexity of medical care/ medical decision making moderate complexity, DAVID and diuretic management postop CABG    Labs:   Recent Results (from the past 24 hour(s))   ECG 12 Lead Rhythm Change    Collection Time: 07/27/23  2:45 PM   Result Value Ref Range    QT Interval 415 ms   POC Glucose Once    Collection Time: 07/27/23  4:08 PM    Specimen: Blood   Result Value Ref Range    Glucose 158 (H) 70 - 130 mg/dL   Potassium    Collection Time: 07/27/23  4:11 PM    " Specimen: Blood   Result Value Ref Range    Potassium 4.2 3.5 - 5.2 mmol/L   Duplex Venous Lower Extremity - Bilateral CAR    Collection Time: 07/27/23  5:17 PM   Result Value Ref Range    Right Common Femoral Spont Y     Right Common Femoral Competent Y     Right Common Femoral Phasic Y     Right Common Femoral Compress C     Right Common Femoral Augment Y     Right Saphenofemoral Junction Compress C     Right Profunda Femoral Compress C     Right Proximal Femoral Compress C     Right Mid Femoral Spont Y     Right Mid Femoral Competent Y     Right Mid Femoral Phasic Y     Right Mid Femoral Compress C     Right Mid Femoral Augment Y     Right Distal Femoral Compress C     Right Popliteal Spont Y     Right Popliteal Competent Y     Right Popliteal Phasic Y     Right Popliteal Compress C     Right Popliteal Augment Y     Right Posterior Tibial Compress C     Right Peroneal Compress C     Right Gastronemius Compress C     Right Greater Saph BK Compress C     Right Lesser Saph Compress C     Left Lesser Saph Vessel 1.0     Left Common Femoral Spont Y     Left Common Femoral Competent Y     Left Common Femoral Phasic Y     Left Common Femoral Compress C     Left Common Femoral Augment Y     Left Saphenofemoral Junction Compress C     Left Profunda Femoral Compress C     Left Proximal Femoral Compress C     Left Mid Femoral Spont Y     Left Mid Femoral Competent Y     Left Mid Femoral Phasic Y     Left Mid Femoral Compress C     Left Mid Femoral Augment Y     Left Distal Femoral Compress C     Left Popliteal Spont Y     Left Popliteal Competent Y     Left Popliteal Phasic Y     Left Popliteal Compress C     Left Popliteal Augment Y     Left Posterior Tibial Compress C     Left Peroneal Compress C     Left Gastronemius Compress C     Left Greater Saph AK Compress C     Left Greater Saph BK Compress C     Left Lesser Saph Compress P     Left Lesser Saph Thrombus C    POC Glucose Once    Collection Time: 07/27/23  8:54 PM     Specimen: Blood   Result Value Ref Range    Glucose 144 (H) 70 - 130 mg/dL   Renal Function Panel    Collection Time: 07/28/23  2:57 AM    Specimen: Blood   Result Value Ref Range    Glucose 126 (H) 65 - 99 mg/dL    BUN 34 (H) 8 - 23 mg/dL    Creatinine 1.08 (H) 0.57 - 1.00 mg/dL    Sodium 137 136 - 145 mmol/L    Potassium 4.2 3.5 - 5.2 mmol/L    Chloride 105 98 - 107 mmol/L    CO2 20.0 (L) 22.0 - 29.0 mmol/L    Calcium 8.3 (L) 8.6 - 10.5 mg/dL    Albumin 3.1 (L) 3.5 - 5.2 g/dL    Phosphorus 2.4 (L) 2.5 - 4.5 mg/dL    Anion Gap 12.0 5.0 - 15.0 mmol/L    BUN/Creatinine Ratio 31.5 (H) 7.0 - 25.0    eGFR 52.7 (L) >60.0 mL/min/1.73   POC Glucose Once    Collection Time: 07/28/23  6:43 AM    Specimen: Blood   Result Value Ref Range    Glucose 136 (H) 70 - 130 mg/dL   ECG 12 Lead QT Measurement    Collection Time: 07/28/23  9:22 AM   Result Value Ref Range    QT Interval 603 ms       Radiology:  Pertinent radiology studies were reviewed as described above      Medications have been reviewed separately in chart overview      ASSESSMENT:  DAVID postop. On top of CKD stage IIIa.  Baseline creatinine around 1.0.  Likely due to hypotension with decreased renal perfusion.  Excellent urine output, slowly improving  Hypotension, much better, midodrine discontinued  CAD status post three-vessel CABG  NSTEMI  Combined systolic and diastolic CHF  Diabetes mellitus type 2  Hypothyroidism      DISCUSSION/PLAN:  Renal function remains stable today and near baseline  Volume acceptable on exam  Continue current dose of oral Lasix/spironolactone  Phosphorus slightly low but that should improve with better nutrition  Maintain current low-dose sodium bicarb  Rate control per cardiology  BP borderline low.  Would hold off on starting ACE inhibitor or ARB right now  Trazodone also remains on hold but okay to continue Jardiance    Defer antimicrobials to primary team  Calcium corrects to normal with low albumin    Stable for discharge home  anytime from a renal standpoint  She should follow-up with me in the office in 2 to 4 weeks with repeat labs  Discussed with patient and family at bedside    Anderson Parker MD  [facility]  Office phone number: [phone]  Answering service phone number: [phone]    07/28/23  12:27 EDT    Dictation performed using Dragon dictation software

## 2023-07-28 NOTE — DISCHARGE SUMMARY
Date of Admission: 7/18/2023  Date of Discharge:  7/28/2023    Discharge Diagnosis:  -Severe multivessel CAD s/p off-pump CABG x3/ LANE clipping  -Moderate MR  -paroxsymal Atrial fibrillation--preoperatively/post op  -Ischemic cardiomyopathy---EF 36%; GDMT as tolerated  -HTN  -HLD  -DM II  -Hypothyroidism   -Former tobacco abuse   - CKD IIIa  -chronic anemia worsened by post op ABL   -TCP--consumptive; resolved  -post-op leukocytosis--reactive    Presenting Problem/History of Present Illness  Coronary heart disease [I25.10]     Hospital Course  Patient is a 77 y.o. female presented to the ER at [facility] with complaints of chest pain. Cardiac catheterization revealed multi-vessel CAD. She was transferred here for cardiac surgery evaluation. She did have brief episodes of atrial fibrillation preoperatively. On 7/20/23 she underwent off-pump CABGx3 utilizing LIMA/RSVG and LANE ligation with Dr. Alexander (see op note for full report). Post-operatively she did well. She was extubated on day of surgery, weaned from pressors/inotrope, and transferred to stepdown on POD#1. She had atrial fibrillation on POD#2 with RVR and was converted to sinus rhythm with IV amiodarone. She had recurrence of atrial fibrillation on POD#7.Her beta blocker was adjusted and EP evaluated her. They recommended oral amiodarone, metoprolol and Eliquis. Her QTC became prolonged and amiodarone was discontinued. Chest tubes, central line and epicardial wires were removed without issue. She did have urinary retention following cortez catheter removal. Cortez was replaced and urology was consulted. UA with bacteria, and patient was started on a few doses of antibiotics. Culture was positive for yeast, and she was given a dose of diflucan. Her creatinine increased after surgery, and nephrology was consulted. She was aggressively diuresed with improvement. She was transitioned to oral dosing, which will be continued at discharge. Dr. Parker  recommended follow up in his office in 2-4 weeks. Would plan to check BMP/CBC next week via home health. She did have anemia after surgery, and was given multiple transfusions of blood products with improvement. Her incisions had mild erythema, and she was started on a course of doxycyline. She was weaned from oxygen during the day, but overnight oximetry showed 12 minutes of desaturation below 89%. She will need nocturnal oxygen at discharge. Her mobility is at baseline, and she is tolerating the current medication regimen. She is urinating and defecating without issue and is eating and drinking sufficiently. On POD#8 she was deemed ready for discharge home with home health. Sternal precautions reviewed as were signs and symptoms of sternal wound infection. She is to follow up with her PCP in 1 week, Dr. Valera in 1-2 weeks, Dr. Parker in 2-4 weeks, and in our office on 8/16/23 at 11 am. She was educated to call our office with any questions or concerns.     Procedures Performed  Procedure(s):  REZA STERNOTOMY OFF-PUMP CORONARY ARTERY BYPASS GRAFT TIMES        USING LEFFT INTERNAL MAMMARY ARTERY AND     GREATER SAPHENOUS VEIN GRAFT PER EMDOSCOPIC VEIN HARVESTING, MAZE PROCEDURE AND PRP       Consults:   Consults       Date and Time Order Name Status Description    7/27/2023 10:39 AM Cardiac Electrophysiologist Inpatient Consult Completed     7/24/2023  8:50 AM Inpatient Urology Consult Completed     7/23/2023  9:04 AM Inpatient Nephrology Consult Completed             Pertinent Test Results:    Lab Results   Component Value Date    WBC 13.64 (H) 07/27/2023    HGB 9.2 (L) 07/27/2023    HCT 28.3 (L) 07/27/2023    MCV 89.3 07/27/2023     07/27/2023      Lab Results   Component Value Date    GLUCOSE 126 (H) 07/28/2023    CALCIUM 8.3 (L) 07/28/2023     07/28/2023    K 4.2 07/28/2023    CO2 20.0 (L) 07/28/2023     07/28/2023    BUN 34 (H) 07/28/2023    CREATININE 1.08 (H) 07/28/2023    EGFRIFNONA 70  12/15/2021    BCR 31.5 (H) 07/28/2023    ANIONGAP 12.0 07/28/2023     Lab Results   Component Value Date    INR 1.33 (H) 07/21/2023    PROTIME 16.6 (H) 07/21/2023         Condition on Discharge:  Stable  Vital Signs  Temp:  [97.6 °F (36.4 °C)-97.9 °F (36.6 °C)] 97.7 °F (36.5 °C)  Heart Rate:  [61-62] 61  Resp:  [15-18] 18  BP: (121-151)/(49-69) 121/49      Discharge Disposition  Home-Health Care Svc    Discharge Medications     Discharge Medications        New Medications        Instructions Start Date   acetaminophen 325 MG tablet  Commonly known as: TYLENOL   650 mg, Oral, Every 4 Hours PRN      doxycycline 100 MG capsule  Commonly known as: MONODOX   100 mg, Oral, Every 12 Hours Scheduled      Eliquis 5 MG tablet tablet  Generic drug: apixaban   5 mg, Oral, Every 12 Hours Scheduled      metoprolol succinate XL 25 MG 24 hr tablet  Commonly known as: TOPROL-XL   12.5 mg, Oral, Every 12 Hours Scheduled             Changes to Medications        Instructions Start Date   atorvastatin 40 MG tablet  Commonly known as: LIPITOR  What changed:   medication strength  how much to take  when to take this   40 mg, Oral, Nightly      metFORMIN  MG 24 hr tablet  Commonly known as: GLUCOPHAGE-XR  What changed: Another medication with the same name was changed. Make sure you understand how and when to take each.   1,000 mg, Oral, Daily With Dinner      metFORMIN  MG 24 hr tablet  Commonly known as: GLUCOPHAGE-XR  What changed: See the new instructions.   TAKE 1 TABLET BY MOUTH EVERY MORNING AND 2 TABLETS AT SUPPER             Continue These Medications        Instructions Start Date   albuterol sulfate  (90 Base) MCG/ACT inhaler  Commonly known as: PROVENTIL HFA;VENTOLIN HFA;PROAIR HFA   2 puffs, Inhalation, Every 4 Hours PRN      aspirin 81 MG EC tablet   81 mg, Oral, Daily      buPROPion  MG 24 hr tablet  Commonly known as: WELLBUTRIN XL   300 mg, Oral, Daily      Farxiga 10 MG tablet  Generic drug:  dapagliflozin Propanediol   10 mg, Oral, Daily      furosemide 40 MG tablet  Commonly known as: Lasix   40 mg, Oral, Daily      montelukast 10 MG tablet  Commonly known as: SINGULAIR   10 mg, Oral, Daily      PROBIOTIC PO   1 capsule, Oral, Daily      spironolactone 25 MG tablet  Commonly known as: ALDACTONE   25 mg, Oral, Daily      Synthroid 75 MCG tablet  Generic drug: levothyroxine   75 mcg, Oral, Daily             Stop These Medications      amLODIPine 5 MG tablet  Commonly known as: NORVASC     AZO-CRANBERRY PO     carvedilol 6.25 MG tablet  Commonly known as: COREG     Entresto  MG tablet  Generic drug: sacubitril-valsartan              Discharge Diet:     Activity at Discharge:     Follow-up Appointments  Future Appointments   Date Time Provider Department Center   7/31/2023  2:00 PM Rochelle Brown APRN [facility] PC [facility]   8/2/2023  9:00 AM Valerie Valera MD [facility] HRTFL HA None   8/16/2023 11:00 AM Emi Britt APRN [facility]   8/29/2023 10:15 AM Romario Valdez DO [facility] PC [facility]   8/30/2023  1:30 PM Clarence Johnson APRN [facility] CD [facility]     Additional Instructions for the Follow-ups that You Need to Schedule       Ambulatory Referral to Cardiac Rehab   As directed      Ambulatory Referral to Home Health ([facility])   As directed      To have BMP and CBC in 5 days called to Dr. Alexander's office [phone]    Order Comments: To have BMP and CBC in 5 days called to Dr. Alexander's office [phone]    Face to Face Visit Date: 7/28/2023   Follow-up provider for Plan of Care?: I will be treating the patient on an ongoing basis.  Please send me the Plan of Care for signature.   Follow-up provider: OUSMANE ALEXANDER [[id number]]   Reason/Clinical Findings: post op open heart   Describe mobility limitations that make leaving home difficult: post-operative weakness   Nursing/Therapeutic Services Requested: Skilled Nursing Physical Therapy   Skilled nursing orders: Post CABG care   PT  orders: Strengthening Therapeutic exercise   Frequency: 1 Week 1        Call MD With Problems / Concerns   As directed      Instructions:  Call office at [phone] for any drainage, increased redness, or fever over 100.5    Order Comments: Instructions:  Call office at [phone] for any drainage, increased redness, or fever over 100.5         Discharge Follow-up with PCP   As directed       Currently Documented PCP:    Rochelle Brown APRN    PCP Phone Number:    [phone]     Follow Up Details: in 1 week        Discharge Follow-up with Specified Provider: Cardiologist--Dr. Valera 1-2 weeks; 2 Weeks   As directed      To: Cardiologist--Dr. Valera 1-2 weeks   Follow Up: 2 Weeks   Follow Up Details: call for appointment, bring all medication bottles to appointment        Discharge Follow-up with Specified Provider: LONA Luciano 8/16/23 at 11am   As directed      To: LONA Luciano 8/16/23 at 11am   Follow Up Details: bring all current medications to appointment        Basic Metabolic Panel    Aug 02, 2023 (Approximate)      CBC & Differential    Aug 02, 2023 (Approximate)      Manual Differential: No   Release to patient: Routine Release                Test Results Pending at Discharge       LONA Mosqueda  07/28/23  12:39 EDT

## 2023-07-28 NOTE — CASE MANAGEMENT/SOCIAL WORK
Continued Stay Note  [facility]     Patient Name: Ann-Marie Hughes  MRN: [id number]  Today's Date: 7/28/2023    Admit Date: 7/18/2023    Plan: Home w/ [facility] and [employer] will provide nocturnal oxygen.   Discharge Plan       Row Name 07/28/23 1009       Plan    Plan Home w/ [facility] and [employer] will provide nocturnal oxygen.    Plan Comments CCP spoke with Pt and her son, Joby Hughes ([phone]) at bedside about their DME options for nocturnal oxygen.  Joby chose [employer].  CCP called referral to [place]/[employer].  CCP sent a message to Rochelle w/ [facility] at 10:16 AM to advise Pt is discharging home today.  CCP following..........Denice GONZÁLES/FELICIA CM                   Discharge Codes    No documentation.                 Expected Discharge Date and Time       Expected Discharge Date Expected Discharge Time    Jul 28, 2023               Denice Pablo RN

## 2023-07-28 NOTE — CASE MANAGEMENT/SOCIAL WORK
Case Management Discharge Note      Final Note: Pt discharged home with [facility]  and [employer] providing nocturnal oxygen……..Denice GONZÁLES/FELICIA CM    Provided Post Acute Provider List?: N/A  N/A Provider List Comment: Will need to evaluate after CABG tomorrow.  Provided Post Acute Provider Quality & Resource List?: Yes  Delivered To: Support Person  Support Person: edwin Wren  Method of Delivery: In person    Selected Continued Care - Discharged on 7/28/2023 Admission date: 7/18/2023 - Discharge disposition: Home-Health Care Svc      Destination    No services have been selected for the patient.                Durable Medical Equipment Coordination complete.      Service Provider Selected Services Address Phone Fax Patient Preferred    [employer] [facility] Medical [facility] [address] [facility] [place] [id number] [phone] --              Dialysis/Infusion    No services have been selected for the patient.                Home Medical Care Coordination complete.      Service Provider Selected Services Address Phone Fax Patient Preferred    [facility] - [facility] [id number]  [facility] [address] [address] [phone] --              Therapy    No services have been selected for the patient.                Community Resources    No services have been selected for the patient.                Community & DME    No services have been selected for the patient.                    Transportation Services  Private: Car    Final Discharge Disposition Code: 06 - home with home health care

## 2023-07-31 ENCOUNTER — PREP FOR SURGERY (OUTPATIENT)
Dept: OTHER | Facility: HOSPITAL | Age: 78
End: 2023-07-31
Payer: MEDICARE

## 2023-07-31 ENCOUNTER — APPOINTMENT (OUTPATIENT)
Dept: GENERAL RADIOLOGY | Facility: HOSPITAL | Age: 78
DRG: 280 | End: 2023-07-31
Payer: MEDICARE

## 2023-07-31 ENCOUNTER — HOSPITAL ENCOUNTER (INPATIENT)
Facility: HOSPITAL | Age: 78
LOS: 1 days | Discharge: ANOTHER HOSPITAL | DRG: 280 | End: 2023-08-01
Attending: EMERGENCY MEDICINE | Admitting: STUDENT IN AN ORGANIZED HEALTH CARE EDUCATION/TRAINING PROGRAM
Payer: MEDICARE

## 2023-07-31 ENCOUNTER — READMISSION MANAGEMENT (OUTPATIENT)
Dept: CALL CENTER | Facility: HOSPITAL | Age: 78
End: 2023-07-31
Payer: MEDICARE

## 2023-07-31 ENCOUNTER — TRANSITIONAL CARE MANAGEMENT TELEPHONE ENCOUNTER (OUTPATIENT)
Dept: CALL CENTER | Facility: HOSPITAL | Age: 78
End: 2023-07-31
Payer: MEDICARE

## 2023-07-31 ENCOUNTER — TELEPHONE (OUTPATIENT)
Dept: CARDIAC SURGERY | Facility: CLINIC | Age: 78
End: 2023-07-31
Payer: MEDICARE

## 2023-07-31 DIAGNOSIS — D72.829 LEUKOCYTOSIS, UNSPECIFIED TYPE: ICD-10-CM

## 2023-07-31 DIAGNOSIS — J81.0 ACUTE PULMONARY EDEMA: Primary | ICD-10-CM

## 2023-07-31 DIAGNOSIS — J90 PLEURAL EFFUSION: ICD-10-CM

## 2023-07-31 DIAGNOSIS — R09.02 HYPOXIA: ICD-10-CM

## 2023-07-31 PROBLEM — J96.01 ACUTE RESPIRATORY FAILURE WITH HYPOXIA: Status: ACTIVE | Noted: 2023-07-31

## 2023-07-31 PROBLEM — I50.9 CHF (CONGESTIVE HEART FAILURE): Status: ACTIVE | Noted: 2023-07-31

## 2023-07-31 LAB
ALBUMIN SERPL-MCNC: 3.5 G/DL (ref 3.5–5.2)
ALBUMIN SERPL-MCNC: 3.6 G/DL (ref 3.5–5.2)
ALBUMIN/GLOB SERPL: 1.4 G/DL
ALBUMIN/GLOB SERPL: 1.5 G/DL
ALP SERPL-CCNC: 51 U/L (ref 39–117)
ALP SERPL-CCNC: 52 U/L (ref 39–117)
ALT SERPL W P-5'-P-CCNC: 12 U/L (ref 1–33)
ALT SERPL W P-5'-P-CCNC: 12 U/L (ref 1–33)
ANION GAP SERPL CALCULATED.3IONS-SCNC: 13.2 MMOL/L (ref 5–15)
ANION GAP SERPL CALCULATED.3IONS-SCNC: 14.6 MMOL/L (ref 5–15)
AST SERPL-CCNC: 18 U/L (ref 1–32)
AST SERPL-CCNC: 19 U/L (ref 1–32)
BACTERIA UR QL AUTO: ABNORMAL /HPF
BASOPHILS # BLD AUTO: 0.09 10*3/MM3 (ref 0–0.2)
BASOPHILS NFR BLD AUTO: 0.4 % (ref 0–1.5)
BILIRUB SERPL-MCNC: 1.9 MG/DL (ref 0–1.2)
BILIRUB SERPL-MCNC: 2.1 MG/DL (ref 0–1.2)
BILIRUB UR QL STRIP: NEGATIVE
BUN SERPL-MCNC: 56 MG/DL (ref 8–23)
BUN SERPL-MCNC: 58 MG/DL (ref 8–23)
BUN/CREAT SERPL: 46.7 (ref 7–25)
BUN/CREAT SERPL: 48.3 (ref 7–25)
CALCIUM SPEC-SCNC: 8.7 MG/DL (ref 8.6–10.5)
CALCIUM SPEC-SCNC: 8.7 MG/DL (ref 8.6–10.5)
CHLORIDE SERPL-SCNC: 100 MMOL/L (ref 98–107)
CHLORIDE SERPL-SCNC: 100 MMOL/L (ref 98–107)
CLARITY UR: CLEAR
CO2 SERPL-SCNC: 19.4 MMOL/L (ref 22–29)
CO2 SERPL-SCNC: 20.8 MMOL/L (ref 22–29)
COLOR UR: YELLOW
CREAT SERPL-MCNC: 1.2 MG/DL (ref 0.57–1)
CREAT SERPL-MCNC: 1.2 MG/DL (ref 0.57–1)
D-LACTATE SERPL-SCNC: 2 MMOL/L (ref 0.5–2)
D-LACTATE SERPL-SCNC: 2.3 MMOL/L (ref 0.5–2)
DEPRECATED RDW RBC AUTO: 64.7 FL (ref 37–54)
EGFRCR SERPLBLD CKD-EPI 2021: 46.4 ML/MIN/1.73
EGFRCR SERPLBLD CKD-EPI 2021: 46.4 ML/MIN/1.73
EOSINOPHIL # BLD AUTO: 0.22 10*3/MM3 (ref 0–0.4)
EOSINOPHIL # BLD MANUAL: 0.77 10*3/MM3 (ref 0–0.4)
EOSINOPHIL NFR BLD AUTO: 0.9 % (ref 0.3–6.2)
EOSINOPHIL NFR BLD MANUAL: 3 % (ref 0.3–6.2)
ERYTHROCYTE [DISTWIDTH] IN BLOOD BY AUTOMATED COUNT: 19.6 % (ref 12.3–15.4)
GEN 5 2HR TROPONIN T REFLEX: 143 NG/L
GLOBULIN UR ELPH-MCNC: 2.4 GM/DL
GLOBULIN UR ELPH-MCNC: 2.5 GM/DL
GLUCOSE SERPL-MCNC: 140 MG/DL (ref 65–99)
GLUCOSE SERPL-MCNC: 151 MG/DL (ref 65–99)
GLUCOSE UR STRIP-MCNC: ABNORMAL MG/DL
HCT VFR BLD AUTO: 24.9 % (ref 34–46.6)
HGB BLD-MCNC: 8.2 G/DL (ref 12–15.9)
HGB UR QL STRIP.AUTO: NEGATIVE
HOLD SPECIMEN: NORMAL
HOLD SPECIMEN: NORMAL
HYALINE CASTS UR QL AUTO: ABNORMAL /LPF
IMM GRANULOCYTES # BLD AUTO: 1.75 10*3/MM3 (ref 0–0.05)
IMM GRANULOCYTES NFR BLD AUTO: 6.9 % (ref 0–0.5)
KETONES UR QL STRIP: NEGATIVE
LEUKOCYTE ESTERASE UR QL STRIP.AUTO: ABNORMAL
LYMPHOCYTES # BLD AUTO: 2.7 10*3/MM3 (ref 0.7–3.1)
LYMPHOCYTES # BLD MANUAL: 2.04 10*3/MM3 (ref 0.7–3.1)
LYMPHOCYTES NFR BLD AUTO: 10.6 % (ref 19.6–45.3)
LYMPHOCYTES NFR BLD MANUAL: 11 % (ref 5–12)
MCH RBC QN AUTO: 30.7 PG (ref 26.6–33)
MCHC RBC AUTO-ENTMCNC: 32.9 G/DL (ref 31.5–35.7)
MCV RBC AUTO: 93.3 FL (ref 79–97)
MONOCYTES # BLD AUTO: 2.74 10*3/MM3 (ref 0.1–0.9)
MONOCYTES # BLD: 2.81 10*3/MM3 (ref 0.1–0.9)
MONOCYTES NFR BLD AUTO: 10.7 % (ref 5–12)
NEUTROPHILS # BLD AUTO: 19.89 10*3/MM3 (ref 1.7–7)
NEUTROPHILS NFR BLD AUTO: 18 10*3/MM3 (ref 1.7–7)
NEUTROPHILS NFR BLD AUTO: 70.5 % (ref 42.7–76)
NEUTROPHILS NFR BLD MANUAL: 78 % (ref 42.7–76)
NITRITE UR QL STRIP: NEGATIVE
NRBC BLD AUTO-RTO: 2.5 /100 WBC (ref 0–0.2)
NRBC SPEC MANUAL: 5 /100 WBC (ref 0–0.2)
NT-PROBNP SERPL-MCNC: ABNORMAL PG/ML (ref 0–1800)
OVALOCYTES BLD QL SMEAR: ABNORMAL
PH UR STRIP.AUTO: <=5 [PH] (ref 5–8)
PLAT MORPH BLD: NORMAL
PLATELET # BLD AUTO: 376 10*3/MM3 (ref 140–450)
PMV BLD AUTO: 9.9 FL (ref 6–12)
POLYCHROMASIA BLD QL SMEAR: ABNORMAL
POTASSIUM SERPL-SCNC: 4.2 MMOL/L (ref 3.5–5.2)
POTASSIUM SERPL-SCNC: 4.4 MMOL/L (ref 3.5–5.2)
PROCALCITONIN SERPL-MCNC: 0.07 NG/ML (ref 0–0.25)
PROT SERPL-MCNC: 5.9 G/DL (ref 6–8.5)
PROT SERPL-MCNC: 6.1 G/DL (ref 6–8.5)
PROT UR QL STRIP: ABNORMAL
QT INTERVAL: 447 MS
RBC # BLD AUTO: 2.67 10*6/MM3 (ref 3.77–5.28)
RBC # UR STRIP: ABNORMAL /HPF
REF LAB TEST METHOD: ABNORMAL
SARS-COV-2 RNA RESP QL NAA+PROBE: NOT DETECTED
SODIUM SERPL-SCNC: 134 MMOL/L (ref 136–145)
SODIUM SERPL-SCNC: 134 MMOL/L (ref 136–145)
SP GR UR STRIP: 1.01 (ref 1–1.03)
SQUAMOUS #/AREA URNS HPF: ABNORMAL /HPF
TROPONIN T DELTA: 1 NG/L
TROPONIN T SERPL HS-MCNC: 142 NG/L
UROBILINOGEN UR QL STRIP: ABNORMAL
VARIANT LYMPHS NFR BLD MANUAL: 8 % (ref 19.6–45.3)
WBC # UR STRIP: ABNORMAL /HPF
WBC MORPH BLD: NORMAL
WBC NRBC COR # BLD: 25.5 10*3/MM3 (ref 3.4–10.8)
WHOLE BLOOD HOLD COAG: NORMAL
WHOLE BLOOD HOLD SPECIMEN: NORMAL
YEAST URNS QL MICRO: ABNORMAL /HPF

## 2023-07-31 PROCEDURE — 71045 X-RAY EXAM CHEST 1 VIEW: CPT

## 2023-07-31 PROCEDURE — 93005 ELECTROCARDIOGRAM TRACING: CPT

## 2023-07-31 PROCEDURE — 93005 ELECTROCARDIOGRAM TRACING: CPT | Performed by: EMERGENCY MEDICINE

## 2023-07-31 PROCEDURE — 25010000002 FUROSEMIDE PER 20 MG: Performed by: EMERGENCY MEDICINE

## 2023-07-31 PROCEDURE — 84145 PROCALCITONIN (PCT): CPT | Performed by: STUDENT IN AN ORGANIZED HEALTH CARE EDUCATION/TRAINING PROGRAM

## 2023-07-31 PROCEDURE — 85007 BL SMEAR W/DIFF WBC COUNT: CPT | Performed by: EMERGENCY MEDICINE

## 2023-07-31 PROCEDURE — 83605 ASSAY OF LACTIC ACID: CPT | Performed by: EMERGENCY MEDICINE

## 2023-07-31 PROCEDURE — 25010000002 CEFTRIAXONE PER 250 MG: Performed by: PHYSICIAN ASSISTANT

## 2023-07-31 PROCEDURE — 99285 EMERGENCY DEPT VISIT HI MDM: CPT

## 2023-07-31 PROCEDURE — 87040 BLOOD CULTURE FOR BACTERIA: CPT | Performed by: EMERGENCY MEDICINE

## 2023-07-31 PROCEDURE — 81001 URINALYSIS AUTO W/SCOPE: CPT | Performed by: EMERGENCY MEDICINE

## 2023-07-31 PROCEDURE — 80053 COMPREHEN METABOLIC PANEL: CPT | Performed by: EMERGENCY MEDICINE

## 2023-07-31 PROCEDURE — 80053 COMPREHEN METABOLIC PANEL: CPT

## 2023-07-31 PROCEDURE — 84484 ASSAY OF TROPONIN QUANT: CPT | Performed by: EMERGENCY MEDICINE

## 2023-07-31 PROCEDURE — 93010 ELECTROCARDIOGRAM REPORT: CPT | Performed by: INTERNAL MEDICINE

## 2023-07-31 PROCEDURE — 83880 ASSAY OF NATRIURETIC PEPTIDE: CPT | Performed by: EMERGENCY MEDICINE

## 2023-07-31 PROCEDURE — 25010000002 ONDANSETRON PER 1 MG: Performed by: EMERGENCY MEDICINE

## 2023-07-31 PROCEDURE — 87635 SARS-COV-2 COVID-19 AMP PRB: CPT | Performed by: EMERGENCY MEDICINE

## 2023-07-31 PROCEDURE — 83735 ASSAY OF MAGNESIUM: CPT | Performed by: STUDENT IN AN ORGANIZED HEALTH CARE EDUCATION/TRAINING PROGRAM

## 2023-07-31 PROCEDURE — 85025 COMPLETE CBC W/AUTO DIFF WBC: CPT | Performed by: EMERGENCY MEDICINE

## 2023-07-31 RX ORDER — ONDANSETRON 2 MG/ML
4 INJECTION INTRAMUSCULAR; INTRAVENOUS ONCE
Status: COMPLETED | OUTPATIENT
Start: 2023-07-31 | End: 2023-07-31

## 2023-07-31 RX ORDER — SODIUM CHLORIDE 0.9 % (FLUSH) 0.9 %
10 SYRINGE (ML) INJECTION AS NEEDED
Status: DISCONTINUED | OUTPATIENT
Start: 2023-07-31 | End: 2023-08-01

## 2023-07-31 RX ORDER — POLYETHYLENE GLYCOL 3350 17 G/17G
17 POWDER, FOR SOLUTION ORAL DAILY PRN
Status: DISCONTINUED | OUTPATIENT
Start: 2023-07-31 | End: 2023-08-01

## 2023-07-31 RX ORDER — ONDANSETRON 4 MG/1
4 TABLET, FILM COATED ORAL EVERY 6 HOURS PRN
Status: DISCONTINUED | OUTPATIENT
Start: 2023-07-31 | End: 2023-08-01 | Stop reason: HOSPADM

## 2023-07-31 RX ORDER — BISACODYL 5 MG/1
5 TABLET, DELAYED RELEASE ORAL DAILY PRN
Status: DISCONTINUED | OUTPATIENT
Start: 2023-07-31 | End: 2023-08-01

## 2023-07-31 RX ORDER — ACETAMINOPHEN 325 MG/1
650 TABLET ORAL EVERY 4 HOURS PRN
Status: DISCONTINUED | OUTPATIENT
Start: 2023-07-31 | End: 2023-08-01

## 2023-07-31 RX ORDER — FUROSEMIDE 10 MG/ML
80 INJECTION INTRAMUSCULAR; INTRAVENOUS ONCE
Status: COMPLETED | OUTPATIENT
Start: 2023-07-31 | End: 2023-07-31

## 2023-07-31 RX ORDER — BISACODYL 10 MG
10 SUPPOSITORY, RECTAL RECTAL DAILY PRN
Status: DISCONTINUED | OUTPATIENT
Start: 2023-07-31 | End: 2023-08-01

## 2023-07-31 RX ORDER — POLYETHYLENE GLYCOL 3350 17 G/17G
17 POWDER, FOR SOLUTION ORAL DAILY PRN
Status: CANCELLED | OUTPATIENT
Start: 2023-07-31

## 2023-07-31 RX ORDER — SODIUM CHLORIDE 0.9 % (FLUSH) 0.9 %
10 SYRINGE (ML) INJECTION EVERY 12 HOURS SCHEDULED
Status: CANCELLED | OUTPATIENT
Start: 2023-07-31

## 2023-07-31 RX ORDER — NITROGLYCERIN 0.4 MG/1
0.4 TABLET SUBLINGUAL
Status: CANCELLED | OUTPATIENT
Start: 2023-07-31

## 2023-07-31 RX ORDER — ACETAMINOPHEN 325 MG/1
650 TABLET ORAL EVERY 4 HOURS PRN
Status: CANCELLED | OUTPATIENT
Start: 2023-07-31

## 2023-07-31 RX ORDER — SODIUM CHLORIDE 0.9 % (FLUSH) 0.9 %
10 SYRINGE (ML) INJECTION AS NEEDED
Status: CANCELLED | OUTPATIENT
Start: 2023-07-31

## 2023-07-31 RX ORDER — AMOXICILLIN 250 MG
2 CAPSULE ORAL 2 TIMES DAILY
Status: CANCELLED | OUTPATIENT
Start: 2023-07-31

## 2023-07-31 RX ORDER — SODIUM CHLORIDE 9 MG/ML
40 INJECTION, SOLUTION INTRAVENOUS AS NEEDED
Status: CANCELLED | OUTPATIENT
Start: 2023-07-31

## 2023-07-31 RX ORDER — NITROGLYCERIN 0.4 MG/1
0.4 TABLET SUBLINGUAL
Status: DISCONTINUED | OUTPATIENT
Start: 2023-07-31 | End: 2023-08-01

## 2023-07-31 RX ORDER — CEFTRIAXONE SODIUM 2 G/50ML
2000 INJECTION, SOLUTION INTRAVENOUS EVERY 24 HOURS
Status: CANCELLED | OUTPATIENT
Start: 2023-07-31 | End: 2023-08-07

## 2023-07-31 RX ORDER — ONDANSETRON 4 MG/1
4 TABLET, FILM COATED ORAL EVERY 6 HOURS PRN
Status: CANCELLED | OUTPATIENT
Start: 2023-07-31

## 2023-07-31 RX ORDER — SODIUM CHLORIDE 0.9 % (FLUSH) 0.9 %
10 SYRINGE (ML) INJECTION EVERY 12 HOURS SCHEDULED
Status: DISCONTINUED | OUTPATIENT
Start: 2023-07-31 | End: 2023-08-01 | Stop reason: HOSPADM

## 2023-07-31 RX ORDER — CHOLECALCIFEROL (VITAMIN D3) 125 MCG
5 CAPSULE ORAL NIGHTLY PRN
Status: DISCONTINUED | OUTPATIENT
Start: 2023-07-31 | End: 2023-08-01 | Stop reason: HOSPADM

## 2023-07-31 RX ORDER — BISACODYL 5 MG/1
5 TABLET, DELAYED RELEASE ORAL DAILY PRN
Status: CANCELLED | OUTPATIENT
Start: 2023-07-31

## 2023-07-31 RX ORDER — SODIUM CHLORIDE 9 MG/ML
40 INJECTION, SOLUTION INTRAVENOUS AS NEEDED
Status: DISCONTINUED | OUTPATIENT
Start: 2023-07-31 | End: 2023-08-01

## 2023-07-31 RX ORDER — BISACODYL 10 MG
10 SUPPOSITORY, RECTAL RECTAL DAILY PRN
Status: CANCELLED | OUTPATIENT
Start: 2023-07-31

## 2023-07-31 RX ORDER — UREA 10 %
5 LOTION (ML) TOPICAL NIGHTLY PRN
Status: CANCELLED | OUTPATIENT
Start: 2023-07-31

## 2023-07-31 RX ORDER — AMOXICILLIN 250 MG
2 CAPSULE ORAL 2 TIMES DAILY
Status: DISCONTINUED | OUTPATIENT
Start: 2023-07-31 | End: 2023-08-01

## 2023-07-31 RX ADMIN — FUROSEMIDE 80 MG: 10 INJECTION, SOLUTION INTRAMUSCULAR; INTRAVENOUS at 13:59

## 2023-07-31 RX ADMIN — CEFTRIAXONE SODIUM 2000 MG: 2 INJECTION, POWDER, FOR SOLUTION INTRAMUSCULAR; INTRAVENOUS at 19:30

## 2023-07-31 RX ADMIN — ONDANSETRON 4 MG: 2 INJECTION INTRAMUSCULAR; INTRAVENOUS at 13:57

## 2023-07-31 NOTE — ED NOTES
YAHIR WITH [facility] STATED THAT THEY DO NOT HAVE A BED AS OF YET AND THAT SHE WILL UPDATE ME IN AN HOUR. STAFF INFORMED  AND CHARGE RN-YRN.

## 2023-07-31 NOTE — Clinical Note
Level of Care: Telemetry [5]   Diagnosis: CHF (congestive heart failure) [898405]   Admitting Physician: OUSMANE DESAI [[id number]]   Bed Request Comments: CVU or CVI   Certification: I Certify That Inpatient Hospital Services Are Medically Necessary For Greater Than 2 Midnights

## 2023-07-31 NOTE — ED PROVIDER NOTES
Time: 12:49 PM EDT  Date of encounter:  7/31/2023  Independent Historian/Clinical History and Information was obtained by:   Patient and Family    History is limited by: N/A    Chief Complaint: Shortness of breath and leg swelling.  Shortness of breath and leg swelling.    History of Present Illness:  Patient is a 77 y.o. year old female who presents to the emergency department for evaluation of shortness of breath.  This patient recently had a CABG 9 to 10 days ago at [facility].  Today she presents to the emergency room complaining of severe shortness of breath along with leg swelling bilaterally.  The patient was seen by home health nurse and sent to the emergency department.  The patient a pulse oximeter that was in the 80 percentile and EMS was summoned to the house.  The patient was placed on 4 L nasal cannula and her symptoms have improved.  The patient denies any fever or chills; however, she has had some mild cough        HPI    Patient Care Team  Primary Care Provider: Rochelle Brown APRN    Past Medical History:     Allergies   Allergen Reactions    Penicillins Palpitations     1. When was your reaction? > 10 years ago  2. Did your reaction happen after the first dose or after several doses? After first dose  3. Did your reaction require ED or hospital care to manage your reaction? Do not know  4. Did your reaction require treatment with epinephrine? Do not know  5. Have you taken amoxicillin (Amoxil) or amoxicillin-clavulanate (Augmentin) without issue since? Yes  6. Have you taken cephalexin (Keflex) without issue since?  Do not know      Past Medical History:   Diagnosis Date    Acne rosacea 08/17/2021    DR.JEFF MOON     Arteriosclerosis of abdominal aorta     B12 deficiency 08/17/2021    DJD (degenerative joint disease)     Hx of smoking 08/17/2021    QUIT IN 1976 AT AGE 31    Hyperlipidemia 08/17/2021    Hypertension     Hypothyroidism     Metabolic syndrome 08/17/2021    KAREN  (stress urinary incontinence, female) 08/17/2021    UTI (urinary tract infection) 08/17/2021    Vitamin D deficiency 08/17/2021    Vitamin D deficiency      Past Surgical History:   Procedure Laterality Date    BACK SURGERY  2013    CARDIAC CATHETERIZATION N/A 07/17/2023    Procedure: Left Heart Cath;  Surgeon: Dinh Andres MD;  Location: formerly [facility] CATH INVASIVE LOCATION;  Service: Cardiovascular;  Laterality: N/A;    COLONOSCOPY  2011    CORONARY ARTERY BYPASS GRAFT WITH MITRAL VALVE REPAIR/REPLACEMENT N/A 07/20/2023    Procedure: REZA STERNOTOMY OFF-PUMP CORONARY ARTERY BYPASS GRAFT TIMES        USING LEFFT INTERNAL MAMMARY ARTERY AND     GREATER SAPHENOUS VEIN GRAFT PER EMDOSCOPIC VEIN HARVESTING, MAZE PROCEDURE AND PRP;  Surgeon: Shane Alexander MD;  Location: [facility];  Service: Cardiothoracic;  Laterality: N/A;     History reviewed. No pertinent family history.    Home Medications:  Prior to Admission medications    Medication Sig Start Date End Date Taking? Authorizing Provider   acetaminophen (TYLENOL) 325 MG tablet Take 2 tablets by mouth Every 4 (Four) Hours As Needed for Mild Pain. 7/28/23  Yes Anjelica Chase APRN   albuterol sulfate  (90 Base) MCG/ACT inhaler Inhale 2 puffs Every 4 (Four) Hours As Needed for Wheezing. 5/30/23  Yes Les Moreno DO   apixaban (ELIQUIS) 5 MG tablet tablet Take 1 tablet by mouth Every 12 (Twelve) Hours. Indications: Atrial Fibrillation 7/28/23  Yes Anjelica Chase APRN   aspirin 81 MG EC tablet Take 1 tablet by mouth Daily.   Yes Provider, MD Kanwal   atorvastatin (LIPITOR) 40 MG tablet Take 1 tablet by mouth Every Night for 90 days. 7/28/23 10/26/23 Yes Anjelica Chase APRN   buPROPion XL (WELLBUTRIN XL) 300 MG 24 hr tablet TAKE 1 TABLET BY MOUTH DAILY 5/23/23  Yes Rochelle Brown APRN   doxycycline (MONODOX) 100 MG capsule Take 1 capsule by mouth Every 12 (Twelve) Hours for 11 doses. Indications: Infection of the Skin and/or  Soft Tissue 7/28/23 8/3/23 Yes Anjelica Chase APRN   metFORMIN ER (GLUCOPHAGE-XR) 500 MG 24 hr tablet TAKE 1 TABLET BY MOUTH EVERY MORNING AND 2 TABLETS AT SUPPER  Patient taking differently: 1 tablet Daily With Breakfast. 23  Yes Roseanna Hernandez MD   montelukast (SINGULAIR) 10 MG tablet Take 1 tablet by mouth Daily. 23  Yes Rochelle Brown APRN   Probiotic Product (PROBIOTIC PO) Take 1 capsule by mouth Daily.   Yes Provider, MD Kanwal   spironolactone (ALDACTONE) 25 MG tablet Take 1 tablet by mouth Daily. 23  Yes Valerie Valera MD   Synthroid 75 MCG tablet Take 1 tablet by mouth Daily. 7/10/23  Yes Rochelle Brown APRN   dapagliflozin Propanediol (Farxiga) 10 MG tablet Take 10 mg by mouth Daily. 23   Valerie Valera MD   furosemide (Lasix) 40 MG tablet Take 1 tablet by mouth Daily. 23   Rochelle Brown APRN   metFORMIN ER (GLUCOPHAGE-XR) 500 MG 24 hr tablet Take 2 tablets by mouth Daily With Dinner.    Provider, MD Kanwal   metoprolol succinate XL (TOPROL-XL) 25 MG 24 hr tablet Take 0.5 tablets by mouth Every 12 (Twelve) Hours for 90 days. 7/28/23 10/26/23  Anjelica Chase APRN        Social History:   Social History     Tobacco Use    Smoking status: Former     Packs/day: 1.00     Years: 12.00     Pack years: 12.00     Types: Cigarettes     Quit date:      Years since quittin.6    Smokeless tobacco: Never   Vaping Use    Vaping Use: Never used   Substance Use Topics    Alcohol use: Never    Drug use: Never         Review of Systems:  Review of Systems   Constitutional:  Negative for chills and fever.   HENT:  Negative for congestion, ear pain and sore throat.    Eyes:  Negative for pain.   Respiratory:  Positive for cough and shortness of breath. Negative for chest tightness.    Cardiovascular:  Negative for chest pain.   Gastrointestinal:  Negative for abdominal pain, diarrhea, nausea and vomiting.   Genitourinary:  Negative for flank pain and  "hematuria.   Musculoskeletal:  Negative for joint swelling.   Skin:  Negative for pallor.   Neurological:  Negative for seizures and headaches.   Hematological: Negative.    Psychiatric/Behavioral: Negative.     All other systems reviewed and are negative.     Physical Exam:  /74 (BP Location: Right arm, Patient Position: Lying)   Pulse 90   Temp 97.9 øF (36.6 øC) (Oral)   Resp 16   Ht 162.6 cm (64\")   Wt 93.4 kg (205 lb 14.6 oz)   LMP  (LMP Unknown)   SpO2 97%   BMI 35.34 kg/mý     Physical Exam  Vitals and nursing note reviewed.   Constitutional:       General: She is in acute distress.      Appearance: Normal appearance. She is not toxic-appearing.   HENT:      Head: Normocephalic and atraumatic.      Mouth/Throat:      Mouth: Mucous membranes are moist.   Eyes:      General: No scleral icterus.  Cardiovascular:      Rate and Rhythm: Normal rate and regular rhythm.      Pulses: Normal pulses.      Heart sounds: Normal heart sounds.   Pulmonary:      Effort: Respiratory distress present.      Breath sounds: Examination of the right-middle field reveals decreased breath sounds, rhonchi and rales. Examination of the left-middle field reveals decreased breath sounds, rhonchi and rales.   Chest:      Chest wall: Tenderness present.   Abdominal:      General: Abdomen is flat.      Palpations: Abdomen is soft.      Tenderness: There is no abdominal tenderness.   Musculoskeletal:         General: Normal range of motion.      Cervical back: Normal range of motion and neck supple.   Skin:     Capillary Refill: Capillary refill takes less than 2 seconds.      Comments: Lower extremity harvest sites are clean and dry and chest wounds are also clean and dry.   Neurological:      General: No focal deficit present.      Mental Status: She is alert and oriented to person, place, and time. Mental status is at baseline.              Procedures:  Procedures      Medical Decision Making:      Comorbidities that affect " care:    Coronary Artery Disease    External Notes reviewed:    Previous Admission Note: Previous admission for CABG      The following orders were placed and all results were independently analyzed by me:  Orders Placed This Encounter   Procedures    Blood Culture - Blood,    Blood Culture - Blood,    Respiratory Culture - Sputum, Cough    COVID-19,CEPHEID/HARSH,COR/JOHNNY/PAD/FABIOLA/MAD IN-HOUSE(OR EMERGENT/ADD-ON),NP SWAB IN TRANSPORT MEDIA 3-4 HR TAT, RT-PCR - Swab, Nasopharynx    XR Chest 1 View    XR Chest 1 View    [place] Draw    Comprehensive Metabolic Panel    BNP    Single High Sensitivity Troponin T    CBC Auto Differential    Manual Differential    High Sensitivity Troponin T 2Hr    Lactic Acid, Plasma    Urinalysis With Culture If Indicated - Urine, Catheter    STAT Lactic Acid, Reflex    Urinalysis, Microscopic Only - Urine, Clean Catch    Comprehensive Metabolic Panel    Procalcitonin    Magnesium    CBC Auto Differential    High Sensitivity Troponin T    Undress & Gown    Vital Signs    ECG 12 Lead ED Triage Standing Order; SOA    Wound Ostomy Eval & Treat    Inpatient Admission    Inpatient Admission    Discharge patient    CBC & Differential    Green Top (Gel)    Lavender Top    Gold Top - SST    Light Blue Top       Medications Given in the Emergency Department:  Medications   furosemide (LASIX) injection 80 mg (80 mg Intravenous Given 7/31/23 1359)   ondansetron (ZOFRAN) injection 4 mg (4 mg Intravenous Given 7/31/23 1357)        ED Course:           EKG: Sinus rhythm rate of 70 bpm  Normal P wave and [place] interval  QRS and normal act  Normal ST segments and normal QT QTc interval.      Labs:    Lab Results (last 24 hours)       Procedure Component Value Units Date/Time    CBC Auto Differential [[id number]]  (Abnormal) Collected: 08/02/23 0847    Specimen: Blood Updated: 08/02/23 0928     WBC 17.27 10*3/mm3      RBC 2.68 10*6/mm3      Hemoglobin 8.1 g/dL      Hematocrit 24.5 %      MCV 91.4 fL      MCH  30.2 pg      MCHC 33.1 g/dL      RDW 18.0 %      RDW-SD 58.0 fl      MPV 10.2 fL      Platelets 365 10*3/mm3      Neutrophil % 69.5 %      Lymphocyte % 13.5 %      Monocyte % 11.3 %      Eosinophil % 2.0 %      Basophil % 0.3 %      Immature Grans % 3.4 %      Neutrophils, Absolute 11.98 10*3/mm3      Lymphocytes, Absolute 2.34 10*3/mm3      Monocytes, Absolute 1.96 10*3/mm3      Eosinophils, Absolute 0.35 10*3/mm3      Basophils, Absolute 0.06 10*3/mm3      Immature Grans, Absolute 0.58 10*3/mm3      nRBC 0.3 /100 WBC     Comprehensive Metabolic Panel [[id number]]  (Abnormal) Collected: 08/02/23 0847    Specimen: Blood Updated: 08/02/23 0947     Glucose 176 mg/dL      BUN 56 mg/dL      Creatinine 1.21 mg/dL      Sodium 142 mmol/L      Potassium 4.2 mmol/L      Chloride 102 mmol/L      CO2 25.6 mmol/L      Calcium 9.3 mg/dL      Total Protein 5.9 g/dL      Albumin 3.5 g/dL      ALT (SGPT) 11 U/L      AST (SGOT) 13 U/L      Alkaline Phosphatase 54 U/L      Total Bilirubin 1.3 mg/dL      Globulin 2.4 gm/dL      A/G Ratio 1.5 g/dL      BUN/Creatinine Ratio 46.3     Anion Gap 14.4 mmol/L      eGFR 46.0 mL/min/1.73     Narrative:      GFR Normal >60  Chronic Kidney Disease <60  Kidney Failure <15    The GFR formula is only valid for adults with stable renal function between ages 18 and 70.    Magnesium [[id number]]  (Normal) Collected: 08/02/23 0847    Specimen: Blood Updated: 08/02/23 0947     Magnesium 1.9 mg/dL              Imaging:    No Radiology Exams Resulted Within Past 24 Hours      Differential Diagnosis and Discussion:    Dyspnea: Differential diagnosis includes but is not limited to metabolic acidosis, neurological disorders, psychogenic, asthma, pneumothorax, upper airway obstruction, COPD, pneumonia, noncardiogenic pulmonary edema, interstitial lung disease, anemia, congestive heart failure, and pulmonary embolism    All labs were reviewed and interpreted by me.  EKG was interpreted by me.    MDM     Amount  and/or Complexity of Data Reviewed  Clinical lab tests: reviewed  Tests in the radiology section of CPTr: reviewed  Tests in the medicine section of CP[facility]: reviewed  Decide to obtain previous medical records or to obtain history from someone other than the patient: yes             Patient Care Considerations:    CT CHEST: I considered ordering a CT scan of the chest, however Xray is diagnostic      Consultants/Shared Management Plan:    Consultant: I have discussed the case with CT surgeon who states the patient can be transferred to [facility]; however, they have no beds.  The patient will be admitted to [facility] until bed is available    Social Determinants of Health:    Patient has presented with family members who are responsible, reliable and will ensure follow up care.      Disposition and Care Coordination:    Admit:   Through independent evaluation of the patient's history, physical, and imperical data, the patient meets criteria for observation/admission to the hospital.        Final diagnoses:   Acute pulmonary edema   Pleural effusion   Hypoxia   Leukocytosis, unspecified type        ED Disposition       ED Disposition   Decision to Admit    Condition   --    Comment   Level of Care: Telemetry [5]   Diagnosis: Acute respiratory failure with hypoxia [068954]   Admitting Physician: IRMA JASMINE [[id number]]   Attending Physician: IRMA JASMINE [[id number]]   Certification: I Certify That Inpatient Hospital Services Are Medically Necessary For Greater Than 2 Midnights                 This medical record created using voice recognition software.             Abner Stockton,   08/02/23 1121

## 2023-08-01 ENCOUNTER — HOSPITAL ENCOUNTER (INPATIENT)
Facility: HOSPITAL | Age: 78
LOS: 4 days | Discharge: HOME | DRG: 291 | End: 2023-08-06
Payer: MEDICARE

## 2023-08-01 VITALS
TEMPERATURE: 97.9 F | HEIGHT: 64 IN | WEIGHT: 205.91 LBS | SYSTOLIC BLOOD PRESSURE: 118 MMHG | DIASTOLIC BLOOD PRESSURE: 74 MMHG | HEART RATE: 90 BPM | RESPIRATION RATE: 16 BRPM | OXYGEN SATURATION: 97 % | BODY MASS INDEX: 35.15 KG/M2

## 2023-08-01 PROBLEM — I48.0 PAF (PAROXYSMAL ATRIAL FIBRILLATION): Status: ACTIVE | Noted: 2023-08-01

## 2023-08-01 PROBLEM — I25.810 CORONARY ARTERY DISEASE INVOLVING AUTOLOGOUS VEIN CORONARY BYPASS GRAFT WITHOUT ANGINA PECTORIS: Status: ACTIVE | Noted: 2023-08-01

## 2023-08-01 PROBLEM — I50.23 ACUTE ON CHRONIC HFREF (HEART FAILURE WITH REDUCED EJECTION FRACTION): Status: ACTIVE | Noted: 2023-07-31

## 2023-08-01 LAB
ANION GAP SERPL CALCULATED.3IONS-SCNC: 12 MMOL/L (ref 5–15)
BASOPHILS # BLD AUTO: 0.09 10*3/MM3 (ref 0–0.2)
BASOPHILS NFR BLD AUTO: 0.4 % (ref 0–1.5)
BUN SERPL-MCNC: 60 MG/DL (ref 8–23)
BUN/CREAT SERPL: 45.1 (ref 7–25)
CALCIUM SPEC-SCNC: 8.5 MG/DL (ref 8.6–10.5)
CHLORIDE SERPL-SCNC: 101 MMOL/L (ref 98–107)
CO2 SERPL-SCNC: 24 MMOL/L (ref 22–29)
CREAT SERPL-MCNC: 1.33 MG/DL (ref 0.57–1)
DEPRECATED RDW RBC AUTO: 69.4 FL (ref 37–54)
EGFRCR SERPLBLD CKD-EPI 2021: 41 ML/MIN/1.73
EOSINOPHIL # BLD AUTO: 0.58 10*3/MM3 (ref 0–0.4)
EOSINOPHIL NFR BLD AUTO: 2.8 % (ref 0.3–6.2)
ERYTHROCYTE [DISTWIDTH] IN BLOOD BY AUTOMATED COUNT: 20.1 % (ref 12.3–15.4)
GLUCOSE SERPL-MCNC: 119 MG/DL (ref 65–99)
HCT VFR BLD AUTO: 23.4 % (ref 34–46.6)
HGB BLD-MCNC: 7.5 G/DL (ref 12–15.9)
IMM GRANULOCYTES # BLD AUTO: 1.13 10*3/MM3 (ref 0–0.05)
IMM GRANULOCYTES NFR BLD AUTO: 5.4 % (ref 0–0.5)
LYMPHOCYTES # BLD AUTO: 2.99 10*3/MM3 (ref 0.7–3.1)
LYMPHOCYTES NFR BLD AUTO: 14.3 % (ref 19.6–45.3)
MAGNESIUM SERPL-MCNC: 1.9 MG/DL (ref 1.6–2.4)
MAGNESIUM SERPL-MCNC: 1.9 MG/DL (ref 1.6–2.4)
MCH RBC QN AUTO: 30.7 PG (ref 26.6–33)
MCHC RBC AUTO-ENTMCNC: 32.1 G/DL (ref 31.5–35.7)
MCV RBC AUTO: 95.9 FL (ref 79–97)
MONOCYTES # BLD AUTO: 2.59 10*3/MM3 (ref 0.1–0.9)
MONOCYTES NFR BLD AUTO: 12.4 % (ref 5–12)
NEUTROPHILS NFR BLD AUTO: 13.48 10*3/MM3 (ref 1.7–7)
NEUTROPHILS NFR BLD AUTO: 64.7 % (ref 42.7–76)
NRBC BLD AUTO-RTO: 1 /100 WBC (ref 0–0.2)
PLATELET # BLD AUTO: 341 10*3/MM3 (ref 140–450)
PMV BLD AUTO: 10.1 FL (ref 6–12)
POTASSIUM SERPL-SCNC: 4.2 MMOL/L (ref 3.5–5.2)
RBC # BLD AUTO: 2.44 10*6/MM3 (ref 3.77–5.28)
SODIUM SERPL-SCNC: 137 MMOL/L (ref 136–145)
TROPONIN T SERPL HS-MCNC: 155 NG/L
WBC NRBC COR # BLD: 20.86 10*3/MM3 (ref 3.4–10.8)

## 2023-08-01 PROCEDURE — 85025 COMPLETE CBC W/AUTO DIFF WBC: CPT

## 2023-08-01 PROCEDURE — 36415 COLL VENOUS BLD VENIPUNCTURE: CPT

## 2023-08-01 PROCEDURE — 84484 ASSAY OF TROPONIN QUANT: CPT | Performed by: STUDENT IN AN ORGANIZED HEALTH CARE EDUCATION/TRAINING PROGRAM

## 2023-08-01 PROCEDURE — 94799 UNLISTED PULMONARY SVC/PX: CPT

## 2023-08-01 PROCEDURE — 94761 N-INVAS EAR/PLS OXIMETRY MLT: CPT

## 2023-08-01 PROCEDURE — 83735 ASSAY OF MAGNESIUM: CPT | Performed by: STUDENT IN AN ORGANIZED HEALTH CARE EDUCATION/TRAINING PROGRAM

## 2023-08-01 PROCEDURE — 25010000002 CEFTRIAXONE PER 250 MG: Performed by: STUDENT IN AN ORGANIZED HEALTH CARE EDUCATION/TRAINING PROGRAM

## 2023-08-01 PROCEDURE — G0378 HOSPITAL OBSERVATION PER HR: HCPCS

## 2023-08-01 PROCEDURE — 80048 BASIC METABOLIC PNL TOTAL CA: CPT | Performed by: STUDENT IN AN ORGANIZED HEALTH CARE EDUCATION/TRAINING PROGRAM

## 2023-08-01 PROCEDURE — 99222 1ST HOSP IP/OBS MODERATE 55: CPT | Performed by: INTERNAL MEDICINE

## 2023-08-01 PROCEDURE — 25010000002 FUROSEMIDE PER 20 MG: Performed by: STUDENT IN AN ORGANIZED HEALTH CARE EDUCATION/TRAINING PROGRAM

## 2023-08-01 RX ORDER — ATORVASTATIN CALCIUM 40 MG/1
40 TABLET, FILM COATED ORAL NIGHTLY
Status: DISCONTINUED | OUTPATIENT
Start: 2023-08-01 | End: 2023-08-01 | Stop reason: HOSPADM

## 2023-08-01 RX ORDER — BISACODYL 5 MG/1
5 TABLET, DELAYED RELEASE ORAL DAILY PRN
Status: DISCONTINUED | OUTPATIENT
Start: 2023-08-01 | End: 2023-08-01 | Stop reason: HOSPADM

## 2023-08-01 RX ORDER — SODIUM CHLORIDE 0.9 % (FLUSH) 0.9 %
10 SYRINGE (ML) INJECTION EVERY 12 HOURS SCHEDULED
Status: DISCONTINUED | OUTPATIENT
Start: 2023-08-01 | End: 2023-08-01 | Stop reason: HOSPADM

## 2023-08-01 RX ORDER — SODIUM CHLORIDE 0.9 % (FLUSH) 0.9 %
10 SYRINGE (ML) INJECTION AS NEEDED
Status: DISCONTINUED | OUTPATIENT
Start: 2023-08-01 | End: 2023-08-01 | Stop reason: HOSPADM

## 2023-08-01 RX ORDER — SPIRONOLACTONE 25 MG/1
25 TABLET ORAL DAILY
Status: DISCONTINUED | OUTPATIENT
Start: 2023-08-01 | End: 2023-08-01 | Stop reason: HOSPADM

## 2023-08-01 RX ORDER — AMIODARONE HYDROCHLORIDE 200 MG/1
400 TABLET ORAL EVERY 12 HOURS SCHEDULED
Status: DISCONTINUED | OUTPATIENT
Start: 2023-08-01 | End: 2023-08-01 | Stop reason: HOSPADM

## 2023-08-01 RX ORDER — FUROSEMIDE 10 MG/ML
40 INJECTION INTRAMUSCULAR; INTRAVENOUS
Status: DISCONTINUED | OUTPATIENT
Start: 2023-08-01 | End: 2023-08-01 | Stop reason: HOSPADM

## 2023-08-01 RX ORDER — POLYETHYLENE GLYCOL 3350 17 G/17G
17 POWDER, FOR SOLUTION ORAL DAILY PRN
Status: DISCONTINUED | OUTPATIENT
Start: 2023-08-01 | End: 2023-08-01 | Stop reason: HOSPADM

## 2023-08-01 RX ORDER — CHOLECALCIFEROL (VITAMIN D3) 125 MCG
5 CAPSULE ORAL NIGHTLY PRN
Status: DISCONTINUED | OUTPATIENT
Start: 2023-08-01 | End: 2023-08-01 | Stop reason: HOSPADM

## 2023-08-01 RX ORDER — METOLAZONE 2.5 MG/1
2.5 TABLET ORAL ONCE
Status: DISCONTINUED | OUTPATIENT
Start: 2023-08-01 | End: 2023-08-01 | Stop reason: HOSPADM

## 2023-08-01 RX ORDER — SODIUM CHLORIDE 9 MG/ML
40 INJECTION, SOLUTION INTRAVENOUS AS NEEDED
Status: DISCONTINUED | OUTPATIENT
Start: 2023-08-01 | End: 2023-08-01 | Stop reason: HOSPADM

## 2023-08-01 RX ORDER — LEVOTHYROXINE SODIUM 0.07 MG/1
75 TABLET ORAL DAILY
Status: DISCONTINUED | OUTPATIENT
Start: 2023-08-01 | End: 2023-08-01 | Stop reason: HOSPADM

## 2023-08-01 RX ORDER — ALBUTEROL SULFATE 90 UG/1
2 AEROSOL, METERED RESPIRATORY (INHALATION) EVERY 4 HOURS PRN
Status: DISCONTINUED | OUTPATIENT
Start: 2023-08-01 | End: 2023-08-01 | Stop reason: HOSPADM

## 2023-08-01 RX ORDER — ACETAMINOPHEN 325 MG/1
650 TABLET ORAL EVERY 4 HOURS PRN
Status: DISCONTINUED | OUTPATIENT
Start: 2023-08-01 | End: 2023-08-01

## 2023-08-01 RX ORDER — MONTELUKAST SODIUM 10 MG/1
10 TABLET ORAL DAILY
Status: DISCONTINUED | OUTPATIENT
Start: 2023-08-01 | End: 2023-08-01 | Stop reason: HOSPADM

## 2023-08-01 RX ORDER — AMOXICILLIN 250 MG
2 CAPSULE ORAL 2 TIMES DAILY
Status: DISCONTINUED | OUTPATIENT
Start: 2023-08-01 | End: 2023-08-01 | Stop reason: HOSPADM

## 2023-08-01 RX ORDER — ACETAMINOPHEN 325 MG/1
650 TABLET ORAL EVERY 6 HOURS PRN
Status: DISCONTINUED | OUTPATIENT
Start: 2023-08-01 | End: 2023-08-01 | Stop reason: HOSPADM

## 2023-08-01 RX ORDER — BUPROPION HYDROCHLORIDE 150 MG/1
300 TABLET ORAL DAILY
Status: DISCONTINUED | OUTPATIENT
Start: 2023-08-01 | End: 2023-08-01 | Stop reason: HOSPADM

## 2023-08-01 RX ORDER — BISACODYL 10 MG
10 SUPPOSITORY, RECTAL RECTAL DAILY PRN
Status: DISCONTINUED | OUTPATIENT
Start: 2023-08-01 | End: 2023-08-01 | Stop reason: HOSPADM

## 2023-08-01 RX ORDER — ASPIRIN 81 MG/1
81 TABLET ORAL DAILY
Status: DISCONTINUED | OUTPATIENT
Start: 2023-08-01 | End: 2023-08-01 | Stop reason: HOSPADM

## 2023-08-01 RX ORDER — METOPROLOL SUCCINATE 25 MG/1
12.5 TABLET, EXTENDED RELEASE ORAL EVERY 12 HOURS SCHEDULED
Status: DISCONTINUED | OUTPATIENT
Start: 2023-08-01 | End: 2023-08-01 | Stop reason: HOSPADM

## 2023-08-01 RX ORDER — NITROGLYCERIN 0.4 MG/1
0.4 TABLET SUBLINGUAL
Status: DISCONTINUED | OUTPATIENT
Start: 2023-08-01 | End: 2023-08-01 | Stop reason: HOSPADM

## 2023-08-01 RX ORDER — ALUMINA, MAGNESIA, AND SIMETHICONE 2400; 2400; 240 MG/30ML; MG/30ML; MG/30ML
15 SUSPENSION ORAL EVERY 6 HOURS PRN
Status: DISCONTINUED | OUTPATIENT
Start: 2023-08-01 | End: 2023-08-01 | Stop reason: HOSPADM

## 2023-08-01 RX ADMIN — MONTELUKAST 10 MG: 10 TABLET, FILM COATED ORAL at 08:58

## 2023-08-01 RX ADMIN — DOCUSATE SODIUM AND SENNOSIDES 2 TABLET: 50; 8.6 TABLET, FILM COATED ORAL at 20:36

## 2023-08-01 RX ADMIN — Medication 10 ML: at 20:35

## 2023-08-01 RX ADMIN — FUROSEMIDE 40 MG: 10 INJECTION, SOLUTION INTRAMUSCULAR; INTRAVENOUS at 08:57

## 2023-08-01 RX ADMIN — METOPROLOL SUCCINATE 12.5 MG: 25 TABLET, EXTENDED RELEASE ORAL at 08:59

## 2023-08-01 RX ADMIN — AMIODARONE HYDROCHLORIDE 400 MG: 200 TABLET ORAL at 20:34

## 2023-08-01 RX ADMIN — APIXABAN 5 MG: 5 TABLET, FILM COATED ORAL at 20:35

## 2023-08-01 RX ADMIN — MUPIROCIN 1 APPLICATION: 20 OINTMENT TOPICAL at 21:09

## 2023-08-01 RX ADMIN — ASPIRIN 81 MG: 81 TABLET, COATED ORAL at 08:58

## 2023-08-01 RX ADMIN — Medication 10 ML: at 09:01

## 2023-08-01 RX ADMIN — ATORVASTATIN CALCIUM 40 MG: 40 TABLET, FILM COATED ORAL at 00:34

## 2023-08-01 RX ADMIN — ACETAMINOPHEN 650 MG: 325 TABLET ORAL at 00:35

## 2023-08-01 RX ADMIN — Medication 10 ML: at 00:35

## 2023-08-01 RX ADMIN — APIXABAN 5 MG: 5 TABLET, FILM COATED ORAL at 08:58

## 2023-08-01 RX ADMIN — METOPROLOL SUCCINATE 12.5 MG: 25 TABLET, EXTENDED RELEASE ORAL at 00:35

## 2023-08-01 RX ADMIN — BUPROPION HYDROCHLORIDE 300 MG: 150 TABLET, EXTENDED RELEASE ORAL at 08:58

## 2023-08-01 RX ADMIN — CEFTRIAXONE SODIUM 2000 MG: 2 INJECTION, POWDER, FOR SOLUTION INTRAMUSCULAR; INTRAVENOUS at 18:43

## 2023-08-01 RX ADMIN — SPIRONOLACTONE 25 MG: 25 TABLET ORAL at 08:57

## 2023-08-01 RX ADMIN — METOPROLOL SUCCINATE 12.5 MG: 25 TABLET, EXTENDED RELEASE ORAL at 20:35

## 2023-08-01 RX ADMIN — LEVOTHYROXINE SODIUM 75 MCG: 75 TABLET ORAL at 08:59

## 2023-08-01 RX ADMIN — ATORVASTATIN CALCIUM 40 MG: 40 TABLET, FILM COATED ORAL at 20:34

## 2023-08-01 RX ADMIN — Medication 10 ML: at 08:59

## 2023-08-01 RX ADMIN — EMPAGLIFLOZIN 10 MG: 10 TABLET, FILM COATED ORAL at 08:57

## 2023-08-01 RX ADMIN — FUROSEMIDE 40 MG: 10 INJECTION, SOLUTION INTRAMUSCULAR; INTRAVENOUS at 18:38

## 2023-08-01 NOTE — PLAN OF CARE
Goal Outcome Evaluation:     Patient was admitted to the floor from ed, accompanied by her daughter, on 2 liters of oxygen,patient is bradycardic heart rate has been 52-60 bpm. Bp within normal range. Asymptomatic,Pa made aware, no new orders, patient has no urine output since admission, patient didn't drink a lot too, bladder scan was done, it shows 0 urine output, no acute changes in condition, awaiting bed in [facility], to continue plan of care.

## 2023-08-01 NOTE — PLAN OF CARE
Goal Outcome Evaluation:      Patient transferring to [facility] for continuity of care.

## 2023-08-01 NOTE — CONSULTS
Cardiology Consult Note  [facility] 4TH FLOOR MEDICAL TELEMETRY UNIT          Patient Identification:  Ann-Marie Hughes      [id number]  77 y.o.        female  1945           Reason for Consultation: CHF    PCP: Rochelle Brown APRN    History of Present Illness:     Patient is a 77-year-old female with a history of congestive heart failure systolic EF of 40%, CAD with recent CABG, paroxysmal atrial fibrillation status post recent Maze procedure, hypertension, dyslipidemia who presented with increasing shortness of breath and fatigue issues over the last weekend.  She denies any anginal chest discomfort issues.  She denies any anginal chest discomfort issues.  She also notes some increased lower extremity edema and a pulse ox at home noted on 80%.    Past History:  Past Medical History:   Diagnosis Date    Acne rosacea 08/17/2021    DR.JEFF MOON     Arteriosclerosis of abdominal aorta     B12 deficiency 08/17/2021    DJD (degenerative joint disease)     Hx of smoking 08/17/2021    QUIT IN 1976 AT AGE 31    Hyperlipidemia 08/17/2021    Hypertension     Hypothyroidism     Metabolic syndrome 08/17/2021    KAREN (stress urinary incontinence, female) 08/17/2021    UTI (urinary tract infection) 08/17/2021    Vitamin D deficiency 08/17/2021    Vitamin D deficiency      Past Surgical History:   Procedure Laterality Date    BACK SURGERY  2013    CARDIAC CATHETERIZATION N/A 07/17/2023    Procedure: Left Heart Cath;  Surgeon: Dinh Andres MD;  Location: [facility] INVASIVE LOCATION;  Service: Cardiovascular;  Laterality: N/A;    COLONOSCOPY  2011    CORONARY ARTERY BYPASS GRAFT WITH MITRAL VALVE REPAIR/REPLACEMENT N/A 07/20/2023    Procedure: REZA STERNOTOMY OFF-PUMP CORONARY ARTERY BYPASS GRAFT TIMES        USING LEFFT INTERNAL MAMMARY ARTERY AND     GREATER SAPHENOUS VEIN GRAFT PER EMDOSCOPIC VEIN HARVESTING, MAZE PROCEDURE AND PRP;  Surgeon: Shane Alexander MD;  [facility];   Service: Cardiothoracic;  Laterality: N/A;     Allergies   Allergen Reactions    Penicillins Palpitations     1. When was your reaction? > 10 years ago  2. Did your reaction happen after the first dose or after several doses? After first dose  3. Did your reaction require ED or hospital care to manage your reaction? Do not know  4. Did your reaction require treatment with epinephrine? Do not know  5. Have you taken amoxicillin (Amoxil) or amoxicillin-clavulanate (Augmentin) without issue since? Yes  6. Have you taken cephalexin (Keflex) without issue since?  Do not know      Social History     Socioeconomic History    Marital status:    Tobacco Use    Smoking status: Former     Packs/day: 1.00     Years: 12.00     Pack years: 12.00     Types: Cigarettes     Quit date:      Years since quittin.6    Smokeless tobacco: Never   Vaping Use    Vaping Use: Never used   Substance and Sexual Activity    Alcohol use: Never    Drug use: Never    Sexual activity: Defer     History reviewed. No pertinent family history.    Medications:  Prior to Admission medications    Medication Sig Start Date End Date Taking? Authorizing Provider   acetaminophen (TYLENOL) 325 MG tablet Take 2 tablets by mouth Every 4 (Four) Hours As Needed for Mild Pain. 23  Yes Anjelica Chase APRN   albuterol sulfate  (90 Base) MCG/ACT inhaler Inhale 2 puffs Every 4 (Four) Hours As Needed for Wheezing. 23  Yes Les Moreno DO   apixaban (ELIQUIS) 5 MG tablet tablet Take 1 tablet by mouth Every 12 (Twelve) Hours. Indications: Atrial Fibrillation 23  Yes Anjelica Chase APRN   aspirin 81 MG EC tablet Take 1 tablet by mouth Daily.   Yes Provider, MD Kanwal   atorvastatin (LIPITOR) 40 MG tablet Take 1 tablet by mouth Every Night for 90 days. 7/28/23 10/26/23 Yes Anjelica Chase APRN   buPROPion XL (WELLBUTRIN XL) 300 MG 24 hr tablet TAKE 1 TABLET BY MOUTH DAILY 23  Yes Rochelle Brown APRN    dapagliflozin Propanediol (Farxiga) 10 MG tablet Take 10 mg by mouth Daily. 7/12/23  Yes Valerie Valera MD   doxycycline (MONODOX) 100 MG capsule Take 1 capsule by mouth Every 12 (Twelve) Hours for 11 doses. Indications: Infection of the Skin and/or Soft Tissue 7/28/23 8/3/23 Yes Anjelica Chase APRN   furosemide (Lasix) 40 MG tablet Take 1 tablet by mouth Daily. 7/14/23  Yes Rochelle Brown APRN   metFORMIN ER (GLUCOPHAGE-XR) 500 MG 24 hr tablet TAKE 1 TABLET BY MOUTH EVERY MORNING AND 2 TABLETS AT SUPPER  Patient taking differently: 1 tablet Daily With Breakfast. 1/19/23  Yes Roseanna Hernandez MD   metFORMIN ER (GLUCOPHAGE-XR) 500 MG 24 hr tablet Take 2 tablets by mouth Daily With Dinner.   Yes Kanwal Orozco MD   metoprolol succinate XL (TOPROL-XL) 25 MG 24 hr tablet Take 0.5 tablets by mouth Every 12 (Twelve) Hours for 90 days. 7/28/23 10/26/23 Yes Anjelica Chase APRN   montelukast (SINGULAIR) 10 MG tablet Take 1 tablet by mouth Daily. 7/5/23  Yes Rochelle Brown APRN   Probiotic Product (PROBIOTIC PO) Take 1 capsule by mouth Daily.   Yes Kanwal Orozco MD   spironolactone (ALDACTONE) 25 MG tablet Take 1 tablet by mouth Daily. 6/28/23  Yes Valerie Valera MD   Synthroid 75 MCG tablet Take 1 tablet by mouth Daily. 7/10/23  Yes Rochelle Brown APRN      Current medications:  apixaban, 5 mg, Oral, Q12H  aspirin, 81 mg, Oral, Daily  atorvastatin, 40 mg, Oral, Nightly  buPROPion XL, 300 mg, Oral, Daily  cefTRIAXone, 2,000 mg, Intravenous, Q24H  empagliflozin, 10 mg, Oral, Daily  furosemide, 40 mg, Intravenous, BID  levothyroxine, 75 mcg, Oral, Daily  metoprolol succinate XL, 12.5 mg, Oral, Q12H  montelukast, 10 mg, Oral, Daily  senna-docusate sodium, 2 tablet, Oral, BID  sodium chloride, 10 mL, Intravenous, Q12H  sodium chloride, 10 mL, Intravenous, Q12H  spironolactone, 25 mg, Oral, Daily      Current IV drips:       Review of Systems   Constitutional: Negative for chills,  "fever and weight loss.   HENT:  Negative for congestion and nosebleeds.    Cardiovascular:  Positive for leg swelling. Negative for orthopnea and paroxysmal nocturnal dyspnea.   Respiratory:  Positive for shortness of breath. Negative for cough.    Endocrine: Negative for cold intolerance and heat intolerance.   Skin:  Negative for rash.   Musculoskeletal:  Negative for back pain and muscle weakness.   Gastrointestinal:  Negative for abdominal pain, nausea and vomiting.   Genitourinary:  Negative for dysuria and nocturia.   Neurological:  Negative for dizziness and light-headedness.   Psychiatric/Behavioral:  Negative for altered mental status and hallucinations.        Physical Exam    /60 (BP Location: Right arm, Patient Position: Lying) Comment: nurse notified  Pulse 71   Temp 98.2 øF (36.8 øC) (Oral)   Resp 18   Ht 162.6 cm (64\")   Wt 93.4 kg (205 lb 14.6 oz)   LMP  (LMP Unknown)   SpO2 99%   BMI 35.34 kg/mý  Body mass index is 35.34 kg/mý.   Oxygen saturation   @FLOWAN(10::1)@ SpO2  Min: 94 %  Max: 100 %    General Appearance:   no acute distress  Alert and oriented x3  HENT:   lips not cyanotic  Atraumatic  Neck:  thyroid not enlarged  supple  Respiratory:  no respiratory distress  normal breath sounds  Bilateral basal crackles  Cardiovascular:  no jugular venous distention  regular rhythm  apical impulse normal  S1 normal, S2 normal  no S3, no S4   no murmur  no rub, no thrill  no carotid bruit  pedal pulses normal  lower extremity edema: +1  Gastrointestinal:   bowel sounds normal  non-tender  no hepatomegaly, no splenomegaly  Musculoskeletal:  no clubbing of fingers.   normocephalic, head atraumatic  Skin:   warm, dry  No rashes  Neuro/Psychiatric:  normal mood and affect  judgement and insight appropriate      Cardiographics:     ECG  (personally reviewed)    Telemetry:  (personally reviewed) normal sinus rhythm and paroxysmal atrial fibrillation   Results for orders placed in visit on " 07/20/23    Diagnostic IntraOp Dev    Narrative  Diagnostic IntraOp Dev    Procedure Performed: Diagnostic IntraOp Dev  Start Time:  End Time:    Preanesthesia Checklist:  Patient identified, IV assessed, risks and benefits discussed, monitors and equipment assessed, procedure being performed at surgeon's request and anesthesia consent obtained.    General Procedure Information  Diagnostic Indications for Echo:  assessment of ascending aorta, assessment of surgical repair, defect repair evaluation and hemodynamic monitoring  Physician Requesting Echo: Shane Alexander MD  Location performed:  OR  Intubated  Bite block placed  Heart visualized  Probe Insertion:  Easy  Probe Type:  Multiplane  Modalities:  2D only, color flow mapping, continuous wave Doppler and pulse wave Doppler    Echocardiographic and Doppler Measurements    Ventricles    Right Ventricle:  Cavity size normal.  Global function mildly impaired.  Left Ventricle:  Cavity size dilated.  Global Function moderately impaired.  Ejection Fraction 40%.    Ventricular Regional Function:  7- Mid Anteroseptal:  hypokinetic  11- Mid Inferior:  hypokinetic  12- Mid Inferoseptal:  hypokinetic      Valves    Aortic Valve:  Annulus normal.  Stenosis mild.  Area: 1.3 cmý.  Mean Gradient: 15 mmHg.  Regurgitation trace.  Leaflets thickened.  Leaflet motions restricted.  Specific leaflet segments with abnormal motions are described in the following comments:  NCC, LCC    Mitral Valve:  Annulus dilated.  Regurgitation mild.  Leaflets thickened.  Leaflet motions restricted.  Specific leaflet segments with abnormal motions are described in the following comments:  P    Tricuspid Valve:  Annulus normal.  Regurgitation mild.  Pulmonic Valve:  Annulus normal.  Regurgitation trace.      Aorta    Ascending Aorta:  Size normal.  Aortic Arch:  Size normal.  Descending Aorta:  Size normal.      Atria    Right Atrium:  Size normal.  Left Atrium:  Size normal.  Left atrial  appendage normal.          Diastolic Function Measurements:  Diastolic Dysfunction Grade= indeterminate  E=  ms  A=  ms  E/A Ratio=  DT=  ms  S/D=  IVRT=    Other Findings  Pericardium:  normal  Pleural Effusion:  none  Pulmonary Arteries:  normal  Pulmonary Venous Flow:  blunted (decreased) systolic flow    Anesthesia Information  Performed Personally  Anesthesiologist:  Almas Charles MD      Echocardiogram Comments:  77 year old female with multivessel CAD and ischemic MR presents for CABG.  - Dilated LV with moderately reduced LV function (LVEF 35-40%; hypokinetic inferior and septal walls)  - Non dilated RV, mildly reduced RV function  - Mild-mod MR: functional r/t dilated MV annulus, central jet, VC 0.3 cm, mildly blunted RUPV  - Mild AS d/t partially fused NCC and LCC; mean gradient 15 mmHg, JAZMYNE 1.3 cm2 via doppler  - Mild TR, trace PI  - LANE without thrombus  - No intracardiac shunt  - No pericardial/pleural effusion  - No aortic dissection    S/p OpCABG x3:  - Hypovolemic LV, low-normal LV function it appears  - Non dilated RV  - Mild MR, mild AS -- unchanged  - LANE appears to be clipped  - No pericardial/pleural effusion  - No aortic dissection    Findings discussed with surgical team     Results for orders placed during the hospital encounter of 07/10/23    Stress Test With Myocardial Perfusion One Day    Interpretation Summary    Diaphragmatic attenuation artifact is present.    Myocardial perfusion imaging indicates a large-sized, severe area of ischemia located in the inferior wall and lateral wall.    Abnormal LV wall motion consistent with severe hypokinesis of the lateral and inferior wall.    Left ventricular ejection fraction is moderately reduced (Calculated EF = 37%).    Impressions are consistent with a high risk study.      Results for orders placed during the hospital encounter of 07/16/23    Cardiac Catheterization/Vascular Study    Narrative  [facility]  CARDIAC  CATHETERIZATION PROCEDURE REPORT    Patient: Ann-Marie Hughes  : 1945  MRN: [id number]    Procedure Date:  23    Referring Physician:  Zuleyka Brand MD    Interventional Cardiologist:  Dinh Andres MD    Indication:  Angina pectoris, other  Cardiomyopathy with LV ejection fraction of 30 to 35%  Positive stress test showing large inferolateral wall ischemia    Clinical Presentation:  Ms. Hughes was recently diagnosed with cardiomyopathy and congestive heart failure with LV ejection fraction 30 to 35%.  She had a SPECT stress test done recently which showed large inferolateral wall ischemia.  Overnight, she came because of chest pain.  She was noted to have mildly elevated troponin.  Today, she was brought to the Cath Lab to identify ischemic culprits.    Procedure performed:  Left heart catheterization  Selective coronary angiography      Access Sites:  Right radial artery    Findings:  1. Coronary Artery Anatomy:  Dominance: Right  Left Main: Long segment which is free of any stenosis.  Left Anterior Descending Artery: Medium caliber vessel giving rise to various diagonal and septal  branches.  Proximal LAD is free of any stenosis.  Mid LAD has a focal lesion which is angiographically 70% severity.  Some calcifications noted in this lesion.  Further down LAD has nonobstructive lesion which is angiographically 40% severity with calcification.  First large diagonal branch has a 50% stenosis in its ostium.  Distal and apical LAD are free of any stenosis.  Left Circumflex Artery: Nondominant vessel giving rise to 1 large OM branch.  Proximal left circumflex artery has luminal irregularities with calcification.  Mid left circumflex was artery has a 95% focal lesion which compensates blood supply to the terminal OM branch.  Right Coronary Artery: Large dominant vessel giving rise to right PDA and PLV branches.  Proximal RCA has a focal lesion which is angiographically 80% severity.  Mid RCA  is subtotally occluded with a lesion angiographically 99% severity with heavy calcification.  ANEUDY I flow is noted in distal RCA, PDA and PLV branches.  Some branches of the PDA were noted to be filled by left-to-right collaterals.    2. Hemodynamics:  The opening aortic pressure is 128/61 with a mean of 85 mmHg.  The left ventricular end-diastolic pressure is 7 mmHg.  There was no gradient across aortic valve on pullback  The closing aortic pressure is 143/54 with a mean of 89 mmHg    3. Left Ventriculogram: Not performed due to chronic kidney disease      Conclusions:  Multivessel coronary artery disease including subtotal occlusion of mid right coronary artery, 95% stenosis of the mid left circumflex artery and 70% lesion involving mid LAD artery  Ischemic cardiomyopathy LV ejection fraction of 30 to 35% per echocardiogram with normal LVEDP.    Recommendations:  We will discuss angiograms with cardiothoracic surgeons to consider coronary artery bypass grafting due to multivessel coronary artery disease, decreased LV systolic function and diabetes mellitus      Procedure Details:  Informed consent was obtained with an explanation of the risks, benefits and alternatives of the procedure. The patient was brought to the Cardiac Catheterization Laboratory and was prepped and draped in a standard sterile fashion. Moderate sedation with Fentanyl and Versed was administered by the circulating nurse. Lidocaine 2% was used to anesthetize the right radial artery and a 5/6 Slender sheath was placed.  A 5 F TGR catheter was then advanced over a 0.035 guidewire into the ascending aorta.  This catheter was used to engage the right and left coronary arteries with diagnostic angiography obtained in all appropriate projections by injection of non-ionic contrast.  The same catheter was advanced over a wire into the left ventricle.  Left ventricular hemodynamics were documented.  Left ventriculography was not performed due to  chronic kidney disease, to conserve the amount of contrast dye used.  Gradient across aortic valve as documented by pullback technique.  The catheter was then removed over a guidewire. The radial artery sheath was removed without difficulty and TR Band was placed over the access site with excellent hemostasis.    Patient tolerated the procedure well without any complications, and transferred to the post procedure area for recovery in a stable condition.    Cumulative fluoroscopy time: 4 min    Cumulative air kerma: 695 mGy    Total amount of contrast used: 50 ml of Isovue      Complications:  None.    Estimated Blood Loss:  Minimal.    Dinh Andres MD    07/17/23  11:54 EDT      Cardiolite (Tc-99m Sestamibi) stress test   Lab Review:       CBC          7/27/2023    03:05 7/31/2023    12:03 8/1/2023    06:05   CBC   WBC 13.64  25.50  20.86    RBC 3.17  2.67  2.44    Hemoglobin 9.2  8.2  7.5    Hematocrit 28.3  24.9  23.4    MCV 89.3  93.3  95.9    MCH 29.0  30.7  30.7    MCHC 32.5  32.9  32.1    RDW 16.0  19.6  20.1    Platelets 306  376  341        CMP          7/28/2023    02:57 7/31/2023    12:03 7/31/2023    14:13 8/1/2023    06:05   CMP   Glucose 126  151  140  119    BUN 34  58  56  60    Creatinine 1.08  1.20  1.20  1.33    EGFR 52.7  46.4  46.4  41.0    Sodium 137  134  134  137    Potassium 4.2  4.4  4.2  4.2    Chloride 105  100  100  101    Calcium 8.3  8.7  8.7  8.5    Total Protein  6.1  5.9     Albumin 3.1  3.6  3.5     Globulin  2.5  2.4     Total Bilirubin  2.1  1.9     Alkaline Phosphatase  52  51     AST (SGOT)  18  19     ALT (SGPT)  12  12     Albumin/Globulin Ratio  1.4  1.5     BUN/Creatinine Ratio 31.5  48.3  46.7  45.1    Anion Gap 12.0  14.6  13.2  12.0         CARDIAC LABS:      Lab 08/01/23  0605 07/31/23  1413 07/31/23  1203   PROBNP  --   --  27,235.0*   HSTROP T 155* 143* 142*      No results found for: DIGOXIN   Lab Results   Component Value Date    TSH 1.130 06/15/2023            Invalid input(s): LDLCALC  No results found for: POCTROP  No components found for: DDIMERQUAN  Lab Results   Component Value Date    MG 1.9 08/01/2023             CARDIAC LABS:      Lab 08/01/23  0605 07/31/23  1413 07/31/23  1203   PROBNP  --   --  27,235.0*   HSTROP T 155* 143* 142*        Imaging:  CXR   MPRESSION:                 1. Interval postsurgical changes from cardiac surgery  2. Small to moderate left-sided pleural effusion and dependent opacity at the left base is new from   the comparison.  Mild dependent opacity at the right base favored represent atelectasis or mild   degree of edema.  3. No pneumothorax identified.  4. Mild pulmonary vascular congestion accentuated by technique  Assessment:    CAD status post CABG x3 vessels    PAF (paroxysmal atrial fibrillation)    Acute on chronic HFrEF (heart failure with reduced ejection fraction)    Acute respiratory failure with hypoxia      Acute on chronic systolic congestive heart failure with evidence of decompensation agree with IV diuresis starting of Lasix 40 twice daily with uptitrating if not get out more than 1.5 L/day.  We will give a one-time dose of Zaroxolyn today as well to help promote diuresis.  Strict I's and O's we will attempt to maintain normal sinus rhythm with amiodarone therapy as this may be a contributing factor to her decompensation.  We will repeat echocardiogram to make sure no evidence of any chemical complications after surgery    Elevated troponins mild relatively flat in nature likely secondary to recent surgery as well as chronic renal failure and CHF issues feel any evidence of ACS type picture      Plan:  1.  IV Lasix 40 twice daily  2.  Zaroxolyn 2.5x1 today  3.  Strict I's and O's  4.  Echocardiogram  5.  Amiodarone 400 twice daily      Thank you for allowing us to share in [facility].            Roderick Brand MD   8/1/2023    13:13 EDT

## 2023-08-01 NOTE — H&P
[facility]   HOSPITALIST HISTORY AND PHYSICAL  Date: 2023   Patient Name: Ann-Marie Hughes  : 1945  MRN: [id number]  Primary Care Physician:  Rochelle Brown APRN  Date of admission: 2023    Subjective   Subjective     Chief Complaint: Shortness of breath    HPI:    Ann-Marie Hughes is a 77 y.o. female past medical history of CAD status post CABG 9 days ago,, hypertension, paroxysmal A-fib on amiodarone/Eliquis, type 2 diabetes, metabolic syndrome, presents to the ER due to worsening shortness of breath and weakness.  Patient was recently hospitalized at [facility] for 8 or 9 days after having triple bypass surgery.  She states at that time she did have worsening functional status while in the hospital with some possible congestive heart failure requiring IV diuresis.  She was ultimately discharged home but over the last 2 days since she has been home she has had progressive worsening lower extremity edema as well as exertional dyspnea that prompted her to eventually come into the ER for evaluation.  Upon arrival here she was hypoxic on room air to the high 80s and required initiation of 2 L nasal cannula.  She was afebrile and denies having any productive cough at home.  Lab work-up was revealing of a significant elevation in her proBNP to 27,000 Which is up trended from her most recent one 13 days ago.  Troponin also noted to be elevated but stable at 142 followed by 143 2 hours later and this is only mildly elevated compared to troponin from 2 weeks ago.  Patient also noted to have edema pattern on chest x-ray concerning for congestive heart failure.  She was given a dose of Rocephin in the ER and also Lasix for diuresis.  Patient subsequently improved however request was made to transfer patient to care of her CT surgeon at [facility].  Dr. Alexander has graciously accepted to see the patient at their facility however they do not have bed availability and thus  her hospitalist service was contacted for admission until they are able to find a bed.  At the time my exam patient is resting comfortably and has already urinated significant amount.  She states she feels much improved.  Denies having any overt chest pain or pressure over the last few days.  Denies having any pleurisy.      Personal History     Past Medical History:  Past Medical History:   Diagnosis Date    Acne rosacea 08/17/2021    DR.JEFF MOON     Arteriosclerosis of abdominal aorta     B12 deficiency 08/17/2021    DJD (degenerative joint disease)     Hx of smoking 08/17/2021    QUIT IN 1976 AT AGE 31    Hyperlipidemia 08/17/2021    Hypertension     Hypothyroidism     Metabolic syndrome 08/17/2021    KAREN (stress urinary incontinence, female) 08/17/2021    UTI (urinary tract infection) 08/17/2021    Vitamin D deficiency 08/17/2021    Vitamin D deficiency          Past Surgical History:  Past Surgical History:   Procedure Laterality Date    BACK SURGERY  2013    CARDIAC CATHETERIZATION N/A 07/17/2023    Procedure: Left Heart Cath;  Surgeon: Dinh Andres MD;  Location: [facility] CATH INVASIVE LOCATION;  Service: Cardiovascular;  Laterality: N/A;    COLONOSCOPY  2011    CORONARY ARTERY BYPASS GRAFT WITH MITRAL VALVE REPAIR/REPLACEMENT N/A 07/20/2023    Procedure: REZA STERNOTOMY OFF-PUMP CORONARY ARTERY BYPASS GRAFT TIMES        USING LEFFT INTERNAL MAMMARY ARTERY AND     GREATER SAPHENOUS VEIN GRAFT PER EMDOSCOPIC VEIN HARVESTING, MAZE PROCEDURE AND PRP;  Surgeon: Shane Alexander MD;  [facility];  Service: Cardiothoracic;  Laterality: N/A;         Family History:   Reviewed and noncontributory except as mentioned in HPI    Social History:   Social Determinants of Health     Tobacco Use: Medium Risk    Smoking Tobacco Use: Former    Smokeless Tobacco Use: Never    Passive Exposure: Not on file   Alcohol Use: Not At Risk    Frequency of Alcohol Consumption: Never    Average Number of  Drinks: Patient does not drink    Frequency of Binge Drinking: Never   Financial Resource Strain: Not on file   Food Insecurity: No Food Insecurity    Worried About Running Out of Food in the Last Year: Never true    Ran Out of Food in the Last Year: Never true   Transportation Needs: Not on file   Physical Activity: Not on file   Stress: Not on file   Social Connections: Not on file   Intimate Partner Violence: Not on file   Depression: Not at risk    PHQ-2 Score: 0   Housing Stability: Not on file         Home Medications:  Probiotic Product, acetaminophen, albuterol sulfate HFA, apixaban, aspirin, atorvastatin, buPROPion XL, dapagliflozin Propanediol, doxycycline, furosemide, levothyroxine, metFORMIN ER, metoprolol succinate XL, montelukast, and spironolactone    Allergies:  Allergies   Allergen Reactions    Penicillins Palpitations     1. When was your reaction? > 10 years ago  2. Did your reaction happen after the first dose or after several doses? After first dose  3. Did your reaction require ED or hospital care to manage your reaction? Do not know  4. Did your reaction require treatment with epinephrine? Do not know  5. Have you taken amoxicillin (Amoxil) or amoxicillin-clavulanate (Augmentin) without issue since? Yes  6. Have you taken cephalexin (Keflex) without issue since?  Do not know        Review of Systems   All systems were reviewed and negative except for: Shortness of breath, lower extremity edema    Objective   Objective     Vitals:   Temp:  [98.3 øF (36.8 øC)] 98.3 øF (36.8 øC)  Heart Rate:  [61-71] 61  Resp:  [21-36] 21  BP: (161-169)/(63-68) 161/63  Flow (L/min):  [2-4] 2    Physical Exam    Constitutional: Awake, alert, no acute distress   Eyes: Pupils equal, sclerae anicteric, no conjunctival injection   HENT: NCAT, mucous membranes moist   Neck: Supple, no thyromegaly, no lymphadenopathy, trachea midline   Respiratory: Decreased breath sounds at the bases bilaterally, nonlabored  respirations    Cardiovascular: RRR, no murmurs, rubs, or gallops, palpable pedal pulses bilaterally   Gastrointestinal: Positive bowel sounds, soft, nontender, nondistended   Musculoskeletal: 2+ pitting edema bilaterally no clubbing or cyanosis to extremities   Psychiatric: Appropriate affect, cooperative   Neurologic: Oriented x 3, strength symmetric in all extremities, Cranial Nerves grossly intact to confrontation, speech clear   Skin: No rashes     Result Review    Result Review:  I have personally reviewed the results from the time of this admission to 7/31/2023 21:10 EDT and agree with these findings:  [x]  Laboratory  [x]  Microbiology  [x]  Radiology  [x]  EKG/Telemetry   [x]  Cardiology/Vascular   []  Pathology  [x]  Old records  []  Other:      Assessment & Plan   Assessment / Plan     Assessment/Plan:   Acute on chronic decompensated heart failure with reduced ejection fraction  Acute hypoxic respiratory failure secondary to above  Elevated troponin likely due to type II MI due to demand ischemia from above  Paroxysmal atrial fibrillation on amiodarone/Eliquis  Type 2 diabetes  Hypertension  Hypothyroidism  Hyperlipidemia  Moderate mitral regurgitation      Plan  - Patient is already been accepted at [facility] under the care of her CT surgeon (Dr. Alexander) however they do not have bed availability, will temporarily admit to our facility until bed is available  -In the interim we will continue diuresis with 40 mg Lasix twice daily, strict intake and output, sodium and fluid restriction, daily weights  - Trend 1 additional troponin, EKG was personally reviewed Showed sinus rhythm with PACs and nonspecific ST-T wave changes in the lateral leads.  No ischemic pattern seen and do not feel her troponin elevation is of an ischemic origin and more likely demand due to the current decompensated heart failure.  If it continues to significantly uptrend can consider cardiac evaluation however  ischemia in the setting of a recent CABG is highly unlikely  - Continue home amiodarone and Eliquis for paroxysmal atrial fibrillation  - Continue insulin sliding scale and Accu-Cheks for type 2 diabetes  - Continue home antihypertensives for hypertension once we have confirmed dosing  - Continue Synthroid  - Continue statin  - Monitor volume status closely, do not feel there is any benefit in repeating echocardiogram at this time-most recent echo from end of June noted and shows EF of 36% which was suspected to be ischemic origin which she has just recently fixed      Discussed with ER Physician and Nurse    All labs/imaging studies were personally reviewed and findings are as noted above      DVT Prophylaxis: Eliquis    CODE STATUS:    Code Status (Patient has no pulse and is not breathing): CPR (Attempt to Resuscitate)  Medical Interventions (Patient has pulse or is breathing): Full Support  Release to patient: Routine Release      Admission Status:  I believe this patient meets inpatient status.    Electronically signed by Lamonte Barragan MD, 07/31/23, 9:10 PM EDT.

## 2023-08-01 NOTE — ED NOTES
Patient resting comfortably with family at bedside. With approval from physician, Dr. Stockton, gave Pt turkey sandwich, chips, and water. Call light in reach, side rails up for safety

## 2023-08-01 NOTE — SIGNIFICANT NOTE
Wound Eval / Progress Noted     Teresa     Patient Name: Ann-Marie Hughes  : 1945  MRN: [id number]  Today's Date: 2023                 Admit Date: 2023    Visit Dx:    ICD-10-CM ICD-9-CM   1. Acute pulmonary edema  J81.0 518.4   2. Pleural effusion  J90 511.9   3. Hypoxia  R09.02 799.02   4. Leukocytosis, unspecified type  D72.829 288.60         Acute on chronic HFrEF (heart failure with reduced ejection fraction)    Acute respiratory failure with hypoxia    CAD status post CABG x3 vessels    PAF (paroxysmal atrial fibrillation)        Past Medical History:   Diagnosis Date    Acne rosacea 2021    DR.JEFF MOON     Arteriosclerosis of abdominal aorta     B12 deficiency 2021    DJD (degenerative joint disease)     Hx of smoking 2021    QUIT IN  AT AGE 31    Hyperlipidemia 2021    Hypertension     Hypothyroidism     Metabolic syndrome 2021    KAREN (stress urinary incontinence, female) 2021    UTI (urinary tract infection) 2021    Vitamin D deficiency 2021    Vitamin D deficiency         Past Surgical History:   Procedure Laterality Date    BACK SURGERY      CARDIAC CATHETERIZATION N/A 2023    Procedure: Left Heart Cath;  Surgeon: Dinh Andres MD;  Location: [facility] CATH INVASIVE LOCATION;  Service: Cardiovascular;  Laterality: N/A;    COLONOSCOPY      CORONARY ARTERY BYPASS GRAFT WITH MITRAL VALVE REPAIR/REPLACEMENT N/A 2023    Procedure: REZA STERNOTOMY OFF-PUMP CORONARY ARTERY BYPASS GRAFT TIMES        USING LEFFT INTERNAL MAMMARY ARTERY AND     GREATER SAPHENOUS VEIN GRAFT PER EMDOSCOPIC VEIN HARVESTING, MAZE PROCEDURE AND PRP;  Surgeon: Shane Alexander MD;  Location: [facility];  Service: Cardiothoracic;  Laterality: N/A;         Physical Assessment:  Wound 23 1336 midline sternal Incision (Active)   Wound Image   23 0454   Dressing Appearance dry;intact 23 1200   Closure None  08/01/23 1200   Base red;scab;yellow 08/01/23 1200   Periwound intact;dry;pink 08/01/23 1035   Periwound Temperature warm 08/01/23 1035   Periwound Skin Turgor soft 08/01/23 1035   Edges rolled/closed 08/01/23 1035   Drainage Amount none 08/01/23 1035   Care, Wound cleansed with;sterile normal saline 08/01/23 1200   Dressing Care open to air 08/01/23 1035       Wound 07/20/23 1336 Right distal thigh Incision (Active)   Wound Image   08/01/23 0456   Dressing Appearance open to air 08/01/23 1200   Closure None 08/01/23 1200   Base red;dry;epithelialization 08/01/23 1035   Periwound intact;dry 08/01/23 1035   Periwound Temperature warm 08/01/23 1035   Periwound Skin Turgor soft 08/01/23 1035   Edges rolled/closed 08/01/23 1035   Drainage Amount none 08/01/23 1035   Care, Wound cleansed with;sterile normal saline 08/01/23 1035   Dressing Care open to air 08/01/23 1035       Wound 07/20/23 1551 Left anterior thigh Incision (Active)   Wound Image   08/01/23 0456   Dressing Appearance open to air 08/01/23 1200   Closure Liquid skin adhesive 08/01/23 1200   Base dry;red;epithelialization 08/01/23 1035   Periwound intact;dry;pink 08/01/23 1035   Periwound Temperature warm 08/01/23 1035   Periwound Skin Turgor soft 08/01/23 1035   Edges rolled/closed 08/01/23 1035   Drainage Amount none 08/01/23 1035   Care, Wound cleansed with;sterile normal saline 08/01/23 1035   Dressing Care open to air 08/01/23 1035       Wound 08/01/23 0453 Left lateral leg (Active)   Wound Image   08/01/23 0456   Dressing Appearance open to air 08/01/23 1200   Closure Other (Comment) 08/01/23 1200   Base red 08/01/23 1200   Periwound intact;dry 08/01/23 1035   Periwound Temperature warm 08/01/23 1200   Periwound Skin Turgor soft 08/01/23 1200   Edges rolled/closed 08/01/23 1200   Drainage Characteristics/Odor bleeding controlled 08/01/23 0456   Drainage Amount none 08/01/23 1035   Care, Wound cleansed with;sterile normal saline 08/01/23 1035   Dressing  Care open to air 08/01/23 1035       Wound 08/01/23 0453 midline abdomen Incision (Active)   Wound Image   08/01/23 0455   Dressing Appearance intact;dry 08/01/23 1035   Closure None 08/01/23 1035   Base red;dry;scab 08/01/23 1035   Periwound intact;dry 08/01/23 1035   Periwound Temperature warm 08/01/23 1035   Periwound Skin Turgor soft 08/01/23 1035   Edges rolled/closed 08/01/23 1035   Drainage Characteristics/Odor sanguineous;bleeding controlled 08/01/23 0455   Drainage Amount none 08/01/23 1035   Care, Wound cleansed with;sterile normal saline 08/01/23 1035   Dressing Care dressing applied;dressing moistened;silver impregnated;hydrofiber;silicone;border dressing 08/01/23 1035   Periwound Care absorptive dressing applied 08/01/23 1035        Wound Check / Follow-up:  Patient seen today for a wound consult. Family present at bedside. Patient with scattered surgical incisions to the bilateral legs, midline sternum and anterior abdomen; patient reports that she has recently had a triple bypass performed at [facility].   Midline sternal incision with some areas of crusted tissue present but over all the surgical incision with closure a epithelium present. Cleansed with NS. Recommend BID application of mupirocin ointment and leave open to air.  Abdominal incision with thickened crusted tissue present as well. No drainage noted at this time. Cleansed with NS. Recommend application of NS moistened silver impregnated hydrofiber to the wound base.   Incisions to the bilateral legs with healing ridge present; wound adherence met with liquid skin glue. Recommend to provide quality skin care and hygiene. Apply moisturizer to bilateral legs and feet.  Buttocks without discoloration; all tissue intact and blanchable.     Impression: surgical incisions with primary closure    Short term goals:  regain skin integrity, moisture prevention, topical treatment, daily dressing changes.    Flor Palmer RN    8/1/2023     14:00 EDT

## 2023-08-01 NOTE — DISCHARGE SUMMARY
[facility]         HOSPITALIST  DISCHARGE SUMMARY    Patient Name: Ann-Marie Hughes  : 1945  MRN: [id number]    Date of Admission: 2023  Date of Discharge:  2023   Primary Care Physician: Rochelle Brown APRN    Consults       Date and Time Order Name Status Description    2023  8:08 AM Inpatient Cardiology Consult      2023  8:20 PM Hospitalist (on-call MD unless specified)      2023  7:16 PM Inpatient Nephrology Consult      2023  1:26 PM IP General Consult (Use specialty-specific consult if known)      2023 10:39 AM Cardiac Electrophysiologist Inpatient Consult Completed     2023  8:50 AM Inpatient Urology Consult Completed     2023  9:04 AM Inpatient Nephrology Consult Completed             Active and Resolved Hospital Problems:  Active Hospital Problems    Diagnosis POA    CAD status post CABG x3 vessels [I25.810] Unknown    PAF (paroxysmal atrial fibrillation) [I48.0] Unknown    Acute on chronic HFrEF (heart failure with reduced ejection fraction) [I50.23] Yes    Acute respiratory failure with hypoxia [J96.01] Yes      Resolved Hospital Problems   No resolved problems to display.       Hospital Course     Hospital Course:  Ann-Marie Hughes is a 77 y.o. female past medical history of CAD status post CABG 9 days ago,, hypertension, paroxysmal A-fib on amiodarone/Eliquis, type 2 diabetes, metabolic syndrome, presents to the ER due to worsening shortness of breath and weakness.  Patient was recently hospitalized at [facility] for 8 or 9 days after having triple bypass surgery.  She states at that time she did have worsening functional status while in the hospital with some possible congestive heart failure requiring IV diuresis.  She was ultimately discharged home but over the last 2 days since she has been home she has had progressive worsening lower extremity edema as well as exertional dyspnea that prompted her to eventually  come into the ER for evaluation.  Upon arrival here she was hypoxic on room air to the high 80s and required initiation of 2 L nasal cannula.  She was afebrile and denies having any productive cough at home.  Lab work-up was revealing of a significant elevation in her proBNP to 27,000 Which is up trended from her most recent one 13 days ago.  Troponin also noted to be elevated but stable at 142 followed by 143 2 hours later and this is only mildly elevated compared to troponin from 2 weeks ago.  Patient also noted to have edema pattern on chest x-ray concerning for congestive heart failure.  She was given a dose of Rocephin in the ER and also Lasix for diuresis.  Patient subsequently improved however request was made to transfer patient to care of her CT surgeon at [facility].  Dr. Alexander has graciously accepted to see the patient at their facility however they do not have bed availability and thus her hospitalist service was contacted for admission until they are able to find a bed.  At the time my exam patient is resting comfortably and has already urinated significant amount.  She states she feels much improved.  Denies having any overt chest pain or pressure over the last few days.  Denies having any pleurisy.     The patient was admitted to [facility] hospitalist service overnight for stabilization while awaiting a bed.  She was treated with diuresis with Lasix 40 mg IV twice daily, sodium and fluid restriction, daily weights and strict I's and O's.  Cardiology was consulted in the morning by me and made further recommendations.  Fortunately the patient was able to secure a bed at [facility] and is being transferred there for higher level of care.        Day of Discharge     Vital Signs:  Temp:  [97.3 øF (36.3 øC)-98.2 øF (36.8 øC)] 98.2 øF (36.8 øC)  Heart Rate:  [56-71] 71  Resp:  [18-20] 18  BP: (106-143)/(41-87) 106/60  Flow (L/min):  [2-3] 2  Physical Exam:                Constitutional: Awake, alert, no acute distress              Eyes: Pupils equal, sclerae anicteric, no conjunctival injection              HENT: NCAT, mucous membranes moist              Neck: Supple, no thyromegaly, no lymphadenopathy, trachea midline              Respiratory: Decreased breath sounds at the bases bilaterally, nonlabored respirations               Cardiovascular: RRR, no murmurs, rubs, or gallops, palpable pedal pulses bilaterally              Gastrointestinal: Positive bowel sounds, soft, nontender, nondistended              Musculoskeletal: 2+ pitting edema bilaterally no clubbing or cyanosis to extremities              Psychiatric: Appropriate affect, cooperative              Neurologic: Oriented x 3, strength symmetric in all extremities, Cranial Nerves grossly intact to confrontation, speech clear              Skin: No rashes       Discharge Details        Discharge Medications        Changes to Medications        Instructions Start Date   metFORMIN  MG 24 hr tablet  Commonly known as: GLUCOPHAGE-XR  What changed: Another medication with the same name was changed. Make sure you understand how and when to take each.   1,000 mg, Oral, Daily With Dinner      metFORMIN  MG 24 hr tablet  Commonly known as: GLUCOPHAGE-XR  What changed: See the new instructions.   TAKE 1 TABLET BY MOUTH EVERY MORNING AND 2 TABLETS AT SUPPER             Continue These Medications        Instructions Start Date   acetaminophen 325 MG tablet  Commonly known as: TYLENOL   650 mg, Oral, Every 4 Hours PRN      albuterol sulfate  (90 Base) MCG/ACT inhaler  Commonly known as: PROVENTIL HFA;VENTOLIN HFA;PROAIR HFA   2 puffs, Inhalation, Every 4 Hours PRN      aspirin 81 MG EC tablet   81 mg, Oral, Daily      atorvastatin 40 MG tablet  Commonly known as: LIPITOR   40 mg, Oral, Nightly      buPROPion  MG 24 hr tablet  Commonly known as: WELLBUTRIN XL   300 mg, Oral, Daily      doxycycline 100 MG  capsule  Commonly known as: MONODOX   100 mg, Oral, Every 12 Hours Scheduled      Eliquis 5 MG tablet tablet  Generic drug: apixaban   5 mg, Oral, Every 12 Hours Scheduled      Farxiga 10 MG tablet  Generic drug: dapagliflozin Propanediol   10 mg, Oral, Daily      furosemide 40 MG tablet  Commonly known as: Lasix   40 mg, Oral, Daily      metoprolol succinate XL 25 MG 24 hr tablet  Commonly known as: TOPROL-XL   12.5 mg, Oral, Every 12 Hours Scheduled      montelukast 10 MG tablet  Commonly known as: SINGULAIR   10 mg, Oral, Daily      PROBIOTIC PO   1 capsule, Oral, Daily      spironolactone 25 MG tablet  Commonly known as: ALDACTONE   25 mg, Oral, Daily      Synthroid 75 MCG tablet  Generic drug: levothyroxine   75 mcg, Oral, Daily               Allergies   Allergen Reactions    Penicillins Palpitations     1. When was your reaction? > 10 years ago  2. Did your reaction happen after the first dose or after several doses? After first dose  3. Did your reaction require ED or hospital care to manage your reaction? Do not know  4. Did your reaction require treatment with epinephrine? Do not know  5. Have you taken amoxicillin (Amoxil) or amoxicillin-clavulanate (Augmentin) without issue since? Yes  6. Have you taken cephalexin (Keflex) without issue since?  Do not know        Discharge Disposition:  Short Term Hospital (DC - External)    Diet:  Hospital:  Diet Order   Procedures    Diet: Cardiac Diets, Diabetic Diets, Renal Diets; Healthy Heart (2-3 Na+); Consistent Carbohydrate; Low Sodium (2-3g), Low Potassium, Low Phosphorus; Texture: Regular Texture (IDDSI 7); Fluid Consistency: Thin (IDDSI 0)       Discharge Activity:   per accepting hospital     CODE STATUS:  Code Status and Medical Interventions:   Ordered at: 07/31/23 2103     Code Status (Patient has no pulse and is not breathing):    CPR (Attempt to Resuscitate)     Medical Interventions (Patient has pulse or is breathing):    Full Support     Release to  patient:    Routine Release         Future Appointments   Date Time Provider Department Center   8/2/2023  9:00 AM [place]era [place] [facility] [place]   8/16/2023 11:00 AM Emi Britt APRN [facility]   8/29/2023 10:15 AM Romario Valdez,  [facility] [place] [facility]   8/30/2023  1:30 PM Clarence Johnson, [facility]           Pertinent  and/or Most Recent Results     PROCEDURES:   NA    LAB RESULTS:      Lab 08/01/23  0605 07/31/23  1838 07/31/23  1413 07/31/23  1352 07/31/23  1203 07/27/23  0305   WBC 20.86*  --   --   --  25.50* 13.64*   HEMOGLOBIN 7.5*  --   --   --  8.2* 9.2*   HEMATOCRIT 23.4*  --   --   --  24.9* 28.3*   PLATELETS 341  --   --   --  376 306   NEUTROS ABS 13.48*  --   --   --  18.00*  19.89*  --    IMMATURE GRANS (ABS) 1.13*  --   --   --  1.75*  --    LYMPHS ABS 2.99  --   --   --  2.70  --    MONOS ABS 2.59*  --   --   --  2.74*  --    EOS ABS 0.58*  --   --   --  0.22  0.77*  --    MCV 95.9  --   --   --  93.3 89.3   PROCALCITONIN  --   --  0.07  --   --   --    LACTATE  --  2.0  --  2.3*  --   --          Lab 08/01/23  0605 07/31/23  1413 07/31/23  1203 07/28/23  0257 07/27/23  1611 07/27/23  1027 07/27/23  0305 07/26/23  0335   SODIUM 137 134* 134* 137  --   --  138 132*   POTASSIUM 4.2 4.2 4.4 4.2 4.2   < > 3.5 3.9   CHLORIDE 101 100 100 105  --   --  104 102   CO2 24.0 20.8* 19.4* 20.0*  --   --  21.2* 17.9*   ANION GAP 12.0 13.2 14.6 12.0  --   --  12.8 12.1   BUN 60* 56* 58* 34*  --   --  35* 36*   CREATININE 1.33* 1.20* 1.20* 1.08*  --   --  1.18* 1.41*   EGFR 41.0* 46.4* 46.4* 52.7*  --   --  47.4* 38.3*   GLUCOSE 119* 140* 151* 126*  --   --  129* 107*   CALCIUM 8.5* 8.7 8.7 8.3*  --   --  8.1* 8.2*   MAGNESIUM 1.9 1.9  --   --   --   --  1.9  --    PHOSPHORUS  --   --   --  2.4*  --   --  2.4* 2.0*    < > = values in this interval not displayed.         Lab 07/31/23  1413 07/31/23  1203 07/28/23  0257 07/27/23  0305 07/26/23  0335   TOTAL PROTEIN 5.9* 6.1  --   --    --    ALBUMIN 3.5 3.6 3.1* 3.0* 3.0*   GLOBULIN 2.4 2.5  --   --   --    ALT (SGPT) 12 12  --   --   --    AST (SGOT) 19 18  --   --   --    BILIRUBIN 1.9* 2.1*  --   --   --    ALK PHOS 51 52  --   --   --          Lab 08/01/23  0605 07/31/23  1413 07/31/23  1203   PROBNP  --   --  27,235.0*   HSTROP T 155* 143* 142*                 Brief Urine Lab Results  (Last result in the past 365 days)        Color   Clarity   Blood   Leuk Est   Nitrite   Protein   CREAT   Urine HCG        07/31/23 1553 Yellow   Clear   Negative   Trace   Negative   30 mg/dL (1+)                 Microbiology Results (last 10 days)       Procedure Component Value - Date/Time    COVID-19,CEPHEID/HARSH,COR/JOHNNY/PAD/FABIOLA/MAD IN-HOUSE(OR EMERGENT/ADD-ON),NP SWAB IN TRANSPORT MEDIA 3-4 HR TAT, RT-PCR - Swab, Nasopharynx [[id number]]  (Normal) Collected: 07/31/23 2240    Lab Status: Final result Specimen: Swab from Nasopharynx Updated: 07/31/23 2348     COVID19 Not Detected    Narrative:      Fact sheet for providers: https://www.fda.gov/media/383635/download     Fact sheet for patients: https://www.fda.gov/media/584842/download  Fact sheet for providers: https://www.fda.gov/media/265971/download     Fact sheet for patients: https://www.fda.gov/media/397054/download    Blood Culture - Blood, Arm, Left [[id number]]  (Normal) Collected: 07/31/23 1352    Lab Status: Preliminary result Specimen: Blood from Arm, Left Updated: 08/01/23 1415     Blood Culture No growth at 24 hours    Blood Culture - Blood, Arm, Right [[id number]]  (Normal) Collected: 07/31/23 1352    Lab Status: Preliminary result Specimen: Blood from Arm, Right Updated: 08/01/23 1415     Blood Culture No growth at 24 hours    Urine Culture - Urine, Urine, Clean Catch [[id number]]  (Abnormal) Collected: 07/23/23 1346    Lab Status: Final result Specimen: Urine, Clean Catch Updated: 07/25/23 1330     Urine Culture Yeast isolated     Comment: No further workup       Narrative:      Colonization of  the urinary tract without infection is common. Treatment is discouraged unless the patient is symptomatic, pregnant, or undergoing an invasive urologic procedure.            XR Chest 1 View    Result Date: 7/31/2023  Impression:  No significant change in comparison to 1141 today.       MILLA WILSON MD       Electronically Signed and Approved By: MILLA WILSON MD on 7/31/2023 at 20:10             XR Chest 1 View    Result Date: 7/31/2023  Impression:   1. Interval postsurgical changes from cardiac surgery 2. Small to moderate left-sided pleural effusion and dependent opacity at the left base is new from the comparison.  Mild dependent opacity at the right base favored represent atelectasis or mild degree of edema. 3. No pneumothorax identified. 4. Mild pulmonary vascular congestion accentuated by technique       JENNIFER STEVENS MD       Electronically Signed and Approved By: JENNIFER STEVENS MD on 7/31/2023 at 11:58             XR Chest 1 View    Result Date: 7/25/2023  Impression: Minimal right apical pneumothorax appears slightly decreased  This report was finalized on 7/25/2023 7:41 AM by Dr. Anderson Borden M.D.      XR Chest 1 View    Result Date: 7/24/2023  Impression: No significant pneumothorax is present following chest tube removal.  This report was finalized on 7/24/2023 2:53 PM by Dr. Roderick Maurice M.D.      XR Chest 1 View    Result Date: 7/22/2023  Impression: Persistent vascular congestion and bibasilar atelectasis.  This report was finalized on 7/22/2023 5:57 AM by Dr. Baylee Latham M.D.      XR Chest 1 View    Result Date: 7/16/2023  Impression:   No active cardiopulmonary disease is seen.       MILLA WILSON MD       Electronically Signed and Approved By: MILLA WILSON MD on 7/16/2023 at 7:34              Results for orders placed during the hospital encounter of 07/18/23    Duplex Venous Lower Extremity - Bilateral CAR    Interpretation Summary    Venous waveforms are somewhat  pulsatile, which can indicate central venous congestion.    Chronic superficial vein thrombosis in the left small saphenous vein.    All other veins appeared normal bilaterally.      Results for orders placed during the hospital encounter of 07/18/23    Duplex Venous Lower Extremity - Bilateral CAR    Interpretation Summary    Venous waveforms are somewhat pulsatile, which can indicate central venous congestion.    Chronic superficial vein thrombosis in the left small saphenous vein.    All other veins appeared normal bilaterally.      Results for orders placed in visit on 07/20/23    Diagnostic IntraOp Dev    Narrative  Diagnostic IntraOp Dev    Procedure Performed: Diagnostic IntraOp Dev  Start Time:  End Time:    Preanesthesia Checklist:  Patient identified, IV assessed, risks and benefits discussed, monitors and equipment assessed, procedure being performed at surgeon's request and anesthesia consent obtained.    General Procedure Information  Diagnostic Indications for Echo:  assessment of ascending aorta, assessment of surgical repair, defect repair evaluation and hemodynamic monitoring  Physician Requesting Echo: Shane Alexander MD  Location performed:  OR  Intubated  Bite block placed  Heart visualized  Probe Insertion:  Easy  Probe Type:  Multiplane  Modalities:  2D only, color flow mapping, continuous wave Doppler and pulse wave Doppler    Echocardiographic and Doppler Measurements    Ventricles    Right Ventricle:  Cavity size normal.  Global function mildly impaired.  Left Ventricle:  Cavity size dilated.  Global Function moderately impaired.  Ejection Fraction 40%.    Ventricular Regional Function:  7- Mid Anteroseptal:  hypokinetic  11- Mid Inferior:  hypokinetic  12- Mid Inferoseptal:  hypokinetic      Valves    Aortic Valve:  Annulus normal.  Stenosis mild.  Area: 1.3 cmý.  Mean Gradient: 15 mmHg.  Regurgitation trace.  Leaflets thickened.  Leaflet motions restricted.  Specific leaflet  segments with abnormal motions are described in the following comments:  NCC, LCC    Mitral Valve:  Annulus dilated.  Regurgitation mild.  Leaflets thickened.  Leaflet motions restricted.  Specific leaflet segments with abnormal motions are described in the following comments:  P    Tricuspid Valve:  Annulus normal.  Regurgitation mild.  Pulmonic Valve:  Annulus normal.  Regurgitation trace.      Aorta    Ascending Aorta:  Size normal.  Aortic Arch:  Size normal.  Descending Aorta:  Size normal.      Atria    Right Atrium:  Size normal.  Left Atrium:  Size normal.  Left atrial appendage normal.          Diastolic Function Measurements:  Diastolic Dysfunction Grade= indeterminate  E=  ms  A=  ms  E/A Ratio=  DT=  ms  S/D=  IVRT=    Other Findings  Pericardium:  normal  Pleural Effusion:  none  Pulmonary Arteries:  normal  Pulmonary Venous Flow:  blunted (decreased) systolic flow    Anesthesia Information  Performed Personally  Anesthesiologist:  Almsa Charles MD      Echocardiogram Comments:  77 year old female with multivessel CAD and ischemic MR presents for CABG.  - Dilated LV with moderately reduced LV function (LVEF 35-40%; hypokinetic inferior and septal walls)  - Non dilated RV, mildly reduced RV function  - Mild-mod MR: functional r/t dilated MV annulus, central jet, VC 0.3 cm, mildly blunted RUPV  - Mild AS d/t partially fused NCC and LCC; mean gradient 15 mmHg, JAZMYNE 1.3 cm2 via doppler  - Mild TR, trace PI  - LANE without thrombus  - No intracardiac shunt  - No pericardial/pleural effusion  - No aortic dissection    S/p OpCABG x3:  - Hypovolemic LV, low-normal LV function it appears  - Non dilated RV  - Mild MR, mild AS -- unchanged  - LANE appears to be clipped  - No pericardial/pleural effusion  - No aortic dissection    Findings discussed with surgical team      Labs Pending at Discharge:  Pending Labs       Order Current Status    Blood Culture - Blood, Arm, Left Preliminary result    Blood Culture  - Blood, Arm, Right Preliminary result              Time spent on Discharge including face to face service:  40 minutes    Electronically signed by Breezy Mon DO, 08/01/23, 3:15 PM EDT.

## 2023-08-01 NOTE — CASE MANAGEMENT/SOCIAL WORK
IP:  7/31/23 (Met IP criteria)  DC:  8/1/23  LOS:  < 2MN  Exception: Transferred to  [facility] for higher loc

## 2023-08-02 ENCOUNTER — APPOINTMENT (OUTPATIENT)
Dept: GENERAL RADIOLOGY | Facility: HOSPITAL | Age: 78
DRG: 291 | End: 2023-08-02
Payer: MEDICARE

## 2023-08-02 PROBLEM — I48.91 ATRIAL FIBRILLATION: Status: ACTIVE | Noted: 2023-08-02

## 2023-08-02 LAB
ALBUMIN SERPL-MCNC: 3.5 G/DL (ref 3.5–5.2)
ALBUMIN/GLOB SERPL: 1.5 G/DL
ALP SERPL-CCNC: 54 U/L (ref 39–117)
ALT SERPL W P-5'-P-CCNC: 11 U/L (ref 1–33)
ANION GAP SERPL CALCULATED.3IONS-SCNC: 14.4 MMOL/L (ref 5–15)
AST SERPL-CCNC: 13 U/L (ref 1–32)
BASOPHILS # BLD AUTO: 0.06 10*3/MM3 (ref 0–0.2)
BASOPHILS NFR BLD AUTO: 0.3 % (ref 0–1.5)
BILIRUB SERPL-MCNC: 1.3 MG/DL (ref 0–1.2)
BUN SERPL-MCNC: 56 MG/DL (ref 8–23)
BUN/CREAT SERPL: 46.3 (ref 7–25)
CALCIUM SPEC-SCNC: 9.3 MG/DL (ref 8.6–10.5)
CHLORIDE SERPL-SCNC: 102 MMOL/L (ref 98–107)
CO2 SERPL-SCNC: 25.6 MMOL/L (ref 22–29)
CREAT SERPL-MCNC: 1.21 MG/DL (ref 0.57–1)
DEPRECATED RDW RBC AUTO: 58 FL (ref 37–54)
EGFRCR SERPLBLD CKD-EPI 2021: 46 ML/MIN/1.73
EOSINOPHIL # BLD AUTO: 0.35 10*3/MM3 (ref 0–0.4)
EOSINOPHIL NFR BLD AUTO: 2 % (ref 0.3–6.2)
ERYTHROCYTE [DISTWIDTH] IN BLOOD BY AUTOMATED COUNT: 18 % (ref 12.3–15.4)
GLOBULIN UR ELPH-MCNC: 2.4 GM/DL
GLUCOSE BLDC GLUCOMTR-MCNC: 131 MG/DL (ref 70–130)
GLUCOSE BLDC GLUCOMTR-MCNC: 160 MG/DL (ref 70–130)
GLUCOSE BLDC GLUCOMTR-MCNC: 189 MG/DL (ref 70–130)
GLUCOSE SERPL-MCNC: 176 MG/DL (ref 65–99)
HCT VFR BLD AUTO: 24.5 % (ref 34–46.6)
HGB BLD-MCNC: 8.1 G/DL (ref 12–15.9)
IMM GRANULOCYTES # BLD AUTO: 0.58 10*3/MM3 (ref 0–0.05)
IMM GRANULOCYTES NFR BLD AUTO: 3.4 % (ref 0–0.5)
LYMPHOCYTES # BLD AUTO: 2.34 10*3/MM3 (ref 0.7–3.1)
LYMPHOCYTES NFR BLD AUTO: 13.5 % (ref 19.6–45.3)
MAGNESIUM SERPL-MCNC: 1.9 MG/DL (ref 1.6–2.4)
MCH RBC QN AUTO: 30.2 PG (ref 26.6–33)
MCHC RBC AUTO-ENTMCNC: 33.1 G/DL (ref 31.5–35.7)
MCV RBC AUTO: 91.4 FL (ref 79–97)
MONOCYTES # BLD AUTO: 1.96 10*3/MM3 (ref 0.1–0.9)
MONOCYTES NFR BLD AUTO: 11.3 % (ref 5–12)
NEUTROPHILS NFR BLD AUTO: 11.98 10*3/MM3 (ref 1.7–7)
NEUTROPHILS NFR BLD AUTO: 69.5 % (ref 42.7–76)
NRBC BLD AUTO-RTO: 0.3 /100 WBC (ref 0–0.2)
PLATELET # BLD AUTO: 365 10*3/MM3 (ref 140–450)
PMV BLD AUTO: 10.2 FL (ref 6–12)
POTASSIUM SERPL-SCNC: 4.2 MMOL/L (ref 3.5–5.2)
PROT SERPL-MCNC: 5.9 G/DL (ref 6–8.5)
QT INTERVAL: 372 MS
RBC # BLD AUTO: 2.68 10*6/MM3 (ref 3.77–5.28)
SODIUM SERPL-SCNC: 142 MMOL/L (ref 136–145)
WBC NRBC COR # BLD: 17.27 10*3/MM3 (ref 3.4–10.8)

## 2023-08-02 PROCEDURE — 83735 ASSAY OF MAGNESIUM: CPT | Performed by: NURSE PRACTITIONER

## 2023-08-02 PROCEDURE — 93010 ELECTROCARDIOGRAM REPORT: CPT | Performed by: INTERNAL MEDICINE

## 2023-08-02 PROCEDURE — 97530 THERAPEUTIC ACTIVITIES: CPT

## 2023-08-02 PROCEDURE — 25010000002 FUROSEMIDE PER 20 MG: Performed by: NURSE PRACTITIONER

## 2023-08-02 PROCEDURE — 93005 ELECTROCARDIOGRAM TRACING: CPT | Performed by: NURSE PRACTITIONER

## 2023-08-02 PROCEDURE — 25010000002 CEFTRIAXONE PER 250 MG: Performed by: NURSE PRACTITIONER

## 2023-08-02 PROCEDURE — 97166 OT EVAL MOD COMPLEX 45 MIN: CPT

## 2023-08-02 PROCEDURE — 99222 1ST HOSP IP/OBS MODERATE 55: CPT

## 2023-08-02 PROCEDURE — 97162 PT EVAL MOD COMPLEX 30 MIN: CPT

## 2023-08-02 PROCEDURE — 82948 REAGENT STRIP/BLOOD GLUCOSE: CPT

## 2023-08-02 PROCEDURE — 71045 X-RAY EXAM CHEST 1 VIEW: CPT

## 2023-08-02 PROCEDURE — 63710000001 INSULIN LISPRO (HUMAN) PER 5 UNITS: Performed by: NURSE PRACTITIONER

## 2023-08-02 PROCEDURE — 85025 COMPLETE CBC W/AUTO DIFF WBC: CPT | Performed by: NURSE PRACTITIONER

## 2023-08-02 PROCEDURE — 80053 COMPREHEN METABOLIC PANEL: CPT | Performed by: NURSE PRACTITIONER

## 2023-08-02 RX ORDER — NICOTINE POLACRILEX 4 MG
15 LOZENGE BUCCAL
Status: DISCONTINUED | OUTPATIENT
Start: 2023-08-02 | End: 2023-08-06 | Stop reason: HOSPADM

## 2023-08-02 RX ORDER — BISACODYL 10 MG
10 SUPPOSITORY, RECTAL RECTAL DAILY PRN
Status: DISCONTINUED | OUTPATIENT
Start: 2023-08-02 | End: 2023-08-06 | Stop reason: HOSPADM

## 2023-08-02 RX ORDER — IBUPROFEN 600 MG/1
1 TABLET ORAL
Status: DISCONTINUED | OUTPATIENT
Start: 2023-08-02 | End: 2023-08-06 | Stop reason: HOSPADM

## 2023-08-02 RX ORDER — ONDANSETRON 2 MG/ML
4 INJECTION INTRAMUSCULAR; INTRAVENOUS EVERY 6 HOURS PRN
Status: DISCONTINUED | OUTPATIENT
Start: 2023-08-02 | End: 2023-08-06 | Stop reason: HOSPADM

## 2023-08-02 RX ORDER — SODIUM CHLORIDE 0.9 % (FLUSH) 0.9 %
10 SYRINGE (ML) INJECTION AS NEEDED
Status: DISCONTINUED | OUTPATIENT
Start: 2023-08-02 | End: 2023-08-06 | Stop reason: HOSPADM

## 2023-08-02 RX ORDER — ACETAMINOPHEN 325 MG/1
650 TABLET ORAL EVERY 4 HOURS PRN
Status: DISCONTINUED | OUTPATIENT
Start: 2023-08-02 | End: 2023-08-06 | Stop reason: HOSPADM

## 2023-08-02 RX ORDER — MONTELUKAST SODIUM 10 MG/1
10 TABLET ORAL DAILY
Status: DISCONTINUED | OUTPATIENT
Start: 2023-08-02 | End: 2023-08-06 | Stop reason: HOSPADM

## 2023-08-02 RX ORDER — SPIRONOLACTONE 25 MG/1
25 TABLET ORAL DAILY
Status: DISCONTINUED | OUTPATIENT
Start: 2023-08-02 | End: 2023-08-06 | Stop reason: HOSPADM

## 2023-08-02 RX ORDER — ATORVASTATIN CALCIUM 20 MG/1
40 TABLET, FILM COATED ORAL NIGHTLY
Status: DISCONTINUED | OUTPATIENT
Start: 2023-08-02 | End: 2023-08-06 | Stop reason: HOSPADM

## 2023-08-02 RX ORDER — FUROSEMIDE 10 MG/ML
40 INJECTION INTRAMUSCULAR; INTRAVENOUS ONCE
Status: COMPLETED | OUTPATIENT
Start: 2023-08-02 | End: 2023-08-02

## 2023-08-02 RX ORDER — NITROGLYCERIN 0.4 MG/1
0.4 TABLET SUBLINGUAL
Status: DISCONTINUED | OUTPATIENT
Start: 2023-08-02 | End: 2023-08-02

## 2023-08-02 RX ORDER — METOPROLOL SUCCINATE 25 MG/1
25 TABLET, EXTENDED RELEASE ORAL EVERY 12 HOURS SCHEDULED
Status: DISCONTINUED | OUTPATIENT
Start: 2023-08-02 | End: 2023-08-02

## 2023-08-02 RX ORDER — NITROGLYCERIN 0.4 MG/1
0.4 TABLET SUBLINGUAL
Status: DISCONTINUED | OUTPATIENT
Start: 2023-08-02 | End: 2023-08-06 | Stop reason: HOSPADM

## 2023-08-02 RX ORDER — POLYETHYLENE GLYCOL 3350 17 G/17G
17 POWDER, FOR SOLUTION ORAL DAILY PRN
Status: DISCONTINUED | OUTPATIENT
Start: 2023-08-02 | End: 2023-08-06 | Stop reason: HOSPADM

## 2023-08-02 RX ORDER — SODIUM CHLORIDE 9 MG/ML
40 INJECTION, SOLUTION INTRAVENOUS AS NEEDED
Status: DISCONTINUED | OUTPATIENT
Start: 2023-08-02 | End: 2023-08-06 | Stop reason: HOSPADM

## 2023-08-02 RX ORDER — BISACODYL 5 MG/1
5 TABLET, DELAYED RELEASE ORAL DAILY PRN
Status: DISCONTINUED | OUTPATIENT
Start: 2023-08-02 | End: 2023-08-06 | Stop reason: HOSPADM

## 2023-08-02 RX ORDER — ASPIRIN 81 MG/1
81 TABLET ORAL DAILY
Status: DISCONTINUED | OUTPATIENT
Start: 2023-08-02 | End: 2023-08-06 | Stop reason: HOSPADM

## 2023-08-02 RX ORDER — LEVOTHYROXINE SODIUM 0.07 MG/1
75 TABLET ORAL
Status: DISCONTINUED | OUTPATIENT
Start: 2023-08-02 | End: 2023-08-06 | Stop reason: HOSPADM

## 2023-08-02 RX ORDER — IPRATROPIUM BROMIDE AND ALBUTEROL SULFATE 2.5; .5 MG/3ML; MG/3ML
3 SOLUTION RESPIRATORY (INHALATION) EVERY 4 HOURS PRN
Status: DISCONTINUED | OUTPATIENT
Start: 2023-08-02 | End: 2023-08-06 | Stop reason: HOSPADM

## 2023-08-02 RX ORDER — SODIUM CHLORIDE 0.9 % (FLUSH) 0.9 %
10 SYRINGE (ML) INJECTION EVERY 12 HOURS SCHEDULED
Status: DISCONTINUED | OUTPATIENT
Start: 2023-08-02 | End: 2023-08-06 | Stop reason: HOSPADM

## 2023-08-02 RX ORDER — BUPROPION HYDROCHLORIDE 300 MG/1
300 TABLET ORAL DAILY
Status: DISCONTINUED | OUTPATIENT
Start: 2023-08-02 | End: 2023-08-06 | Stop reason: HOSPADM

## 2023-08-02 RX ORDER — METOPROLOL SUCCINATE 25 MG/1
12.5 TABLET, EXTENDED RELEASE ORAL EVERY 12 HOURS SCHEDULED
Status: DISCONTINUED | OUTPATIENT
Start: 2023-08-02 | End: 2023-08-02

## 2023-08-02 RX ORDER — INSULIN LISPRO 100 [IU]/ML
2-9 INJECTION, SOLUTION INTRAVENOUS; SUBCUTANEOUS
Status: DISCONTINUED | OUTPATIENT
Start: 2023-08-02 | End: 2023-08-06 | Stop reason: HOSPADM

## 2023-08-02 RX ORDER — DEXTROSE MONOHYDRATE 25 G/50ML
25 INJECTION, SOLUTION INTRAVENOUS
Status: DISCONTINUED | OUTPATIENT
Start: 2023-08-02 | End: 2023-08-06 | Stop reason: HOSPADM

## 2023-08-02 RX ORDER — METOPROLOL SUCCINATE 25 MG/1
25 TABLET, EXTENDED RELEASE ORAL EVERY 12 HOURS SCHEDULED
Status: DISCONTINUED | OUTPATIENT
Start: 2023-08-02 | End: 2023-08-06 | Stop reason: HOSPADM

## 2023-08-02 RX ORDER — FLUTICASONE PROPIONATE 50 MCG
2 SPRAY, SUSPENSION (ML) NASAL DAILY
Status: DISCONTINUED | OUTPATIENT
Start: 2023-08-02 | End: 2023-08-06 | Stop reason: HOSPADM

## 2023-08-02 RX ORDER — AMOXICILLIN 250 MG
2 CAPSULE ORAL 2 TIMES DAILY
Status: DISCONTINUED | OUTPATIENT
Start: 2023-08-02 | End: 2023-08-06 | Stop reason: HOSPADM

## 2023-08-02 RX ADMIN — LEVOTHYROXINE SODIUM 75 MCG: 0.07 TABLET ORAL at 10:27

## 2023-08-02 RX ADMIN — ATORVASTATIN CALCIUM 40 MG: 20 TABLET, FILM COATED ORAL at 21:29

## 2023-08-02 RX ADMIN — Medication 10 ML: at 10:28

## 2023-08-02 RX ADMIN — APIXABAN 5 MG: 5 TABLET, FILM COATED ORAL at 21:29

## 2023-08-02 RX ADMIN — Medication 10 ML: at 21:35

## 2023-08-02 RX ADMIN — ASPIRIN 81 MG: 81 TABLET, COATED ORAL at 10:27

## 2023-08-02 RX ADMIN — INSULIN LISPRO 2 UNITS: 100 INJECTION, SOLUTION INTRAVENOUS; SUBCUTANEOUS at 17:14

## 2023-08-02 RX ADMIN — INSULIN LISPRO 2 UNITS: 100 INJECTION, SOLUTION INTRAVENOUS; SUBCUTANEOUS at 21:34

## 2023-08-02 RX ADMIN — MONTELUKAST SODIUM 10 MG: 10 TABLET, FILM COATED ORAL at 10:27

## 2023-08-02 RX ADMIN — CEFTRIAXONE SODIUM 2000 MG: 2 INJECTION, POWDER, FOR SOLUTION INTRAMUSCULAR; INTRAVENOUS at 10:28

## 2023-08-02 RX ADMIN — METOPROLOL SUCCINATE 25 MG: 25 TABLET, EXTENDED RELEASE ORAL at 10:27

## 2023-08-02 RX ADMIN — BUPROPION HYDROCHLORIDE 300 MG: 300 TABLET, EXTENDED RELEASE ORAL at 10:27

## 2023-08-02 RX ADMIN — METOPROLOL SUCCINATE 25 MG: 25 TABLET, EXTENDED RELEASE ORAL at 21:34

## 2023-08-02 RX ADMIN — SPIRONOLACTONE 25 MG: 25 TABLET ORAL at 17:14

## 2023-08-02 RX ADMIN — APIXABAN 5 MG: 5 TABLET, FILM COATED ORAL at 10:27

## 2023-08-02 RX ADMIN — FUROSEMIDE 40 MG: 10 INJECTION, SOLUTION INTRAMUSCULAR; INTRAVENOUS at 12:42

## 2023-08-02 NOTE — THERAPY EVALUATION
Patient Name: Ann-Marie Hughes  : 1945    MRN: [id number]                              Today's Date: 2023       Admit Date: 2023    Visit Dx: No diagnosis found.  Patient Active Problem List   Diagnosis    Arthritis    Bilateral leg pain    Bladder disorder    Chronic pain syndrome    Depression    Diabetes mellitus, type II    Type 2 diabetes mellitus with hyperglycemia    Gastric reflux    Herniated disc    Hypertension    Hypothyroidism    Low back pain    Pain in joint, multiple sites    Pain in soft tissues of limb    Shortness of breath    Hyperlipidemia    B12 deficiency    Vitamin D deficiency    UTI (urinary tract infection)    Metabolic syndrome    Acne rosacea    Hx of smoking    Stress incontinence of urine    Arteriosclerosis of abdominal aorta    Iron deficiency anemia secondary to inadequate dietary iron intake    Seasonal allergies    Hyponatremia    Leg length discrepancy    Type 2 diabetes mellitus with hyperglycemia    Reactive depression    Encounter for subsequent annual wellness visit (AWV) in Medicare patient    Class 1 obesity with alveolar hypoventilation, serious comorbidity, and body mass index (BMI) of 32.0 to 32.9 in adult    Heart failure with reduced ejection fraction    Coronary artery disease of native artery of native heart with stable angina pectoris    Chest pain    Chronic HFrEF (heart failure with reduced ejection fraction)    NSTEMI (non-ST elevated myocardial infarction)    Coronary heart disease    Abnormal findings on diagnostic imaging of heart and coronary circulation    Acute on chronic HFrEF (heart failure with reduced ejection fraction)    Acute respiratory failure with hypoxia    CAD status post CABG x3 vessels    PAF (paroxysmal atrial fibrillation)    Atrial fibrillation     Past Medical History:   Diagnosis Date    Acne rosacea 2021    DR.JEFF MOON     Arteriosclerosis of abdominal aorta     B12 deficiency 2021    JEANINE  (degenerative joint disease)     Hx of smoking 08/17/2021    QUIT IN 1976 AT AGE 31    Hyperlipidemia 08/17/2021    Hypertension     Hypothyroidism     Metabolic syndrome 08/17/2021    KAREN (stress urinary incontinence, female) 08/17/2021    UTI (urinary tract infection) 08/17/2021    Vitamin D deficiency 08/17/2021    Vitamin D deficiency      Past Surgical History:   Procedure Laterality Date    BACK SURGERY  2013    CARDIAC CATHETERIZATION N/A 07/17/2023    Procedure: Left Heart Cath;  Surgeon: Dinh Andres MD;  Location:  [facility];  Service: Cardiovascular;  Laterality: N/A;    COLONOSCOPY  2011    CORONARY ARTERY BYPASS GRAFT WITH MITRAL VALVE REPAIR/REPLACEMENT N/A 07/20/2023    Procedure: REZA STERNOTOMY OFF-PUMP CORONARY ARTERY BYPASS GRAFT TIMES        USING LEFFT INTERNAL MAMMARY ARTERY AND     GREATER SAPHENOUS VEIN GRAFT PER EMDOSCOPIC VEIN HARVESTING, MAZE PROCEDURE AND PRP;  Surgeon: Shane Alexander MD;  Location: [facility];  Service: Cardiothoracic;  Laterality: N/A;      General Information       [facility] Name 08/02/23 1426          Physical Therapy Time and Intention    Document Type evaluation (P)   -     Mode of Treatment physical therapy;co-treatment;occupational therapy (P)   -       Row Name 08/02/23 1426          General Information    Patient Profile Reviewed yes (P)   -     Prior Level of Function independent: (P)   -     Existing Precautions/Restrictions cardiac (P)   a fib, triple bipass recently  -       Row Name 08/02/23 1426          Living Environment    People in Home spouse (P)   -       Row Name 08/02/23 1426          Cognition    Orientation Status (Cognition) oriented x 4 (P)   -[facility] Name 08/02/23 1426          Safety Issues, Functional Mobility    Impairments Affecting Function (Mobility) balance;endurance/activity tolerance;shortness of breath;strength (P)   -               User Key  (r) = Recorded By, (t) = Taken By, (c) =  Cosigned By      Initials Name Provider Type     Liliana Bull, PT Student PT Student                   Mobility       [facility] Name 08/02/23 1429          Bed Mobility    Bed Mobility supine-sit (P)   -     Supine-Sit [place] (Bed Mobility) modified independence (P)   -     Assistive Device (Bed Mobility) head of bed elevated;bed rails (P)   -[facility] Name 08/02/23 1429          Sit-Stand Transfer    Sit-Stand [place] (Transfers) standby assist (P)   -     Assistive Device (Sit-Stand Transfers) walker, front-wheeled (P)   -[facility] Name 08/02/23 1429          Gait/Stairs (Locomotion)    [place] Level (Gait) contact guard (P)   -     Assistive Device (Gait) walker, front-wheeled (P)   -     Distance in Feet (Gait) 10 (P)   -     Comment, (Gait/Stairs) steady gait (P)   -               User Key  (r) = Recorded By, (t) = Taken By, (c) = Cosigned By      Initials Name Provider Type     Liliana Bull, PT Student PT Student                   Obj/Interventions       [facility] Name 08/02/23 1430          Range of Motion Comprehensive    General Range of Motion bilateral lower extremity ROM WFL (P)   -LH       Row Name 08/02/23 1430          Strength Comprehensive (MMT)    General Manual Muscle Testing (MMT) Assessment other (see comments) (P)   -     Comment, General Manual Muscle Testing (MMT) Assessment no focal strengt deficits identified, generalized weakness noted (P)   -[facility] Name 08/02/23 1430          Balance    Balance Assessment sitting static balance;sitting dynamic balance;standing static balance;standing dynamic balance (P)   -     Static Sitting Balance standby assist (P)   -     Dynamic Sitting Balance standby assist (P)   -     Position, Sitting Balance sitting edge of bed (P)   -     Static Standing Balance standby assist (P)   -     Dynamic Standing Balance contact guard (P)   -     Position/Device Used, Standing Balance walker, front-wheeled (P)   -        Row Name 08/02/23 1430          Sensory Assessment (Somatosensory)    Sensory Assessment (Somatosensory) sensation intact (P)   -               User Key  (r) = Recorded By, (t) = Taken By, (c) = Cosigned By      Initials Name Provider Type     Liliana Bull, PT Student PT Student                   Goals/Plan       Row Name 08/02/23 1440          Bed Mobility Goal 1 (PT)    Activity/Assistive Device (Bed Mobility Goal 1, PT) bed mobility activities, all (P)   -     [place] Level/Cues Needed (Bed Mobility Goal 1, PT) modified independence (P)   -     Time Frame (Bed Mobility Goal 1, PT) 1 week (P)   -LH       Row Name 08/02/23 1440          Transfer Goal 1 (PT)    Activity/Assistive Device (Transfer Goal 1, PT) transfers, all;walker, rolling (P)   -     [place] Level/Cues Needed (Transfer Goal 1, PT) standby assist (P)   -     Time Frame (Transfer Goal 1, PT) 1 week (P)   -LH       Row Name 08/02/23 1440          Gait Training Goal 1 (PT)    Activity/Assistive Device (Gait Training Goal 1, PT) gait (walking locomotion);assistive device use;walker, rolling (P)   -     [place] Level (Gait Training Goal 1, PT) standby assist (P)   -     Distance (Gait Training Goal 1, PT) 70' (P)   -     Time Frame (Gait Training Goal 1, PT) 1 week (P)   -LH       Row Name 08/02/23 1440          Therapy Assessment/Plan (PT)    Planned Therapy Interventions (PT) balance training;bed mobility training;gait training;patient/family education;strengthening;transfer training (P)   -               User Key  (r) = Recorded By, (t) = Taken By, (c) = Cosigned By      Initials Name Provider Type     Liliana Bull, PT Student PT Student                   Clinical Impression       Row Name 08/02/23 1430          Pain    Pretreatment Pain Rating 0/10 - no pain (P)   -LH       Row Name 08/02/23 1430          Plan of Care Review    Plan of Care Reviewed With patient;family (P)   -     Outcome Evaluation Pt  is a 76 yo female admitted to [facility] with atrial fibrillation. PMH significant of HTN, HLD, and hypothyroidism. Pt is on 2L of oxygen at home. Pt reports to be indepenent and ambulates with a rollator. Pt recently had a triple bipass done. Pt has needed assistance with getting dressed since surgery. Pt's HR was 110 BPM while resting in bed. Pt performed sit-stand SBA and amb 10' to transfer chair CGA. Pt had a steady gait. Pt c/o slight SOA once in chair. HR was 140 BPM. PT intervention necessary to assess decreasing endurance, SOA, balance, and strength deficits. Plan to d/c home with family and HH pending medical progression, will continue to follow. (P)   -       Row Name 08/02/23 1430          Therapy Assessment/Plan (PT)    Patient/Family Therapy Goals Statement (PT) go home with family (P)   -     Rehab Potential (PT) good, to achieve stated therapy goals (P)   -     Criteria for Skilled Interventions Met (PT) yes;skilled treatment is necessary (P)   -     Therapy Frequency (PT) 5 times/wk (P)   -       Row Name 08/02/23 1430          Vital Signs    Pretreatment Heart Rate (beats/min) 110 (P)   -     Posttreatment Heart Rate (beats/min) 140 (P)   -     O2 Delivery Pre Treatment supplemental O2 (P)   2L  -[facility] Name 08/02/23 1430          Positioning and Restraints    Post Treatment Position chair (P)   -     In Chair with other staff (P)   with transfer tech in chair for CVU  -               User Key  (r) = Recorded By, (t) = Taken By, (c) = Cosigned By      Initials Name Provider Type     Liliana Bull, PT Student PT Student                   Outcome Measures       Row Name 08/02/23 1442 08/02/23 0838       How much help from another person do you currently need...    Turning from your back to your side while in flat bed without using bedrails? 3 (P)   - 3  -GP    Moving from lying on back to sitting on the side of a flat bed without bedrails? 3 (P)   - 3  -GP    Moving to and  from a bed to a chair (including a wheelchair)? 3 (P)   - 3  -GP    Standing up from a chair using your arms (e.g., wheelchair, bedside chair)? 3 (P)   - 2  -GP    Climbing 3-5 steps with a railing? 2 (P)   - 2  -GP    To walk in hospital room? 3 (P)   - 2  -GP    AM-PAC 6 Clicks Score (PT) 17 (P)   - 15  -GP    Highest level of mobility 5 --> Static standing (P)   - 4 --> Transferred to chair/commode  -GP      Row Name 08/02/23 1442          Functional Assessment    Outcome Measure Options AM-PAC 6 Clicks Basic Mobility (PT) (P)   -               User Key  (r) = Recorded By, (t) = Taken By, (c) = Cosigned By      Initials Name Provider Type    GP Evelyne Pruett, RN Registered Nurse     Liliana Bull, PT Student PT Student                                 Physical Therapy Education       Title: PT OT SLP Therapies (In Progress)       Topic: Physical Therapy (In Progress)       Point: Mobility training (Done)       Learning Progress Summary             Patient Acceptance, E, VU by  at 8/2/2023 1444   Family Acceptance, E, VU by  at 8/2/2023 1444                         Point: Home exercise program (Not Started)       Learner Progress:  Not documented in this visit.              Point: Body mechanics (Not Started)       Learner Progress:  Not documented in this visit.              Point: Precautions (Not Started)       Learner Progress:  Not documented in this visit.                              User Key       Initials Effective Dates Name Provider Type Discipline     06/28/23 -  Liliana Bull, PT Student PT Student PT                  PT Recommendation and Plan  Planned Therapy Interventions (PT): (P) balance training, bed mobility training, gait training, patient/family education, strengthening, transfer training  Plan of Care Reviewed With: (P) patient, family  Outcome Evaluation: (P) Pt is a 76 yo female admitted to [facility] with atrial fibrillation. PMH significant of HTN, HLD, and  hypothyroidism. Pt is on 2L of oxygen at home. Pt reports to be indepenent and ambulates with a rollator. Pt recently had a triple bipass done. Pt has needed assistance with getting dressed since surgery. Pt's HR was 110 BPM while resting in bed. Pt performed sit-stand SBA and amb 10' to transfer chair CGA. Pt had a steady gait. Pt c/o slight SOA once in chair. HR was 140 BPM. PT intervention necessary to assess decreasing endurance, SOA, balance, and strength deficits. Plan to d/c home with family and HH pending medical progression, will continue to follow.     Time Calculation:         PT Charges       Row Name 08/02/23 1444             Time Calculation    Start Time 1406 (P)   -      Stop Time 1426 (P)   -      Time Calculation (min) 20 min (P)   -      PT Received On 08/02/23 (P)   -      PT - Next Appointment 08/03/23 (P)   -      PT Goal Re-Cert Due Date 08/09/23 (P)   -         Time Calculation- PT    Total Timed Code Minutes- PT 12 minute(s) (P)   -         Timed Charges    65279 - PT Therapeutic Activity Minutes 12 (P)   -         Total Minutes    Timed Charges Total Minutes 12 (P)   -       Total Minutes 12 (P)   -                User Key  (r) = Recorded By, (t) = Taken By, (c) = Cosigned By      Initials Name Provider Type     Liliana Bull, PT Student PT Student                  Therapy Charges for Today       Code Description Service Date Service Provider Modifiers Qty    [id number]  PT THERAPEUTIC ACT EA 15 MIN 8/2/2023 Liliana Bull, PT Student GP 1    [id number]  PT EVAL MOD COMPLEXITY 2 8/2/2023 Liliana Bull, PT Student GP 1            PT G-Codes  Outcome Measure Options: (P) AM-PAC 6 Clicks Basic Mobility (PT)  AM-PAC 6 Clicks Score (PT): (P) 17  PT Discharge Summary  Anticipated Discharge Disposition (PT): (P) home with assist, home with home health    Liliana Bull PT Student  8/2/2023

## 2023-08-02 NOTE — PLAN OF CARE
Goal Outcome Evaluation:  Plan of Care Reviewed With: (P) patient, family           Outcome Evaluation: (P) Pt is a 76 yo female admitted to [facility] with atrial fibrillation. PMH significant of HTN, HLD, and hypothyroidism. Pt is on 2L of oxygen at home. Pt reports to be indepenent and ambulates with a rollator. Pt recently had a triple bipass done. Pt has needed assistance with getting dressed since surgery. Pt's HR was 110 BPM while resting in bed. Pt performed sit-stand SBA and amb 10' to transfer chair CGA. Pt had a steady gait. Pt c/o slight SOA once in chair. HR was 140 BPM. PT intervention necessary to assess decreasing endurance, SOA, balance, and strength deficits. Plan to d/c home with family and HH pending medical progression, will continue to follow.      Anticipated Discharge Disposition (PT): (P) home with assist, home with home health

## 2023-08-02 NOTE — CONSULTS
Electrophysiology Hospital Consult            Patient Name: Ann-Marie Hughes  Age/Sex: 77 y.o. female  : 1945  MRN: [id number]    Date of Admission: 2023  Date of Encounter Visit: 23  Encounter Provider: LONA Ayala  Referring Provider: Abner Stockton DO  Place of Service: [facility] CARDIOLOGY  Patient Care Team:  Rochelle Brown APRN as PCP - General (Internal Medicine)      Subjective:   EP Consultation for: atrial fibrillation     Chief Complaint: shortness of breath, edema    History of Present Illness:  Ann-Marie Hughes is a 77 y.o. female who follows with Dr. Valera in [facility].      She saw him for the first time in 2023 with complaints of dyspnea for one month prior.      She had elevated BNP subsequent echo showed LVEF of 36-40%.  Stress test was indicative of high risk study.      She had left heart cath on  showing multi vessel CAD.      She was transferred to [facility] and underwent CABGx3 and LANE clip with Dr. Alexander on .    Postoperatively she had episodes of atrial fibrillation. We saw her and tried amiodarone but discontinued due to prolonged QT. Recommended beta blocker and anticoagulation. She was mildly symptomatic with her AF and suspected she had episodes prior to surgery but no diagnosis. She was discharged on .               She presented to [place] ED on  with shortness of breath and edema. BNP was elevated, chest xray consistent with effusion. She was also noted to have elevated creatinine, WBC of 21,000, and hgb of 7.5.     According to notes she was having episodes of PAF while in ED. Although initial EKG appears to be NSR (tracing is poor quality). She was diuresed and transferred to [facility]. Since admission she has been in atrial fibrillation with rates 100-120bpm.     EP has been asked to for atrial fibrillation.     Dr. Desir and I saw her this afternoon.  She says that she came  in due to weakness, shortness of breath, and edema.  Does occasionally notice palpitations or irregular heartbeats but says she is not extremely bothered by this.  She says that she currently feels back at baseline.  Her shortness of breath and edema have improved since admission.  She remains in atrial fibrillation with rates 70s to 120 bpm.    Past Medical History:  Past Medical History:   Diagnosis Date    Acne rosacea 08/17/2021    DR.JEFF MOON     Arteriosclerosis of abdominal aorta     B12 deficiency 08/17/2021    DJD (degenerative joint disease)     Hx of smoking 08/17/2021    QUIT IN 1976 AT AGE 31    Hyperlipidemia 08/17/2021    Hypertension     Hypothyroidism     Metabolic syndrome 08/17/2021    KAREN (stress urinary incontinence, female) 08/17/2021    UTI (urinary tract infection) 08/17/2021    Vitamin D deficiency 08/17/2021    Vitamin D deficiency        Past Surgical History:   Procedure Laterality Date    BACK SURGERY  2013    CARDIAC CATHETERIZATION N/A 07/17/2023    Procedure: Left Heart Cath;  Surgeon: Dinh Andres MD;  Location: [facility] CATH INVASIVE LOCATION;  Service: Cardiovascular;  Laterality: N/A;    COLONOSCOPY  2011    CORONARY ARTERY BYPASS GRAFT WITH MITRAL VALVE REPAIR/REPLACEMENT N/A 07/20/2023    Procedure: REZA STERNOTOMY OFF-PUMP CORONARY ARTERY BYPASS GRAFT TIMES        USING LEFFT INTERNAL MAMMARY ARTERY AND     GREATER SAPHENOUS VEIN GRAFT PER EMDOSCOPIC VEIN HARVESTING, MAZE PROCEDURE AND PRP;  Surgeon: Shane Alexander MD;  Location: [facility] CVOR;  Service: Cardiothoracic;  Laterality: N/A;       Home Medications:   Medications Prior to Admission   Medication Sig Dispense Refill Last Dose    apixaban (ELIQUIS) 5 MG tablet tablet Take 1 tablet by mouth Every 12 (Twelve) Hours. Indications: Atrial Fibrillation 60 tablet 2 8/1/2023    aspirin 81 MG EC tablet Take 1 tablet by mouth Daily.   8/1/2023    atorvastatin (LIPITOR) 40 MG tablet Take 1 tablet by mouth Every  Night for 90 days. 90 tablet 0 8/1/2023    buPROPion XL (WELLBUTRIN XL) 300 MG 24 hr tablet TAKE 1 TABLET BY MOUTH DAILY 90 tablet 1 8/1/2023    dapagliflozin Propanediol (Farxiga) 10 MG tablet Take 10 mg by mouth Daily. 30 tablet 3 8/1/2023    doxycycline (MONODOX) 100 MG capsule Take 1 capsule by mouth Every 12 (Twelve) Hours for 11 doses. Indications: Infection of the Skin and/or Soft Tissue 11 capsule 0 8/1/2023    furosemide (Lasix) 40 MG tablet Take 1 tablet by mouth Daily. 30 tablet 0 8/1/2023    metFORMIN ER (GLUCOPHAGE-XR) 500 MG 24 hr tablet TAKE 1 TABLET BY MOUTH EVERY MORNING AND 2 TABLETS AT SUPPER (Patient taking differently: 1 tablet Daily With Breakfast.) 270 tablet 4 Past Week    metFORMIN ER (GLUCOPHAGE-XR) 500 MG 24 hr tablet Take 2 tablets by mouth Daily With Dinner.   Past Week    metoprolol succinate XL (TOPROL-XL) 25 MG 24 hr tablet Take 0.5 tablets by mouth Every 12 (Twelve) Hours for 90 days. 30 tablet 2 8/1/2023    montelukast (SINGULAIR) 10 MG tablet Take 1 tablet by mouth Daily. 90 tablet 3 8/1/2023    Probiotic Product (PROBIOTIC PO) Take 1 capsule by mouth Daily.   8/1/2023    spironolactone (ALDACTONE) 25 MG tablet Take 1 tablet by mouth Daily. 90 tablet 3 8/1/2023    Synthroid 75 MCG tablet Take 1 tablet by mouth Daily. 90 tablet 3 8/1/2023    acetaminophen (TYLENOL) 325 MG tablet Take 2 tablets by mouth Every 4 (Four) Hours As Needed for Mild Pain.   More than a month    albuterol sulfate  (90 Base) MCG/ACT inhaler Inhale 2 puffs Every 4 (Four) Hours As Needed for Wheezing. 18 g 0 More than a month       Allergies:  Allergies   Allergen Reactions    Penicillins Palpitations     1. When was your reaction? > 10 years ago  2. Did your reaction happen after the first dose or after several doses? After first dose  3. Did your reaction require ED or hospital care to manage your reaction? Do not know  4. Did your reaction require treatment with epinephrine? Do not know  5. Have you  taken amoxicillin (Amoxil) or amoxicillin-clavulanate (Augmentin) without issue since? Yes  6. Have you taken cephalexin (Keflex) without issue since?  Do not know        Past Social History:  Social History     Socioeconomic History    Marital status:    Tobacco Use    Smoking status: Former     Packs/day: 1.00     Years: 12.00     Pack years: 12.00     Types: Cigarettes     Quit date:      Years since quittin.6    Smokeless tobacco: Never   Vaping Use    Vaping Use: Never used   Substance and Sexual Activity    Alcohol use: Never    Drug use: Never    Sexual activity: Defer       Past Family History:  No family history on file.    Review of Systems: All systems reviewed. Pertinent positives identified in HPI. All other systems are negative.     14 point ROS was performed and is negative except as outlined in HPI.     Objective:     Objective:  Vital Signs (last 24 hours)          0700   0659  0700  08/ 1056   Most Recent      Temp (øF)   98.1      97.3     97.3 (36.3) / 0734    Heart Rate   73      56     56 08/ 0734    Resp   18      18     18 08/ 0734    BP   106/84      132/77     132/77 / 0734    SpO2 (%)   100      100     100 / 0734          Temp:  [97.3 øF (36.3 øC)-98.2 øF (36.8 øC)] 97.3 øF (36.3 øC)  Heart Rate:  [] 56  Resp:  [16-18] 18  BP: (106-132)/(60-84) 132/77  Body mass index is 34.44 kg/mý.        Physical Exam:     General Appearance: No acute distress, well developed and well nourished.   Eyes: Conjunctiva and lids: No erythema, swelling, or discharge. Sclera non-icteric.   HENT: Atraumatic, normocephalic. External eyes, ears, and nose normal.   Respiratory: No signs of respiratory distress. Respiration rhythm and depth normal.   Clear to auscultation. No rales, crackles, rhonchi, or wheezing auscultated.   Cardiovascular:  Heart Rate and Rhythm: irregularly irregular, Heart Sounds: Normal S1 and S2. No S3 or S4  noted.  Gastrointestinal:  Abdomen soft, non-distended, non-tender.  Musculoskeletal: Normal movement of extremities  Skin: Warm and dry.   Psychiatric: Patient alert and oriented to person, place, and time. Speech and behavior appropriate. Normal mood and affect.    Labs:   Lab Review:     Results from last 7 days   Lab Units 08/02/23  0847 08/01/23  0605 07/31/23  1413 07/31/23  1203 07/28/23  0257 07/27/23  1611 07/27/23  1027 07/27/23  0305   SODIUM mmol/L 142 137 134* 134* 137  --   --  138   POTASSIUM mmol/L 4.2 4.2 4.2 4.4 4.2 4.2 3.7 3.5   CHLORIDE mmol/L 102 101 100 100 105  --   --  104   CO2 mmol/L 25.6 24.0 20.8* 19.4* 20.0*  --   --  21.2*   BUN mg/dL 56* 60* 56* 58* 34*  --   --  35*   CREATININE mg/dL 1.21* 1.33* 1.20* 1.20* 1.08*  --   --  1.18*   GLUCOSE mg/dL 176* 119* 140* 151* 126*  --   --  129*   CALCIUM mg/dL 9.3 8.5* 8.7 8.7 8.3*  --   --  8.1*   AST (SGOT) U/L 13  --  19 18  --   --   --   --    ALT (SGPT) U/L 11  --  12 12  --   --   --   --      Results from last 7 days   Lab Units 08/01/23  0605 07/31/23  1413 07/31/23  1203   HSTROP T ng/L 155* 143* 142*     Results from last 7 days   Lab Units 08/02/23  0847   WBC 10*3/mm3 17.27*   HEMOGLOBIN g/dL 8.1*   HEMATOCRIT % 24.5*   PLATELETS 10*3/mm3 365             Results from last 7 days   Lab Units 08/02/23  0847   MAGNESIUM mg/dL 1.9         Results from last 7 days   Lab Units 07/31/23  1203   PROBNP pg/mL 27,235.0*                 EKG:               I personally viewed and interpreted the patient's EKG/Telemetry tracings.    Assessment:       Atrial fibrillation        Plan:   Dr. Meyers and I saw this patient.        She had CABG approximately 1 week ago and subsequently developed paroxysmal atrial fibrillation.  He was discharged and came back to the ED with edema, shortness of breath, and was in atrial fibrillation at that time.  We feel that the A-fib is likely contributing to her presentation but probably not the main cause.           Not really candidate for rhythm control at this time given her current health status.  She did not tolerate amiodarone due to QT prolongation.    We recommended focusing on rate control as she is minimally symptomatic with her atrial fibrillation.          I increased her metoprolol to 25 mg twice daily.  I do not think digoxin is a good long-term solution given her current DAVID but I do think we could use it in a short-term capacity and will discuss with nephrology.    We will continue to follow along and adjust medications as needed.          Thank you for allowing me to participate in the care of Ann-Marie Hughes. Feel free to contact me directly with any further questions or concerns.    LONA Ayala  [facility]  08/02/23  10:56 EDT

## 2023-08-02 NOTE — INTERVAL H&P NOTE
H&P updated. The patient was examined and the following changes are noted:  Patient has op CABG with Dr. Alexander on 7/20. She was discharged home with home health on 7/28. She subsequently [facility] with c/o shortness of breath. BNP was elevated and CXR was consistent with edema. She was also noted to be in atrial fibrillation. She was IV diuresed with improvement in symptoms. She was transferred here for further work up.

## 2023-08-02 NOTE — CONSULTS
Patient Care Team:  Rochelle Brown APRN as PCP - General (Internal Medicine)    Chief complaint: CKD3        History of Present Illness  Patient is a 76 yo female who was transferred here form [facility] for further evaluation and treatment. She had presented there with worsening SOB and lower extremity edema. She was found to be hypoxic in ED and with pulmonary edema on CXR. She was started on IV diuretics with improvement in symptoms. Given her recent CABG on 7/20/23 she was transferred here once bed was available.     She was seen by us during her last admission for CKD3b with baseline Cr about 1.0. she did develop post op DAVID.   Her Cr is now 1.2   She reports her edema is improving and SOB is better    Review of Systems   Review of Systems - History obtained from chart review and the patient  General ROS: negative  Psychological ROS: negative  Ophthalmic ROS: negative  ENT ROS: negative  Allergy and Immunology ROS: negative  Hematological and Lymphatic ROS: negative  Endocrine ROS: negative  Respiratory ROS: per HPI  Cardiovascular ROS: no chest pain or dyspnea on exertion  Gastrointestinal ROS: no abdominal pain, change in bowel habits, or black or bloody stools  Genito-Urinary ROS: no dysuria, trouble voiding, or hematuria  Musculoskeletal ROS: edema in lower extremities, improved  Neurological ROS: no TIA or stroke symptoms  Dermatological ROS: negative   Past Medical History:   Diagnosis Date    Acne rosacea 08/17/2021    DR.JEFF MOON     Arteriosclerosis of abdominal aorta     B12 deficiency 08/17/2021    DJD (degenerative joint disease)     Hx of smoking 08/17/2021    QUIT IN 1976 AT AGE 31    Hyperlipidemia 08/17/2021    Hypertension     Hypothyroidism     Metabolic syndrome 08/17/2021    KAREN (stress urinary incontinence, female) 08/17/2021    UTI (urinary tract infection) 08/17/2021    Vitamin D deficiency 08/17/2021    Vitamin D deficiency    ,   Past Surgical History:   Procedure  Laterality Date    BACK SURGERY      CARDIAC CATHETERIZATION N/A 2023    Procedure: Left Heart Cath;  Surgeon: Dinh Andres MD;  Location: [facility] CATH INVASIVE LOCATION;  Service: Cardiovascular;  Laterality: N/A;    COLONOSCOPY      CORONARY ARTERY BYPASS GRAFT WITH MITRAL VALVE REPAIR/REPLACEMENT N/A 2023    Procedure: REZA STERNOTOMY OFF-PUMP CORONARY ARTERY BYPASS GRAFT TIMES        USING LEFFT INTERNAL MAMMARY ARTERY AND     GREATER SAPHENOUS VEIN GRAFT PER EMDOSCOPIC VEIN HARVESTING, MAZE PROCEDURE AND PRP;  Surgeon: Shane Alexander MD;  Location: [facility] CVOR;  Service: Cardiothoracic;  Laterality: N/A;   , No family history on file.,   Social History     Socioeconomic History    Marital status:    Tobacco Use    Smoking status: Former     Packs/day: 1.00     Years: 12.00     Pack years: 12.00     Types: Cigarettes     Quit date:      Years since quittin.6    Smokeless tobacco: Never   Vaping Use    Vaping Use: Never used   Substance and Sexual Activity    Alcohol use: Never    Drug use: Never    Sexual activity: Defer     E-cigarette/Vaping    E-cigarette/Vaping Use Never User      E-cigarette/Vaping Substances    Nicotine No     THC No     CBD No     Flavoring No      E-cigarette/Vaping Devices    Disposable No     Pre-filled or Refillable Cartridge No     Refillable Tank No     Pre-filled Pod No          ,   Medications Prior to Admission   Medication Sig Dispense Refill Last Dose    apixaban (ELIQUIS) 5 MG tablet tablet Take 1 tablet by mouth Every 12 (Twelve) Hours. Indications: Atrial Fibrillation 60 tablet 2 2023    aspirin 81 MG EC tablet Take 1 tablet by mouth Daily.   2023    atorvastatin (LIPITOR) 40 MG tablet Take 1 tablet by mouth Every Night for 90 days. 90 tablet 0 2023    buPROPion XL (WELLBUTRIN XL) 300 MG 24 hr tablet TAKE 1 TABLET BY MOUTH DAILY 90 tablet 1 2023    dapagliflozin Propanediol (Farxiga) 10 MG tablet Take 10 mg by  mouth Daily. 30 tablet 3 8/1/2023    doxycycline (MONODOX) 100 MG capsule Take 1 capsule by mouth Every 12 (Twelve) Hours for 11 doses. Indications: Infection of the Skin and/or Soft Tissue 11 capsule 0 8/1/2023    furosemide (Lasix) 40 MG tablet Take 1 tablet by mouth Daily. 30 tablet 0 8/1/2023    metFORMIN ER (GLUCOPHAGE-XR) 500 MG 24 hr tablet TAKE 1 TABLET BY MOUTH EVERY MORNING AND 2 TABLETS AT SUPPER (Patient taking differently: 1 tablet Daily With Breakfast.) 270 tablet 4 Past Week    metFORMIN ER (GLUCOPHAGE-XR) 500 MG 24 hr tablet Take 2 tablets by mouth Daily With Dinner.   Past Week    metoprolol succinate XL (TOPROL-XL) 25 MG 24 hr tablet Take 0.5 tablets by mouth Every 12 (Twelve) Hours for 90 days. 30 tablet 2 8/1/2023    montelukast (SINGULAIR) 10 MG tablet Take 1 tablet by mouth Daily. 90 tablet 3 8/1/2023    Probiotic Product (PROBIOTIC PO) Take 1 capsule by mouth Daily.   8/1/2023    spironolactone (ALDACTONE) 25 MG tablet Take 1 tablet by mouth Daily. 90 tablet 3 8/1/2023    Synthroid 75 MCG tablet Take 1 tablet by mouth Daily. 90 tablet 3 8/1/2023    acetaminophen (TYLENOL) 325 MG tablet Take 2 tablets by mouth Every 4 (Four) Hours As Needed for Mild Pain.   More than a month    albuterol sulfate  (90 Base) MCG/ACT inhaler Inhale 2 puffs Every 4 (Four) Hours As Needed for Wheezing. 18 g 0 More than a month   , Scheduled Meds:  apixaban, 5 mg, Oral, Q12H  aspirin, 81 mg, Oral, Daily  atorvastatin, 40 mg, Oral, Nightly  buPROPion XL, 300 mg, Oral, Daily  cefTRIAXone, 2,000 mg, Intravenous, Q24H  fluticasone, 2 spray, Each Nare, Daily  furosemide, 40 mg, Intravenous, Once  insulin lispro, 2-9 Units, Subcutaneous, 4x Daily AC & at Bedtime  levothyroxine, 75 mcg, Oral, Q AM  metoprolol succinate XL, 25 mg, Oral, Q12H  montelukast, 10 mg, Oral, Daily  senna-docusate sodium, 2 tablet, Oral, BID  sodium chloride, 10 mL, Intravenous, Q12H   , Continuous Infusions:   , PRN Meds:    acetaminophen     senna-docusate sodium **AND** polyethylene glycol **AND** bisacodyl **AND** bisacodyl    dextrose    dextrose    glucagon (human recombinant)    nitroglycerin    ondansetron    sodium chloride    sodium chloride, and Allergies:  Penicillins    Objective     Vital Signs  Temp:  [97.3 øF (36.3 øC)-98.1 øF (36.7 øC)] 97.3 øF (36.3 øC)  Heart Rate:  [] 56  Resp:  [16-18] 18  BP: (106-132)/(73-84) 132/77    No intake/output data recorded.  I/O last 3 completed shifts:  In: -   Out: 280 [Urine:280]    Physical Exam  Physical Examination:   General appearance - alert, well appearing, and in no distress. Siting in recliner   Mental status - alert, oriented to person, place, and time  Eyes - pupils equal and reactive, extraocular eye movements intact  Chest - clear to auscultation, no wheezes, rales or rhonchi, symmetric air entry. Sternal incision clean and dry   Heart - normal rate, regular rhythm, normal S1, S2, no murmurs, rubs, clicks or gallops  Abdomen - soft, nontender, nondistended, no masses or organomegaly  Neurological - alert, oriented, normal speech, no focal findings or movement disorder noted  Extremities - + edema. Some resolving bruising in leg. Incision clean and dry.    Results Review:    I reviewed the patient's new clinical results.    WBC WBC   Date Value Ref Range Status   08/02/2023 17.27 (H) 3.40 - 10.80 10*3/mm3 Final   08/01/2023 20.86 (H) 3.40 - 10.80 10*3/mm3 Final   07/31/2023 25.50 (H) 3.40 - 10.80 10*3/mm3 Final      HGB Hemoglobin   Date Value Ref Range Status   08/02/2023 8.1 (L) 12.0 - 15.9 g/dL Final   08/01/2023 7.5 (L) 12.0 - 15.9 g/dL Final   07/31/2023 8.2 (L) 12.0 - 15.9 g/dL Final      HCT Hematocrit   Date Value Ref Range Status   08/02/2023 24.5 (L) 34.0 - 46.6 % Final   08/01/2023 23.4 (L) 34.0 - 46.6 % Final   07/31/2023 24.9 (L) 34.0 - 46.6 % Final      Platlets No results found for: LABPLAT   MCV MCV   Date Value Ref Range Status   08/02/2023 91.4 79.0 - 97.0 fL Final    08/01/2023 95.9 79.0 - 97.0 fL Final   07/31/2023 93.3 79.0 - 97.0 fL Final          Sodium Sodium   Date Value Ref Range Status   08/02/2023 142 136 - 145 mmol/L Final   08/01/2023 137 136 - 145 mmol/L Final   07/31/2023 134 (L) 136 - 145 mmol/L Final   07/31/2023 134 (L) 136 - 145 mmol/L Final      Potassium Potassium   Date Value Ref Range Status   08/02/2023 4.2 3.5 - 5.2 mmol/L Final   08/01/2023 4.2 3.5 - 5.2 mmol/L Final   07/31/2023 4.2 3.5 - 5.2 mmol/L Final     Comment:     Slight hemolysis detected by analyzer. Results may be affected.   07/31/2023 4.4 3.5 - 5.2 mmol/L Final     Comment:     Slight hemolysis detected by analyzer. Results may be affected.      Chloride Chloride   Date Value Ref Range Status   08/02/2023 102 98 - 107 mmol/L Final   08/01/2023 101 98 - 107 mmol/L Final   07/31/2023 100 98 - 107 mmol/L Final   07/31/2023 100 98 - 107 mmol/L Final      CO2 CO2   Date Value Ref Range Status   08/02/2023 25.6 22.0 - 29.0 mmol/L Final   08/01/2023 24.0 22.0 - 29.0 mmol/L Final   07/31/2023 20.8 (L) 22.0 - 29.0 mmol/L Final   07/31/2023 19.4 (L) 22.0 - 29.0 mmol/L Final      BUN BUN   Date Value Ref Range Status   08/02/2023 56 (H) 8 - 23 mg/dL Final   08/01/2023 60 (H) 8 - 23 mg/dL Final   07/31/2023 56 (H) 8 - 23 mg/dL Final   07/31/2023 58 (H) 8 - 23 mg/dL Final      Creatinine Creatinine   Date Value Ref Range Status   08/02/2023 1.21 (H) 0.57 - 1.00 mg/dL Final   08/01/2023 1.33 (H) 0.57 - 1.00 mg/dL Final   07/31/2023 1.20 (H) 0.57 - 1.00 mg/dL Final   07/31/2023 1.20 (H) 0.57 - 1.00 mg/dL Final      Calcium Calcium   Date Value Ref Range Status   08/02/2023 9.3 8.6 - 10.5 mg/dL Final   08/01/2023 8.5 (L) 8.6 - 10.5 mg/dL Final   07/31/2023 8.7 8.6 - 10.5 mg/dL Final   07/31/2023 8.7 8.6 - 10.5 mg/dL Final      PO4 No results found for: CAPO4   Albumin Albumin   Date Value Ref Range Status   08/02/2023 3.5 3.5 - 5.2 g/dL Final   07/31/2023 3.5 3.5 - 5.2 g/dL Final   07/31/2023 3.6 3.5 -  5.2 g/dL Final      Magnesium Magnesium   Date Value Ref Range Status   08/02/2023 1.9 1.6 - 2.4 mg/dL Final   08/01/2023 1.9 1.6 - 2.4 mg/dL Final   07/31/2023 1.9 1.6 - 2.4 mg/dL Final      Uric Acid No results found for: URICACID         Assessment & Plan       Atrial fibrillation      Assessment & Plan  DAVID  CKD23b  Volume overload   Anemia   Leukocytosis, on abx  CAD s/p CABG x3 on 7/20/23  Ischemic cardiomyopathy with EF 36%  DM  HTN  Atrial fibrillation       Renal function close to her baseline (around 1.0)  SOB and edema improving, but still with edema in lower extremities   Continue with current diuretic regimen   Monitor renal function and volume status  Renally dose medications   Will follow   Thanks for referral     I discussed the patients findings and my recommendations with patient and family    Sarita Small MD  08/02/23  12:23 EDT

## 2023-08-02 NOTE — PROGRESS NOTES
LOS: 0 days   Patient Care Team:  Rochelle Brown APRN as PCP - General (Internal Medicine)    Chief Complaint: post op    Subjective:  Symptoms:  She reports shortness of breath.  No cough or chest pain.    Diet:  Adequate intake.  No nausea or vomiting.    Activity level: Impaired due to weakness.    Pain:  She reports no pain.      Reports mild shortness of breath with exertion, but feels that it is improving  Vital Signs  Temp:  [97.3 øF (36.3 øC)-98.2 øF (36.8 øC)] 97.3 øF (36.3 øC)  Heart Rate:  [] 56  Resp:  [16-18] 18  BP: (106-132)/(60-84) 132/77  Body mass index is 34.44 kg/mý.    Intake/Output Summary (Last 24 hours) at 8/2/2023 0948  Last data filed at 8/2/2023 0624  Gross per 24 hour   Intake --   Output 280 ml   Net -280 ml     No intake/output data recorded.          08/01/23  2254   Weight: 91 kg (200 lb 9.9 oz)     Objective:  General Appearance:  Comfortable and in no acute distress.    Vital signs: (most recent): Blood pressure 132/77, pulse 56, temperature 97.3 øF (36.3 øC), temperature source Oral, resp. rate 18, weight 91 kg (200 lb 9.9 oz), SpO2 100 %, not currently breastfeeding.  Vital signs are normal.  No fever.    Output: Producing urine and producing stool.    Lungs:  Normal effort and normal respiratory rate.  She is not in respiratory distress.  There are decreased breath sounds.    Heart: Normal rate.  Irregular rhythm.    Abdomen: Abdomen is soft.  Bowel sounds are normal.     Extremities: There is dependent edema (mild bilateral LE).    Pulses: Distal pulses are intact.    Neurological: Patient is alert and oriented to person, place and time.    Skin:  Warm and dry.  (Sternal incision clean and dry  Bilateral lower extremity incisions clean, dry, and intact. Steri strips in placed on left calf incision. Ecchymosis noted on bilateral LE)        Results Review:        WBC WBC   Date Value Ref Range Status   08/02/2023 17.27 (H) 3.40 - 10.80 10*3/mm3 Final   08/01/2023 20.86 (H)  3.40 - 10.80 10*3/mm3 Final   07/31/2023 25.50 (H) 3.40 - 10.80 10*3/mm3 Final      HGB Hemoglobin   Date Value Ref Range Status   08/02/2023 8.1 (L) 12.0 - 15.9 g/dL Final   08/01/2023 7.5 (L) 12.0 - 15.9 g/dL Final   07/31/2023 8.2 (L) 12.0 - 15.9 g/dL Final      HCT Hematocrit   Date Value Ref Range Status   08/02/2023 24.5 (L) 34.0 - 46.6 % Final   08/01/2023 23.4 (L) 34.0 - 46.6 % Final   07/31/2023 24.9 (L) 34.0 - 46.6 % Final      Platelets Platelets   Date Value Ref Range Status   08/02/2023 365 140 - 450 10*3/mm3 Final   08/01/2023 341 140 - 450 10*3/mm3 Final   07/31/2023 376 140 - 450 10*3/mm3 Final        PT/INR:  No results found for: PROTIME/No results found for: INR    Sodium Sodium   Date Value Ref Range Status   08/02/2023 142 136 - 145 mmol/L Final   08/01/2023 137 136 - 145 mmol/L Final   07/31/2023 134 (L) 136 - 145 mmol/L Final   07/31/2023 134 (L) 136 - 145 mmol/L Final      Potassium Potassium   Date Value Ref Range Status   08/02/2023 4.2 3.5 - 5.2 mmol/L Final   08/01/2023 4.2 3.5 - 5.2 mmol/L Final   07/31/2023 4.2 3.5 - 5.2 mmol/L Final     Comment:     Slight hemolysis detected by analyzer. Results may be affected.   07/31/2023 4.4 3.5 - 5.2 mmol/L Final     Comment:     Slight hemolysis detected by analyzer. Results may be affected.      Chloride Chloride   Date Value Ref Range Status   08/02/2023 102 98 - 107 mmol/L Final   08/01/2023 101 98 - 107 mmol/L Final   07/31/2023 100 98 - 107 mmol/L Final   07/31/2023 100 98 - 107 mmol/L Final      Bicarbonate CO2   Date Value Ref Range Status   08/02/2023 25.6 22.0 - 29.0 mmol/L Final   08/01/2023 24.0 22.0 - 29.0 mmol/L Final   07/31/2023 20.8 (L) 22.0 - 29.0 mmol/L Final   07/31/2023 19.4 (L) 22.0 - 29.0 mmol/L Final      BUN BUN   Date Value Ref Range Status   08/02/2023 56 (H) 8 - 23 mg/dL Final   08/01/2023 60 (H) 8 - 23 mg/dL Final   07/31/2023 56 (H) 8 - 23 mg/dL Final   07/31/2023 58 (H) 8 - 23 mg/dL Final      Creatinine Creatinine    Date Value Ref Range Status   08/02/2023 1.21 (H) 0.57 - 1.00 mg/dL Final   08/01/2023 1.33 (H) 0.57 - 1.00 mg/dL Final   07/31/2023 1.20 (H) 0.57 - 1.00 mg/dL Final   07/31/2023 1.20 (H) 0.57 - 1.00 mg/dL Final      Calcium Calcium   Date Value Ref Range Status   08/02/2023 9.3 8.6 - 10.5 mg/dL Final   08/01/2023 8.5 (L) 8.6 - 10.5 mg/dL Final   07/31/2023 8.7 8.6 - 10.5 mg/dL Final   07/31/2023 8.7 8.6 - 10.5 mg/dL Final      Magnesium Magnesium   Date Value Ref Range Status   08/02/2023 1.9 1.6 - 2.4 mg/dL Final   08/01/2023 1.9 1.6 - 2.4 mg/dL Final   07/31/2023 1.9 1.6 - 2.4 mg/dL Final          apixaban, 5 mg, Oral, Q12H  aspirin, 81 mg, Oral, Daily  atorvastatin, 40 mg, Oral, Nightly  buPROPion XL, 300 mg, Oral, Daily  cefTRIAXone, 2,000 mg, Intravenous, Q24H  levothyroxine, 75 mcg, Oral, Q AM  metoprolol succinate XL, 12.5 mg, Oral, Q12H  montelukast, 10 mg, Oral, Daily  senna-docusate sodium, 2 tablet, Oral, BID  sodium chloride, 10 mL, Intravenous, Q12H         Assessment & Plan  -admit with dyspnea  - paroxsymal atrial fibrillation with RVR;   - acute on chronic HFrEF  -Severe multivessel CAD s/p off-pump CABG x3/ LANE clipping 7/20 with Dr. Alexander  -Moderate MR  -Ischemic cardiomyopathy---EF 36%; GDMT as tolerated  -HTN  -HLD  -DM II  -Hypothyroidism   -Former tobacco abuse   -CKD IIIa  -chronic anemia worsened by post op ABL   -TCP--consumptive; resolved  -leukocytosis--reactive    Lab worked ordered  With atrial fibrillation this morning. EP evaluated her after surgery, will have them see her. Continue metoprolol and Eliquis at this time  Nephrology to see for diuretic management given CKD  WBC elevated, and patient on course of rocephin at [place]. Will continue for a total of 7 days  Paint all incisions with betadine daily  Transfer to CVI/CVU when bed available  Continue routine care    LONA Mosqueda  08/02/23  09:48 EDT

## 2023-08-02 NOTE — PLAN OF CARE
Goal Outcome Evaluation:  Plan of Care Reviewed With: patient, family        Progress: no change  Outcome Evaluation: Pt is a 76 yo female admitted with SOA and afib. She was transferred to [facility] from [facility]. Hx recent CABGx3 and LANE clip on 7/20. Pt reports prior to surgery, she was (I) with ADLs and using rollator at baseline. she has required assist with dressing since surgery, was at home for short period before re-admission to hospital. Pt reports she has been wearing 2L continuous O2. Pt presenting this date below baseline with decreased act tolerance, UE strength, and overall (I) and safety with ADLs. Pt completed bed mob with mod (I), 105-110 bpm while seated at EOB. STS with SBA and short distance to transport chair with CGA with RWx, HR increase to 140 bpm and increased SOA. Continue to follow as pt will continue to benefit from skilled OT to address goals and maximize safety and (I) prior to d/c. Plans home with family support and HHOT.      Anticipated Discharge Disposition (OT): home, home with assist, home with home health

## 2023-08-02 NOTE — THERAPY EVALUATION
Patient Name: Ann-Marie Hughes  : 1945    MRN: [id number]                              Today's Date: 2023       Admit Date: 2023    Visit Dx: No diagnosis found.  Patient Active Problem List   Diagnosis    Arthritis    Bilateral leg pain    Bladder disorder    Chronic pain syndrome    Depression    Diabetes mellitus, type II    Type 2 diabetes mellitus with hyperglycemia    Gastric reflux    Herniated disc    Hypertension    Hypothyroidism    Low back pain    Pain in joint, multiple sites    Pain in soft tissues of limb    Shortness of breath    Hyperlipidemia    B12 deficiency    Vitamin D deficiency    UTI (urinary tract infection)    Metabolic syndrome    Acne rosacea    Hx of smoking    Stress incontinence of urine    Arteriosclerosis of abdominal aorta    Iron deficiency anemia secondary to inadequate dietary iron intake    Seasonal allergies    Hyponatremia    Leg length discrepancy    Type 2 diabetes mellitus with hyperglycemia    Reactive depression    Encounter for subsequent annual wellness visit (AWV) in Medicare patient    Class 1 obesity with alveolar hypoventilation, serious comorbidity, and body mass index (BMI) of 32.0 to 32.9 in adult    Heart failure with reduced ejection fraction    Coronary artery disease of native artery of native heart with stable angina pectoris    Chest pain    Chronic HFrEF (heart failure with reduced ejection fraction)    NSTEMI (non-ST elevated myocardial infarction)    Coronary heart disease    Abnormal findings on diagnostic imaging of heart and coronary circulation    Acute on chronic HFrEF (heart failure with reduced ejection fraction)    Acute respiratory failure with hypoxia    CAD status post CABG x3 vessels    PAF (paroxysmal atrial fibrillation)    Atrial fibrillation     Past Medical History:   Diagnosis Date    Acne rosacea 2021    DR.JEFF MOON     Arteriosclerosis of abdominal aorta     B12 deficiency 2021    JEANINE  (degenerative joint disease)     Hx of smoking 08/17/2021    QUIT IN 1976 AT AGE 31    Hyperlipidemia 08/17/2021    Hypertension     Hypothyroidism     Metabolic syndrome 08/17/2021    KAREN (stress urinary incontinence, female) 08/17/2021    UTI (urinary tract infection) 08/17/2021    Vitamin D deficiency 08/17/2021    Vitamin D deficiency      Past Surgical History:   Procedure Laterality Date    BACK SURGERY  2013    CARDIAC CATHETERIZATION N/A 07/17/2023    Procedure: Left Heart Cath;  Surgeon: Dinh Andres MD;  Location:  [facility];  Service: Cardiovascular;  Laterality: N/A;    COLONOSCOPY  2011    CORONARY ARTERY BYPASS GRAFT WITH MITRAL VALVE REPAIR/REPLACEMENT N/A 07/20/2023    Procedure: REZA STERNOTOMY OFF-PUMP CORONARY ARTERY BYPASS GRAFT TIMES        USING LEFFT INTERNAL MAMMARY ARTERY AND     GREATER SAPHENOUS VEIN GRAFT PER EMDOSCOPIC VEIN HARVESTING, MAZE PROCEDURE AND PRP;  Surgeon: Shane Alexander MD;  Location: [facility];  Service: Cardiothoracic;  Laterality: N/A;      General Information       Row Name 08/02/23 1534          OT Time and Intention    Document Type evaluation  -MW     Mode of Treatment co-treatment;occupational therapy  -MW       Row Name 08/02/23 1534          General Information    Patient Profile Reviewed yes  -MW     Prior Level of Function independent:  prior to CABG on 7/20 pt reports (I) with ADLs and func mob with rollator  -MW     Existing Precautions/Restrictions cardiac;fall  afib, CABG 7/20  -MW     Barriers to Rehab medically complex  -MW       Row Name 08/02/23 1534          Living Environment    People in Home spouse  -MW       Row Name 08/02/23 1534          Cognition    Orientation Status (Cognition) oriented x 4  -MW       Row Name 08/02/23 1534          Safety Issues, Functional Mobility    Impairments Affecting Function (Mobility) balance;endurance/activity tolerance;shortness of breath;strength  -MW               User Key  (r) =  Recorded By, (t) = Taken By, (c) = Cosigned By      Initials Name Provider Type    Rhiannon Brooks OT Occupational Therapist                     Mobility/ADL's       Row Name 08/02/23 1536          Bed Mobility    Bed Mobility supine-sit  -     Supine-Sit Tift (Bed Mobility) modified independence  -     Assistive Device (Bed Mobility) head of bed elevated;bed rails  -     Comment, (Bed Mobility) SBA for sit balance  -       Row Name 08/02/23 1536          Transfers    Transfers sit-stand transfer  -       Row Name 08/02/23 1536          Sit-Stand Transfer    Sit-Stand Tift (Transfers) standby assist  -     Assistive Device (Sit-Stand Transfers) walker, front-wheeled  -       Row Name 08/02/23 1536          Functional Mobility    Functional Mobility- Ind. Level standby assist  -     Functional Mobility- Device walker, front-wheeled  -MW     Functional Mobility- Comment short distance to transport chair, increased SOA with activity  -[facility] Name 08/02/23 1536          Activities of Daily Living    BADL Assessment/Intervention lower body dressing;feeding;toileting  -[facility] Name 08/02/23 1536          Lower Body Dressing Assessment/Training    Tift Level (Lower Body Dressing) socks  -     Comment, (Lower Body Dressing) socks donned on arrival  -[facility] Name 08/02/23 1536          Self-Feeding Assessment/Training    Tift Level (Feeding) feeding skills  -     Comment, (Feeding) BUE hand to mouth WFL  -[facility] Name 08/02/23 1536          Toileting Assessment/Training    Comment, (Toileting) brief and purewick donned, able to adjust brief min A in static stand  -               User Key  (r) = Recorded By, (t) = Taken By, (c) = Cosigned By      Initials Name Provider Type    Rhiannon Brooks OT Occupational Therapist                   Obj/Interventions       Row Name 08/02/23 1538          Sensory Assessment (Somatosensory)    Sensory  Assessment (Somatosensory) UE sensation intact  -       Row Name 08/02/23 1538          Vision Assessment/Intervention    Visual Impairment/Limitations WFL;corrective lenses full-time  -       Row Name 08/02/23 1538          Range of Motion Comprehensive    General Range of Motion bilateral upper extremity ROM WNL  -[facility] Name 08/02/23 1538          Strength Comprehensive (MMT)    General Manual Muscle Testing (MMT) Assessment upper extremity strength deficits identified  -     Comment, General Manual Muscle Testing (MMT) Assessment generalized weakness BUE noted  -       Row Name 08/02/23 1538          Motor Skills    Motor Skills functional endurance;coordination  -     Coordination WFL;bilateral;upper extremity  -     Functional Endurance poor  -[facility] Name 08/02/23 1538          Balance    Balance Assessment sitting static balance;sitting dynamic balance;sit to stand dynamic balance;standing dynamic balance;standing static balance  -     Static Sitting Balance standby assist  -     Dynamic Sitting Balance standby assist  -     Position, Sitting Balance sitting edge of bed  -     Sit to Stand Dynamic Balance contact guard  -     Static Standing Balance standby assist  -     Dynamic Standing Balance contact guard  -     Position/Device Used, Standing Balance supported;walker, front-wheeled  -     Balance Interventions sitting;standing;sit to stand;supported;static;dynamic;minimal challenge  -     Comment, Balance no overt LOBs  -               User Key  (r) = Recorded By, (t) = Taken By, (c) = Cosigned By      Initials Name Provider Type    Rhiannon Brooks OT Occupational Therapist                   Goals/Plan       Row Name 08/02/23 1544          Transfer Goal 1 (OT)    Activity/Assistive Device (Transfer Goal 1, OT) transfers, all  -     Onset Level/Cues Needed (Transfer Goal 1, OT) modified independence  -     Time Frame (Transfer Goal 1, OT) short  term goal (STG);2 weeks  -MW     Progress/Outcome (Transfer Goal 1, OT) goal ongoing  -       Row Name 08/02/23 1544          Dressing Goal 1 (OT)    Activity/Device (Dressing Goal 1, OT) upper body dressing;lower body dressing  -MW     Winkler/Cues Needed (Dressing Goal 1, OT) standby assist  -MW     Time Frame (Dressing Goal 1, OT) short term goal (STG);2 weeks  -MW     Progress/Outcome (Dressing Goal 1, OT) goal ongoing  -       Row Name 08/02/23 1544          Toileting Goal 1 (OT)    Activity/Device (Toileting Goal 1, OT) toileting skills, all  -MW     Winkler Level/Cues Needed (Toileting Goal 1, OT) modified independence  -MW     Time Frame (Toileting Goal 1, OT) short term goal (STG);2 weeks  -MW     Progress/Outcome (Toileting Goal 1, OT) goal ongoing  -       Row Name 08/02/23 1544          Grooming Goal 1 (OT)    Activity/Device (Grooming Goal 1, OT) grooming skills, all  -MW     Winkler (Grooming Goal 1, OT) modified independence  -MW     Time Frame (Grooming Goal 1, OT) short term goal (STG);2 weeks  -MW     Progress/Outcome (Grooming Goal 1, OT) goal ongoing  -       Row Name 08/02/23 1544          Strength Goal 1 (OT)    Strength Goal 1 (OT) improve BUE strength to 4+/5  -MW     Time Frame (Strength Goal 1, OT) short term goal (STG);2 weeks  -MW     Progress/Outcome (Strength Goal 1, OT) goal ongoing  -       Row Name 08/02/23 1544          Therapy Assessment/Plan (OT)    Planned Therapy Interventions (OT) activity tolerance training;functional balance retraining;BADL retraining;neuromuscular control/coordination retraining;patient/caregiver education/training;transfer/mobility retraining;ROM/therapeutic exercise;occupation/activity based interventions;strengthening exercise  -MW               User Key  (r) = Recorded By, (t) = Taken By, (c) = Cosigned By      Initials Name Provider Type    Rhiannon Brooks, OT Occupational Therapist                   Clinical Impression        Row Name 08/02/23 1539          Pain Assessment    Pretreatment Pain Rating 0/10 - no pain  -MW     Posttreatment Pain Rating 0/10 - no pain  -MW       Row Name 08/02/23 1539          Plan of Care Review    Plan of Care Reviewed With patient;family  -MW     Progress no change  -MW     Outcome Evaluation Pt is a 76 yo female admitted with SOA and afib. She was transferred to [facility] from [facility]. Hx recent CABGx3 and LANE clip on 7/20. Pt reports prior to surgery, she was (I) with ADLs and using rollator at baseline. she has required assist with dressing since surgery, was at home for short period before re-admission to hospital. Pt reports she has been wearing 2L continuous O2. Pt presenting this date below baseline with decreased act tolerance, UE strength, and overall (I) and safety with ADLs. Pt completed bed mob with mod (I), 105-110 bpm while seated at EOB. STS with SBA and short distance to transport chair with CGA with RWx, HR increase to 140 bpm and increased SOA. Continue to follow as pt will continue to benefit from skilled OT to address goals and maximize safety and (I) prior to d/c. Plans home with family support and HHOT.  -MW       Row Name 08/02/23 1539          Therapy Assessment/Plan (OT)    Rehab Potential (OT) good, to achieve stated therapy goals  -     Criteria for Skilled Therapeutic Interventions Met (OT) yes;meets criteria;skilled treatment is necessary  -MW     Therapy Frequency (OT) 5 times/wk  -MW       Row Name 08/02/23 1539          Therapy Plan Review/Discharge Plan (OT)    Anticipated Discharge Disposition (OT) home;home with assist;home with home health  -MW       Row Name 08/02/23 1539          Vital Signs    Pretreatment Heart Rate (beats/min) 110  -MW     Posttreatment Heart Rate (beats/min) 140  -MW     O2 Delivery Pre Treatment supplemental O2  -MW     O2 Delivery Intra Treatment supplemental O2  -MW     O2 Delivery Post Treatment supplemental O2  -MW     Pre Patient  Position Supine  -MW     Intra Patient Position Standing  -MW     Post Patient Position Sitting  -MW       Row Name 08/02/23 1539          Positioning and Restraints    Pre-Treatment Position in bed  -MW     Post Treatment Position chair  -MW     In Chair with other staff  transport  -MW               User Key  (r) = Recorded By, (t) = Taken By, (c) = Cosigned By      Initials Name Provider Type    MW Rhiannon Queen, OT Occupational Therapist                   Outcome Measures       Row Name 08/02/23 1545          How much help from another is currently needed...    Putting on and taking off regular lower body clothing? 3  -MW     Bathing (including washing, rinsing, and drying) 3  -MW     Toileting (which includes using toilet bed pan or urinal) 3  -MW     Putting on and taking off regular upper body clothing 3  -MW     Taking care of personal grooming (such as brushing teeth) 3  -MW     Eating meals 4  -MW     AM-PAC 6 Clicks Score (OT) 19  -MW       Row Name 08/02/23 1456 08/02/23 1442       How much help from another person do you currently need...    Turning from your back to your side while in flat bed without using bedrails? 3  -CB 3  -EM (r) LH (t) EM (c)    Moving from lying on back to sitting on the side of a flat bed without bedrails? 3  -CB 3  -EM (r) LH (t) EM (c)    Moving to and from a bed to a chair (including a wheelchair)? 3  -CB 3  -EM (r) LH (t) EM (c)    Standing up from a chair using your arms (e.g., wheelchair, bedside chair)? 3  -CB 3  -EM (r) LH (t) EM (c)    Climbing 3-5 steps with a railing? 2  -CB 2  -EM (r) LH (t) EM (c)    To walk in hospital room? 3  -CB 3  -EM (r) LH (t) EM (c)    AM-PAC 6 Clicks Score (PT) 17  -CB 17  -EM (r) LH (t)    Highest level of mobility 5 --> Static standing  -CB 5 --> Static standing  -EM (r) LH (t)      Row Name 08/02/23 0838          How much help from another person do you currently need...    Turning from your back to your side while in flat bed  without using bedrails? 3  -GP     Moving from lying on back to sitting on the side of a flat bed without bedrails? 3  -GP     Moving to and from a bed to a chair (including a wheelchair)? 3  -GP     Standing up from a chair using your arms (e.g., wheelchair, bedside chair)? 2  -GP     Climbing 3-5 steps with a railing? 2  -GP     To walk in hospital room? 2  -GP     AM-PAC 6 Clicks Score (PT) 15  -GP     Highest level of mobility 4 --> Transferred to chair/commode  -GP       Row Name 08/02/23 1545 08/02/23 1442       Functional Assessment    Outcome Measure Options AM-PAC 6 Clicks Daily Activity (OT)  -PARKER AM-PAC 6 Clicks Basic Mobility (PT)  -EM (r) LH (t) EM (c)              User Key  (r) = Recorded By, (t) = Taken By, (c) = Cosigned By      Initials Name Provider Type    GP Evelyne Pruett, RN Registered Nurse    Ann-Marie Parr, PT Physical Therapist    Rosey Mccarthy, RN Registered Nurse    Rhiannon Brooks OT Occupational Therapist    Liliana Fuentes, PT Student PT Student                    Occupational Therapy Education       Title: PT OT SLP Therapies (In Progress)       Topic: Occupational Therapy (In Progress)       Point: ADL training (Done)       Description:   Instruct learner(s) on proper safety adaptation and remediation techniques during self care or transfers.   Instruct in proper use of assistive devices.                  Learning Progress Summary             Patient Acceptance, E, VU by PARKER at 8/2/2023 6204    Comment: role of OT, d/c rec                         Point: Home exercise program (Not Started)       Description:   Instruct learner(s) on appropriate technique for monitoring, assisting and/or progressing therapeutic exercises/activities.                  Learner Progress:  Not documented in this visit.              Point: Precautions (Done)       Description:   Instruct learner(s) on prescribed precautions during self-care and functional transfers.                   Learning Progress Summary             Patient Acceptance, E, VU by  at 8/2/2023 1545    Comment: role of OT, d/c rec                         Point: Body mechanics (Done)       Description:   Instruct learner(s) on proper positioning and spine alignment during self-care, functional mobility activities and/or exercises.                  Learning Progress Summary             Patient Acceptance, E, VU by  at 8/2/2023 1545    Comment: role of OT, d/c rec                                         User Key       Initials Effective Dates Name Provider Type Discipline    PARKER 08/20/21 -  Rhiannon Queen OT Occupational Therapist OT                  OT Recommendation and Plan  Planned Therapy Interventions (OT): activity tolerance training, functional balance retraining, BADL retraining, neuromuscular control/coordination retraining, patient/caregiver education/training, transfer/mobility retraining, ROM/therapeutic exercise, occupation/activity based interventions, strengthening exercise  Therapy Frequency (OT): 5 times/wk  Plan of Care Review  Plan of Care Reviewed With: patient, family  Progress: no change  Outcome Evaluation: Pt is a 78 yo female admitted with SOA and afib. She was transferred to [facility] from [facility]. Hx recent CABGx3 and LANE clip on 7/20. Pt reports prior to surgery, she was (I) with ADLs and using rollator at baseline. she has required assist with dressing since surgery, was at home for short period before re-admission to hospital. Pt reports she has been wearing 2L continuous O2. Pt presenting this date below baseline with decreased act tolerance, UE strength, and overall (I) and safety with ADLs. Pt completed bed mob with mod (I), 105-110 bpm while seated at EOB. STS with SBA and short distance to transport chair with CGA with RWx, HR increase to 140 bpm and increased SOA. Continue to follow as pt will continue to benefit from skilled OT to address goals and maximize safety and (I) prior to d/c.  Plans home with family support and HHOT.     Time Calculation:   Evaluation Complexity (OT)  Review Occupational Profile/Medical/Therapy History Complexity: expanded/moderate complexity  Assessment, Occupational Performance/Identification of Deficit Complexity: 3-5 performance deficits  Clinical Decision Making Complexity (OT): detailed assessment/moderate complexity  Overall Complexity of Evaluation (OT): moderate complexity     Time Calculation- OT       Row Name 08/02/23 1546             Time Calculation- OT    OT Start Time 1405  -MW      OT Stop Time 1418  -MW      OT Time Calculation (min) 13 min  -MW      Total Timed Code Minutes- OT 8 minute(s)  -MW      OT Received On 08/02/23  -MW      OT - Next Appointment 08/03/23  -MW      OT Goal Re-Cert Due Date 08/16/23  -MW         Timed Charges    06517 - OT Therapeutic Activity Minutes 8  -MW         Untimed Charges    OT Eval/Re-eval Minutes 5  -MW         Total Minutes    Timed Charges Total Minutes 8  -MW      Untimed Charges Total Minutes 5  -MW       Total Minutes 13  -MW                User Key  (r) = Recorded By, (t) = Taken By, (c) = Cosigned By      Initials Name Provider Type     Rhiannon Queen OT Occupational Therapist                  Therapy Charges for Today       Code Description Service Date Service Provider Modifiers Qty    83467067633 HC OT THERAPEUTIC ACT EA 15 MIN 8/2/2023 Rhiannon Queen OT GO 1    20192848873 HC OT EVAL MOD COMPLEXITY 2 8/2/2023 Rhiannon Queen OT GO 1                 Rhiannon Queen OT  8/2/2023

## 2023-08-02 NOTE — NURSING NOTE
Patient is alert and oriented x 4, on room air, tolerating well, on afib heart rate 110-120's, md is aware, to be transferred to [facility],  At 2030 contacted ems,to follow up, they said they will come as soon as possible  At 2130 ems came and transferred the patient to [facility], patient is vitally stable, took all belongingness with the daughter.

## 2023-08-02 NOTE — DISCHARGE PLACEMENT REQUEST
"Cammie Martinth Karlie (77 y.o. Female)       Date of Birth   1945    Social Security Number       Address   [address] [place] [address]    Home Phone   [phone]    MRN   [id number]       Adventism   Sikhism    Marital Status                               Admission Date   8/1/23    Admission Type   Elective    Admitting Provider   Shane Alexander MD    Attending Provider   Shane Alexander MD    Department, Room/Bed   [address], [address]       Discharge Date       Discharge Disposition       Discharge Destination                                 Attending Provider: Shane Alexander MD    Allergies: Penicillins    Isolation: None   Infection: None   Code Status: CPR    Ht: 162.6 cm (64\")   Wt: 91 kg (200 lb 9.9 oz)    Admission Cmt: None   Principal Problem: Atrial fibrillation [I48.91]                   Active Insurance as of 8/1/2023       Primary Coverage       Payor Plan Insurance Group Employer/Plan Group    MEDICARE MEDICARE A & B        Payor Plan Address Payor Plan Phone Number Payor Plan Fax Number Effective Dates    [address] [phone]  11/1/2009 - None Entered    [facility] [id number]         Subscriber Name Subscriber Birth Date Member ID       ANN-MARIE AMRTIN KARLIE 1945 [id number]               Secondary Coverage       Payor Plan Insurance Group Employer/Plan Group    [place] [id number]       Payor Plan Address Payor Plan Phone Number Payor Plan Fax Number Effective Dates    [address]   1/1/2010 - None Entered    [facility] [id number]         Subscriber Name Subscriber Birth Date Member ID       ANN-MARIE MARTIN KARLIE 1945 [id number]                     Emergency Contacts        (Rel.) Home Phone Work Phone Mobile Phone    JAMEL MARTIN (Spouse) [phone] -- [phone]    Maria EOlga (Daughter) [phone] -- [phone]    Joby Martin (Son) [phone] -- " [phone]

## 2023-08-02 NOTE — CASE MANAGEMENT/SOCIAL WORK
Discharge Planning Assessment  [facility]     Patient Name: Ann-Marie Hughes  MRN: [id number]  Today's Date: 8/2/2023    Admit Date: 8/1/2023    Plan: Home, home health, family to transport home   Discharge Needs Assessment       Row Name 08/02/23 1052       Living Environment    People in Home spouse    Name(s) of People in Home Dinh Hughes [phone]    Current Living Arrangements home    Potentially Unsafe Housing Conditions none    Primary Care Provided by self    Provides Primary Care For no one    Family Caregiver if Needed spouse;child(precious), adult    Family Caregiver Names Dinh Hughes, children as well    Quality of Family Relationships helpful;involved    Able to Return to Prior Arrangements yes       Resource/Environmental Concerns    Resource/Environmental Concerns none    Transportation Concerns none       Food Insecurity    Within the past 12 months, you worried that your food would run out before you got the money to buy more. Never true    Within the past 12 months, the food you bought just didn't last and you didn't have money to get more. Never true       Transition Planning    Patient/Family Anticipates Transition to home;home with family    Patient/Family Anticipated Services at Transition none;home health care;other (see comments)  Current with [facility]    Transportation Anticipated family or friend will provide       Discharge Needs Assessment    Current Outpatient/Agency/Support Group homecare agency    Equipment Currently Used at Home oxygen;rollator  [employer] provides home oxygen    Concerns to be Addressed no discharge needs identified;denies needs/concerns at this time    Anticipated Changes Related to Illness none    Equipment Needed After Discharge none    Outpatient/Agency/Support Group Needs homecare agency    Provided Post Acute Provider List? N/A    Provided Post Acute Provider Quality & Resource List? N/A                   Discharge Plan       Row Name 08/02/23 105        Plan    Plan Home, home health, family to transport home    Patient/Family in Agreement with Plan yes    Provided Post Acute Provider List? Refused    Provided Post Acute Provider Quality & Resource List? Refused    Plan Comments Met with pt at bedside. Permission obtained to speak to pt in front of daughter, Olga. Introduced self and explained role of . Face sheet verified, PCP is Rochelle Brown. Pt denies any difficulty paying for medications, and obtains medications from [facility]. Pt lives with her spouse, but stated that if any care needs arise, her spouse or children could assist with any needs. Pt is independent in ADL's, fatigues easily and requires assistance at times. Pt uses a rollator for mobility. Pt uses o2 at night, but recently has been wearing o2 at 2L continous. (contacted [facility] and informed of pt incresed usage). Pt is current with [facility], and has been to [facility] in the past. Pt intends on returning home with home health at discharge. Upon discharge, family will transport home. Explained that CCP would follow to assess for discharge needs.                  Continued Care and Services - Admitted Since 8/1/2023    Coordination has not been started for this encounter.       Selected Continued Care - Prior Encounters Includes continued care and service providers with selected services from prior encounters from 5/3/2023 to 8/2/2023      Discharged on 7/28/2023 Admission date: 7/18/2023 - Discharge disposition: Home-Health Care Svc      Durable Medical Equipment       Service Provider Selected Services Address Phone Fax Patient Preferred    [facility] [address] [place] [id number] [phone] --              Home Medical Care       Service Provider Selected Services Address Phone Fax Patient Preferred    [facility] - [facility] [id number]  [address] [place] [address] [phone] --                          Expected Discharge Date and Time       Expected Discharge Date Expected Discharge Time    Aug 7, 2023            Demographic Summary    No documentation.                  Functional Status    No documentation.                  Psychosocial    No documentation.                  Abuse/Neglect    No documentation.                  Legal    No documentation.                  Substance Abuse    No documentation.                  Patient Forms    No documentation.                     Sandrita Morgan RN

## 2023-08-03 ENCOUNTER — APPOINTMENT (OUTPATIENT)
Dept: CT IMAGING | Facility: HOSPITAL | Age: 78
DRG: 291 | End: 2023-08-03
Payer: MEDICARE

## 2023-08-03 ENCOUNTER — APPOINTMENT (OUTPATIENT)
Dept: CARDIOLOGY | Facility: HOSPITAL | Age: 78
DRG: 291 | End: 2023-08-03
Payer: MEDICARE

## 2023-08-03 LAB
ABO GROUP BLD: NORMAL
ANION GAP SERPL CALCULATED.3IONS-SCNC: 10 MMOL/L (ref 5–15)
ANTI-FYA: NORMAL
ANTI-JKA: NORMAL
BASOPHILS # BLD AUTO: 0.1 10*3/MM3 (ref 0–0.2)
BASOPHILS NFR BLD AUTO: 0.5 % (ref 0–1.5)
BH CV ECHO MEAS - EDV(CUBED): 113 ML
BH CV ECHO MEAS - ESV(CUBED): 58.3 ML
BH CV ECHO MEAS - FS: 19.8 %
BH CV ECHO MEAS - IVS/LVPW: 0.94 CM
BH CV ECHO MEAS - IVSD: 1.18 CM
BH CV ECHO MEAS - LV MASS(C)D: 225 GRAMS
BH CV ECHO MEAS - LVIDD: 4.8 CM
BH CV ECHO MEAS - LVIDS: 3.9 CM
BH CV ECHO MEAS - LVPWD: 1.25 CM
BH CV ECHO MEAS - MR MAX PG: 103.4 MMHG
BH CV ECHO MEAS - MR MAX VEL: 508.4 CM/SEC
BH CV ECHO MEAS - MV DEC TIME: 0.27 MSEC
BH CV ECHO MEAS - MV E MAX VEL: 197 CM/SEC
BLD GP AB SCN SERPL QL ELUTION: POSITIVE
BLD GP AB SCN SERPL QL: POSITIVE
BUN SERPL-MCNC: 50 MG/DL (ref 8–23)
BUN/CREAT SERPL: 48.5 (ref 7–25)
CALCIUM SPEC-SCNC: 8.9 MG/DL (ref 8.6–10.5)
CHLORIDE SERPL-SCNC: 104 MMOL/L (ref 98–107)
CO2 SERPL-SCNC: 26 MMOL/L (ref 22–29)
CREAT SERPL-MCNC: 1.03 MG/DL (ref 0.57–1)
DAT C3: POSITIVE
DAT IGG-SP REAG RBC-IMP: POSITIVE
DAT POLY-SP REAG RBC QL: POSITIVE
DEPRECATED RDW RBC AUTO: 59.9 FL (ref 37–54)
EGFRCR SERPLBLD CKD-EPI 2021: 55.8 ML/MIN/1.73
EOSINOPHIL # BLD AUTO: 0.66 10*3/MM3 (ref 0–0.4)
EOSINOPHIL NFR BLD AUTO: 3.3 % (ref 0.3–6.2)
ERYTHROCYTE [DISTWIDTH] IN BLOOD BY AUTOMATED COUNT: 18.1 % (ref 12.3–15.4)
GLUCOSE BLDC GLUCOMTR-MCNC: 138 MG/DL (ref 70–130)
GLUCOSE BLDC GLUCOMTR-MCNC: 141 MG/DL (ref 70–130)
GLUCOSE BLDC GLUCOMTR-MCNC: 151 MG/DL (ref 70–130)
GLUCOSE BLDC GLUCOMTR-MCNC: 197 MG/DL (ref 70–130)
GLUCOSE SERPL-MCNC: 113 MG/DL (ref 65–99)
HCT VFR BLD AUTO: 23.4 % (ref 34–46.6)
HGB BLD-MCNC: 7.7 G/DL (ref 12–15.9)
IMM GRANULOCYTES # BLD AUTO: 0.69 10*3/MM3 (ref 0–0.05)
IMM GRANULOCYTES NFR BLD AUTO: 3.4 % (ref 0–0.5)
KIDD A ANTIGEN: NEGATIVE
LYMPHOCYTES # BLD AUTO: 3.03 10*3/MM3 (ref 0.7–3.1)
LYMPHOCYTES NFR BLD AUTO: 15 % (ref 19.6–45.3)
MAGNESIUM SERPL-MCNC: 1.7 MG/DL (ref 1.6–2.4)
MCH RBC QN AUTO: 30.1 PG (ref 26.6–33)
MCHC RBC AUTO-ENTMCNC: 32.9 G/DL (ref 31.5–35.7)
MCV RBC AUTO: 91.4 FL (ref 79–97)
MONOCYTES # BLD AUTO: 2.65 10*3/MM3 (ref 0.1–0.9)
MONOCYTES NFR BLD AUTO: 13.2 % (ref 5–12)
NEUTROPHILS NFR BLD AUTO: 13.02 10*3/MM3 (ref 1.7–7)
NEUTROPHILS NFR BLD AUTO: 64.6 % (ref 42.7–76)
NRBC BLD AUTO-RTO: 0.4 /100 WBC (ref 0–0.2)
PANAGGLUTINATION: NORMAL
PLATELET # BLD AUTO: 344 10*3/MM3 (ref 140–450)
PMV BLD AUTO: 10.2 FL (ref 6–12)
POTASSIUM SERPL-SCNC: 3.9 MMOL/L (ref 3.5–5.2)
RBC # BLD AUTO: 2.56 10*6/MM3 (ref 3.77–5.28)
RH BLD: POSITIVE
SODIUM SERPL-SCNC: 140 MMOL/L (ref 136–145)
T&S EXPIRATION DATE: NORMAL
WBC NRBC COR # BLD: 20.15 10*3/MM3 (ref 3.4–10.8)

## 2023-08-03 PROCEDURE — 86905 BLOOD TYPING RBC ANTIGENS: CPT | Performed by: NURSE PRACTITIONER

## 2023-08-03 PROCEDURE — 86880 COOMBS TEST DIRECT: CPT | Performed by: NURSE PRACTITIONER

## 2023-08-03 PROCEDURE — 93325 DOPPLER ECHO COLOR FLOW MAPG: CPT | Performed by: INTERNAL MEDICINE

## 2023-08-03 PROCEDURE — 99232 SBSQ HOSP IP/OBS MODERATE 35: CPT

## 2023-08-03 PROCEDURE — 97535 SELF CARE MNGMENT TRAINING: CPT

## 2023-08-03 PROCEDURE — 86870 RBC ANTIBODY IDENTIFICATION: CPT | Performed by: NURSE PRACTITIONER

## 2023-08-03 PROCEDURE — 71250 CT THORAX DX C-: CPT

## 2023-08-03 PROCEDURE — 25010000002 FUROSEMIDE PER 20 MG: Performed by: INTERNAL MEDICINE

## 2023-08-03 PROCEDURE — 25010000002 FUROSEMIDE PER 20 MG: Performed by: NURSE PRACTITIONER

## 2023-08-03 PROCEDURE — 83735 ASSAY OF MAGNESIUM: CPT | Performed by: NURSE PRACTITIONER

## 2023-08-03 PROCEDURE — 63710000001 INSULIN LISPRO (HUMAN) PER 5 UNITS: Performed by: NURSE PRACTITIONER

## 2023-08-03 PROCEDURE — 80048 BASIC METABOLIC PNL TOTAL CA: CPT | Performed by: NURSE PRACTITIONER

## 2023-08-03 PROCEDURE — 82948 REAGENT STRIP/BLOOD GLUCOSE: CPT

## 2023-08-03 PROCEDURE — 86920 COMPATIBILITY TEST SPIN: CPT

## 2023-08-03 PROCEDURE — 86902 BLOOD TYPE ANTIGEN DONOR EA: CPT

## 2023-08-03 PROCEDURE — 25010000002 MAGNESIUM SULFATE 2 GM/50ML SOLUTION: Performed by: NURSE PRACTITIONER

## 2023-08-03 PROCEDURE — 36430 TRANSFUSION BLD/BLD COMPNT: CPT

## 2023-08-03 PROCEDURE — P9016 RBC LEUKOCYTES REDUCED: HCPCS

## 2023-08-03 PROCEDURE — 93308 TTE F-UP OR LMTD: CPT

## 2023-08-03 PROCEDURE — 86860 RBC ANTIBODY ELUTION: CPT | Performed by: NURSE PRACTITIONER

## 2023-08-03 PROCEDURE — 93321 DOPPLER ECHO F-UP/LMTD STD: CPT

## 2023-08-03 PROCEDURE — 93308 TTE F-UP OR LMTD: CPT | Performed by: INTERNAL MEDICINE

## 2023-08-03 PROCEDURE — 86850 RBC ANTIBODY SCREEN: CPT | Performed by: NURSE PRACTITIONER

## 2023-08-03 PROCEDURE — 86900 BLOOD TYPING SEROLOGIC ABO: CPT | Performed by: NURSE PRACTITIONER

## 2023-08-03 PROCEDURE — 86922 COMPATIBILITY TEST ANTIGLOB: CPT

## 2023-08-03 PROCEDURE — 93321 DOPPLER ECHO F-UP/LMTD STD: CPT | Performed by: INTERNAL MEDICINE

## 2023-08-03 PROCEDURE — 86901 BLOOD TYPING SEROLOGIC RH(D): CPT | Performed by: NURSE PRACTITIONER

## 2023-08-03 PROCEDURE — 25010000002 CEFTRIAXONE PER 250 MG: Performed by: NURSE PRACTITIONER

## 2023-08-03 PROCEDURE — 93325 DOPPLER ECHO COLOR FLOW MAPG: CPT

## 2023-08-03 PROCEDURE — 85025 COMPLETE CBC W/AUTO DIFF WBC: CPT | Performed by: NURSE PRACTITIONER

## 2023-08-03 PROCEDURE — 86900 BLOOD TYPING SEROLOGIC ABO: CPT

## 2023-08-03 PROCEDURE — 86870 RBC ANTIBODY IDENTIFICATION: CPT

## 2023-08-03 RX ORDER — MAGNESIUM SULFATE HEPTAHYDRATE 40 MG/ML
2 INJECTION, SOLUTION INTRAVENOUS ONCE
Status: COMPLETED | OUTPATIENT
Start: 2023-08-03 | End: 2023-08-03

## 2023-08-03 RX ORDER — FUROSEMIDE 10 MG/ML
80 INJECTION INTRAMUSCULAR; INTRAVENOUS 2 TIMES DAILY
Status: COMPLETED | OUTPATIENT
Start: 2023-08-03 | End: 2023-08-04

## 2023-08-03 RX ORDER — FUROSEMIDE 10 MG/ML
40 INJECTION INTRAMUSCULAR; INTRAVENOUS 2 TIMES DAILY
Status: DISCONTINUED | OUTPATIENT
Start: 2023-08-03 | End: 2023-08-03

## 2023-08-03 RX ORDER — IRON POLYSACCHARIDE COMPLEX 150 MG
150 CAPSULE ORAL DAILY
Status: DISCONTINUED | OUTPATIENT
Start: 2023-08-03 | End: 2023-08-06 | Stop reason: HOSPADM

## 2023-08-03 RX ORDER — POTASSIUM CHLORIDE 750 MG/1
20 TABLET, FILM COATED, EXTENDED RELEASE ORAL ONCE
Status: COMPLETED | OUTPATIENT
Start: 2023-08-03 | End: 2023-08-03

## 2023-08-03 RX ORDER — DIGOXIN 125 MCG
125 TABLET ORAL
Status: DISCONTINUED | OUTPATIENT
Start: 2023-08-03 | End: 2023-08-04

## 2023-08-03 RX ADMIN — Medication 150 MG: at 11:49

## 2023-08-03 RX ADMIN — Medication 10 ML: at 09:47

## 2023-08-03 RX ADMIN — METOPROLOL SUCCINATE 25 MG: 25 TABLET, EXTENDED RELEASE ORAL at 20:52

## 2023-08-03 RX ADMIN — ATORVASTATIN CALCIUM 40 MG: 20 TABLET, FILM COATED ORAL at 20:52

## 2023-08-03 RX ADMIN — INSULIN LISPRO 2 UNITS: 100 INJECTION, SOLUTION INTRAVENOUS; SUBCUTANEOUS at 16:48

## 2023-08-03 RX ADMIN — POTASSIUM CHLORIDE 20 MEQ: 750 TABLET, EXTENDED RELEASE ORAL at 09:42

## 2023-08-03 RX ADMIN — BUPROPION HYDROCHLORIDE 300 MG: 300 TABLET, EXTENDED RELEASE ORAL at 09:42

## 2023-08-03 RX ADMIN — Medication 10 ML: at 20:53

## 2023-08-03 RX ADMIN — LEVOTHYROXINE SODIUM 75 MCG: 0.07 TABLET ORAL at 06:19

## 2023-08-03 RX ADMIN — MONTELUKAST SODIUM 10 MG: 10 TABLET, FILM COATED ORAL at 09:42

## 2023-08-03 RX ADMIN — DIGOXIN 125 MCG: 125 TABLET ORAL at 12:25

## 2023-08-03 RX ADMIN — FUROSEMIDE 40 MG: 40 INJECTION, SOLUTION INTRAMUSCULAR; INTRAVENOUS at 09:42

## 2023-08-03 RX ADMIN — MAGNESIUM SULFATE HEPTAHYDRATE 2 G: 2 INJECTION, SOLUTION INTRAVENOUS at 09:41

## 2023-08-03 RX ADMIN — CEFTRIAXONE SODIUM 2000 MG: 2 INJECTION, POWDER, FOR SOLUTION INTRAMUSCULAR; INTRAVENOUS at 09:42

## 2023-08-03 RX ADMIN — APIXABAN 5 MG: 5 TABLET, FILM COATED ORAL at 09:42

## 2023-08-03 RX ADMIN — FLUTICASONE PROPIONATE 2 SPRAY: 50 SPRAY, METERED NASAL at 12:26

## 2023-08-03 RX ADMIN — FUROSEMIDE 80 MG: 10 INJECTION, SOLUTION INTRAMUSCULAR; INTRAVENOUS at 20:52

## 2023-08-03 RX ADMIN — ASPIRIN 81 MG: 81 TABLET, COATED ORAL at 09:42

## 2023-08-03 RX ADMIN — METOPROLOL SUCCINATE 25 MG: 25 TABLET, EXTENDED RELEASE ORAL at 09:42

## 2023-08-03 RX ADMIN — SPIRONOLACTONE 25 MG: 25 TABLET ORAL at 09:42

## 2023-08-03 NOTE — PLAN OF CARE
Goal Outcome Evaluation:  Plan of Care Reviewed With: patient        Progress: no change          Pt is A&O x 4.  Afib on tele 100-120's. All other vs stable. On 2 L NC. No complaints of pain overnight. No acute distress noted. WCTM    Problem: Adult Inpatient Plan of Care  Goal: Plan of Care Review  Outcome: Ongoing, Progressing  Flowsheets (Taken 8/3/2023 0642)  Progress: no change  Plan of Care Reviewed With: patient  Goal: Patient-Specific Goal (Individualized)  Outcome: Ongoing, Progressing  Goal: Absence of Hospital-Acquired Illness or Injury  Outcome: Ongoing, Progressing  Intervention: Identify and Manage Fall Risk  Description: Perform standard risk assessment on admission using a validated tool or comprehensive approach appropriate to the patient; reassess fall risk frequently, with change in status or transfer to another level of care.  Communicate fall injury risk to interprofessional healthcare team.  Determine need for increased observation, equipment and environmental modification, such as low bed, signage and supportive, nonskid footwear.  Adjust safety measures to individual developmental age, stage and identified risk factors.  Reinforce the importance of safety and physical activity with patient and family.  Perform regular intentional rounding to assess need for position change, pain assessment and personal needs, including assistance with toileting.  Recent Flowsheet Documentation  Taken 8/3/2023 0600 by Fernando Braun, RN  Safety Promotion/Fall Prevention:   assistive device/personal items within reach   activity supervised   fall prevention program maintained   clutter free environment maintained   muscle strengthening facilitated   nonskid shoes/slippers when out of bed   safety round/check completed   room organization consistent  Taken 8/3/2023 0430 by Fernando Braun, RN  Safety Promotion/Fall Prevention:   activity supervised   assistive device/personal items within reach   fall prevention  program maintained   clutter free environment maintained   nonskid shoes/slippers when out of bed   muscle strengthening facilitated   toileting scheduled   safety round/check completed   room organization consistent  Taken 8/3/2023 0200 by Fernando Braun RN  Safety Promotion/Fall Prevention:   assistive device/personal items within reach   activity supervised   mobility aid in reach   lighting adjusted   safety round/check completed   room organization consistent  Taken 8/3/2023 0023 by Fernando Braun RN  Safety Promotion/Fall Prevention:   activity supervised   assistive device/personal items within reach   fall prevention program maintained   clutter free environment maintained   muscle strengthening facilitated   nonskid shoes/slippers when out of bed   safety round/check completed   room organization consistent  Taken 8/2/2023 2230 by Fernando Braun RN  Safety Promotion/Fall Prevention:   activity supervised   assistive device/personal items within reach   lighting adjusted   muscle strengthening facilitated   mobility aid in reach   safety round/check completed   room organization consistent  Taken 8/2/2023 2020 by Fernando Braun RN  Safety Promotion/Fall Prevention:   activity supervised   fall prevention program maintained   clutter free environment maintained   assistive device/personal items within reach   muscle strengthening facilitated   nonskid shoes/slippers when out of bed   safety round/check completed   room organization consistent  Intervention: Prevent Skin Injury  Description: Perform a screening for skin injury risk, such as pressure or moisture associated skin damage on admission and at regular intervals throughout hospital stay.  Keep all areas of skin (especially folds) clean and dry.  Maintain adequate skin hydration.  Relieve and redistribute pressure and protect bony prominences; implement measures based on patient-specific risk factors.  Match turning and repositioning schedule to clinical  condition.  Encourage weight shift frequently; assist with reposition if unable to complete independently.  Float heels off bed; avoid pressure on the Achilles tendon.  Keep skin free from extended contact with medical devices.  Encourage functional activity and mobility, as early as tolerated.  Use aids (e.g., slide boards, mechanical lift) during transfer.  Recent Flowsheet Documentation  Taken 8/3/2023 0600 by Fernando Braun RN  Body Position: supine, legs elevated  Taken 8/3/2023 0430 by Fernando Braun RN  Body Position: position changed independently  Taken 8/3/2023 0200 by Fernando Braun RN  Body Position: position changed independently  Taken 8/3/2023 0023 by Fernando Braun RN  Body Position: position changed independently  Taken 8/2/2023 2230 by Fernando Braun RN  Body Position: position changed independently  Taken 8/2/2023 2020 by Fernando Braun RN  Body Position: supine  Intervention: Prevent and Manage VTE (Venous Thromboembolism) Risk  Description: Assess for VTE (venous thromboembolism) risk.  Encourage and assist with early ambulation.  Initiate and maintain compression or other therapy, as indicated, based on identified risk in accordance with organizational protocol and provider order.  Encourage both active and passive leg exercises while in bed, if unable to ambulate.  Recent Flowsheet Documentation  Taken 8/3/2023 0430 by Fernando Braun RN  Activity Management: activity encouraged  Taken 8/3/2023 0200 by Fernando Braun RN  Activity Management: activity encouraged  Taken 8/3/2023 0023 by Fernando Braun RN  Activity Management: activity encouraged  Taken 8/2/2023 2230 by Fernando Braun RN  Activity Management: activity encouraged  Taken 8/2/2023 2020 by Fernando Braun RN  Activity Management: activity encouraged  VTE Prevention/Management:   bilateral   compression stockings off  Intervention: Prevent Infection  Description: Maintain skin and mucous membrane integrity; promote hand, oral and pulmonary  hygiene.  Optimize fluid balance, nutrition, sleep and glycemic control to maximize infection resistance.  Identify potential sources of infection early to prevent or mitigate progression of infection (e.g., wound, lines, devices).  Evaluate ongoing need for invasive devices; remove promptly when no longer indicated.  Recent Flowsheet Documentation  Taken 8/3/2023 0600 by Fernando Braun RN  Infection Prevention:   visitors restricted/screened   single patient room provided   rest/sleep promoted   personal protective equipment utilized   hand hygiene promoted   equipment surfaces disinfected   cohorting utilized   environmental surveillance performed  Taken 8/3/2023 0430 by Fernando Braun RN  Infection Prevention:   visitors restricted/screened   single patient room provided   rest/sleep promoted   personal protective equipment utilized   hand hygiene promoted   equipment surfaces disinfected   environmental surveillance performed   cohorting utilized  Taken 8/3/2023 0200 by Fernando Braun RN  Infection Prevention:   visitors restricted/screened   single patient room provided   rest/sleep promoted   hand hygiene promoted   equipment surfaces disinfected   cohorting utilized   environmental surveillance performed   personal protective equipment utilized  Taken 8/3/2023 0023 by Fernando Braun RN  Infection Prevention:   visitors restricted/screened   single patient room provided   rest/sleep promoted   personal protective equipment utilized   hand hygiene promoted   environmental surveillance performed   cohorting utilized   equipment surfaces disinfected  Taken 8/2/2023 2230 by Fernando Braun RN  Infection Prevention:   visitors restricted/screened   personal protective equipment utilized   hand hygiene promoted   equipment surfaces disinfected   environmental surveillance performed   cohorting utilized   rest/sleep promoted   single patient room provided  Taken 8/2/2023 2020 by Fernando Braun RN  Infection Prevention:    visitors restricted/screened   single patient room provided   rest/sleep promoted   personal protective equipment utilized   hand hygiene promoted   equipment surfaces disinfected   environmental surveillance performed   cohorting utilized  Goal: Optimal Comfort and Wellbeing  Outcome: Ongoing, Progressing  Intervention: Provide Person-Centered Care  Description: Use a family-focused approach to care.  Develop trust and rapport by proactively providing information, encouraging questions, addressing concerns and offering reassurance.  Acknowledge emotional response to hospitalization.  Recognize and utilize personal coping strategies.  Honor spiritual and cultural preferences.  Recent Flowsheet Documentation  Taken 8/3/2023 0430 by Fernando Braun RN  Trust Relationship/Rapport:   thoughts/feelings acknowledged   empathic listening provided   emotional support provided   care explained   choices provided   questions encouraged   questions answered   reassurance provided  Taken 8/3/2023 0023 by Fernando Braun RN  Trust Relationship/Rapport:   thoughts/feelings acknowledged   reassurance provided   questions encouraged   questions answered   empathic listening provided   emotional support provided   care explained   choices provided  Taken 8/2/2023 2020 by Fernando Braun RN  Trust Relationship/Rapport:   thoughts/feelings acknowledged   reassurance provided   questions encouraged   questions answered   empathic listening provided   emotional support provided   care explained   choices provided  Goal: Readiness for Transition of Care  Outcome: Ongoing, Progressing

## 2023-08-03 NOTE — PROGRESS NOTES
LOS: 1 day   Patient Care Team:  Rochelle Brown APRN as PCP - General (Internal Medicine)    Chief Complaint: post op    Subjective:  Symptoms:  She reports shortness of breath.  No cough or chest pain.    Diet:  Adequate intake.  No nausea or vomiting.    Activity level: Impaired due to weakness.    Pain:  She reports no pain.      Reports mild shortness of breath with exertion, but feels that it is improving    Vital Signs  Temp:  [97.2 øF (36.2 øC)-97.9 øF (36.6 øC)] 97.9 øF (36.6 øC)  Heart Rate:  [] 101  Resp:  [18] 18  BP: ()/(60-94) 109/64  Body mass index is 34.44 kg/mý.    Intake/Output Summary (Last 24 hours) at 8/3/2023 0754  Last data filed at 8/3/2023 0500  Gross per 24 hour   Intake 120 ml   Output 600 ml   Net -480 ml       No intake/output data recorded.          08/01/23  2254   Weight: 91 kg (200 lb 9.9 oz)     Objective:  General Appearance:  Comfortable and in no acute distress.    Vital signs: (most recent): Blood pressure 109/64, pulse 101, temperature 97.9 øF (36.6 øC), temperature source Oral, resp. rate 18, weight 91 kg (200 lb 9.9 oz), SpO2 98 %, not currently breastfeeding.  Vital signs are normal.  No fever.    Output: Producing urine and producing stool.    Lungs:  Normal effort and normal respiratory rate.  She is not in respiratory distress.  There are decreased breath sounds.    Heart: Normal rate.  Irregular rhythm.    Abdomen: Abdomen is soft.  Bowel sounds are normal.     Extremities: There is dependent edema (mild bilateral LE).    Pulses: Distal pulses are intact.    Neurological: Patient is alert and oriented to person, place and time.    Skin:  Warm and dry.  (Sternal incision clean and dry  Bilateral lower extremity incisions clean, dry, and intact. Steri strips in placed on left calf incision. Ecchymosis noted on bilateral LE)        Results Review:        WBC WBC   Date Value Ref Range Status   08/03/2023 20.15 (H) 3.40 - 10.80 10*3/mm3 Final   08/02/2023 17.27  (H) 3.40 - 10.80 10*3/mm3 Final   08/01/2023 20.86 (H) 3.40 - 10.80 10*3/mm3 Final   07/31/2023 25.50 (H) 3.40 - 10.80 10*3/mm3 Final      HGB Hemoglobin   Date Value Ref Range Status   08/03/2023 7.7 (L) 12.0 - 15.9 g/dL Final   08/02/2023 8.1 (L) 12.0 - 15.9 g/dL Final   08/01/2023 7.5 (L) 12.0 - 15.9 g/dL Final   07/31/2023 8.2 (L) 12.0 - 15.9 g/dL Final      HCT Hematocrit   Date Value Ref Range Status   08/03/2023 23.4 (L) 34.0 - 46.6 % Final   08/02/2023 24.5 (L) 34.0 - 46.6 % Final   08/01/2023 23.4 (L) 34.0 - 46.6 % Final   07/31/2023 24.9 (L) 34.0 - 46.6 % Final      Platelets Platelets   Date Value Ref Range Status   08/03/2023 344 140 - 450 10*3/mm3 Final   08/02/2023 365 140 - 450 10*3/mm3 Final   08/01/2023 341 140 - 450 10*3/mm3 Final   07/31/2023 376 140 - 450 10*3/mm3 Final        PT/INR:  No results found for: PROTIME/No results found for: INR    Sodium Sodium   Date Value Ref Range Status   08/03/2023 140 136 - 145 mmol/L Final   08/02/2023 142 136 - 145 mmol/L Final   08/01/2023 137 136 - 145 mmol/L Final   07/31/2023 134 (L) 136 - 145 mmol/L Final   07/31/2023 134 (L) 136 - 145 mmol/L Final      Potassium Potassium   Date Value Ref Range Status   08/03/2023 3.9 3.5 - 5.2 mmol/L Final   08/02/2023 4.2 3.5 - 5.2 mmol/L Final   08/01/2023 4.2 3.5 - 5.2 mmol/L Final   07/31/2023 4.2 3.5 - 5.2 mmol/L Final     Comment:     Slight hemolysis detected by analyzer. Results may be affected.   07/31/2023 4.4 3.5 - 5.2 mmol/L Final     Comment:     Slight hemolysis detected by analyzer. Results may be affected.      Chloride Chloride   Date Value Ref Range Status   08/03/2023 104 98 - 107 mmol/L Final   08/02/2023 102 98 - 107 mmol/L Final   08/01/2023 101 98 - 107 mmol/L Final   07/31/2023 100 98 - 107 mmol/L Final   07/31/2023 100 98 - 107 mmol/L Final      Bicarbonate CO2   Date Value Ref Range Status   08/03/2023 26.0 22.0 - 29.0 mmol/L Final   08/02/2023 25.6 22.0 - 29.0 mmol/L Final   08/01/2023 24.0  22.0 - 29.0 mmol/L Final   07/31/2023 20.8 (L) 22.0 - 29.0 mmol/L Final   07/31/2023 19.4 (L) 22.0 - 29.0 mmol/L Final      BUN BUN   Date Value Ref Range Status   08/03/2023 50 (H) 8 - 23 mg/dL Final   08/02/2023 56 (H) 8 - 23 mg/dL Final   08/01/2023 60 (H) 8 - 23 mg/dL Final   07/31/2023 56 (H) 8 - 23 mg/dL Final   07/31/2023 58 (H) 8 - 23 mg/dL Final      Creatinine Creatinine   Date Value Ref Range Status   08/03/2023 1.03 (H) 0.57 - 1.00 mg/dL Final   08/02/2023 1.21 (H) 0.57 - 1.00 mg/dL Final   08/01/2023 1.33 (H) 0.57 - 1.00 mg/dL Final   07/31/2023 1.20 (H) 0.57 - 1.00 mg/dL Final   07/31/2023 1.20 (H) 0.57 - 1.00 mg/dL Final      Calcium Calcium   Date Value Ref Range Status   08/03/2023 8.9 8.6 - 10.5 mg/dL Final   08/02/2023 9.3 8.6 - 10.5 mg/dL Final   08/01/2023 8.5 (L) 8.6 - 10.5 mg/dL Final   07/31/2023 8.7 8.6 - 10.5 mg/dL Final   07/31/2023 8.7 8.6 - 10.5 mg/dL Final      Magnesium Magnesium   Date Value Ref Range Status   08/03/2023 1.7 1.6 - 2.4 mg/dL Final   08/02/2023 1.9 1.6 - 2.4 mg/dL Final   08/01/2023 1.9 1.6 - 2.4 mg/dL Final   07/31/2023 1.9 1.6 - 2.4 mg/dL Final          apixaban, 5 mg, Oral, Q12H  aspirin, 81 mg, Oral, Daily  atorvastatin, 40 mg, Oral, Nightly  buPROPion XL, 300 mg, Oral, Daily  cefTRIAXone, 2,000 mg, Intravenous, Q24H  fluticasone, 2 spray, Each Nare, Daily  furosemide, 40 mg, Intravenous, BID  insulin lispro, 2-9 Units, Subcutaneous, 4x Daily AC & at Bedtime  levothyroxine, 75 mcg, Oral, Q AM  magnesium sulfate, 2 g, Intravenous, Once  metoprolol succinate XL, 25 mg, Oral, Q12H  montelukast, 10 mg, Oral, Daily  potassium chloride, 20 mEq, Oral, Once  senna-docusate sodium, 2 tablet, Oral, BID  sodium chloride, 10 mL, Intravenous, Q12H  spironolactone, 25 mg, Oral, Daily         Assessment & Plan  -admit with dyspnea  - paroxsymal atrial fibrillation with RVR;   - acute on chronic HFrEF  -Severe multivessel CAD s/p off-pump CABG x3/ LANE clipping 7/20 with   [place]  -Moderate MR  -Ischemic cardiomyopathy---EF 36%; GDMT as tolerated  -HTN  -HLD  -DM II  -Hypothyroidism   -Former tobacco abuse   -CKD IIIa  -chronic anemia worsened by post op ABL   -TCP--consumptive; resolved  -leukocytosis--reactive    Creatinine improved this morning. IV diurese again today. Aldactone resumed yesterday. Replete magnesium  Remains in atrial fibrillation with rates in the low 100s. Seen by EP and beta blocker was increased with the goal of rate control  WBC elevated further, but patient remains afebrile. Blood cultures with no growth to date  Hgb down to 7.7 this am. Resume iron supplement randy transfuse 1 unit. Will check echo to r/o pericardial effusion  Paint all incisions with betadine daily  Continue routine care    LONA Mosqueda  08/03/23  07:54 EDT

## 2023-08-03 NOTE — THERAPY TREATMENT NOTE
Patient Name: Ann-Marie Hughes  : 1945    MRN: [id number]                              Today's Date: 8/3/2023       Admit Date: 2023    Visit Dx: No diagnosis found.  Patient Active Problem List   Diagnosis    Arthritis    Bilateral leg pain    Bladder disorder    Chronic pain syndrome    Depression    Diabetes mellitus, type II    Type 2 diabetes mellitus with hyperglycemia    Gastric reflux    Herniated disc    Hypertension    Hypothyroidism    Low back pain    Pain in joint, multiple sites    Pain in soft tissues of limb    Shortness of breath    Hyperlipidemia    B12 deficiency    Vitamin D deficiency    UTI (urinary tract infection)    Metabolic syndrome    Acne rosacea    Hx of smoking    Stress incontinence of urine    Arteriosclerosis of abdominal aorta    Iron deficiency anemia secondary to inadequate dietary iron intake    Seasonal allergies    Hyponatremia    Leg length discrepancy    Type 2 diabetes mellitus with hyperglycemia    Reactive depression    Encounter for subsequent annual wellness visit (AWV) in Medicare patient    Class 1 obesity with alveolar hypoventilation, serious comorbidity, and body mass index (BMI) of 32.0 to 32.9 in adult    Heart failure with reduced ejection fraction    Coronary artery disease of native artery of native heart with stable angina pectoris    Chest pain    Chronic HFrEF (heart failure with reduced ejection fraction)    NSTEMI (non-ST elevated myocardial infarction)    Coronary heart disease    Abnormal findings on diagnostic imaging of heart and coronary circulation    Acute on chronic HFrEF (heart failure with reduced ejection fraction)    Acute respiratory failure with hypoxia    CAD status post CABG x3 vessels    PAF (paroxysmal atrial fibrillation)    Atrial fibrillation     Past Medical History:   Diagnosis Date    Acne rosacea 2021    DR.JEFF MOON     Arteriosclerosis of abdominal aorta     B12 deficiency 2021    JEANINE  (degenerative joint disease)     Hx of smoking 08/17/2021    QUIT IN 1976 AT AGE 31    Hyperlipidemia 08/17/2021    Hypertension     Hypothyroidism     Metabolic syndrome 08/17/2021    KAREN (stress urinary incontinence, female) 08/17/2021    UTI (urinary tract infection) 08/17/2021    Vitamin D deficiency 08/17/2021    Vitamin D deficiency      Past Surgical History:   Procedure Laterality Date    BACK SURGERY  2013    CARDIAC CATHETERIZATION N/A 07/17/2023    Procedure: Left Heart Cath;  Surgeon: Dinh Andres MD;  Location:  [facility];  Service: Cardiovascular;  Laterality: N/A;    COLONOSCOPY  2011    CORONARY ARTERY BYPASS GRAFT WITH MITRAL VALVE REPAIR/REPLACEMENT N/A 07/20/2023    Procedure: REZA STERNOTOMY OFF-PUMP CORONARY ARTERY BYPASS GRAFT TIMES        USING LEFFT INTERNAL MAMMARY ARTERY AND     GREATER SAPHENOUS VEIN GRAFT PER EMDOSCOPIC VEIN HARVESTING, MAZE PROCEDURE AND PRP;  Surgeon: Shane Alexander MD;  Location: [facility];  Service: Cardiothoracic;  Laterality: N/A;      General Information       Row Name 08/03/23 1255          OT Time and Intention    Document Type therapy note (daily note)  -ERIKA     Mode of Treatment individual therapy;occupational therapy  -       Row Name 08/03/23 1255          General Information    Patient Profile Reviewed yes  -KN     Existing Precautions/Restrictions cardiac;fall  -       Row Name 08/03/23 1255          Cognition    Orientation Status (Cognition) oriented x 4  -KN       Row Name 08/03/23 1255          Safety Issues, Functional Mobility    Impairments Affecting Function (Mobility) balance;endurance/activity tolerance;shortness of breath;strength  -               User Key  (r) = Recorded By, (t) = Taken By, (c) = Cosigned By      Initials Name Provider Type    Shaji Lewis OT Occupational Therapist                     Mobility/ADL's       Row Name 08/03/23 1256          Bed Mobility    Comment, (Bed Mobility) UIC upon  "arrival to room  -KN       Row Name 08/03/23 1256          Transfers    Transfers sit-stand transfer  -KN       Row Name 08/03/23 1256          Sit-Stand Transfer    Sit-Stand [place] (Transfers) standby assist  -KN     Assistive Device (Sit-Stand Transfers) --  rollator  -KN       Row Name 08/03/23 1256          Activities of Daily Living    BADL Assessment/Intervention grooming  pt refused all ADLs \"reports she has to have her daughter here to help her\"  -KN       Row Name 08/03/23 1256          Grooming Assessment/Training    [place] Level (Grooming) grooming skills;standby assist  -KN     Position (Grooming) sink side  -KN     Comment, (Grooming) simulated grooming task standing at sink with rollator  -KN               User Key  (r) = Recorded By, (t) = Taken By, (c) = Cosigned By      Initials Name Provider Type    Shaji Lewis, OT Occupational Therapist                   Obj/Interventions    No documentation.                  Goals/Plan    No documentation.                  Clinical Impression       Row Name 08/03/23 1257          Pain Assessment    Pretreatment Pain Rating 0/10 - no pain  -KN     Posttreatment Pain Rating 0/10 - no pain  -[facility] Name 08/03/23 1257          Plan of Care Review    Plan of Care Reviewed With patient;spouse  -KN     Progress improving  -KN     Outcome Evaluation Pt seen this AM for OT treatment session. Pt refused ADLs this session due to \"having to have her daughter with her\". Pt agree able to ambulate to sink and simulate grooming task. Pt SBA for STS, SBA for mobility, and SBA for simulated task standing at sink. Pt will cont. to benefit from skilled OT services to [facility] at home. Cont OT POC. Recommend HH.  -KN       Row Name 08/03/23 1257          Positioning and Restraints    Pre-Treatment Position sitting in chair/recliner  -KN     Post Treatment Position chair  -KN     In Chair reclined;call light within reach;encouraged to call for assist;with " family/caregiver;exit alarm on  -KN               User Key  (r) = Recorded By, (t) = Taken By, (c) = Cosigned By      Initials Name Provider Type    Shaji Lewis OT Occupational Therapist                   Outcome Measures       Row Name 08/03/23 1259          How much help from another is currently needed...    Putting on and taking off regular lower body clothing? 3  -KN     Bathing (including washing, rinsing, and drying) 3  -KN     Toileting (which includes using toilet bed pan or urinal) 3  -KN     Putting on and taking off regular upper body clothing 3  -KN     Taking care of personal grooming (such as brushing teeth) 4  -KN     Eating meals 4  -KN     AM-PAC 6 Clicks Score (OT) 20  -KN       Row Name 08/03/23 0942          How much help from another person do you currently need...    Turning from your back to your side while in flat bed without using bedrails? 3  -HL     Moving from lying on back to sitting on the side of a flat bed without bedrails? 3  -HL     Moving to and from a bed to a chair (including a wheelchair)? 3  -HL     Standing up from a chair using your arms (e.g., wheelchair, bedside chair)? 3  -HL     Climbing 3-5 steps with a railing? 2  -HL     To walk in hospital room? 3  -HL     AM-PAC 6 Clicks Score (PT) 17  -HL     Highest level of mobility 5 --> Static standing  -HL       Row Name 08/03/23 1251          Functional Assessment    Outcome Measure Options AM-PAC 6 Clicks Daily Activity (OT)  -KN               User Key  (r) = Recorded By, (t) = Taken By, (c) = Cosigned By      Initials Name Provider Type    Carlita Johnson RN Registered Nurse    Shaji Lewis OT Occupational Therapist                    Occupational Therapy Education       Title: PT OT SLP Therapies (Done)       Topic: Occupational Therapy (Done)       Point: ADL training (Done)       Description:   Instruct learner(s) on proper safety adaptation and remediation techniques during self care or transfers.    "Instruct in proper use of assistive devices.                  Learning Progress Summary             Patient Acceptance, E, VU by ERIKA at 8/3/2023 1259    Acceptance, E, VU by PARKER at 8/2/2023 1545    Comment: role of OT, d/c rec                         Point: Home exercise program (Done)       Description:   Instruct learner(s) on appropriate technique for monitoring, assisting and/or progressing therapeutic exercises/activities.                  Learning Progress Summary             Patient Acceptance, E, VU by ERIKA at 8/3/2023 1259                         Point: Precautions (Done)       Description:   Instruct learner(s) on prescribed precautions during self-care and functional transfers.                  Learning Progress Summary             Patient Acceptance, E, VU by ERIKA at 8/3/2023 1259    Acceptance, E, VU by PARKER at 8/2/2023 1545    Comment: role of OT, d/c rec                         Point: Body mechanics (Done)       Description:   Instruct learner(s) on proper positioning and spine alignment during self-care, functional mobility activities and/or exercises.                  Learning Progress Summary             Patient Acceptance, E, VU by ERIKA at 8/3/2023 1259    Acceptance, E, VU by PARKER at 8/2/2023 1545    Comment: role of OT, d/c rec                                         User Key       Initials Effective Dates Name Provider Type Discipline    PARKER 08/20/21 -  Rhiannon Queen OT Occupational Therapist OT    ERIKA 08/02/22 -  Shaji Giron OT Occupational Therapist OT                  OT Recommendation and Plan     Plan of Care Review  Plan of Care Reviewed With: patient, spouse  Progress: improving  Outcome Evaluation: Pt seen this AM for OT treatment session. Pt refused ADLs this session due to \"having to have her daughter with her\". Pt agree able to ambulate to sink and simulate grooming task. Pt SBA for STS, SBA for mobility, and SBA for simulated task standing at sink. Pt will cont. to benefit from skilled OT " services to [facility] [facility] at home. Cont OT POC. Recommend HH.     Time Calculation:         Time Calculation- OT       Row Name 08/03/23 1300             Time Calculation- OT    OT Start Time 0821  -KN      OT Stop Time 0835  -KN      OT Time Calculation (min) 14 min  -KN      OT Received On 08/03/23  -KN         Timed Charges    99926 - OT Self Care/Mgmt Minutes 14  -KN         Total Minutes    Timed Charges Total Minutes 14  -KN       Total Minutes 14  -KN                User Key  (r) = Recorded By, (t) = Taken By, (c) = Cosigned By      Initials Name Provider Type    Shaji Lewis OT Occupational Therapist                  Therapy Charges for Today       Code Description Service Date Service Provider Modifiers Qty    50876495173 HC OT SELF CARE/MGMT/TRAIN EA 15 MIN 8/3/2023 Shaji Giron OT GO 1                 Shaji Giron OT  8/3/2023

## 2023-08-03 NOTE — PLAN OF CARE
Goal Outcome Evaluation:  Plan of Care Reviewed With: patient        Progress: improving  Outcome Evaluation: Pt. admitted for a fib. PO dig initiated. Remained a fib 90-110s until 1730 - converted to SB in 50s. SBP 90-110s, 2L NC. IV diuresising per renal, 600 UOP. Plans to increase to 80mg BID. Hgb 7.7 today, plans for transfusion of 1unit PRBC. Blood bank notified RN pt. has developed new antibodies since prior administration of blood on last hospitalization, therefore causing a delay on having blood ready. Last call at 1700 with blood bank, still waiting. Blood bank plans to call when blood ready. WCTM.

## 2023-08-03 NOTE — PROGRESS NOTES
Electrophysiology Follow-Up Note      Patient Name: Ann-Marie Hughes  Age/Sex: 77 y.o. female  : 1945  MRN: [id number]      Day of Service: 23       Chief Complaint/Follow-up: atrial fibrillation     S: says she is doing better today. Just took a shower. Some dyspnea with ambulation but overall feeling better.       Temp:  [97.3 øF (36.3 øC)-97.9 øF (36.6 øC)] 97.5 øF (36.4 øC)  Heart Rate:  [] 105  Resp:  [18] 18  BP: ()/(57-94) 98/68     PHYSICAL EXAM:    General Appearance: No acute distress, well developed and well nourished.   Eyes: Conjunctiva and lids: No erythema, swelling, or discharge. Sclera non-icteric.   HENT: Atraumatic, normocephalic. External eyes, ears, and nose normal.   Respiratory: No signs of respiratory distress. Respiration rhythm and depth normal.   Clear to auscultation. No rales, crackles, rhonchi, or wheezing auscultated.   Cardiovascular:  Heart Rate and Rhythm: irregularly irregular, Heart Sounds: Normal S1 and S2. No S3 or S4 noted.  Gastrointestinal:  Abdomen soft, non-distended, non-tender.  Musculoskeletal: Normal movement of extremities  Skin: Warm and dry.   Psychiatric: Patient alert and oriented to person, place, and time. Speech and behavior appropriate. Normal mood and affect.       ECG/TELE:           Results from last 7 days   Lab Units 23  02423  0847 23  0605   SODIUM mmol/L 140 142 137   POTASSIUM mmol/L 3.9 4.2 4.2   CHLORIDE mmol/L 104 102 101   CO2 mmol/L 26.0 25.6 24.0   BUN mg/dL 50* 56* 60*   CREATININE mg/dL 1.03* 1.21* 1.33*   GLUCOSE mg/dL 113* 176* 119*   CALCIUM mg/dL 8.9 9.3 8.5*     Results from last 7 days   Lab Units 23  02423  0847 23  0605   WBC 10*3/mm3 20.15* 17.27* 20.86*   HEMOGLOBIN g/dL 7.7* 8.1* 7.5*   HEMATOCRIT % 23.4* 24.5* 23.4*   PLATELETS 10*3/mm3 344 365 341         Results from last 7 days   Lab Units 23  0605 23  1413 23  1203   HSTROP T ng/L  155* 143* 142*               Current Medications:   Scheduled Meds:aspirin, 81 mg, Oral, Daily  atorvastatin, 40 mg, Oral, Nightly  buPROPion XL, 300 mg, Oral, Daily  [START ON 8/4/2023] cefTRIAXone, 1,000 mg, Intravenous, Q24H  digoxin, 125 mcg, Oral, Daily  fluticasone, 2 spray, Each Nare, Daily  furosemide, 40 mg, Intravenous, BID  insulin lispro, 2-9 Units, Subcutaneous, 4x Daily AC & at Bedtime  iron polysaccharides, 150 mg, Oral, Daily  levothyroxine, 75 mcg, Oral, Q AM  metoprolol succinate XL, 25 mg, Oral, Q12H  montelukast, 10 mg, Oral, Daily  senna-docusate sodium, 2 tablet, Oral, BID  sodium chloride, 10 mL, Intravenous, Q12H  spironolactone, 25 mg, Oral, Daily            Atrial fibrillation       Plan:   Atrial fibrillation--- she remains in atrial fibrillation is with periods of RVR.  Heart rate is a little bit better today.  We will add low-dose daily, digoxin, 125 mcg.  Monitor kidney function closely.    DAVID--he function has improved, 1.03 this morning.  Nephrology following    Anemia--echocardiogram ordered to rule out pericardial effusion.  Read as positive pericardial effusion but CT of the chest shows small bilateral pleural effusions.    WBC elevated--no specific cause identified.  WBC continues to be elevated, blood cultures no growth thus far.  She is afebrile.      Concern for pericardial effusion.  This morning.  However CT scan of the chest ruled that out.    remains in atrial fibrillation.  Digoxin was added today, we will reassess heart rate in the morning.      LONA Ayala  08/03/23  15:48 EDT

## 2023-08-03 NOTE — PLAN OF CARE
"Goal Outcome Evaluation:  Plan of Care Reviewed With: patient, spouse        Progress: improving  Outcome Evaluation: Pt seen this AM for OT treatment session. Pt refused ADLs this session due to \"having to have her daughter with her\". Pt agree able to ambulate to sink and simulate grooming task. Pt SBA for STS, SBA for mobility, and SBA for simulated task standing at sink. Pt will cont. to benefit from skilled OT services to [facility] at home. Cont OT POC. Recommend HH.         "

## 2023-08-03 NOTE — PROGRESS NOTES
"   LOS: 1 day     Chief Complaint/ Reason for encounter: Postop DAVID    Subjective   Readmitted for fluid overload on 8/2      Medical history reviewed:  History of Present Illness    Subjective  Patient is well-known to me from recent admission.  Recently discharged after CABG on 7/20 but presented back to the ER with increasing shortness of breath.  Her BNP was elevated and chest x-ray showed pulmonary edema and she was started on IV diuretics  Creatinine was 1.3 at peak    She is feeling better today, less short of breath, less edema in her legs  She is been urinating quite a bit per patient/family but does not appear that it is all being recorded      History taken from: Patient and chart    Vital Signs  Temp:  [97.3 øF (36.3 øC)-97.9 øF (36.6 øC)] 97.5 øF (36.4 øC)  Heart Rate:  [] 105  Resp:  [18] 18  BP: ()/(57-94) 98/68       Wt Readings from Last 1 Encounters:   08/03/23 0919 89 kg (196 lb 3.4 oz)   08/03/23 0812 89.4 kg (196 lb 15.7 oz)   08/01/23 2254 91 kg (200 lb 9.9 oz)       Objective:  Vital signs: (most recent): Blood pressure 98/68, pulse 105, temperature 97.5 øF (36.4 øC), temperature source Oral, resp. rate 18, height 162 cm (63.78\"), weight 89 kg (196 lb 3.4 oz), SpO2 93 %, not currently breastfeeding.              Objective:  General Appearance:  Comfortable, mildly ill-appearing, in no acute distress and not in pain.  Awake, alert, oriented  HEENT: Mucous membranes moist, no injury, oropharynx clear  Lungs:  Normal effort and normal respiratory rate.  Breath sounds clear to auscultation.  No  respiratory distress.  No rales, decreased breath sounds or rhonchi.    Heart: Normal rate.  Regular rhythm.  S1, S2 normal.  No murmur.   Abdomen: Abdomen is soft.  Bowel sounds are normal, no abdominal tenderness.  There is no rebound or guarding  Extremities: Decreased, 1+ edema of bilateral lower extremities  Neurological: No focal motor or sensory deficits, pupils reactive  Skin:  Warm and " dry.  No rash or cyanosis.       Results Review:    Intake/Output:     Intake/Output Summary (Last 24 hours) at 8/3/2023 1443  Last data filed at 8/3/2023 1400  Gross per 24 hour   Intake 600 ml   Output 1150 ml   Net -550 ml           DATA:  Radiology and Labs:  The following labs independently reviewed by me. Additional labs ordered for tomorrow a.m.  Interval notes, chart personally reviewed by me.   Old records independently reviewed showing normal baseline creatinine around 1.0  Discussed with patient herself at bedside    Risk/ complexity of medical care/ medical decision making moderate complexity, DAVID and diuretic management postop CABG    Labs:   Recent Results (from the past 24 hour(s))   POC Glucose Once    Collection Time: 08/02/23  4:31 PM    Specimen: Blood   Result Value Ref Range    Glucose 189 (H) 70 - 130 mg/dL   POC Glucose Once    Collection Time: 08/02/23  8:36 PM    Specimen: Blood   Result Value Ref Range    Glucose 160 (H) 70 - 130 mg/dL   Basic Metabolic Panel    Collection Time: 08/03/23  2:49 AM    Specimen: Blood   Result Value Ref Range    Glucose 113 (H) 65 - 99 mg/dL    BUN 50 (H) 8 - 23 mg/dL    Creatinine 1.03 (H) 0.57 - 1.00 mg/dL    Sodium 140 136 - 145 mmol/L    Potassium 3.9 3.5 - 5.2 mmol/L    Chloride 104 98 - 107 mmol/L    CO2 26.0 22.0 - 29.0 mmol/L    Calcium 8.9 8.6 - 10.5 mg/dL    BUN/Creatinine Ratio 48.5 (H) 7.0 - 25.0    Anion Gap 10.0 5.0 - 15.0 mmol/L    eGFR 55.8 (L) >60.0 mL/min/1.73   Magnesium    Collection Time: 08/03/23  2:49 AM    Specimen: Blood   Result Value Ref Range    Magnesium 1.7 1.6 - 2.4 mg/dL   CBC Auto Differential    Collection Time: 08/03/23  2:49 AM    Specimen: Blood   Result Value Ref Range    WBC 20.15 (H) 3.40 - 10.80 10*3/mm3    RBC 2.56 (L) 3.77 - 5.28 10*6/mm3    Hemoglobin 7.7 (L) 12.0 - 15.9 g/dL    Hematocrit 23.4 (L) 34.0 - 46.6 %    MCV 91.4 79.0 - 97.0 fL    MCH 30.1 26.6 - 33.0 pg    MCHC 32.9 31.5 - 35.7 g/dL    RDW 18.1 (H) 12.3 -  15.4 %    RDW-SD 59.9 (H) 37.0 - 54.0 fl    MPV 10.2 6.0 - 12.0 fL    Platelets 344 140 - 450 10*3/mm3    Neutrophil % 64.6 42.7 - 76.0 %    Lymphocyte % 15.0 (L) 19.6 - 45.3 %    Monocyte % 13.2 (H) 5.0 - 12.0 %    Eosinophil % 3.3 0.3 - 6.2 %    Basophil % 0.5 0.0 - 1.5 %    Immature Grans % 3.4 (H) 0.0 - 0.5 %    Neutrophils, Absolute 13.02 (H) 1.70 - 7.00 10*3/mm3    Lymphocytes, Absolute 3.03 0.70 - 3.10 10*3/mm3    Monocytes, Absolute 2.65 (H) 0.10 - 0.90 10*3/mm3    Eosinophils, Absolute 0.66 (H) 0.00 - 0.40 10*3/mm3    Basophils, Absolute 0.10 0.00 - 0.20 10*3/mm3    Immature Grans, Absolute 0.69 (H) 0.00 - 0.05 10*3/mm3    nRBC 0.4 (H) 0.0 - 0.2 /100 WBC   POC Glucose Once    Collection Time: 08/03/23  6:45 AM    Specimen: Blood   Result Value Ref Range    Glucose 151 (H) 70 - 130 mg/dL   Adult Transthoracic Echo Limited W/ Cont if Necessary Per Protocol    Collection Time: 08/03/23  9:19 AM   Result Value Ref Range    LVIDd 4.8 cm    LVIDs 3.9 cm    IVSd 1.18 cm    LVPWd 1.25 cm    FS 19.8 %    IVS/LVPW 0.94 cm    ESV(cubed) 58.3 ml    EDV(cubed) 113.0 ml    LV mass(C)d 225.0 grams    MV E max nandini 197.0 cm/sec    MV dec time 0.27 msec    MR max nandini 508.4 cm/sec    MR max .4 mmHg   POC Glucose Once    Collection Time: 08/03/23 11:28 AM    Specimen: Blood   Result Value Ref Range    Glucose 141 (H) 70 - 130 mg/dL   Type & Screen    Collection Time: 08/03/23 11:37 AM    Specimen: Blood   Result Value Ref Range    ABO Type O     RH type Positive     Antibody Screen Positive     T&S Expiration Date 8/6/2023 11:59:59 PM    Direct Antiglobulin Test (Direct Cristina)    Collection Time: 08/03/23 11:37 AM    Specimen: Blood   Result Value Ref Range    NANNETTE Positive    NANNETTE, IgG    Collection Time: 08/03/23 11:37 AM    Specimen: Blood   Result Value Ref Range    NANNETTE IgG Positive    Complement Direct Cristina    Collection Time: 08/03/23 11:37 AM    Specimen: Blood   Result Value Ref Range    NANNETTE C3 Positive         Radiology:  Pertinent radiology studies were reviewed as described above      Medications have been reviewed separately in chart overview      ASSESSMENT:  DAVID postop. On top of CKD stage IIIa.  Baseline creatinine around 1.0.  Likely due to decompensated CHF, improving with IV diuretics and creatinine is 1.0 today  Hypotension, stable, previously on midodrine  Postop anemia on oral iron  CAD status post three-vessel CABG  NSTEMI  Combined systolic and diastolic CHF  Diabetes mellitus type 2  Hypothyroidism      DISCUSSION/PLAN:  Renal function has improved, stable today and near baseline  Continue IV Lasix  Volume status appears to be improving  Will request strict I's and O's  Continue current dose of oral Lasix/spironolactone/potassium  Digoxin dosing per cardiology    Renal dose IV antibiotics      Anderson Parker MD  [facility]  Office phone number: [phone]  Answering service phone number: [phone]    08/03/23  14:43 EDT    Dictation performed using Dragon dictation software

## 2023-08-04 ENCOUNTER — APPOINTMENT (OUTPATIENT)
Dept: GENERAL RADIOLOGY | Facility: HOSPITAL | Age: 78
DRG: 291 | End: 2023-08-04
Payer: MEDICARE

## 2023-08-04 ENCOUNTER — APPOINTMENT (OUTPATIENT)
Dept: CARDIOLOGY | Facility: HOSPITAL | Age: 78
DRG: 291 | End: 2023-08-04
Payer: MEDICARE

## 2023-08-04 LAB
ALBUMIN SERPL-MCNC: 3.6 G/DL (ref 3.5–5.2)
ANION GAP SERPL CALCULATED.3IONS-SCNC: 8.8 MMOL/L (ref 5–15)
BACTERIA UR QL AUTO: ABNORMAL /HPF
BASOPHILS # BLD AUTO: 0.09 10*3/MM3 (ref 0–0.2)
BASOPHILS NFR BLD AUTO: 0.4 % (ref 0–1.5)
BH BB BLOOD EXPIRATION DATE: NORMAL
BH BB BLOOD TYPE BARCODE: 9500
BH BB DISPENSE STATUS: NORMAL
BH BB PRODUCT CODE: NORMAL
BH BB UNIT NUMBER: NORMAL
BH CV LOWER VASCULAR LEFT COMMON FEMORAL AUGMENT: NORMAL
BH CV LOWER VASCULAR LEFT COMMON FEMORAL COMPETENT: NORMAL
BH CV LOWER VASCULAR LEFT COMMON FEMORAL COMPRESS: NORMAL
BH CV LOWER VASCULAR LEFT COMMON FEMORAL PHASIC: NORMAL
BH CV LOWER VASCULAR LEFT COMMON FEMORAL SPONT: NORMAL
BH CV LOWER VASCULAR LEFT DISTAL FEMORAL COMPRESS: NORMAL
BH CV LOWER VASCULAR LEFT GASTRONEMIUS COMPRESS: NORMAL
BH CV LOWER VASCULAR LEFT GREATER SAPH AK COMPRESS: NORMAL
BH CV LOWER VASCULAR LEFT LESSER SAPH COMPRESS: NORMAL
BH CV LOWER VASCULAR LEFT MID FEMORAL AUGMENT: NORMAL
BH CV LOWER VASCULAR LEFT MID FEMORAL COMPETENT: NORMAL
BH CV LOWER VASCULAR LEFT MID FEMORAL COMPRESS: NORMAL
BH CV LOWER VASCULAR LEFT MID FEMORAL PHASIC: NORMAL
BH CV LOWER VASCULAR LEFT MID FEMORAL SPONT: NORMAL
BH CV LOWER VASCULAR LEFT PERONEAL COMPRESS: NORMAL
BH CV LOWER VASCULAR LEFT POPLITEAL AUGMENT: NORMAL
BH CV LOWER VASCULAR LEFT POPLITEAL COMPETENT: NORMAL
BH CV LOWER VASCULAR LEFT POPLITEAL COMPRESS: NORMAL
BH CV LOWER VASCULAR LEFT POPLITEAL PHASIC: NORMAL
BH CV LOWER VASCULAR LEFT POPLITEAL SPONT: NORMAL
BH CV LOWER VASCULAR LEFT POSTERIOR TIBIAL COMPRESS: NORMAL
BH CV LOWER VASCULAR LEFT PROFUNDA FEMORAL COMPRESS: NORMAL
BH CV LOWER VASCULAR LEFT PROXIMAL FEMORAL COMPRESS: NORMAL
BH CV LOWER VASCULAR LEFT SAPHENOFEMORAL JUNCTION COMPRESS: NORMAL
BH CV LOWER VASCULAR RIGHT COMMON FEMORAL AUGMENT: NORMAL
BH CV LOWER VASCULAR RIGHT COMMON FEMORAL COMPETENT: NORMAL
BH CV LOWER VASCULAR RIGHT COMMON FEMORAL COMPRESS: NORMAL
BH CV LOWER VASCULAR RIGHT COMMON FEMORAL PHASIC: NORMAL
BH CV LOWER VASCULAR RIGHT COMMON FEMORAL SPONT: NORMAL
BH CV LOWER VASCULAR RIGHT DISTAL FEMORAL COMPRESS: NORMAL
BH CV LOWER VASCULAR RIGHT GASTRONEMIUS COMPRESS: NORMAL
BH CV LOWER VASCULAR RIGHT GREATER SAPH BK COMPRESS: NORMAL
BH CV LOWER VASCULAR RIGHT LESSER SAPH COMPRESS: NORMAL
BH CV LOWER VASCULAR RIGHT MID FEMORAL AUGMENT: NORMAL
BH CV LOWER VASCULAR RIGHT MID FEMORAL COMPETENT: NORMAL
BH CV LOWER VASCULAR RIGHT MID FEMORAL COMPRESS: NORMAL
BH CV LOWER VASCULAR RIGHT MID FEMORAL PHASIC: NORMAL
BH CV LOWER VASCULAR RIGHT MID FEMORAL SPONT: NORMAL
BH CV LOWER VASCULAR RIGHT PERONEAL COMPRESS: NORMAL
BH CV LOWER VASCULAR RIGHT POPLITEAL AUGMENT: NORMAL
BH CV LOWER VASCULAR RIGHT POPLITEAL COMPETENT: NORMAL
BH CV LOWER VASCULAR RIGHT POPLITEAL COMPRESS: NORMAL
BH CV LOWER VASCULAR RIGHT POPLITEAL PHASIC: NORMAL
BH CV LOWER VASCULAR RIGHT POPLITEAL SPONT: NORMAL
BH CV LOWER VASCULAR RIGHT POSTERIOR TIBIAL COMPRESS: NORMAL
BH CV LOWER VASCULAR RIGHT PROFUNDA FEMORAL COMPRESS: NORMAL
BH CV LOWER VASCULAR RIGHT PROXIMAL FEMORAL COMPRESS: NORMAL
BH CV LOWER VASCULAR RIGHT SAPHENOFEMORAL JUNCTION COMPRESS: NORMAL
BILIRUB UR QL STRIP: NEGATIVE
BUN SERPL-MCNC: 50 MG/DL (ref 8–23)
BUN/CREAT SERPL: 38.2 (ref 7–25)
CALCIUM SPEC-SCNC: 8.9 MG/DL (ref 8.6–10.5)
CHLORIDE SERPL-SCNC: 102 MMOL/L (ref 98–107)
CLARITY UR: CLEAR
CO2 SERPL-SCNC: 28.2 MMOL/L (ref 22–29)
COLOR UR: YELLOW
CREAT SERPL-MCNC: 1.31 MG/DL (ref 0.57–1)
CROSSMATCH INTERPRETATION: NORMAL
CRP SERPL-MCNC: 2.44 MG/DL (ref 0–0.5)
DEPRECATED RDW RBC AUTO: 55.7 FL (ref 37–54)
EGFRCR SERPLBLD CKD-EPI 2021: 41.8 ML/MIN/1.73
EOSINOPHIL # BLD AUTO: 0.7 10*3/MM3 (ref 0–0.4)
EOSINOPHIL NFR BLD AUTO: 3.4 % (ref 0.3–6.2)
ERYTHROCYTE [DISTWIDTH] IN BLOOD BY AUTOMATED COUNT: 17.1 % (ref 12.3–15.4)
ERYTHROCYTE [SEDIMENTATION RATE] IN BLOOD: 3 MM/HR (ref 0–30)
GLUCOSE BLDC GLUCOMTR-MCNC: 102 MG/DL (ref 70–130)
GLUCOSE BLDC GLUCOMTR-MCNC: 114 MG/DL (ref 70–130)
GLUCOSE BLDC GLUCOMTR-MCNC: 144 MG/DL (ref 70–130)
GLUCOSE BLDC GLUCOMTR-MCNC: 199 MG/DL (ref 70–130)
GLUCOSE SERPL-MCNC: 94 MG/DL (ref 65–99)
GLUCOSE UR STRIP-MCNC: NEGATIVE MG/DL
HCT VFR BLD AUTO: 26.1 % (ref 34–46.6)
HGB BLD-MCNC: 8.7 G/DL (ref 12–15.9)
HGB UR QL STRIP.AUTO: NEGATIVE
HYALINE CASTS UR QL AUTO: ABNORMAL /LPF
IMM GRANULOCYTES # BLD AUTO: 0.47 10*3/MM3 (ref 0–0.05)
IMM GRANULOCYTES NFR BLD AUTO: 2.3 % (ref 0–0.5)
KETONES UR QL STRIP: NEGATIVE
LEUKOCYTE ESTERASE UR QL STRIP.AUTO: ABNORMAL
LYMPHOCYTES # BLD AUTO: 3.4 10*3/MM3 (ref 0.7–3.1)
LYMPHOCYTES NFR BLD AUTO: 16.6 % (ref 19.6–45.3)
MAGNESIUM SERPL-MCNC: 2.1 MG/DL (ref 1.6–2.4)
MCH RBC QN AUTO: 30.5 PG (ref 26.6–33)
MCHC RBC AUTO-ENTMCNC: 33.3 G/DL (ref 31.5–35.7)
MCV RBC AUTO: 91.6 FL (ref 79–97)
MONOCYTES # BLD AUTO: 2.31 10*3/MM3 (ref 0.1–0.9)
MONOCYTES NFR BLD AUTO: 11.3 % (ref 5–12)
NEUTROPHILS NFR BLD AUTO: 13.48 10*3/MM3 (ref 1.7–7)
NEUTROPHILS NFR BLD AUTO: 66 % (ref 42.7–76)
NITRITE UR QL STRIP: NEGATIVE
NRBC BLD AUTO-RTO: 0.5 /100 WBC (ref 0–0.2)
PH UR STRIP.AUTO: 5.5 [PH] (ref 5–8)
PHOSPHATE SERPL-MCNC: 3.8 MG/DL (ref 2.5–4.5)
PLATELET # BLD AUTO: 353 10*3/MM3 (ref 140–450)
PMV BLD AUTO: 10.5 FL (ref 6–12)
POTASSIUM SERPL-SCNC: 4 MMOL/L (ref 3.5–5.2)
PROT UR QL STRIP: NEGATIVE
RBC # BLD AUTO: 2.85 10*6/MM3 (ref 3.77–5.28)
RBC # UR STRIP: ABNORMAL /HPF
REF LAB TEST METHOD: ABNORMAL
SODIUM SERPL-SCNC: 139 MMOL/L (ref 136–145)
SP GR UR STRIP: 1.01 (ref 1–1.03)
SQUAMOUS #/AREA URNS HPF: ABNORMAL /HPF
UNIT  ABO: NORMAL
UNIT  RH: NORMAL
UROBILINOGEN UR QL STRIP: ABNORMAL
WBC # UR STRIP: ABNORMAL /HPF
WBC NRBC COR # BLD: 20.45 10*3/MM3 (ref 3.4–10.8)
YEAST URNS QL MICRO: ABNORMAL /HPF

## 2023-08-04 PROCEDURE — 93970 EXTREMITY STUDY: CPT

## 2023-08-04 PROCEDURE — 85025 COMPLETE CBC W/AUTO DIFF WBC: CPT | Performed by: NURSE PRACTITIONER

## 2023-08-04 PROCEDURE — 84155 ASSAY OF PROTEIN SERUM: CPT | Performed by: INTERNAL MEDICINE

## 2023-08-04 PROCEDURE — 97116 GAIT TRAINING THERAPY: CPT

## 2023-08-04 PROCEDURE — 84165 PROTEIN E-PHORESIS SERUM: CPT | Performed by: INTERNAL MEDICINE

## 2023-08-04 PROCEDURE — 86334 IMMUNOFIX E-PHORESIS SERUM: CPT | Performed by: INTERNAL MEDICINE

## 2023-08-04 PROCEDURE — 80069 RENAL FUNCTION PANEL: CPT | Performed by: INTERNAL MEDICINE

## 2023-08-04 PROCEDURE — 83735 ASSAY OF MAGNESIUM: CPT | Performed by: NURSE PRACTITIONER

## 2023-08-04 PROCEDURE — 82784 ASSAY IGA/IGD/IGG/IGM EACH: CPT | Performed by: INTERNAL MEDICINE

## 2023-08-04 PROCEDURE — 63710000001 INSULIN LISPRO (HUMAN) PER 5 UNITS: Performed by: NURSE PRACTITIONER

## 2023-08-04 PROCEDURE — 82948 REAGENT STRIP/BLOOD GLUCOSE: CPT

## 2023-08-04 PROCEDURE — 25010000002 FUROSEMIDE PER 20 MG: Performed by: INTERNAL MEDICINE

## 2023-08-04 PROCEDURE — 25010000002 CEFTRIAXONE PER 250 MG: Performed by: INTERNAL MEDICINE

## 2023-08-04 PROCEDURE — 99232 SBSQ HOSP IP/OBS MODERATE 35: CPT

## 2023-08-04 PROCEDURE — 86140 C-REACTIVE PROTEIN: CPT | Performed by: INTERNAL MEDICINE

## 2023-08-04 PROCEDURE — 85652 RBC SED RATE AUTOMATED: CPT | Performed by: INTERNAL MEDICINE

## 2023-08-04 PROCEDURE — 71045 X-RAY EXAM CHEST 1 VIEW: CPT

## 2023-08-04 PROCEDURE — 81001 URINALYSIS AUTO W/SCOPE: CPT | Performed by: NURSE PRACTITIONER

## 2023-08-04 RX ORDER — FUROSEMIDE 10 MG/ML
40 INJECTION INTRAMUSCULAR; INTRAVENOUS ONCE
Status: COMPLETED | OUTPATIENT
Start: 2023-08-04 | End: 2023-08-04

## 2023-08-04 RX ORDER — FUROSEMIDE 40 MG/1
40 TABLET ORAL
Status: DISCONTINUED | OUTPATIENT
Start: 2023-08-05 | End: 2023-08-06 | Stop reason: HOSPADM

## 2023-08-04 RX ORDER — DIGOXIN 125 MCG
125 TABLET ORAL EVERY OTHER DAY
Status: DISCONTINUED | OUTPATIENT
Start: 2023-08-05 | End: 2023-08-06 | Stop reason: HOSPADM

## 2023-08-04 RX ADMIN — Medication 10 ML: at 08:30

## 2023-08-04 RX ADMIN — ACETAMINOPHEN 650 MG: 325 TABLET, FILM COATED ORAL at 23:11

## 2023-08-04 RX ADMIN — CEFTRIAXONE SODIUM 1000 MG: 1 INJECTION, POWDER, FOR SOLUTION INTRAMUSCULAR; INTRAVENOUS at 08:29

## 2023-08-04 RX ADMIN — ASPIRIN 81 MG: 81 TABLET, COATED ORAL at 08:29

## 2023-08-04 RX ADMIN — FUROSEMIDE 40 MG: 40 INJECTION, SOLUTION INTRAMUSCULAR; INTRAVENOUS at 20:16

## 2023-08-04 RX ADMIN — METOPROLOL SUCCINATE 25 MG: 25 TABLET, EXTENDED RELEASE ORAL at 08:29

## 2023-08-04 RX ADMIN — SENNOSIDES AND DOCUSATE SODIUM 2 TABLET: 50; 8.6 TABLET ORAL at 20:16

## 2023-08-04 RX ADMIN — ATORVASTATIN CALCIUM 40 MG: 20 TABLET, FILM COATED ORAL at 20:16

## 2023-08-04 RX ADMIN — ACETAMINOPHEN 650 MG: 325 TABLET, FILM COATED ORAL at 00:45

## 2023-08-04 RX ADMIN — Medication 150 MG: at 08:29

## 2023-08-04 RX ADMIN — MONTELUKAST SODIUM 10 MG: 10 TABLET, FILM COATED ORAL at 08:30

## 2023-08-04 RX ADMIN — METOPROLOL SUCCINATE 25 MG: 25 TABLET, EXTENDED RELEASE ORAL at 20:16

## 2023-08-04 RX ADMIN — LEVOTHYROXINE SODIUM 75 MCG: 0.07 TABLET ORAL at 05:00

## 2023-08-04 RX ADMIN — BUPROPION HYDROCHLORIDE 300 MG: 300 TABLET, EXTENDED RELEASE ORAL at 08:29

## 2023-08-04 RX ADMIN — INSULIN LISPRO 2 UNITS: 100 INJECTION, SOLUTION INTRAVENOUS; SUBCUTANEOUS at 11:43

## 2023-08-04 RX ADMIN — FUROSEMIDE 40 MG: 10 INJECTION, SOLUTION INTRAMUSCULAR; INTRAVENOUS at 11:43

## 2023-08-04 RX ADMIN — Medication 10 ML: at 20:17

## 2023-08-04 RX ADMIN — SPIRONOLACTONE 25 MG: 25 TABLET ORAL at 08:30

## 2023-08-04 RX ADMIN — FLUTICASONE PROPIONATE 2 SPRAY: 50 SPRAY, METERED NASAL at 08:30

## 2023-08-04 NOTE — PLAN OF CARE
Goal Outcome Evaluation:  Plan of Care Reviewed With: patient, family        Progress: improving  Outcome Evaluation: Pt seen for PT this AM, sitting UIC on arrival and states she's already walked 1 lap around unit with her family. Pt stood with mod I and ambulated 150ft with mod I and rollator from home. Pt ambulated on RA, taking 2 seated rest breaks 2/2 SOA, satting 91% following activity. Pt reports she has been getting up to the bathroom with family present and feels safe continuing to mobilize with family/nsg staff - no further acute PT needs. Safe to DC home with HHPT and family assist, PT will sign-off, RN aware.      Anticipated Discharge Disposition (PT): home with assist, home with home health

## 2023-08-04 NOTE — THERAPY DISCHARGE NOTE
Patient Name: Ann-Marie Hughes  : 1945    MRN: [id number]                              Today's Date: 2023       Admit Date: 2023    Visit Dx: No diagnosis found.  Patient Active Problem List   Diagnosis    Arthritis    Bilateral leg pain    Bladder disorder    Chronic pain syndrome    Depression    Diabetes mellitus, type II    Type 2 diabetes mellitus with hyperglycemia    Gastric reflux    Herniated disc    Hypertension    Hypothyroidism    Low back pain    Pain in joint, multiple sites    Pain in soft tissues of limb    Shortness of breath    Hyperlipidemia    B12 deficiency    Vitamin D deficiency    UTI (urinary tract infection)    Metabolic syndrome    Acne rosacea    Hx of smoking    Stress incontinence of urine    Arteriosclerosis of abdominal aorta    Iron deficiency anemia secondary to inadequate dietary iron intake    Seasonal allergies    Hyponatremia    Leg length discrepancy    Type 2 diabetes mellitus with hyperglycemia    Reactive depression    Encounter for subsequent annual wellness visit (AWV) in Medicare patient    Class 1 obesity with alveolar hypoventilation, serious comorbidity, and body mass index (BMI) of 32.0 to 32.9 in adult    Heart failure with reduced ejection fraction    Coronary artery disease of native artery of native heart with stable angina pectoris    Chest pain    Chronic HFrEF (heart failure with reduced ejection fraction)    NSTEMI (non-ST elevated myocardial infarction)    Coronary heart disease    Abnormal findings on diagnostic imaging of heart and coronary circulation    Acute on chronic HFrEF (heart failure with reduced ejection fraction)    Acute respiratory failure with hypoxia    CAD status post CABG x3 vessels    PAF (paroxysmal atrial fibrillation)    Atrial fibrillation     Past Medical History:   Diagnosis Date    Acne rosacea 2021    DR.JEFF MOON     Arteriosclerosis of abdominal aorta     B12 deficiency 2021    JEANINE  (degenerative joint disease)     Hx of smoking 08/17/2021    QUIT IN 1976 AT AGE 31    Hyperlipidemia 08/17/2021    Hypertension     Hypothyroidism     Metabolic syndrome 08/17/2021    KAREN (stress urinary incontinence, female) 08/17/2021    UTI (urinary tract infection) 08/17/2021    Vitamin D deficiency 08/17/2021    Vitamin D deficiency      Past Surgical History:   Procedure Laterality Date    BACK SURGERY  2013    CARDIAC CATHETERIZATION N/A 07/17/2023    Procedure: Left Heart Cath;  Surgeon: Dinh Andres MD;  Location:  [facility];  Service: Cardiovascular;  Laterality: N/A;    COLONOSCOPY  2011    CORONARY ARTERY BYPASS GRAFT WITH MITRAL VALVE REPAIR/REPLACEMENT N/A 07/20/2023    Procedure: REZA STERNOTOMY OFF-PUMP CORONARY ARTERY BYPASS GRAFT TIMES        USING LEFFT INTERNAL MAMMARY ARTERY AND     GREATER SAPHENOUS VEIN GRAFT PER EMDOSCOPIC VEIN HARVESTING, MAZE PROCEDURE AND PRP;  Surgeon: Shane Alexander MD;  Location: [facility];  Service: Cardiothoracic;  Laterality: N/A;      General Information       Row Name 08/04/23 1312          Physical Therapy Time and Intention    Document Type discharge treatment  -     Mode of Treatment individual therapy;physical therapy  -       Row Name 08/04/23 1312          General Information    Patient Profile Reviewed yes  -     Existing Precautions/Restrictions cardiac;fall  -       Row Name 08/04/23 1312          Cognition    Orientation Status (Cognition) oriented x 4  -       Row Name 08/04/23 1312          Safety Issues, Functional Mobility    Impairments Affecting Function (Mobility) balance;endurance/activity tolerance;shortness of breath;strength  -               User Key  (r) = Recorded By, (t) = Taken By, (c) = Cosigned By      Initials Name Provider Type     Yaquelin Johnson PT Physical Therapist                   Mobility       Row Name 08/04/23 1313          Bed Mobility    Supine-Sit Bow[place] (Bed  Mobility) not tested  -     Comment, (Bed Mobility) NT - UIC  -       Row Name 08/04/23 1313          Sit-Stand Transfer    Sit-Stand [place] (Transfers) modified independence  -     Assistive Device (Sit-Stand Transfers) walker, 4-wheeled  -       Row Name 08/04/23 1313          Gait/Stairs (Locomotion)    [place] Level (Gait) modified independence  -     Assistive Device (Gait) walker, 4-wheeled  -     Distance in Feet (Gait) 150ft  -     Deviations/Abnormal Patterns (Gait) rogerio decreased;gait speed decreased;stride length decreased  -     Bilateral Gait Deviations forward flexed posture  -     Comment, (Gait/Stairs) Slow pace, 2 seated rest breaks, walked on RA  -               User Key  (r) = Recorded By, (t) = Taken By, (c) = Cosigned By      Initials Name Provider Type     Yaquelin Johnson, PT Physical Therapist                   Obj/Interventions    No documentation.                  Goals/Plan    No documentation.                  Clinical Impression       [facility] Name 08/04/23 1313          Pain    Pretreatment Pain Rating 0/10 - no pain  -     Posttreatment Pain Rating 0/10 - no pain  -[facility] Name 08/04/23 1313          Plan of Care Review    Plan of Care Reviewed With patient;family  -     Progress improving  -     Outcome Evaluation Pt seen for PT this AM, sitting UI on arrival and states she's already walked 1 lap around unit with her family. Pt stood with mod I and ambulated 150ft with mod I and rollator from home. Pt ambulated on RA, taking 2 seated rest breaks 2/2 SOA, satting 91% following activity. Pt reports she has been getting up to the bathroom with family present and feels safe continuing to mobilize with family/nsg staff - no further acute PT needs. Safe to DC home with HHPT and family assist, PT will sign-off, RN aware.  -       Row Name 08/04/23 1313          Vital Signs    O2 Delivery Pre Treatment room air  -     O2 Delivery Intra Treatment  room air  -     O2 Delivery Post Treatment room air  -       Row Name 08/04/23 1313          Positioning and Restraints    Pre-Treatment Position sitting in chair/recliner  -     Post Treatment Position chair  -     In Chair notified nsg;reclined;call light within reach;encouraged to call for assist;with family/caregiver  -               User Key  (r) = Recorded By, (t) = Taken By, (c) = Cosigned By      Initials Name Provider Type     Yaquelin Johnson PT Physical Therapist                   Outcome Measures       Row Name 08/04/23 1315 08/04/23 0829       How much help from another person do you currently need...    Turning from your back to your side while in flat bed without using bedrails? 4  - 3  -HL    Moving from lying on back to sitting on the side of a flat bed without bedrails? 4  - 3  -HL    Moving to and from a bed to a chair (including a wheelchair)? 4  - 3  -HL    Standing up from a chair using your arms (e.g., wheelchair, bedside chair)? 4  - 3  -HL    Climbing 3-5 steps with a railing? 3  - 2  -HL    To walk in hospital room? 3  - 3  -HL    AM-PAC 6 Clicks Score (PT) 22  - 17  -HL    Highest level of mobility 7 --> Walked 25 feet or more  - 5 --> Static standing  -      Row Name 08/04/23 1315          Functional Assessment    Outcome Measure Options AM-PAC 6 Clicks Basic Mobility (PT)  -               User Key  (r) = Recorded By, (t) = Taken By, (c) = Cosigned By      Initials Name Provider Type     Carlita Blackburn, RN Registered Nurse     Yaquelin Johnson PT Physical Therapist                  Physical Therapy Education       Title: PT OT SLP Therapies (Done)       Topic: Physical Therapy (Done)       Point: Mobility training (Done)       Learning Progress Summary             Patient Acceptance, E,TB,D, VU by  at 8/4/2023 1315    Acceptance, E, VU by  at 8/3/2023 1259    Acceptance, E, VU by  at 8/2/2023 1444   Family Acceptance, E, VU by  at  8/2/2023 1444                         Point: Home exercise program (Done)       Learning Progress Summary             Patient Acceptance, E,TB,D, VU by  at 8/4/2023 1315    Acceptance, E, VU by  at 8/3/2023 1259                         Point: Body mechanics (Done)       Learning Progress Summary             Patient Acceptance, E,TB,D, VU by  at 8/4/2023 1315    Acceptance, E, VU by  at 8/3/2023 1259                         Point: Precautions (Done)       Learning Progress Summary             Patient Acceptance, E,TB,D, VU by  at 8/4/2023 1315    Acceptance, E, VU by  at 8/3/2023 1259                                         User Key       Initials Effective Dates Name Provider Type Discipline     04/08/22 -  Yaquelin Johnson, PT Physical Therapist PT     08/02/22 -  Shaji Giron, OT Occupational Therapist OT     06/28/23 -  Liliana Bull, PT Student PT Student PT                  PT Recommendation and Plan     Plan of Care Reviewed With: patient, family  Progress: improving  Outcome Evaluation: Pt seen for PT this AM, sitting UIC on arrival and states she's already walked 1 lap around unit with her family. Pt stood with mod I and ambulated 150ft with mod I and rollator from home. Pt ambulated on RA, taking 2 seated rest breaks 2/2 SOA, satting 91% following activity. Pt reports she has been getting up to the bathroom with family present and feels safe continuing to mobilize with family/nsg staff - no further acute PT needs. Safe to DC home with HHPT and family assist, PT will sign-off, RN aware.     Time Calculation:         PT Charges       Row Name 08/04/23 1316             Time Calculation    Start Time 1001  -      Stop Time 1011  -      Time Calculation (min) 10 min  -      PT Received On 08/04/23  -         Time Calculation- PT    Total Timed Code Minutes- PT 10 minute(s)  -         Timed Charges    66501 - Gait Training Minutes  8  -      21736 - PT Therapeutic Activity Minutes 2   -BH         Total Minutes    Timed Charges Total Minutes 10  -BH       Total Minutes 10  -BH                User Key  (r) = Recorded By, (t) = Taken By, (c) = Cosigned By      Initials Name Provider Type    Yaquelin Hernandez, PT Physical Therapist                  Therapy Charges for Today       Code Description Service Date Service Provider Modifiers Qty    96910504711 HC GAIT TRAINING EA 15 MIN 8/4/2023 Yaquelin Johnson, PT GP 1            PT G-Codes  Outcome Measure Options: AM-PAC 6 Clicks Basic Mobility (PT)  AM-PAC 6 Clicks Score (PT): 22  AM-PAC 6 Clicks Score (OT): 20    PT Discharge Summary  Anticipated Discharge Disposition (PT): home with assist, home with home health    Yaquelin Johnson, RIOS  8/4/2023

## 2023-08-04 NOTE — PROGRESS NOTES
Electrophysiology Follow-Up Note      Patient Name: AnnM-arie Hughes  Age/Sex: 77 y.o. female  : 1945  MRN: [id number]      Day of Service: 23       Chief Complaint/Follow-up: atrial fibrillation, dyspnea, edema    S: converted to NSR yesterday around 1730, remains in NSR this AM.       Temp:  [97.5 øF (36.4 øC)-98.3 øF (36.8 øC)] 97.5 øF (36.4 øC)  Heart Rate:  [] 53  Resp:  [18-20] 20  BP: ()/(54-97) 153/54     PHYSICAL EXAM:    General Appearance: No acute distress, well developed and well nourished.   Eyes: Conjunctiva and lids: No erythema, swelling, or discharge. Sclera non-icteric.   HENT: Atraumatic, normocephalic. External eyes, ears, and nose normal.   Respiratory: No signs of respiratory distress. Respiration rhythm and depth normal.   Clear to auscultation. No rales, crackles, rhonchi, or wheezing auscultated.   Cardiovascular:  Heart Rate and Rhythm: Normal, Heart Sounds: Normal S1 and S2. No S3 or S4 noted.  Gastrointestinal:  Abdomen soft, non-distended, non-tender.  Musculoskeletal: Normal movement of extremities  Skin: Warm and dry.   Psychiatric: Patient alert and oriented to person, place, and time. Speech and behavior appropriate. Normal mood and affect.       ECG/TELE:             Results from last 7 days   Lab Units 23  02423  0847   SODIUM mmol/L 139 140 142   POTASSIUM mmol/L 4.0 3.9 4.2   CHLORIDE mmol/L 102 104 102   CO2 mmol/L 28.2 26.0 25.6   BUN mg/dL 50* 50* 56*   CREATININE mg/dL 1.31* 1.03* 1.21*   GLUCOSE mg/dL 94 113* 176*   CALCIUM mg/dL 8.9 8.9 9.3     Results from last 7 days   Lab Units 23  0249 23  0847   WBC 10*3/mm3 20.45* 20.15* 17.27*   HEMOGLOBIN g/dL 8.7* 7.7* 8.1*   HEMATOCRIT % 26.1* 23.4* 24.5*   PLATELETS 10*3/mm3 353 344 365         Results from last 7 days   Lab Units 23  0605 23  1413 23  1203   HSTROP T ng/L 155* 143* 142*               Current  Medications:   Scheduled Meds:aspirin, 81 mg, Oral, Daily  atorvastatin, 40 mg, Oral, Nightly  buPROPion XL, 300 mg, Oral, Daily  cefTRIAXone, 1,000 mg, Intravenous, Q24H  digoxin, 125 mcg, Oral, Daily  fluticasone, 2 spray, Each Nare, Daily  furosemide, 80 mg, Intravenous, BID  insulin lispro, 2-9 Units, Subcutaneous, 4x Daily AC & at Bedtime  iron polysaccharides, 150 mg, Oral, Daily  levothyroxine, 75 mcg, Oral, Q AM  metoprolol succinate XL, 25 mg, Oral, Q12H  montelukast, 10 mg, Oral, Daily  senna-docusate sodium, 2 tablet, Oral, BID  sodium chloride, 10 mL, Intravenous, Q12H  spironolactone, 25 mg, Oral, Daily            Atrial fibrillation       Plan:    Atrial fibrillation--- she is feeling better today. she converted yesterday around 1730 and remains in NSR this morning. No significant awareness.       2. Anemia--- status post 1 unit PRBC. Hgb 8.7 this morning.       3. DAVID-- creatinine 1.3 this morning. She did get 80mg IV lasix post infusion. Nephrology following.       4. WBC elevated-- unclear etiology. She is afebrile. Small bilateral effusions on CT yesterday.         Creatinine slightly up this morning. She did get 80mg of lasix post transfusion last night. Will give digoxin today and monitor creatinine in the morning. If she remains in NSR she may not need.     Clarence Johnson, LONA  08/04/23  09:16 EDT

## 2023-08-04 NOTE — CONSULTS
Subjective     REASON FOR CONSULTATION:  1. LEUKOCYTOSIS , NEUTROPHILS HAVE NO GRANULES  2. ANEMIA FOR 3 YEARS,NORMAL MCV: VERY ATYPICAL RED BLOOD CELL MORPHOLOGY  3. CHF AND PULMONARY EDEMA , CAD, ELEVATED CREATININE.  Provide an opinion on any further workup or treatment                             REQUESTING PHYSICIAN:    Shane Alexander MD      Attending     Since 8/1/2023     [phone]       RECORDS OBTAINED:  Records of the patients history including those obtained from the referring provider were reviewed and summarized in detail.        History of Present Illness On 08/04/2023 I had the opportunity to see this 77-year-old female who has been admitted to the hospital in pulmonary edema, congestive heart failure, associated with cardiac disease. She has undergone recently a cardiac bypass. We have been consulted because the patient has been noticed to have leukocytosis, new issue for her in absence of any other fever or infection and she also has been noticed to have anemia. On further questioning to the patient her primary care physician in [place] has told her for many months that she has been anemic but no specific investigation has been made about that that she is aware of. She has no notion of passage of blood in the stool, no hematemesis, no melena, no hematuria, no vaginal bleeding. Her diet is complete in nutrients. She has lost some weight because she has been very fatigued lately. The fatigue was associated with her heart operation but even before that she was very tired. The patient also states that she has not had any definitive infection even though she had pneumonia  weeks before her cardiac operation but she got better from this. She has not had any fevers, chills, night sweats, pruritus, adenopathies or rashes. She complains of some shortness of breath upon exertion but this is dramatically better since diuresis was established for the treatment of her CHF. Urinary output has  been abundant. She has no difficulty with miction. Her bowel activity has been normal. Her surgical sites in her leg have had some ecchymosis and swelling and she has had significant fluid accumulation in her legs.     The patient has no previous history of hematological disorder. She is not aware of any organ enlargement or anything of this nature.        Past Medical History:   Diagnosis Date    Acne rosacea 08/17/2021    DR.JEFF MOON     Arteriosclerosis of abdominal aorta     B12 deficiency 08/17/2021    DJD (degenerative joint disease)     Hx of smoking 08/17/2021    QUIT IN 1976 AT AGE 31    Hyperlipidemia 08/17/2021    Hypertension     Hypothyroidism     Metabolic syndrome 08/17/2021    KAREN (stress urinary incontinence, female) 08/17/2021    UTI (urinary tract infection) 08/17/2021    Vitamin D deficiency 08/17/2021    Vitamin D deficiency         Past Surgical History:   Procedure Laterality Date    BACK SURGERY  2013    CARDIAC CATHETERIZATION N/A 07/17/2023    Procedure: Left Heart Cath;  Surgeon: Dinh Andres MD;  Location: [facility] CATH INVASIVE LOCATION;  Service: Cardiovascular;  Laterality: N/A;    COLONOSCOPY  2011    CORONARY ARTERY BYPASS GRAFT WITH MITRAL VALVE REPAIR/REPLACEMENT N/A 07/20/2023    Procedure: REZA STERNOTOMY OFF-PUMP CORONARY ARTERY BYPASS GRAFT TIMES        USING LEFFT INTERNAL MAMMARY ARTERY AND     GREATER SAPHENOUS VEIN GRAFT PER EMDOSCOPIC VEIN HARVESTING, MAZE PROCEDURE AND PRP;  Surgeon: Shane Alexander MD;  Location: [facility] CVOR;  Service: Cardiothoracic;  Laterality: N/A;        No current facility-administered medications on file prior to encounter.     Current Outpatient Medications on File Prior to Encounter   Medication Sig Dispense Refill    apixaban (ELIQUIS) 5 MG tablet tablet Take 1 tablet by mouth Every 12 (Twelve) Hours. Indications: Atrial Fibrillation 60 tablet 2    aspirin 81 MG EC tablet Take 1 tablet by mouth Daily.      atorvastatin  (LIPITOR) 40 MG tablet Take 1 tablet by mouth Every Night for 90 days. 90 tablet 0    buPROPion XL (WELLBUTRIN XL) 300 MG 24 hr tablet TAKE 1 TABLET BY MOUTH DAILY 90 tablet 1    dapagliflozin Propanediol (Farxiga) 10 MG tablet Take 10 mg by mouth Daily. 30 tablet 3    [] doxycycline (MONODOX) 100 MG capsule Take 1 capsule by mouth Every 12 (Twelve) Hours for 11 doses. Indications: Infection of the Skin and/or Soft Tissue 11 capsule 0    furosemide (Lasix) 40 MG tablet Take 1 tablet by mouth Daily. 30 tablet 0    metFORMIN ER (GLUCOPHAGE-XR) 500 MG 24 hr tablet TAKE 1 TABLET BY MOUTH EVERY MORNING AND 2 TABLETS AT SUPPER (Patient taking differently: 1 tablet Daily With Breakfast.) 270 tablet 4    metFORMIN ER (GLUCOPHAGE-XR) 500 MG 24 hr tablet Take 2 tablets by mouth Daily With Dinner.      metoprolol succinate XL (TOPROL-XL) 25 MG 24 hr tablet Take 0.5 tablets by mouth Every 12 (Twelve) Hours for 90 days. 30 tablet 2    montelukast (SINGULAIR) 10 MG tablet Take 1 tablet by mouth Daily. 90 tablet 3    Probiotic Product (PROBIOTIC PO) Take 1 capsule by mouth Daily.      spironolactone (ALDACTONE) 25 MG tablet Take 1 tablet by mouth Daily. 90 tablet 3    Synthroid 75 MCG tablet Take 1 tablet by mouth Daily. 90 tablet 3    acetaminophen (TYLENOL) 325 MG tablet Take 2 tablets by mouth Every 4 (Four) Hours As Needed for Mild Pain.      albuterol sulfate  (90 Base) MCG/ACT inhaler Inhale 2 puffs Every 4 (Four) Hours As Needed for Wheezing. 18 g 0        ALLERGIES:    Allergies   Allergen Reactions    Penicillins Palpitations     1. When was your reaction? > 10 years ago  2. Did your reaction happen after the first dose or after several doses? After first dose  3. Did your reaction require ED or hospital care to manage your reaction? Do not know  4. Did your reaction require treatment with epinephrine? Do not know  5. Have you taken amoxicillin (Amoxil) or amoxicillin-clavulanate (Augmentin) without issue  since? Yes  6. Have you taken cephalexin (Keflex) without issue since?  Do not know         Social History     Socioeconomic History    Marital status:    Tobacco Use    Smoking status: Former     Packs/day: 1.00     Years: 12.00     Pack years: 12.00     Types: Cigarettes     Quit date:      Years since quittin.6    Smokeless tobacco: Never   Vaping Use    Vaping Use: Never used   Substance and Sexual Activity    Alcohol use: Never    Drug use: Never    Sexual activity: Defer        History reviewed. No pertinent family history.     Review of Systems   Constitutional:  Positive for activity change, appetite change, fatigue and unexpected weight change.   HENT: Negative.     Respiratory:  Positive for cough and shortness of breath.    Cardiovascular:  Positive for leg swelling.   Gastrointestinal: Negative.    Genitourinary: Negative.    Musculoskeletal: Negative.    Neurological: Negative.    Hematological: Negative.    Psychiatric/Behavioral: Negative.        Objective     Vitals:    23 0630 23 0815 23 1020 23 1555   BP:  153/54 158/57 110/74   BP Location:  Left arm Left arm Left arm   Patient Position:  Sitting Sitting Sitting   Pulse:  53 54 97   Resp:  20 20 21   Temp:  97.5 øF (36.4 øC) 97.9 øF (36.6 øC)    TempSrc:  Oral Oral Oral   SpO2:  95% 93% 99%   Weight: 88.3 kg (194 lb 10.7 oz)      Height:              No data to display                Physical Exam  Constitutional:       Appearance: She is ill-appearing.   HENT:      Head: Normocephalic.      Mouth/Throat:      Mouth: Mucous membranes are moist.      Pharynx: Oropharynx is clear.   Eyes:      General: No scleral icterus.     Extraocular Movements: Extraocular movements intact.      Pupils: Pupils are equal, round, and reactive to light.   Cardiovascular:      Rate and Rhythm: Normal rate and regular rhythm.      Pulses: Normal pulses.      Heart sounds: Normal heart sounds. No murmur heard.    No gallop.    Pulmonary:      Effort: Pulmonary effort is normal. No respiratory distress.      Breath sounds: No wheezing or rales.   Abdominal:      General: Abdomen is flat.      Palpations: There is no mass.      Tenderness: There is abdominal tenderness. There is no rebound.      Comments: EXIT CHEST TUBE SITES ARE HEALING BUT TENDER   Musculoskeletal:      Right lower leg: Edema present.      Left lower leg: Edema present.      Comments: SURGICAL SITES WITH BRUISING   Lymphadenopathy:      Cervical: No cervical adenopathy.   Skin:     General: Skin is warm and dry.      Coloration: Skin is pale.      Findings: No rash.   Neurological:      General: No focal deficit present.      Mental Status: She is alert.   Psychiatric:         Mood and Affect: Mood normal.         Behavior: Behavior normal.         Thought Content: Thought content normal.         Judgment: Judgment normal.         RECENT LABS:  Hematology WBC   Date Value Ref Range Status   08/04/2023 20.45 (H) 3.40 - 10.80 10*3/mm3 Final     RBC   Date Value Ref Range Status   08/04/2023 2.85 (L) 3.77 - 5.28 10*6/mm3 Final     Hemoglobin   Date Value Ref Range Status   08/04/2023 8.7 (L) 12.0 - 15.9 g/dL Final     Hematocrit   Date Value Ref Range Status   08/04/2023 26.1 (L) 34.0 - 46.6 % Final     Platelets   Date Value Ref Range Status   08/04/2023 353 140 - 450 10*3/mm3 Final        CHEST CT WITHOUT CONTRAST     HISTORY: Pericardial effusion, coronary bypass 07/20/2023.     TECHNIQUE: Noncontrast chest CT is provided.     Radiation dose reduction techniques were utilized, including automated  exposure control and exposure modulation based on body size.     FINDINGS: There are sternal wires and a minimal amount of fluid, soft  tissue edema, and air in the anterior mediastinum as expected following  coronary bypass with recent prior removal of mediastinal drains. There  is no pericardial effusion. Small, posteriorly layering bilateral  pleural effusions are  observed, under 3 cm thick each. There is  extensive atheromatous vascular calcification and a left atrial  appendage closure device. No abnormal free fluid in the visualized upper  abdomen.     Atelectasis in the posterior lower lobes.     IMPRESSION:  Postoperative change from recent heart surgery. There are  small, posteriorly layering pleural effusions bilaterally. No  pericardial effusion.     This report was finalized on 8/3/2023 3:05 PM by Dr. Roderick Maurice      Assessment & Plan   The following is the report of her peripheral blood.     Red blood cell morphology shows anisocytosis 2+, poikilocytosis 2+, macroovalocytes, microcytes, multiple cigar cells, few target cells and few teardrop cells. There is no evidence of schistocytes. There is no evidence of acanthocytes. Even though machine reported nucleated red blood cells in circulation, I could not find any.     White blood cell morphology shows segs with no granularity whatsoever. No atypical lymphocytes or monocytes in circulation. No plasma cells, no blasts. Platelets mildly increased with no platelet clumps and no atypical morphology.     In summary this patient has leukocytosis that I cannot explain on the basis of infection. This phenomenon is new and has been related to her most likely cardiac surgery. In the background of anemia with such an alteration in the morphology of her white blood cells with no granularity in the white cells raises the question if she has some component of primary bone marrow disorder or entities of this nature. The morphology of her red cells is suggestive of a combination of anemia, microcytic and macrocytic and maybe combined deficiency of folic acid, B12 and/or iron.     Also caught my attention the teardrops that sometimes are associated with myelofibrosis. The multiple cigar cells are typically associated with very significant iron deficiency.     The patient has not had a colonoscopy in more than 10 years and I doubt  that she will proceed with any at this age. The patient has not had any mammograms in the last several years because of difficulty with that and her chronic back pain. I find no necessity of this.     In any event, I think she needs to have workup for her anemia with a B12 level, ferritin level, iron profile, reticulated hemoglobin, folic acid, sedimentation rate, C-reactive protein, LDH and also serum protein immunoelectrophoresis.     Her leukocytosis needs to be worked up in the form of peripheral blood flow cytometry and BCR-ABL.     If the patient wants to go home on Saturday or Sunday, if the primary team believes that her CHF is compensated and she could go home that will be okay. We will be glad to bring her back as an outpatient to proceed with some of this laboratory testing including the flow cytometry and the BCR-ABL that require fresh blood. This only can be collected this coming Monday.     I do believe that the patient will require to be seen by my partner, Dr. Reyes, tomorrow for further review of her laboratory testing to be done in the morning.     I will discuss the case with her. I will also send a message to my office in regard to the need for her to come back and see me in the office in 10 days or so and review the rest of her laboratory parameters.    I pointed out to the patient's granddaughter that if her BCR-ABL is abnormal or the flow cytometry is abnormal, she will require bone marrow testing and further radiological analysis.     I discussed all these facts with the patient and her granddaughter and family in the room. I also discussed all these facts with the patient's nurse. It was a pleasure seeing her today.

## 2023-08-04 NOTE — PROGRESS NOTES
"   LOS: 2 days     Chief Complaint/ Reason for encounter: Postop DAVID    Subjective   Readmitted for fluid overload on 8/2      Medical history reviewed:  History of Present Illness    Subjective  Patient is well-known to me from recent admission.  Recently discharged after CABG on 7/20 but presented back to the ER with increasing shortness of breath.  Her BNP was elevated and chest x-ray showed pulmonary edema and she was started on IV diuretics  Creatinine was 1.3 at peak    She is feeling better today, less short of breath, less edema in her legs  She is been urinating quite a bit per patient/family but does not appear that it is all being recorded    8/4: She is doing better today, less short of breath  Now off oxygen, weight down about 6 pounds since admission  Decreased edema    History taken from: Patient and chart    Vital Signs  Temp:  [97.5 øF (36.4 øC)-98.3 øF (36.8 øC)] 97.9 øF (36.6 øC)  Heart Rate:  [] 54  Resp:  [18-20] 20  BP: (102-158)/(54-97) 158/57       Wt Readings from Last 1 Encounters:   08/04/23 0630 88.3 kg (194 lb 10.7 oz)   08/03/23 0919 89 kg (196 lb 3.4 oz)   08/03/23 0812 89.4 kg (196 lb 15.7 oz)   08/01/23 2254 91 kg (200 lb 9.9 oz)       Objective:  Vital signs: (most recent): Blood pressure 158/57, pulse 54, temperature 97.9 øF (36.6 øC), temperature source Oral, resp. rate 20, height 162 cm (63.78\"), weight 88.3 kg (194 lb 10.7 oz), SpO2 93 %, not currently breastfeeding.              Objective:  General Appearance:  Comfortable, mildly ill-appearing, in no acute distress and not in pain.  Awake, alert, oriented  HEENT: Mucous membranes moist, no injury, oropharynx clear  Lungs:  Normal effort and normal respiratory rate.  Breath sounds clear to auscultation.  No  respiratory distress.  No rales, decreased breath sounds or rhonchi.    Heart: Normal rate.  Regular rhythm.  S1, S2 normal.  No murmur.   Abdomen: Abdomen is soft.  Bowel sounds are normal, no abdominal tenderness.  " There is no rebound or guarding  Extremities: Decreased, 1+ edema of bilateral lower extremities  Neurological: No focal motor or sensory deficits, pupils reactive  Skin:  Warm and dry.  No rash or cyanosis.       Results Review:    Intake/Output:     Intake/Output Summary (Last 24 hours) at 8/4/2023 1225  Last data filed at 8/4/2023 0815  Gross per 24 hour   Intake 640 ml   Output 1100 ml   Net -460 ml           DATA:  Radiology and Labs:  The following labs independently reviewed by me. Additional labs ordered for tomorrow a.m.  Interval notes, chart personally reviewed by me.   Old records independently reviewed showing normal baseline creatinine around 1.0  Discussed with patient herself at bedside    Risk/ complexity of medical care/ medical decision making moderate complexity, DAVID and diuretic management postop CABG    Labs:   Recent Results (from the past 24 hour(s))   POC Glucose Once    Collection Time: 08/03/23  4:37 PM    Specimen: Blood   Result Value Ref Range    Glucose 197 (H) 70 - 130 mg/dL   POC Glucose Once    Collection Time: 08/03/23  8:22 PM    Specimen: Blood   Result Value Ref Range    Glucose 138 (H) 70 - 130 mg/dL   Prepare RBC, 1 Units    Collection Time: 08/03/23  8:50 PM   Result Value Ref Range    Product Code M2577U24     Unit Number [id number]     UNIT  ABO O     UNIT  RH NEG     Crossmatch Interpretation Compatible     Dispense Status IS     Blood Expiration Date [id number]     Blood Type Barcode 9500    Magnesium    Collection Time: 08/04/23  3:14 AM    Specimen: Blood   Result Value Ref Range    Magnesium 2.1 1.6 - 2.4 mg/dL   Renal Function Panel    Collection Time: 08/04/23  3:14 AM    Specimen: Blood   Result Value Ref Range    Glucose 94 65 - 99 mg/dL    BUN 50 (H) 8 - 23 mg/dL    Creatinine 1.31 (H) 0.57 - 1.00 mg/dL    Sodium 139 136 - 145 mmol/L    Potassium 4.0 3.5 - 5.2 mmol/L    Chloride 102 98 - 107 mmol/L    CO2 28.2 22.0 - 29.0 mmol/L    Calcium 8.9 8.6 - 10.5  mg/dL    Albumin 3.6 3.5 - 5.2 g/dL    Phosphorus 3.8 2.5 - 4.5 mg/dL    Anion Gap 8.8 5.0 - 15.0 mmol/L    BUN/Creatinine Ratio 38.2 (H) 7.0 - 25.0    eGFR 41.8 (L) >60.0 mL/min/1.73   CBC Auto Differential    Collection Time: 08/04/23  3:14 AM    Specimen: Blood   Result Value Ref Range    WBC 20.45 (H) 3.40 - 10.80 10*3/mm3    RBC 2.85 (L) 3.77 - 5.28 10*6/mm3    Hemoglobin 8.7 (L) 12.0 - 15.9 g/dL    Hematocrit 26.1 (L) 34.0 - 46.6 %    MCV 91.6 79.0 - 97.0 fL    MCH 30.5 26.6 - 33.0 pg    MCHC 33.3 31.5 - 35.7 g/dL    RDW 17.1 (H) 12.3 - 15.4 %    RDW-SD 55.7 (H) 37.0 - 54.0 fl    MPV 10.5 6.0 - 12.0 fL    Platelets 353 140 - 450 10*3/mm3    Neutrophil % 66.0 42.7 - 76.0 %    Lymphocyte % 16.6 (L) 19.6 - 45.3 %    Monocyte % 11.3 5.0 - 12.0 %    Eosinophil % 3.4 0.3 - 6.2 %    Basophil % 0.4 0.0 - 1.5 %    Immature Grans % 2.3 (H) 0.0 - 0.5 %    Neutrophils, Absolute 13.48 (H) 1.70 - 7.00 10*3/mm3    Lymphocytes, Absolute 3.40 (H) 0.70 - 3.10 10*3/mm3    Monocytes, Absolute 2.31 (H) 0.10 - 0.90 10*3/mm3    Eosinophils, Absolute 0.70 (H) 0.00 - 0.40 10*3/mm3    Basophils, Absolute 0.09 0.00 - 0.20 10*3/mm3    Immature Grans, Absolute 0.47 (H) 0.00 - 0.05 10*3/mm3    nRBC 0.5 (H) 0.0 - 0.2 /100 WBC   POC Glucose Once    Collection Time: 08/04/23  6:39 AM    Specimen: Blood   Result Value Ref Range    Glucose 114 70 - 130 mg/dL   POC Glucose Once    Collection Time: 08/04/23 10:20 AM    Specimen: Blood   Result Value Ref Range    Glucose 199 (H) 70 - 130 mg/dL       Radiology:  Pertinent radiology studies were reviewed as described above      Medications have been reviewed separately in chart overview      ASSESSMENT:  DAVID on CKD stage IIIa.  Creatinine slightly worse today with diuresis but volume status improved and she is currently off oxygen  Hypotension, stable, previously on midodrine, now hypertensive  Postop anemia on oral iron  CAD status post three-vessel CABG  NSTEMI  Combined systolic and diastolic  CHF  Diabetes mellitus type 2  Hypothyroidism  Small bilateral pleural effusions      DISCUSSION/PLAN:  Renal function a touch worse today but volume status improved  Oxygenation improved, off supplemental oxygen  Continue IV Lasix today with plans to transition to oral Lasix over the weekend  Volume status appears to be improving  Maintain strict I's and O's  Continue current dose of oral spironolactone/potassium with close attention to potassium levels  Digoxin dosing per cardiology    Renal dose IV antibiotics      Anderson Parker MD  [facility]  Office phone number: [phone]  Answering service phone number: [phone]    08/04/23  12:25 EDT    Dictation performed using Dragon dictation software

## 2023-08-04 NOTE — PLAN OF CARE
Goal Outcome Evaluation:  Plan of Care Reviewed With: patient        Progress: improving  Outcome Evaluation: Pt. admitted for a fib. SB on tele, rates 50s. Brief episodes of a fib 80-110s. Dig held today d/t SB, plans to switch order to QOD. PRN metoprolol added if HR sustains >120bpm. -150s, weaned to RA. IV lasix given per orders, plans for PO to start tomorrow. No c/o pain, WCTM.

## 2023-08-04 NOTE — PROGRESS NOTES
" LOS: 2 days   Patient Care Team:  Rochelle Brown APRN as PCP - General (Internal Medicine)    Chief Complaint: post op    Subjective:  Symptoms:  She reports shortness of breath.  No cough or chest pain.    Diet:  Adequate intake.  No nausea or vomiting.    Activity level: Impaired due to weakness.    Pain:  She reports no pain.      Feeling better this morning, just walked in the [place] with her granddaughter      Vital Signs  Temp:  [97.5 øF (36.4 øC)-98.3 øF (36.8 øC)] 97.5 øF (36.4 øC)  Heart Rate:  [] 53  Resp:  [18-20] 20  BP: ()/(54-97) 153/54  Body mass index is 33.65 kg/mý.    Intake/Output Summary (Last 24 hours) at 8/4/2023 0935  Last data filed at 8/4/2023 0815  Gross per 24 hour   Intake 880 ml   Output 1500 ml   Net -620 ml       I/O this shift:  In: 100 [P.O.:100]  Out: -           08/03/23  0812 08/03/23  0919 08/04/23  0630   Weight: 89.4 kg (196 lb 15.7 oz) 89 kg (196 lb 3.4 oz) 88.3 kg (194 lb 10.7 oz)     Objective:  General Appearance:  Comfortable and in no acute distress.    Vital signs: (most recent): Blood pressure 153/54, pulse 53, temperature 97.5 øF (36.4 øC), temperature source Oral, resp. rate 20, height 162 cm (63.78\"), weight 88.3 kg (194 lb 10.7 oz), SpO2 95 %, not currently breastfeeding.  Vital signs are normal.  No fever.    Output: Producing urine and producing stool.    Lungs:  Normal effort and normal respiratory rate.  She is not in respiratory distress.  There are decreased breath sounds.    Heart: Normal rate.  Irregular rhythm.    Abdomen: Abdomen is soft.  Bowel sounds are normal.     Extremities: There is dependent edema (mild bilateral LE).    Pulses: Distal pulses are intact.    Neurological: Patient is alert and oriented to person, place and time.    Skin:  Warm and dry.  (Sternal incision clean and dry  Bilateral lower extremity incisions clean, dry, and intact. Steri strips in placed on left calf incision. Ecchymosis noted on bilateral LE)        Results " Review:        WBC WBC   Date Value Ref Range Status   08/04/2023 20.45 (H) 3.40 - 10.80 10*3/mm3 Final   08/03/2023 20.15 (H) 3.40 - 10.80 10*3/mm3 Final   08/02/2023 17.27 (H) 3.40 - 10.80 10*3/mm3 Final      HGB Hemoglobin   Date Value Ref Range Status   08/04/2023 8.7 (L) 12.0 - 15.9 g/dL Final   08/03/2023 7.7 (L) 12.0 - 15.9 g/dL Final   08/02/2023 8.1 (L) 12.0 - 15.9 g/dL Final      HCT Hematocrit   Date Value Ref Range Status   08/04/2023 26.1 (L) 34.0 - 46.6 % Final   08/03/2023 23.4 (L) 34.0 - 46.6 % Final   08/02/2023 24.5 (L) 34.0 - 46.6 % Final      Platelets Platelets   Date Value Ref Range Status   08/04/2023 353 140 - 450 10*3/mm3 Final   08/03/2023 344 140 - 450 10*3/mm3 Final   08/02/2023 365 140 - 450 10*3/mm3 Final        PT/INR:  No results found for: PROTIME/No results found for: INR    Sodium Sodium   Date Value Ref Range Status   08/04/2023 139 136 - 145 mmol/L Final   08/03/2023 140 136 - 145 mmol/L Final   08/02/2023 142 136 - 145 mmol/L Final      Potassium Potassium   Date Value Ref Range Status   08/04/2023 4.0 3.5 - 5.2 mmol/L Final   08/03/2023 3.9 3.5 - 5.2 mmol/L Final   08/02/2023 4.2 3.5 - 5.2 mmol/L Final      Chloride Chloride   Date Value Ref Range Status   08/04/2023 102 98 - 107 mmol/L Final   08/03/2023 104 98 - 107 mmol/L Final   08/02/2023 102 98 - 107 mmol/L Final      Bicarbonate CO2   Date Value Ref Range Status   08/04/2023 28.2 22.0 - 29.0 mmol/L Final   08/03/2023 26.0 22.0 - 29.0 mmol/L Final   08/02/2023 25.6 22.0 - 29.0 mmol/L Final      BUN BUN   Date Value Ref Range Status   08/04/2023 50 (H) 8 - 23 mg/dL Final   08/03/2023 50 (H) 8 - 23 mg/dL Final   08/02/2023 56 (H) 8 - 23 mg/dL Final      Creatinine Creatinine   Date Value Ref Range Status   08/04/2023 1.31 (H) 0.57 - 1.00 mg/dL Final   08/03/2023 1.03 (H) 0.57 - 1.00 mg/dL Final   08/02/2023 1.21 (H) 0.57 - 1.00 mg/dL Final      Calcium Calcium   Date Value Ref Range Status   08/04/2023 8.9 8.6 - 10.5 mg/dL  Final   08/03/2023 8.9 8.6 - 10.5 mg/dL Final   08/02/2023 9.3 8.6 - 10.5 mg/dL Final      Magnesium Magnesium   Date Value Ref Range Status   08/04/2023 2.1 1.6 - 2.4 mg/dL Final   08/03/2023 1.7 1.6 - 2.4 mg/dL Final   08/02/2023 1.9 1.6 - 2.4 mg/dL Final          aspirin, 81 mg, Oral, Daily  atorvastatin, 40 mg, Oral, Nightly  buPROPion XL, 300 mg, Oral, Daily  cefTRIAXone, 1,000 mg, Intravenous, Q24H  digoxin, 125 mcg, Oral, Daily  fluticasone, 2 spray, Each Nare, Daily  furosemide, 80 mg, Intravenous, BID  insulin lispro, 2-9 Units, Subcutaneous, 4x Daily AC & at Bedtime  iron polysaccharides, 150 mg, Oral, Daily  levothyroxine, 75 mcg, Oral, Q AM  metoprolol succinate XL, 25 mg, Oral, Q12H  montelukast, 10 mg, Oral, Daily  senna-docusate sodium, 2 tablet, Oral, BID  sodium chloride, 10 mL, Intravenous, Q12H  spironolactone, 25 mg, Oral, Daily         Assessment & Plan  -admit with dyspnea  - paroxsymal atrial fibrillation with RVR--now in SR  - acute on chronic HFrEF  -Severe multivessel CAD s/p off-pump CABG x3/ LANE clipping 7/20 with Dr. Alexander  -Moderate MR  -Ischemic cardiomyopathy---EF 36%; GDMT as tolerated  -HTN  -HLD  -DM II  -Hypothyroidism   -Former tobacco abuse   -CKD IIIa  -chronic anemia worsened by post op ABL   -TCP--consumptive; resolved  -leukocytosis--reactive    Feeling better this morning  Creatinine mildly elevated this morning. Continued on IV diuretics per nephrology   No in SR with rates in the low 60s to 50s. Remains on Toprol, digoxin started per EP. Will have to monitor her kidney function/HR closely  Placed on RA and tolerating well with O2 sats in the upper 90s  WBC elevated further, but patient remains afebrile. Blood cultures with no growth to date. Both legs are very ecchymotic/and there is a small fluid collection/warmth just above the left lower extremity incision. Will repeat a venous doppler  Echo yesterday showed small pericardial effusion, but was not evident on  chest CT. She was noted to have pleural effusion. Repeat CXR this am and will discuss with surgeon  Hgb improved to 8.7 following transfusion. Continue iron supplement. Discussed with Dr. Sinha, who recommends hold Eliquis at this time  Paint all incisions with betadine daily  Continue routine care    LONA Mosqueda  08/04/23  09:35 EDT

## 2023-08-05 ENCOUNTER — APPOINTMENT (OUTPATIENT)
Dept: GENERAL RADIOLOGY | Facility: HOSPITAL | Age: 78
DRG: 291 | End: 2023-08-05
Payer: MEDICARE

## 2023-08-05 LAB
ALBUMIN SERPL-MCNC: 3.6 G/DL (ref 3.5–5.2)
ANION GAP SERPL CALCULATED.3IONS-SCNC: 11 MMOL/L (ref 5–15)
BACTERIA SPEC AEROBE CULT: NORMAL
BACTERIA SPEC AEROBE CULT: NORMAL
BASOPHILS # BLD AUTO: 0.09 10*3/MM3 (ref 0–0.2)
BASOPHILS NFR BLD AUTO: 0.5 % (ref 0–1.5)
BUN SERPL-MCNC: 53 MG/DL (ref 8–23)
BUN/CREAT SERPL: 46.9 (ref 7–25)
CALCIUM SPEC-SCNC: 8.8 MG/DL (ref 8.6–10.5)
CHLORIDE SERPL-SCNC: 102 MMOL/L (ref 98–107)
CO2 SERPL-SCNC: 27 MMOL/L (ref 22–29)
CREAT SERPL-MCNC: 1.13 MG/DL (ref 0.57–1)
DEPRECATED RDW RBC AUTO: 56.9 FL (ref 37–54)
EGFRCR SERPLBLD CKD-EPI 2021: 49.9 ML/MIN/1.73
EOSINOPHIL # BLD AUTO: 0.79 10*3/MM3 (ref 0–0.4)
EOSINOPHIL NFR BLD AUTO: 4.4 % (ref 0.3–6.2)
ERYTHROCYTE [DISTWIDTH] IN BLOOD BY AUTOMATED COUNT: 17.7 % (ref 12.3–15.4)
FERRITIN SERPL-MCNC: 1946 NG/ML (ref 13–150)
FIBRINOGEN PPP-MCNC: 366 MG/DL (ref 219–464)
FOLATE SERPL-MCNC: 14 NG/ML (ref 4.78–24.2)
GLUCOSE BLDC GLUCOMTR-MCNC: 116 MG/DL (ref 70–130)
GLUCOSE BLDC GLUCOMTR-MCNC: 137 MG/DL (ref 70–130)
GLUCOSE BLDC GLUCOMTR-MCNC: 149 MG/DL (ref 70–130)
GLUCOSE BLDC GLUCOMTR-MCNC: 184 MG/DL (ref 70–130)
GLUCOSE SERPL-MCNC: 104 MG/DL (ref 65–99)
HCT VFR BLD AUTO: 25.6 % (ref 34–46.6)
HGB BLD-MCNC: 8.3 G/DL (ref 12–15.9)
HGB RETIC QN AUTO: 36.9 PG (ref 29.8–36.1)
IMM GRANULOCYTES # BLD AUTO: 0.35 10*3/MM3 (ref 0–0.05)
IMM GRANULOCYTES NFR BLD AUTO: 2 % (ref 0–0.5)
IMM RETICS NFR: 37.4 % (ref 3–15.8)
IRON 24H UR-MRATE: 56 MCG/DL (ref 37–145)
IRON SATN MFR SERPL: 21 % (ref 20–50)
LDH SERPL-CCNC: 170 U/L (ref 135–214)
LYMPHOCYTES # BLD AUTO: 2.62 10*3/MM3 (ref 0.7–3.1)
LYMPHOCYTES NFR BLD AUTO: 14.7 % (ref 19.6–45.3)
MAGNESIUM SERPL-MCNC: 1.8 MG/DL (ref 1.6–2.4)
MCH RBC QN AUTO: 29.9 PG (ref 26.6–33)
MCHC RBC AUTO-ENTMCNC: 32.4 G/DL (ref 31.5–35.7)
MCV RBC AUTO: 92.1 FL (ref 79–97)
MONOCYTES # BLD AUTO: 1.91 10*3/MM3 (ref 0.1–0.9)
MONOCYTES NFR BLD AUTO: 10.7 % (ref 5–12)
NEUTROPHILS NFR BLD AUTO: 12.07 10*3/MM3 (ref 1.7–7)
NEUTROPHILS NFR BLD AUTO: 67.7 % (ref 42.7–76)
NRBC BLD AUTO-RTO: 0.6 /100 WBC (ref 0–0.2)
PHOSPHATE SERPL-MCNC: 3.7 MG/DL (ref 2.5–4.5)
PLATELET # BLD AUTO: 342 10*3/MM3 (ref 140–450)
PMV BLD AUTO: 10 FL (ref 6–12)
POTASSIUM SERPL-SCNC: 3.8 MMOL/L (ref 3.5–5.2)
RBC # BLD AUTO: 2.78 10*6/MM3 (ref 3.77–5.28)
RETICS # AUTO: 0.07 10*6/MM3 (ref 0.02–0.13)
RETICS/RBC NFR AUTO: 2.63 % (ref 0.7–1.9)
SODIUM SERPL-SCNC: 140 MMOL/L (ref 136–145)
TIBC SERPL-MCNC: 261 MCG/DL (ref 298–536)
TRANSFERRIN SERPL-MCNC: 175 MG/DL (ref 200–360)
VIT B12 BLD-MCNC: 1212 PG/ML (ref 211–946)
WBC NRBC COR # BLD: 17.83 10*3/MM3 (ref 3.4–10.8)

## 2023-08-05 PROCEDURE — 82607 VITAMIN B-12: CPT | Performed by: INTERNAL MEDICINE

## 2023-08-05 PROCEDURE — 82746 ASSAY OF FOLIC ACID SERUM: CPT | Performed by: INTERNAL MEDICINE

## 2023-08-05 PROCEDURE — 71046 X-RAY EXAM CHEST 2 VIEWS: CPT

## 2023-08-05 PROCEDURE — 85025 COMPLETE CBC W/AUTO DIFF WBC: CPT | Performed by: NURSE PRACTITIONER

## 2023-08-05 PROCEDURE — 83735 ASSAY OF MAGNESIUM: CPT | Performed by: NURSE PRACTITIONER

## 2023-08-05 PROCEDURE — 83540 ASSAY OF IRON: CPT | Performed by: INTERNAL MEDICINE

## 2023-08-05 PROCEDURE — 99024 POSTOP FOLLOW-UP VISIT: CPT | Performed by: THORACIC SURGERY (CARDIOTHORACIC VASCULAR SURGERY)

## 2023-08-05 PROCEDURE — 80069 RENAL FUNCTION PANEL: CPT | Performed by: INTERNAL MEDICINE

## 2023-08-05 PROCEDURE — 84466 ASSAY OF TRANSFERRIN: CPT | Performed by: INTERNAL MEDICINE

## 2023-08-05 PROCEDURE — 83615 LACTATE (LD) (LDH) ENZYME: CPT | Performed by: INTERNAL MEDICINE

## 2023-08-05 PROCEDURE — 85046 RETICYTE/HGB CONCENTRATE: CPT | Performed by: INTERNAL MEDICINE

## 2023-08-05 PROCEDURE — 82728 ASSAY OF FERRITIN: CPT | Performed by: INTERNAL MEDICINE

## 2023-08-05 PROCEDURE — 63710000001 INSULIN LISPRO (HUMAN) PER 5 UNITS: Performed by: NURSE PRACTITIONER

## 2023-08-05 PROCEDURE — 25010000002 CEFTRIAXONE PER 250 MG: Performed by: INTERNAL MEDICINE

## 2023-08-05 PROCEDURE — 82948 REAGENT STRIP/BLOOD GLUCOSE: CPT

## 2023-08-05 PROCEDURE — 85384 FIBRINOGEN ACTIVITY: CPT | Performed by: INTERNAL MEDICINE

## 2023-08-05 PROCEDURE — 99232 SBSQ HOSP IP/OBS MODERATE 35: CPT

## 2023-08-05 RX ADMIN — BUPROPION HYDROCHLORIDE 300 MG: 300 TABLET, EXTENDED RELEASE ORAL at 08:46

## 2023-08-05 RX ADMIN — MONTELUKAST SODIUM 10 MG: 10 TABLET, FILM COATED ORAL at 08:46

## 2023-08-05 RX ADMIN — FLUTICASONE PROPIONATE 2 SPRAY: 50 SPRAY, METERED NASAL at 08:48

## 2023-08-05 RX ADMIN — ASPIRIN 81 MG: 81 TABLET, COATED ORAL at 08:46

## 2023-08-05 RX ADMIN — Medication 150 MG: at 08:47

## 2023-08-05 RX ADMIN — SENNOSIDES AND DOCUSATE SODIUM 2 TABLET: 50; 8.6 TABLET ORAL at 08:46

## 2023-08-05 RX ADMIN — METOPROLOL SUCCINATE 25 MG: 25 TABLET, EXTENDED RELEASE ORAL at 20:53

## 2023-08-05 RX ADMIN — SENNOSIDES AND DOCUSATE SODIUM 2 TABLET: 50; 8.6 TABLET ORAL at 20:54

## 2023-08-05 RX ADMIN — INSULIN LISPRO 2 UNITS: 100 INJECTION, SOLUTION INTRAVENOUS; SUBCUTANEOUS at 20:54

## 2023-08-05 RX ADMIN — LEVOTHYROXINE SODIUM 75 MCG: 0.07 TABLET ORAL at 05:17

## 2023-08-05 RX ADMIN — SPIRONOLACTONE 25 MG: 25 TABLET ORAL at 12:04

## 2023-08-05 RX ADMIN — ATORVASTATIN CALCIUM 40 MG: 20 TABLET, FILM COATED ORAL at 20:54

## 2023-08-05 RX ADMIN — Medication 10 ML: at 08:48

## 2023-08-05 RX ADMIN — FUROSEMIDE 40 MG: 40 TABLET ORAL at 17:38

## 2023-08-05 RX ADMIN — FUROSEMIDE 40 MG: 40 TABLET ORAL at 08:47

## 2023-08-05 RX ADMIN — Medication 10 ML: at 20:54

## 2023-08-05 RX ADMIN — CEFTRIAXONE SODIUM 1000 MG: 1 INJECTION, POWDER, FOR SOLUTION INTRAMUSCULAR; INTRAVENOUS at 08:53

## 2023-08-05 NOTE — PROGRESS NOTES
Subjective .     REASONS FOR FOLLOWUP:  LEUKOCYTOSIS , NEUTROPHILS HAVE NO GRANULES  2. ANEMIA FOR 3 YEARS,NORMAL MCV: VERY ATYPICAL RED BLOOD CELL MORPHOLOGY  3. CHF AND PULMONARY EDEMA , CAD, ELEVATED CREATININE.    HISTORY OF PRESENT ILLNESS:  The patient is a 77 y.o. year old female who is here for follow-up with the above-mentioned history.    History of Present Illness        On 08/04/2023 I had the opportunity to see this 77-year-old female who has been admitted to the hospital in pulmonary edema, congestive heart failure, associated with cardiac disease. She has undergone recently a cardiac bypass. We have been consulted because the patient has been noticed to have leukocytosis, new issue for her in absence of any other fever or infection and she also has been noticed to have anemia. On further questioning to the patient her primary care physician in [place] has told her for many months that she has been anemic but no specific investigation has been made about that that she is aware of. She has no notion of passage of blood in the stool, no hematemesis, no melena, no hematuria, no vaginal bleeding. Her diet is complete in nutrients. She has lost some weight because she has been very fatigued lately. The fatigue was associated with her heart operation but even before that she was very tired. The patient also states that she has not had any definitive infection even though she had pneumonia  weeks before her cardiac operation but she got better from this. She has not had any fevers, chills, night sweats, pruritus, adenopathies or rashes. She complains of some shortness of breath upon exertion but this is dramatically better since diuresis was established for the treatment of her CHF. Urinary output has been abundant. She has no difficulty with miction. Her bowel activity has been normal. Her surgical sites in her leg have had some ecchymosis and swelling and she has had significant fluid accumulation  in her legs.      The patient has no previous history of hematological disorder. She is not aware of any organ enlargement or anything of this nature.       Past Medical History:   Diagnosis Date    Acne rosacea 08/17/2021    DR.JEFF MOON     Arteriosclerosis of abdominal aorta     B12 deficiency 08/17/2021    DJD (degenerative joint disease)     Hx of smoking 08/17/2021    QUIT IN 1976 AT AGE 31    Hyperlipidemia 08/17/2021    Hypertension     Hypothyroidism     Metabolic syndrome 08/17/2021    KAREN (stress urinary incontinence, female) 08/17/2021    UTI (urinary tract infection) 08/17/2021    Vitamin D deficiency 08/17/2021    Vitamin D deficiency        ONCOLOGIC HISTORY:  (History from previous dates can be found in the separate document.)    No current facility-administered medications on file prior to encounter.     Current Outpatient Medications on File Prior to Encounter   Medication Sig Dispense Refill    apixaban (ELIQUIS) 5 MG tablet tablet Take 1 tablet by mouth Every 12 (Twelve) Hours. Indications: Atrial Fibrillation 60 tablet 2    aspirin 81 MG EC tablet Take 1 tablet by mouth Daily.      atorvastatin (LIPITOR) 40 MG tablet Take 1 tablet by mouth Every Night for 90 days. 90 tablet 0    buPROPion XL (WELLBUTRIN XL) 300 MG 24 hr tablet TAKE 1 TABLET BY MOUTH DAILY 90 tablet 1    dapagliflozin Propanediol (Farxiga) 10 MG tablet Take 10 mg by mouth Daily. 30 tablet 3    furosemide (Lasix) 40 MG tablet Take 1 tablet by mouth Daily. 30 tablet 0    metFORMIN ER (GLUCOPHAGE-XR) 500 MG 24 hr tablet TAKE 1 TABLET BY MOUTH EVERY MORNING AND 2 TABLETS AT SUPPER (Patient taking differently: 1 tablet Daily With Breakfast.) 270 tablet 4    metFORMIN ER (GLUCOPHAGE-XR) 500 MG 24 hr tablet Take 2 tablets by mouth Daily With Dinner.      metoprolol succinate XL (TOPROL-XL) 25 MG 24 hr tablet Take 0.5 tablets by mouth Every 12 (Twelve) Hours for 90 days. 30 tablet 2    montelukast (SINGULAIR) 10 MG tablet Take 1  tablet by mouth Daily. 90 tablet 3    Probiotic Product (PROBIOTIC PO) Take 1 capsule by mouth Daily.      spironolactone (ALDACTONE) 25 MG tablet Take 1 tablet by mouth Daily. 90 tablet 3    Synthroid 75 MCG tablet Take 1 tablet by mouth Daily. 90 tablet 3    acetaminophen (TYLENOL) 325 MG tablet Take 2 tablets by mouth Every 4 (Four) Hours As Needed for Mild Pain.      albuterol sulfate  (90 Base) MCG/ACT inhaler Inhale 2 puffs Every 4 (Four) Hours As Needed for Wheezing. 18 g 0       ALLERGIES:     Allergies   Allergen Reactions    Penicillins Palpitations     1. When was your reaction? > 10 years ago  2. Did your reaction happen after the first dose or after several doses? After first dose  3. Did your reaction require ED or hospital care to manage your reaction? Do not know  4. Did your reaction require treatment with epinephrine? Do not know  5. Have you taken amoxicillin (Amoxil) or amoxicillin-clavulanate (Augmentin) without issue since? Yes  6. Have you taken cephalexin (Keflex) without issue since?  Do not know        Social History     Socioeconomic History    Marital status:    Tobacco Use    Smoking status: Former     Packs/day: 1.00     Years: 12.00     Pack years: 12.00     Types: Cigarettes     Quit date:      Years since quittin.6    Smokeless tobacco: Never   Vaping Use    Vaping Use: Never used   Substance and Sexual Activity    Alcohol use: Never    Drug use: Never    Sexual activity: Defer         Cancer-related family history is not on file.     Review of Systems  A comprehensive 14 point review of systems was performed and was negative except as mentioned.    Objective      Vitals:    23 2335 23 0300 23 0355 23 0530   BP: 135/79  140/59    BP Location: Right arm  Right arm    Patient Position: Lying  Lying    Pulse: 53  51    Resp: 20  20    Temp: 98.5 øF (36.9 øC)  97.6 øF (36.4 øC)    TempSrc: Oral  Oral    SpO2:  94%     Weight:    91.4 kg (201  lb 8 oz)   Height:              No data to display                Physical Exam      CONSTITUTIONAL:  Vital signs reviewed.  Alert and oriented x3  No distress, looks comfortable.  EYES:  Conjunctivae and lids unremarkable.  Extraocular eye movements intact.  HEENT: No evidence of lymphadenopathy, no thyromegaly  EARS,NOSE,MOUTH,THROAT:  Ears and nose appear unremarkable.  Lips, teeth, gums appear unremarkable.  RESPIRATORY:  Normal respiratory effort.  No rales  or wheezing, clear.   CARDIOVASCULAR:  Regular rate and rhythm, no murmur  No significant lower extremity edema.  Abdomen: Soft nontender positive bowel sounds no hepatosplenomegaly  SKIN: No wounds.  No rashes.  MUSCULOSKELETAL/EXTREMITIES: No clubbing or cyanosis.  No apparent unilateral weakness.  NEURO: CN 2-12 appear intact. No focal neurological deficits noted.  PSYCHIATRIC:  Normal judgment and insight.  Normal mood and affect.      RECENT LABS:  Hematology WBC   Date Value Ref Range Status   08/05/2023 17.83 (H) 3.40 - 10.80 10*3/mm3 Final     RBC   Date Value Ref Range Status   08/05/2023 2.78 (L) 3.77 - 5.28 10*6/mm3 Final     Hemoglobin   Date Value Ref Range Status   08/05/2023 8.3 (L) 12.0 - 15.9 g/dL Final     Hematocrit   Date Value Ref Range Status   08/05/2023 25.6 (L) 34.0 - 46.6 % Final     Platelets   Date Value Ref Range Status   08/05/2023 342 140 - 450 10*3/mm3 Final        Assessment & Plan   The following is the report of her peripheral blood.      Red blood cell morphology shows anisocytosis 2+, poikilocytosis 2+, macroovalocytes, microcytes, multiple cigar cells, few target cells and few teardrop cells. There is no evidence of schistocytes. There is no evidence of acanthocytes. Even though machine reported nucleated red blood cells in circulation, I could not find any.      White blood cell morphology shows segs with no granularity whatsoever. No atypical lymphocytes or monocytes in circulation. No plasma cells, no blasts.  Platelets mildly increased with no platelet clumps and no atypical morphology.      In summary this patient has leukocytosis that I cannot explain on the basis of infection. This phenomenon is new and has been related to her most likely cardiac surgery. In the background of anemia with such an alteration in the morphology of her white blood cells with no granularity in the white cells raises the question if she has some component of primary bone marrow disorder or entities of this nature. The morphology of her red cells is suggestive of a combination of anemia, microcytic and macrocytic and maybe combined deficiency of folic acid, B12 and/or iron.      Also caught my attention the teardrops that sometimes are associated with myelofibrosis. The multiple cigar cells are typically associated with very significant iron deficiency.      The patient has not had a colonoscopy in more than 10 years and I doubt that she will proceed with any at this age. The patient has not had any mammograms in the last several years because of difficulty with that and her chronic back pain. I find no necessity of this.      In any event, I think she needs to have workup for her anemia with a B12 level, ferritin level, iron profile, reticulated hemoglobin, folic acid, sedimentation rate, C-reactive protein, LDH and also serum protein immunoelectrophoresis.      Her leukocytosis needs to be worked up in the form of peripheral blood flow cytometry and BCR-ABL.      If the patient wants to go home on Saturday or Sunday, if the primary team believes that her CHF is compensated and she could go home that will be okay. We will be glad to bring her back as an outpatient to proceed with some of this laboratory testing including the flow cytometry and the BCR-ABL that require fresh blood. This only can be collected this coming Monday.      I do believe that the patient will require to be seen by my partner, Dr. Reyes, tomorrow for further review  of her laboratory testing to be done in the morning.      I will discuss the case with her. I will also send a message to my office in regard to the need for her to come back and see me in the office in 10 days or so and review the rest of her laboratory parameters.     I pointed out to the patient's granddaughter that if her BCR-ABL is abnormal or the flow cytometry is abnormal, she will require bone marrow testing and further radiological analysis.      I discussed all these facts with the patient and her granddaughter and family in the room. I also discussed all these facts with the patient's nurse. It was a pleasure seeing her today.    Plan  Patient is okay to be discharged from our point of view  If discharge we will obtain outpatient flow cytometry and BCR/able  Patient has appointment with Dr. Can in 10 days  If any of the testing about comes abnormal she would require a bone marrow which can be done as outpatient    Dionna Reyes MD                   Cc:  Abner Stockton,

## 2023-08-05 NOTE — PROGRESS NOTES
Electrophysiology Follow-Up Note      Patient Name: Ann-Marie Hughes  Age/Sex: 77 y.o. female  : 1945  MRN: [id number]      Day of Service: 23       Chief Complaint/Follow-up: post CABG, atrial fibrillation     S: feeling much better this morning. Remains in NSR, brief AF yesterday.       Temp:  [97.3 øF (36.3 øC)-98.5 øF (36.9 øC)] 97.6 øF (36.4 øC)  Heart Rate:  [51-97] 60  Resp:  [20-21] 20  BP: (110-158)/(52-79) 132/56     PHYSICAL EXAM:    General Appearance: No acute distress, well developed and well nourished.   Eyes: Conjunctiva and lids: No erythema, swelling, or discharge. Sclera non-icteric.   HENT: Atraumatic, normocephalic. External eyes, ears, and nose normal.   Respiratory: No signs of respiratory distress. Respiration rhythm and depth normal.   Clear to auscultation. No rales, crackles, rhonchi, or wheezing auscultated.   Cardiovascular:  Heart Rate and Rhythm: Normal, Heart Sounds: Normal S1 and S2. No S3 or S4 noted  Gastrointestinal:  Abdomen soft, non-distended, non-tender.  Musculoskeletal: Normal movement of extremities  Skin: Warm and dry.   Psychiatric: Patient alert and oriented to person, place, and time. Speech and behavior appropriate. Normal mood and affect.       ECG/TELE:         Results from last 7 days   Lab Units 23  03123  0249   SODIUM mmol/L 140 139 140   POTASSIUM mmol/L 3.8 4.0 3.9   CHLORIDE mmol/L 102 102 104   CO2 mmol/L 27.0 28.2 26.0   BUN mg/dL 53* 50* 50*   CREATININE mg/dL 1.13* 1.31* 1.03*   GLUCOSE mg/dL 104* 94 113*   CALCIUM mg/dL 8.8 8.9 8.9     Results from last 7 days   Lab Units 23  0314 23  0249   WBC 10*3/mm3 17.83* 20.45* 20.15*   HEMOGLOBIN g/dL 8.3* 8.7* 7.7*   HEMATOCRIT % 25.6* 26.1* 23.4*   PLATELETS 10*3/mm3 342 353 344         Results from last 7 days   Lab Units 23  0605 23  1413 23  1203   HSTROP T ng/L 155* 143* 142*               Current  Medications:   Scheduled Meds:aspirin, 81 mg, Oral, Daily  atorvastatin, 40 mg, Oral, Nightly  buPROPion XL, 300 mg, Oral, Daily  cefTRIAXone, 1,000 mg, Intravenous, Q24H  digoxin, 125 mcg, Oral, Every Other Day  fluticasone, 2 spray, Each Nare, Daily  furosemide, 40 mg, Oral, BID  insulin lispro, 2-9 Units, Subcutaneous, 4x Daily AC & at Bedtime  iron polysaccharides, 150 mg, Oral, Daily  levothyroxine, 75 mcg, Oral, Q AM  metoprolol succinate XL, 25 mg, Oral, Q12H  montelukast, 10 mg, Oral, Daily  senna-docusate sodium, 2 tablet, Oral, BID  sodium chloride, 10 mL, Intravenous, Q12H  spironolactone, 25 mg, Oral, Daily            Atrial fibrillation       Plan:   Atrial fibrillation--- she is in NSR this morning. Brief AF yesterday but she has been mostly SR. Continue metoprolol and digoxin (EOD). Need to restart apixaban once hgb is stable.         2. Anemia--- status post 1 unit PRBC on 8/3. Hgb still dropping 8.3 this morning. Hematology saw, more extensive lab work. Plan for them to follow up today.           3. DAVID-- creatinine down to 1.13 this morning. Nephrology following. Now on oral diuretics. Shortness of breath is improving.         4. WBC elevated-- unclear etiology. She is afebrile. Small bilateral effusions on CT yesterday. Her WBC is down to 17 this morning, hopefully trending down.         -- I would plan to monitor today for further evaluation of anemia. If stable from hematology standpoint and no further testing planned then maybe home tomorrow.       LONA Ayala  08/05/23  08:42 EDT

## 2023-08-05 NOTE — PROGRESS NOTES
Status post coronary bypass on 7/20/2023, readmitted with atrial fibrillation.  Apparently she also has moderate mitral valve regurgitation and ischemic cardiomyopathy.    CV stable.  Pulmonary toileting.  Dyspnea has greatly improved.  Oral diet as tolerated.  Nonoliguric.  Afebrile.    Continue leukocytosis and anemia; work-up ongoing by hematology.    Reevaluation by physical therapy.  Mobilize as much as possible.

## 2023-08-05 NOTE — PROGRESS NOTES
"RENAL/KCC:     LOS: 3 days     Chief Complaint/ Reason for encounter: Postop DAVID    Subjective   Chart reviewed  Feels better    Vital Signs  Temp:  [97.3 øF (36.3 øC)-98.5 øF (36.9 øC)] 97.4 øF (36.3 øC)  Heart Rate:  [51-97] 52  Resp:  [19-21] 19  BP: (110-140)/(52-79) 132/56       Wt Readings from Last 1 Encounters:   08/05/23 0530 91.4 kg (201 lb 8 oz)   08/04/23 0630 88.3 kg (194 lb 10.7 oz)   08/03/23 0919 89 kg (196 lb 3.4 oz)   08/03/23 0812 89.4 kg (196 lb 15.7 oz)   08/01/23 2254 91 kg (200 lb 9.9 oz)       Objective:  Vital signs: (most recent): Blood pressure 132/56, pulse 52, temperature 97.4 øF (36.3 øC), temperature source Oral, resp. rate 19, height 162 cm (63.78\"), weight 91.4 kg (201 lb 8 oz), SpO2 95 %, not currently breastfeeding.              Objective:  General Appearance:  Comfortable, mildly ill-appearing, in no acute distress and not in pain.  Awake, alert, oriented  HEENT: Mucous membranes moist, no injury, oropharynx clear  Lungs:  Normal effort and normal respiratory rate.  Breath sounds clear to auscultation.  No  respiratory distress.  No rales, decreased breath sounds or rhonchi.    Heart: Normal rate.  Regular rhythm.  S1, S2 normal.  No murmur.   Abdomen: Abdomen is soft.  Bowel sounds are normal, no abdominal tenderness.  There is no rebound or guarding  Extremities: Decreased, 1+ edema of bilateral lower extremities  Neurological: No focal motor or sensory deficits, pupils reactive  Skin:  Warm and dry.  No rash or cyanosis.       Results Review:    Intake/Output:     Intake/Output Summary (Last 24 hours) at 8/5/2023 1030  Last data filed at 8/5/2023 0822  Gross per 24 hour   Intake 600 ml   Output 1250 ml   Net -650 ml         Labs:   Recent Results (from the past 24 hour(s))   Urinalysis With Culture If Indicated - Urine, Clean Catch    Collection Time: 08/04/23  2:01 PM    Specimen: Urine, Clean Catch   Result Value Ref Range    Color, UA Yellow Yellow, Straw    Appearance, UA " Clear Clear    pH, UA 5.5 5.0 - 8.0    Specific Gravity, UA 1.008 1.005 - 1.030    Glucose, UA Negative Negative    Ketones, UA Negative Negative    Bilirubin, UA Negative Negative    Blood, UA Negative Negative    Protein, UA Negative Negative    Leuk Esterase, UA Trace (A) Negative    Nitrite, UA Negative Negative    Urobilinogen, UA 0.2 E.U./dL 0.2 - 1.0 E.U./dL   Urinalysis, Microscopic Only - Urine, Clean Catch    Collection Time: 08/04/23  2:01 PM    Specimen: Urine, Clean Catch   Result Value Ref Range    RBC, UA 0-2 None Seen, 0-2 /HPF    WBC, UA 3-5 (A) None Seen, 0-2 /HPF    Bacteria, UA None Seen None Seen /HPF    Squamous Epithelial Cells, UA 0-2 None Seen, 0-2 /HPF    Yeast, UA Small/1+ Yeast None Seen /HPF    Hyaline Casts, UA 0-2 None Seen /LPF    Methodology Manual Light Microscopy    Duplex Venous Lower Extremity - Bilateral CAR    Collection Time: 08/04/23  4:35 PM   Result Value Ref Range    Right Common Femoral Spont Y     Right Common Femoral Competent Y     Right Common Femoral Phasic Y     Right Common Femoral Compress C     Right Common Femoral Augment Y     Right Saphenofemoral Junction Compress C     Right Profunda Femoral Compress C     Right Proximal Femoral Compress C     Right Mid Femoral Spont Y     Right Mid Femoral Competent Y     Right Mid Femoral Phasic Y     Right Mid Femoral Compress C     Right Mid Femoral Augment Y     Right Distal Femoral Compress C     Right Popliteal Spont Y     Right Popliteal Competent Y     Right Popliteal Phasic Y     Right Popliteal Compress C     Right Popliteal Augment Y     Right Posterior Tibial Compress C     Right Peroneal Compress C     Right Gastronemius Compress C     Right Greater Saph BK Compress C     Right Lesser Saph Compress C     Left Common Femoral Spont Y     Left Common Femoral Competent Y     Left Common Femoral Phasic Y     Left Common Femoral Compress C     Left Common Femoral Augment Y     Left Saphenofemoral Junction Compress C      Left Profunda Femoral Compress C     Left Proximal Femoral Compress C     Left Mid Femoral Spont Y     Left Mid Femoral Competent Y     Left Mid Femoral Phasic Y     Left Mid Femoral Compress C     Left Mid Femoral Augment Y     Left Distal Femoral Compress C     Left Popliteal Spont Y     Left Popliteal Competent Y     Left Popliteal Phasic Y     Left Popliteal Compress C     Left Popliteal Augment Y     Left Posterior Tibial Compress C     Left Peroneal Compress C     Left Gastronemius Compress C     Left Greater Saph AK Compress C     Left Lesser Saph Compress C    POC Glucose Once    Collection Time: 08/04/23  5:02 PM    Specimen: Blood   Result Value Ref Range    Glucose 102 70 - 130 mg/dL   Prepare RBC, 1 Units    Collection Time: 08/04/23  6:00 PM   Result Value Ref Range    Product Code R6856W34     Unit Number [id number]     UNIT  ABO O     UNIT  RH NEG     Crossmatch Interpretation Compatible     Dispense Status PT     Blood Expiration Date [id number]     Blood Type Barcode 9500    POC Glucose Once    Collection Time: 08/04/23  8:44 PM    Specimen: Blood   Result Value Ref Range    Glucose 144 (H) 70 - 130 mg/dL   Magnesium    Collection Time: 08/05/23  3:17 AM    Specimen: Blood   Result Value Ref Range    Magnesium 1.8 1.6 - 2.4 mg/dL   Renal Function Panel    Collection Time: 08/05/23  3:17 AM    Specimen: Blood   Result Value Ref Range    Glucose 104 (H) 65 - 99 mg/dL    BUN 53 (H) 8 - 23 mg/dL    Creatinine 1.13 (H) 0.57 - 1.00 mg/dL    Sodium 140 136 - 145 mmol/L    Potassium 3.8 3.5 - 5.2 mmol/L    Chloride 102 98 - 107 mmol/L    CO2 27.0 22.0 - 29.0 mmol/L    Calcium 8.8 8.6 - 10.5 mg/dL    Albumin 3.6 3.5 - 5.2 g/dL    Phosphorus 3.7 2.5 - 4.5 mg/dL    Anion Gap 11.0 5.0 - 15.0 mmol/L    BUN/Creatinine Ratio 46.9 (H) 7.0 - 25.0    eGFR 49.9 (L) >60.0 mL/min/1.73   Ferritin    Collection Time: 08/05/23  3:17 AM    Specimen: Blood   Result Value Ref Range    Ferritin 1,946.00 (H) 13.00 -  150.00 ng/mL   Iron Profile    Collection Time: 08/05/23  3:17 AM    Specimen: Blood   Result Value Ref Range    Iron 56 37 - 145 mcg/dL    Iron Saturation (TSAT) 21 20 - 50 %    Transferrin 175 (L) 200 - 360 mg/dL    TIBC 261 (L) 298 - 536 mcg/dL   Vitamin B12    Collection Time: 08/05/23  3:17 AM    Specimen: Blood   Result Value Ref Range    Vitamin B-12 1,212 (H) 211 - 946 pg/mL   Folate    Collection Time: 08/05/23  3:17 AM    Specimen: Blood   Result Value Ref Range    Folate 14.00 4.78 - 24.20 ng/mL   Retic With IRF & RET-He    Collection Time: 08/05/23  3:17 AM    Specimen: Blood   Result Value Ref Range    Immature Reticulocyte Fraction 37.4 (H) 3.0 - 15.8 %    Reticulocyte % 2.63 (H) 0.70 - 1.90 %    Reticulocyte Absolute 0.0731 0.0200 - 0.1300 10*6/mm3    Reticulocyte Hgb 36.9 (H) 29.8 - 36.1 pg   Lactate Dehydrogenase    Collection Time: 08/05/23  3:17 AM    Specimen: Blood   Result Value Ref Range     135 - 214 U/L   Fibrinogen    Collection Time: 08/05/23  3:17 AM    Specimen: Blood   Result Value Ref Range    Fibrinogen 366 219 - 464 mg/dL   CBC Auto Differential    Collection Time: 08/05/23  3:17 AM    Specimen: Blood   Result Value Ref Range    WBC 17.83 (H) 3.40 - 10.80 10*3/mm3    RBC 2.78 (L) 3.77 - 5.28 10*6/mm3    Hemoglobin 8.3 (L) 12.0 - 15.9 g/dL    Hematocrit 25.6 (L) 34.0 - 46.6 %    MCV 92.1 79.0 - 97.0 fL    MCH 29.9 26.6 - 33.0 pg    MCHC 32.4 31.5 - 35.7 g/dL    RDW 17.7 (H) 12.3 - 15.4 %    RDW-SD 56.9 (H) 37.0 - 54.0 fl    MPV 10.0 6.0 - 12.0 fL    Platelets 342 140 - 450 10*3/mm3    Neutrophil % 67.7 42.7 - 76.0 %    Lymphocyte % 14.7 (L) 19.6 - 45.3 %    Monocyte % 10.7 5.0 - 12.0 %    Eosinophil % 4.4 0.3 - 6.2 %    Basophil % 0.5 0.0 - 1.5 %    Immature Grans % 2.0 (H) 0.0 - 0.5 %    Neutrophils, Absolute 12.07 (H) 1.70 - 7.00 10*3/mm3    Lymphocytes, Absolute 2.62 0.70 - 3.10 10*3/mm3    Monocytes, Absolute 1.91 (H) 0.10 - 0.90 10*3/mm3    Eosinophils, Absolute 0.79 (H)  0.00 - 0.40 10*3/mm3    Basophils, Absolute 0.09 0.00 - 0.20 10*3/mm3    Immature Grans, Absolute 0.35 (H) 0.00 - 0.05 10*3/mm3    nRBC 0.6 (H) 0.0 - 0.2 /100 WBC   POC Glucose Once    Collection Time: 08/05/23  6:52 AM    Specimen: Blood   Result Value Ref Range    Glucose 116 70 - 130 mg/dL       Medications have been reviewed separately in chart overview      ASSESSMENT:  DAVID on CKD stage IIIa.  Creatinine slightly worse today with diuresis but volume status improved and she is currently off oxygen  Hypotension, stable, previously on midodrine, now hypertensive  Postop anemia on oral iron  CAD status post three-vessel CABG  NSTEMI  Combined systolic and diastolic CHF  Diabetes mellitus type 2  Hypothyroidism  Small bilateral pleural effusions      DISCUSSION/PLAN:  Renal function better today, volume OK  Continue PO Lasix   Maintain strict I's and O's  Continue current dose of oral spironolactone with close attention to potassium levels  Digoxin dosing per cardiology    Renal dose IV antibiotics      Breezy He MD  [facility]  Office phone number: [phone]  Answering service phone number: [phone]    08/05/23  10:30 EDT

## 2023-08-05 NOTE — PLAN OF CARE
Goal Outcome Evaluation:  Plan of Care Reviewed With: patient, daughter           Outcome Evaluation: Pt. admitted for Afib. SB on monitor, HR 50s. Dig and metoprolol held today d/t SB. On RA, all VSS. No c/o pain thisshift

## 2023-08-06 ENCOUNTER — READMISSION MANAGEMENT (OUTPATIENT)
Dept: CALL CENTER | Facility: HOSPITAL | Age: 78
End: 2023-08-06
Payer: MEDICARE

## 2023-08-06 VITALS
OXYGEN SATURATION: 94 % | DIASTOLIC BLOOD PRESSURE: 76 MMHG | HEIGHT: 64 IN | WEIGHT: 191.69 LBS | TEMPERATURE: 97.9 F | SYSTOLIC BLOOD PRESSURE: 117 MMHG | BODY MASS INDEX: 32.73 KG/M2 | RESPIRATION RATE: 18 BRPM | HEART RATE: 58 BPM

## 2023-08-06 LAB
ALBUMIN SERPL-MCNC: 3.4 G/DL (ref 3.5–5.2)
ANION GAP SERPL CALCULATED.3IONS-SCNC: 9 MMOL/L (ref 5–15)
BASOPHILS # BLD AUTO: 0.08 10*3/MM3 (ref 0–0.2)
BASOPHILS NFR BLD AUTO: 0.5 % (ref 0–1.5)
BUN SERPL-MCNC: 48 MG/DL (ref 8–23)
BUN/CREAT SERPL: 44 (ref 7–25)
CALCIUM SPEC-SCNC: 9.1 MG/DL (ref 8.6–10.5)
CHLORIDE SERPL-SCNC: 102 MMOL/L (ref 98–107)
CO2 SERPL-SCNC: 28 MMOL/L (ref 22–29)
CREAT SERPL-MCNC: 1.09 MG/DL (ref 0.57–1)
DEPRECATED RDW RBC AUTO: 60.9 FL (ref 37–54)
EGFRCR SERPLBLD CKD-EPI 2021: 52.1 ML/MIN/1.73
EOSINOPHIL # BLD AUTO: 0.56 10*3/MM3 (ref 0–0.4)
EOSINOPHIL NFR BLD AUTO: 3.3 % (ref 0.3–6.2)
ERYTHROCYTE [DISTWIDTH] IN BLOOD BY AUTOMATED COUNT: 18.2 % (ref 12.3–15.4)
GLUCOSE BLDC GLUCOMTR-MCNC: 119 MG/DL (ref 70–130)
GLUCOSE BLDC GLUCOMTR-MCNC: 140 MG/DL (ref 70–130)
GLUCOSE SERPL-MCNC: 100 MG/DL (ref 65–99)
HCT VFR BLD AUTO: 25.9 % (ref 34–46.6)
HGB BLD-MCNC: 8.5 G/DL (ref 12–15.9)
IMM GRANULOCYTES # BLD AUTO: 0.26 10*3/MM3 (ref 0–0.05)
IMM GRANULOCYTES NFR BLD AUTO: 1.6 % (ref 0–0.5)
LYMPHOCYTES # BLD AUTO: 2.96 10*3/MM3 (ref 0.7–3.1)
LYMPHOCYTES NFR BLD AUTO: 17.7 % (ref 19.6–45.3)
MCH RBC QN AUTO: 30.7 PG (ref 26.6–33)
MCHC RBC AUTO-ENTMCNC: 32.8 G/DL (ref 31.5–35.7)
MCV RBC AUTO: 93.5 FL (ref 79–97)
MONOCYTES # BLD AUTO: 1.9 10*3/MM3 (ref 0.1–0.9)
MONOCYTES NFR BLD AUTO: 11.3 % (ref 5–12)
NEUTROPHILS NFR BLD AUTO: 11.01 10*3/MM3 (ref 1.7–7)
NEUTROPHILS NFR BLD AUTO: 65.6 % (ref 42.7–76)
NRBC BLD AUTO-RTO: 0.8 /100 WBC (ref 0–0.2)
PHOSPHATE SERPL-MCNC: 3.1 MG/DL (ref 2.5–4.5)
PLATELET # BLD AUTO: 342 10*3/MM3 (ref 140–450)
PMV BLD AUTO: 10.5 FL (ref 6–12)
POTASSIUM SERPL-SCNC: 3.6 MMOL/L (ref 3.5–5.2)
RBC # BLD AUTO: 2.77 10*6/MM3 (ref 3.77–5.28)
SODIUM SERPL-SCNC: 139 MMOL/L (ref 136–145)
WBC NRBC COR # BLD: 16.77 10*3/MM3 (ref 3.4–10.8)

## 2023-08-06 PROCEDURE — 85025 COMPLETE CBC W/AUTO DIFF WBC: CPT | Performed by: NURSE PRACTITIONER

## 2023-08-06 PROCEDURE — 99024 POSTOP FOLLOW-UP VISIT: CPT | Performed by: THORACIC SURGERY (CARDIOTHORACIC VASCULAR SURGERY)

## 2023-08-06 PROCEDURE — 25010000002 CEFTRIAXONE PER 250 MG: Performed by: INTERNAL MEDICINE

## 2023-08-06 PROCEDURE — 80069 RENAL FUNCTION PANEL: CPT | Performed by: INTERNAL MEDICINE

## 2023-08-06 PROCEDURE — 99238 HOSP IP/OBS DSCHRG MGMT 30/<: CPT

## 2023-08-06 PROCEDURE — 82948 REAGENT STRIP/BLOOD GLUCOSE: CPT

## 2023-08-06 RX ORDER — POTASSIUM CHLORIDE 750 MG/1
20 TABLET, FILM COATED, EXTENDED RELEASE ORAL DAILY
Qty: 90 TABLET | Refills: 0 | Status: CANCELLED | OUTPATIENT
Start: 2023-08-06

## 2023-08-06 RX ORDER — FUROSEMIDE 40 MG/1
40 TABLET ORAL 2 TIMES DAILY
Qty: 60 TABLET | Refills: 2 | Status: SHIPPED | OUTPATIENT
Start: 2023-08-06

## 2023-08-06 RX ORDER — DIGOXIN 125 MCG
125 TABLET ORAL EVERY OTHER DAY
Qty: 30 TABLET | Refills: 2 | Status: SHIPPED | OUTPATIENT
Start: 2023-08-07

## 2023-08-06 RX ADMIN — CEFTRIAXONE SODIUM 1000 MG: 1 INJECTION, POWDER, FOR SOLUTION INTRAMUSCULAR; INTRAVENOUS at 09:58

## 2023-08-06 RX ADMIN — BUPROPION HYDROCHLORIDE 300 MG: 300 TABLET, EXTENDED RELEASE ORAL at 09:58

## 2023-08-06 RX ADMIN — ASPIRIN 81 MG: 81 TABLET, COATED ORAL at 09:58

## 2023-08-06 RX ADMIN — SPIRONOLACTONE 25 MG: 25 TABLET ORAL at 09:58

## 2023-08-06 RX ADMIN — MONTELUKAST SODIUM 10 MG: 10 TABLET, FILM COATED ORAL at 09:58

## 2023-08-06 RX ADMIN — Medication 150 MG: at 09:58

## 2023-08-06 RX ADMIN — FLUTICASONE PROPIONATE 2 SPRAY: 50 SPRAY, METERED NASAL at 09:58

## 2023-08-06 RX ADMIN — METOPROLOL SUCCINATE 25 MG: 25 TABLET, EXTENDED RELEASE ORAL at 09:58

## 2023-08-06 RX ADMIN — SENNOSIDES AND DOCUSATE SODIUM 2 TABLET: 50; 8.6 TABLET ORAL at 09:58

## 2023-08-06 RX ADMIN — LEVOTHYROXINE SODIUM 75 MCG: 0.07 TABLET ORAL at 05:17

## 2023-08-06 NOTE — PROGRESS NOTES
Subjective .     REASONS FOR FOLLOWUP:  LEUKOCYTOSIS , NEUTROPHILS HAVE NO GRANULES  2. ANEMIA FOR 3 YEARS,NORMAL MCV: VERY ATYPICAL RED BLOOD CELL MORPHOLOGY  3. CHF AND PULMONARY EDEMA , CAD, ELEVATED CREATININE.    Interval history:    August 6, 2023:    Cardiothoracic surgery saw patient.  Patient is s/p coronary artery bypass surgery July 20, 2023 and was readmitted with A-fib.  Cardiothoracic surgery okay to discharge.  From a point we can follow-up the patient in the office.    HISTORY OF PRESENT ILLNESS:  The patient is a 77 y.o. year old female who is here for follow-up with the above-mentioned history.    History of Present Illness        On 08/04/2023 I had the opportunity to see this 77-year-old female who has been admitted to the hospital in pulmonary edema, congestive heart failure, associated with cardiac disease. She has undergone recently a cardiac bypass. We have been consulted because the patient has been noticed to have leukocytosis, new issue for her in absence of any other fever or infection and she also has been noticed to have anemia. On further questioning to the patient her primary care physician in [place] has told her for many months that she has been anemic but no specific investigation has been made about that that she is aware of. She has no notion of passage of blood in the stool, no hematemesis, no melena, no hematuria, no vaginal bleeding. Her diet is complete in nutrients. She has lost some weight because she has been very fatigued lately. The fatigue was associated with her heart operation but even before that she was very tired. The patient also states that she has not had any definitive infection even though she had pneumonia  weeks before her cardiac operation but she got better from this. She has not had any fevers, chills, night sweats, pruritus, adenopathies or rashes. She complains of some shortness of breath upon exertion but this is dramatically better since  diuresis was established for the treatment of her CHF. Urinary output has been abundant. She has no difficulty with miction. Her bowel activity has been normal. Her surgical sites in her leg have had some ecchymosis and swelling and she has had significant fluid accumulation in her legs.      The patient has no previous history of hematological disorder. She is not aware of any organ enlargement or anything of this nature.       Past Medical History:   Diagnosis Date    Acne rosacea 08/17/2021    DR.JEFF MOON     Arteriosclerosis of abdominal aorta     B12 deficiency 08/17/2021    DJD (degenerative joint disease)     Hx of smoking 08/17/2021    QUIT IN 1976 AT AGE 31    Hyperlipidemia 08/17/2021    Hypertension     Hypothyroidism     Metabolic syndrome 08/17/2021    KAREN (stress urinary incontinence, female) 08/17/2021    UTI (urinary tract infection) 08/17/2021    Vitamin D deficiency 08/17/2021    Vitamin D deficiency        ONCOLOGIC HISTORY:  (History from previous dates can be found in the separate document.)    No current facility-administered medications on file prior to encounter.     Current Outpatient Medications on File Prior to Encounter   Medication Sig Dispense Refill    apixaban (ELIQUIS) 5 MG tablet tablet Take 1 tablet by mouth Every 12 (Twelve) Hours. Indications: Atrial Fibrillation 60 tablet 2    aspirin 81 MG EC tablet Take 1 tablet by mouth Daily.      atorvastatin (LIPITOR) 40 MG tablet Take 1 tablet by mouth Every Night for 90 days. 90 tablet 0    buPROPion XL (WELLBUTRIN XL) 300 MG 24 hr tablet TAKE 1 TABLET BY MOUTH DAILY 90 tablet 1    dapagliflozin Propanediol (Farxiga) 10 MG tablet Take 10 mg by mouth Daily. 30 tablet 3    furosemide (Lasix) 40 MG tablet Take 1 tablet by mouth Daily. 30 tablet 0    metFORMIN ER (GLUCOPHAGE-XR) 500 MG 24 hr tablet TAKE 1 TABLET BY MOUTH EVERY MORNING AND 2 TABLETS AT SUPPER (Patient taking differently: 1 tablet Daily With Breakfast.) 270 tablet 4     metFORMIN ER (GLUCOPHAGE-XR) 500 MG 24 hr tablet Take 2 tablets by mouth Daily With Dinner.      metoprolol succinate XL (TOPROL-XL) 25 MG 24 hr tablet Take 0.5 tablets by mouth Every 12 (Twelve) Hours for 90 days. 30 tablet 2    montelukast (SINGULAIR) 10 MG tablet Take 1 tablet by mouth Daily. 90 tablet 3    Probiotic Product (PROBIOTIC PO) Take 1 capsule by mouth Daily.      spironolactone (ALDACTONE) 25 MG tablet Take 1 tablet by mouth Daily. 90 tablet 3    Synthroid 75 MCG tablet Take 1 tablet by mouth Daily. 90 tablet 3    acetaminophen (TYLENOL) 325 MG tablet Take 2 tablets by mouth Every 4 (Four) Hours As Needed for Mild Pain.      albuterol sulfate  (90 Base) MCG/ACT inhaler Inhale 2 puffs Every 4 (Four) Hours As Needed for Wheezing. 18 g 0       ALLERGIES:     Allergies   Allergen Reactions    Penicillins Palpitations     1. When was your reaction? > 10 years ago  2. Did your reaction happen after the first dose or after several doses? After first dose  3. Did your reaction require ED or hospital care to manage your reaction? Do not know  4. Did your reaction require treatment with epinephrine? Do not know  5. Have you taken amoxicillin (Amoxil) or amoxicillin-clavulanate (Augmentin) without issue since? Yes  6. Have you taken cephalexin (Keflex) without issue since?  Do not know        Social History     Socioeconomic History    Marital status:    Tobacco Use    Smoking status: Former     Packs/day: 1.00     Years: 12.00     Pack years: 12.00     Types: Cigarettes     Quit date:      Years since quittin.6    Smokeless tobacco: Never   Vaping Use    Vaping Use: Never used   Substance and Sexual Activity    Alcohol use: Never    Drug use: Never    Sexual activity: Defer         Cancer-related family history is not on file.     Review of Systems  A comprehensive 14 point review of systems was performed and was negative except as mentioned.    Objective      Vitals:    23 0013  08/06/23 0405 08/06/23 0407 08/06/23 0704   BP: 143/59 151/67 155/85    BP Location: Left arm Left arm     Patient Position: Lying Lying     Pulse: 63 54 55    Resp: 18 18     Temp: 97.2 øF (36.2 øC) 97.5 øF (36.4 øC)     TempSrc: Oral Oral     SpO2:       Weight:    87 kg (191 lb 11 oz)   Height:              No data to display                  Physical Exam      CONSTITUTIONAL:  Vital signs reviewed.  Alert and oriented x3  No distress, looks comfortable.  EYES:  Conjunctivae and lids unremarkable.  Extraocular eye movements intact.  HEENT: No evidence of lymphadenopathy, no thyromegaly  EARS,NOSE,MOUTH,THROAT:  Ears and nose appear unremarkable.  Lips, teeth, gums appear unremarkable.  RESPIRATORY:  Normal respiratory effort.  No rales  or wheezing, clear.   CARDIOVASCULAR:  Regular rate and rhythm, no murmur  No significant lower extremity edema.  Abdomen: Soft nontender positive bowel sounds no hepatosplenomegaly  SKIN: No wounds.  No rashes.  MUSCULOSKELETAL/EXTREMITIES: No clubbing or cyanosis.  No apparent unilateral weakness.  NEURO: CN 2-12 appear intact. No focal neurological deficits noted.  PSYCHIATRIC:  Normal judgment and insight.  Normal mood and affect.      RECENT LABS:  Hematology WBC   Date Value Ref Range Status   08/06/2023 16.77 (H) 3.40 - 10.80 10*3/mm3 Final     RBC   Date Value Ref Range Status   08/06/2023 2.77 (L) 3.77 - 5.28 10*6/mm3 Final     Hemoglobin   Date Value Ref Range Status   08/06/2023 8.5 (L) 12.0 - 15.9 g/dL Final     Hematocrit   Date Value Ref Range Status   08/06/2023 25.9 (L) 34.0 - 46.6 % Final     Platelets   Date Value Ref Range Status   08/06/2023 342 140 - 450 10*3/mm3 Final        Assessment & Plan   The following is the report of her peripheral blood.      Red blood cell morphology shows anisocytosis 2+, poikilocytosis 2+, macroovalocytes, microcytes, multiple cigar cells, few target cells and few teardrop cells. There is no evidence of schistocytes. There is no  evidence of acanthocytes. Even though machine reported nucleated red blood cells in circulation, I could not find any.      White blood cell morphology shows segs with no granularity whatsoever. No atypical lymphocytes or monocytes in circulation. No plasma cells, no blasts. Platelets mildly increased with no platelet clumps and no atypical morphology.      In summary this patient has leukocytosis that I cannot explain on the basis of infection. This phenomenon is new and has been related to her most likely cardiac surgery. In the background of anemia with such an alteration in the morphology of her white blood cells with no granularity in the white cells raises the question if she has some component of primary bone marrow disorder or entities of this nature. The morphology of her red cells is suggestive of a combination of anemia, microcytic and macrocytic and maybe combined deficiency of folic acid, B12 and/or iron.      Also caught my attention the teardrops that sometimes are associated with myelofibrosis. The multiple cigar cells are typically associated with very significant iron deficiency.      The patient has not had a colonoscopy in more than 10 years and I doubt that she will proceed with any at this age. The patient has not had any mammograms in the last several years because of difficulty with that and her chronic back pain. I find no necessity of this.      In any event, I think she needs to have workup for her anemia with a B12 level, ferritin level, iron profile, reticulated hemoglobin, folic acid, sedimentation rate, C-reactive protein, LDH and also serum protein immunoelectrophoresis.      Her leukocytosis needs to be worked up in the form of peripheral blood flow cytometry and BCR-ABL.      If the patient wants to go home on Saturday or Sunday, if the primary team believes that her CHF is compensated and she could go home that will be okay. We will be glad to bring her back as an outpatient to  proceed with some of this laboratory testing including the flow cytometry and the BCR-ABL that require fresh blood. This only can be collected this coming Monday.      I do believe that the patient will require to be seen by my partner, Dr. Reyes, tomorrow for further review of her laboratory testing to be done in the morning.      I will discuss the case with her. I will also send a message to my office in regard to the need for her to come back and see me in the office in 10 days or so and review the rest of her laboratory parameters.     I pointed out to the patient's granddaughter that if her BCR-ABL is abnormal or the flow cytometry is abnormal, she will require bone marrow testing and further radiological analysis.      I discussed all these facts with the patient and her granddaughter and family in the room. I also discussed all these facts with the patient's nurse. It was a pleasure seeing her today.    Plan  Patient is okay to be discharged from our point of view  If discharge we will obtain outpatient flow cytometry and BCR/able  Patient has appointment with Dr. Can in 10 days  If any of the testing about comes abnormal she would require a bone marrow which can be done as outpatient    Okay to discharge patient from a point and patient will follow-up with Dr. Can in 10 days.  We will sign off    Dionna Reyes MD                   Cc:  Abner Stockton DO

## 2023-08-06 NOTE — PROGRESS NOTES
"RENAL/KCC:     LOS: 4 days     Chief Complaint/ Reason for encounter: Postop DAVID    Subjective   Chart reviewed  Feels better    Vital Signs  Temp:  [97.2 øF (36.2 øC)-97.8 øF (36.6 øC)] 97.5 øF (36.4 øC)  Heart Rate:  [53-63] 55  Resp:  [17-18] 18  BP: (129-155)/(45-85) 155/85       Wt Readings from Last 1 Encounters:   08/06/23 0704 87 kg (191 lb 11 oz)   08/05/23 0530 91.4 kg (201 lb 8 oz)   08/04/23 0630 88.3 kg (194 lb 10.7 oz)   08/03/23 0919 89 kg (196 lb 3.4 oz)   08/03/23 0812 89.4 kg (196 lb 15.7 oz)   08/01/23 2254 91 kg (200 lb 9.9 oz)       Objective:  Vital signs: (most recent): Blood pressure 155/85, pulse 55, temperature 97.5 øF (36.4 øC), temperature source Oral, resp. rate 18, height 162 cm (63.78\"), weight 87 kg (191 lb 11 oz), SpO2 94 %, not currently breastfeeding.              Objective:  General Appearance:  Comfortable, mildly ill-appearing, in no acute distress and not in pain.  Awake, alert, oriented  HEENT: Mucous membranes moist, no injury, oropharynx clear  Lungs:  Normal effort and normal respiratory rate.  Breath sounds clear to auscultation.  No  respiratory distress.  No rales, decreased breath sounds or rhonchi.    Heart: Normal rate.  Regular rhythm.  S1, S2 normal.  No murmur.   Abdomen: Abdomen is soft.  Bowel sounds are normal, no abdominal tenderness.  There is no rebound or guarding  Extremities: Decreased, 1+ edema of bilateral lower extremities  Neurological: No focal motor or sensory deficits, pupils reactive  Skin:  Warm and dry.  No rash or cyanosis.       Results Review:    Intake/Output:     Intake/Output Summary (Last 24 hours) at 8/6/2023 1022  Last data filed at 8/6/2023 0807  Gross per 24 hour   Intake 840 ml   Output 1300 ml   Net -460 ml         Labs:   Recent Results (from the past 24 hour(s))   POC Glucose Once    Collection Time: 08/05/23 12:01 PM    Specimen: Blood   Result Value Ref Range    Glucose 149 (H) 70 - 130 mg/dL   POC Glucose Once    Collection " Time: 08/05/23  4:13 PM    Specimen: Blood   Result Value Ref Range    Glucose 137 (H) 70 - 130 mg/dL   POC Glucose Once    Collection Time: 08/05/23  8:34 PM    Specimen: Blood   Result Value Ref Range    Glucose 184 (H) 70 - 130 mg/dL   Renal Function Panel    Collection Time: 08/06/23  3:13 AM    Specimen: Blood   Result Value Ref Range    Glucose 100 (H) 65 - 99 mg/dL    BUN 48 (H) 8 - 23 mg/dL    Creatinine 1.09 (H) 0.57 - 1.00 mg/dL    Sodium 139 136 - 145 mmol/L    Potassium 3.6 3.5 - 5.2 mmol/L    Chloride 102 98 - 107 mmol/L    CO2 28.0 22.0 - 29.0 mmol/L    Calcium 9.1 8.6 - 10.5 mg/dL    Albumin 3.4 (L) 3.5 - 5.2 g/dL    Phosphorus 3.1 2.5 - 4.5 mg/dL    Anion Gap 9.0 5.0 - 15.0 mmol/L    BUN/Creatinine Ratio 44.0 (H) 7.0 - 25.0    eGFR 52.1 (L) >60.0 mL/min/1.73   CBC Auto Differential    Collection Time: 08/06/23  3:13 AM    Specimen: Blood   Result Value Ref Range    WBC 16.77 (H) 3.40 - 10.80 10*3/mm3    RBC 2.77 (L) 3.77 - 5.28 10*6/mm3    Hemoglobin 8.5 (L) 12.0 - 15.9 g/dL    Hematocrit 25.9 (L) 34.0 - 46.6 %    MCV 93.5 79.0 - 97.0 fL    MCH 30.7 26.6 - 33.0 pg    MCHC 32.8 31.5 - 35.7 g/dL    RDW 18.2 (H) 12.3 - 15.4 %    RDW-SD 60.9 (H) 37.0 - 54.0 fl    MPV 10.5 6.0 - 12.0 fL    Platelets 342 140 - 450 10*3/mm3    Neutrophil % 65.6 42.7 - 76.0 %    Lymphocyte % 17.7 (L) 19.6 - 45.3 %    Monocyte % 11.3 5.0 - 12.0 %    Eosinophil % 3.3 0.3 - 6.2 %    Basophil % 0.5 0.0 - 1.5 %    Immature Grans % 1.6 (H) 0.0 - 0.5 %    Neutrophils, Absolute 11.01 (H) 1.70 - 7.00 10*3/mm3    Lymphocytes, Absolute 2.96 0.70 - 3.10 10*3/mm3    Monocytes, Absolute 1.90 (H) 0.10 - 0.90 10*3/mm3    Eosinophils, Absolute 0.56 (H) 0.00 - 0.40 10*3/mm3    Basophils, Absolute 0.08 0.00 - 0.20 10*3/mm3    Immature Grans, Absolute 0.26 (H) 0.00 - 0.05 10*3/mm3    nRBC 0.8 (H) 0.0 - 0.2 /100 WBC   POC Glucose Once    Collection Time: 08/06/23  6:32 AM    Specimen: Blood   Result Value Ref Range    Glucose 119 70 - 130 mg/dL        Medications have been reviewed separately in chart overview      ASSESSMENT:  DAVID on CKD stage IIIa.  Creatinine slightly worse today with diuresis but volume status improved and she is currently off oxygen  Hypotension, stable, previously on midodrine, now hypertensive  Postop anemia on oral iron  CAD status post three-vessel CABG  NSTEMI  Combined systolic and diastolic CHF  Diabetes mellitus type 2  Hypothyroidism  Small bilateral pleural effusions      DISCUSSION/PLAN:  Renal function even better today, volume OK  Continue PO Lasix   Maintain strict I's and O's  Continue current dose of oral spironolactone with close attention to potassium levels  Digoxin dosing per cardiology  OK for D/C from a renal standpoint      Breezy He MD  [facility]  Office phone number: [phone]  Answering service phone number: [phone]    08/06/23  10:22 EDT

## 2023-08-06 NOTE — OUTREACH NOTE
Prep Survey      Flowsheet Row Responses   [facility] facility patient discharged from? [facility]   Is LACE score < 7 ? No   Eligibility [facility]   Date of Admission 08/01/23   Date of Discharge 08/06/23   Discharge Disposition Home-Health Care Svc   Discharge diagnosis A-fib, dyspnea, edema   Does the patient have one of the following disease processes/diagnoses(primary or secondary)? Other   Does the patient have Home health ordered? Yes   What is the Home health agency?  [facility]    Is there a DME ordered? No   Prep survey completed? Yes            Sulema TORRES - Registered Nurse

## 2023-08-06 NOTE — DISCHARGE SUMMARY
DISCHARGE NOTE    Patient Name: Ann-Marie Hughes  Age/Sex: 77 y.o. female  : 1945  MRN: [id number]    Date of Discharge:  2023   Date of Admit: 2023  Encounter Provider: LONA Ayala  Place of Service: [facility] CARDIOLOGY  Patient Care Team:  Rochelle Brown APRN as PCP - General (Internal Medicine)    Subjective:     Discharge Diagnosis:    Atrial fibrillation      Hospital Course:   Ann-Marie Hughes is a 77 y.o. female who follows with Dr. Valera in [facility].      She saw him for the first time in 2023 with complaints of dyspnea for one month prior.      She had elevated BNP subsequent echo showed LVEF of 36-40%.  Stress test was indicative of high risk study.      She had left heart cath on  showing multi vessel CAD.      She was transferred to [facility] [place], operated by [facility] and underwent CABGx3 and LANE clip with Dr. Alexander on .     Postoperatively she had episodes of atrial fibrillation. We saw her and tried amiodarone but discontinued due to prolonged QT. Recommended beta blocker and anticoagulation. She was discharged on .                    Presented to [facility] ED on  with dyspnea and edema. Bilateral effusion. She was diuresed and improved.     WBC was elevated on admission, no identifiable source of infection. Now trending down.     Since admission A fib has been well controlled. She will go home on metoprolol and digoxin (EOD). She would like to follow up with Dr. Desir in 3-4 weeks post discharge. Recommend follow up in [facility] in 1-2 weeks for labs.     She had issues with anemia post op. Hematology evaluated. They have given the okay for discharge and resuming apixaban and recommended follow up with Dr. Can as outpatient in 10 days.     She also had DAVID. Nephrology saw. Improvement in creatinine and had cleared for discharge with  outpatient follow up.     She has been cleared for discharge by all consults. I will send her home on apixban 5mg BID, metoprolol 12.5mg BID, digoxin (EOD), and furosemide 40mg BID. She is on spirolactone and not receiving potassium supplement while here, will need BMP next week as outpatient.    Vital Signs  Temp:  [97.2 øF (36.2 øC)-97.8 øF (36.6 øC)] 97.5 øF (36.4 øC)  Heart Rate:  [53-63] 55  Resp:  [17-18] 18  BP: (129-155)/(45-85) 155/85    Intake/Output Summary (Last 24 hours) at 8/6/2023 1211  Last data filed at 8/6/2023 0807  Gross per 24 hour   Intake 840 ml   Output 1000 ml   Net -160 ml       Physical Exam:    General Appearance: No acute distress, well developed and well nourished.   Eyes: Conjunctiva and lids: No erythema, swelling, or discharge. Sclera non-icteric.   HENT: Atraumatic, normocephalic. External eyes, ears, and nose normal.   Respiratory: No signs of respiratory distress. Respiration rhythm and depth normal.   Clear to auscultation. No rales, crackles, rhonchi, or wheezing auscultated.   Cardiovascular:  Heart Rate and Rhythm: Normal, Heart Sounds: Normal S1 and S2. No S3 or S4 noted.  Gastrointestinal:  Abdomen soft, non-distended, non-tender.  Musculoskeletal: Normal movement of extremities  Skin: Warm and dry.   Psychiatric: Patient alert and oriented to person, place, and time. Speech and behavior appropriate. Normal mood and affect.    Labs:   Results from last 7 days   Lab Units 08/06/23  0313 08/05/23  0317 08/04/23  0314 08/03/23  0249 08/02/23  0847 08/01/23  0605 07/31/23  1413 07/31/23  1203   SODIUM mmol/L 139 140 139 140 142 137 134* 134*   POTASSIUM mmol/L 3.6 3.8 4.0 3.9 4.2 4.2 4.2 4.4   CHLORIDE mmol/L 102 102 102 104 102 101 100 100   CO2 mmol/L 28.0 27.0 28.2 26.0 25.6 24.0 20.8* 19.4*   BUN mg/dL 48* 53* 50* 50* 56* 60* 56* 58*   CREATININE mg/dL 1.09* 1.13* 1.31* 1.03* 1.21* 1.33* 1.20* 1.20*   GLUCOSE mg/dL 100* 104* 94 113* 176* 119* 140* 151*   CALCIUM mg/dL 9.1  8.8 8.9 8.9 9.3 8.5* 8.7 8.7   AST (SGOT) U/L  --   --   --   --  13  --  19 18   ALT (SGPT) U/L  --   --   --   --  11  --  12 12     Results from last 7 days   Lab Units 08/01/23  0605 07/31/23  1413 07/31/23  1203   HSTROP T ng/L 155* 143* 142*     Results from last 7 days   Lab Units 08/06/23  0313 08/05/23  0317 08/04/23  0314 08/03/23  0249 08/02/23  0847 08/01/23  0605 07/31/23  1203   WBC 10*3/mm3 16.77* 17.83* 20.45* 20.15* 17.27* 20.86* 25.50*   HEMOGLOBIN g/dL 8.5* 8.3* 8.7* 7.7* 8.1* 7.5* 8.2*   HEMATOCRIT % 25.9* 25.6* 26.1* 23.4* 24.5* 23.4* 24.9*   PLATELETS 10*3/mm3 342 342 353 344 365 341 376         Results from last 7 days   Lab Units 08/05/23 0317 08/04/23 0314 08/03/23  0249 08/02/23  0847 08/01/23  0605 07/31/23  1413   MAGNESIUM mg/dL 1.8 2.1 1.7 1.9 1.9 1.9         Results from last 7 days   Lab Units 07/31/23  1203   PROBNP pg/mL 27,235.0*           Discharge Diet:    Dietary Orders (From admission, onward)       Start     Ordered    08/02/23 0731  Diet: Cardiac Diets, Diabetic Diets; Healthy Heart (2-3 Na+); Consistent Carbohydrate; Texture: Regular Texture (IDDSI 7); Fluid Consistency: Thin (IDDSI 0)  Diet Effective Now        References:    Diet Order [place]   Question Answer Comment   Diets: Cardiac Diets    Diets: Diabetic Diets    Cardiac Diet: Healthy Heart (2-3 Na+)    Diabetic Diet: Consistent Carbohydrate    Texture: Regular Texture (IDDSI 7)    Fluid Consistency: Thin (IDDSI 0)        08/02/23 0732                      Activity at Discharge:      Discharge Medications     Discharge Medications        ASK your doctor about these medications        Instructions Start Date   acetaminophen 325 MG tablet  Commonly known as: TYLENOL   650 mg, Oral, Every 4 Hours PRN      albuterol sulfate  (90 Base) MCG/ACT inhaler  Commonly known as: PROVENTIL HFA;VENTOLIN HFA;PROAIR HFA   2 puffs, Inhalation, Every 4 Hours PRN      aspirin 81 MG EC tablet   81 mg, Oral, Daily       atorvastatin 40 MG tablet  Commonly known as: LIPITOR   40 mg, Oral, Nightly      buPROPion  MG 24 hr tablet  Commonly known as: WELLBUTRIN XL   300 mg, Oral, Daily      doxycycline 100 MG capsule  Commonly known as: MONODOX  Ask about: Should I take this medication?   100 mg, Oral, Every 12 Hours Scheduled      Eliquis 5 MG tablet tablet  Generic drug: apixaban   5 mg, Oral, Every 12 Hours Scheduled      Farxiga 10 MG tablet  Generic drug: dapagliflozin Propanediol   10 mg, Oral, Daily      furosemide 40 MG tablet  Commonly known as: Lasix   40 mg, Oral, Daily      metFORMIN  MG 24 hr tablet  Commonly known as: GLUCOPHAGE-XR   1,000 mg, Oral, Daily With Dinner      metFORMIN  MG 24 hr tablet  Commonly known as: GLUCOPHAGE-XR   TAKE 1 TABLET BY MOUTH EVERY MORNING AND 2 TABLETS AT SUPPER      metoprolol succinate XL 25 MG 24 hr tablet  Commonly known as: TOPROL-XL   12.5 mg, Oral, Every 12 Hours Scheduled      montelukast 10 MG tablet  Commonly known as: SINGULAIR   10 mg, Oral, Daily      PROBIOTIC PO   1 capsule, Oral, Daily      spironolactone 25 MG tablet  Commonly known as: ALDACTONE   25 mg, Oral, Daily      Synthroid 75 MCG tablet  Generic drug: levothyroxine   75 mcg, Oral, Daily               Discharge disposition: home    Follow-up Appointments   Contact information for follow-up providers       Bsoton Can MD Follow up.    Specialties: Hematology and Oncology, Oncology, Hematology  Why: please call office. recommended follow up in 10 days post discharge.  Contact information:  [address]  [place] [address]  [phone]               Shane Alexander MD Follow up.    Specialty: Cardiothoracic Surgery  Why: please call office for follow up appt.  Contact information:  [address]  [address]  [place] [address]  [phone]               Lamonte Desir MD Follow up in 4 week(s).    Specialties: Cardiology, Cardiac Electrophysiology  Why: the office  will call you for 4 week follow up appt.  Contact information:  [address]  [facility] [address] [phone]                       Contact information for after-discharge care       Home Medical Care       [facility] .    Service: Home Health Services  Contact information:  [address]  [facility] [address] [phone]                                 Future Appointments   Date Time Provider Department Center   8/16/2023 11:00 AM Emi Britt APRN [facility]   8/29/2023 10:15 AM Romario Valdez DO [facility]   8/30/2023  1:30 PM Clarence Johnson APRN [facility]  08/06/23  12:11 EDT

## 2023-08-06 NOTE — PROGRESS NOTES
Status post coronary bypass on 7/20/2023, readmitted with atrial fibrillation.  She also has moderate mitral valve regurgitation ischemic cardiomyopathy.    To be stable.  Pulmonary toileting.  Dyspnea resolved.  Ambulating well in hallways.  Oral diet as tolerated.  Nonoliguric.  Afebrile.    Continue leukocytosis and anemia; hematology service is planned to work her up as an outpatient.    Ambulating well with physical therapy.  Okay to discharge home from a surgical standpoint.

## 2023-08-07 ENCOUNTER — TELEPHONE (OUTPATIENT)
Dept: ONCOLOGY | Facility: CLINIC | Age: 78
End: 2023-08-07
Payer: MEDICARE

## 2023-08-07 ENCOUNTER — TRANSITIONAL CARE MANAGEMENT TELEPHONE ENCOUNTER (OUTPATIENT)
Dept: CALL CENTER | Facility: HOSPITAL | Age: 78
End: 2023-08-07
Payer: MEDICARE

## 2023-08-07 ENCOUNTER — TRANSCRIBE ORDERS (OUTPATIENT)
Dept: ADMINISTRATIVE | Facility: HOSPITAL | Age: 78
End: 2023-08-07
Payer: MEDICARE

## 2023-08-07 DIAGNOSIS — N17.9 ACUTE RENAL FAILURE, UNSPECIFIED ACUTE RENAL FAILURE TYPE: Primary | ICD-10-CM

## 2023-08-07 NOTE — CASE MANAGEMENT/SOCIAL WORK
Case Management Discharge Note      Final Note: Pt discharged home, 8/6/23 with continued [facility]....Denice GONZÁLES/FELICIA CM    Provided Post Acute Provider List?: Refused  Provided Post Acute Provider Quality & Resource List?: Refused    Selected Continued Care - Discharged on 8/6/2023 Admission date: 8/1/2023 - Discharge disposition: Home or Self Care      Destination    No services have been selected for the patient.                Durable Medical Equipment    No services have been selected for the patient.                Dialysis/Infusion    No services have been selected for the patient.                Home Medical Care Coordination complete.      Service Provider Selected Services Address Phone Fax Patient Preferred    [facility] - [facility] [address], [place] [address] [phone] --              Therapy    No services have been selected for the patient.                Community Resources    No services have been selected for the patient.                Community & DME    No services have been selected for the patient.                    Selected Continued Care - Prior Encounters Includes continued care and service providers with selected services from prior encounters from 5/3/2023 to 8/6/2023      Discharged on 7/28/2023 Admission date: 7/18/2023 - Discharge disposition: Home-Health Care Svc      Durable Medical Equipment       Service Provider Selected Services Address Phone Fax Patient Preferred    [facility] [address] [facility] [id number] [phone] --              Home Medical Care       Service Provider Selected Services Address Phone Fax Patient Preferred    [facility] - [facility] [address] [address] [phone] --                               Final Discharge Disposition Code:  06 - home with home health care

## 2023-08-07 NOTE — PROGRESS NOTES
Enter Query Response Below      Query Response: Type II MI ruled in secondary to systolic chf               If applicable, please update the problem list.       Patient: Ann-Marie Hughes        : 1945  Account: [id number]           Admit Date: 2023        How to Respond to this query:       a. Click New Note     b. Answer query within the yellow box.                c. Update the Problem List, if applicable.      If you have any questions about this query contact me at: [email or web address]     :    77 year old female admitted with shortness of breath.  Clinical indicators:  History of Coronary artery disease, CABG.  H&P assessment:  acute on chronic decompensated heart failure with reduced ejection fraction, EKG showed nonspecific ST-T wave changes in the lateral leads. Elevated troponin likely due to type II MI due to demand ischemia.  HSTROP T 155 (), 143, 142 ().   Cardiology consult assessment: Elevated troponins mild relatively flat in nature likely secondary to recent surgery as well as chronic renal failure and CHF issues feel any evidence of ACS type picture.  Discharge summary without mention of type II MI.  Treatments:  aspirin, lipitor ,Toprol XL, nitrostat prn.    Please clarify the following:    Type II MI ruled in  Type II MI ruled out  Other- specify______  Unable to determine      By submitting this query, we are merely seeking further clarification of documentation to accurately reflect all conditions that you are monitoring, evaluating, treating or that extend the hospitalization or utilize additional resources of care. Please utilize your independent clinical judgment when addressing the question(s) above.     This query and your response, once completed, will be entered into the legal medical record.    Sincerely,  Denice Castillo RN  Clinical Documentation Integrity Program

## 2023-08-07 NOTE — TELEPHONE ENCOUNTER
----- Message from Oliverio Galan sent at 8/7/2023  7:33 AM EDT -----    ----- Message -----  From: Dionna Reyes MD  Sent: 8/6/2023   9:17 AM EDT  To: [facility]  Pool    Please schedule appointment with Dr. Can in 10 days with CBC CMP, 2 unit appointment.  Patient is hospital return    Dionna Reyes MD

## 2023-08-07 NOTE — OUTREACH NOTE
Call Center TCM Note      Flowsheet Row Responses   [facility] patient discharged from? [facility]   Does the patient have one of the following disease processes/diagnoses(primary or secondary)? Other   TCM attempt successful? Yes   Call start time 0918   Call end time 0923   Discharge diagnosis A-fib, dyspnea, edema   Does the patient have all medications ordered at discharge? Yes   Is the patient taking all medications as directed (includes completed medication regime)? Yes   Does the patient have an appointment with their PCP within 7-14 days of discharge? No   Nursing Interventions Patient declined scheduling/rescheduling appointment at this time, Patient desires to follow up with specialty only   What is the Home health agency?  [facility]    Has home health visited the patient within 72 hours of discharge? Call prior to 72 hours   Home health comments HH is supposed to come today   Psychosocial issues? No   Did the patient receive a copy of their discharge instructions? Yes   Nursing interventions Reviewed instructions with patient   What is the patient's perception of their health status since discharge? Improving   Is the patient/caregiver able to teach back signs and symptoms related to disease process for when to call PCP? Yes   Is the patient/caregiver able to teach back signs and symptoms related to disease process for when to call 911? Yes   Is the patient/caregiver able to teach back the hierarchy of who to call/visit for symptoms/problems? PCP, Specialist, Home health nurse, Urgent Care, ED, 911 Yes   TCM call completed? Yes   Wrap up additional comments Doing well, all concerns addressed, states she does not wish to f/u with PCP at this time and will see her surgeon for a f/u.   Call end time 0923   Would this patient benefit from a Referral to Amb Social Work? No   Is the patient interested in additional calls from an ambulatory ? No            Michelle Beltre RN    8/7/2023,  09:23 EDT

## 2023-08-07 NOTE — PROGRESS NOTES
Enter Query Response Below      Query Response: Creatinine of 1.3 represents DAVID from baseline of 1.0             If applicable, please update the problem list.     Patient: Ann-Marie Hughes        : 1945  Account: [id number]           Admit Date:         How to Respond to this query:       a. Click New Note     b. Answer query within the yellow box.                c. Update the Problem List, if applicable.      If you have any questions about this query contact me at: [phone]     Dr. Parker:    77 year old female with CAD with CABG on  presented on  with shortness of breath.  Patient found to have acute on chronic heart failure.  Progress notes state DAVID.  Nephrology note states DAVID-renal function close ot her baseline (around 1.0).   Labs include:  23 BUN: 60 Creatinine: 1.33   23 BUN: 56 Creatinine: 1.21   23 BUN: 50 Creatinine: 1.03   23 BUN: 50 Creatinine: 1.31   23 BUN: 53 Creatinine: 1.13   23 BUN: 48 Creatinine: 1.09   IV fluids given and labs monitored.        After study, was acute kidney injury (DAVID) clinically supported during this admission?    Acute kidney injury (DAVID) was supported with additional clinical indicators:____________  Acute kidney injury (DAVID) was not supported  Other- specify_____________  Unable to determine     DAVID is defined by KDIGO as any of the following:  Increase in creatinine > 0.3 mg/dl within 48 hours or less; or   Increase in creatinine level to > 1.5x baseline (historical or measure), which is known or presumed to have occurred within the prior 7 days   Urine volume <0.5 ml/kg/h for 6 hours.  Reference article: http://www.kdigo.org/clinical_practice_guidelines/pdf/KDIGO%20AKI%20Guideline.pdf (as published in Kidney [employer] Supplements, The Official Journal of the International Society of Nephrology, vol. 2, issue 1, 2012.)      By submitting this query, we are merely seeking further clarification of  documentation to accurately reflect all conditions that you are monitoring, evaluating, treating or that extend the hospitalization or utilize additional resources of care. Please utilize your independent clinical judgment when addressing the question(s) above.     This query and your response, once completed, will be entered into the legal medical record.    Sincerely,  Mandy Hooker RN, MSN  Clinical Documentation Integrity Program   [email or web address]

## 2023-08-08 NOTE — PROGRESS NOTES
Enter Query Response Below      Query Response: MI did not occur within 28 days             If applicable, please update the problem list.     Patient: Ann-Marie Hughes        : 1945  Account: [id number]           Admit Date:         How to Respond to this query:       a. Click New Note     b. Answer query within the yellow box.                c. Update the Problem List, if applicable.      If you have any questions about this query contact me at: [phone]     Dr. Desir:    77 year old female with history of CAD with CABG on  presented on  with shortness of breath.  Patient found to have acute on chronic heart failure and atrial fibrillation.  Progress notes include NSTEMI.      Please clarify the status of the MI:    MI occurring within 28 days  MI did not occur within 28 days  Other- specify______  Unable to determine      By submitting this query, we are merely seeking further clarification of documentation to accurately reflect all conditions that you are monitoring, evaluating, treating or that extend the hospitalization or utilize additional resources of care. Please utilize your independent clinical judgment when addressing the question(s) above.     This query and your response, once completed, will be entered into the legal medical record.    Sincerely,  Mandy Hooker RN, MSN  Clinical Documentation Integrity Program   [email or web address]

## 2023-08-08 NOTE — PROGRESS NOTES
Enter Query Response Below      Query Response: Heart failure due to hypertension, ischemic cardiomyopathy and valvular disease             If applicable, please update the problem list.     Patient: Ann-Marie Hughes        : 1945  Account: [id number]           Admit Date:         How to Respond to this query:       a. Click New Note     b. Answer query within the yellow box.                c. Update the Problem List, if applicable.      If you have any questions about this query contact me at: [phone]     Dr. Desir:    77 year old female with history of congestive heart failure, hypertension, CAD with CABG on  presented on  with shortness of breath.  Patient found to have acute on chronic heart failure and atrial fibrillation.  Progress notes indicate that patient also has moderate mitral valve regurgitation and ischemic cardiomyopathy.  Echo summary included small pericardial effusion and mild to moderate mitral valve regurgitation is present.  Patient given furosemide IV.     Please clarify the etiology of the patient's heart failure:    Heart failure due to hypertension  Heart failure due to ischemic cardiomyopathy  Heart failure due to valvular disease  Heart failure due to hypertension, ischemic cardiomyopathy and valvular disease  Other- specify__________  Unable to determine      By submitting this query, we are merely seeking further clarification of documentation to accurately reflect all conditions that you are monitoring, evaluating, treating or that extend the hospitalization or utilize additional resources of care. Please utilize your independent clinical judgment when addressing the question(s) above.     This query and your response, once completed, will be entered into the legal medical record.    Sincerely,  Mandy Hooker RN, MSN  Clinical Documentation Integrity Program   [email or web address]

## 2023-08-09 LAB
ALBUMIN SERPL ELPH-MCNC: 3.5 G/DL (ref 2.9–4.4)
ALBUMIN/GLOB SERPL: 1.5 {RATIO} (ref 0.7–1.7)
ALPHA1 GLOB SERPL ELPH-MCNC: 0.3 G/DL (ref 0–0.4)
ALPHA2 GLOB SERPL ELPH-MCNC: 0.7 G/DL (ref 0.4–1)
B-GLOBULIN SERPL ELPH-MCNC: 0.6 G/DL (ref 0.7–1.3)
GAMMA GLOB SERPL ELPH-MCNC: 0.9 G/DL (ref 0.4–1.8)
GLOBULIN SER-MCNC: 2.5 G/DL (ref 2.2–3.9)
IGA SERPL-MCNC: 63 MG/DL (ref 64–422)
IGG SERPL-MCNC: 894 MG/DL (ref 586–1602)
IGM SERPL-MCNC: 92 MG/DL (ref 26–217)
INTERPRETATION SERPL IEP-IMP: ABNORMAL
LABORATORY COMMENT REPORT: ABNORMAL
M PROTEIN SERPL ELPH-MCNC: ABNORMAL G/DL
PROT SERPL-MCNC: 6 G/DL (ref 6–8.5)

## 2023-08-09 NOTE — PROGRESS NOTES
"Enter Query Response Below      Query Response: NSTEMI ruled out, CAD only              If applicable, please update the problem list.     Patient: Ann-Marie Hughes        : 1945  Account: [id number]           Admit Date: 2023        How to Respond to this query:       a. Click New Note     b. Answer query within the yellow box.                c. Update the Problem List, if applicable.      If you have any questions about this query contact me at: [email or web address]     Dr. Alexander    77-year-old female admitted 23 for CABG x3. The op note documents \"N7-STEMI and Multivessel CAD\". Nephrology progress notes document \"NSTEMI\", the dc summary documents \"Severe multivessel CAD\".  cardiac cath at [facility] notes \"Mid LAD has a focal lesion which is angiographically 70% severity. Some calcifications noted in this lesion.\", \"Mid left circumflex was artery has a 95% focal lesion which compensates blood supply to the terminal OM branch.\", \"Proximal RCA has a focal lesion which is angiographically 80% severity. Mid RCA is subtotally occluded with a lesion angiographically 99% severity with heavy calcification.\" Troponins were not drawn at [facility].     Please clarify the type of NSTEMI the patient was treated/monitored for:    Type 1 MI due to ______(please specify- plaque erosion, rupture, fissure or thrombus)  Type 2 MI due to fixed CAD  NSTEMI ruled out, CAD only  Other- specify______  Unable to determine    By submitting this query, we are merely seeking further clarification of documentation to accurately reflect all conditions that you are monitoring, evaluating, treating or that extend the hospitalization or utilize additional resources of care. Please utilize your independent clinical judgment when addressing the question(s) above.     This query and your response, once completed, will be entered into the legal medical record.    Sincerely,  Aria SAXENA, RN, " CCDS  Clinical Documentation Integrity Program

## 2023-08-14 ENCOUNTER — TELEPHONE (OUTPATIENT)
Dept: ONCOLOGY | Facility: CLINIC | Age: 78
End: 2023-08-14
Payer: MEDICARE

## 2023-08-14 DIAGNOSIS — D72.829 LEUKOCYTOSIS, UNSPECIFIED TYPE: Primary | ICD-10-CM

## 2023-08-14 NOTE — TELEPHONE ENCOUNTER
Caller: EllenAlexander    Relationship to patient: Emergency Contact    Best call back number: [phone]    Type of visit: F/U 2    Requested date: CALL TO R/S, WOULD LIKE TO BE SEEN ON 8/16/2023    If rescheduling, when is the original appointment: 8/18/2023      CLINICAL:   PATIENT SON ALEXANDER CALLING HE WAS LOOKING AT HAVING [facility] TAKE HER LABS, IF SO YOU MAY NEED TO ADD LABS?

## 2023-08-14 NOTE — TELEPHONE ENCOUNTER
Called Joby to let him know that I am not sure what Juan José Barrios schedule looks like, but that I could ask lona to look. Patient has three appts this week and is not strong enough per son to make all three all different days. He stated she had one 8/16 at 1pm and one 8/17 at 10:30am. This information was sent to Lona. He also asked if lab orders could be sent to [facility]. I let him know I could take care of this. Joby v/u.

## 2023-08-15 ENCOUNTER — TELEPHONE (OUTPATIENT)
Dept: INTERNAL MEDICINE | Facility: CLINIC | Age: 78
End: 2023-08-15
Payer: MEDICARE

## 2023-08-15 NOTE — TELEPHONE ENCOUNTER
PATIENTS SON CALLING BACK TO SPEAK TO AMIRA ABOUT RESCHEDULING PATIENTS APPOINTMENT.  PLEASE CALL TO ADVISE.

## 2023-08-15 NOTE — TELEPHONE ENCOUNTER
ANDRE:  Drea NATION from [employer] [facility] called. States the patient is declining therapy from [facility] at this time as she feels she is doing pretty well, and if she needs therapy she has a niece that is a therapist and she will just contact her.

## 2023-08-16 ENCOUNTER — OFFICE VISIT (OUTPATIENT)
Dept: CARDIAC SURGERY | Facility: CLINIC | Age: 78
End: 2023-08-16
Payer: MEDICARE

## 2023-08-16 VITALS
SYSTOLIC BLOOD PRESSURE: 138 MMHG | RESPIRATION RATE: 16 BRPM | DIASTOLIC BLOOD PRESSURE: 81 MMHG | OXYGEN SATURATION: 99 % | HEART RATE: 57 BPM | BODY MASS INDEX: 32.27 KG/M2 | TEMPERATURE: 96.9 F | HEIGHT: 64 IN | WEIGHT: 189 LBS

## 2023-08-16 DIAGNOSIS — Z95.1 S/P CABG (CORONARY ARTERY BYPASS GRAFT): Primary | ICD-10-CM

## 2023-08-16 PROCEDURE — 1159F MED LIST DOCD IN RCRD: CPT | Performed by: NURSE PRACTITIONER

## 2023-08-16 PROCEDURE — 3075F SYST BP GE 130 - 139MM HG: CPT | Performed by: NURSE PRACTITIONER

## 2023-08-16 PROCEDURE — 3079F DIAST BP 80-89 MM HG: CPT | Performed by: NURSE PRACTITIONER

## 2023-08-16 PROCEDURE — 1160F RVW MEDS BY RX/DR IN RCRD: CPT | Performed by: NURSE PRACTITIONER

## 2023-08-16 PROCEDURE — 99024 POSTOP FOLLOW-UP VISIT: CPT | Performed by: NURSE PRACTITIONER

## 2023-08-16 NOTE — PROGRESS NOTES
"CARDIOVASCULAR SURGERY FOLLOW-UP PROGRESS NOTE  Chief Complaint: post op        HPI:   Dear Rochelle Chong APRN and colleagues:    It was nice to see Ann-Marie Hughes in follow up 4 weeks after surgery.  As you know, she is a 77 y.o. female with NSTEMI, CAD, who underwent off pump CABGx3, left appendage ligation by Dr. Alexander. She did well postoperatively and continues to do well. She comes in today complaining of nothing.  Her activity level has been good.  There is one chest tube site that is taking longer to scab over, she has large breast and I have instructed her to not cover with any bandage and to try and keep her breast off of the chest tube site to allow it to heal.  Will have her paint her incisions with betadine to help it scab over and dry up. Her other incisions look well approximated and are healing well.  She denies popping or clicking with coughing or with deep inspiration.  She has not followed up with cardiology yet but will ask our office to reach out to Dr. Best's office to schedule a follow up with him.        Physical Exam:         /81 (BP Location: Left arm, Patient Position: Sitting, Cuff Size: Adult)   Pulse 57   Temp 96.9 øF (36.1 øC)   Resp 16   Ht 162.6 cm (64\")   Wt 85.7 kg (189 lb)   LMP  (LMP Unknown)   SpO2 99%   BMI 32.44 kg/mý   Heart:  regularly irregular rhythm, no murmurs  Lungs:  clear to auscultation bilaterally  Extremities:  no edema  Incision(s):  sternum stable, incisions healing    Assessment/Plan:     S/P CABG. Overall, she is doing well.    No significant post-op complications  Slow post-op rehabilitation progress    No heavy lifting > 10 pounds for 2 more weeks, no more than 50 lbs for 3 months   Keep incisions clean and dry  OK to begin cardiac rehab  Follow-up as scheduled with cardiology  Follow-up as scheduled with PCP      Thank you for allowing me to participate in the care of your   patient.  Regards,  LONA Vera    "

## 2023-08-16 NOTE — LETTER
"August 16, 2023       No Recipients    Patient: Ann-Marie Hughes   YOB: 1945   Date of Visit: 8/16/2023     Dear LONA Rubi:       Thank you for referring Ann-Marie Hughes to me for evaluation. Below are the relevant portions of my assessment and plan of care.    If you have questions, please do not hesitate to call me. I look forward to following Ann-Marie along with you.         Sincerely,        LONA Vera        CC:   No Recipients    Melissa Corral APRN  08/16/23 1357  Sign when Signing Visit  CARDIOVASCULAR SURGERY FOLLOW-UP PROGRESS NOTE  Chief Complaint: post op        HPI:   Dear Rochelle Chong APRN and colleagues:    It was nice to see Ann-Marie Hughes in follow up 4 weeks after surgery.  As you know, she is a 77 y.o. female with NSTEMI, CAD, who underwent off pump CABGx3, left appendage ligation by Dr. Alexander. She did well postoperatively and continues to do well. She comes in today complaining of nothing.  Her activity level has been good.  There is one chest tube site that is taking longer to scab over, she has large breast and I have instructed her to not cover with any bandage and to try and keep her breast off of the chest tube site to allow it to heal.  Will have her paint her incisions with betadine to help it scab over and dry up. Her other incisions look well approximated and are healing well.  She denies popping or clicking with coughing or with deep inspiration.  She has not followed up with cardiology yet but will ask our office to reach out to Dr. Best's office to schedule a follow up with him.        Physical Exam:         /81 (BP Location: Left arm, Patient Position: Sitting, Cuff Size: Adult)   Pulse 57   Temp 96.9 øF (36.1 øC)   Resp 16   Ht 162.6 cm (64\")   Wt 85.7 kg (189 lb)   LMP  (LMP Unknown)   SpO2 99%   BMI 32.44 kg/mý   Heart:  regularly irregular rhythm, no murmurs  Lungs:  clear to auscultation " bilaterally  Extremities:  no edema  Incision(s):  sternum stable, incisions healing    Assessment/Plan:     S/P CABG. Overall, she is doing well.    No significant post-op complications  Slow post-op rehabilitation progress    No heavy lifting > 10 pounds for 2 more weeks, no more than 50 lbs for 3 months   Keep incisions clean and dry  OK to begin cardiac rehab  Follow-up as scheduled with cardiology  Follow-up as scheduled with PCP      Thank you for allowing me to participate in the care of your   patient.  Regards,  Melissa Zaman, APRN

## 2023-08-17 ENCOUNTER — OFFICE VISIT (OUTPATIENT)
Dept: ONCOLOGY | Facility: CLINIC | Age: 78
End: 2023-08-17
Payer: MEDICARE

## 2023-08-17 ENCOUNTER — LAB (OUTPATIENT)
Dept: LAB | Facility: HOSPITAL | Age: 78
End: 2023-08-17
Payer: MEDICARE

## 2023-08-17 VITALS
HEART RATE: 59 BPM | DIASTOLIC BLOOD PRESSURE: 73 MMHG | OXYGEN SATURATION: 97 % | TEMPERATURE: 97.7 F | HEIGHT: 64 IN | SYSTOLIC BLOOD PRESSURE: 120 MMHG | RESPIRATION RATE: 16 BRPM | BODY MASS INDEX: 31.65 KG/M2 | WEIGHT: 185.4 LBS

## 2023-08-17 DIAGNOSIS — N17.9 ACUTE RENAL FAILURE, UNSPECIFIED ACUTE RENAL FAILURE TYPE: Primary | ICD-10-CM

## 2023-08-17 DIAGNOSIS — D50.8 IRON DEFICIENCY ANEMIA SECONDARY TO INADEQUATE DIETARY IRON INTAKE: Primary | ICD-10-CM

## 2023-08-17 DIAGNOSIS — N18.31 CHRONIC KIDNEY DISEASE (CKD) STAGE G3A/A1, MODERATELY DECREASED GLOMERULAR FILTRATION RATE (GFR) BETWEEN 45-59 ML/MIN/1.73 SQUARE METER AND ALBUMINURIA CREATININE RATIO LESS THAN 30 MG/G (CMS/H*: ICD-10-CM

## 2023-08-17 DIAGNOSIS — D72.829 LEUKOCYTOSIS, UNSPECIFIED TYPE: ICD-10-CM

## 2023-08-17 DIAGNOSIS — D59.10 HEMOLYTIC ANEMIA DUE TO ANTIBODY: ICD-10-CM

## 2023-08-17 LAB
ALBUMIN SERPL-MCNC: 4.4 G/DL (ref 3.5–5.2)
ANION GAP SERPL CALCULATED.3IONS-SCNC: 19.1 MMOL/L (ref 5–15)
BASOPHILS # BLD AUTO: 0.05 10*3/MM3 (ref 0–0.2)
BASOPHILS NFR BLD AUTO: 0.4 % (ref 0–1.5)
BUN SERPL-MCNC: 50 MG/DL (ref 8–23)
BUN/CREAT SERPL: 29.1 (ref 7–25)
CALCIUM SPEC-SCNC: 8.8 MG/DL (ref 8.6–10.5)
CHLORIDE SERPL-SCNC: 97 MMOL/L (ref 98–107)
CO2 SERPL-SCNC: 25.9 MMOL/L (ref 22–29)
CREAT SERPL-MCNC: 1.72 MG/DL (ref 0.6–1.1)
DEPRECATED RDW RBC AUTO: 77.7 FL (ref 37–54)
EGFRCR SERPLBLD CKD-EPI 2021: 30.1 ML/MIN/1.73
EOSINOPHIL # BLD AUTO: 0.14 10*3/MM3 (ref 0–0.4)
EOSINOPHIL NFR BLD AUTO: 1.2 % (ref 0.3–6.2)
ERYTHROCYTE [DISTWIDTH] IN BLOOD BY AUTOMATED COUNT: 22.2 % (ref 12.3–15.4)
GLUCOSE SERPL-MCNC: 90 MG/DL (ref 65–99)
HCT VFR BLD AUTO: 29.6 % (ref 34–46.6)
HGB BLD-MCNC: 9.5 G/DL (ref 12–15.9)
HGB RETIC QN AUTO: 34.7 PG (ref 29.8–36.1)
IMM GRANULOCYTES # BLD AUTO: 0.06 10*3/MM3 (ref 0–0.05)
IMM GRANULOCYTES NFR BLD AUTO: 0.5 % (ref 0–0.5)
IMM RETICS NFR: 33.3 % (ref 3–15.8)
LYMPHOCYTES # BLD AUTO: 3.42 10*3/MM3 (ref 0.7–3.1)
LYMPHOCYTES NFR BLD AUTO: 30.1 % (ref 19.6–45.3)
MCH RBC QN AUTO: 31.6 PG (ref 26.6–33)
MCHC RBC AUTO-ENTMCNC: 32.1 G/DL (ref 31.5–35.7)
MCV RBC AUTO: 98.3 FL (ref 79–97)
MONOCYTES # BLD AUTO: 1.24 10*3/MM3 (ref 0.1–0.9)
MONOCYTES NFR BLD AUTO: 10.9 % (ref 5–12)
NEUTROPHILS NFR BLD AUTO: 56.9 % (ref 42.7–76)
NEUTROPHILS NFR BLD AUTO: 6.47 10*3/MM3 (ref 1.7–7)
NRBC BLD AUTO-RTO: 1.2 /100 WBC (ref 0–0.2)
PHOSPHATE SERPL-MCNC: 4.4 MG/DL (ref 2.5–4.5)
PLATELET # BLD AUTO: 311 10*3/MM3 (ref 140–450)
PMV BLD AUTO: 10 FL (ref 6–12)
POTASSIUM SERPL-SCNC: 3.4 MMOL/L (ref 3.5–5.2)
RBC # BLD AUTO: 3.01 10*6/MM3 (ref 3.77–5.28)
RETICS # AUTO: 0.09 10*6/MM3 (ref 0.02–0.13)
RETICS/RBC NFR AUTO: 3.09 % (ref 0.7–1.9)
SODIUM SERPL-SCNC: 142 MMOL/L (ref 136–145)
WBC NRBC COR # BLD: 11.38 10*3/MM3 (ref 3.4–10.8)

## 2023-08-17 PROCEDURE — 85046 RETICYTE/HGB CONCENTRATE: CPT

## 2023-08-17 PROCEDURE — 88182 CELL MARKER STUDY: CPT

## 2023-08-17 PROCEDURE — 80069 RENAL FUNCTION PANEL: CPT

## 2023-08-17 PROCEDURE — 85025 COMPLETE CBC W/AUTO DIFF WBC: CPT

## 2023-08-17 PROCEDURE — 88184 FLOWCYTOMETRY/ TC 1 MARKER: CPT

## 2023-08-17 PROCEDURE — 36415 COLL VENOUS BLD VENIPUNCTURE: CPT

## 2023-08-17 PROCEDURE — 88185 FLOWCYTOMETRY/TC ADD-ON: CPT

## 2023-08-17 RX ORDER — PREDNISONE 10 MG/1
10 TABLET ORAL DAILY
Qty: 30 TABLET | Refills: 1 | Status: SHIPPED | OUTPATIENT
Start: 2023-08-17

## 2023-08-17 RX ORDER — MULTIPLE VITAMINS W/ MINERALS TAB 9MG-400MCG
1 TAB ORAL DAILY
COMMUNITY

## 2023-08-17 RX ORDER — FOLIC ACID 1 MG/1
1 TABLET ORAL DAILY
Qty: 30 TABLET | Refills: 1 | Status: SHIPPED | OUTPATIENT
Start: 2023-08-17

## 2023-08-17 NOTE — PROGRESS NOTES
Subjective         REASONS FOR FOLLOWUP:    1. LEUKOCYTOSIS , NEUTROPHILS HAVE NO GRANULES  2. ANEMIA FOR 3 YEARS,NORMAL MCV: VERY ATYPICAL RED BLOOD CELL MORPHOLOGY  3. CHF AND PULMONARY EDEMA , CAD, ELEVATED CREATININE.    HISTORY OF PRESENT ILLNESS:     On 08/04/2023 I had the opportunity to see this 77-year-old female who has been admitted to the hospital in pulmonary edema, congestive heart failure, associated with cardiac disease. She has undergone recently a cardiac bypass. We have been consulted because the patient has been noticed to have leukocytosis, new issue for her in absence of any other fever or infection and she also has been noticed to have anemia. On further questioning to the patient her primary care physician in [place] has told her for many months that she has been anemic but no specific investigation has been made about that that she is aware of. She has no notion of passage of blood in the stool, no hematemesis, no melena, no hematuria, no vaginal bleeding. Her diet is complete in nutrients. She has lost some weight because she has been very fatigued lately. The fatigue was associated with her heart operation but even before that she was very tired. The patient also states that she has not had any definitive infection even though she had pneumonia  weeks before her cardiac operation but she got better from this. She has not had any fevers, chills, night sweats, pruritus, adenopathies or rashes. She complains of some shortness of breath upon exertion but this is dramatically better since diuresis was established for the treatment of her CHF. Urinary output has been abundant. She has no difficulty with miction. Her bowel activity has been normal. Her surgical sites in her leg have had some ecchymosis and swelling and she has had significant fluid accumulation in her legs.      The patient has no previous history of hematological disorder. She is not aware of any organ enlargement or  anything of this nature.  The patient returned to the office on 08/17/2023 in company of her  and her son. In the interim she was discharged from [facility] a few days ago after her cardiac surgery and her CHF and she has been at home with no appetite whatsoever and significant fatigue. Her appetite is minimum. Her blood sugar has been in the 120 category. She has not had any nausea, vomiting, diarrhea, melena, enterorrhagia, abdominal pain. Urination is normal in volume, urine is normal color. She has minimal cough and some shortness of breath. She has no energy for anything. She requires total care for bathing, dressing and so forth by the son and . She has not had any fever, chills or infection. Her surgical sites in the chest wall and her lower extremities are all dry with no evidence of infection.       Past Medical History:   Diagnosis Date    Acne rosacea 08/17/2021    DR.JEFF MOON     Arteriosclerosis of abdominal aorta     B12 deficiency 08/17/2021    DJD (degenerative joint disease)     Hx of smoking 08/17/2021    QUIT IN 1976 AT AGE 31    Hyperlipidemia 08/17/2021    Hypertension     Hypothyroidism     Metabolic syndrome 08/17/2021    KAREN (stress urinary incontinence, female) 08/17/2021    Type 2 diabetes mellitus     UTI (urinary tract infection) 08/17/2021    Vitamin D deficiency 08/17/2021        Past Surgical History:   Procedure Laterality Date    BACK SURGERY  2013    CARDIAC CATHETERIZATION N/A 07/17/2023    Procedure: Left Heart Cath;  Surgeon: Dinh Andres MD;  Location: [facility] CATH INVASIVE LOCATION;  Service: Cardiovascular;  Laterality: N/A;    CARPAL TUNNEL RELEASE      COLONOSCOPY  2011    CORONARY ARTERY BYPASS GRAFT WITH MITRAL VALVE REPAIR/REPLACEMENT N/A 07/20/2023    Procedure: REZA STERNOTOMY OFF-PUMP CORONARY ARTERY BYPASS GRAFT TIMES        USING LEFFT INTERNAL MAMMARY ARTERY AND     GREATER SAPHENOUS VEIN GRAFT PER EMDOSCOPIC VEIN HARVESTING, MAZE  PROCEDURE AND PRP;  Surgeon: Shane Alexander MD;  Location: [facility];  Service: Cardiothoracic;  Laterality: N/A;    KNEE SURGERY      LUMBAR DISCECTOMY      NECK SURGERY      Cervical    POSTERIOR LUMBAR/THORACIC SPINE FUSION          Current Outpatient Medications on File Prior to Visit   Medication Sig Dispense Refill    acetaminophen (TYLENOL) 325 MG tablet Take 2 tablets by mouth Every 4 (Four) Hours As Needed for Mild Pain.      albuterol sulfate  (90 Base) MCG/ACT inhaler Inhale 2 puffs Every 4 (Four) Hours As Needed for Wheezing. 18 g 0    apixaban (ELIQUIS) 5 MG tablet tablet Take 1 tablet by mouth Every 12 (Twelve) Hours. Indications: Atrial Fibrillation 60 tablet 2    aspirin 81 MG EC tablet Take 1 tablet by mouth Daily.      atorvastatin (LIPITOR) 40 MG tablet Take 1 tablet by mouth Every Night for 90 days. 90 tablet 0    buPROPion XL (WELLBUTRIN XL) 300 MG 24 hr tablet TAKE 1 TABLET BY MOUTH DAILY 90 tablet 1    dapagliflozin Propanediol (Farxiga) 10 MG tablet Take 10 mg by mouth Daily. 30 tablet 3    furosemide (LASIX) 40 MG tablet Take 1 tablet by mouth 2 (Two) Times a Day. 60 tablet 2    metFORMIN ER (GLUCOPHAGE-XR) 500 MG 24 hr tablet TAKE 1 TABLET BY MOUTH EVERY MORNING AND 2 TABLETS AT SUPPER 270 tablet 4    metoprolol succinate XL (TOPROL-XL) 25 MG 24 hr tablet Take 0.5 tablets by mouth Every 12 (Twelve) Hours for 90 days. (Patient taking differently: Take 0.5 tablets by mouth Every 12 (Twelve) Hours. 12.5mg in AM and 12.5mg in Evening) 30 tablet 2    montelukast (SINGULAIR) 10 MG tablet Take 1 tablet by mouth Daily. 90 tablet 3    multivitamin with minerals (MULTIVITAMIN WOMEN 50+ PO) Take 1 tablet by mouth Daily.      spironolactone (ALDACTONE) 25 MG tablet Take 1 tablet by mouth Daily. 90 tablet 3    Synthroid 75 MCG tablet Take 1 tablet by mouth Daily. 90 tablet 3    digoxin (LANOXIN) 125 MCG tablet Take 1 tablet by mouth Every Other Day. 30 tablet 2    metFORMIN ER  "(GLUCOPHAGE-XR) 500 MG 24 hr tablet Take 2 tablets by mouth Daily With Dinner.      Probiotic Product (PROBIOTIC PO) Take 1 capsule by mouth Daily.       No current facility-administered medications on file prior to visit.        ALLERGIES:    Allergies   Allergen Reactions    Penicillins Palpitations     1. When was your reaction? > 10 years ago  2. Did your reaction happen after the first dose or after several doses? After first dose  3. Did your reaction require ED or hospital care to manage your reaction? Do not know  4. Did your reaction require treatment with epinephrine? Do not know  5. Have you taken amoxicillin (Amoxil) or amoxicillin-clavulanate (Augmentin) without issue since? Yes  6. Have you taken cephalexin (Keflex) without issue since?  Do not know         Social History     Socioeconomic History    Marital status:      Spouse name: Dinh   Tobacco Use    Smoking status: Former     Packs/day: 1.00     Years: 12.00     Pack years: 12.00     Types: Cigarettes     Quit date:      Years since quittin.6    Smokeless tobacco: Never   Vaping Use    Vaping Use: Never used   Substance and Sexual Activity    Alcohol use: Never    Drug use: Never    Sexual activity: Defer        Family History   Problem Relation Age of Onset    Heart disease Mother     Arthritis Mother     Heart attack Mother     Arthritis Father         Objective     Vitals:    23 1309   BP: 120/73   Pulse: 59   Resp: 16   Temp: 97.7 øF (36.5 øC)   TempSrc: Temporal   SpO2: 97%   Weight: 84.1 kg (185 lb 6.4 oz)   Height: 162.6 cm (64.02\")   PainSc: 0-No pain         2023    12:59 PM   Current Status   ECOG score 2       Physical Exam        GENERAL:  Well-developed, Patient  in no acute distress. In a wheel chair  SKIN:  Warm, dry ,NO purpura ,no rash.very pale minimal jaundice  HEENT:  Pupils were equal and reactive to light and accomodation, conjunctivae noninjected,  normal visual acuity.   NECK:  Supple with good " range of motion; no thyromegaly , no JVD or bruits,.No carotid artery pain, no carotid abnormal pulsation   LYMPHATICS:  No cervical, NO supraclavicular, NO axillary, NO inguinal adenopathies.  CARDIAC   normal rate , regular rhythm, without murmur,NO rubs NO S3 NO S4   LUNGS: decreased breath sounds bilateral, no wheezing, NO rhonchi, NO crackles ,NO rubs.  VASCULAR VENOUS: no cyanosis, NO collateral circulation, NO varicosities, NO edema, NO palpable cords, NO pain,NO erythema, NO pigmentation of the skin.Surgical sites in both legs are well healed with minimal ecchymosis surrounding them but no erythema or evidence of infection.       ABDOMEN:  Soft, NO pain,no hepatomegaly, no splenomegaly,no masses, no ascites, no collateral circulation,no distention.  EXTREMITIES  AND SPINE:  No clubbing, no cyanosis ,no deformities , no pain .No kyphosis,  no pain in spine, no pain in ribs , no pain in pelvic bone.  NEUROLOGICAL:  Patient was awake, alert, oriented to time, person and place.      RECENT LABS:  Hematology WBC   Date Value Ref Range Status   08/17/2023 11.38 (H) 3.40 - 10.80 10*3/mm3 Final     RBC   Date Value Ref Range Status   08/17/2023 3.01 (L) 3.77 - 5.28 10*6/mm3 Final     Hemoglobin   Date Value Ref Range Status   08/17/2023 9.5 (L) 12.0 - 15.9 g/dL Final     Hematocrit   Date Value Ref Range Status   08/17/2023 29.6 (L) 34.0 - 46.6 % Final     Platelets   Date Value Ref Range Status   08/17/2023 311 140 - 450 10*3/mm3 Final       CBC:    WBC   Date Value Ref Range Status   08/17/2023 11.38 (H) 3.40 - 10.80 10*3/mm3 Final     RBC   Date Value Ref Range Status   08/17/2023 3.01 (L) 3.77 - 5.28 10*6/mm3 Final     Hemoglobin   Date Value Ref Range Status   08/17/2023 9.5 (L) 12.0 - 15.9 g/dL Final     Hematocrit   Date Value Ref Range Status   08/17/2023 29.6 (L) 34.0 - 46.6 % Final     MCV   Date Value Ref Range Status   08/17/2023 98.3 (H) 79.0 - 97.0 fL Final     MCH   Date Value Ref Range Status    08/17/2023 31.6 26.6 - 33.0 pg Final     MCHC   Date Value Ref Range Status   08/17/2023 32.1 31.5 - 35.7 g/dL Final     RDW   Date Value Ref Range Status   08/17/2023 22.2 (H) 12.3 - 15.4 % Final     RDW-SD   Date Value Ref Range Status   08/17/2023 77.7 (H) 37.0 - 54.0 fl Final     MPV   Date Value Ref Range Status   08/17/2023 10.0 6.0 - 12.0 fL Final     Platelets   Date Value Ref Range Status   08/17/2023 311 140 - 450 10*3/mm3 Final     Neutrophil %   Date Value Ref Range Status   08/17/2023 56.9 42.7 - 76.0 % Final     Lymphocyte %   Date Value Ref Range Status   08/17/2023 30.1 19.6 - 45.3 % Final     Monocyte %   Date Value Ref Range Status   08/17/2023 10.9 5.0 - 12.0 % Final     Eosinophil %   Date Value Ref Range Status   08/17/2023 1.2 0.3 - 6.2 % Final     Basophil %   Date Value Ref Range Status   08/17/2023 0.4 0.0 - 1.5 % Final     Immature Grans %   Date Value Ref Range Status   08/17/2023 0.5 0.0 - 0.5 % Final     Neutrophils, Absolute   Date Value Ref Range Status   08/17/2023 6.47 1.70 - 7.00 10*3/mm3 Final     Lymphocytes, Absolute   Date Value Ref Range Status   08/17/2023 3.42 (H) 0.70 - 3.10 10*3/mm3 Final     Monocytes, Absolute   Date Value Ref Range Status   08/17/2023 1.24 (H) 0.10 - 0.90 10*3/mm3 Final     Eosinophils, Absolute   Date Value Ref Range Status   08/17/2023 0.14 0.00 - 0.40 10*3/mm3 Final     Basophils, Absolute   Date Value Ref Range Status   08/17/2023 0.05 0.00 - 0.20 10*3/mm3 Final     Immature Grans, Absolute   Date Value Ref Range Status   08/17/2023 0.06 (H) 0.00 - 0.05 10*3/mm3 Final     nRBC   Date Value Ref Range Status   08/17/2023 1.2 (H) 0.0 - 0.2 /100 WBC Final        CMP:    Glucose   Date Value Ref Range Status   08/17/2023 90 65 - 99 mg/dL Final     BUN   Date Value Ref Range Status   08/17/2023 50 (H) 8 - 23 mg/dL Final     Creatinine   Date Value Ref Range Status   08/17/2023 1.72 (H) 0.60 - 1.10 mg/dL Final     Sodium   Date Value Ref Range Status    08/17/2023 142 136 - 145 mmol/L Final     Potassium   Date Value Ref Range Status   08/17/2023 3.4 (L) 3.5 - 5.2 mmol/L Final     Chloride   Date Value Ref Range Status   08/17/2023 97 (L) 98 - 107 mmol/L Final     CO2   Date Value Ref Range Status   08/17/2023 25.9 22.0 - 29.0 mmol/L Final     Calcium   Date Value Ref Range Status   08/17/2023 8.8 8.6 - 10.5 mg/dL Final     Total Protein   Date Value Ref Range Status   08/02/2023 5.9 (L) 6.0 - 8.5 g/dL Final     Albumin   Date Value Ref Range Status   08/17/2023 4.4 3.5 - 5.2 g/dL Final     ALT (SGPT)   Date Value Ref Range Status   08/02/2023 11 1 - 33 U/L Final     AST (SGOT)   Date Value Ref Range Status   08/02/2023 13 1 - 32 U/L Final     Alkaline Phosphatase   Date Value Ref Range Status   08/02/2023 54 39 - 117 U/L Final     Total Bilirubin   Date Value Ref Range Status   08/02/2023 1.3 (H) 0.0 - 1.2 mg/dL Final     Globulin   Date Value Ref Range Status   08/02/2023 2.4 gm/dL Final     A/G Ratio   Date Value Ref Range Status   08/02/2023 1.5 g/dL Final     BUN/Creatinine Ratio   Date Value Ref Range Status   08/17/2023 29.1 (H) 7.0 - 25.0 Final     Anion Gap   Date Value Ref Range Status   08/17/2023 19.1 (H) 5.0 - 15.0 mmol/L Final               Assessment & Plan     Diagnoses and all orders for this visit:    1. 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  Dispense: 30 tablet; Refill: 1  -     folic acid (FOLVITE) 1 MG tablet; Take 1 tablet by mouth Daily.  Dispense: 30 tablet; Refill: 1       On 08/17/2023 I reviewed all of the issues pertinent to her care after being in the hospital with congestive heart failure and being significantly anemic. We documented that her anemia has been a slow process for the last 3 years and her leukocytosis has also been present for the same period of time. In the hospital we documented that the patient had a normal B12, normal folate, normal ferritin, normal iron profile. Her LDH was normal. Serum protein immunoelectrophoresis was negative for monoclonal protein. On the other hand the patient had a Coomb's test that was positive, complement was positive.    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 Today her peripheral blood has an element of lymphocytosis and raises the question if she has a lymphoid malignancy like for example chronic lymphoid leukemia that will trigger leukocytosis, lymphocytosis and hemolytic anemia. To me that is the most likely case. The hemoglobin remains low. The patient remains significantly fatigued. She is minimally jaundice and she remains anemic. Her platelet count is normal. She has no peripheral adenopathy palpable. No obvious splenomegaly clinically. Nevertheless my advice to this patient and discussed with the son and the patient's   in the room is as follows:    1. I would like for her to take a multivitamin like prenatal 3 times a week.  2. I would like her to take folic acid 1 mg a day.  3. I have sent a prescription for prednisone 10 mg a day. The patient is diabetic. I do not think she can take more than 10 mg a day. She will need to continue checking her blood sugar on a daily basis and if her blood sugar is above 200 she will need to notify us and will notify her primary physician to help us to take care of her blood sugar.     She will require laboratory assessment in [place] on weekly basis including CBC and a retic count and we will review her back her in a month with similar features.     I am going to go ahead and send a peripheral blood flow cytometry today. A BCR/ABL was going to be sent, I find no reason for this given the differential in the white count.     I am not going to request any radiological assessment of her abdomen and pelvis pending to see how she responds to this simple regimen.     If the patient fails to respond to this dose of prednisone or if the hemoglobin does not get any better she will require therapy with Rituxan.     Otherwise I discussed with the patient the need for her to try to eat as she is not and try to get up and about with some walking.    She does not have any ongoing infection. Her surgical sites are all well clean and hopefully they will continue healing as the time goes by.     We will review her back in a month as posted.

## 2023-08-21 ENCOUNTER — LAB (OUTPATIENT)
Dept: LAB | Facility: HOSPITAL | Age: 78
End: 2023-08-21
Payer: MEDICARE

## 2023-08-21 DIAGNOSIS — N17.9 ACUTE RENAL FAILURE, UNSPECIFIED ACUTE RENAL FAILURE TYPE: ICD-10-CM

## 2023-08-21 DIAGNOSIS — D50.8 IRON DEFICIENCY ANEMIA SECONDARY TO INADEQUATE DIETARY IRON INTAKE: ICD-10-CM

## 2023-08-21 DIAGNOSIS — D59.10 HEMOLYTIC ANEMIA DUE TO ANTIBODY: ICD-10-CM

## 2023-08-21 DIAGNOSIS — E78.2 MIXED HYPERLIPIDEMIA: ICD-10-CM

## 2023-08-21 DIAGNOSIS — I50.20 HEART FAILURE WITH REDUCED EJECTION FRACTION: ICD-10-CM

## 2023-08-21 LAB
ALBUMIN SERPL-MCNC: 4.3 G/DL (ref 3.5–5.2)
ALBUMIN/GLOB SERPL: 1.6 G/DL
ALP SERPL-CCNC: 71 U/L (ref 39–117)
ALT SERPL W P-5'-P-CCNC: 9 U/L (ref 1–33)
ANION GAP SERPL CALCULATED.3IONS-SCNC: 9.9 MMOL/L (ref 5–15)
AST SERPL-CCNC: 23 U/L (ref 1–32)
BASOPHILS # BLD AUTO: 0.04 10*3/MM3 (ref 0–0.2)
BASOPHILS NFR BLD AUTO: 0.4 % (ref 0–1.5)
BILIRUB SERPL-MCNC: 1.4 MG/DL (ref 0–1.2)
BUN SERPL-MCNC: 40 MG/DL (ref 8–23)
BUN/CREAT SERPL: 31.3 (ref 7–25)
CALCIUM SPEC-SCNC: 9.5 MG/DL (ref 8.6–10.5)
CHLORIDE SERPL-SCNC: 99 MMOL/L (ref 98–107)
CO2 SERPL-SCNC: 29.1 MMOL/L (ref 22–29)
CREAT SERPL-MCNC: 1.28 MG/DL (ref 0.57–1)
DEPRECATED RDW RBC AUTO: 80.5 FL (ref 37–54)
EGFRCR SERPLBLD CKD-EPI 2021: 43 ML/MIN/1.73
EOSINOPHIL # BLD AUTO: 0.04 10*3/MM3 (ref 0–0.4)
EOSINOPHIL NFR BLD AUTO: 0.4 % (ref 0.3–6.2)
ERYTHROCYTE [DISTWIDTH] IN BLOOD BY AUTOMATED COUNT: 22.9 % (ref 12.3–15.4)
GLOBULIN UR ELPH-MCNC: 2.7 GM/DL
GLUCOSE SERPL-MCNC: 107 MG/DL (ref 65–99)
HCT VFR BLD AUTO: 27.9 % (ref 34–46.6)
HGB BLD-MCNC: 9.1 G/DL (ref 12–15.9)
HGB RETIC QN AUTO: 33.9 PG (ref 29.8–36.1)
IMM GRANULOCYTES # BLD AUTO: 0.08 10*3/MM3 (ref 0–0.05)
IMM GRANULOCYTES NFR BLD AUTO: 0.7 % (ref 0–0.5)
IMM RETICS NFR: 29.3 % (ref 3–15.8)
LARGE PLATELETS: NORMAL
LDH SERPL-CCNC: 134 U/L (ref 135–214)
LYMPHOCYTES # BLD AUTO: 3.33 10*3/MM3 (ref 0.7–3.1)
LYMPHOCYTES NFR BLD AUTO: 30.2 % (ref 19.6–45.3)
MAGNESIUM SERPL-MCNC: 1.5 MG/DL (ref 1.6–2.4)
MCH RBC QN AUTO: 32 PG (ref 26.6–33)
MCHC RBC AUTO-ENTMCNC: 32.6 G/DL (ref 31.5–35.7)
MCV RBC AUTO: 98.2 FL (ref 79–97)
MONOCYTES # BLD AUTO: 1.12 10*3/MM3 (ref 0.1–0.9)
MONOCYTES NFR BLD AUTO: 10.2 % (ref 5–12)
NEUTROPHILS NFR BLD AUTO: 58.1 % (ref 42.7–76)
NEUTROPHILS NFR BLD AUTO: 6.42 10*3/MM3 (ref 1.7–7)
NRBC BLD AUTO-RTO: 1 /100 WBC (ref 0–0.2)
NT-PROBNP SERPL-MCNC: ABNORMAL PG/ML (ref 0–1800)
OVALOCYTES BLD QL SMEAR: NORMAL
PLATELET # BLD AUTO: 290 10*3/MM3 (ref 140–450)
PMV BLD AUTO: 10.6 FL (ref 6–12)
POIKILOCYTOSIS BLD QL SMEAR: NORMAL
POLYCHROMASIA BLD QL SMEAR: NORMAL
POTASSIUM SERPL-SCNC: 4.6 MMOL/L (ref 3.5–5.2)
PROT SERPL-MCNC: 7 G/DL (ref 6–8.5)
RBC # BLD AUTO: 2.84 10*6/MM3 (ref 3.77–5.28)
REF LAB TEST METHOD: NORMAL
RETICS # AUTO: 0.09 10*6/MM3 (ref 0.02–0.13)
RETICS/RBC NFR AUTO: 3.26 % (ref 0.7–1.9)
SMALL PLATELETS BLD QL SMEAR: ADEQUATE
SODIUM SERPL-SCNC: 138 MMOL/L (ref 136–145)
WBC MORPH BLD: NORMAL
WBC NRBC COR # BLD: 11.03 10*3/MM3 (ref 3.4–10.8)

## 2023-08-21 PROCEDURE — 83615 LACTATE (LD) (LDH) ENZYME: CPT | Performed by: INTERNAL MEDICINE

## 2023-08-21 PROCEDURE — 80053 COMPREHEN METABOLIC PANEL: CPT

## 2023-08-21 PROCEDURE — 85025 COMPLETE CBC W/AUTO DIFF WBC: CPT

## 2023-08-21 PROCEDURE — 83735 ASSAY OF MAGNESIUM: CPT

## 2023-08-21 PROCEDURE — 85046 RETICYTE/HGB CONCENTRATE: CPT | Performed by: INTERNAL MEDICINE

## 2023-08-21 PROCEDURE — 83880 ASSAY OF NATRIURETIC PEPTIDE: CPT

## 2023-08-21 PROCEDURE — 85007 BL SMEAR W/DIFF WBC COUNT: CPT

## 2023-08-22 NOTE — PROGRESS NOTES
Please let patient know that we have received her labs and her kidney function has significantly improved.

## 2023-08-23 DIAGNOSIS — D72.829 LEUKOCYTOSIS, UNSPECIFIED TYPE: ICD-10-CM

## 2023-08-23 DIAGNOSIS — D59.10 HEMOLYTIC ANEMIA DUE TO ANTIBODY: ICD-10-CM

## 2023-08-23 DIAGNOSIS — D50.8 IRON DEFICIENCY ANEMIA SECONDARY TO INADEQUATE DIETARY IRON INTAKE: Primary | ICD-10-CM

## 2023-08-28 ENCOUNTER — TELEPHONE (OUTPATIENT)
Dept: CARDIOLOGY | Facility: CLINIC | Age: 78
End: 2023-08-28
Payer: MEDICARE

## 2023-08-28 ENCOUNTER — LAB (OUTPATIENT)
Dept: LAB | Facility: HOSPITAL | Age: 78
End: 2023-08-28
Payer: MEDICARE

## 2023-08-28 DIAGNOSIS — N18.31 CHRONIC KIDNEY DISEASE, STAGE 3A: ICD-10-CM

## 2023-08-28 DIAGNOSIS — D50.8 IRON DEFICIENCY ANEMIA SECONDARY TO INADEQUATE DIETARY IRON INTAKE: Primary | ICD-10-CM

## 2023-08-28 DIAGNOSIS — Z95.1 S/P CABG (CORONARY ARTERY BYPASS GRAFT): ICD-10-CM

## 2023-08-28 DIAGNOSIS — D59.10 HEMOLYTIC ANEMIA DUE TO ANTIBODY: ICD-10-CM

## 2023-08-28 DIAGNOSIS — D72.829 LEUKOCYTOSIS, UNSPECIFIED TYPE: ICD-10-CM

## 2023-08-28 DIAGNOSIS — D50.8 IRON DEFICIENCY ANEMIA SECONDARY TO INADEQUATE DIETARY IRON INTAKE: ICD-10-CM

## 2023-08-28 DIAGNOSIS — N17.9 ACUTE RENAL FAILURE, UNSPECIFIED ACUTE RENAL FAILURE TYPE: ICD-10-CM

## 2023-08-28 LAB
ALBUMIN SERPL-MCNC: 4.3 G/DL (ref 3.5–5.2)
ALBUMIN/GLOB SERPL: 1.9 G/DL
ALP SERPL-CCNC: 66 U/L (ref 39–117)
ALT SERPL W P-5'-P-CCNC: 19 U/L (ref 1–33)
ANION GAP SERPL CALCULATED.3IONS-SCNC: 12.5 MMOL/L (ref 5–15)
AST SERPL-CCNC: 30 U/L (ref 1–32)
BACTERIA UR QL AUTO: ABNORMAL /HPF
BASOPHILS # BLD AUTO: 0.06 10*3/MM3 (ref 0–0.2)
BASOPHILS NFR BLD AUTO: 0.5 % (ref 0–1.5)
BILIRUB SERPL-MCNC: 1.2 MG/DL (ref 0–1.2)
BILIRUB UR QL STRIP: NEGATIVE
BUN SERPL-MCNC: 39 MG/DL (ref 8–23)
BUN/CREAT SERPL: 31 (ref 7–25)
CALCIUM SPEC-SCNC: 9.6 MG/DL (ref 8.6–10.5)
CHLORIDE SERPL-SCNC: 102 MMOL/L (ref 98–107)
CHROMATIN AB SERPL-ACNC: 14 IU/ML (ref 0–14)
CLARITY UR: CLEAR
CO2 SERPL-SCNC: 27.5 MMOL/L (ref 22–29)
COLOR UR: YELLOW
CREAT SERPL-MCNC: 1.26 MG/DL (ref 0.57–1)
DEPRECATED RDW RBC AUTO: 69.5 FL (ref 37–54)
EGFRCR SERPLBLD CKD-EPI 2021: 43.8 ML/MIN/1.73
EOSINOPHIL # BLD AUTO: 0.13 10*3/MM3 (ref 0–0.4)
EOSINOPHIL NFR BLD AUTO: 1.2 % (ref 0.3–6.2)
ERYTHROCYTE [DISTWIDTH] IN BLOOD BY AUTOMATED COUNT: 20.3 % (ref 12.3–15.4)
GLOBULIN UR ELPH-MCNC: 2.3 GM/DL
GLUCOSE SERPL-MCNC: 103 MG/DL (ref 65–99)
GLUCOSE UR STRIP-MCNC: ABNORMAL MG/DL
HCT VFR BLD AUTO: 28 % (ref 34–46.6)
HGB BLD-MCNC: 9.2 G/DL (ref 12–15.9)
HGB RETIC QN AUTO: 34 PG (ref 29.8–36.1)
HGB UR QL STRIP.AUTO: NEGATIVE
HYALINE CASTS UR QL AUTO: ABNORMAL /LPF
IMM GRANULOCYTES # BLD AUTO: 0.07 10*3/MM3 (ref 0–0.05)
IMM GRANULOCYTES NFR BLD AUTO: 0.6 % (ref 0–0.5)
IMM RETICS NFR: 32.6 % (ref 3–15.8)
KETONES UR QL STRIP: NEGATIVE
LDH SERPL-CCNC: 134 U/L (ref 135–214)
LEUKOCYTE ESTERASE UR QL STRIP.AUTO: ABNORMAL
LYMPHOCYTES # BLD AUTO: 3.36 10*3/MM3 (ref 0.7–3.1)
LYMPHOCYTES NFR BLD AUTO: 30 % (ref 19.6–45.3)
MCH RBC QN AUTO: 31.6 PG (ref 26.6–33)
MCHC RBC AUTO-ENTMCNC: 32.9 G/DL (ref 31.5–35.7)
MCV RBC AUTO: 96.2 FL (ref 79–97)
MONOCYTES # BLD AUTO: 1.15 10*3/MM3 (ref 0.1–0.9)
MONOCYTES NFR BLD AUTO: 10.3 % (ref 5–12)
NEUTROPHILS NFR BLD AUTO: 57.4 % (ref 42.7–76)
NEUTROPHILS NFR BLD AUTO: 6.42 10*3/MM3 (ref 1.7–7)
NITRITE UR QL STRIP: NEGATIVE
NRBC BLD AUTO-RTO: 0.7 /100 WBC (ref 0–0.2)
PH UR STRIP.AUTO: 6 [PH] (ref 5–8)
PLATELET # BLD AUTO: 288 10*3/MM3 (ref 140–450)
PMV BLD AUTO: 10.4 FL (ref 6–12)
POTASSIUM SERPL-SCNC: 4.2 MMOL/L (ref 3.5–5.2)
PROT SERPL-MCNC: 6.6 G/DL (ref 6–8.5)
PROT UR QL STRIP: ABNORMAL
RBC # BLD AUTO: 2.91 10*6/MM3 (ref 3.77–5.28)
RBC # UR STRIP: ABNORMAL /HPF
REF LAB TEST METHOD: ABNORMAL
RETICS # AUTO: 0.1 10*6/MM3 (ref 0.02–0.13)
RETICS/RBC NFR AUTO: 3.35 % (ref 0.7–1.9)
SODIUM SERPL-SCNC: 142 MMOL/L (ref 136–145)
SP GR UR STRIP: 1.02 (ref 1–1.03)
SQUAMOUS #/AREA URNS HPF: ABNORMAL /HPF
UROBILINOGEN UR QL STRIP: ABNORMAL
WBC # UR STRIP: ABNORMAL /HPF
WBC NRBC COR # BLD: 11.19 10*3/MM3 (ref 3.4–10.8)

## 2023-08-28 PROCEDURE — 36415 COLL VENOUS BLD VENIPUNCTURE: CPT

## 2023-08-28 PROCEDURE — 85046 RETICYTE/HGB CONCENTRATE: CPT

## 2023-08-28 PROCEDURE — 80053 COMPREHEN METABOLIC PANEL: CPT

## 2023-08-28 PROCEDURE — 81001 URINALYSIS AUTO W/SCOPE: CPT

## 2023-08-28 PROCEDURE — 85025 COMPLETE CBC W/AUTO DIFF WBC: CPT

## 2023-08-28 PROCEDURE — 86431 RHEUMATOID FACTOR QUANT: CPT

## 2023-08-28 PROCEDURE — 86038 ANTINUCLEAR ANTIBODIES: CPT

## 2023-08-28 PROCEDURE — 86225 DNA ANTIBODY NATIVE: CPT

## 2023-08-28 PROCEDURE — 83615 LACTATE (LD) (LDH) ENZYME: CPT

## 2023-08-28 PROCEDURE — 86200 CCP ANTIBODY: CPT

## 2023-08-29 ENCOUNTER — OFFICE VISIT (OUTPATIENT)
Dept: FAMILY MEDICINE CLINIC | Facility: CLINIC | Age: 78
End: 2023-08-29
Payer: MEDICARE

## 2023-08-29 ENCOUNTER — OFFICE VISIT (OUTPATIENT)
Dept: CARDIOLOGY | Facility: CLINIC | Age: 78
End: 2023-08-29
Payer: MEDICARE

## 2023-08-29 VITALS
HEIGHT: 64 IN | BODY MASS INDEX: 31.28 KG/M2 | SYSTOLIC BLOOD PRESSURE: 124 MMHG | WEIGHT: 183.2 LBS | HEART RATE: 70 BPM | DIASTOLIC BLOOD PRESSURE: 63 MMHG

## 2023-08-29 VITALS
DIASTOLIC BLOOD PRESSURE: 71 MMHG | BODY MASS INDEX: 31.58 KG/M2 | TEMPERATURE: 97.1 F | SYSTOLIC BLOOD PRESSURE: 123 MMHG | HEART RATE: 84 BPM | HEIGHT: 64 IN | WEIGHT: 185 LBS | OXYGEN SATURATION: 97 %

## 2023-08-29 DIAGNOSIS — E78.2 MIXED HYPERLIPIDEMIA: ICD-10-CM

## 2023-08-29 DIAGNOSIS — N18.32 STAGE 3B CHRONIC KIDNEY DISEASE: ICD-10-CM

## 2023-08-29 DIAGNOSIS — I48.0 PAF (PAROXYSMAL ATRIAL FIBRILLATION): ICD-10-CM

## 2023-08-29 DIAGNOSIS — E03.8 OTHER SPECIFIED HYPOTHYROIDISM: ICD-10-CM

## 2023-08-29 DIAGNOSIS — I50.22 CHRONIC HFREF (HEART FAILURE WITH REDUCED EJECTION FRACTION): ICD-10-CM

## 2023-08-29 DIAGNOSIS — Z00.00 ENCOUNTER FOR MEDICAL EXAMINATION TO ESTABLISH CARE: Primary | ICD-10-CM

## 2023-08-29 DIAGNOSIS — Z29.11 NEED FOR RSV VACCINATION: ICD-10-CM

## 2023-08-29 DIAGNOSIS — I10 PRIMARY HYPERTENSION: ICD-10-CM

## 2023-08-29 DIAGNOSIS — E66.2 CLASS 1 OBESITY WITH ALVEOLAR HYPOVENTILATION, SERIOUS COMORBIDITY, AND BODY MASS INDEX (BMI) OF 32.0 TO 32.9 IN ADULT: ICD-10-CM

## 2023-08-29 DIAGNOSIS — I25.810 CORONARY ARTERY DISEASE INVOLVING AUTOLOGOUS VEIN CORONARY BYPASS GRAFT WITHOUT ANGINA PECTORIS: ICD-10-CM

## 2023-08-29 DIAGNOSIS — I50.22 CHRONIC HFREF (HEART FAILURE WITH REDUCED EJECTION FRACTION): Primary | ICD-10-CM

## 2023-08-29 DIAGNOSIS — D59.0 DRUG-INDUCED AUTOANTIBODY TYPE HEMOLYTIC ANEMIA: ICD-10-CM

## 2023-08-29 DIAGNOSIS — E11.9 TYPE 2 DIABETES MELLITUS WITHOUT COMPLICATION, WITHOUT LONG-TERM CURRENT USE OF INSULIN: ICD-10-CM

## 2023-08-29 LAB
ANA SER QL: POSITIVE
CCP IGA+IGG SERPL IA-ACNC: 15 UNITS (ref 0–19)
DSDNA AB SER-ACNC: 1 IU/ML (ref 0–9)
Lab: NORMAL

## 2023-08-29 RX ORDER — SACUBITRIL AND VALSARTAN 24; 26 MG/1; MG/1
0.5 TABLET, FILM COATED ORAL 2 TIMES DAILY
Qty: 180 TABLET | Refills: 3 | Status: CANCELLED
Start: 2023-08-29

## 2023-08-29 NOTE — PROGRESS NOTES
Office Visit    Chief Complaint  Heart failure with reduced ejection fraction    Subjective            Ann-Mariemyron Hughes presents to [facility] CARDIOLOG[facility]  History of Present Illness  Ms. Hughes is a 77-year-old female that presented to the office today for follow-up due to heart failure with reduced ejection fraction and coronary artery disease, 1 month status post CABG.  She underwent a cardiac cath and was found to have multivessel disease. On 7/20/23 she underwent a CABGx3 utilizing LIMA/RSVG and LANE ligation with Dr. Alexander.  She reports that she is recovering very well.  She continues to get short of air with activity and have some edema over the lower extremities.  We have recommended that Ms. Hughes participates in cardiac rehab. She denies any chest pain, dizziness, or syncopal episodes.    Past Medical History:   Diagnosis Date    Acne rosacea 08/17/2021    DR.JEFF MOON     Arteriosclerosis of abdominal aorta     B12 deficiency 08/17/2021    DJD (degenerative joint disease)     Hx of smoking 08/17/2021    QUIT IN 1976 AT AGE 31    Hyperlipidemia 08/17/2021    Hypertension     Hypothyroidism     Metabolic syndrome 08/17/2021    KAREN (stress urinary incontinence, female) 08/17/2021    UTI (urinary tract infection) 08/17/2021    Vitamin D deficiency 08/17/2021       Allergies   Allergen Reactions    Penicillins Palpitations     1. When was your reaction? > 10 years ago  2. Did your reaction happen after the first dose or after several doses? After first dose  3. Did your reaction require ED or hospital care to manage your reaction? Do not know  4. Did your reaction require treatment with epinephrine? Do not know  5. Have you taken amoxicillin (Amoxil) or amoxicillin-clavulanate (Augmentin) without issue since? Yes  6. Have you taken cephalexin (Keflex) without issue since?  Do not know         Past Surgical History:   Procedure Laterality Date    BACK SURGERY  2013    CARDIAC  CATHETERIZATION N/A 2023    Procedure: Left Heart Cath;  Surgeon: Dinh Andres MD;  Location:  [facility];  Service: Cardiovascular;  Laterality: N/A;    CARPAL TUNNEL RELEASE      COLONOSCOPY      CORONARY ARTERY BYPASS GRAFT WITH MITRAL VALVE REPAIR/REPLACEMENT N/A 2023    Procedure: REZA STERNOTOMY OFF-PUMP CORONARY ARTERY BYPASS GRAFT TIMES        USING LEFFT INTERNAL MAMMARY ARTERY AND     GREATER SAPHENOUS VEIN GRAFT PER EMDOSCOPIC VEIN HARVESTING, MAZE PROCEDURE AND PRP;  Surgeon: Shane Alexander MD;  Location:  [facility];  Service: Cardiothoracic;  Laterality: N/A;    KNEE SURGERY      LUMBAR DISCECTOMY      NECK SURGERY      Cervical    POSTERIOR LUMBAR/THORACIC SPINE FUSION          Social History     Tobacco Use    Smoking status: Former     Packs/day: 1.00     Years: 12.00     Pack years: 12.00     Types: Cigarettes     Quit date:      Years since quittin.6    Smokeless tobacco: Never   Vaping Use    Vaping Use: Never used   Substance Use Topics    Alcohol use: Never    Drug use: Never       Family History   Problem Relation Age of Onset    Heart disease Mother     Arthritis Mother     Heart attack Mother     Arthritis Father         Prior to Admission medications    Medication Sig Start Date End Date Taking? Authorizing Provider   apixaban (ELIQUIS) 5 MG tablet tablet Take 1 tablet by mouth Every 12 (Twelve) Hours. Indications: Atrial Fibrillation 23  Yes Anjelica Chase APRN   aspirin 81 MG EC tablet Take 1 tablet by mouth Daily.   Yes ProviderKanwal MD   atorvastatin (LIPITOR) 40 MG tablet Take 1 tablet by mouth Every Night for 90 days. 7/28/23 10/26/23 Yes Anjelica Chase APRN   buPROPion XL (WELLBUTRIN XL) 300 MG 24 hr tablet TAKE 1 TABLET BY MOUTH DAILY 23  Yes Rochelle Brown APRN   dapagliflozin Propanediol (Farxiga) 10 MG tablet Take 10 mg by mouth Daily. 23  Yes Valerie Valera MD   folic acid (FOLVITE) 1  MG tablet Take 1 tablet by mouth Daily. 8/17/23  Yes Boston Can MD   furosemide (LASIX) 40 MG tablet Take 1 tablet by mouth 2 (Two) Times a Day. 8/6/23  Yes Clarence Johnson APRN   metFORMIN ER (GLUCOPHAGE-XR) 500 MG 24 hr tablet TAKE 1 TABLET BY MOUTH EVERY MORNING AND 2 TABLETS AT SUPPER  Patient taking differently: 1 tablet 2 (Two) Times a Day. 1/19/23  Yes Roseanna Hernandez MD   metoprolol succinate XL (TOPROL-XL) 25 MG 24 hr tablet Take 0.5 tablets by mouth Every 12 (Twelve) Hours for 90 days.  Patient taking differently: Take 0.5 tablets by mouth Every 12 (Twelve) Hours. 12.5mg in AM and 12.5mg in Evening 7/28/23 10/26/23 Yes Anjelica Chase APRN   montelukast (SINGULAIR) 10 MG tablet Take 1 tablet by mouth Daily. 7/5/23  Yes Rochelle Brown APRN   multivitamin with minerals tablet tablet Take 1 tablet by mouth Daily.   Yes Provider, MD Kanwal   predniSONE (DELTASONE) 10 MG tablet Take 1 tablet by mouth Daily. 8/17/23  Yes Boston Can MD   spironolactone (ALDACTONE) 25 MG tablet Take 1 tablet by mouth Daily. 6/28/23  Yes Valerie Valera MD   Synthroid 75 MCG tablet Take 1 tablet by mouth Daily. 7/10/23  Yes Rochelle Brown APRN   acetaminophen (TYLENOL) 325 MG tablet Take 2 tablets by mouth Every 4 (Four) Hours As Needed for Mild Pain. 7/28/23   Anjelica Chase APRN   albuterol sulfate  (90 Base) MCG/ACT inhaler Inhale 2 puffs Every 4 (Four) Hours As Needed for Wheezing.  Patient not taking: Reported on 8/29/2023 5/30/23   Les Moreno,    Probiotic Product (My1login PO) Take 1 capsule by mouth Daily.    Provider, MD Kanwal   RSVPreF3 Vac Recomb Adjuvanted (AREXVY) 120 MCG/0.5ML reconstituted suspension injection Inject 0.5 mL into the appropriate muscle as directed by prescriber 1 (One) Time for 1 dose. 8/29/23 8/29/23  Romario Valdez,    digoxin (LANOXIN) 125 MCG tablet Take 1 tablet by mouth Every Other Day.  Patient not taking:  "Reported on 8/29/2023 8/7/23 8/29/23  AlexRobb garzay W, APRN        Review of Systems   Constitutional:  Positive for fatigue.   Respiratory:  Positive for shortness of breath. Negative for cough.    Cardiovascular:  Negative for chest pain, palpitations and leg swelling.   Neurological:  Negative for dizziness.      Symptom Course: Improved    Weight Trend: Decreasing Steadily      Objective     /63   Pulse 70   Ht 162.6 cm (64.02\")   Wt 83.1 kg (183 lb 3.2 oz)   BMI 31.43 kg/mý       Physical Exam  Constitutional:       General: She is awake.      Appearance: Normal appearance.   Neck:      Thyroid: No thyromegaly.      Vascular: No carotid bruit or JVD.   Cardiovascular:      Rate and Rhythm: Normal rate and regular rhythm.      Chest Wall: PMI is not displaced.      Pulses: Normal pulses.      Heart sounds: Normal heart sounds, S1 normal and S2 normal. No murmur heard.    No friction rub. No gallop. No S3 or S4 sounds.   Pulmonary:      Effort: Pulmonary effort is normal.      Breath sounds: Normal breath sounds and air entry. No wheezing, rhonchi or rales.   Abdominal:      General: Bowel sounds are normal.      Palpations: Abdomen is soft. There is no mass.      Tenderness: There is no abdominal tenderness.   Musculoskeletal:      Cervical back: Neck supple.      Right lower leg: Edema present.      Left lower leg: Edema present.   Neurological:      Mental Status: She is alert and oriented to person, place, and time.   Psychiatric:         Mood and Affect: Mood normal.         Behavior: Behavior is cooperative.         Result Review :                      Lab Results   Component Value Date    PROBNP 18,254.0 (H) 08/21/2023    PROBNP 27,235.0 (H) 07/31/2023    PROBNP 10,101.0 (H) 07/18/2023     CMP          8/17/2023    12:52 8/21/2023    08:13 8/28/2023    08:24   CMP   Glucose 90  107  103    BUN 50  40  39    Creatinine 1.72  1.28  1.26    EGFR 30.1  43.0  43.8    Sodium 142  138  142    Potassium " 3.4  4.6  4.2    Chloride 97  99  102    Calcium 8.8  9.5  9.6    Total Protein  7.0  6.6    Albumin 4.4  4.3  4.3    Globulin  2.7  2.3    Total Bilirubin  1.4  1.2    Alkaline Phosphatase  71  66    AST (SGOT)  23  30    ALT (SGPT)  9  19    Albumin/Globulin Ratio  1.6  1.9    BUN/Creatinine Ratio 29.1  31.3  31.0    Anion Gap 19.1  9.9  12.5      CBC w/diff          8/17/2023    12:50 8/21/2023    08:13 8/28/2023    08:24   CBC w/Diff   WBC 11.38  11.03  11.19    RBC 3.01  2.84  2.91    Hemoglobin 9.5  9.1  9.2    Hematocrit 29.6  27.9  28.0    MCV 98.3  98.2  96.2    MCH 31.6  32.0  31.6    MCHC 32.1  32.6  32.9    RDW 22.2  22.9  20.3    Platelets 311  290  288    Neutrophil Rel % 56.9  58.1  57.4    Immature Granulocyte Rel % 0.5  0.7  0.6    Lymphocyte Rel % 30.1  30.2  30.0    Monocyte Rel % 10.9  10.2  10.3    Eosinophil Rel % 1.2  0.4  1.2    Basophil Rel % 0.4  0.4  0.5       Lipid Panel          6/15/2023    10:05 7/16/2023    09:19 7/18/2023    07:25   Lipid Panel   Total Cholesterol 122  97  93    Triglycerides 88  75  71    HDL Cholesterol 62  43  39    VLDL Cholesterol 17  16  15    LDL Cholesterol  43  38  39    LDL/HDL Ratio 0.68  0.91  1.02       Lab Results   Component Value Date    TSH 1.130 06/15/2023    TSH 1.940 01/11/2023    TSH 2.080 08/17/2022      Lab Results   Component Value Date    FREET4 1.31 01/11/2023    FREET4 1.27 08/17/2022    FREET4 1.32 04/13/2022      No results found for: DDIMERQUANT  Magnesium   Date Value Ref Range Status   08/21/2023 1.5 (L) 1.6 - 2.4 mg/dL Final      No results found for: DIGOXIN   A1C Last 3 Results          1/11/2023    09:05 6/15/2023    10:05 7/18/2023    07:25   HGBA1C Last 3 Results   Hemoglobin A1C 6.20  6.10  5.90         Results for orders placed during the hospital encounter of 08/01/23    Adult Transthoracic Echo Limited W/ Cont if Necessary Per Protocol    Interpretation Summary    There is a small (<1cm) pericardial effusion.    There is a  moderate sized right pleural effusion.         Assessment and Plan        Diagnoses and all orders for this visit:    1. Chronic HFrEF (heart failure with reduced ejection fraction) (Primary)  Assessment & Plan:  Ms. Hughes has heart failure with reduced ejection fraction who is recovering very well post CABG.  She continues to experience short of air with activity.  Her pedal edema has improved.  We have encouraged her to crease her activity by participating in cardiac rehab.  Patient was reluctant but agreeable.  I will make the following changes to optimize her heart failure medications:      1.  Reduce the furosemide/Lasix to 40 mg once a day.  2.  Start Entresto 24-26 mg half a tablet twice a day.  3.  Increase Toprol-XL to 12.5 mg in the morning and 25 mg at night for 1 week followed by 25 mg twice a day.  4.  Do a heart rate blood pressure and weight log every day and bring it to us.  5.  Do blood work 1 or 2-day before your next appointment    Orders:  -     Ambulatory Referral to Cardiac Rehab  -     CBC & Differential; Future  -     Comprehensive Metabolic Panel; Future  -     proBNP; Future  -     Magnesium; Future    2. CAD status post CABG x3 vessels  Assessment & Plan:  On 7/20/23 Ms. Hughes underwent a CABGx3 utilizing LIMA/RSVG and LANE ligation with Dr. Alexander.  She is recovering very well.  Her incisions are clean and dry without any drainage erythema or edema.    Orders:  -     Ambulatory Referral to Cardiac Rehab  -     CBC & Differential; Future  -     Comprehensive Metabolic Panel; Future  -     proBNP; Future  -     Magnesium; Future    3. PAF (paroxysmal atrial fibrillation)  Assessment & Plan:  Patient has a history of paroxysmal A-fib.  She remains on Eliquis.    Orders:  -     Ambulatory Referral to Cardiac Rehab  -     CBC & Differential; Future  -     Comprehensive Metabolic Panel; Future  -     proBNP; Future  -     Magnesium; Future    4. Class 1 obesity with alveolar  hypoventilation, serious comorbidity, and body mass index (BMI) of 32.0 to 32.9 in adult  Assessment & Plan:  We have encouraged her to to follow a heart healthy diet and participate in cardiac rehab.      5. Stage 3b chronic kidney disease  Assessment & Plan:  Ms. Hughes kidney function remains low but stable.  We will continue to monitor.    Orders:  -     Comprehensive Metabolic Panel; Future    Other orders  -     metoprolol succinate XL (TOPROL-XL) 25 MG 24 hr tablet; Take 1 tablet by mouth Every 12 (Twelve) Hours for 90 days.  Dispense: 60 tablet; Refill: 2  -     furosemide (LASIX) 40 MG tablet; Take 1 tablet by mouth Daily.  Dispense: 60 tablet; Refill: 2  -     Discontinue: sacubitril-valsartan (Entresto) 24-26 MG tablet; Take 1 tablet by mouth 2 (Two) Times a Day. Indications: lot: [id number] exp: 08/31/2025  Dispense: 28 tablet; Refill: 0            Follow Up     Return for 2 weeks with Alyssa BURGESS    Patient was given instructions and counseling regarding her condition or for health maintenance advice. Please see specific information pulled into the AVS if appropriate.     Valerie Valera MD  08/30/23 15:28 EDT

## 2023-08-29 NOTE — PROGRESS NOTES
Chief Complaint   Patient presents with    Establish Care    Hypothyroidism    Diabetes        Subjective     Ann-Mariedami Hughes  has a past medical history of Acne rosacea (08/17/2021), Arteriosclerosis of abdominal aorta, B12 deficiency (08/17/2021), DJD (degenerative joint disease), smoking (08/17/2021), Hyperlipidemia (08/17/2021), Hypertension, Hypothyroidism, Metabolic syndrome (08/17/2021), KAREN (stress urinary incontinence, female) (08/17/2021), UTI (urinary tract infection) (08/17/2021), and Vitamin D deficiency (08/17/2021).    Establish care-she did used to see Dr. Moriah Michelle.  She has since retired and needs to establish care with a new PCP.    Coronary artery disease-she had recently presented to the emergency room with some new onset shortness of breath.  It was discovered that she had significant coronary disease and had a subsequent three-vessel CABG July 20 at [facility] in [place].  Since then she has not had any chest pain tightness heaviness or shortness of breath.    Paroxysmal A-fib-she denies any palpitations or tachycardia.  She remains on her Eliquis twice a day.    Hypertension-she does check her blood pressure at home on a regular basis.  She states her blood pressure is typically in the 120s over 70s.  It is very good here also today at 123/71.    Hyperlipidemia-she is taking her atorvastatin daily.  She had a lipid profile done just a little over a month ago that was very good.    Type 2 diabetes-she checks her blood sugar fasting every morning.  She states they are typically 110-120.  She had an A1c just about 6 weeks ago that was excellent at 5.9.    Hemolytic anemia-she is currently seeing a hematologist up in [place] at [facility].  She had recent blood work and was discovered to have hemolytic anemia due to being Cristina positive.  She is having blood work done on a weekly basis.  Her hemoglobin just yesterday was stable at 9.1.    Hypothyroid-she takes her Synthroid  every morning as directed.      PHQ-2 Depression Screening  Little interest or pleasure in doing things?     Feeling down, depressed, or hopeless?     PHQ-2 Total Score     PHQ-9 Depression Screening  Little interest or pleasure in doing things?     Feeling down, depressed, or hopeless?     Trouble falling or staying asleep, or sleeping too much?     Feeling tired or having little energy?     Poor appetite or overeating?     Feeling bad about yourself - or that you are a failure or have let yourself or your family down?     Trouble concentrating on things, such as reading the newspaper or watching television?     Moving or speaking so slowly that other people could have noticed? Or the opposite - being so fidgety or restless that you have been moving around a lot more than usual?     Thoughts that you would be better off dead, or of hurting yourself in some way?     PHQ-9 Total Score     If you checked off any problems, how difficult have these problems made it for you to do your work, take care of things at home, or get along with other people?       Allergies   Allergen Reactions    Penicillins Palpitations     1. When was your reaction? > 10 years ago  2. Did your reaction happen after the first dose or after several doses? After first dose  3. Did your reaction require ED or hospital care to manage your reaction? Do not know  4. Did your reaction require treatment with epinephrine? Do not know  5. Have you taken amoxicillin (Amoxil) or amoxicillin-clavulanate (Augmentin) without issue since? Yes  6. Have you taken cephalexin (Keflex) without issue since?  Do not know        Prior to Admission medications    Medication Sig Start Date End Date Taking? Authorizing Provider   acetaminophen (TYLENOL) 325 MG tablet Take 2 tablets by mouth Every 4 (Four) Hours As Needed for Mild Pain. 7/28/23  Yes Anjelica Chase APRN   albuterol sulfate  (90 Base) MCG/ACT inhaler Inhale 2 puffs Every 4 (Four) Hours As  Needed for Wheezing. 5/30/23  Yes Les Moreno DO   apixaban (ELIQUIS) 5 MG tablet tablet Take 1 tablet by mouth Every 12 (Twelve) Hours. Indications: Atrial Fibrillation 7/28/23  Yes Anjelica Chase APRN   aspirin 81 MG EC tablet Take 1 tablet by mouth Daily.   Yes ProviderKanwal MD   atorvastatin (LIPITOR) 40 MG tablet Take 1 tablet by mouth Every Night for 90 days. 7/28/23 10/26/23 Yes Anjelica Chase APRN   buPROPion XL (WELLBUTRIN XL) 300 MG 24 hr tablet TAKE 1 TABLET BY MOUTH DAILY 5/23/23  Yes Rochelle Brown APRN   dapagliflozin Propanediol (Farxiga) 10 MG tablet Take 10 mg by mouth Daily. 7/12/23  Yes Valerie Valera MD   folic acid (FOLVITE) 1 MG tablet Take 1 tablet by mouth Daily. 8/17/23  Yes Boston Can MD   furosemide (LASIX) 40 MG tablet Take 1 tablet by mouth 2 (Two) Times a Day. 8/6/23  Yes Clarence Johnson APRN   metFORMIN ER (GLUCOPHAGE-XR) 500 MG 24 hr tablet TAKE 1 TABLET BY MOUTH EVERY MORNING AND 2 TABLETS AT SUPPER 1/19/23  Yes Roseanna Hernandez MD   metoprolol succinate XL (TOPROL-XL) 25 MG 24 hr tablet Take 0.5 tablets by mouth Every 12 (Twelve) Hours for 90 days.  Patient taking differently: Take 0.5 tablets by mouth Every 12 (Twelve) Hours. 12.5mg in AM and 12.5mg in Evening 7/28/23 10/26/23 Yes Anjelica Chase APRN   montelukast (SINGULAIR) 10 MG tablet Take 1 tablet by mouth Daily. 7/5/23  Yes Rochelle Brown APRN   multivitamin with minerals tablet tablet Take 1 tablet by mouth Daily.   Yes ProviderKanwal MD   predniSONE (DELTASONE) 10 MG tablet Take 1 tablet by mouth Daily. 8/17/23  Yes Boston Can MD   spironolactone (ALDACTONE) 25 MG tablet Take 1 tablet by mouth Daily. 6/28/23  Yes Valerie Valera MD   Synthroid 75 MCG tablet Take 1 tablet by mouth Daily. 7/10/23  Yes Rochelle Brown APRN   digoxin (LANOXIN) 125 MCG tablet Take 1 tablet by mouth Every Other Day.  Patient not taking: Reported on 8/29/2023 8/7/23 8/29/23  Alex  LONA Burnham        Patient Active Problem List   Diagnosis    Arthritis    Bilateral leg pain    Chronic pain syndrome    Diabetes mellitus, type II    Gastric reflux    Herniated disc    Hypertension    Hypothyroidism    Low back pain    Pain in joint, multiple sites    Pain in soft tissues of limb    Hyperlipidemia    B12 deficiency    Vitamin D deficiency    UTI (urinary tract infection)    Metabolic syndrome    Acne rosacea    Hx of smoking    Stress incontinence of urine    Arteriosclerosis of abdominal aorta    Iron deficiency anemia secondary to inadequate dietary iron intake    Seasonal allergies    Hyponatremia    Leg length discrepancy    Reactive depression    Encounter for subsequent annual wellness visit (AWV) in Medicare patient    Class 1 obesity with alveolar hypoventilation, serious comorbidity, and body mass index (BMI) of 32.0 to 32.9 in adult    Heart failure with reduced ejection fraction    Coronary artery disease of native artery of native heart with stable angina pectoris    Chronic HFrEF (heart failure with reduced ejection fraction)    NSTEMI (non-ST elevated myocardial infarction)    Coronary heart disease    Abnormal findings on diagnostic imaging of heart and coronary circulation    Acute on chronic HFrEF (heart failure with reduced ejection fraction)    Acute respiratory failure with hypoxia    CAD status post CABG x3 vessels    PAF (paroxysmal atrial fibrillation)    Encounter for medical examination to establish care    Drug-induced autoantibody type hemolytic anemia    Need for RSV vaccination        Past Surgical History:   Procedure Laterality Date    BACK SURGERY  2013    CARDIAC CATHETERIZATION N/A 07/17/2023    Procedure: Left Heart Cath;  Surgeon: Dinh Andres MD;  Location: [facility] CATH INVASIVE LOCATION;  Service: Cardiovascular;  Laterality: N/A;    CARPAL TUNNEL RELEASE      COLONOSCOPY  2011    CORONARY ARTERY BYPASS GRAFT WITH MITRAL VALVE REPAIR/REPLACEMENT N/A  2023    Procedure: REZA STERNOTOMY OFF-PUMP CORONARY ARTERY BYPASS GRAFT TIMES        USING LEFFT INTERNAL MAMMARY ARTERY AND     GREATER SAPHENOUS VEIN GRAFT PER EMDOSCOPIC VEIN HARVESTING, MAZE PROCEDURE AND PRP;  Surgeon: Shane Alexander MD;  Location: [facility];  Service: Cardiothoracic;  Laterality: N/A;    KNEE SURGERY      LUMBAR DISCECTOMY      NECK SURGERY      Cervical    POSTERIOR LUMBAR/THORACIC SPINE FUSION         Social History     Socioeconomic History    Marital status:      Spouse name: Dinh   Tobacco Use    Smoking status: Former     Packs/day: 1.00     Years: 12.00     Pack years: 12.00     Types: Cigarettes     Quit date:      Years since quittin.6    Smokeless tobacco: Never   Vaping Use    Vaping Use: Never used   Substance and Sexual Activity    Alcohol use: Never    Drug use: Never    Sexual activity: Defer       Family History   Problem Relation Age of Onset    Heart disease Mother     Arthritis Mother     Heart attack Mother     Arthritis Father        Family history, surgical history, past medical history, Allergies and meds reviewed with patient today and updated in [facility] EMR.     ROS:  Review of Systems   Constitutional:  Positive for fatigue.   HENT:  Positive for congestion, postnasal drip and rhinorrhea. Negative for nosebleeds.    Eyes:  Negative for blurred vision and visual disturbance.   Respiratory:  Negative for cough, chest tightness, shortness of breath and wheezing.    Cardiovascular:  Negative for chest pain and palpitations.   Gastrointestinal:  Negative for blood in stool, constipation and diarrhea.   Endocrine: Negative for polydipsia and polyuria.   Genitourinary:  Negative for hematuria.   Allergic/Immunologic: Negative for environmental allergies.   Neurological:  Negative for headache.   Psychiatric/Behavioral:  Negative for depressed mood. The patient is not nervous/anxious.      OBJECTIVE:  Vitals:    23 0954   BP: 123/71   BP  "Location: Right arm   Patient Position: Sitting   Pulse: 84   Temp: 97.1 øF (36.2 øC)   TempSrc: Temporal   SpO2: 97%   Weight: 83.9 kg (185 lb)   Height: 162.6 cm (64.02\")     No results found.   Body mass index is 31.74 kg/mý.  No LMP recorded (lmp unknown). Patient is postmenopausal.    Physical Exam  Vitals and nursing note reviewed.   Constitutional:       General: She is not in acute distress.     Appearance: Normal appearance. She is obese.   HENT:      Head: Normocephalic.      Right Ear: Tympanic membrane, ear canal and external ear normal.      Left Ear: Tympanic membrane, ear canal and external ear normal.      Nose: Nose normal.      Mouth/Throat:      Mouth: Mucous membranes are moist.      Pharynx: Oropharynx is clear.   Eyes:      General: No scleral icterus.     Conjunctiva/sclera: Conjunctivae normal.      Pupils: Pupils are equal, round, and reactive to light.   Cardiovascular:      Rate and Rhythm: Normal rate and regular rhythm.      Pulses: Normal pulses.      Heart sounds: Normal heart sounds. No murmur heard.  Pulmonary:      Effort: Pulmonary effort is normal.      Breath sounds: Normal breath sounds. No wheezing, rhonchi or rales.   Musculoskeletal:      Cervical back: Neck supple. No rigidity or tenderness.      Right lower leg: No edema.      Left lower leg: No edema.   Lymphadenopathy:      Cervical: No cervical adenopathy.   Skin:     General: Skin is warm and dry.      Coloration: Skin is not jaundiced.      Findings: No rash.   Neurological:      General: No focal deficit present.      Mental Status: She is alert and oriented to person, place, and time.   Psychiatric:         Mood and Affect: Mood normal.         Thought Content: Thought content normal.         Judgment: Judgment normal.       Procedures    Lab on 08/28/2023   Component Date Value Ref Range Status    Glucose 08/28/2023 103 (H)  65 - 99 mg/dL Final    BUN 08/28/2023 39 (H)  8 - 23 mg/dL Final    Creatinine 08/28/2023 " 1.26 (H)  0.57 - 1.00 mg/dL Final    Sodium 08/28/2023 142  136 - 145 mmol/L Final    Potassium 08/28/2023 4.2  3.5 - 5.2 mmol/L Final    Chloride 08/28/2023 102  98 - 107 mmol/L Final    CO2 08/28/2023 27.5  22.0 - 29.0 mmol/L Final    Calcium 08/28/2023 9.6  8.6 - 10.5 mg/dL Final    Total Protein 08/28/2023 6.6  6.0 - 8.5 g/dL Final    Albumin 08/28/2023 4.3  3.5 - 5.2 g/dL Final    ALT (SGPT) 08/28/2023 19  1 - 33 U/L Final    AST (SGOT) 08/28/2023 30  1 - 32 U/L Final    Alkaline Phosphatase 08/28/2023 66  39 - 117 U/L Final    Total Bilirubin 08/28/2023 1.2  0.0 - 1.2 mg/dL Final    Globulin 08/28/2023 2.3  gm/dL Final    A/G Ratio 08/28/2023 1.9  g/dL Final    BUN/Creatinine Ratio 08/28/2023 31.0 (H)  7.0 - 25.0 Final    Anion Gap 08/28/2023 12.5  5.0 - 15.0 mmol/L Final    eGFR 08/28/2023 43.8 (L)  >60.0 mL/min/1.73 Final    LDH 08/28/2023 134 (L)  135 - 214 U/L Final    Immature Reticulocyte Fraction 08/28/2023 32.6 (H)  3.0 - 15.8 % Final    Reticulocyte % 08/28/2023 3.35 (H)  0.70 - 1.90 % Final    Reticulocyte Absolute 08/28/2023 0.0975  0.0200 - 0.1300 10*6/mm3 Final    Reticulocyte Hgb 08/28/2023 34.0  29.8 - 36.1 pg Final    Rheumatoid Factor Quantitative 08/28/2023 14.0  0.0 - 14.0 IU/mL Final    WBC 08/28/2023 11.19 (H)  3.40 - 10.80 10*3/mm3 Final    RBC 08/28/2023 2.91 (L)  3.77 - 5.28 10*6/mm3 Final    Hemoglobin 08/28/2023 9.2 (L)  12.0 - 15.9 g/dL Final    Hematocrit 08/28/2023 28.0 (L)  34.0 - 46.6 % Final    MCV 08/28/2023 96.2  79.0 - 97.0 fL Final    MCH 08/28/2023 31.6  26.6 - 33.0 pg Final    MCHC 08/28/2023 32.9  31.5 - 35.7 g/dL Final    RDW 08/28/2023 20.3 (H)  12.3 - 15.4 % Final    RDW-SD 08/28/2023 69.5 (H)  37.0 - 54.0 fl Final    MPV 08/28/2023 10.4  6.0 - 12.0 fL Final    Platelets 08/28/2023 288  140 - 450 10*3/mm3 Final    Neutrophil % 08/28/2023 57.4  42.7 - 76.0 % Final    Lymphocyte % 08/28/2023 30.0  19.6 - 45.3 % Final    Monocyte % 08/28/2023 10.3  5.0 - 12.0 % Final     Eosinophil % 08/28/2023 1.2  0.3 - 6.2 % Final    Basophil % 08/28/2023 0.5  0.0 - 1.5 % Final    Immature Grans % 08/28/2023 0.6 (H)  0.0 - 0.5 % Final    Neutrophils, Absolute 08/28/2023 6.42  1.70 - 7.00 10*3/mm3 Final    Lymphocytes, Absolute 08/28/2023 3.36 (H)  0.70 - 3.10 10*3/mm3 Final    Monocytes, Absolute 08/28/2023 1.15 (H)  0.10 - 0.90 10*3/mm3 Final    Eosinophils, Absolute 08/28/2023 0.13  0.00 - 0.40 10*3/mm3 Final    Basophils, Absolute 08/28/2023 0.06  0.00 - 0.20 10*3/mm3 Final    Immature Grans, Absolute 08/28/2023 0.07 (H)  0.00 - 0.05 10*3/mm3 Final    nRBC 08/28/2023 0.7 (H)  0.0 - 0.2 /100 WBC Final    Color, UA 08/28/2023 Yellow  Yellow, Straw Final    Appearance, UA 08/28/2023 Clear  Clear Final    pH, UA 08/28/2023 6.0  5.0 - 8.0 Final    Specific [place], UA 08/28/2023 1.021  1.005 - 1.030 Final    Glucose, UA 08/28/2023 250 mg/dL (1+) (A)  Negative Final    Ketones, UA 08/28/2023 Negative  Negative Final    Bilirubin, UA 08/28/2023 Negative  Negative Final    Blood, UA 08/28/2023 Negative  Negative Final    Protein, UA 08/28/2023 100 mg/dL (2+) (A)  Negative Final    Leuk Esterase, UA 08/28/2023 Small (1+) (A)  Negative Final    Nitrite, UA 08/28/2023 Negative  Negative Final    Urobilinogen, UA 08/28/2023 1.0 E.U./dL  0.2 - 1.0 E.U./dL Final    RBC, UA 08/28/2023 0-2  None Seen, 0-2 /HPF Final    WBC, UA 08/28/2023 13-20 (A)  None Seen, 0-2 /HPF Final    Bacteria, UA 08/28/2023 Trace (A)  None Seen /HPF Final    Squamous Epithelial Cells, UA 08/28/2023 7-12 (A)  None Seen, 0-2 /HPF Final    Hyaline Casts, UA 08/28/2023 None Seen  None Seen /LPF Final    Methodology 08/28/2023 Manual Light Microscopy   Final   Lab on 08/21/2023   Component Date Value Ref Range Status    Glucose 08/21/2023 107 (H)  65 - 99 mg/dL Final    BUN 08/21/2023 40 (H)  8 - 23 mg/dL Final    Creatinine 08/21/2023 1.28 (H)  0.57 - 1.00 mg/dL Final    Sodium 08/21/2023 138  136 - 145 mmol/L Final    Potassium  08/21/2023 4.6  3.5 - 5.2 mmol/L Final    Chloride 08/21/2023 99  98 - 107 mmol/L Final    CO2 08/21/2023 29.1 (H)  22.0 - 29.0 mmol/L Final    Calcium 08/21/2023 9.5  8.6 - 10.5 mg/dL Final    Total Protein 08/21/2023 7.0  6.0 - 8.5 g/dL Final    Albumin 08/21/2023 4.3  3.5 - 5.2 g/dL Final    ALT (SGPT) 08/21/2023 9  1 - 33 U/L Final    AST (SGOT) 08/21/2023 23  1 - 32 U/L Final    Alkaline Phosphatase 08/21/2023 71  39 - 117 U/L Final    Total Bilirubin 08/21/2023 1.4 (H)  0.0 - 1.2 mg/dL Final    Globulin 08/21/2023 2.7  gm/dL Final    A/G Ratio 08/21/2023 1.6  g/dL Final    BUN/Creatinine Ratio 08/21/2023 31.3 (H)  7.0 - 25.0 Final    Anion Gap 08/21/2023 9.9  5.0 - 15.0 mmol/L Final    eGFR 08/21/2023 43.0 (L)  >60.0 mL/min/1.73 Final    proBNP 08/21/2023 18,254.0 (H)  0.0 - 1,800.0 pg/mL Final    Magnesium 08/21/2023 1.5 (L)  1.6 - 2.4 mg/dL Final    WBC 08/21/2023 11.03 (H)  3.40 - 10.80 10*3/mm3 Final    RBC 08/21/2023 2.84 (L)  3.77 - 5.28 10*6/mm3 Final    Hemoglobin 08/21/2023 9.1 (L)  12.0 - 15.9 g/dL Final    Hematocrit 08/21/2023 27.9 (L)  34.0 - 46.6 % Final    MCV 08/21/2023 98.2 (H)  79.0 - 97.0 fL Final    MCH 08/21/2023 32.0  26.6 - 33.0 pg Final    MCHC 08/21/2023 32.6  31.5 - 35.7 g/dL Final    RDW 08/21/2023 22.9 (H)  12.3 - 15.4 % Final    RDW-SD 08/21/2023 80.5 (H)  37.0 - 54.0 fl Final    MPV 08/21/2023 10.6  6.0 - 12.0 fL Final    Platelets 08/21/2023 290  140 - 450 10*3/mm3 Final    Neutrophil % 08/21/2023 58.1  42.7 - 76.0 % Final    Lymphocyte % 08/21/2023 30.2  19.6 - 45.3 % Final    Monocyte % 08/21/2023 10.2  5.0 - 12.0 % Final    Eosinophil % 08/21/2023 0.4  0.3 - 6.2 % Final    Basophil % 08/21/2023 0.4  0.0 - 1.5 % Final    Immature Grans % 08/21/2023 0.7 (H)  0.0 - 0.5 % Final    Neutrophils, Absolute 08/21/2023 6.42  1.70 - 7.00 10*3/mm3 Final    Lymphocytes, Absolute 08/21/2023 3.33 (H)  0.70 - 3.10 10*3/mm3 Final    Monocytes, Absolute 08/21/2023 1.12 (H)  0.10 - 0.90 10*3/mm3  Final    Eosinophils, Absolute 08/21/2023 0.04  0.00 - 0.40 10*3/mm3 Final    Basophils, Absolute 08/21/2023 0.04  0.00 - 0.20 10*3/mm3 Final    Immature Grans, Absolute 08/21/2023 0.08 (H)  0.00 - 0.05 10*3/mm3 Final    nRBC 08/21/2023 1.0 (H)  0.0 - 0.2 /100 WBC Final    Ovalocytes 08/21/2023 Slight/1+  None Seen Final    Poikilocytes 08/21/2023 Slight/1+  None Seen Final    Polychromasia 08/21/2023 Slight/1+  None Seen Final    WBC Morphology 08/21/2023 Normal  Normal Final    Platelet Estimate 08/21/2023 Adequate  Normal Final    Large Platelets 08/21/2023 Slight/1+  None Seen Final   Office Visit on 08/17/2023   Component Date Value Ref Range Status    LDH 08/21/2023 134 (L)  135 - 214 U/L Final    Immature Reticulocyte Fraction 08/21/2023 29.3 (H)  3.0 - 15.8 % Final    Reticulocyte % 08/21/2023 3.26 (H)  0.70 - 1.90 % Final    Reticulocyte Absolute 08/21/2023 0.0926  0.0200 - 0.1300 10*6/mm3 Final    Reticulocyte Hgb 08/21/2023 33.9  29.8 - 36.1 pg Final   Lab on 08/17/2023   Component Date Value Ref Range Status    Reference Lab Report 08/17/2023 Flow Cytometry-Whole Blood   Final    WBC 08/17/2023 11.38 (H)  3.40 - 10.80 10*3/mm3 Final    RBC 08/17/2023 3.01 (L)  3.77 - 5.28 10*6/mm3 Final    Hemoglobin 08/17/2023 9.5 (L)  12.0 - 15.9 g/dL Final    Hematocrit 08/17/2023 29.6 (L)  34.0 - 46.6 % Final    MCV 08/17/2023 98.3 (H)  79.0 - 97.0 fL Final    MCH 08/17/2023 31.6  26.6 - 33.0 pg Final    MCHC 08/17/2023 32.1  31.5 - 35.7 g/dL Final    RDW 08/17/2023 22.2 (H)  12.3 - 15.4 % Final    RDW-SD 08/17/2023 77.7 (H)  37.0 - 54.0 fl Final    MPV 08/17/2023 10.0  6.0 - 12.0 fL Final    Platelets 08/17/2023 311  140 - 450 10*3/mm3 Final    Neutrophil % 08/17/2023 56.9  42.7 - 76.0 % Final    Lymphocyte % 08/17/2023 30.1  19.6 - 45.3 % Final    Monocyte % 08/17/2023 10.9  5.0 - 12.0 % Final    Eosinophil % 08/17/2023 1.2  0.3 - 6.2 % Final    Basophil % 08/17/2023 0.4  0.0 - 1.5 % Final    Immature Grans %  08/17/2023 0.5  0.0 - 0.5 % Final    Neutrophils, Absolute 08/17/2023 6.47  1.70 - 7.00 10*3/mm3 Final    Lymphocytes, Absolute 08/17/2023 3.42 (H)  0.70 - 3.10 10*3/mm3 Final    Monocytes, Absolute 08/17/2023 1.24 (H)  0.10 - 0.90 10*3/mm3 Final    Eosinophils, Absolute 08/17/2023 0.14  0.00 - 0.40 10*3/mm3 Final    Basophils, Absolute 08/17/2023 0.05  0.00 - 0.20 10*3/mm3 Final    Immature Grans, Absolute 08/17/2023 0.06 (H)  0.00 - 0.05 10*3/mm3 Final    nRBC 08/17/2023 1.2 (H)  0.0 - 0.2 /100 WBC Final    Glucose 08/17/2023 90  65 - 99 mg/dL Final    BUN 08/17/2023 50 (H)  8 - 23 mg/dL Final    Creatinine 08/17/2023 1.72 (H)  0.60 - 1.10 mg/dL Final    Sodium 08/17/2023 142  136 - 145 mmol/L Final    Potassium 08/17/2023 3.4 (L)  3.5 - 5.2 mmol/L Final    Chloride 08/17/2023 97 (L)  98 - 107 mmol/L Final    CO2 08/17/2023 25.9  22.0 - 29.0 mmol/L Final    Calcium 08/17/2023 8.8  8.6 - 10.5 mg/dL Final    Albumin 08/17/2023 4.4  3.5 - 5.2 g/dL Final    Phosphorus 08/17/2023 4.4  2.5 - 4.5 mg/dL Final    Anion Gap 08/17/2023 19.1 (H)  5.0 - 15.0 mmol/L Final    BUN/Creatinine Ratio 08/17/2023 29.1 (H)  7.0 - 25.0 Final    eGFR 08/17/2023 30.1 (L)  >60.0 mL/min/1.73 Final    Immature Reticulocyte Fraction 08/17/2023 33.3 (H)  3.0 - 15.8 % Final    Reticulocyte % 08/17/2023 3.09 (H)  0.70 - 1.90 % Final    Reticulocyte Absolute 08/17/2023 0.0933  0.0200 - 0.1300 10*6/mm3 Final    Reticulocyte Hgb 08/17/2023 34.7  29.8 - 36.1 pg Final   No results displayed because visit has over 200 results.      Admission on 07/31/2023, Discharged on 08/01/2023   Component Date Value Ref Range Status    QT Interval 07/31/2023 447  ms Final    Glucose 07/31/2023 151 (H)  65 - 99 mg/dL Final    BUN 07/31/2023 58 (H)  8 - 23 mg/dL Final    Creatinine 07/31/2023 1.20 (H)  0.57 - 1.00 mg/dL Final    Sodium 07/31/2023 134 (L)  136 - 145 mmol/L Final    Potassium 07/31/2023 4.4  3.5 - 5.2 mmol/L Final    Slight hemolysis detected by  analyzer. Results may be affected.    Chloride 07/31/2023 100  98 - 107 mmol/L Final    CO2 07/31/2023 19.4 (L)  22.0 - 29.0 mmol/L Final    Calcium 07/31/2023 8.7  8.6 - 10.5 mg/dL Final    Total Protein 07/31/2023 6.1  6.0 - 8.5 g/dL Final    Albumin 07/31/2023 3.6  3.5 - 5.2 g/dL Final    ALT (SGPT) 07/31/2023 12  1 - 33 U/L Final    AST (SGOT) 07/31/2023 18  1 - 32 U/L Final    Alkaline Phosphatase 07/31/2023 52  39 - 117 U/L Final    Total Bilirubin 07/31/2023 2.1 (H)  0.0 - 1.2 mg/dL Final    Globulin 07/31/2023 2.5  gm/dL Final    A/G Ratio 07/31/2023 1.4  g/dL Final    BUN/Creatinine Ratio 07/31/2023 48.3 (H)  7.0 - 25.0 Final    Anion Gap 07/31/2023 14.6  5.0 - 15.0 mmol/L Final    eGFR 07/31/2023 46.4 (L)  >60.0 mL/min/1.73 Final    proBNP 07/31/2023 27,235.0 (H)  0.0 - 1,800.0 pg/mL Final    HS Troponin T 07/31/2023 142 (C)  <10 ng/L Final    Extra Tube 07/31/2023 Hold for add-ons.   Final    Auto resulted.    Extra Tube 07/31/2023 hold for add-on   Final    Auto resulted    Extra Tube 07/31/2023 Hold for add-ons.   Final    Auto resulted.    Extra Tube 07/31/2023 Hold for add-ons.   Final    Auto resulted    WBC 07/31/2023 25.50 (H)  3.40 - 10.80 10*3/mm3 Final    RBC 07/31/2023 2.67 (L)  3.77 - 5.28 10*6/mm3 Final    Hemoglobin 07/31/2023 8.2 (L)  12.0 - 15.9 g/dL Final    Hematocrit 07/31/2023 24.9 (L)  34.0 - 46.6 % Final    MCV 07/31/2023 93.3  79.0 - 97.0 fL Final    MCH 07/31/2023 30.7  26.6 - 33.0 pg Final    MCHC 07/31/2023 32.9  31.5 - 35.7 g/dL Final    RDW 07/31/2023 19.6 (H)  12.3 - 15.4 % Final    RDW-SD 07/31/2023 64.7 (H)  37.0 - 54.0 fl Final    MPV 07/31/2023 9.9  6.0 - 12.0 fL Final    Platelets 07/31/2023 376  140 - 450 10*3/mm3 Final    Neutrophil % 07/31/2023 70.5  42.7 - 76.0 % Final    Lymphocyte % 07/31/2023 10.6 (L)  19.6 - 45.3 % Final    Monocyte % 07/31/2023 10.7  5.0 - 12.0 % Final    Eosinophil % 07/31/2023 0.9  0.3 - 6.2 % Final    Basophil % 07/31/2023 0.4  0.0 - 1.5 %  Final    Immature Grans % 07/31/2023 6.9 (H)  0.0 - 0.5 % Final    Neutrophils, Absolute 07/31/2023 18.00 (H)  1.70 - 7.00 10*3/mm3 Final    Lymphocytes, Absolute 07/31/2023 2.70  0.70 - 3.10 10*3/mm3 Final    Monocytes, Absolute 07/31/2023 2.74 (H)  0.10 - 0.90 10*3/mm3 Final    Eosinophils, Absolute 07/31/2023 0.22  0.00 - 0.40 10*3/mm3 Final    Basophils, Absolute 07/31/2023 0.09  0.00 - 0.20 10*3/mm3 Final    Immature Grans, Absolute 07/31/2023 1.75 (H)  0.00 - 0.05 10*3/mm3 Final    nRBC 07/31/2023 2.5 (H)  0.0 - 0.2 /100 WBC Final    Neutrophil % 07/31/2023 78.0 (H)  42.7 - 76.0 % Final    Lymphocyte % 07/31/2023 8.0 (L)  19.6 - 45.3 % Final    Monocyte % 07/31/2023 11.0  5.0 - 12.0 % Final    Eosinophil % 07/31/2023 3.0  0.3 - 6.2 % Final    Neutrophils Absolute 07/31/2023 19.89 (H)  1.70 - 7.00 10*3/mm3 Final    Lymphocytes Absolute 07/31/2023 2.04  0.70 - 3.10 10*3/mm3 Final    Monocytes Absolute 07/31/2023 2.81 (H)  0.10 - 0.90 10*3/mm3 Final    Eosinophils Absolute 07/31/2023 0.77 (H)  0.00 - 0.40 10*3/mm3 Final    nRBC 07/31/2023 5.0 (H)  0.0 - 0.2 /100 WBC Final    Ovalocytes 07/31/2023 Slight/1+  None Seen Final    Polychromasia 07/31/2023 Slight/1+  None Seen Final    WBC Morphology 07/31/2023 Normal  Normal Final    Platelet Morphology 07/31/2023 Normal  Normal Final    HS Troponin T 07/31/2023 143 (C)  <10 ng/L Final    Troponin T Delta 07/31/2023 1  >=-4 - <+4 ng/L Final    Lactate 07/31/2023 2.3 (C)  0.5 - 2.0 mmol/L Final    Blood Culture 07/31/2023 No growth at 5 days   Final    Blood Culture 07/31/2023 No growth at 5 days   Final    Color, UA 07/31/2023 Yellow  Yellow, Straw Final    Appearance, UA 07/31/2023 Clear  Clear Final    pH, UA 07/31/2023 <=5.0  5.0 - 8.0 Final    Specific Gravity, UA 07/31/2023 1.012  1.005 - 1.030 Final    Glucose, UA 07/31/2023 100 mg/dL (Trace) (A)  Negative Final    Ketones, UA 07/31/2023 Negative  Negative Final    Bilirubin, UA 07/31/2023 Negative  Negative  Final    Blood, UA 07/31/2023 Negative  Negative Final    Protein, UA 07/31/2023 30 mg/dL (1+) (A)  Negative Final    Leuk Esterase, UA 07/31/2023 Trace (A)  Negative Final    Nitrite, UA 07/31/2023 Negative  Negative Final    Urobilinogen, UA 07/31/2023 0.2 E.U./dL  0.2 - 1.0 E.U./dL Final    Lactate 07/31/2023 2.0  0.5 - 2.0 mmol/L Final    RBC, UA 07/31/2023 None Seen  None Seen /HPF Final    WBC, UA 07/31/2023 3-5 (A)  None Seen /HPF Final    Urine culture not indicated.    Bacteria, UA 07/31/2023 Trace (A)  None Seen /HPF Final    Squamous Epithelial Cells, UA 07/31/2023 3-6 (A)  None Seen, 0-2 /HPF Final    Yeast, UA 07/31/2023 Small/1+ Budding Yeast  None Seen /HPF Final    Hyaline Casts, UA 07/31/2023 None Seen  None Seen /LPF Final    Methodology 07/31/2023 Manual Light Microscopy   Final    Glucose 07/31/2023 140 (H)  65 - 99 mg/dL Final    BUN 07/31/2023 56 (H)  8 - 23 mg/dL Final    Creatinine 07/31/2023 1.20 (H)  0.57 - 1.00 mg/dL Final    Sodium 07/31/2023 134 (L)  136 - 145 mmol/L Final    Potassium 07/31/2023 4.2  3.5 - 5.2 mmol/L Final    Slight hemolysis detected by analyzer. Results may be affected.    Chloride 07/31/2023 100  98 - 107 mmol/L Final    CO2 07/31/2023 20.8 (L)  22.0 - 29.0 mmol/L Final    Calcium 07/31/2023 8.7  8.6 - 10.5 mg/dL Final    Total Protein 07/31/2023 5.9 (L)  6.0 - 8.5 g/dL Final    Albumin 07/31/2023 3.5  3.5 - 5.2 g/dL Final    ALT (SGPT) 07/31/2023 12  1 - 33 U/L Final    AST (SGOT) 07/31/2023 19  1 - 32 U/L Final    Alkaline Phosphatase 07/31/2023 51  39 - 117 U/L Final    Total Bilirubin 07/31/2023 1.9 (H)  0.0 - 1.2 mg/dL Final    Globulin 07/31/2023 2.4  gm/dL Final    A/G Ratio 07/31/2023 1.5  g/dL Final    BUN/Creatinine Ratio 07/31/2023 46.7 (H)  7.0 - 25.0 Final    Anion Gap 07/31/2023 13.2  5.0 - 15.0 mmol/L Final    eGFR 07/31/2023 46.4 (L)  >60.0 mL/min/1.73 Final    Procalcitonin 07/31/2023 0.07  0.00 - 0.25 ng/mL Final    WBC 08/01/2023 20.86 (H)  3.40 -  10.80 10*3/mm3 Final    RBC 08/01/2023 2.44 (L)  3.77 - 5.28 10*6/mm3 Final    Hemoglobin 08/01/2023 7.5 (L)  12.0 - 15.9 g/dL Final    Hematocrit 08/01/2023 23.4 (L)  34.0 - 46.6 % Final    MCV 08/01/2023 95.9  79.0 - 97.0 fL Final    MCH 08/01/2023 30.7  26.6 - 33.0 pg Final    MCHC 08/01/2023 32.1  31.5 - 35.7 g/dL Final    RDW 08/01/2023 20.1 (H)  12.3 - 15.4 % Final    RDW-SD 08/01/2023 69.4 (H)  37.0 - 54.0 fl Final    MPV 08/01/2023 10.1  6.0 - 12.0 fL Final    Platelets 08/01/2023 341  140 - 450 10*3/mm3 Final    Neutrophil % 08/01/2023 64.7  42.7 - 76.0 % Final    Lymphocyte % 08/01/2023 14.3 (L)  19.6 - 45.3 % Final    Monocyte % 08/01/2023 12.4 (H)  5.0 - 12.0 % Final    Eosinophil % 08/01/2023 2.8  0.3 - 6.2 % Final    Basophil % 08/01/2023 0.4  0.0 - 1.5 % Final    Immature Grans % 08/01/2023 5.4 (H)  0.0 - 0.5 % Final    Neutrophils, Absolute 08/01/2023 13.48 (H)  1.70 - 7.00 10*3/mm3 Final    Lymphocytes, Absolute 08/01/2023 2.99  0.70 - 3.10 10*3/mm3 Final    Monocytes, Absolute 08/01/2023 2.59 (H)  0.10 - 0.90 10*3/mm3 Final    Eosinophils, Absolute 08/01/2023 0.58 (H)  0.00 - 0.40 10*3/mm3 Final    Basophils, Absolute 08/01/2023 0.09  0.00 - 0.20 10*3/mm3 Final    Immature Grans, Absolute 08/01/2023 1.13 (H)  0.00 - 0.05 10*3/mm3 Final    nRBC 08/01/2023 1.0 (H)  0.0 - 0.2 /100 WBC Final    COVID19 07/31/2023 Not Detected  Not Detected - Ref. Range Final    Glucose 08/01/2023 119 (H)  65 - 99 mg/dL Final    BUN 08/01/2023 60 (H)  8 - 23 mg/dL Final    Creatinine 08/01/2023 1.33 (H)  0.57 - 1.00 mg/dL Final    Sodium 08/01/2023 137  136 - 145 mmol/L Final    Potassium 08/01/2023 4.2  3.5 - 5.2 mmol/L Final    Chloride 08/01/2023 101  98 - 107 mmol/L Final    CO2 08/01/2023 24.0  22.0 - 29.0 mmol/L Final    Calcium 08/01/2023 8.5 (L)  8.6 - 10.5 mg/dL Final    BUN/Creatinine Ratio 08/01/2023 45.1 (H)  7.0 - 25.0 Final    Anion Gap 08/01/2023 12.0  5.0 - 15.0 mmol/L Final    eGFR 08/01/2023 41.0 (L)   >60.0 mL/min/1.73 Final    Magnesium 08/01/2023 1.9  1.6 - 2.4 mg/dL Final    Magnesium 07/31/2023 1.9  1.6 - 2.4 mg/dL Final    HS Troponin T 08/01/2023 155 (C)  <10 ng/L Final       ASSESSMENT/ PLAN:    Diagnoses and all orders for this visit:    1. Encounter for medical examination to establish care (Primary)  Assessment & Plan:  She presents today to establish care.  She just had some recent blood work about 3 months ago that was all very good.  She will continue to follow-up with cardiology since her bypass as well as her hematologist for her new diagnosis of hemolytic anemia.      2. CAD status post CABG x3 vessels  Assessment & Plan:  She is doing well since her rehab.  She still has home health.  She does not feel that she needs any additional therapy at this time.      3. Chronic HFrEF (heart failure with reduced ejection fraction)  Assessment & Plan:  She is doing well since her bypass surgery and has diuresed off several pounds.  She is not having any shortness of breath or edema.      4. Mixed hyperlipidemia  Assessment & Plan:  Her most recent lipid profile was excellent.  We will continue her current statin and dosage.      5. Primary hypertension  Assessment & Plan:  Her blood pressure is great here today as well as at home.  We will continue her current meds.      6. PAF (paroxysmal atrial fibrillation)  Assessment & Plan:  Currently she remains regular.  She will continue her current meds and her Eliquis twice daily.      7. Type 2 diabetes mellitus without complication, without long-term current use of insulin  Assessment & Plan:  Her diabetic control is excellent.  She will continue her current meds.      8. Other specified hypothyroidism    9. Drug-induced autoantibody type hemolytic anemia    10. Need for RSV vaccination  Assessment & Plan:  Given her significant list of significant medical problems we will get her vaccinated for the RSV virus.      Other orders  -     RSVPreF3 Vac Recomb  Adjuvanted (AREXVY) 120 MCG/0.5ML reconstituted suspension injection; Inject 0.5 mL into the appropriate muscle as directed by prescriber 1 (One) Time for 1 dose.  Dispense: 0.5 mL; Refill: 0        Orders Placed Today:     New Medications Ordered This Visit   Medications    RSVPreF3 Vac Recomb Adjuvanted (AREXVY) 120 MCG/0.5ML reconstituted suspension injection     Sig: Inject 0.5 mL into the appropriate muscle as directed by prescriber 1 (One) Time for 1 dose.     Dispense:  0.5 mL     Refill:  0        Management Plan:     An After Visit Summary was printed and given to the patient at discharge.    Follow-up: Return in about 4 months (around 12/29/2023) for Recheck.    Romario Valdez DO 8/29/2023 11:09 EDT  This note was electronically signed.

## 2023-08-29 NOTE — ASSESSMENT & PLAN NOTE
She is doing well since her rehab.  She still has home health.  She does not feel that she needs any additional therapy at this time.

## 2023-08-29 NOTE — ASSESSMENT & PLAN NOTE
Given her significant list of significant medical problems we will get her vaccinated for the RSV virus.

## 2023-08-29 NOTE — ASSESSMENT & PLAN NOTE
She is doing well since her bypass surgery and has diuresed off several pounds.  She is not having any shortness of breath or edema.

## 2023-08-29 NOTE — ASSESSMENT & PLAN NOTE
On 7/20/23 Ms. Hughes underwent a CABGx3 utilizing LIMA/RSVG and LANE ligation with Dr. Alexander.  She is recovering very well.  Her incisions are clean and dry without any drainage erythema or edema.

## 2023-08-29 NOTE — PATIENT INSTRUCTIONS
1.  Reduce the furosemide/Lasix to 40 mg once a day.  2.  Start Entresto 24-26 mg half a tablet twice a day.  3.  Increase Toprol-XL to 12.5 mg in the morning and 25 mg at night for 1 week followed by 25 mg twice a day.  4.  Do a heart rate blood pressure and weight log every day and bring it to us.  5.  Do blood work 1 or 2-day before your next appointment

## 2023-08-29 NOTE — ASSESSMENT & PLAN NOTE
Ms. Hughes has heart failure with reduced ejection fraction who is recovering very well post CABG.  She continues to experience short of air with activity.  Her pedal edema has improved.  We have encouraged her to crease her activity by participating in cardiac rehab.  Patient was reluctant but agreeable.  I will make the following changes to optimize her heart failure medications:      1.  Reduce the furosemide/Lasix to 40 mg once a day.  2.  Start Entresto 24-26 mg half a tablet twice a day.  3.  Increase Toprol-XL to 12.5 mg in the morning and 25 mg at night for 1 week followed by 25 mg twice a day.  4.  Do a heart rate blood pressure and weight log every day and bring it to us.  5.  Do blood work 1 or 2-day before your next appointment

## 2023-08-29 NOTE — ASSESSMENT & PLAN NOTE
She presents today to establish care.  She just had some recent blood work about 3 months ago that was all very good.  She will continue to follow-up with cardiology since her bypass as well as her hematologist for her new diagnosis of hemolytic anemia.

## 2023-08-30 PROBLEM — N18.32 STAGE 3B CHRONIC KIDNEY DISEASE: Status: ACTIVE | Noted: 2023-08-30

## 2023-08-30 RX ORDER — METOPROLOL SUCCINATE 25 MG/1
25 TABLET, EXTENDED RELEASE ORAL EVERY 12 HOURS SCHEDULED
Qty: 60 TABLET | Refills: 2 | Status: SHIPPED | OUTPATIENT
Start: 2023-08-30 | End: 2023-11-28

## 2023-08-30 RX ORDER — FUROSEMIDE 40 MG/1
40 TABLET ORAL DAILY
Qty: 60 TABLET | Refills: 2 | Status: SHIPPED | OUTPATIENT
Start: 2023-08-30

## 2023-08-30 RX ORDER — SACUBITRIL AND VALSARTAN 24; 26 MG/1; MG/1
0.5 TABLET, FILM COATED ORAL 2 TIMES DAILY
Qty: 90 TABLET | Refills: 3 | Status: SHIPPED | OUTPATIENT
Start: 2023-08-30 | End: 2023-11-28

## 2023-08-30 RX ORDER — SACUBITRIL AND VALSARTAN 24; 26 MG/1; MG/1
1 TABLET, FILM COATED ORAL 2 TIMES DAILY
Qty: 28 TABLET | Refills: 0 | COMMUNITY
Start: 2023-08-30 | End: 2023-08-30 | Stop reason: SDUPTHER

## 2023-09-06 ENCOUNTER — LAB (OUTPATIENT)
Dept: LAB | Facility: HOSPITAL | Age: 78
End: 2023-09-06
Payer: MEDICARE

## 2023-09-06 DIAGNOSIS — I48.0 PAF (PAROXYSMAL ATRIAL FIBRILLATION): ICD-10-CM

## 2023-09-06 DIAGNOSIS — D50.8 IRON DEFICIENCY ANEMIA SECONDARY TO INADEQUATE DIETARY IRON INTAKE: ICD-10-CM

## 2023-09-06 DIAGNOSIS — Z95.1 S/P CABG (CORONARY ARTERY BYPASS GRAFT): Primary | ICD-10-CM

## 2023-09-06 DIAGNOSIS — I25.810 CORONARY ARTERY DISEASE INVOLVING AUTOLOGOUS VEIN CORONARY BYPASS GRAFT WITHOUT ANGINA PECTORIS: ICD-10-CM

## 2023-09-06 DIAGNOSIS — N18.32 STAGE 3B CHRONIC KIDNEY DISEASE: ICD-10-CM

## 2023-09-06 DIAGNOSIS — I50.22 CHRONIC HFREF (HEART FAILURE WITH REDUCED EJECTION FRACTION): ICD-10-CM

## 2023-09-06 LAB
ALBUMIN SERPL-MCNC: 4.5 G/DL (ref 3.5–5.2)
ALBUMIN/GLOB SERPL: 1.9 G/DL
ALP SERPL-CCNC: 53 U/L (ref 39–117)
ALT SERPL W P-5'-P-CCNC: 23 U/L (ref 1–33)
ANION GAP SERPL CALCULATED.3IONS-SCNC: 11.9 MMOL/L (ref 5–15)
ANISOCYTOSIS BLD QL: ABNORMAL
AST SERPL-CCNC: 25 U/L (ref 1–32)
BILIRUB SERPL-MCNC: 1 MG/DL (ref 0–1.2)
BUN SERPL-MCNC: 52 MG/DL (ref 8–23)
BUN/CREAT SERPL: 36.9 (ref 7–25)
CALCIUM SPEC-SCNC: 10.3 MG/DL (ref 8.6–10.5)
CHLORIDE SERPL-SCNC: 98 MMOL/L (ref 98–107)
CO2 SERPL-SCNC: 28.1 MMOL/L (ref 22–29)
CREAT SERPL-MCNC: 1.41 MG/DL (ref 0.57–1)
DEPRECATED RDW RBC AUTO: 85.5 FL (ref 37–54)
EGFRCR SERPLBLD CKD-EPI 2021: 38.3 ML/MIN/1.73
EOSINOPHIL # BLD MANUAL: 0.69 10*3/MM3 (ref 0–0.4)
EOSINOPHIL NFR BLD MANUAL: 5 % (ref 0.3–6.2)
ERYTHROCYTE [DISTWIDTH] IN BLOOD BY AUTOMATED COUNT: 22.5 % (ref 12.3–15.4)
GLOBULIN UR ELPH-MCNC: 2.4 GM/DL
GLUCOSE SERPL-MCNC: 130 MG/DL (ref 65–99)
HCT VFR BLD AUTO: 34.5 % (ref 34–46.6)
HGB BLD-MCNC: 10.8 G/DL (ref 12–15.9)
HGB RETIC QN AUTO: 35.9 PG (ref 29.8–36.1)
IMM RETICS NFR: 31.4 % (ref 3–15.8)
LDH SERPL-CCNC: 150 U/L (ref 135–214)
LYMPHOCYTES # BLD MANUAL: 2.47 10*3/MM3 (ref 0.7–3.1)
LYMPHOCYTES NFR BLD MANUAL: 10 % (ref 5–12)
MACROCYTES BLD QL SMEAR: ABNORMAL
MAGNESIUM SERPL-MCNC: 2 MG/DL (ref 1.6–2.4)
MCH RBC QN AUTO: 32.4 PG (ref 26.6–33)
MCHC RBC AUTO-ENTMCNC: 31.3 G/DL (ref 31.5–35.7)
MCV RBC AUTO: 103.6 FL (ref 79–97)
MONOCYTES # BLD: 1.37 10*3/MM3 (ref 0.1–0.9)
NEUTROPHILS # BLD AUTO: 9.21 10*3/MM3 (ref 1.7–7)
NEUTROPHILS NFR BLD MANUAL: 65 % (ref 42.7–76)
NEUTS BAND NFR BLD MANUAL: 2 % (ref 0–5)
NRBC SPEC MANUAL: 1 /100 WBC (ref 0–0.2)
NT-PROBNP SERPL-MCNC: 6516 PG/ML (ref 0–1800)
OVALOCYTES BLD QL SMEAR: ABNORMAL
PLATELET # BLD AUTO: 320 10*3/MM3 (ref 140–450)
PMV BLD AUTO: 11.4 FL (ref 6–12)
POTASSIUM SERPL-SCNC: 5.1 MMOL/L (ref 3.5–5.2)
PROT SERPL-MCNC: 6.9 G/DL (ref 6–8.5)
RBC # BLD AUTO: 3.33 10*6/MM3 (ref 3.77–5.28)
RETICS # AUTO: 0.09 10*6/MM3 (ref 0.02–0.13)
RETICS/RBC NFR AUTO: 2.77 % (ref 0.7–1.9)
SCAN SLIDE: NORMAL
SMALL PLATELETS BLD QL SMEAR: ADEQUATE
SODIUM SERPL-SCNC: 138 MMOL/L (ref 136–145)
VARIANT LYMPHS NFR BLD MANUAL: 18 % (ref 19.6–45.3)
WBC MORPH BLD: NORMAL
WBC NRBC COR # BLD: 13.74 10*3/MM3 (ref 3.4–10.8)

## 2023-09-06 PROCEDURE — 83880 ASSAY OF NATRIURETIC PEPTIDE: CPT

## 2023-09-06 PROCEDURE — 80053 COMPREHEN METABOLIC PANEL: CPT

## 2023-09-06 PROCEDURE — 83735 ASSAY OF MAGNESIUM: CPT

## 2023-09-06 PROCEDURE — 83615 LACTATE (LD) (LDH) ENZYME: CPT

## 2023-09-06 PROCEDURE — 85007 BL SMEAR W/DIFF WBC COUNT: CPT

## 2023-09-06 PROCEDURE — 85046 RETICYTE/HGB CONCENTRATE: CPT

## 2023-09-06 PROCEDURE — 85025 COMPLETE CBC W/AUTO DIFF WBC: CPT

## 2023-09-06 PROCEDURE — 36415 COLL VENOUS BLD VENIPUNCTURE: CPT

## 2023-09-06 NOTE — PROGRESS NOTES
I spoke with MsKaruna Ellen she said that her hematologist is watching the white blood counts closely.  I have informed her that it is slightly elevated along with her kidney function.  She said she thinks she is passing a kidney stone at this time.  We will continue to monitor her.

## 2023-09-11 LAB — REF LAB TEST METHOD: NORMAL

## 2023-09-12 ENCOUNTER — OUTSIDE FACILITY SERVICE (OUTPATIENT)
Dept: CARDIAC SURGERY | Facility: CLINIC | Age: 78
End: 2023-09-12
Payer: MEDICARE

## 2023-09-13 ENCOUNTER — OFFICE VISIT (OUTPATIENT)
Dept: ONCOLOGY | Facility: CLINIC | Age: 78
End: 2023-09-13
Payer: MEDICARE

## 2023-09-13 ENCOUNTER — LAB (OUTPATIENT)
Dept: LAB | Facility: HOSPITAL | Age: 78
End: 2023-09-13
Payer: MEDICARE

## 2023-09-13 VITALS
WEIGHT: 182.9 LBS | SYSTOLIC BLOOD PRESSURE: 120 MMHG | TEMPERATURE: 97.1 F | OXYGEN SATURATION: 99 % | BODY MASS INDEX: 31.22 KG/M2 | HEART RATE: 63 BPM | DIASTOLIC BLOOD PRESSURE: 79 MMHG | HEIGHT: 64 IN

## 2023-09-13 DIAGNOSIS — E53.8 B12 DEFICIENCY: ICD-10-CM

## 2023-09-13 DIAGNOSIS — D59.0 DRUG-INDUCED AUTOANTIBODY TYPE HEMOLYTIC ANEMIA: ICD-10-CM

## 2023-09-13 DIAGNOSIS — C91.Z0 LARGE GRANULAR LYMPHOCYTE DISORDER: ICD-10-CM

## 2023-09-13 DIAGNOSIS — D59.10 HEMOLYTIC ANEMIA DUE TO ANTIBODY: Primary | ICD-10-CM

## 2023-09-13 DIAGNOSIS — D50.8 IRON DEFICIENCY ANEMIA SECONDARY TO INADEQUATE DIETARY IRON INTAKE: ICD-10-CM

## 2023-09-13 DIAGNOSIS — D59.10 HEMOLYTIC ANEMIA DUE TO ANTIBODY: ICD-10-CM

## 2023-09-13 LAB
BASOPHILS # BLD AUTO: 0.08 10*3/MM3 (ref 0–0.2)
BASOPHILS NFR BLD AUTO: 0.4 % (ref 0–1.5)
DEPRECATED RDW RBC AUTO: 78.7 FL (ref 37–54)
EOSINOPHIL # BLD AUTO: 0.1 10*3/MM3 (ref 0–0.4)
EOSINOPHIL NFR BLD AUTO: 0.6 % (ref 0.3–6.2)
ERYTHROCYTE [DISTWIDTH] IN BLOOD BY AUTOMATED COUNT: 21.4 % (ref 12.3–15.4)
HCT VFR BLD AUTO: 32.3 % (ref 34–46.6)
HGB BLD-MCNC: 10.9 G/DL (ref 12–15.9)
IMM GRANULOCYTES # BLD AUTO: 0.15 10*3/MM3 (ref 0–0.05)
IMM GRANULOCYTES NFR BLD AUTO: 0.8 % (ref 0–0.5)
LYMPHOCYTES # BLD AUTO: 4.52 10*3/MM3 (ref 0.7–3.1)
LYMPHOCYTES NFR BLD AUTO: 25.4 % (ref 19.6–45.3)
MCH RBC QN AUTO: 33.6 PG (ref 26.6–33)
MCHC RBC AUTO-ENTMCNC: 33.7 G/DL (ref 31.5–35.7)
MCV RBC AUTO: 99.7 FL (ref 79–97)
MONOCYTES # BLD AUTO: 1.51 10*3/MM3 (ref 0.1–0.9)
MONOCYTES NFR BLD AUTO: 8.5 % (ref 5–12)
NEUTROPHILS NFR BLD AUTO: 11.45 10*3/MM3 (ref 1.7–7)
NEUTROPHILS NFR BLD AUTO: 64.3 % (ref 42.7–76)
NRBC BLD AUTO-RTO: 0.2 /100 WBC (ref 0–0.2)
PLATELET # BLD AUTO: 260 10*3/MM3 (ref 140–450)
PMV BLD AUTO: 10.6 FL (ref 6–12)
RBC # BLD AUTO: 3.24 10*6/MM3 (ref 3.77–5.28)
WBC NRBC COR # BLD: 17.81 10*3/MM3 (ref 3.4–10.8)

## 2023-09-13 PROCEDURE — 36415 COLL VENOUS BLD VENIPUNCTURE: CPT

## 2023-09-13 PROCEDURE — 85025 COMPLETE CBC W/AUTO DIFF WBC: CPT

## 2023-09-13 NOTE — PROGRESS NOTES
Subjective         REASONS FOR FOLLOWUP:    1. LEUKOCYTOSIS , NEUTROPHILS HAVE NO GRANULES  2. ANEMIA FOR 3 YEARS,NORMAL MCV: VERY ATYPICAL RED BLOOD CELL MORPHOLOGY  3. CHF AND PULMONARY EDEMA , CAD, ELEVATED CREATININE.    HISTORY OF PRESENT ILLNESS:     On 08/04/2023 I had the opportunity to see this 77-year-old female who has been admitted to the hospital in pulmonary edema, congestive heart failure, associated with cardiac disease. She has undergone recently a cardiac bypass. We have been consulted because the patient has been noticed to have leukocytosis, new issue for her in absence of any other fever or infection and she also has been noticed to have anemia. On further questioning to the patient her primary care physician in [place] has told her for many months that she has been anemic but no specific investigation has been made about that that she is aware of. She has no notion of passage of blood in the stool, no hematemesis, no melena, no hematuria, no vaginal bleeding. Her diet is complete in nutrients. She has lost some weight because she has been very fatigued lately. The fatigue was associated with her heart operation but even before that she was very tired. The patient also states that she has not had any definitive infection even though she had pneumonia  weeks before her cardiac operation but she got better from this. She has not had any fevers, chills, night sweats, pruritus, adenopathies or rashes. She complains of some shortness of breath upon exertion but this is dramatically better since diuresis was established for the treatment of her CHF. Urinary output has been abundant. She has no difficulty with miction. Her bowel activity has been normal. Her surgical sites in her leg have had some ecchymosis and swelling and she has had significant fluid accumulation in her legs.      The patient has no previous history of hematological disorder. She is not aware of any organ enlargement or  anything of this nature.  The patient returned to the office on 08/17/2023 in company of her  and her son. In the interim she was discharged from [facility] a few days ago after her cardiac surgery and her CHF and she has been at home with no appetite whatsoever and significant fatigue. Her appetite is minimum. Her blood sugar has been in the 120 category. She has not had any nausea, vomiting, diarrhea, melena, enterorrhagia, abdominal pain. Urination is normal in volume, urine is normal color. She has minimal cough and some shortness of breath. She has no energy for anything. She requires total care for bathing, dressing and so forth by the son and . She has not had any fever, chills or infection. Her surgical sites in the chest wall and her lower extremities are all dry with no evidence of infection.   On 09/13/2023 the patient returned to the office in company of her  and her son. In the meantime since the previous visit the patient has had a remarkable clinical improvement. Her appetite has returned to normality. The patient actually has been up and about in the kitchen doing some cooking and doing a little bit of dishes. The patient has been able to get around in her walker and her rollator. Her blood sugar has remained below 200 in spite of the minimal dose of prednisone. She has lesser degree of shortness of breath and near complete resolution of the anasarca. Her bowel activity is normal. Urination is abundant. She has no fevers, chills or infection.     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        Past Medical History:   Diagnosis Date    Acne rosacea 08/17/2021    DR.JEFF MOON     Arteriosclerosis of abdominal aorta     B12 deficiency 08/17/2021    DJD (degenerative joint disease)     Hx of smoking 08/17/2021    QUIT IN 1976 AT AGE 31    Hyperlipidemia 08/17/2021     Hypertension     Hypothyroidism     Metabolic syndrome 08/17/2021    KAREN (stress urinary incontinence, female) 08/17/2021    UTI (urinary tract infection) 08/17/2021    Vitamin D deficiency 08/17/2021        Past Surgical History:   Procedure Laterality Date    BACK SURGERY  2013    CARDIAC CATHETERIZATION N/A 07/17/2023    Procedure: Left Heart Cath;  Surgeon: Dinh Andres MD;  Location: [facility] CATH INVASIVE LOCATION;  Service: Cardiovascular;  Laterality: N/A;    CARPAL TUNNEL RELEASE      COLONOSCOPY  2011    CORONARY ARTERY BYPASS GRAFT WITH MITRAL VALVE REPAIR/REPLACEMENT N/A 07/20/2023    Procedure: REZA STERNOTOMY OFF-PUMP CORONARY ARTERY BYPASS GRAFT TIMES        USING LEFFT INTERNAL MAMMARY ARTERY AND     GREATER SAPHENOUS VEIN GRAFT PER EMDOSCOPIC VEIN HARVESTING, MAZE PROCEDURE AND PRP;  Surgeon: hSane Alexander MD;  Location: [facility] CVOR;  Service: Cardiothoracic;  Laterality: N/A;    KNEE SURGERY      LUMBAR DISCECTOMY      NECK SURGERY      Cervical    POSTERIOR LUMBAR/THORACIC SPINE FUSION          Current Outpatient Medications on File Prior to Visit   Medication Sig Dispense Refill    acetaminophen (TYLENOL) 325 MG tablet Take 2 tablets by mouth Every 4 (Four) Hours As Needed for Mild Pain.      albuterol sulfate  (90 Base) MCG/ACT inhaler Inhale 2 puffs Every 4 (Four) Hours As Needed for Wheezing. 18 g 0    apixaban (ELIQUIS) 5 MG tablet tablet Take 1 tablet by mouth Every 12 (Twelve) Hours. Indications: Atrial Fibrillation 60 tablet 2    aspirin 81 MG EC tablet Take 1 tablet by mouth Daily.      atorvastatin (LIPITOR) 40 MG tablet Take 1 tablet by mouth Every Night for 90 days. 90 tablet 0    buPROPion XL (WELLBUTRIN XL) 300 MG 24 hr tablet TAKE 1 TABLET BY MOUTH DAILY 90 tablet 1    dapagliflozin Propanediol (Farxiga) 10 MG tablet Take 10 mg by mouth Daily. 30 tablet 3    folic acid (FOLVITE) 1 MG tablet Take 1 tablet by mouth Daily. 30 tablet 1    furosemide (LASIX) 40 MG  tablet Take 1 tablet by mouth Daily. 60 tablet 2    metFORMIN ER (GLUCOPHAGE-XR) 500 MG 24 hr tablet TAKE 1 TABLET BY MOUTH EVERY MORNING AND 2 TABLETS AT SUPPER (Patient taking differently: 1 tablet 2 (Two) Times a Day.) 270 tablet 4    metoprolol succinate XL (TOPROL-XL) 25 MG 24 hr tablet Take 1 tablet by mouth Every 12 (Twelve) Hours for 90 days. 60 tablet 2    montelukast (SINGULAIR) 10 MG tablet Take 1 tablet by mouth Daily. 90 tablet 3    multivitamin with minerals tablet tablet Take 1 tablet by mouth Daily.      predniSONE (DELTASONE) 10 MG tablet Take 1 tablet by mouth Daily. 30 tablet 1    Probiotic Product (InterResolve PO) Take 1 capsule by mouth Daily.      sacubitril-valsartan (Entresto) 24-26 MG tablet Take 0.5 tablets by mouth 2 (Two) Times a Day for 90 days. 90 tablet 3    spironolactone (ALDACTONE) 25 MG tablet Take 1 tablet by mouth Daily. 90 tablet 3    Synthroid 75 MCG tablet Take 1 tablet by mouth Daily. 90 tablet 3     No current facility-administered medications on file prior to visit.        ALLERGIES:    Allergies   Allergen Reactions    Penicillins Palpitations     1. When was your reaction? > 10 years ago  2. Did your reaction happen after the first dose or after several doses? After first dose  3. Did your reaction require ED or hospital care to manage your reaction? Do not know  4. Did your reaction require treatment with epinephrine? Do not know  5. Have you taken amoxicillin (Amoxil) or amoxicillin-clavulanate (Augmentin) without issue since? Yes  6. Have you taken cephalexin (Keflex) without issue since?  Do not know         Social History     Socioeconomic History    Marital status:      Spouse name: Dinh   Tobacco Use    Smoking status: Former     Packs/day: 1.00     Years: 12.00     Pack years: 12.00     Types: Cigarettes     Quit date:      Years since quittin.7    Smokeless tobacco: Never   Vaping Use    Vaping Use: Never used   Substance and Sexual  "Activity    Alcohol use: Never    Drug use: Never    Sexual activity: Defer        Family History   Problem Relation Age of Onset    Heart disease Mother     Arthritis Mother     Heart attack Mother     Arthritis Father         Objective     Vitals:    09/13/23 0903   BP: 120/79   Pulse: 63   Temp: 97.1 °F (36.2 °C)   TempSrc: Temporal   SpO2: 99%   Weight: 83 kg (182 lb 14.4 oz)   Height: 162.6 cm (64.02\")   PainSc: 0-No pain         9/13/2023     9:01 AM   Current Status   ECOG score 2       Physical Exam              GENERAL:  Well-developed, Patient  in no acute distress. In a wheel chair  SKIN:  Warm, dry ,NO purpura ,no rash.  HEENT:  Pupils were equal and reactive to light and accomodation, conjunctivae noninjected,  normal visual acuity.   NECK:  Supple with good range of motion; no thyromegaly , no JVD or bruits,.No carotid artery pain, no carotid abnormal pulsation   LYMPHATICS:  No cervical, NO supraclavicular, NO axillary, NO inguinal adenopathies.  CARDIAC   normal rate , regular rhythm, without murmur,NO rubs NO S3 NO S4   LUNGS: normal breath sounds bilateral, no wheezing, NO rhonchi, NO crackles ,NO rubs.Surgical site from the chest tube below the sternum is still raw with no obvious secondary infection.      VASCULAR VENOUS: no cyanosis, NO collateral circulation, NO varicosities, NO edema, NO palpable cords, NO pain,NO erythema, NO pigmentation of the skin.  ABDOMEN:  Soft, NO pain,no hepatomegaly, no splenomegaly,no masses, no ascites, no collateral circulation,no distention.  EXTREMITIES  AND SPINE:  No clubbing, no cyanosis ,no deformities , no pain .No kyphosis,  no pain in spine, no pain in ribs , no pain in pelvic bone.  NEUROLOGICAL:  Patient was awake, alert, oriented to time, person and place.        RECENT LABS:  Hematology WBC   Date Value Ref Range Status   09/13/2023 17.81 (H) 3.40 - 10.80 10*3/mm3 Final     RBC   Date Value Ref Range Status   09/13/2023 3.24 (L) 3.77 - 5.28 10*6/mm3 " Final     Hemoglobin   Date Value Ref Range Status   09/13/2023 10.9 (L) 12.0 - 15.9 g/dL Final     Hematocrit   Date Value Ref Range Status   09/13/2023 32.3 (L) 34.0 - 46.6 % Final     Platelets   Date Value Ref Range Status   09/13/2023 260 140 - 450 10*3/mm3 Final       CBC:    WBC   Date Value Ref Range Status   09/13/2023 17.81 (H) 3.40 - 10.80 10*3/mm3 Final     RBC   Date Value Ref Range Status   09/13/2023 3.24 (L) 3.77 - 5.28 10*6/mm3 Final     Hemoglobin   Date Value Ref Range Status   09/13/2023 10.9 (L) 12.0 - 15.9 g/dL Final     Hematocrit   Date Value Ref Range Status   09/13/2023 32.3 (L) 34.0 - 46.6 % Final     MCV   Date Value Ref Range Status   09/13/2023 99.7 (H) 79.0 - 97.0 fL Final     MCH   Date Value Ref Range Status   09/13/2023 33.6 (H) 26.6 - 33.0 pg Final     MCHC   Date Value Ref Range Status   09/13/2023 33.7 31.5 - 35.7 g/dL Final     RDW   Date Value Ref Range Status   09/13/2023 21.4 (H) 12.3 - 15.4 % Final     RDW-SD   Date Value Ref Range Status   09/13/2023 78.7 (H) 37.0 - 54.0 fl Final     MPV   Date Value Ref Range Status   09/13/2023 10.6 6.0 - 12.0 fL Final     Platelets   Date Value Ref Range Status   09/13/2023 260 140 - 450 10*3/mm3 Final     Neutrophil %   Date Value Ref Range Status   09/13/2023 64.3 42.7 - 76.0 % Final     Lymphocyte %   Date Value Ref Range Status   09/13/2023 25.4 19.6 - 45.3 % Final     Monocyte %   Date Value Ref Range Status   09/13/2023 8.5 5.0 - 12.0 % Final     Eosinophil %   Date Value Ref Range Status   09/13/2023 0.6 0.3 - 6.2 % Final     Basophil %   Date Value Ref Range Status   09/13/2023 0.4 0.0 - 1.5 % Final     Immature Grans %   Date Value Ref Range Status   09/13/2023 0.8 (H) 0.0 - 0.5 % Final     Neutrophils, Absolute   Date Value Ref Range Status   09/13/2023 11.45 (H) 1.70 - 7.00 10*3/mm3 Final     Lymphocytes, Absolute   Date Value Ref Range Status   09/13/2023 4.52 (H) 0.70 - 3.10 10*3/mm3 Final     Monocytes, Absolute   Date  Value Ref Range Status   09/13/2023 1.51 (H) 0.10 - 0.90 10*3/mm3 Final     Eosinophils, Absolute   Date Value Ref Range Status   09/13/2023 0.10 0.00 - 0.40 10*3/mm3 Final     Basophils, Absolute   Date Value Ref Range Status   09/13/2023 0.08 0.00 - 0.20 10*3/mm3 Final     Immature Grans, Absolute   Date Value Ref Range Status   09/13/2023 0.15 (H) 0.00 - 0.05 10*3/mm3 Final     nRBC   Date Value Ref Range Status   09/13/2023 0.2 0.0 - 0.2 /100 WBC Final        CMP:    Glucose   Date Value Ref Range Status   09/06/2023 130 (H) 65 - 99 mg/dL Final     BUN   Date Value Ref Range Status   09/06/2023 52 (H) 8 - 23 mg/dL Final     Creatinine   Date Value Ref Range Status   09/06/2023 1.41 (H) 0.57 - 1.00 mg/dL Final     Sodium   Date Value Ref Range Status   09/06/2023 138 136 - 145 mmol/L Final     Potassium   Date Value Ref Range Status   09/06/2023 5.1 3.5 - 5.2 mmol/L Final     Chloride   Date Value Ref Range Status   09/06/2023 98 98 - 107 mmol/L Final     CO2   Date Value Ref Range Status   09/06/2023 28.1 22.0 - 29.0 mmol/L Final     Calcium   Date Value Ref Range Status   09/06/2023 10.3 8.6 - 10.5 mg/dL Final     Total Protein   Date Value Ref Range Status   09/06/2023 6.9 6.0 - 8.5 g/dL Final     Albumin   Date Value Ref Range Status   09/06/2023 4.5 3.5 - 5.2 g/dL Final     ALT (SGPT)   Date Value Ref Range Status   09/06/2023 23 1 - 33 U/L Final     AST (SGOT)   Date Value Ref Range Status   09/06/2023 25 1 - 32 U/L Final     Alkaline Phosphatase   Date Value Ref Range Status   09/06/2023 53 39 - 117 U/L Final     Total Bilirubin   Date Value Ref Range Status   09/06/2023 1.0 0.0 - 1.2 mg/dL Final     Globulin   Date Value Ref Range Status   09/06/2023 2.4 gm/dL Final     A/G Ratio   Date Value Ref Range Status   09/06/2023 1.9 g/dL Final     BUN/Creatinine Ratio   Date Value Ref Range Status   09/06/2023 36.9 (H) 7.0 - 25.0 Final     Anion Gap   Date Value Ref Range Status   09/06/2023 11.9 5.0 - 15.0  mmol/L Final           Flow Cytometry, Blood (08/17/2023 13:04)         Component  Ref Range & Units 2 wk ago   Immature Reticulocyte Fraction  3.0 - 15.8 % 32.6 High    Reticulocyte %  0.70 - 1.90 % 3.35 High    Reticulocyte Absolute  0.0200 - 0.1300 10*6/mm3 0.0975   Reticulocyte Hgb  29.8 - 36.1 pg 34.0        View Full Report           Related Result Highlights     CBC Auto Differential Final result 8/28/2023                       Result Care Coordination      Patient Communication     Add Comments   Add Notifications  Back to Top            Contains abnormal data NORMA  Order: [id number]  Collected 8/28/2023 08:24    Specimen Information: Blood   0 Result Notes      Component  Ref Range & Units 2 wk ago   NORMA Direct  Negative Positive Abnormal         View Full Report     Narrative    Performed at:  [address]  [id number]  : Kg Marcelo PhD, Phone:  [phone]         Result Care Coordination      Patient Communication     Add Comments   Add Notifications  Back to Top         REFLEXED DNA/DS  Order: [id number] - Reflex for Order [id number]  Collected 8/28/2023 08:24      Specimen Information: Blood   0 Result Notes          Component  Ref Range & Units 2 wk ago   Anti-DNA (DS) Ab Qn  0 - 9 IU/mL 1   Comment:                                    Negative      <5                                     Equivocal  5 - 9                                     Positive      >9   See below: Comment   Comment: Autoantibody                       Disease Association  ------------------------------------------------------------                          Condition                  Frequency  ---------------------   ------------------------   ---------  Antinuclear Antibody,    SLE, mixed connective  Direct (NORMA-D)           tissue diseases  ---------------------   ------------------------   ---------  dsDNA                    SLE                        40 -  60%  ---------------------   ------------------------   ---------  Chromatin                Drug induced SLE                90%                           SLE                        48 - 97%  ---------------------   ------------------------   ---------  SSA (Ro)                 SLE                        25 - 35%                           Sjogren's Syndrome         40 - 70%                            Lupus                 100%  ---------------------   ------------------------   ---------  SSB (La)                 SLE                             10%                           Sjogren's Syndrome              30%  ---------------------   -----------------------    ---------  Sm (anti-Smith)          SLE                        15 - 30%  ---------------------   -----------------------    ---------  RNP                      Mixed Connective Tissue                           Disease                         95%  (U1 nRNP,                SLE                        30 - 50%  anti-ribonucleoprotein)  Polymyositis and/or                           Dermatomyositis                 20%  ---------------------   ------------------------   ---------  Scl-70 (antiDNA          Scleroderma (diffuse)      20 - 35%  topoisomerase)           Crest                           13%  ---------------------   ------------------------   ---------  Carol-1                     Polymyositis and/or                           Dermatomyositis            20 - 40%  ---------------------   ------------------------   ---------  Centromere B             Scleroderma - Crest                           variant                         80%          View Full Report     Narrative    Performed at:   [address]  [id number]  : Kg Marcelo PhD, Phone:  [phone]         Result Care Coordination      Patient Communication     Add Comments   Add Notifications  Back to Top           Rheumatoid Factor, Quant  Order:  [id number]  Collected 8/28/2023 08:24    Specimen Information: Blood   0 Result Notes      Component  Ref Range & Units 2 wk ago   Rheumatoid Factor Quantitative  0.0 - 14.0 IU/mL 14.0        View Full Report           Related Result Highlights     Lactate Dehydrogenase Final result 8/28/2023                       Result Care Coordination      Patient Communication     Add Comments   Add Notifications  Back to Top           Cyclic Citrul Peptide Antibody, IgG / IgA  Order: [id number]  Collected 8/28/2023 08:24    Specimen Information: Blood   0 Result Notes      Component  Ref Range & Units 2 wk ago   CCP Antibodies IgG/IgA  0 - 19 units 15   Comment:                           Negative               <20                            Weak positive      20 - 39                            Moderate positive  40 - 59                            Strong positive        >59        View Full Report     Narrative    Performed at:  [address]  [id number]  : Kg Marcelo PhD, Phone:  [phone]         Result Care Coordination      Patient Communication     Add Comments   Add Notifications  Back to Top           REFLEXED DNA/DS  Order: [id number] - Reflex for Order [id number]  Collected 8/28/2023 08:24    Specimen Information: Blood   0 Result Notes      Component  Ref Range & Units 2 wk ago   Anti-DNA (DS) Ab Qn  0 - 9 IU/mL 1   Comment:                                    Negative      <5                                     Equivocal  5 - 9                                     Positive      >9   See below: Comment   Comment: Autoantibody                       Disease Association  ------------------------------------------------------------                          Condition                  Frequency  ---------------------   ------------------------   ---------  Antinuclear Antibody,    SLE, mixed connective  Direct (NORMA-D)           tissue diseases  ---------------------    ------------------------   ---------  dsDNA                    SLE                        40 - 60%  ---------------------   ------------------------   ---------  Chromatin                Drug induced SLE                90%                           SLE                        48 - 97%  ---------------------   ------------------------   ---------  SSA (Ro)                 SLE                        25 - 35%                           Sjogren's Syndrome         40 - 70%                            Lupus                 100%  ---------------------   ------------------------   ---------  SSB (La)                 SLE                             10%                           Sjogren's Syndrome              30%  ---------------------   -----------------------    ---------  Sm (anti-Smith)          SLE                        15 - 30%  ---------------------   -----------------------    ---------  RNP                      Mixed Connective Tissue                           Disease                         95%  (U1 nRNP,                SLE                        30 - 50%  anti-ribonucleoprotein)  Polymyositis and/or                           Dermatomyositis                 20%  ---------------------   ------------------------   ---------  Scl-70 (antiDNA          Scleroderma (diffuse)      20 - 35%  topoisomerase)           Crest                           13%  ---------------------   ------------------------   ---------  Carol-1                     Polymyositis and/or                           Dermatomyositis            20 - 40%  ---------------------   ------------------------   ---------  Centromere B             Scleroderma - Crest                           variant                         80%        View Full Report     Narrative    Performed at:   [address]  [id number]  : Kg Marcelo PhD, Phone:  [phone]         Result Care Coordination      Patient  Communication     Add Comments   Add Notifications  Back to Top          Contains abnormal data NORMA  Order: [id number]  Collected 8/28/2023 08:24      Specimen Information: Blood   0 Result Notes          Component  Ref Range & Units 2 wk ago   NORMA Direct  Negative Positive Abnormal           View Full Report     Narrative    Performed at:  [address]  [id number]  : gK Marcelo PhD, Phone:  [phone]         Result Care Coordination      Patient Communication     Add Comments            Assessment & Plan     Diagnoses and all orders for this visit:    1. 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 Iron deficiency anemia secondary to inadequate dietary iron intake  -     CBC & Differential; Future  -     Comprehensive Metabolic Panel; Future  -     Direct Antiglobulin Test (Direct Cristina); Future    3. B12 deficiency  -     CBC & Differential; Future  -     Comprehensive Metabolic Panel; Future  -     Direct Antiglobulin Test (Direct Cristina); Future    4. Drug-induced autoantibody type hemolytic anemia  -     CBC & Differential; Future  -     Comprehensive Metabolic Panel; Future  -     Direct Antiglobulin Test (Direct Cristina); Future    5. Large granular lymphocyte disorder       On 08/17/2023 I reviewed all of the issues pertinent to her care after being in the hospital with congestive heart failure and being significantly anemic. We documented that her anemia has been a slow process for the last 3 years and her leukocytosis has also been present for the same period of time. In the hospital we documented that the patient had a normal B12, normal folate, normal ferritin, normal iron profile. Her LDH was normal. Serum protein immunoelectrophoresis was negative for monoclonal protein. On the  other hand the patient had a Coomb's test that was positive, complement was positive.    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 Today her peripheral blood has an element of lymphocytosis and raises the question if she has a lymphoid malignancy like for example chronic lymphoid leukemia that will trigger leukocytosis, lymphocytosis and hemolytic anemia. To me that is the most likely case. The hemoglobin remains low. The patient remains significantly fatigued. She is minimally jaundice and she remains anemic. Her platelet count is normal. She has no peripheral adenopathy palpable. No obvious splenomegaly clinically. Nevertheless my advice to this patient and discussed with the son and the patient's  in the room is as follows:    1. I would like for her to take a multivitamin like prenatal 3 times a week.  2. I would like her to take folic acid 1 mg a day.  3. I have sent a prescription for prednisone 10 mg a day. The patient is diabetic. I do not think she can take more than 10 mg a day. She will need to continue checking her blood sugar on a daily basis and if her blood sugar is above 200 she will need to notify us and will notify her primary physician to help us to take care of her blood sugar.     She will require laboratory assessment in [place] on weekly basis including CBC and a retic count and we will review her back her in a month with similar features.     I am going to go ahead and send a peripheral blood flow cytometry today. A BCR/ABL was going to be sent, I find no reason for this given the differential in the white count.     I am not going to request any radiological assessment of her abdomen and pelvis pending to see how she responds to this simple regimen.     If the patient fails to respond to this dose of prednisone or if the hemoglobin does not get any better she will require therapy with Rituxan.     Otherwise I  discussed with the patient the need for her to try to eat as she is not and try to get up and about with some walking.    She does not have any ongoing infection. Her surgical sites are all well clean and hopefully they will continue healing as the time goes by.     We will review her back in a month as posted.     I reviewed the patient on 09/13/2023. Since the previous visit several weeks ago the patient has had a remarkable clinical improvement. Her appetite is back triggered by prednisone utilization. She has been able to gain back strength to the point that she is able to get around the house using her rollator and she has been able to cook simple meals and do some dishes. The patient is sleeping poorly. Besides this she has no obvious aches and pains. No jaundice, no rashes. She has minimal degree of shortness of breath and her energy level has substantially improved. Her anasarca has resolved. Her bowel activity is appropriate. Urination is appropriate. Mentation has remained or improved substantially.     During the previous visit we discussed the fact that we needed to have further analysis of her blood. This included a CCP antibody that was negative, an NORMA that was positive, a DNA-DSA confirmation that was negative. 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     I provided reports of all these tests to the patient and in simple words I explained to her and her family what this means. It is very likely that this patient has an autoimmune condition that is triggering also LGL showing up. This explains her persistent leukocytosis. Her anemia has substantially improved and I do not believe that the patient has significant degree of hemolysis with a reticulocyte count that is normal, LDH that is normal, bilirubin that is normal.     The patient otherwise actually feels as posted  dramatically better.     RECOMMENDATIONS:    1. I have advised her to have continuous persistent proper diet at this time. She is eating much better and functioning much better.   2. I advised her to remain on her prednisone and the dose that she will take now is 5 mg a day. Eventually the goal will be to move this upon visit in 6 weeks to every other day.   3. The patient will require for her incisional site below the chest bone from the chest tube, local therapy with honey and brown sugar. Hopefully this will minimize any possibility of infection and will allow this area to clean and close definitively. She will apply this after washing this with soap and water, saline solution and using this concoction only once a day.   4. I would like for the patient to return to see us back in 6 weeks with a CBC, CMP and a Cristina test.   5. I advised the patient that she does not need to have any more blood work in between her next visit in her local [facility] hospital.     I discussed all these facts with the patient's  and son in the room.

## 2023-09-18 ENCOUNTER — OFFICE VISIT (OUTPATIENT)
Dept: CARDIOLOGY | Facility: CLINIC | Age: 78
End: 2023-09-18
Payer: MEDICARE

## 2023-09-18 ENCOUNTER — LAB (OUTPATIENT)
Dept: LAB | Facility: HOSPITAL | Age: 78
End: 2023-09-18
Payer: MEDICARE

## 2023-09-18 ENCOUNTER — TELEPHONE (OUTPATIENT)
Dept: CARDIOLOGY | Facility: CLINIC | Age: 78
End: 2023-09-18

## 2023-09-18 VITALS
WEIGHT: 184 LBS | DIASTOLIC BLOOD PRESSURE: 42 MMHG | HEART RATE: 70 BPM | SYSTOLIC BLOOD PRESSURE: 110 MMHG | BODY MASS INDEX: 31.41 KG/M2 | HEIGHT: 64 IN

## 2023-09-18 DIAGNOSIS — I50.22 CHRONIC HFREF (HEART FAILURE WITH REDUCED EJECTION FRACTION): ICD-10-CM

## 2023-09-18 DIAGNOSIS — E66.2 CLASS 1 OBESITY WITH ALVEOLAR HYPOVENTILATION, SERIOUS COMORBIDITY, AND BODY MASS INDEX (BMI) OF 32.0 TO 32.9 IN ADULT: ICD-10-CM

## 2023-09-18 DIAGNOSIS — N18.32 STAGE 3B CHRONIC KIDNEY DISEASE: ICD-10-CM

## 2023-09-18 DIAGNOSIS — I25.810 CORONARY ARTERY DISEASE INVOLVING AUTOLOGOUS VEIN CORONARY BYPASS GRAFT WITHOUT ANGINA PECTORIS: ICD-10-CM

## 2023-09-18 DIAGNOSIS — I50.22 CHRONIC HFREF (HEART FAILURE WITH REDUCED EJECTION FRACTION): Primary | ICD-10-CM

## 2023-09-18 DIAGNOSIS — I48.0 PAF (PAROXYSMAL ATRIAL FIBRILLATION): ICD-10-CM

## 2023-09-18 LAB
ALBUMIN SERPL-MCNC: 4.2 G/DL (ref 3.5–5.2)
ALBUMIN/GLOB SERPL: 1.8 G/DL
ALP SERPL-CCNC: 61 U/L (ref 39–117)
ALT SERPL W P-5'-P-CCNC: 27 U/L (ref 1–33)
ANION GAP SERPL CALCULATED.3IONS-SCNC: 10.6 MMOL/L (ref 5–15)
AST SERPL-CCNC: 33 U/L (ref 1–32)
BASOPHILS # BLD AUTO: 0.06 10*3/MM3 (ref 0–0.2)
BASOPHILS NFR BLD AUTO: 0.5 % (ref 0–1.5)
BILIRUB SERPL-MCNC: 0.5 MG/DL (ref 0–1.2)
BUN SERPL-MCNC: 50 MG/DL (ref 8–23)
BUN/CREAT SERPL: 37.3 (ref 7–25)
CALCIUM SPEC-SCNC: 9.4 MG/DL (ref 8.6–10.5)
CHLORIDE SERPL-SCNC: 104 MMOL/L (ref 98–107)
CO2 SERPL-SCNC: 27.4 MMOL/L (ref 22–29)
CREAT SERPL-MCNC: 1.34 MG/DL (ref 0.57–1)
DEPRECATED RDW RBC AUTO: 83.6 FL (ref 37–54)
EGFRCR SERPLBLD CKD-EPI 2021: 40.7 ML/MIN/1.73
EOSINOPHIL # BLD AUTO: 0.13 10*3/MM3 (ref 0–0.4)
EOSINOPHIL NFR BLD AUTO: 1 % (ref 0.3–6.2)
ERYTHROCYTE [DISTWIDTH] IN BLOOD BY AUTOMATED COUNT: 21.7 % (ref 12.3–15.4)
GLOBULIN UR ELPH-MCNC: 2.3 GM/DL
GLUCOSE SERPL-MCNC: 139 MG/DL (ref 65–99)
HCT VFR BLD AUTO: 31 % (ref 34–46.6)
HGB BLD-MCNC: 10 G/DL (ref 12–15.9)
IMM GRANULOCYTES # BLD AUTO: 0.03 10*3/MM3 (ref 0–0.05)
IMM GRANULOCYTES NFR BLD AUTO: 0.2 % (ref 0–0.5)
LYMPHOCYTES # BLD AUTO: 3.87 10*3/MM3 (ref 0.7–3.1)
LYMPHOCYTES NFR BLD AUTO: 30.9 % (ref 19.6–45.3)
MAGNESIUM SERPL-MCNC: 1.9 MG/DL (ref 1.6–2.4)
MCH RBC QN AUTO: 33.8 PG (ref 26.6–33)
MCHC RBC AUTO-ENTMCNC: 32.3 G/DL (ref 31.5–35.7)
MCV RBC AUTO: 104.7 FL (ref 79–97)
MONOCYTES # BLD AUTO: 1.09 10*3/MM3 (ref 0.1–0.9)
MONOCYTES NFR BLD AUTO: 8.7 % (ref 5–12)
NEUTROPHILS NFR BLD AUTO: 58.7 % (ref 42.7–76)
NEUTROPHILS NFR BLD AUTO: 7.36 10*3/MM3 (ref 1.7–7)
NRBC BLD AUTO-RTO: 0.2 /100 WBC (ref 0–0.2)
NT-PROBNP SERPL-MCNC: 4386 PG/ML (ref 0–1800)
PLATELET # BLD AUTO: 263 10*3/MM3 (ref 140–450)
PMV BLD AUTO: 11.1 FL (ref 6–12)
POTASSIUM SERPL-SCNC: 5 MMOL/L (ref 3.5–5.2)
PROT SERPL-MCNC: 6.5 G/DL (ref 6–8.5)
RBC # BLD AUTO: 2.96 10*6/MM3 (ref 3.77–5.28)
SODIUM SERPL-SCNC: 142 MMOL/L (ref 136–145)
WBC NRBC COR # BLD: 12.54 10*3/MM3 (ref 3.4–10.8)

## 2023-09-18 PROCEDURE — 83880 ASSAY OF NATRIURETIC PEPTIDE: CPT

## 2023-09-18 PROCEDURE — 83735 ASSAY OF MAGNESIUM: CPT

## 2023-09-18 PROCEDURE — 99214 OFFICE O/P EST MOD 30 MIN: CPT

## 2023-09-18 PROCEDURE — 1160F RVW MEDS BY RX/DR IN RCRD: CPT

## 2023-09-18 PROCEDURE — 3074F SYST BP LT 130 MM HG: CPT

## 2023-09-18 PROCEDURE — 1159F MED LIST DOCD IN RCRD: CPT

## 2023-09-18 PROCEDURE — 80053 COMPREHEN METABOLIC PANEL: CPT

## 2023-09-18 PROCEDURE — 36415 COLL VENOUS BLD VENIPUNCTURE: CPT

## 2023-09-18 PROCEDURE — 3078F DIAST BP <80 MM HG: CPT

## 2023-09-18 PROCEDURE — 85025 COMPLETE CBC W/AUTO DIFF WBC: CPT

## 2023-09-18 RX ORDER — METOPROLOL SUCCINATE 50 MG/1
50 TABLET, EXTENDED RELEASE ORAL EVERY 12 HOURS SCHEDULED
Qty: 60 TABLET | Refills: 2 | Status: SHIPPED | OUTPATIENT
Start: 2023-09-18 | End: 2023-12-17

## 2023-09-18 RX ORDER — SACUBITRIL AND VALSARTAN 49; 51 MG/1; MG/1
1 TABLET, FILM COATED ORAL 2 TIMES DAILY
Qty: 180 TABLET | Refills: 3
Start: 2023-09-18 | End: 2023-09-18 | Stop reason: SDUPTHER

## 2023-09-18 RX ORDER — SPIRONOLACTONE 25 MG/1
25 TABLET ORAL EVERY OTHER DAY
Qty: 90 TABLET | Refills: 3
Start: 2023-09-18

## 2023-09-18 RX ORDER — SACUBITRIL AND VALSARTAN 49; 51 MG/1; MG/1
2 TABLET, FILM COATED ORAL 2 TIMES DAILY
Qty: 180 TABLET | Refills: 3
Start: 2023-09-18

## 2023-09-18 NOTE — ASSESSMENT & PLAN NOTE
Ms. Hughes's kidney function remains low with only a slight increase in function.  She reports that she had just passed a kidney stone.  I will get labs at today's visit to evaluate for any changes.

## 2023-09-18 NOTE — ASSESSMENT & PLAN NOTE
Detail Level: Detailed Ms. Hughes has chronic heart failure with reduced ejection fraction who is doing more status post CABG.  She continues to increase her activity without any short of air.  She continues to experience mild bilateral pedal edema.  On her last set of labs her potassium was 5.1.  I will decrease her spironolactone to other every other day for now until we received the labs back to determine her current levels.  Patient contacted the office and per her home log her systolic blood pressure is 111 and her heart rate averages in the 70s and 80s.  Per last note stated she was on a half a tablet of low-dose Entresto but patient has been taking Entresto 49/51 2 tablets twice a day as prescribed prior to her procedure.  I have confirmed this with her by phone as well.  I will continue her Entresto 49/51 mg 2 tablets twice daily (samples due to coverage), and Farxiga at the current doses.  Patient confirmed the dosage of Entresto by phone once she returned home.  I will make the following changes to optimize her heart failure medications:    1.  Decrease spironolactone 25 mg, take 1 tablet every other day.  2.  Increase metoprolol 25 mg, take 1 tablet in the morning and 2 at night for 1 week followed by 2 tablets twice a day.  New prescription will be for metoprolol 50 mg twice daily.  3.  Do heart rate blood pressure and daily weight log and bring it to next appointment.  4.  Do blood work 1 to 2 days prior to your next visit.   Detail Level: Zone

## 2023-09-18 NOTE — ASSESSMENT & PLAN NOTE
I have encouraged Ms. Hughes to abide by a heart healthy diet and decrease her fluid intake to 2 L or less a day.

## 2023-09-18 NOTE — PATIENT INSTRUCTIONS
1.  Decrease spironolactone 25 mg, take 1 tablet every other day.  2.  Increase metoprolol 25 mg, take 1 tablet in the morning and 2 at night for 1 week followed by 2 tablets twice a day.  New prescription will be for metoprolol 50 mg twice daily.  3.  Do heart rate blood pressure and daily weight log and bring it to next appointment.  4.  Do blood work 1 to 2 days prior to your next visit.

## 2023-09-18 NOTE — ASSESSMENT & PLAN NOTE
Ms. Hughes is now 2 months post CABG x3 utilizing her LIMA/RSVG and LANE ligation with   Dr. Alexander.  She is recovering very well.

## 2023-09-18 NOTE — PROGRESS NOTES
Office Visit    Chief Complaint  No chief complaint on file.    Subjective            Ann-Marie Hughes presents to [facility] CARDIOLOGY  History of Present Illness  Ms. Hughes is a 77-year-old female that presented to the office today for follow-up due to heart failure with reduced ejection fraction and coronary artery disease.  She underwent a CABG on July 20, 2023 and is recovering very well.  Ms. Hughes has decided not to follow through with cardiac rehab.  She has decided that she will continue to increase her activity on her own.  She denies any chest pain, short of air, dizziness or syncopal episodes.  Patient did not bring her heart rate/blood pressure log or medications with her at today's visit.  Per her list her medications are very different than listed on ours.  Patient to call once she arrived home and we clarified her medications and blood pressure/heart rate per her log.  I have discussed her CBC from her oncologist.  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    Past Medical History:   Diagnosis Date    Acne rosacea 08/17/2021    DR.JEFF MOON     Arteriosclerosis of abdominal aorta     B12 deficiency 08/17/2021    DJD (degenerative joint disease)     Hx of smoking 08/17/2021    QUIT IN 1976 AT AGE 31    Hyperlipidemia 08/17/2021    Hypertension     Hypothyroidism     Metabolic syndrome 08/17/2021    KAREN (stress urinary incontinence, female) 08/17/2021    UTI (urinary tract infection) 08/17/2021    Vitamin D deficiency 08/17/2021       Allergies   Allergen Reactions    Penicillins Palpitations     1. When was your reaction? > 10 years ago  2. Did your reaction happen after the first dose or after several doses? After first dose  3. Did your reaction require ED or hospital care to manage your reaction? Do not know  4. Did your reaction require treatment with epinephrine? Do not  know  5. Have you taken amoxicillin (Amoxil) or amoxicillin-clavulanate (Augmentin) without issue since? Yes  6. Have you taken cephalexin (Keflex) without issue since?  Do not know         Past Surgical History:   Procedure Laterality Date    BACK SURGERY  2013    CARDIAC CATHETERIZATION N/A 2023    Procedure: Left Heart Cath;  Surgeon: Dinh Andres MD;  Location:  [facility];  Service: Cardiovascular;  Laterality: N/A;    CARPAL TUNNEL RELEASE      COLONOSCOPY      CORONARY ARTERY BYPASS GRAFT WITH MITRAL VALVE REPAIR/REPLACEMENT N/A 2023    Procedure: REZA STERNOTOMY OFF-PUMP CORONARY ARTERY BYPASS GRAFT TIMES        USING LEFFT INTERNAL MAMMARY ARTERY AND     GREATER SAPHENOUS VEIN GRAFT PER EMDOSCOPIC VEIN HARVESTING, MAZE PROCEDURE AND PRP;  Surgeon: Shane Alexander MD;  Location: [facility] CV[facility];  Service: Cardiothoracic;  Laterality: N/A;    KNEE SURGERY      LUMBAR DISCECTOMY      NECK SURGERY      Cervical    POSTERIOR LUMBAR/THORACIC SPINE FUSION          Social History     Tobacco Use    Smoking status: Former     Packs/day: 1.00     Years: 12.00     Pack years: 12.00     Types: Cigarettes     Quit date:      Years since quittin.7    Smokeless tobacco: Never   Vaping Use    Vaping Use: Never used   Substance Use Topics    Alcohol use: Never    Drug use: Never       Family History   Problem Relation Age of Onset    Heart disease Mother     Arthritis Mother     Heart attack Mother     Arthritis Father         Prior to Admission medications    Medication Sig Start Date End Date Taking? Authorizing Provider   acetaminophen (TYLENOL) 325 MG tablet Take 2 tablets by mouth Every 4 (Four) Hours As Needed for Mild Pain. 23  Yes Anjelica Chase APRN   albuterol sulfate  (90 Base) MCG/ACT inhaler Inhale 2 puffs Every 4 (Four) Hours As Needed for Wheezing. 23  Yes eLs Moreno,    apixaban (ELIQUIS) 5 MG tablet tablet Take 1 tablet by  mouth Every 12 (Twelve) Hours. Indications: Atrial Fibrillation 7/28/23  Yes Anjelica Chase APRN   aspirin 81 MG EC tablet Take 1 tablet by mouth Daily.   Yes Kanwal Orozco MD   atorvastatin (LIPITOR) 40 MG tablet Take 1 tablet by mouth Every Night for 90 days. 7/28/23 10/26/23 Yes Anjelica Chase APRN   buPROPion XL (WELLBUTRIN XL) 300 MG 24 hr tablet TAKE 1 TABLET BY MOUTH DAILY 5/23/23  Yes Rochelle Brown APRN   dapagliflozin Propanediol (Farxiga) 10 MG tablet Take 10 mg by mouth Daily. 7/12/23  Yes Valerie Valera MD   folic acid (FOLVITE) 1 MG tablet Take 1 tablet by mouth Daily. 8/17/23  Yes Boston Can MD   furosemide (LASIX) 40 MG tablet Take 1 tablet by mouth Daily. 8/30/23  Yes Valerie Valera MD   metFORMIN ER (GLUCOPHAGE-XR) 500 MG 24 hr tablet TAKE 1 TABLET BY MOUTH EVERY MORNING AND 2 TABLETS AT SUPPER  Patient taking differently: 1 tablet 2 (Two) Times a Day. 1/19/23  Yes Roseanna Hernandez MD   metoprolol succinate XL (TOPROL-XL) 25 MG 24 hr tablet Take 1 tablet by mouth Every 12 (Twelve) Hours for 90 days. 8/30/23 11/28/23 Yes Valerie Valera MD   montelukast (SINGULAIR) 10 MG tablet Take 1 tablet by mouth Daily. 7/5/23  Yes Rochelle Brown APRN   multivitamin with minerals tablet tablet Take 1 tablet by mouth Daily.   Yes Kanwal Orozco MD   predniSONE (DELTASONE) 10 MG tablet Take 1 tablet by mouth Daily.  Patient taking differently: Take 0.5 tablets by mouth Every Other Day. 8/17/23  Yes Boston Can MD   Probiotic Product (Axiom PO) Take 1 capsule by mouth Daily.   Yes Kanwal Orozco MD   sacubitril-valsartan (Entresto) 24-26 MG tablet Take 0.5 tablets by mouth 2 (Two) Times a Day for 90 days. 8/30/23 11/28/23 Yes Valerie Valera MD   spironolactone (ALDACTONE) 25 MG tablet Take 1 tablet by mouth Daily. 6/28/23  Yes Valerie Valera MD   Synthroid 75 MCG tablet Take 1 tablet by mouth Daily. 7/10/23  Yes Rochelle Brown APRN     "    Review of Systems   Constitutional:  Positive for fatigue.   Respiratory:  Positive for shortness of breath. Negative for cough.    Cardiovascular:  Positive for chest pain. Negative for palpitations and leg swelling.   Neurological:  Negative for dizziness.      Symptom Course: Been Unchanged    Weight Trend: Stable     Objective     /42   Pulse 70   Ht 162.6 cm (64\")   Wt 83.5 kg (184 lb)   BMI 31.58 kg/m²       Physical Exam  Constitutional:       General: She is awake.      Appearance: Normal appearance.   Neck:      Thyroid: No thyromegaly.      Vascular: No carotid bruit or JVD.   Cardiovascular:      Rate and Rhythm: Normal rate and regular rhythm.      Chest Wall: PMI is not displaced.      Pulses: Normal pulses.      Heart sounds: Normal heart sounds, S1 normal and S2 normal. No murmur heard.    No friction rub. No gallop. No S3 or S4 sounds.   Pulmonary:      Effort: Pulmonary effort is normal.      Breath sounds: Normal breath sounds and air entry. No wheezing, rhonchi or rales.   Abdominal:      General: Bowel sounds are normal.      Palpations: Abdomen is soft. There is no mass.      Tenderness: There is no abdominal tenderness.   Musculoskeletal:      Cervical back: Neck supple.      Right lower leg: Edema present.      Left lower leg: Edema present.   Neurological:      Mental Status: She is alert and oriented to person, place, and time.   Psychiatric:         Mood and Affect: Mood normal.         Behavior: Behavior is cooperative.         Result Review :                      Lab Results   Component Value Date    PROBNP 6,516.0 (H) 09/06/2023    PROBNP 18,254.0 (H) 08/21/2023    PROBNP 27,235.0 (H) 07/31/2023     CMP          8/21/2023    08:13 8/28/2023    08:24 9/6/2023    08:22   CMP   Glucose 107  103  130    BUN 40  39  52    Creatinine 1.28  1.26  1.41    EGFR 43.0  43.8  38.3    Sodium 138  142  138    Potassium 4.6  4.2  5.1    Chloride 99  102  98    Calcium 9.5  9.6  10.3  "   Total Protein 7.0  6.6  6.9    Albumin 4.3  4.3  4.5    Globulin 2.7  2.3  2.4    Total Bilirubin 1.4  1.2  1.0    Alkaline Phosphatase 71  66  53    AST (SGOT) 23  30  25    ALT (SGPT) 9  19  23    Albumin/Globulin Ratio 1.6  1.9  1.9    BUN/Creatinine Ratio 31.3  31.0  36.9    Anion Gap 9.9  12.5  11.9      CBC w/diff          8/28/2023    08:24 9/6/2023    08:22 9/13/2023    08:55   CBC w/Diff   WBC 11.19  13.74  17.81    RBC 2.91  3.33  3.24    Hemoglobin 9.2  10.8  10.9    Hematocrit 28.0  34.5  32.3    MCV 96.2  103.6  99.7    MCH 31.6  32.4  33.6    MCHC 32.9  31.3  33.7    RDW 20.3  22.5  21.4    Platelets 288  320  260    Neutrophil Rel % 57.4   64.3    Immature Granulocyte Rel % 0.6   0.8    Lymphocyte Rel % 30.0   25.4    Monocyte Rel % 10.3   8.5    Eosinophil Rel % 1.2   0.6    Basophil Rel % 0.5   0.4       Lipid Panel          6/15/2023    10:05 7/16/2023    09:19 7/18/2023    07:25   Lipid Panel   Total Cholesterol 122  97  93    Triglycerides 88  75  71    HDL Cholesterol 62  43  39    VLDL Cholesterol 17  16  15    LDL Cholesterol  43  38  39    LDL/HDL Ratio 0.68  0.91  1.02       Lab Results   Component Value Date    TSH 1.130 06/15/2023    TSH 1.940 01/11/2023    TSH 2.080 08/17/2022      Lab Results   Component Value Date    FREET4 1.31 01/11/2023    FREET4 1.27 08/17/2022    FREET4 1.32 04/13/2022      No results found for: DDIMERQUANT  Magnesium   Date Value Ref Range Status   09/06/2023 2.0 1.6 - 2.4 mg/dL Final      No results found for: DIGOXIN   A1C Last 3 Results          1/11/2023    09:05 6/15/2023    10:05 7/18/2023    07:25   HGBA1C Last 3 Results   Hemoglobin A1C 6.20  6.10  5.90           Results for orders placed during the hospital encounter of 08/01/23    Adult Transthoracic Echo Limited W/ Cont if Necessary Per Protocol    Interpretation Summary    There is a small (<1cm) pericardial effusion.    There is a moderate sized right pleural effusion.           Assessment and Plan         Diagnoses and all orders for this visit:    1. Chronic HFrEF (heart failure with reduced ejection fraction) (Primary)  Assessment & Plan:  Ms. Hughes has chronic heart failure with reduced ejection fraction who is doing more status post CABG.  She continues to increase her activity without any short of air.  She continues to experience mild bilateral pedal edema.  On her last set of labs her potassium was 5.1.  I will decrease her spironolactone to other every other day for now until we received the labs back to determine her current levels.  Per her home log her systolic blood pressure is 111 and her heart rate averages in the 70s and 80s.  I will continue her Entresto 49/51 mg 2 tablets twice daily (samples due to coverage), and Farxiga at the current doses.  I will make the following changes to optimize her heart failure medications:    1.  Decrease spironolactone 25 mg, take 1 tablet every other day.  2.  Increase metoprolol 25 mg, take 1 tablet in the morning and 2 at night for 1 week followed by 2 tablets twice a day.  New prescription will be for metoprolol 50 mg twice daily.  3.  Do heart rate blood pressure and daily weight log and bring it to next appointment.  4.  Do blood work 1 to 2 days prior to your next visit.    Orders:  -     spironolactone (ALDACTONE) 25 MG tablet; Take 1 tablet by mouth Every Other Day.  Dispense: 90 tablet; Refill: 3  -     sacubitril-valsartan (Entresto) 49-51 MG tablet; Take 1 tablet by mouth 2 (Two) Times a Day.  Dispense: 180 tablet; Refill: 3  -     Comprehensive Metabolic Panel; Future  -     Magnesium; Future  -     CBC & Differential; Future  -     proBNP; Future  -     Cancel: CBC & Differential  -     Cancel: Comprehensive Metabolic Panel  -     Cancel: Magnesium  -     Cancel: proBNP    2. CAD status post CABG x3 vessels  Assessment & Plan:  Ms. Hughes is now 2 months post CABG x3 utilizing her LIMA/RSVG and LANE ligation with   Dr. Alexander.  She is  recovering very well.      3. PAF (paroxysmal atrial fibrillation)  Assessment & Plan:  Patient has a history of paroxysmal atrial fib.  She remains on Eliquis.      4. Stage 3b chronic kidney disease  Assessment & Plan:  Ms. Hughes's kidney function remains low with only a slight increase in function.  She reports that she had just passed a kidney stone.  I will get labs at today's visit to evaluate for any changes.      5. Class 1 obesity with alveolar hypoventilation, serious comorbidity, and body mass index (BMI) of 32.0 to 32.9 in adult  Assessment & Plan:  I have encouraged Ms. Hughes to abide by a heart healthy diet and decrease her fluid intake to 2 L or less a day.      Other orders  -     Cancel: CBC & Differential  -     Cancel: Comprehensive Metabolic Panel  -     Cancel: proBNP  -     Cancel: Magnesium            Follow Up     Return for 3 weeks with Dr. Valera.    Patient was given instructions and counseling regarding her condition or for health maintenance advice. Please see specific information pulled into the AVS if appropriate.     Electronically signed by LONA Bocanegra, 09/18/23, 11:46 AM EDT.

## 2023-09-18 NOTE — TELEPHONE ENCOUNTER
Caller: Ann-Marie Hughes    Relationship: Self    Best call back number: [phone]     What is the best time to reach you: ANYTIME    Who are you requesting to speak with (clinical staff, provider,  specific staff member): SHARON PIRES    What was the call regarding: PATIENT CALLED IN STATING SHARON PIRES TOLD HER TO GIVE HER A CALL AND TALK TO HER PERSONALLY REGARDING SOME QUESTIONS. PATIENT DID HAVE AN APPOINTMENT WITH SHARON THIS MORNING.     Is it okay if the provider responds through [facility]: NO, PLEASE CALL

## 2023-10-04 ENCOUNTER — LAB (OUTPATIENT)
Dept: LAB | Facility: HOSPITAL | Age: 78
End: 2023-10-04
Payer: MEDICARE

## 2023-10-04 DIAGNOSIS — E53.8 B12 DEFICIENCY: ICD-10-CM

## 2023-10-04 DIAGNOSIS — D59.0 DRUG-INDUCED AUTOANTIBODY TYPE HEMOLYTIC ANEMIA: ICD-10-CM

## 2023-10-04 DIAGNOSIS — I50.20 HEART FAILURE WITH REDUCED EJECTION FRACTION: ICD-10-CM

## 2023-10-04 DIAGNOSIS — D50.8 IRON DEFICIENCY ANEMIA SECONDARY TO INADEQUATE DIETARY IRON INTAKE: ICD-10-CM

## 2023-10-04 DIAGNOSIS — D59.10 HEMOLYTIC ANEMIA DUE TO ANTIBODY: Primary | ICD-10-CM

## 2023-10-04 LAB
ALBUMIN SERPL-MCNC: 4 G/DL (ref 3.5–5.2)
ALBUMIN/GLOB SERPL: 1.4 G/DL
ALP SERPL-CCNC: 49 U/L (ref 39–117)
ALT SERPL W P-5'-P-CCNC: 17 U/L (ref 1–33)
ANION GAP SERPL CALCULATED.3IONS-SCNC: 12 MMOL/L (ref 5–15)
AST SERPL-CCNC: 22 U/L (ref 1–32)
BASOPHILS # BLD AUTO: 0.03 10*3/MM3 (ref 0–0.2)
BASOPHILS NFR BLD AUTO: 0.3 % (ref 0–1.5)
BILIRUB SERPL-MCNC: 0.6 MG/DL (ref 0–1.2)
BUN SERPL-MCNC: 32 MG/DL (ref 8–23)
BUN/CREAT SERPL: 22.2 (ref 7–25)
CALCIUM SPEC-SCNC: 10 MG/DL (ref 8.6–10.5)
CHLORIDE SERPL-SCNC: 103 MMOL/L (ref 98–107)
CO2 SERPL-SCNC: 27 MMOL/L (ref 22–29)
CREAT SERPL-MCNC: 1.44 MG/DL (ref 0.57–1)
DAT C3: NEGATIVE
DAT IGG GEL: POSITIVE
DEPRECATED RDW RBC AUTO: 66.8 FL (ref 37–54)
EGFRCR SERPLBLD CKD-EPI 2021: 37.3 ML/MIN/1.73
EOSINOPHIL # BLD AUTO: 0.18 10*3/MM3 (ref 0–0.4)
EOSINOPHIL NFR BLD AUTO: 2 % (ref 0.3–6.2)
ERYTHROCYTE [DISTWIDTH] IN BLOOD BY AUTOMATED COUNT: 18.1 % (ref 12.3–15.4)
GLOBULIN UR ELPH-MCNC: 2.8 GM/DL
GLUCOSE SERPL-MCNC: 135 MG/DL (ref 65–99)
HCT VFR BLD AUTO: 29.9 % (ref 34–46.6)
HGB BLD-MCNC: 9.9 G/DL (ref 12–15.9)
IMM GRANULOCYTES # BLD AUTO: 0.03 10*3/MM3 (ref 0–0.05)
IMM GRANULOCYTES NFR BLD AUTO: 0.3 % (ref 0–0.5)
LYMPHOCYTES # BLD AUTO: 1.98 10*3/MM3 (ref 0.7–3.1)
LYMPHOCYTES NFR BLD AUTO: 21.9 % (ref 19.6–45.3)
MAGNESIUM SERPL-MCNC: 2 MG/DL (ref 1.6–2.4)
MCH RBC QN AUTO: 33.4 PG (ref 26.6–33)
MCHC RBC AUTO-ENTMCNC: 33.1 G/DL (ref 31.5–35.7)
MCV RBC AUTO: 101 FL (ref 79–97)
MONOCYTES # BLD AUTO: 1.3 10*3/MM3 (ref 0.1–0.9)
MONOCYTES NFR BLD AUTO: 14.4 % (ref 5–12)
NEUTROPHILS NFR BLD AUTO: 5.51 10*3/MM3 (ref 1.7–7)
NEUTROPHILS NFR BLD AUTO: 61.1 % (ref 42.7–76)
NRBC BLD AUTO-RTO: 0.2 /100 WBC (ref 0–0.2)
NT-PROBNP SERPL-MCNC: 8191 PG/ML (ref 0–1800)
PLATELET # BLD AUTO: 251 10*3/MM3 (ref 140–450)
PMV BLD AUTO: 10.6 FL (ref 6–12)
POTASSIUM SERPL-SCNC: 5 MMOL/L (ref 3.5–5.2)
PROT SERPL-MCNC: 6.8 G/DL (ref 6–8.5)
RBC # BLD AUTO: 2.96 10*6/MM3 (ref 3.77–5.28)
SODIUM SERPL-SCNC: 142 MMOL/L (ref 136–145)
WBC NRBC COR # BLD: 9.03 10*3/MM3 (ref 3.4–10.8)

## 2023-10-04 PROCEDURE — 86880 COOMBS TEST DIRECT: CPT

## 2023-10-04 PROCEDURE — 80053 COMPREHEN METABOLIC PANEL: CPT

## 2023-10-04 PROCEDURE — 83880 ASSAY OF NATRIURETIC PEPTIDE: CPT

## 2023-10-04 PROCEDURE — 83735 ASSAY OF MAGNESIUM: CPT

## 2023-10-04 PROCEDURE — 85025 COMPLETE CBC W/AUTO DIFF WBC: CPT

## 2023-10-04 PROCEDURE — 36415 COLL VENOUS BLD VENIPUNCTURE: CPT

## 2023-10-10 ENCOUNTER — OFFICE VISIT (OUTPATIENT)
Dept: CARDIOLOGY | Facility: CLINIC | Age: 78
End: 2023-10-10
Payer: MEDICARE

## 2023-10-10 VITALS
HEART RATE: 63 BPM | SYSTOLIC BLOOD PRESSURE: 110 MMHG | BODY MASS INDEX: 31.07 KG/M2 | DIASTOLIC BLOOD PRESSURE: 50 MMHG | HEIGHT: 64 IN | WEIGHT: 182 LBS

## 2023-10-10 DIAGNOSIS — E78.2 MIXED HYPERLIPIDEMIA: ICD-10-CM

## 2023-10-10 DIAGNOSIS — I25.810 CORONARY ARTERY DISEASE INVOLVING AUTOLOGOUS VEIN CORONARY BYPASS GRAFT WITHOUT ANGINA PECTORIS: ICD-10-CM

## 2023-10-10 DIAGNOSIS — N18.32 STAGE 3B CHRONIC KIDNEY DISEASE: ICD-10-CM

## 2023-10-10 DIAGNOSIS — I31.39 PERICARDIAL EFFUSION: ICD-10-CM

## 2023-10-10 DIAGNOSIS — I10 PRIMARY HYPERTENSION: ICD-10-CM

## 2023-10-10 DIAGNOSIS — I50.22 CHRONIC HFREF (HEART FAILURE WITH REDUCED EJECTION FRACTION): Primary | ICD-10-CM

## 2023-10-10 DIAGNOSIS — I48.0 PAF (PAROXYSMAL ATRIAL FIBRILLATION): ICD-10-CM

## 2023-10-10 PROBLEM — I50.20 HEART FAILURE WITH REDUCED EJECTION FRACTION: Status: RESOLVED | Noted: 2023-06-28 | Resolved: 2023-10-10

## 2023-10-10 PROBLEM — I50.23 ACUTE ON CHRONIC HFREF (HEART FAILURE WITH REDUCED EJECTION FRACTION): Status: RESOLVED | Noted: 2023-07-31 | Resolved: 2023-10-10

## 2023-10-10 PROCEDURE — 1159F MED LIST DOCD IN RCRD: CPT | Performed by: INTERNAL MEDICINE

## 2023-10-10 PROCEDURE — 99215 OFFICE O/P EST HI 40 MIN: CPT | Performed by: INTERNAL MEDICINE

## 2023-10-10 PROCEDURE — 3078F DIAST BP <80 MM HG: CPT | Performed by: INTERNAL MEDICINE

## 2023-10-10 PROCEDURE — 3074F SYST BP LT 130 MM HG: CPT | Performed by: INTERNAL MEDICINE

## 2023-10-10 PROCEDURE — 1160F RVW MEDS BY RX/DR IN RCRD: CPT | Performed by: INTERNAL MEDICINE

## 2023-10-10 RX ORDER — SACUBITRIL AND VALSARTAN 49; 51 MG/1; MG/1
1 TABLET, FILM COATED ORAL 2 TIMES DAILY
Qty: 28 TABLET | Refills: 0 | COMMUNITY
Start: 2023-10-10

## 2023-10-10 RX ORDER — SACUBITRIL AND VALSARTAN 49; 51 MG/1; MG/1
1 TABLET, FILM COATED ORAL 2 TIMES DAILY
Start: 2023-10-10

## 2023-10-10 RX ORDER — SPIRONOLACTONE 25 MG/1
25 TABLET ORAL DAILY
Start: 2023-10-10

## 2023-10-10 NOTE — ASSESSMENT & PLAN NOTE
She has chronic kidney disease stage IIIb it has become a little worse.  We will monitor it closely.

## 2023-10-10 NOTE — ASSESSMENT & PLAN NOTE
Ms. Hughes is a 77 years old female who is status post bypass surgery for ischemic heart disease and has chronic heart failure with reduced ejection fraction who is doing reasonably well.  However her fluid overload is still present although mild, her BNP has gone up significantly and her creatinine is crept up, I would like to exclude the possibility of worsening of her pericardial effusion that was noted on her echocardiogram in August.  We will order an urgent echocardiogram.  In addition, I am making the following changes to her medications:    1.  Decrease Entresto to 49-51 mg 1 tablet twice a day.  2.  Increase spironolactone to 25 mg every day instead of every other day.  3.  Continue heart rate blood pressure and weight monitoring.  4.  Follow a low potassium low-sodium fluid restricted diet.  1800 mL/day.  5.  Do blood work 1 or 2 days before your next appointment

## 2023-10-10 NOTE — ASSESSMENT & PLAN NOTE
She has had paroxysmal atrial fibrillation and currently is rate controlled.  We will continue the Toprol and the current dosage without increasing it because of her relatively low blood pressures

## 2023-10-10 NOTE — PATIENT INSTRUCTIONS
1.  Decrease Entresto to 49-51 mg 1 tablet twice a day.  2.  Increase spironolactone to 25 mg every day instead of every other day.  3.  Continue heart rate blood pressure and weight monitoring.  4.  Follow a low potassium low-sodium fluid restricted diet.  1800 mL/day.  5.  Do blood work 1 or 2 days before your next appointment

## 2023-10-10 NOTE — PROGRESS NOTES
Office Visit    Chief Complaint  Chronic HFrEF    Subjective            Ann-Marie Hughes presents to [facility] CARDIOLOGY  History of Present Illness  Known pulmonary about the blood pressure Mrs. Hughes is a 77 years old female with multivessel coronary disease hypertension diabetes hyperlipidemia, coronary artery disease, s/p bypass surgery who feels reasonably well.  Her blood pressures are running on the low side, her NT proBNP has gone up and her creatinine has gone up a little bit.  She denies chest pain palpitation shortness of breath dizziness or syncope.  She just feels fatigued and tired easily.  She walks with a walker.  She declined a full cardiac rehab.      Past Medical History:   Diagnosis Date    Acne rosacea 08/17/2021    DR.JEFF MOON     Arteriosclerosis of abdominal aorta     B12 deficiency 08/17/2021    DJD (degenerative joint disease)     Hx of smoking 08/17/2021    QUIT IN 1976 AT AGE 31    Hyperlipidemia 08/17/2021    Hypertension     Hypothyroidism     Metabolic syndrome 08/17/2021    KAREN (stress urinary incontinence, female) 08/17/2021    UTI (urinary tract infection) 08/17/2021    Vitamin D deficiency 08/17/2021       Allergies   Allergen Reactions    Penicillins Palpitations     1. When was your reaction? > 10 years ago  2. Did your reaction happen after the first dose or after several doses? After first dose  3. Did your reaction require ED or hospital care to manage your reaction? Do not know  4. Did your reaction require treatment with epinephrine? Do not know  5. Have you taken amoxicillin (Amoxil) or amoxicillin-clavulanate (Augmentin) without issue since? Yes  6. Have you taken cephalexin (Keflex) without issue since?  Do not know         Past Surgical History:   Procedure Laterality Date    BACK SURGERY  2013    CARDIAC CATHETERIZATION N/A 07/17/2023    Procedure: Left Heart Cath;  Surgeon: Dinh Andres MD;  Location: [facility] INVASIVE LOCATION;   Service: Cardiovascular;  Laterality: N/A;    CARPAL TUNNEL RELEASE      COLONOSCOPY      CORONARY ARTERY BYPASS GRAFT WITH MITRAL VALVE REPAIR/REPLACEMENT N/A 2023    Procedure: REZA STERNOTOMY OFF-PUMP CORONARY ARTERY BYPASS GRAFT TIMES        USING LEFFT INTERNAL MAMMARY ARTERY AND     GREATER SAPHENOUS VEIN GRAFT PER EMDOSCOPIC VEIN HARVESTING, MAZE PROCEDURE AND PRP;  Surgeon: Shane Alexander MD;  Location: [facility];  Service: Cardiothoracic;  Laterality: N/A;    KNEE SURGERY      LUMBAR DISCECTOMY      NECK SURGERY      Cervical    POSTERIOR LUMBAR/THORACIC SPINE FUSION          Social History     Tobacco Use    Smoking status: Former     Packs/day: 1.00     Years: 12.00     Additional pack years: 0.00     Total pack years: 12.00     Types: Cigarettes     Quit date:      Years since quittin.8    Smokeless tobacco: Never   Vaping Use    Vaping Use: Never used   Substance Use Topics    Alcohol use: Never    Drug use: Never       Family History   Problem Relation Age of Onset    Heart disease Mother     Arthritis Mother     Heart attack Mother     Arthritis Father         Prior to Admission medications    Medication Sig Start Date End Date Taking? Authorizing Provider   acetaminophen (TYLENOL) 325 MG tablet Take 2 tablets by mouth Every 4 (Four) Hours As Needed for Mild Pain. 23   Anjelica Chase APRN   albuterol sulfate  (90 Base) MCG/ACT inhaler Inhale 2 puffs Every 4 (Four) Hours As Needed for Wheezing. 23   Les Moreno DO   apixaban (ELIQUIS) 5 MG tablet tablet Take 1 tablet by mouth Every 12 (Twelve) Hours. Indications: Atrial Fibrillation 23   Anjelica Chase APRN   aspirin 81 MG EC tablet Take 1 tablet by mouth Daily.    Provider, MD Kanwal   atorvastatin (LIPITOR) 40 MG tablet Take 1 tablet by mouth Every Night for 90 days. 7/28/23 10/26/23  Anjelica Chase APRN   buPROPion XL (WELLBUTRIN XL) 300 MG 24 hr tablet TAKE 1 TABLET  "BY MOUTH DAILY 5/23/23   Rochelle Brown APRN   dapagliflozin Propanediol (Farxiga) 10 MG tablet Take 10 mg by mouth Daily. 7/12/23   Valerie Valera MD   folic acid (FOLVITE) 1 MG tablet Take 1 tablet by mouth Daily. 8/17/23   Boston Can MD   furosemide (LASIX) 40 MG tablet Take 1 tablet by mouth Daily. 8/30/23   Valerie Valera MD   metFORMIN ER (GLUCOPHAGE-XR) 500 MG 24 hr tablet TAKE 1 TABLET BY MOUTH EVERY MORNING AND 2 TABLETS AT SUPPER  Patient taking differently: 1 tablet 2 (Two) Times a Day. 1/19/23   Roseanna Hernandez MD   metoprolol succinate XL (TOPROL-XL) 50 MG 24 hr tablet Take 1 tablet by mouth Every 12 (Twelve) Hours for 90 days. 9/18/23 12/17/23  Sara Lubin APRN   montelukast (SINGULAIR) 10 MG tablet Take 1 tablet by mouth Daily. 7/5/23   Rochelle Brown APRN   multivitamin with minerals tablet tablet Take 1 tablet by mouth Daily.    Provider, MD Kanwal   predniSONE (DELTASONE) 10 MG tablet Take 1 tablet by mouth Daily.  Patient taking differently: Take 0.5 tablets by mouth Every Other Day. 8/17/23   Boston Can MD   Probiotic Product (Firebase PO) Take 1 capsule by mouth Daily.    Provider, MD Kanwal   sacubitril-valsartan (Entresto) 49-51 MG tablet Take 2 tablets by mouth 2 (Two) Times a Day. 9/18/23   Sara Lubin APRN   spironolactone (ALDACTONE) 25 MG tablet Take 1 tablet by mouth Every Other Day. 9/18/23   Sara Lubin APRN   Synthroid 75 MCG tablet Take 1 tablet by mouth Daily. 7/10/23   Rochelle Brown APRN        Review of Systems   Constitutional:  Positive for fatigue.   Respiratory:  Negative for cough and shortness of breath.    Cardiovascular:  Negative for chest pain, palpitations and leg swelling.        Symptom Course: Been Unchanged    Weight Trend: Stable     Objective     /50   Pulse 63   Ht 162.6 cm (64.02\")   Wt 82.6 kg (182 lb)   BMI 31.22 kg/mý       Physical Exam  Constitutional:       General: She is " awake.      Appearance: Normal appearance.   Neck:      Thyroid: No thyromegaly.      Vascular: No carotid bruit or JVD.   Cardiovascular:      Rate and Rhythm: Normal rate and regular rhythm.      Chest Wall: PMI is not displaced.      Pulses: Normal pulses.      Heart sounds: Normal heart sounds, S1 normal and S2 normal. No murmur heard.     No friction rub. No gallop. No S3 or S4 sounds.   Pulmonary:      Effort: Pulmonary effort is normal.      Breath sounds: Normal breath sounds and air entry. No wheezing, rhonchi or rales.   Abdominal:      General: Bowel sounds are normal.      Palpations: Abdomen is soft. There is no mass.      Tenderness: There is no abdominal tenderness.   Musculoskeletal:      Cervical back: Neck supple.      Right lower leg: Edema present.      Left lower leg: Edema present.   Neurological:      Mental Status: She is alert and oriented to person, place, and time.   Psychiatric:         Mood and Affect: Mood normal.         Behavior: Behavior is cooperative.       Lab Results   Component Value Date    PROBNP 8,191.0 (H) 10/04/2023    PROBNP 4,386.0 (H) 09/18/2023    PROBNP 6,516.0 (H) 09/06/2023     CMP          9/6/2023    08:22 9/18/2023    11:55 10/4/2023    08:56   CMP   Glucose 130  139  135    BUN 52  50  32    Creatinine 1.41  1.34  1.44    EGFR 38.3  40.7  37.3    Sodium 138  142  142    Potassium 5.1  5.0  5.0    Chloride 98  104  103    Calcium 10.3  9.4  10.0    Total Protein 6.9  6.5  6.8    Albumin 4.5  4.2  4.0    Globulin 2.4  2.3  2.8    Total Bilirubin 1.0  0.5  0.6    Alkaline Phosphatase 53  61  49    AST (SGOT) 25  33  22    ALT (SGPT) 23  27  17    Albumin/Globulin Ratio 1.9  1.8  1.4    BUN/Creatinine Ratio 36.9  37.3  22.2    Anion Gap 11.9  10.6  12.0      CBC w/diff          9/13/2023    08:55 9/18/2023    11:55 10/4/2023    08:56   CBC w/Diff   WBC 17.81  12.54  9.03    RBC 3.24  2.96  2.96    Hemoglobin 10.9  10.0  9.9    Hematocrit 32.3  31.0  29.9    MCV 99.7   "104.7  101.0    MCH 33.6  33.8  33.4    MCHC 33.7  32.3  33.1    RDW 21.4  21.7  18.1    Platelets 260  263  251    Neutrophil Rel % 64.3  58.7  61.1    Immature Granulocyte Rel % 0.8  0.2  0.3    Lymphocyte Rel % 25.4  30.9  21.9    Monocyte Rel % 8.5  8.7  14.4    Eosinophil Rel % 0.6  1.0  2.0    Basophil Rel % 0.4  0.5  0.3       Lipid Panel          6/15/2023    10:05 7/16/2023    09:19 7/18/2023    07:25   Lipid Panel   Total Cholesterol 122  97  93    Triglycerides 88  75  71    HDL Cholesterol 62  43  39    VLDL Cholesterol 17  16  15    LDL Cholesterol  43  38  39    LDL/HDL Ratio 0.68  0.91  1.02       Lab Results   Component Value Date    TSH 1.130 06/15/2023    TSH 1.940 01/11/2023    TSH 2.080 08/17/2022      Lab Results   Component Value Date    FREET4 1.31 01/11/2023    FREET4 1.27 08/17/2022    FREET4 1.32 04/13/2022      No results found for: \"DDIMERQUANT\"  Magnesium   Date Value Ref Range Status   10/04/2023 2.0 1.6 - 2.4 mg/dL Final      No results found for: \"DIGOXIN\"   A1C Last 3 Results          1/11/2023    09:05 6/15/2023    10:05 7/18/2023    07:25   HGBA1C Last 3 Results   Hemoglobin A1C 6.20  6.10  5.90           How is she also going to dated milligram    Result Review :                      Results for orders placed during the hospital encounter of 08/01/23    Adult Transthoracic Echo Limited W/ Cont if Necessary Per Protocol    Interpretation Summary    There is a small (<1cm) pericardial effusion.    There is a moderate sized right pleural effusion.         Assessment and Plan        Diagnoses and all orders for this visit:    1. Chronic HFrEF (heart failure with reduced ejection fraction) (Primary)  Assessment & Plan:  Ms. Hughes is a 77 years old female who is status post bypass surgery for ischemic heart disease and has chronic heart failure with reduced ejection fraction who is doing reasonably well.  However her fluid overload is still present although mild, her BNP has gone up " significantly and her creatinine is crept up, I would like to exclude the possibility of worsening of her pericardial effusion that was noted on her echocardiogram in August.  We will order an urgent echocardiogram.  In addition, I am making the following changes to her medications:    1.  Decrease Entresto to 49-51 mg 1 tablet twice a day.  2.  Increase spironolactone to 25 mg every day instead of every other day.  3.  Continue heart rate blood pressure and weight monitoring.  4.  Follow a low potassium low-sodium fluid restricted diet.  1800 mL/day.  5.  Do blood work 1 or 2 days before your next appointment    Orders:  -     Comprehensive Metabolic Panel; Future  -     Magnesium; Future  -     proBNP; Future  -     spironolactone (ALDACTONE) 25 MG tablet; Take 1 tablet by mouth Daily.  -     sacubitril-valsartan (Entresto) 49-51 MG tablet; Take 1 tablet by mouth 2 (Two) Times a Day.  -     Adult Transthoracic Echo Complete W/ Cont if Necessary Per Protocol; Future  -     Lipid Panel; Future  -     Comprehensive Metabolic Panel; Future  -     Magnesium; Future  -     25-HydroxyVitamin D LCMS D2+D3; Future    2. Primary hypertension  Assessment & Plan:  Her blood pressures are running on the low side.  We are therefore making adjustments in her medications and ruling out pleural or pericardial effusion      3. Mixed hyperlipidemia    4. PAF (paroxysmal atrial fibrillation)  Assessment & Plan:  She has had paroxysmal atrial fibrillation and currently is rate controlled.  We will continue the Toprol and the current dosage without increasing it because of her relatively low blood pressures      5. CAD status post CABG x3 vessels  -     Adult Transthoracic Echo Complete W/ Cont if Necessary Per Protocol; Future  -     Lipid Panel; Future  -     Comprehensive Metabolic Panel; Future  -     Magnesium; Future  -     25-HydroxyVitamin D LCMS D2+D3; Future    6. Stage 3b chronic kidney disease  Assessment & Plan:  She has  chronic kidney disease stage IIIb it has become a little worse.  We will monitor it closely.    Orders:  -     Adult Transthoracic Echo Complete W/ Cont if Necessary Per Protocol; Future  -     Lipid Panel; Future  -     Comprehensive Metabolic Panel; Future  -     Magnesium; Future  -     25-HydroxyVitamin D LCMS D2+D3; Future    7. Pericardial effusion  Assessment & Plan:  She had mild to moderate pericardial effusion on her previous echo.  She is running low blood pressures despite him feeling better overall.  I am suspicious that her pericardial effusion could have become worse especially since she is on Eliquis.  Therefore going to get a stat echo the next day or 2 before making further adjustments in her medications.    Orders:  -     Adult Transthoracic Echo Complete W/ Cont if Necessary Per Protocol; Future    Arrangements have been made for an echo as soon as possible.  She has been asked to go to for the today or tomorrow based on her convenience.  If the echo does not show significant pericardial effusion we will proceed with a chest x-ray to rule out significant pleural effusion.  She had moderate pleural effusion in August        Follow Up     Return for Echo asap.  fu 4 weeks., EKG with next office visit.    Patient was given instructions and counseling regarding her condition or for health maintenance advice. Please see specific information pulled into the AVS if appropriate.     Total time spent, 50 minutes.This time includes time spent by me for the following activities:  Reviewing past records including hospitalizations, performing a cardiac focused examination and/or evaluation, educating and counselling the patient and family, independently interpreting results and diagnostic tests and communicating that information to patient and family, documenting information in the medical record, etc. E/M time spent excludes any time spent on other reported services.  Electronically signed by Valerei Valera  MD, 10/10/23, 9:30 AM EDT.

## 2023-10-10 NOTE — ASSESSMENT & PLAN NOTE
Her blood pressures are running on the low side.  We are therefore making adjustments in her medications and ruling out pleural or pericardial effusion

## 2023-10-10 NOTE — ASSESSMENT & PLAN NOTE
She had mild to moderate pericardial effusion on her previous echo.  She is running low blood pressures despite him feeling better overall.  I am suspicious that her pericardial effusion could have become worse especially since she is on Eliquis.  Therefore going to get a stat echo the next day or 2 before making further adjustments in her medications.

## 2023-10-11 RX ORDER — FOLIC ACID 1 MG/1
1000 TABLET ORAL DAILY
Qty: 30 TABLET | Refills: 1 | Status: SHIPPED | OUTPATIENT
Start: 2023-10-11

## 2023-10-16 ENCOUNTER — TRANSCRIBE ORDERS (OUTPATIENT)
Dept: LAB | Facility: HOSPITAL | Age: 78
End: 2023-10-16
Payer: MEDICARE

## 2023-10-16 ENCOUNTER — LAB (OUTPATIENT)
Dept: LAB | Facility: HOSPITAL | Age: 78
End: 2023-10-16
Payer: MEDICARE

## 2023-10-16 DIAGNOSIS — I12.9 HYPERTENSIVE NEPHROPATHY: ICD-10-CM

## 2023-10-16 DIAGNOSIS — N17.9 ACUTE RENAL FAILURE, UNSPECIFIED ACUTE RENAL FAILURE TYPE: Primary | ICD-10-CM

## 2023-10-16 DIAGNOSIS — I25.10 DISEASE OF CARDIOVASCULAR SYSTEM: ICD-10-CM

## 2023-10-16 DIAGNOSIS — N18.31 CHRONIC KIDNEY DISEASE (CKD) STAGE G3A/A1, MODERATELY DECREASED GLOMERULAR FILTRATION RATE (GFR) BETWEEN 45-59 ML/MIN/1.73 SQUARE METER AND ALBUMINURIA CREATININE RATIO LESS THAN 30 MG/G (CMS/H*: ICD-10-CM

## 2023-10-16 DIAGNOSIS — N17.9 ACUTE RENAL FAILURE, UNSPECIFIED ACUTE RENAL FAILURE TYPE: ICD-10-CM

## 2023-10-16 LAB
25(OH)D3 SERPL-MCNC: 50.2 NG/ML (ref 30–100)
ALBUMIN SERPL-MCNC: 4.1 G/DL (ref 3.5–5.2)
ANION GAP SERPL CALCULATED.3IONS-SCNC: 13.5 MMOL/L (ref 5–15)
BACTERIA UR QL AUTO: NORMAL /HPF
BILIRUB UR QL STRIP: NEGATIVE
BUN SERPL-MCNC: 45 MG/DL (ref 8–23)
BUN/CREAT SERPL: 31.3 (ref 7–25)
CALCIUM SPEC-SCNC: 9.7 MG/DL (ref 8.6–10.5)
CHLORIDE SERPL-SCNC: 102 MMOL/L (ref 98–107)
CLARITY UR: CLEAR
CO2 SERPL-SCNC: 25.5 MMOL/L (ref 22–29)
COLOR UR: YELLOW
CREAT SERPL-MCNC: 1.44 MG/DL (ref 0.57–1)
CREAT UR-MCNC: 50.5 MG/DL
DEPRECATED RDW RBC AUTO: 64.1 FL (ref 37–54)
EGFRCR SERPLBLD CKD-EPI 2021: 37.3 ML/MIN/1.73
ERYTHROCYTE [DISTWIDTH] IN BLOOD BY AUTOMATED COUNT: 17.7 % (ref 12.3–15.4)
GLUCOSE SERPL-MCNC: 148 MG/DL (ref 65–99)
GLUCOSE UR STRIP-MCNC: ABNORMAL MG/DL
HCT VFR BLD AUTO: 31.3 % (ref 34–46.6)
HGB BLD-MCNC: 10.2 G/DL (ref 12–15.9)
HGB UR QL STRIP.AUTO: NEGATIVE
HYALINE CASTS UR QL AUTO: NORMAL /LPF
KETONES UR QL STRIP: NEGATIVE
LEUKOCYTE ESTERASE UR QL STRIP.AUTO: ABNORMAL
MCH RBC QN AUTO: 32.2 PG (ref 26.6–33)
MCHC RBC AUTO-ENTMCNC: 32.6 G/DL (ref 31.5–35.7)
MCV RBC AUTO: 98.7 FL (ref 79–97)
NITRITE UR QL STRIP: NEGATIVE
PH UR STRIP.AUTO: 6 [PH] (ref 5–8)
PHOSPHATE SERPL-MCNC: 3.1 MG/DL (ref 2.5–4.5)
PLATELET # BLD AUTO: 338 10*3/MM3 (ref 140–450)
PMV BLD AUTO: 11 FL (ref 6–12)
POTASSIUM SERPL-SCNC: 4.4 MMOL/L (ref 3.5–5.2)
PROT ?TM UR-MCNC: 19.5 MG/DL
PROT UR QL STRIP: ABNORMAL
PROT/CREAT UR: 0.39 MG/G{CREAT}
RBC # BLD AUTO: 3.17 10*6/MM3 (ref 3.77–5.28)
RBC # UR STRIP: NORMAL /HPF
REF LAB TEST METHOD: NORMAL
SODIUM SERPL-SCNC: 141 MMOL/L (ref 136–145)
SP GR UR STRIP: 1.01 (ref 1–1.03)
SQUAMOUS #/AREA URNS HPF: NORMAL /HPF
UROBILINOGEN UR QL STRIP: ABNORMAL
WBC # UR STRIP: NORMAL /HPF
WBC NRBC COR # BLD: 10.63 10*3/MM3 (ref 3.4–10.8)

## 2023-10-16 PROCEDURE — 84156 ASSAY OF PROTEIN URINE: CPT

## 2023-10-16 PROCEDURE — 80069 RENAL FUNCTION PANEL: CPT

## 2023-10-16 PROCEDURE — 36415 COLL VENOUS BLD VENIPUNCTURE: CPT

## 2023-10-16 PROCEDURE — 81001 URINALYSIS AUTO W/SCOPE: CPT

## 2023-10-16 PROCEDURE — 82306 VITAMIN D 25 HYDROXY: CPT

## 2023-10-16 PROCEDURE — 82570 ASSAY OF URINE CREATININE: CPT

## 2023-10-16 PROCEDURE — 85027 COMPLETE CBC AUTOMATED: CPT

## 2023-10-24 RX ORDER — APIXABAN 5 MG/1
5 TABLET, FILM COATED ORAL EVERY 12 HOURS
Qty: 60 TABLET | Refills: 0 | Status: SHIPPED | OUTPATIENT
Start: 2023-10-24

## 2023-10-24 RX ORDER — APIXABAN 5 MG/1
5 TABLET, FILM COATED ORAL EVERY 12 HOURS
Qty: 60 TABLET | Refills: 0 | OUTPATIENT
Start: 2023-10-24

## 2023-10-24 RX ORDER — ATORVASTATIN CALCIUM 10 MG/1
TABLET, FILM COATED ORAL
Qty: 90 TABLET | Refills: 0 | OUTPATIENT
Start: 2023-10-24

## 2023-10-25 RX ORDER — ATORVASTATIN CALCIUM 40 MG/1
40 TABLET, FILM COATED ORAL NIGHTLY
Qty: 90 TABLET | Refills: 0 | OUTPATIENT
Start: 2023-10-25 | End: 2024-01-23

## 2023-10-25 NOTE — TELEPHONE ENCOUNTER
Caller: [facility] - [place] Mclean - [address] - [phone]  - [phone] FX    Relationship: Pharmacy    Best call back number: [phone]    Requested Prescriptions:   Requested Prescriptions     Pending Prescriptions Disp Refills    atorvastatin (LIPITOR) 40 MG tablet 90 tablet 0     Sig: Take 1 tablet by mouth Every Night for 90 days.        Pharmacy where request should be sent:      Last office visit with prescribing clinician: Visit date not found   Last telemedicine visit with prescribing clinician: Visit date not found   Next office visit with prescribing clinician: Visit date not found     Additional details provided by patient: PHARMACY BELIEVES PT MAY BE COMPLETELY OUT OF MEDICATION.    Does the patient have less than a 3 day supply:  [x] Yes  [] No    Would you like a call back once the refill request has been completed: [] Yes [x] No    If the office needs to give you a call back, can they leave a voicemail: [] Yes [x] No    Oliverio Mckeon Rep   10/25/23 10:34 EDT

## 2023-10-30 RX ORDER — ATORVASTATIN CALCIUM 10 MG/1
10 TABLET, FILM COATED ORAL DAILY
Qty: 90 TABLET | Refills: 1 | Status: SHIPPED | OUTPATIENT
Start: 2023-10-30

## 2023-10-30 RX ORDER — ATORVASTATIN CALCIUM 10 MG/1
TABLET, FILM COATED ORAL
Qty: 90 TABLET | Refills: 0 | OUTPATIENT
Start: 2023-10-30

## 2023-10-31 ENCOUNTER — TELEPHONE (OUTPATIENT)
Dept: FAMILY MEDICINE CLINIC | Facility: CLINIC | Age: 78
End: 2023-10-31
Payer: MEDICARE

## 2023-10-31 RX ORDER — LEVOTHYROXINE SODIUM 75 MCG
75 TABLET ORAL DAILY
Qty: 90 TABLET | Refills: 3 | Status: SHIPPED | OUTPATIENT
Start: 2023-10-31

## 2023-11-01 ENCOUNTER — OFFICE VISIT (OUTPATIENT)
Dept: ONCOLOGY | Facility: CLINIC | Age: 78
End: 2023-11-01
Payer: MEDICARE

## 2023-11-01 ENCOUNTER — LAB (OUTPATIENT)
Dept: LAB | Facility: HOSPITAL | Age: 78
End: 2023-11-01
Payer: MEDICARE

## 2023-11-01 VITALS
SYSTOLIC BLOOD PRESSURE: 124 MMHG | HEIGHT: 64 IN | TEMPERATURE: 98.2 F | BODY MASS INDEX: 29.82 KG/M2 | DIASTOLIC BLOOD PRESSURE: 76 MMHG | HEART RATE: 67 BPM | OXYGEN SATURATION: 96 % | WEIGHT: 174.7 LBS

## 2023-11-01 DIAGNOSIS — E53.8 B12 DEFICIENCY: ICD-10-CM

## 2023-11-01 DIAGNOSIS — C91.Z0 LARGE GRANULAR LYMPHOCYTE DISORDER: ICD-10-CM

## 2023-11-01 DIAGNOSIS — D50.8 IRON DEFICIENCY ANEMIA SECONDARY TO INADEQUATE DIETARY IRON INTAKE: Primary | ICD-10-CM

## 2023-11-01 DIAGNOSIS — D50.8 IRON DEFICIENCY ANEMIA SECONDARY TO INADEQUATE DIETARY IRON INTAKE: ICD-10-CM

## 2023-11-01 LAB
ALBUMIN SERPL-MCNC: 3.6 G/DL (ref 3.5–5.2)
ALBUMIN/GLOB SERPL: 1.1 G/DL
ALP SERPL-CCNC: 53 U/L (ref 39–117)
ALT SERPL W P-5'-P-CCNC: 15 U/L (ref 1–33)
ANION GAP SERPL CALCULATED.3IONS-SCNC: 15.9 MMOL/L (ref 5–15)
AST SERPL-CCNC: 26 U/L (ref 1–32)
BASOPHILS # BLD AUTO: 0.06 10*3/MM3 (ref 0–0.2)
BASOPHILS NFR BLD AUTO: 0.5 % (ref 0–1.5)
BILIRUB SERPL-MCNC: 0.6 MG/DL (ref 0–1.2)
BUN SERPL-MCNC: 50 MG/DL (ref 8–23)
BUN/CREAT SERPL: 32.1 (ref 7–25)
CALCIUM SPEC-SCNC: 9.8 MG/DL (ref 8.6–10.5)
CHLORIDE SERPL-SCNC: 99 MMOL/L (ref 98–107)
CO2 SERPL-SCNC: 24.1 MMOL/L (ref 22–29)
CREAT SERPL-MCNC: 1.56 MG/DL (ref 0.6–1.1)
DEPRECATED RDW RBC AUTO: 66.9 FL (ref 37–54)
EGFRCR SERPLBLD CKD-EPI 2021: 33.9 ML/MIN/1.73
EOSINOPHIL # BLD AUTO: 0.2 10*3/MM3 (ref 0–0.4)
EOSINOPHIL NFR BLD AUTO: 1.5 % (ref 0.3–6.2)
ERYTHROCYTE [DISTWIDTH] IN BLOOD BY AUTOMATED COUNT: 17.9 % (ref 12.3–15.4)
GLOBULIN UR ELPH-MCNC: 3.2 GM/DL
GLUCOSE SERPL-MCNC: 109 MG/DL (ref 65–99)
HCT VFR BLD AUTO: 34 % (ref 34–46.6)
HGB BLD-MCNC: 10.8 G/DL (ref 12–15.9)
IMM GRANULOCYTES # BLD AUTO: 0.08 10*3/MM3 (ref 0–0.05)
IMM GRANULOCYTES NFR BLD AUTO: 0.6 % (ref 0–0.5)
LYMPHOCYTES # BLD AUTO: 3.33 10*3/MM3 (ref 0.7–3.1)
LYMPHOCYTES NFR BLD AUTO: 25.6 % (ref 19.6–45.3)
MCH RBC QN AUTO: 32.5 PG (ref 26.6–33)
MCHC RBC AUTO-ENTMCNC: 31.8 G/DL (ref 31.5–35.7)
MCV RBC AUTO: 102.4 FL (ref 79–97)
MONOCYTES # BLD AUTO: 1.55 10*3/MM3 (ref 0.1–0.9)
MONOCYTES NFR BLD AUTO: 11.9 % (ref 5–12)
NEUTROPHILS NFR BLD AUTO: 59.9 % (ref 42.7–76)
NEUTROPHILS NFR BLD AUTO: 7.77 10*3/MM3 (ref 1.7–7)
NRBC BLD AUTO-RTO: 0.2 /100 WBC (ref 0–0.2)
PLATELET # BLD AUTO: 333 10*3/MM3 (ref 140–450)
PMV BLD AUTO: 10 FL (ref 6–12)
POTASSIUM SERPL-SCNC: 4.5 MMOL/L (ref 3.5–5.2)
PROT SERPL-MCNC: 6.8 G/DL (ref 6–8.5)
RBC # BLD AUTO: 3.32 10*6/MM3 (ref 3.77–5.28)
SODIUM SERPL-SCNC: 139 MMOL/L (ref 136–145)
WBC NRBC COR # BLD: 12.99 10*3/MM3 (ref 3.4–10.8)

## 2023-11-01 PROCEDURE — 36415 COLL VENOUS BLD VENIPUNCTURE: CPT

## 2023-11-01 PROCEDURE — 85025 COMPLETE CBC W/AUTO DIFF WBC: CPT

## 2023-11-01 PROCEDURE — 80053 COMPREHEN METABOLIC PANEL: CPT

## 2023-11-01 RX ORDER — PREDNISONE 5 MG/1
TABLET ORAL
Qty: 60 TABLET | Refills: 1 | Status: SHIPPED | OUTPATIENT
Start: 2023-11-01

## 2023-11-01 NOTE — PROGRESS NOTES
Subjective         REASONS FOR FOLLOWUP:    1. LEUKOCYTOSIS , NEUTROPHILS HAVE NO GRANULES  2. ANEMIA FOR 3 YEARS,NORMAL MCV: VERY ATYPICAL RED BLOOD CELL MORPHOLOGY  3. CHF AND PULMONARY EDEMA , CAD, ELEVATED CREATININE.    HISTORY OF PRESENT ILLNESS:    On 11/01/2023, this 77-year-old female returns to the office in company of her  and her daughter. At this time the patient is not eating anything because not only she is on a sugar-restricted diet given her diabetes but also on a potassium-restricted diet given the fact that she takes Aldactone and Entresto and her potassium has been found to be elevated. Therefore, the patient's nutrition is very poor. Again, she finds not too much that she can eat and function on and then she is prostrated in a chair or recliner most of the day and using her walker just for short distances. She is fatigued. She has no fevers, chills, or infection. She has some shortness of breath upon exertion. No paroxysmal nocturnal dyspnea. No orthopnea. Her bowel activity is appropriate. Urination is appropriate in volume. There is no swelling in lower extremities. She has no fever or infection. No clinical bleeding. She remains on Eliquis and aspirin. She continues being seen by Cardiology as well.     The patient remains on a low-dose prednisone 5 mg every other day to try to stimulate appetite but they do not believe that this is sufficient. This will obligate the change of this medicine at this point.                HEMATOLOGY HISTORY:   On 08/04/2023 I had the opportunity to see this 77-year-old female who has been admitted to the hospital in pulmonary edema, congestive heart failure, associated with cardiac disease. She has undergone recently a cardiac bypass. We have been consulted because the patient has been noticed to have leukocytosis, new issue for her in absence of any other fever or infection and she also has been noticed to have anemia. On further questioning to the  patient her primary care physician in [place] has told her for many months that she has been anemic but no specific investigation has been made about that that she is aware of. She has no notion of passage of blood in the stool, no hematemesis, no melena, no hematuria, no vaginal bleeding. Her diet is complete in nutrients. She has lost some weight because she has been very fatigued lately. The fatigue was associated with her heart operation but even before that she was very tired. The patient also states that she has not had any definitive infection even though she had pneumonia  weeks before her cardiac operation but she got better from this. She has not had any fevers, chills, night sweats, pruritus, adenopathies or rashes. She complains of some shortness of breath upon exertion but this is dramatically better since diuresis was established for the treatment of her CHF. Urinary output has been abundant. She has no difficulty with miction. Her bowel activity has been normal. Her surgical sites in her leg have had some ecchymosis and swelling and she has had significant fluid accumulation in her legs.      The patient has no previous history of hematological disorder. She is not aware of any organ enlargement or anything of this nature.  The patient returned to the office on 08/17/2023 in company of her  and her son. In the interim she was discharged from [facility] a few days ago after her cardiac surgery and her CHF and she has been at home with no appetite whatsoever and significant fatigue. Her appetite is minimum. Her blood sugar has been in the 120 category. She has not had any nausea, vomiting, diarrhea, melena, enterorrhagia, abdominal pain. Urination is normal in volume, urine is normal color. She has minimal cough and some shortness of breath. She has no energy for anything. She requires total care for bathing, dressing and so forth by the son and . She has not had any fever,  chills or infection. Her surgical sites in the chest wall and her lower extremities are all dry with no evidence of infection.   On 09/13/2023 the patient returned to the office in company of her  and her son. In the meantime since the previous visit the patient has had a remarkable clinical improvement. Her appetite has returned to normality. The patient actually has been up and about in the kitchen doing some cooking and doing a little bit of dishes. The patient has been able to get around in her walker and her rollator. Her blood sugar has remained below 200 in spite of the minimal dose of prednisone. She has lesser degree of shortness of breath and near complete resolution of the anasarca. Her bowel activity is normal. Urination is abundant. She has no fevers, chills or infection.     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        Past Medical History:   Diagnosis Date    Acne rosacea 08/17/2021    DR.JEFF MOON     Arteriosclerosis of abdominal aorta     B12 deficiency 08/17/2021    DJD (degenerative joint disease)     Hx of smoking 08/17/2021    QUIT IN 1976 AT AGE 31    Hyperlipidemia 08/17/2021    Hypertension     Hypothyroidism     Metabolic syndrome 08/17/2021    KAREN (stress urinary incontinence, female) 08/17/2021    UTI (urinary tract infection) 08/17/2021    Vitamin D deficiency 08/17/2021        Past Surgical History:   Procedure Laterality Date    BACK SURGERY  2013    CARDIAC CATHETERIZATION N/A 07/17/2023    Procedure: Left Heart Cath;  Surgeon: Dinh Andres MD;  Location: [facility] CATH INVASIVE LOCATION;  Service: Cardiovascular;  Laterality: N/A;    CARPAL TUNNEL RELEASE      COLONOSCOPY  2011    CORONARY ARTERY BYPASS GRAFT WITH MITRAL VALVE REPAIR/REPLACEMENT N/A 07/20/2023    Procedure: REZA STERNOTOMY OFF-PUMP CORONARY ARTERY BYPASS GRAFT TIMES        USING LEFFT INTERNAL MAMMARY  ARTERY AND     GREATER SAPHENOUS VEIN GRAFT PER EMDOSCOPIC VEIN HARVESTING, MAZE PROCEDURE AND PRP;  Surgeon: Shane Alexander MD;  Location: [facility];  Service: Cardiothoracic;  Laterality: N/A;    KNEE SURGERY      LUMBAR DISCECTOMY      NECK SURGERY      Cervical    POSTERIOR LUMBAR/THORACIC SPINE FUSION          Current Outpatient Medications on File Prior to Visit   Medication Sig Dispense Refill    acetaminophen (TYLENOL) 325 MG tablet Take 2 tablets by mouth Every 4 (Four) Hours As Needed for Mild Pain.      albuterol sulfate  (90 Base) MCG/ACT inhaler Inhale 2 puffs Every 4 (Four) Hours As Needed for Wheezing. 18 g 0    apixaban (Eliquis) 5 MG tablet tablet TAKE 1 TABLET BY MOUTH EVERY 12 HOURS 60 tablet 0    aspirin 81 MG EC tablet Take 1 tablet by mouth Daily.      atorvastatin (LIPITOR) 10 MG tablet TAKE 1 TABLET BY MOUTH EVERY DAY 90 tablet 1    buPROPion XL (WELLBUTRIN XL) 300 MG 24 hr tablet TAKE 1 TABLET BY MOUTH DAILY 90 tablet 1    Cranberry-Vitamin C-Probiotic (AZO CRANBERRY PO) Take  by mouth.      dapagliflozin Propanediol (Farxiga) 10 MG tablet Take 10 mg by mouth Daily. 30 tablet 3    folic acid (FOLVITE) 1 MG tablet TAKE 1 TABLET BY MOUTH EVERY DAY 30 tablet 1    furosemide (LASIX) 40 MG tablet Take 1 tablet by mouth Daily. 60 tablet 2    metFORMIN ER (GLUCOPHAGE-XR) 500 MG 24 hr tablet TAKE 1 TABLET BY MOUTH EVERY MORNING AND 2 TABLETS AT SUPPER (Patient taking differently: 1 tablet 2 (Two) Times a Day.) 270 tablet 4    metoprolol succinate XL (TOPROL-XL) 50 MG 24 hr tablet Take 1 tablet by mouth Every 12 (Twelve) Hours for 90 days. 60 tablet 2    montelukast (SINGULAIR) 10 MG tablet Take 1 tablet by mouth Daily. 90 tablet 3    multivitamin with minerals tablet tablet Take 1 tablet by mouth Daily.      predniSONE (DELTASONE) 10 MG tablet Take 1 tablet by mouth Daily. (Patient taking differently: Take 0.5 tablets by mouth Every Other Day.) 30 tablet 1    Probiotic Product  "([facility] PO) Take 1 capsule by mouth Daily.      sacubitril-valsartan (Entresto) 49-51 MG tablet Take 1 tablet by mouth 2 (Two) Times a Day.      sacubitril-valsartan (Entresto) 49-51 MG tablet Take 1 tablet by mouth 2 (Two) Times a Day. Indications: lot [id number]  exp 2025 28 tablet 0    spironolactone (ALDACTONE) 25 MG tablet Take 1 tablet by mouth Daily.      Synthroid 75 MCG tablet Take 1 tablet by mouth Daily. 90 tablet 3     No current facility-administered medications on file prior to visit.        ALLERGIES:    Allergies   Allergen Reactions    Penicillins Palpitations     1. When was your reaction? > 10 years ago  2. Did your reaction happen after the first dose or after several doses? After first dose  3. Did your reaction require ED or hospital care to manage your reaction? Do not know  4. Did your reaction require treatment with epinephrine? Do not know  5. Have you taken amoxicillin (Amoxil) or amoxicillin-clavulanate (Augmentin) without issue since? Yes  6. Have you taken cephalexin (Keflex) without issue since?  Do not know         Social History     Socioeconomic History    Marital status:      Spouse name: Dinh   Tobacco Use    Smoking status: Former     Packs/day: 1.00     Years: 12.00     Additional pack years: 0.00     Total pack years: 12.00     Types: Cigarettes     Quit date:      Years since quittin.8    Smokeless tobacco: Never   Vaping Use    Vaping Use: Never used   Substance and Sexual Activity    Alcohol use: Never    Drug use: Never    Sexual activity: Defer        Family History   Problem Relation Age of Onset    Heart disease Mother     Arthritis Mother     Heart attack Mother     Arthritis Father         Objective     Vitals:    23 1043   BP: 124/76   Pulse: 67   Temp: 98.2 °F (36.8 °C)   TempSrc: Temporal   SpO2: 96%   Weight: 79.2 kg (174 lb 11.2 oz)   Height: 162.6 cm (64.02\")   PainSc: 0-No pain         2023    10:41 AM   Current " Status   ECOG score 1       Physical Exam            GENERAL:  Well-developed, Patient  in no acute distress. IN A WHEEL CHAIR  SKIN:  Warm, dry ,NO purpura ,no rash.PALE  HEENT:  Pupils were equal and reactive to light and accomodation, conjunctivae noninjected,  normal visual acuity.   NECK:  Supple with good range of motion; no thyromegaly , no JVD or bruits,.No carotid artery pain, no carotid abnormal pulsation   LYMPHATICS:  No cervical, NO supraclavicular, NO axillary, NO inguinal adenopathies.  CARDIAC   normal rate , regular rhythm, without murmur,NO rubs NO S3 NO S4   LUNGS: normal breath sounds bilateral, no wheezing, NO rhonchi, NO crackles ,NO rubs.  VASCULAR VENOUS: no cyanosis, NO collateral circulation, NO varicosities, NO edema, NO palpable cords, NO pain,NO erythema, NO pigmentation of the skin.  ABDOMEN:  Soft, NO pain,no hepatomegaly, no splenomegaly,no masses, no ascites, no collateral circulation,no distention.  EXTREMITIES  AND SPINE:  No clubbing, no cyanosis ,no deformities , no pain .No kyphosis,  no pain in spine, no pain in ribs , no pain in pelvic bone.  NEUROLOGICAL:  Patient was awake, alert, oriented to time, person and place.        RECENT LABS:  Hematology WBC   Date Value Ref Range Status   11/01/2023 12.99 (H) 3.40 - 10.80 10*3/mm3 Final     RBC   Date Value Ref Range Status   11/01/2023 3.32 (L) 3.77 - 5.28 10*6/mm3 Final     Hemoglobin   Date Value Ref Range Status   11/01/2023 10.8 (L) 12.0 - 15.9 g/dL Final     Hematocrit   Date Value Ref Range Status   11/01/2023 34.0 34.0 - 46.6 % Final     Platelets   Date Value Ref Range Status   11/01/2023 333 140 - 450 10*3/mm3 Final       CBC:    WBC   Date Value Ref Range Status   11/01/2023 12.99 (H) 3.40 - 10.80 10*3/mm3 Final     RBC   Date Value Ref Range Status   11/01/2023 3.32 (L) 3.77 - 5.28 10*6/mm3 Final     Hemoglobin   Date Value Ref Range Status   11/01/2023 10.8 (L) 12.0 - 15.9 g/dL Final     Hematocrit   Date Value Ref  Range Status   11/01/2023 34.0 34.0 - 46.6 % Final     MCV   Date Value Ref Range Status   11/01/2023 102.4 (H) 79.0 - 97.0 fL Final     MCH   Date Value Ref Range Status   11/01/2023 32.5 26.6 - 33.0 pg Final     MCHC   Date Value Ref Range Status   11/01/2023 31.8 31.5 - 35.7 g/dL Final     RDW   Date Value Ref Range Status   11/01/2023 17.9 (H) 12.3 - 15.4 % Final     RDW-SD   Date Value Ref Range Status   11/01/2023 66.9 (H) 37.0 - 54.0 fl Final     MPV   Date Value Ref Range Status   11/01/2023 10.0 6.0 - 12.0 fL Final     Platelets   Date Value Ref Range Status   11/01/2023 333 140 - 450 10*3/mm3 Final     Neutrophil %   Date Value Ref Range Status   11/01/2023 59.9 42.7 - 76.0 % Final     Lymphocyte %   Date Value Ref Range Status   11/01/2023 25.6 19.6 - 45.3 % Final     Monocyte %   Date Value Ref Range Status   11/01/2023 11.9 5.0 - 12.0 % Final     Eosinophil %   Date Value Ref Range Status   11/01/2023 1.5 0.3 - 6.2 % Final     Basophil %   Date Value Ref Range Status   11/01/2023 0.5 0.0 - 1.5 % Final     Immature Grans %   Date Value Ref Range Status   11/01/2023 0.6 (H) 0.0 - 0.5 % Final     Neutrophils, Absolute   Date Value Ref Range Status   11/01/2023 7.77 (H) 1.70 - 7.00 10*3/mm3 Final     Lymphocytes, Absolute   Date Value Ref Range Status   11/01/2023 3.33 (H) 0.70 - 3.10 10*3/mm3 Final     Monocytes, Absolute   Date Value Ref Range Status   11/01/2023 1.55 (H) 0.10 - 0.90 10*3/mm3 Final     Eosinophils, Absolute   Date Value Ref Range Status   11/01/2023 0.20 0.00 - 0.40 10*3/mm3 Final     Basophils, Absolute   Date Value Ref Range Status   11/01/2023 0.06 0.00 - 0.20 10*3/mm3 Final     Immature Grans, Absolute   Date Value Ref Range Status   11/01/2023 0.08 (H) 0.00 - 0.05 10*3/mm3 Final     nRBC   Date Value Ref Range Status   11/01/2023 0.2 0.0 - 0.2 /100 WBC Final        CMP:    Glucose   Date Value Ref Range Status   11/01/2023 109 (H) 65 - 99 mg/dL Final     BUN   Date Value Ref Range  Status   11/01/2023 50 (H) 8 - 23 mg/dL Final     Creatinine   Date Value Ref Range Status   11/01/2023 1.56 (H) 0.60 - 1.10 mg/dL Final     Sodium   Date Value Ref Range Status   11/01/2023 139 136 - 145 mmol/L Final     Potassium   Date Value Ref Range Status   11/01/2023 4.5 3.5 - 5.2 mmol/L Final     Chloride   Date Value Ref Range Status   11/01/2023 99 98 - 107 mmol/L Final     CO2   Date Value Ref Range Status   11/01/2023 24.1 22.0 - 29.0 mmol/L Final     Calcium   Date Value Ref Range Status   11/01/2023 9.8 8.6 - 10.5 mg/dL Final     Total Protein   Date Value Ref Range Status   11/01/2023 6.8 6.0 - 8.5 g/dL Final     Albumin   Date Value Ref Range Status   11/01/2023 3.6 3.5 - 5.2 g/dL Final     ALT (SGPT)   Date Value Ref Range Status   11/01/2023 15 1 - 33 U/L Final     AST (SGOT)   Date Value Ref Range Status   11/01/2023 26 1 - 32 U/L Final     Alkaline Phosphatase   Date Value Ref Range Status   11/01/2023 53 39 - 117 U/L Final     Total Bilirubin   Date Value Ref Range Status   11/01/2023 0.6 0.0 - 1.2 mg/dL Final     Globulin   Date Value Ref Range Status   11/01/2023 3.2 gm/dL Final     A/G Ratio   Date Value Ref Range Status   11/01/2023 1.1 g/dL Final     BUN/Creatinine Ratio   Date Value Ref Range Status   11/01/2023 32.1 (H) 7.0 - 25.0 Final     Anion Gap   Date Value Ref Range Status   11/01/2023 15.9 (H) 5.0 - 15.0 mmol/L Final           Alen      Assessment & Plan     Diagnoses and all orders for this visit:    1. Iron deficiency anemia secondary to inadequate dietary iron intake (Primary)  -     CBC & Differential; Future  -     Comprehensive Metabolic Panel; Future    2. Large granular lymphocyte disorder    3. B12 deficiency       On 08/17/2023 I reviewed all of the issues pertinent to her care after being in the hospital with congestive heart failure and being significantly anemic. We documented that her anemia has been a slow process for the last 3 years and her leukocytosis has also  been present for the same period of time. In the hospital we documented that the patient had a normal B12, normal folate, normal ferritin, normal iron profile. Her LDH was normal. Serum protein immunoelectrophoresis was negative for monoclonal protein. On the other hand the patient had a Coomb's test that was positive, complement was positive.    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 Today her peripheral blood has an element of lymphocytosis and raises the question if she has a lymphoid malignancy like for example chronic lymphoid leukemia that will trigger leukocytosis, lymphocytosis and hemolytic anemia. To me that is the most likely case. The hemoglobin remains low. The patient remains significantly fatigued. She is minimally jaundice and she remains anemic. Her platelet count is normal. She has no peripheral adenopathy palpable. No obvious splenomegaly clinically. Nevertheless my advice to this patient and discussed with the son and the patient's  in the room is as follows:    1. I would like for her to take a multivitamin like prenatal 3 times a week.  2. I would like her to take folic acid 1 mg a day.  3. I have sent a prescription for prednisone 10 mg a day. The patient is diabetic. I do not think she can take more than 10 mg a day. She will need to continue checking her blood sugar on a daily basis and if her blood sugar is above 200 she will need to notify us and will notify her primary physician to help us to take care of her blood sugar.     She will require laboratory assessment in [place] on weekly basis including CBC and a retic count and we will review her back her in a month with similar features.     I am going to go ahead and send a peripheral blood flow cytometry today. A BCR/ABL was going to be sent, I find no reason for this given the differential in the white count.     I am not going to request any  radiological assessment of her abdomen and pelvis pending to see how she responds to this simple regimen.     If the patient fails to respond to this dose of prednisone or if the hemoglobin does not get any better she will require therapy with Rituxan.     Otherwise I discussed with the patient the need for her to try to eat as she is not and try to get up and about with some walking.    She does not have any ongoing infection. Her surgical sites are all well clean and hopefully they will continue healing as the time goes by.     We will review her back in a month as posted.     I reviewed the patient on 09/13/2023. Since the previous visit several weeks ago the patient has had a remarkable clinical improvement. Her appetite is back triggered by prednisone utilization. She has been able to gain back strength to the point that she is able to get around the house using her rollator and she has been able to cook simple meals and do some dishes. The patient is sleeping poorly. Besides this she has no obvious aches and pains. No jaundice, no rashes. She has minimal degree of shortness of breath and her energy level has substantially improved. Her anasarca has resolved. Her bowel activity is appropriate. Urination is appropriate. Mentation has remained or improved substantially.     During the previous visit we discussed the fact that we needed to have further analysis of her blood. This included a CCP antibody that was negative, an NORMA that was positive, a DNA-DSA confirmation that was negative. 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     I provided reports of all these tests to the patient and in simple words I explained to her and her family what this means. It is very likely that this patient has an autoimmune condition that is triggering also LGL showing up. This explains her persistent  leukocytosis. Her anemia has substantially improved and I do not believe that the patient has significant degree of hemolysis with a reticulocyte count that is normal, LDH that is normal, bilirubin that is normal.     The patient otherwise actually feels as posted dramatically better.     RECOMMENDATIONS:    1. I have advised her to have continuous persistent proper diet at this time. She is eating much better and functioning much better.   2. I advised her to remain on her prednisone and the dose that she will take now is 5 mg a day. Eventually the goal will be to move this upon visit in 6 weeks to every other day.   3. The patient will require for her incisional site below the chest bone from the chest tube, local therapy with honey and brown sugar. Hopefully this will minimize any possibility of infection and will allow this area to clean and close definitively. She will apply this after washing this with soap and water, saline solution and using this concoction only once a day.   4. I would like for the patient to return to see us back in 6 weeks with a CBC, CMP and a Cristina test.   5. I advised the patient that she does not need to have any more blood work in between her next visit in her local [facility].     I discussed all these facts with the patient's  and son in the room.  On 11/01/2023, the patient was further reviewed. Since the previous visit, the patient does not find too much of anything that she can eat because she is on a glucose-restricted diet given her diabetes and now on a potassium-restricted diet given the fact that she has been found to have a high potassium and this is probably related to the combination and utilization of Aldactone along with Entresto.     The patient obviously has in my opinion poor nutrition given this fact and she is not hungry. She is depressed and she is trying to do some minimal housework that she is capable of.    She found that the prednisone every  other day is not as good as the prednisone everyday to try to stimulate her appetite and make her to feel better and I think at this point of the game, I have no other option than give her prednisone 5 mg every day to try to see if she perks up a little bit and feels better. Overall, her large granular lymphocytic leukemia numbers are not changing. Her white count remains minimally elevated and her hemoglobin remains still on the low side. On the other hand, it is a little bit better than during the previous visit. Her platelet count is stable.     The patient has been advised to then proceed with prednisone 5 mg every day. She will be advised as well to continue her multivitamin on daily basis and I advised her that she needs to talk with her cardiologist in regard how to control the issues pertinent to her potassium without having to have a potassium restricted diet. Again, Aldactone and Entresto are probably the reason for this phenomenon in the first place.     I would like to review the patient back in a couple of months with a CBC and a CMP.     I discussed all these facts with the patient, her  and her daughter. I highlighted in her medication profile the combination of Entresto and Aldactone triggering hyperkalemia and maybe if she has still the need for a potassium-restricted diet, I think she should be referred to be seen by a dietician in her local hospital.

## 2023-11-02 ENCOUNTER — TELEPHONE (OUTPATIENT)
Dept: OTHER | Facility: HOSPITAL | Age: 78
End: 2023-11-02
Payer: MEDICARE

## 2023-11-02 NOTE — TELEPHONE ENCOUNTER
Called patient's daughter to relay message from Dr. Can: patient is very dehydrated. Advised to increase fluids, hold aldactone, and take lasix only every other day until patient sees cardiologist. Patient has an appt on 11/7. Sent CMP to both PCP and Cardiologist. Pt's daughter v/u. Jaelyn Luna RN

## 2023-11-06 ENCOUNTER — LAB (OUTPATIENT)
Dept: LAB | Facility: HOSPITAL | Age: 78
End: 2023-11-06
Payer: MEDICARE

## 2023-11-06 DIAGNOSIS — I25.810 CORONARY ARTERY DISEASE INVOLVING AUTOLOGOUS VEIN CORONARY BYPASS GRAFT WITHOUT ANGINA PECTORIS: ICD-10-CM

## 2023-11-06 DIAGNOSIS — N18.32 STAGE 3B CHRONIC KIDNEY DISEASE: ICD-10-CM

## 2023-11-06 DIAGNOSIS — I50.22 CHRONIC HFREF (HEART FAILURE WITH REDUCED EJECTION FRACTION): ICD-10-CM

## 2023-11-06 LAB
ALBUMIN SERPL-MCNC: 3.9 G/DL (ref 3.5–5.2)
ALBUMIN/GLOB SERPL: 1.2 G/DL
ALP SERPL-CCNC: 58 U/L (ref 39–117)
ALT SERPL W P-5'-P-CCNC: 13 U/L (ref 1–33)
ANION GAP SERPL CALCULATED.3IONS-SCNC: 10.3 MMOL/L (ref 5–15)
AST SERPL-CCNC: 20 U/L (ref 1–32)
BILIRUB SERPL-MCNC: 0.3 MG/DL (ref 0–1.2)
BUN SERPL-MCNC: 50 MG/DL (ref 8–23)
BUN/CREAT SERPL: 37.6 (ref 7–25)
CALCIUM SPEC-SCNC: 10.7 MG/DL (ref 8.6–10.5)
CHLORIDE SERPL-SCNC: 104 MMOL/L (ref 98–107)
CHOLEST SERPL-MCNC: 143 MG/DL (ref 0–200)
CO2 SERPL-SCNC: 27.7 MMOL/L (ref 22–29)
CREAT SERPL-MCNC: 1.33 MG/DL (ref 0.57–1)
EGFRCR SERPLBLD CKD-EPI 2021: 41 ML/MIN/1.73
GLOBULIN UR ELPH-MCNC: 3.3 GM/DL
GLUCOSE SERPL-MCNC: 119 MG/DL (ref 65–99)
HDLC SERPL-MCNC: 40 MG/DL (ref 40–60)
LDLC SERPL CALC-MCNC: 81 MG/DL (ref 0–100)
LDLC/HDLC SERPL: 1.97 {RATIO}
MAGNESIUM SERPL-MCNC: 1.7 MG/DL (ref 1.6–2.4)
POTASSIUM SERPL-SCNC: 4.1 MMOL/L (ref 3.5–5.2)
PROT SERPL-MCNC: 7.2 G/DL (ref 6–8.5)
SODIUM SERPL-SCNC: 142 MMOL/L (ref 136–145)
TRIGL SERPL-MCNC: 122 MG/DL (ref 0–150)
VLDLC SERPL-MCNC: 22 MG/DL (ref 5–40)

## 2023-11-06 PROCEDURE — 82306 VITAMIN D 25 HYDROXY: CPT

## 2023-11-06 PROCEDURE — 80053 COMPREHEN METABOLIC PANEL: CPT

## 2023-11-06 PROCEDURE — 80061 LIPID PANEL: CPT

## 2023-11-06 PROCEDURE — 36415 COLL VENOUS BLD VENIPUNCTURE: CPT

## 2023-11-06 PROCEDURE — 83735 ASSAY OF MAGNESIUM: CPT

## 2023-11-07 ENCOUNTER — OFFICE VISIT (OUTPATIENT)
Dept: CARDIOLOGY | Facility: CLINIC | Age: 78
End: 2023-11-07
Payer: MEDICARE

## 2023-11-07 VITALS
SYSTOLIC BLOOD PRESSURE: 108 MMHG | HEART RATE: 57 BPM | WEIGHT: 179 LBS | HEIGHT: 64 IN | BODY MASS INDEX: 30.56 KG/M2 | DIASTOLIC BLOOD PRESSURE: 52 MMHG

## 2023-11-07 DIAGNOSIS — E78.2 MIXED HYPERLIPIDEMIA: ICD-10-CM

## 2023-11-07 DIAGNOSIS — I50.22 CHRONIC HFREF (HEART FAILURE WITH REDUCED EJECTION FRACTION): Primary | ICD-10-CM

## 2023-11-07 DIAGNOSIS — I25.810 CORONARY ARTERY DISEASE INVOLVING AUTOLOGOUS VEIN CORONARY BYPASS GRAFT WITHOUT ANGINA PECTORIS: ICD-10-CM

## 2023-11-07 PROCEDURE — 3074F SYST BP LT 130 MM HG: CPT | Performed by: INTERNAL MEDICINE

## 2023-11-07 PROCEDURE — 1159F MED LIST DOCD IN RCRD: CPT | Performed by: INTERNAL MEDICINE

## 2023-11-07 PROCEDURE — 1160F RVW MEDS BY RX/DR IN RCRD: CPT | Performed by: INTERNAL MEDICINE

## 2023-11-07 PROCEDURE — 3078F DIAST BP <80 MM HG: CPT | Performed by: INTERNAL MEDICINE

## 2023-11-07 PROCEDURE — 99214 OFFICE O/P EST MOD 30 MIN: CPT | Performed by: INTERNAL MEDICINE

## 2023-11-07 RX ORDER — FUROSEMIDE 40 MG/1
20 TABLET ORAL DAILY
Qty: 45 TABLET | Refills: 2 | Status: SHIPPED | OUTPATIENT
Start: 2023-11-07

## 2023-11-07 RX ORDER — SACUBITRIL AND VALSARTAN 49; 51 MG/1; MG/1
0.5 TABLET, FILM COATED ORAL 2 TIMES DAILY
Qty: 56 TABLET | Refills: 0 | COMMUNITY
Start: 2023-11-07

## 2023-11-07 RX ORDER — ATORVASTATIN CALCIUM 40 MG/1
40 TABLET, FILM COATED ORAL DAILY
Qty: 90 TABLET | Refills: 3 | Status: SHIPPED | OUTPATIENT
Start: 2023-11-07

## 2023-11-07 RX ORDER — SACUBITRIL AND VALSARTAN 49; 51 MG/1; MG/1
0.5 TABLET, FILM COATED ORAL 2 TIMES DAILY
Start: 2023-11-07 | End: 2023-11-07 | Stop reason: SDUPTHER

## 2023-11-07 NOTE — ASSESSMENT & PLAN NOTE
Her lipids have deteriorated.  Her LDL is above 70 now.  She used to be on 40 mg of atorvastatin and somehow somebody has decreased it to 10 mg at bedtime.  We will up it to 40 mg at bedtime and emphasized to her the need to follow a low cholesterol diet

## 2023-11-07 NOTE — PATIENT INSTRUCTIONS
1.  Decrease Lasix to 40 mg half a tablet once a day.  2.  Decrease Entresto to 49-50 one half a tablet twice a day.  3.  Make sure that she is taking atorvastatin 40 mg once at bedtime.  4.  Continue to monitor heart rate and blood sugar and weight.  5.  Get blood work done few days before her next appointment

## 2023-11-07 NOTE — ASSESSMENT & PLAN NOTE
Her LV function has improved significantly.  It is now 51 to 55%.  She seems to feel a little fatigued tired and running low blood pressures at home.  I am going to cut down on Entresto to half a tablet twice a day and reduce her Lasix to 20 once a day.  She will follow-up in 6 weeks

## 2023-11-07 NOTE — ASSESSMENT & PLAN NOTE
She has had stable coronary disease without any angina.  She will continue the beta-blocker and aspirin and high intensity statin.

## 2023-11-07 NOTE — PROGRESS NOTES
Office Visit    Chief Complaint  Chronic HFrEF    Subjective            Ann-Marie Hughes presents to [facility] CARDIOLOGY  History of Present Illness  Ms. Hughes is a 77 years old female with ischemic cardiomyopathy, coronary disease status post bypass surgery, heart failure with reduced ejection fraction who is doing very well.  No chest pain palpitation dizziness syncope.  Her LV function has improved over time.  Her blood pressures are running on the low side still but not too bad.      Past Medical History:   Diagnosis Date    Acne rosacea 08/17/2021    DR.JEFF MOON     Arteriosclerosis of abdominal aorta     B12 deficiency 08/17/2021    DJD (degenerative joint disease)     Hx of smoking 08/17/2021    QUIT IN 1976 AT AGE 31    Hyperlipidemia 08/17/2021    Hypertension     Hypothyroidism     Metabolic syndrome 08/17/2021    KAREN (stress urinary incontinence, female) 08/17/2021    UTI (urinary tract infection) 08/17/2021    Vitamin D deficiency 08/17/2021       Allergies   Allergen Reactions    Penicillins Palpitations     1. When was your reaction? > 10 years ago  2. Did your reaction happen after the first dose or after several doses? After first dose  3. Did your reaction require ED or hospital care to manage your reaction? Do not know  4. Did your reaction require treatment with epinephrine? Do not know  5. Have you taken amoxicillin (Amoxil) or amoxicillin-clavulanate (Augmentin) without issue since? Yes  6. Have you taken cephalexin (Keflex) without issue since?  Do not know         Past Surgical History:   Procedure Laterality Date    BACK SURGERY  2013    CARDIAC CATHETERIZATION N/A 07/17/2023    Procedure: Left Heart Cath;  Surgeon: Dinh Andres MD;  Location: [facility] CATH INVASIVE [facility];  Service: Cardiovascular;  Laterality: N/A;    CARPAL TUNNEL RELEASE      COLONOSCOPY  2011    CORONARY ARTERY BYPASS GRAFT WITH MITRAL VALVE REPAIR/REPLACEMENT N/A 07/20/2023     Procedure: REZA STERNOTOMY OFF-PUMP CORONARY ARTERY BYPASS GRAFT TIMES        USING LEFFT INTERNAL MAMMARY ARTERY AND     GREATER SAPHENOUS VEIN GRAFT PER EMDOSCOPIC VEIN HARVESTING, MAZE PROCEDURE AND PRP;  Surgeon: Shane Alexander MD;  Location: [facility];  Service: Cardiothoracic;  Laterality: N/A;    KNEE SURGERY      LUMBAR DISCECTOMY      NECK SURGERY      Cervical    POSTERIOR LUMBAR/THORACIC SPINE FUSION          Social History     Tobacco Use    Smoking status: Former     Packs/day: 1.00     Years: 12.00     Additional pack years: 0.00     Total pack years: 12.00     Types: Cigarettes     Quit date:      Years since quittin.8    Smokeless tobacco: Never   Vaping Use    Vaping Use: Never used   Substance Use Topics    Alcohol use: Never    Drug use: Never       Family History   Problem Relation Age of Onset    Heart disease Mother     Arthritis Mother     Heart attack Mother     Arthritis Father         Prior to Admission medications    Medication Sig Start Date End Date Taking? Authorizing Provider   acetaminophen (TYLENOL) 325 MG tablet Take 2 tablets by mouth Every 4 (Four) Hours As Needed for Mild Pain. 23   Anjelica Chase APRN   albuterol sulfate  (90 Base) MCG/ACT inhaler Inhale 2 puffs Every 4 (Four) Hours As Needed for Wheezing. 23   Les Moreno,    apixaban (Eliquis) 5 MG tablet tablet TAKE 1 TABLET BY MOUTH EVERY 12 HOURS 10/24/23   Valerie Valera MD   aspirin 81 MG EC tablet Take 1 tablet by mouth Daily.    Provider, MD Kanwal   atorvastatin (LIPITOR) 10 MG tablet TAKE 1 TABLET BY MOUTH EVERY DAY 10/30/23   Romario Valdez, DO   buPROPion XL (WELLBUTRIN XL) 300 MG 24 hr tablet TAKE 1 TABLET BY MOUTH DAILY 23   Rochelle Brown APRN   Cranberry-Vitamin C-Probiotic (AZO CRANBERRY PO) Take  by mouth.    Provider, MD Kanwal   dapagliflozin Propanediol (Farxiga) 10 MG tablet Take 10 mg by mouth Daily. 23   Valerie Valera MD  "  folic acid (FOLVITE) 1 MG tablet TAKE 1 TABLET BY MOUTH EVERY DAY 10/11/23   Boston Can MD   furosemide (LASIX) 40 MG tablet Take 1 tablet by mouth Daily. 8/30/23   Valerie Valera MD   metFORMIN ER (GLUCOPHAGE-XR) 500 MG 24 hr tablet TAKE 1 TABLET BY MOUTH EVERY MORNING AND 2 TABLETS AT SUPPER  Patient taking differently: 1 tablet 2 (Two) Times a Day. 1/19/23   Roseanna Hernandez MD   metoprolol succinate XL (TOPROL-XL) 50 MG 24 hr tablet Take 1 tablet by mouth Every 12 (Twelve) Hours for 90 days. 9/18/23 12/17/23  Sara Lubin APRN   montelukast (SINGULAIR) 10 MG tablet Take 1 tablet by mouth Daily. 7/5/23   Rochelle Brown APRN   multivitamin with minerals tablet tablet Take 1 tablet by mouth Daily.    Provider, MD Kanwal   predniSONE (DELTASONE) 5 MG tablet TAKE 5 MG EVERY DAY 11/1/23   Boston Can MD   Probiotic Product (Circl PO) Take 1 capsule by mouth Daily.    Provider, MD Kanwal   sacubitril-valsartan (Entresto) 49-51 MG tablet Take 1 tablet by mouth 2 (Two) Times a Day. 10/10/23   Valerie Valera MD   sacubitril-valsartan (Entresto) 49-51 MG tablet Take 1 tablet by mouth 2 (Two) Times a Day. Indications: lot [id number]  exp 8/30/2025 10/10/23   Valerie Valera MD   spironolactone (ALDACTONE) 25 MG tablet Take 1 tablet by mouth Daily. 10/10/23   Valerie Valera MD   Synthroid 75 MCG tablet Take 1 tablet by mouth Daily. 10/31/23   Romario Valdez DO        Review of Systems   Constitutional:  Negative for fatigue.   Respiratory:  Positive for cough and shortness of breath.    Cardiovascular:  Negative for chest pain, palpitations and leg swelling.   Neurological:  Negative for dizziness.        Symptom Course: Been Unchanged    Weight Trend: Stable     Objective     /52   Pulse 57   Ht 162.6 cm (64\")   Wt 81.2 kg (179 lb)   BMI 30.73 kg/m²       Physical Exam  Constitutional:       General: She is awake.      Appearance: Normal " appearance.   Neck:      Thyroid: No thyromegaly.      Vascular: No carotid bruit or JVD.   Cardiovascular:      Rate and Rhythm: Normal rate and regular rhythm.      Chest Wall: PMI is not displaced.      Pulses: Normal pulses.      Heart sounds: Normal heart sounds, S1 normal and S2 normal. No murmur heard.     No friction rub. No gallop. No S3 or S4 sounds.   Pulmonary:      Effort: Pulmonary effort is normal.      Breath sounds: Normal breath sounds and air entry. No wheezing, rhonchi or rales.   Abdominal:      General: Bowel sounds are normal.      Palpations: Abdomen is soft. There is no mass.      Tenderness: There is no abdominal tenderness.   Musculoskeletal:      Cervical back: Neck supple.      Right lower leg: No edema.      Left lower leg: No edema.   Neurological:      Mental Status: She is alert and oriented to person, place, and time.   Psychiatric:         Mood and Affect: Mood normal.         Behavior: Behavior is cooperative.           Result Review :                      Lab Results   Component Value Date    PROBNP 8,191.0 (H) 10/04/2023    PROBNP 4,386.0 (H) 09/18/2023    PROBNP 6,516.0 (H) 09/06/2023     CMP          10/16/2023    08:54 11/1/2023    10:19 11/6/2023    09:17   CMP   Glucose 148  109  119    BUN 45  50  50    Creatinine 1.44  1.56  1.33    EGFR 37.3  33.9  41.0    Sodium 141  139  142    Potassium 4.4  4.5  4.1    Chloride 102  99  104    Calcium 9.7  9.8  10.7    Total Protein  6.8  7.2    Albumin 4.1  3.6  3.9    Globulin  3.2  3.3    Total Bilirubin  0.6  0.3    Alkaline Phosphatase  53  58    AST (SGOT)  26  20    ALT (SGPT)  15  13    Albumin/Globulin Ratio  1.1  1.2    BUN/Creatinine Ratio 31.3  32.1  37.6    Anion Gap 13.5  15.9  10.3      CBC w/diff          10/4/2023    08:56 10/16/2023    08:54 11/1/2023    10:19   CBC w/Diff   WBC 9.03  10.63  12.99    RBC 2.96  3.17  3.32    Hemoglobin 9.9  10.2  10.8    Hematocrit 29.9  31.3  34.0    .0  98.7  102.4    MCH  "33.4  32.2  32.5    MCHC 33.1  32.6  31.8    RDW 18.1  17.7  17.9    Platelets 251  338  333    Neutrophil Rel % 61.1   59.9    Immature Granulocyte Rel % 0.3   0.6    Lymphocyte Rel % 21.9   25.6    Monocyte Rel % 14.4   11.9    Eosinophil Rel % 2.0   1.5    Basophil Rel % 0.3   0.5       Lipid Panel          7/16/2023    09:19 7/18/2023    07:25 11/6/2023    09:17   Lipid Panel   Total Cholesterol 97  93  143    Triglycerides 75  71  122    HDL Cholesterol 43  39  40    VLDL Cholesterol 16  15  22    LDL Cholesterol  38  39  81    LDL/HDL Ratio 0.91  1.02  1.97       Lab Results   Component Value Date    TSH 1.130 06/15/2023    TSH 1.940 01/11/2023    TSH 2.080 08/17/2022      Lab Results   Component Value Date    FREET4 1.31 01/11/2023    FREET4 1.27 08/17/2022    FREET4 1.32 04/13/2022      No results found for: \"DDIMERQUANT\"  Magnesium   Date Value Ref Range Status   11/06/2023 1.7 1.6 - 2.4 mg/dL Final      No results found for: \"DIGOXIN\"   A1C Last 3 Results          1/11/2023    09:05 6/15/2023    10:05 7/18/2023    07:25   HGBA1C Last 3 Results   Hemoglobin A1C 6.20  6.10  5.90           Results for orders placed in visit on 10/11/23    Adult Transthoracic Echo Limited W/ Cont if Necessary Per Protocol    Interpretation Summary    Left ventricular systolic function is low normal. Left ventricular ejection fraction appears to be 51 - 55%.    Left ventricular wall thickness is consistent with mild concentric hypertrophy.    Left ventricular diastolic function is consistent with (grade I) impaired relaxation.    There is bileaflet mitral valve thickening present.  Mild mitral regurgitation is noted    There is a trivial pericardial effusion.        Assessment and Plan        Diagnoses and all orders for this visit:    1. Chronic HFrEF (heart failure with reduced ejection fraction) (Primary)  Assessment & Plan:  Her LV function has improved significantly.  It is now 51 to 55%.  She seems to feel a little " fatigued tired and running low blood pressures at home.  I am going to cut down on Entresto to half a tablet twice a day and reduce her Lasix to 20 once a day.  She will follow-up in 6 weeks    Orders:  -     Discontinue: sacubitril-valsartan (Entresto) 49-51 MG tablet; Take 0.5 tablets by mouth 2 (Two) Times a Day.  -     Magnesium; Future  -     Lipid Panel; Future  -     Comprehensive Metabolic Panel; Future    2. Coronary artery disease involving autologous vein coronary bypass graft without angina pectoris  Assessment & Plan:  She has had stable coronary disease without any angina.  She will continue the beta-blocker and aspirin and high intensity statin.    Orders:  -     Magnesium; Future  -     Lipid Panel; Future  -     Comprehensive Metabolic Panel; Future    3. Mixed hyperlipidemia  Assessment & Plan:  Her lipids have deteriorated.  Her LDL is above 70 now.  She used to be on 40 mg of atorvastatin and somehow somebody has decreased it to 10 mg at bedtime.  We will up it to 40 mg at bedtime and emphasized to her the need to follow a low cholesterol diet      Other orders  -     furosemide (LASIX) 40 MG tablet; Take 0.5 tablets by mouth Daily.  Dispense: 45 tablet; Refill: 2  -     atorvastatin (LIPITOR) 40 MG tablet; Take 1 tablet by mouth Daily.  Dispense: 90 tablet; Refill: 3  -     sacubitril-valsartan (Entresto) 49-51 MG tablet; Take 0.5 tablets by mouth 2 (Two) Times a Day. Indications: Cardiac Failure, ot: [id number]-  Dispense: 56 tablet; Refill: 0            Follow Up     Return for 6 weeks with jai.    Patient was given instructions and counseling regarding her condition or for health maintenance advice. Please see specific information pulled into the AVS if appropriate.     Electronically signed by Valerie Valera MD, 11/07/23, 9:08 AM EST.

## 2023-11-08 RX ORDER — FOLIC ACID 1 MG/1
1000 TABLET ORAL DAILY
Qty: 30 TABLET | Refills: 1 | OUTPATIENT
Start: 2023-11-08

## 2023-11-11 LAB
25(OH)D2 SERPL-MCNC: <1 NG/ML
25(OH)D3 SERPL-MCNC: 56 NG/ML
25(OH)D3+25(OH)D2 SERPL-MCNC: 56 NG/ML

## 2023-11-28 RX ORDER — APIXABAN 5 MG/1
5 TABLET, FILM COATED ORAL EVERY 12 HOURS
Qty: 60 TABLET | Refills: 3 | Status: SHIPPED | OUTPATIENT
Start: 2023-11-28

## 2023-12-04 RX ORDER — DAPAGLIFLOZIN 10 MG/1
1 TABLET, FILM COATED ORAL DAILY
Qty: 90 TABLET | Refills: 3 | Status: SHIPPED | OUTPATIENT
Start: 2023-12-04

## 2023-12-11 RX ORDER — BUPROPION HYDROCHLORIDE 300 MG/1
300 TABLET ORAL DAILY
Qty: 90 TABLET | Refills: 1 | OUTPATIENT
Start: 2023-12-11

## 2023-12-11 RX ORDER — BUPROPION HYDROCHLORIDE 300 MG/1
300 TABLET ORAL DAILY
Qty: 90 TABLET | Refills: 0 | OUTPATIENT
Start: 2023-12-11

## 2023-12-13 RX ORDER — FOLIC ACID 1 MG/1
1000 TABLET ORAL DAILY
Qty: 30 TABLET | Refills: 1 | Status: SHIPPED | OUTPATIENT
Start: 2023-12-13

## 2023-12-13 RX ORDER — FOLIC ACID 1 MG/1
1000 TABLET ORAL DAILY
Qty: 30 TABLET | Refills: 1 | OUTPATIENT
Start: 2023-12-13

## 2023-12-13 RX ORDER — BUPROPION HYDROCHLORIDE 300 MG/1
300 TABLET ORAL DAILY
Qty: 90 TABLET | Refills: 1 | Status: SHIPPED | OUTPATIENT
Start: 2023-12-13

## 2023-12-13 NOTE — TELEPHONE ENCOUNTER
Caller: Ann-Marie Hughes    Relationship: Self    Best call back number: [phone]    Requested Prescriptions:   Requested Prescriptions     Pending Prescriptions Disp Refills    folic acid (FOLVITE) 1 MG tablet 30 tablet 1     Sig: Take 1 tablet by mouth Daily.        Pharm[facility] where request should be sent[facility] [address] - [phone]  - [phone] FX     Last office visit with prescribing clinician: 11/1/2023   Last telemedicine visit with prescribing clinician: Visit date not found   Next office visit with prescribing clinician: 1/11/2024     Additional details provided by patient: PATIENT WOULD LIKE A 90 DAY SUPPLY    Does the patient have less than a 3 day supply:  [x] Yes  [] No    Would you like a call back once the refill request has been completed: [] Yes [x] No    If the office needs to give you a call back, can they leave a voicemail: [] Yes [x] No

## 2023-12-13 NOTE — TELEPHONE ENCOUNTER
Caller: Ann-Marie Hughes    Relationship: Self    Best call back number:     [phone]       Requested Prescriptions:   Requested Prescriptions     Pending Prescriptions Disp Refills    buPROPion XL (WELLBUTRIN XL) 300 MG 24 hr tablet 90 tablet 1     Sig: Take 1 tablet by mouth Daily.        Pharmacy where request should be sent: [facility]  [place], [place] - [address] - [phone]  - [phone] FX     Last office visit with prescribing clinician: 8/29/2023   Last telemedicine visit with prescribing clinician: Visit date not found   Next office visit with prescribing clinician: 12/29/2023     Additional details provided by patient: PATIENT STATES THAT THIS HAD ORIGINALLY BEEN PRESCRIBED BY HER LAST PCP.    PATIENT STATES THAT SHE IS OUT OF THIS MEDICATION AT THIS TIME.     Does the patient have less than a 3 day supply:  [x] Yes  [] No    Would you like a call back once the refill request has been completed: [] Yes [] No    If the office needs to give you a call back, can they leave a voicemail: [] Yes [] No    Oliverio Bradley Rep   12/13/23 13:38 EST

## 2023-12-27 ENCOUNTER — LAB (OUTPATIENT)
Dept: LAB | Facility: HOSPITAL | Age: 78
End: 2023-12-27
Payer: MEDICARE

## 2023-12-27 DIAGNOSIS — I25.810 CORONARY ARTERY DISEASE INVOLVING AUTOLOGOUS VEIN CORONARY BYPASS GRAFT WITHOUT ANGINA PECTORIS: ICD-10-CM

## 2023-12-27 DIAGNOSIS — I50.22 CHRONIC HFREF (HEART FAILURE WITH REDUCED EJECTION FRACTION): ICD-10-CM

## 2023-12-27 LAB
ALBUMIN SERPL-MCNC: 4.3 G/DL (ref 3.5–5.2)
ALBUMIN/GLOB SERPL: 1.5 G/DL
ALP SERPL-CCNC: 54 U/L (ref 39–117)
ALT SERPL W P-5'-P-CCNC: 17 U/L (ref 1–33)
ANION GAP SERPL CALCULATED.3IONS-SCNC: 11.3 MMOL/L (ref 5–15)
AST SERPL-CCNC: 24 U/L (ref 1–32)
BILIRUB SERPL-MCNC: 0.7 MG/DL (ref 0–1.2)
BUN SERPL-MCNC: 23 MG/DL (ref 8–23)
BUN/CREAT SERPL: 19.2 (ref 7–25)
CALCIUM SPEC-SCNC: 9.5 MG/DL (ref 8.6–10.5)
CHLORIDE SERPL-SCNC: 105 MMOL/L (ref 98–107)
CHOLEST SERPL-MCNC: 120 MG/DL (ref 0–200)
CO2 SERPL-SCNC: 26.7 MMOL/L (ref 22–29)
CREAT SERPL-MCNC: 1.2 MG/DL (ref 0.57–1)
EGFRCR SERPLBLD CKD-EPI 2021: 46.1 ML/MIN/1.73
GLOBULIN UR ELPH-MCNC: 2.8 GM/DL
GLUCOSE SERPL-MCNC: 139 MG/DL (ref 65–99)
HDLC SERPL-MCNC: 61 MG/DL (ref 40–60)
LDLC SERPL CALC-MCNC: 43 MG/DL (ref 0–100)
LDLC/HDLC SERPL: 0.7 {RATIO}
MAGNESIUM SERPL-MCNC: 2 MG/DL (ref 1.6–2.4)
POTASSIUM SERPL-SCNC: 4.2 MMOL/L (ref 3.5–5.2)
PROT SERPL-MCNC: 7.1 G/DL (ref 6–8.5)
SODIUM SERPL-SCNC: 143 MMOL/L (ref 136–145)
TRIGL SERPL-MCNC: 81 MG/DL (ref 0–150)
VLDLC SERPL-MCNC: 16 MG/DL (ref 5–40)

## 2023-12-27 PROCEDURE — 80053 COMPREHEN METABOLIC PANEL: CPT

## 2023-12-27 PROCEDURE — 80061 LIPID PANEL: CPT

## 2023-12-27 PROCEDURE — 83735 ASSAY OF MAGNESIUM: CPT

## 2023-12-27 PROCEDURE — 36415 COLL VENOUS BLD VENIPUNCTURE: CPT

## 2023-12-29 ENCOUNTER — OFFICE VISIT (OUTPATIENT)
Dept: FAMILY MEDICINE CLINIC | Facility: CLINIC | Age: 78
End: 2023-12-29
Payer: MEDICARE

## 2023-12-29 VITALS
HEIGHT: 64 IN | OXYGEN SATURATION: 98 % | WEIGHT: 182 LBS | TEMPERATURE: 97.2 F | DIASTOLIC BLOOD PRESSURE: 78 MMHG | HEART RATE: 67 BPM | BODY MASS INDEX: 31.07 KG/M2 | SYSTOLIC BLOOD PRESSURE: 110 MMHG

## 2023-12-29 DIAGNOSIS — E11.9 TYPE 2 DIABETES MELLITUS WITHOUT COMPLICATION, WITHOUT LONG-TERM CURRENT USE OF INSULIN: ICD-10-CM

## 2023-12-29 DIAGNOSIS — I25.118 CORONARY ARTERY DISEASE OF NATIVE ARTERY OF NATIVE HEART WITH STABLE ANGINA PECTORIS: ICD-10-CM

## 2023-12-29 DIAGNOSIS — E03.8 OTHER SPECIFIED HYPOTHYROIDISM: ICD-10-CM

## 2023-12-29 DIAGNOSIS — E78.2 MIXED HYPERLIPIDEMIA: ICD-10-CM

## 2023-12-29 DIAGNOSIS — I48.0 PAF (PAROXYSMAL ATRIAL FIBRILLATION): Primary | ICD-10-CM

## 2023-12-29 DIAGNOSIS — I10 PRIMARY HYPERTENSION: ICD-10-CM

## 2023-12-29 DIAGNOSIS — G44.86 CERVICOGENIC HEADACHE: ICD-10-CM

## 2023-12-29 PROCEDURE — 84443 ASSAY THYROID STIM HORMONE: CPT | Performed by: FAMILY MEDICINE

## 2023-12-29 PROCEDURE — 83036 HEMOGLOBIN GLYCOSYLATED A1C: CPT | Performed by: FAMILY MEDICINE

## 2023-12-29 PROCEDURE — 84439 ASSAY OF FREE THYROXINE: CPT | Performed by: FAMILY MEDICINE

## 2023-12-29 RX ORDER — RESPIRATORY SYNCYTIAL VISUS VACCINE RECOMBINANT, ADJUVANTED 120MCG/0.5
0.5 KIT INTRAMUSCULAR ONCE
Qty: 0.5 ML | Refills: 0 | Status: SHIPPED | OUTPATIENT
Start: 2023-12-29 | End: 2023-12-29

## 2023-12-29 NOTE — ASSESSMENT & PLAN NOTE
She had an episode of chest heaviness 3 weeks ago.  It resolved spontaneously has not had any further.  Since she has an upcoming appoint with cardiology we will have her discuss it with them.

## 2023-12-29 NOTE — ASSESSMENT & PLAN NOTE
Her headaches are more likely probably from her neck.  Will get an x-ray of her cervical spine.  In the meantime we will have her continue Tylenol as needed.

## 2023-12-29 NOTE — PROGRESS NOTES
Chief Complaint   Patient presents with    4 month follow up    Hypertension    Hyperlipidemia        Subjective     Ann-Mariedami Hughes  has a past medical history of Acne rosacea (08/17/2021), Arteriosclerosis of abdominal aorta, B12 deficiency (08/17/2021), DJD (degenerative joint disease), smoking (08/17/2021), Hyperlipidemia (08/17/2021), Hypertension, Hypothyroidism, Metabolic syndrome (08/17/2021), KAREN (stress urinary incontinence, female) (08/17/2021), UTI (urinary tract infection) (08/17/2021), and Vitamin D deficiency (08/17/2021).    Type 2 diabetes-she does check her blood sugars fasting.  She states are typically in the range of 130-150.  She denies any blurred vision, excessive thirst, excessive urination.    Hypertension-she does check her blood pressures at home they are good there also.  It is great here today at 110/78.    Hyperlipidemia-she takes her atorvastatin daily.    CAD-overall she is doing well.  She states she did have 1 episode about 3 weeks ago laying in bed that had some tightness in her chest.  It resolved spontaneously and has not had any recurrent episodes.  She does have a follow-up appointment with cardiology January 8.    Hypothyroid-she takes her levothyroxine every morning as prescribed.    Atrial fibrillation-she denies any palpitations or tachycardia.  She takes her Eliquis twice a day every day.  She denies any bloody noses, gross hematuria, or blood per rectum.        PHQ-2 Depression Screening  Little interest or pleasure in doing things?     Feeling down, depressed, or hopeless?     PHQ-2 Total Score     PHQ-9 Depression Screening  Little interest or pleasure in doing things?     Feeling down, depressed, or hopeless?     Trouble falling or staying asleep, or sleeping too much?     Feeling tired or having little energy?     Poor appetite or overeating?     Feeling bad about yourself - or that you are a failure or have let yourself or your family down?     Trouble  concentrating on things, such as reading the newspaper or watching television?     Moving or speaking so slowly that other people could have noticed? Or the opposite - being so fidgety or restless that you have been moving around a lot more than usual?     Thoughts that you would be better off dead, or of hurting yourself in some way?     PHQ-9 Total Score     If you checked off any problems, how difficult have these problems made it for you to do your work, take care of things at home, or get along with other people?       Allergies   Allergen Reactions    Penicillins Palpitations     1. When was your reaction? > 10 years ago  2. Did your reaction happen after the first dose or after several doses? After first dose  3. Did your reaction require ED or hospital care to manage your reaction? Do not know  4. Did your reaction require treatment with epinephrine? Do not know  5. Have you taken amoxicillin (Amoxil) or amoxicillin-clavulanate (Augmentin) without issue since? Yes  6. Have you taken cephalexin (Keflex) without issue since?  Do not know        Prior to Admission medications    Medication Sig Start Date End Date Taking? Authorizing Provider   acetaminophen (TYLENOL) 325 MG tablet Take 2 tablets by mouth Every 4 (Four) Hours As Needed for Mild Pain. 7/28/23  Yes Anjelica Chase APRN   albuterol sulfate  (90 Base) MCG/ACT inhaler Inhale 2 puffs Every 4 (Four) Hours As Needed for Wheezing. 5/30/23  Yes Les Moreno,    apixaban (Eliquis) 5 MG tablet tablet TAKE 1 TABLET BY MOUTH EVERY 12 HOURS 11/28/23  Yes Valerie Valera MD   aspirin 81 MG EC tablet Take 1 tablet by mouth Daily.   Yes aKnwal Orozco MD   atorvastatin (LIPITOR) 40 MG tablet Take 1 tablet by mouth Daily. 11/7/23  Yes Valerie Valera MD   buPROPion XL (WELLBUTRIN XL) 300 MG 24 hr tablet Take 1 tablet by mouth Daily. 12/13/23  Yes Romario Valdez, DO   Cranberry-Vitamin C-Probiotic (AZO CRANBERRY PO) Take  by mouth.    Yes Provider, MD Kanwal   dapagliflozin Propanediol (Farxiga) 10 MG tablet TAKE 1 TABLET BY MOUTH EVERY DAY 12/4/23  Yes Valerie Valera MD   folic acid (FOLVITE) 1 MG tablet TAKE 1 TABLET BY MOUTH DAILY 12/13/23  Yes Boston Can MD   furosemide (LASIX) 40 MG tablet Take 0.5 tablets by mouth Daily. 11/7/23  Yes Valerie Valera MD   metFORMIN ER (GLUCOPHAGE-XR) 500 MG 24 hr tablet TAKE 1 TABLET BY MOUTH EVERY MORNING AND 2 TABLETS AT SUPPER  Patient taking differently: 1 tablet 2 (Two) Times a Day. 1/19/23  Yes Roseanna Hernandez MD   montelukast (SINGULAIR) 10 MG tablet Take 1 tablet by mouth Daily. 7/5/23  Yes Rochelle Brown APRN   multivitamin with minerals tablet tablet Take 1 tablet by mouth Daily.   Yes Provider, MD Kanwal   predniSONE (DELTASONE) 5 MG tablet TAKE 5 MG EVERY DAY 11/1/23  Yes Boston Can MD   Probiotic Product (Pcsso PO) Take 1 capsule by mouth Daily.   Yes Provider, MD Kanwal   sacubitril-valsartan (Entresto) 49-51 MG tablet Take 0.5 tablets by mouth 2 (Two) Times a Day. Indications: Cardiac Failure, ot: [id number]- 11/7/23  Yes Valerie Valera MD   spironolactone (ALDACTONE) 25 MG tablet Take 1 tablet by mouth Daily. 10/10/23  Yes Valerie Valera MD   Synthroid 75 MCG tablet Take 1 tablet by mouth Daily. 10/31/23  Yes Romario Valdez DO   metoprolol succinate XL (TOPROL-XL) 50 MG 24 hr tablet Take 1 tablet by mouth Every 12 (Twelve) Hours for 90 days. 9/18/23 12/17/23  Sara Lubin APRN        Patient Active Problem List   Diagnosis    Arthritis    Bilateral leg pain    Chronic pain syndrome    Diabetes mellitus, type II    Gastric reflux    Herniated disc    Hypertension    Hypothyroidism    Low back pain    Pain in joint, multiple sites    Pain in soft tissues of limb    Hyperlipidemia    B12 deficiency    Vitamin D deficiency    UTI (urinary tract infection)    Metabolic syndrome    Acne rosacea    Hx of smoking     Stress incontinence of urine    Arteriosclerosis of abdominal aorta    Iron deficiency anemia secondary to inadequate dietary iron intake    Seasonal allergies    Hyponatremia    Leg length discrepancy    Reactive depression    Encounter for subsequent annual wellness visit (AWV) in Medicare patient    Class 1 obesity with alveolar hypoventilation, serious comorbidity, and body mass index (BMI) of 32.0 to 32.9 in adult    Coronary artery disease of native artery of native heart with stable angina pectoris    Chronic HFrEF (heart failure with reduced ejection fraction)    NSTEMI (non-ST elevated myocardial infarction)    Coronary heart disease    Abnormal findings on diagnostic imaging of heart and coronary circulation    Acute respiratory failure with hypoxia    CAD status post CABG x3 vessels    PAF (paroxysmal atrial fibrillation)    Encounter for medical examination to establish care    Drug-induced autoantibody type hemolytic anemia    Need for RSV vaccination    Stage 3b chronic kidney disease    Large granular lymphocyte disorder    Pericardial effusion    Cervicogenic headache        Past Surgical History:   Procedure Laterality Date    BACK SURGERY  2013    CARDIAC CATHETERIZATION N/A 07/17/2023    Procedure: Left Heart Cath;  Surgeon: Dinh Andres MD;  Location: [facility] CATH INVASIVE LOCATION;  Service: Cardiovascular;  Laterality: N/A;    CARPAL TUNNEL RELEASE      COLONOSCOPY  2011    CORONARY ARTERY BYPASS GRAFT WITH MITRAL VALVE REPAIR/REPLACEMENT N/A 07/20/2023    Procedure: REZA STERNOTOMY OFF-PUMP CORONARY ARTERY BYPASS GRAFT TIMES        USING LEFFT INTERNAL MAMMARY ARTERY AND     GREATER SAPHENOUS VEIN GRAFT PER EMDOSCOPIC VEIN HARVESTING, MAZE PROCEDURE AND PRP;  Surgeon: Shane Alexander MD;  Location: [facility];  Service: Cardiothoracic;  Laterality: N/A;    KNEE SURGERY      LUMBAR DISCECTOMY      NECK SURGERY      Cervical    POSTERIOR LUMBAR/THORACIC SPINE FUSION         Social  "History     Socioeconomic History    Marital status:      Spouse name: Dinh   Tobacco Use    Smoking status: Former     Packs/day: 1.00     Years: 12.00     Additional pack years: 0.00     Total pack years: 12.00     Types: Cigarettes     Quit date:      Years since quittin.0    Smokeless tobacco: Never   Vaping Use    Vaping Use: Never used   Substance and Sexual Activity    Alcohol use: Never    Drug use: Never    Sexual activity: Defer       Family History   Problem Relation Age of Onset    Heart disease Mother     Arthritis Mother     Heart attack Mother     Arthritis Father        Family history, surgical history, past medical history, Allergies and meds reviewed with patient today and updated in [facility] EMR.     ROS:  Review of Systems   Constitutional:  Negative for fatigue.   HENT:  Negative for congestion, nosebleeds, postnasal drip and rhinorrhea.    Eyes:  Negative for blurred vision and visual disturbance.   Respiratory:  Negative for cough, chest tightness, shortness of breath and wheezing.    Cardiovascular:  Negative for chest pain and palpitations.   Gastrointestinal:  Negative for blood in stool.   Endocrine: Negative for polydipsia and polyuria.   Genitourinary:  Negative for hematuria.   Skin:  Negative for bruise.   Allergic/Immunologic: Negative for environmental allergies.   Neurological:  Positive for headache. Negative for light-headedness.   Psychiatric/Behavioral:  Negative for depressed mood. The patient is not nervous/anxious.        OBJECTIVE:  Vitals:    23 1348   BP: 110/78   BP Location: Left arm   Patient Position: Sitting   Cuff Size: Adult   Pulse: 67   Temp: 97.2 °F (36.2 °C)   TempSrc: Temporal   SpO2: 98%   Weight: 82.6 kg (182 lb)   Height: 162.6 cm (64\")     No results found.   Body mass index is 31.24 kg/m².  No LMP recorded (lmp unknown). Patient is postmenopausal.    Physical Exam  Vitals and nursing note reviewed.   Constitutional:       General: She " is not in acute distress.     Appearance: Normal appearance. She is normal weight.   HENT:      Head: Normocephalic.      Right Ear: Tympanic membrane, ear canal and external ear normal.      Left Ear: Tympanic membrane, ear canal and external ear normal.      Nose: Nose normal.      Mouth/Throat:      Mouth: Mucous membranes are moist.      Dentition: Has dentures.      Pharynx: Oropharynx is clear.   Eyes:      General: No scleral icterus.     Conjunctiva/sclera: Conjunctivae normal.      Pupils: Pupils are equal, round, and reactive to light.   Cardiovascular:      Rate and Rhythm: Normal rate and regular rhythm.      Pulses: Normal pulses.      Heart sounds: Murmur heard.      Systolic murmur is present with a grade of 2/6.   Pulmonary:      Effort: Pulmonary effort is normal.      Breath sounds: Normal breath sounds. No wheezing, rhonchi or rales.   Musculoskeletal:      Cervical back: Neck supple. Tenderness present.   Lymphadenopathy:      Cervical: No cervical adenopathy.   Skin:     General: Skin is warm and dry.      Coloration: Skin is not jaundiced.      Findings: No rash.   Neurological:      General: No focal deficit present.      Mental Status: She is alert and oriented to person, place, and time.   Psychiatric:         Mood and Affect: Mood normal.         Thought Content: Thought content normal.         Judgment: Judgment normal.         Procedures    Office Visit on 12/29/2023   Component Date Value Ref Range Status    TSH 12/29/2023 2.210  0.270 - 4.200 uIU/mL Final    Free T4 12/29/2023 1.74 (H)  0.93 - 1.70 ng/dL Final    T4 results may be falsely increased if patient taking Biotin.    Hemoglobin A1C 12/29/2023 6.00 (H)  4.80 - 5.60 % Final   Lab on 12/27/2023   Component Date Value Ref Range Status    Magnesium 12/27/2023 2.0  1.6 - 2.4 mg/dL Final    Total Cholesterol 12/27/2023 120  0 - 200 mg/dL Final    Triglycerides 12/27/2023 81  0 - 150 mg/dL Final    HDL Cholesterol 12/27/2023 61 (H)   40 - 60 mg/dL Final    LDL Cholesterol  12/27/2023 43  0 - 100 mg/dL Final    VLDL Cholesterol 12/27/2023 16  5 - 40 mg/dL Final    LDL/HDL Ratio 12/27/2023 0.70   Final    Glucose 12/27/2023 139 (H)  65 - 99 mg/dL Final    BUN 12/27/2023 23  8 - 23 mg/dL Final    Creatinine 12/27/2023 1.20 (H)  0.57 - 1.00 mg/dL Final    Sodium 12/27/2023 143  136 - 145 mmol/L Final    Potassium 12/27/2023 4.2  3.5 - 5.2 mmol/L Final    Chloride 12/27/2023 105  98 - 107 mmol/L Final    CO2 12/27/2023 26.7  22.0 - 29.0 mmol/L Final    Calcium 12/27/2023 9.5  8.6 - 10.5 mg/dL Final    Total Protein 12/27/2023 7.1  6.0 - 8.5 g/dL Final    Albumin 12/27/2023 4.3  3.5 - 5.2 g/dL Final    ALT (SGPT) 12/27/2023 17  1 - 33 U/L Final    AST (SGOT) 12/27/2023 24  1 - 32 U/L Final    Alkaline Phosphatase 12/27/2023 54  39 - 117 U/L Final    Total Bilirubin 12/27/2023 0.7  0.0 - 1.2 mg/dL Final    Globulin 12/27/2023 2.8  gm/dL Final    A/G Ratio 12/27/2023 1.5  g/dL Final    BUN/Creatinine Ratio 12/27/2023 19.2  7.0 - 25.0 Final    Anion Gap 12/27/2023 11.3  5.0 - 15.0 mmol/L Final    eGFR 12/27/2023 46.1 (L)  >60.0 mL/min/1.73 Final       ASSESSMENT/ PLAN:    Diagnoses and all orders for this visit:    1. PAF (paroxysmal atrial fibrillation) (Primary)  Assessment & Plan:  Clinically she sounds regular today.  She is rate controlled on on Eliquis.      2. Primary hypertension  Assessment & Plan:  Her blood pressure remains good here as well as at home.  Will continue her current meds.      3. Mixed hyperlipidemia  Assessment & Plan:  She had a recent lipid profile that was very good.  Will continue her current meds.      4. Coronary artery disease of native artery of native heart with stable angina pectoris  Assessment & Plan:  She had an episode of chest heaviness 3 weeks ago.  It resolved spontaneously has not had any further.  Since she has an upcoming appoint with cardiology we will have her discuss it with them.      5. Other specified  hypothyroidism  Assessment & Plan:  Will get an update on her thyroid profile with her labs.    Orders:  -     TSH+Free T4    6. Type 2 diabetes mellitus without complication, without long-term current use of insulin  Assessment & Plan:  Her blood sugars have consistently been good.  Will get an update on her A1c    Orders:  -     Hemoglobin A1c    7. Cervicogenic headache  Assessment & Plan:  Her headaches are more likely probably from her neck.  Will get an x-ray of her cervical spine.  In the meantime we will have her continue Tylenol as needed.    Orders:  -     XR Spine Cervical Complete 4 or 5 View; Future    Other orders  -     RSVPreF3 Vac Recomb Adjuvanted (Arexvy) 120 MCG/0.5ML reconstituted suspension injection; Inject 0.5 mL into the appropriate muscle as directed by prescriber 1 (One) Time for 1 dose.  Dispense: 0.5 mL; Refill: 0  -     Fluzone High-Dose 65+yrs ([id number])  -     COVID-19 F23 (Pfizer) 12yrs+ (COMIRNATY)        BMI is >= 30 and <35. (Class 1 Obesity). The following options were offered after discussion;: exercise counseling/recommendations and nutrition counseling/recommendations      Orders Placed Today:     New Medications Ordered This Visit   Medications    RSVPreF3 Vac Recomb Adjuvanted (Arexvy) 120 MCG/0.5ML reconstituted suspension injection     Sig: Inject 0.5 mL into the appropriate muscle as directed by prescriber 1 (One) Time for 1 dose.     Dispense:  0.5 mL     Refill:  0        Management Plan:     An After Visit Summary was printed and given to the patient at discharge.    Follow-up: Return in about 6 months (around 6/29/2024) for Recheck.    Romario Valdez DO 1/1/2024 11:57 EST  This note was electronically signed.

## 2023-12-30 LAB
HBA1C MFR BLD: 6 % (ref 4.8–5.6)
T4 FREE SERPL-MCNC: 1.74 NG/DL (ref 0.93–1.7)
TSH SERPL DL<=0.05 MIU/L-ACNC: 2.21 UIU/ML (ref 0.27–4.2)

## 2024-01-03 ENCOUNTER — TELEPHONE (OUTPATIENT)
Dept: ONCOLOGY | Facility: CLINIC | Age: 79
End: 2024-01-03
Payer: MEDICARE

## 2024-01-03 NOTE — TELEPHONE ENCOUNTER
Caller: Olga Sanderson -- NOT ON  [facility]    Relationship to patient: Emergency Contact    Best call back number: [phone]     Chief complaint: NEEDS TO CHANGE APPT DATE/TIME    Type of visit: LAB AND FOLLOW UP    Requested date: PT WOULD PREFER MID MORNING. WOULD LIKE THE FOLLOW WEEK IF AVAILABLE IF NOT THEN FIRST AVAILABLE.     If rescheduling, when is the original appointment: 01/11/24       Lizette Mancia (: 1945) is a 65 y.o. Shoulder Pain and Back Pain       Patient Name: Lizette Mancia  Date:2022  : 1945  [x]  Patient  Verified  Payor: Alvarado Lopez / Plan: [place] / Product Type: Medicare /    Lala Padilla MD  Total Treatment Time (min): 60  Total Timed Codes (min): 45  1:1 Treatment Time ( [address] only): 39  Visit #: 5 of 20      Treatment Area: Left shoulder/low back    ASSESSMENT/PLAN:  Below is the assessment and plan developed based on review of pertinent history, physical exam, labs, studies, and medications. Did well today with rc strengthening exercises and back and hip stretching. Continue with current plan of care and progress towards long-term goals. 1. Nondisplaced fracture of lateral end of left clavicle, subsequent encounter for fracture with routine healing  2. Clavicle pain    Return in about 1 week (around 11/15/2022). SUBJECTIVE:  HPI  Doing better. Would like his shoulder to be stronger. OBJECTIVE:    Manual (15 minutes)  Bilateral hamstring and knee-to-chest stretching. Sacral hand with TYREL to lumbar and thoracic ps. Right [address] mobs with PROM,    Exercise(mins 30)  Today's exercises include supine pelvic tilt with ball, hip flexor hang, hamstring stretch, supine resisted mid trap, wall slides, T band stretch at treadmill, resisted scaption, corner stretch, standing mid trap, sliders, and LAT pulldown. Ice post tx(10 mins)    An electronic signature was used to authenticate this note.   -- [place], PT

## 2024-01-08 ENCOUNTER — OFFICE VISIT (OUTPATIENT)
Dept: CARDIOLOGY | Facility: CLINIC | Age: 79
End: 2024-01-08
Payer: MEDICARE

## 2024-01-08 VITALS
DIASTOLIC BLOOD PRESSURE: 70 MMHG | BODY MASS INDEX: 30.9 KG/M2 | SYSTOLIC BLOOD PRESSURE: 138 MMHG | HEART RATE: 52 BPM | HEIGHT: 64 IN | WEIGHT: 181 LBS

## 2024-01-08 DIAGNOSIS — I10 PRIMARY HYPERTENSION: ICD-10-CM

## 2024-01-08 DIAGNOSIS — I50.22 CHRONIC HFREF (HEART FAILURE WITH REDUCED EJECTION FRACTION): Primary | ICD-10-CM

## 2024-01-08 DIAGNOSIS — E78.2 MIXED HYPERLIPIDEMIA: ICD-10-CM

## 2024-01-08 PROCEDURE — 1159F MED LIST DOCD IN RCRD: CPT

## 2024-01-08 PROCEDURE — 1160F RVW MEDS BY RX/DR IN RCRD: CPT

## 2024-01-08 PROCEDURE — 3075F SYST BP GE 130 - 139MM HG: CPT

## 2024-01-08 PROCEDURE — 3078F DIAST BP <80 MM HG: CPT

## 2024-01-08 PROCEDURE — 99214 OFFICE O/P EST MOD 30 MIN: CPT

## 2024-01-08 RX ORDER — METOPROLOL SUCCINATE 50 MG/1
50 TABLET, EXTENDED RELEASE ORAL EVERY 12 HOURS SCHEDULED
Qty: 60 TABLET | Refills: 2 | Status: SHIPPED | OUTPATIENT
Start: 2024-01-08 | End: 2024-04-07

## 2024-01-08 RX ORDER — DAPAGLIFLOZIN 10 MG/1
1 TABLET, FILM COATED ORAL DAILY
Qty: 90 TABLET | Refills: 3 | Status: CANCELLED | OUTPATIENT
Start: 2024-01-08

## 2024-01-08 RX ORDER — SACUBITRIL AND VALSARTAN 49; 51 MG/1; MG/1
1 TABLET, FILM COATED ORAL 2 TIMES DAILY
Qty: 56 TABLET | Refills: 0 | Status: CANCELLED | COMMUNITY
Start: 2024-01-08

## 2024-01-08 RX ORDER — FUROSEMIDE 40 MG/1
40 TABLET ORAL DAILY
Qty: 45 TABLET | Refills: 2 | Status: SHIPPED | OUTPATIENT
Start: 2024-01-08

## 2024-01-08 NOTE — ASSESSMENT & PLAN NOTE
Her blood pressure is stable per her home logs.  Even a little elevated at times.  She is using multiple over-the-counter cold medicines for an upper respiratory infection and she is volume up today.  I have educated on over-the-counter antihistamines and I will increase her furosemide.  Will repeat her labs in 2 weeks and asked her to bring her log to the office in 2 weeks.

## 2024-01-08 NOTE — PATIENT INSTRUCTIONS
1.  Increase the Lasix/furosemide 40 mg take a whole tablet once daily.  2.  Do blood work in 2 weeks and then again prior 1 to 2 days prior to next visit.  3.  Do heart rate blood pressure and daily weight log bring it to the office in 2 weeks and then continue and bring it to next appointment.

## 2024-01-08 NOTE — PROGRESS NOTES
Office Visit    Chief Complaint  Chronic HFrEF    Subjective            Ann-Marie Hughes presents to [facility] CARDIOLOGY  History of Present Illness  Ms. Hughes is a 78-year-old female that presented to the office today for follow-up due to ischemic cardiomyopathy, coronary disease status post bypass surgery, heart failure with reduced ejection fraction.  She reports that she has not been feeling well lately.  She has an upper respiratory infection and has been taking over-the-counter cold medicine.  We have discussed trying allergy medicine and following up with her primary care provider.  Per  she is more short of air and fatigue going short distances.  She does have ongoing bilateral pedal edema.  She has gained 7 pounds since her last visit.  She reports that she has been eating better due to her leukemia.  She denies any chest pain, dizziness or syncopal episodes.      Past Medical History:   Diagnosis Date    Acne rosacea 08/17/2021    DR.JEFF MOON     Arteriosclerosis of abdominal aorta     B12 deficiency 08/17/2021    DJD (degenerative joint disease)     Hx of smoking 08/17/2021    QUIT IN 1976 AT AGE 31    Hyperlipidemia 08/17/2021    Hypertension     Hypothyroidism     Metabolic syndrome 08/17/2021    KAREN (stress urinary incontinence, female) 08/17/2021    UTI (urinary tract infection) 08/17/2021    Vitamin D deficiency 08/17/2021       Allergies   Allergen Reactions    Penicillins Palpitations     1. When was your reaction? > 10 years ago  2. Did your reaction happen after the first dose or after several doses? After first dose  3. Did your reaction require ED or hospital care to manage your reaction? Do not know  4. Did your reaction require treatment with epinephrine? Do not know  5. Have you taken amoxicillin (Amoxil) or amoxicillin-clavulanate (Augmentin) without issue since? Yes  6. Have you taken cephalexin (Keflex) without issue since?  Do not know         Past  Surgical History:   Procedure Laterality Date    BACK SURGERY      CARDIAC CATHETERIZATION N/A 2023    Procedure: Left Heart Cath;  Surgeon: Dinh Andres MD;  Location:  [facility];  Service: Cardiovascular;  Laterality: N/A;    CARPAL TUNNEL RELEASE      COLONOSCOPY      CORONARY ARTERY BYPASS GRAFT WITH MITRAL VALVE REPAIR/REPLACEMENT N/A 2023    Procedure: REZA STERNOTOMY OFF-PUMP CORONARY ARTERY BYPASS GRAFT TIMES        USING LEFFT INTERNAL MAMMARY ARTERY AND     GREATER SAPHENOUS VEIN GRAFT PER EMDOSCOPIC VEIN HARVESTING, MAZE PROCEDURE AND PRP;  Surgeon: Shane Alexander MD;  Location:  [facility];  Service: Cardiothoracic;  Laterality: N/A;    KNEE SURGERY      LUMBAR DISCECTOMY      NECK SURGERY      Cervical    POSTERIOR LUMBAR/THORACIC SPINE FUSION          Social History     Tobacco Use    Smoking status: Former     Packs/day: 1.00     Years: 12.00     Additional pack years: 0.00     Total pack years: 12.00     Types: Cigarettes     Quit date:      Years since quittin.0    Smokeless tobacco: Never   Vaping Use    Vaping Use: Never used   Substance Use Topics    Alcohol use: Never    Drug use: Never       Family History   Problem Relation Age of Onset    Heart disease Mother     Arthritis Mother     Heart attack Mother     Arthritis Father         Prior to Admission medications    Medication Sig Start Date End Date Taking? Authorizing Provider   acetaminophen (TYLENOL) 325 MG tablet Take 2 tablets by mouth Every 4 (Four) Hours As Needed for Mild Pain. 23   Anjelica Chase APRN   albuterol sulfate  (90 Base) MCG/ACT inhaler Inhale 2 puffs Every 4 (Four) Hours As Needed for Wheezing. 23   Les Moreno DO   apixaban (Eliquis) 5 MG tablet tablet TAKE 1 TABLET BY MOUTH EVERY 12 HOURS 23   Valerie Valera MD   aspirin 81 MG EC tablet Take 1 tablet by mouth Daily.    Provider, MD Kanwal   atorvastatin (LIPITOR) 40 MG  tablet Take 1 tablet by mouth Daily. 11/7/23   Valerie Valera MD   buPROPion XL (WELLBUTRIN XL) 300 MG 24 hr tablet Take 1 tablet by mouth Daily. 12/13/23   Romario Valdez,    Cranberry-Vitamin C-Probiotic (AZO CRANBERRY PO) Take  by mouth.    ProviderKanwal MD   dapagliflozin Propanediol (Farxiga) 10 MG tablet TAKE 1 TABLET BY MOUTH EVERY DAY 12/4/23   Valerie Valera MD   folic acid (FOLVITE) 1 MG tablet TAKE 1 TABLET BY MOUTH DAILY 12/13/23   Boston Can MD   furosemide (LASIX) 40 MG tablet Take 0.5 tablets by mouth Daily. 11/7/23   Valerie Valera MD   metFORMIN ER (GLUCOPHAGE-XR) 500 MG 24 hr tablet TAKE 1 TABLET BY MOUTH EVERY MORNING AND 2 TABLETS AT SUPPER  Patient taking differently: 1 tablet 2 (Two) Times a Day. 1/19/23   Roseanna Hernandez MD   metoprolol succinate XL (TOPROL-XL) 50 MG 24 hr tablet Take 1 tablet by mouth Every 12 (Twelve) Hours for 90 days. 9/18/23 12/17/23  Sara Lubin APRN   montelukast (SINGULAIR) 10 MG tablet Take 1 tablet by mouth Daily. 7/5/23   Rochelle Brown APRN   multivitamin with minerals tablet tablet Take 1 tablet by mouth Daily.    Provider, MD Kanwal   predniSONE (DELTASONE) 5 MG tablet TAKE 5 MG EVERY DAY 11/1/23   Boston Can MD   Probiotic Product (xAd PO) Take 1 capsule by mouth Daily.    Provider, MD Kanwal   sacubitril-valsartan (Entresto) 49-51 MG tablet Take 0.5 tablets by mouth 2 (Two) Times a Day. Indications: Cardiac Failure, ot: [id number] 06- 11/7/23   Valerie Valera MD   spironolactone (ALDACTONE) 25 MG tablet Take 1 tablet by mouth Daily. 10/10/23   Valerie Valera MD   Synthroid 75 MCG tablet Take 1 tablet by mouth Daily. 10/31/23   José Miguel, Romario Don, DO        Review of Systems   Constitutional:  Positive for fatigue.   Respiratory:  Positive for cough and shortness of breath.    Cardiovascular:  Positive for leg swelling. Negative for chest pain and palpitations.  "  Neurological:  Negative for dizziness.        Symptom Course: Improved    Weight Trend: Stable     Objective     /70   Pulse 52   Ht 162.6 cm (64\")   Wt 82.1 kg (181 lb)   BMI 31.07 kg/m²       Physical Exam  Constitutional:       General: She is awake.      Appearance: Normal appearance.   Neck:      Thyroid: No thyromegaly.      Vascular: No carotid bruit or JVD.   Cardiovascular:      Rate and Rhythm: Normal rate and regular rhythm.      Chest Wall: PMI is not displaced.      Pulses: Normal pulses.      Heart sounds: Normal heart sounds, S1 normal and S2 normal. No murmur heard.     No friction rub. No gallop. No S3 or S4 sounds.   Pulmonary:      Effort: Pulmonary effort is normal.      Breath sounds: Normal breath sounds and air entry. No wheezing, rhonchi or rales.   Abdominal:      General: Bowel sounds are normal.      Palpations: Abdomen is soft. There is no mass.      Tenderness: There is no abdominal tenderness.   Musculoskeletal:      Cervical back: Neck supple.      Right lower leg: Edema present.      Left lower leg: Edema present.   Neurological:      Mental Status: She is alert and oriented to person, place, and time.   Psychiatric:         Mood and Affect: Mood normal.         Behavior: Behavior is cooperative.           Result Review :                      Lab Results   Component Value Date    PROBNP 8,191.0 (H) 10/04/2023    PROBNP 4,386.0 (H) 09/18/2023    PROBNP 6,516.0 (H) 09/06/2023     CMP          11/1/2023    10:19 11/6/2023    09:17 12/27/2023    08:48   CMP   Glucose 109  119  139    BUN 50  50  23    Creatinine 1.56  1.33  1.20    EGFR 33.9  41.0  46.1    Sodium 139  142  143    Potassium 4.5  4.1  4.2    Chloride 99  104  105    Calcium 9.8  10.7  9.5    Total Protein 6.8  7.2  7.1    Albumin 3.6  3.9  4.3    Globulin 3.2  3.3  2.8    Total Bilirubin 0.6  0.3  0.7    Alkaline Phosphatase 53  58  54    AST (SGOT) 26  20  24    ALT (SGPT) 15  13  17    Albumin/Globulin Ratio " "1.1  1.2  1.5    BUN/Creatinine Ratio 32.1  37.6  19.2    Anion Gap 15.9  10.3  11.3      CBC w/diff          10/4/2023    08:56 10/16/2023    08:54 11/1/2023    10:19   CBC w/Diff   WBC 9.03  10.63  12.99    RBC 2.96  3.17  3.32    Hemoglobin 9.9  10.2  10.8    Hematocrit 29.9  31.3  34.0    .0  98.7  102.4    MCH 33.4  32.2  32.5    MCHC 33.1  32.6  31.8    RDW 18.1  17.7  17.9    Platelets 251  338  333    Neutrophil Rel % 61.1   59.9    Immature Granulocyte Rel % 0.3   0.6    Lymphocyte Rel % 21.9   25.6    Monocyte Rel % 14.4   11.9    Eosinophil Rel % 2.0   1.5    Basophil Rel % 0.3   0.5       Lipid Panel          7/18/2023    07:25 11/6/2023    09:17 12/27/2023    08:48   Lipid Panel   Total Cholesterol 93  143  120    Triglycerides 71  122  81    HDL Cholesterol 39  40  61    VLDL Cholesterol 15  22  16    LDL Cholesterol  39  81  43    LDL/HDL Ratio 1.02  1.97  0.70       Lab Results   Component Value Date    TSH 2.210 12/29/2023    TSH 1.130 06/15/2023    TSH 1.940 01/11/2023      Lab Results   Component Value Date    FREET4 1.74 (H) 12/29/2023    FREET4 1.31 01/11/2023    FREET4 1.27 08/17/2022      No results found for: \"DDIMERQUANT\"  Magnesium   Date Value Ref Range Status   12/27/2023 2.0 1.6 - 2.4 mg/dL Final      No results found for: \"DIGOXIN\"   A1C Last 3 Results          6/15/2023    10:05 7/18/2023    07:25 12/29/2023    14:46   HGBA1C Last 3 Results   Hemoglobin A1C 6.10  5.90  6.00           Results for orders placed in visit on 10/11/23    Adult Transthoracic Echo Limited W/ Cont if Necessary Per Protocol    Interpretation Summary    Left ventricular systolic function is low normal. Left ventricular ejection fraction appears to be 51 - 55%.    Left ventricular wall thickness is consistent with mild concentric hypertrophy.    Left ventricular diastolic function is consistent with (grade I) impaired relaxation.    There is bileaflet mitral valve thickening present.  Mild mitral " regurgitation is noted    There is a trivial pericardial effusion.          Assessment and Plan        Diagnoses and all orders for this visit:    1. Chronic HFrEF (heart failure with reduced ejection fraction) (Primary)  Assessment & Plan:  Ms. Hughes has ischemic cardiomyopathy and heart failure with reduced ejection fraction that has improved over the last few months.  She has become more short of air with ongoing pedal edema.  Dr. Valera has decreased her Entresto over the past 2 visits due to hypotension.  She denies any dizziness or syncopal episodes.  I will continue the spironolactone Farxiga and Entresto at the current doses.  I will make the following changes:    1.  Increase the Lasix/furosemide 40 mg take a whole tablet once daily.  2.  Do blood work in 2 weeks and then again prior 1 to 2 days prior to next visit.  3.  Do heart rate blood pressure and daily weight log bring it to the office in 2 weeks and then continue and bring it to next appointment.    Orders:  -     Comprehensive Metabolic Panel; Future  -     proBNP; Future  -     Magnesium; Future  -     Magnesium; Future  -     CBC & Differential; Future  -     Comprehensive Metabolic Panel; Future  -     proBNP; Future    2. Primary hypertension  Assessment & Plan:  Her blood pressure is stable per her home logs.  Even a little elevated at times.  She is using multiple over-the-counter cold medicines for an upper respiratory infection and she is volume up today.  I have educated on over-the-counter antihistamines and I will increase her furosemide.  Will repeat her labs in 2 weeks and asked her to bring her log to the office in 2 weeks.      3. Mixed hyperlipidemia  Assessment & Plan:  Her lipids are well-controlled on the current dose of atorvastatin.  I will continue the same.      Other orders  -     metoprolol succinate XL (TOPROL-XL) 50 MG 24 hr tablet; Take 1 tablet by mouth Every 12 (Twelve) Hours for 90 days.  Dispense: 60 tablet; Refill:  2  -     furosemide (LASIX) 40 MG tablet; Take 1 tablet by mouth Daily.  Dispense: 45 tablet; Refill: 2            Follow Up     Return in about 6 weeks (around 2/19/2024) for with Dr Valera.    Patient was given instructions and counseling regarding her condition or for health maintenance advice. Please see specific information pulled into the AVS if appropriate.     Electronically signed by LONA Bocanegra, 01/08/24, 10:10 AM EST.

## 2024-01-08 NOTE — ASSESSMENT & PLAN NOTE
Ms. Hughes has ischemic cardiomyopathy and heart failure with reduced ejection fraction that has improved over the last few months.  She has become more short of air with ongoing pedal edema.  Dr. Valera has decreased her Entresto over the past 2 visits due to hypotension.  She denies any dizziness or syncopal episodes.  I will continue the spironolactone Farxiga and Entresto at the current doses.  I will make the following changes:    1.  Increase the Lasix/furosemide 40 mg take a whole tablet once daily.  2.  Do blood work in 2 weeks and then again prior 1 to 2 days prior to next visit.  3.  Do heart rate blood pressure and daily weight log bring it to the office in 2 weeks and then continue and bring it to next appointment.

## 2024-01-22 ENCOUNTER — LAB (OUTPATIENT)
Dept: LAB | Facility: HOSPITAL | Age: 79
End: 2024-01-22
Payer: MEDICARE

## 2024-01-22 DIAGNOSIS — I50.22 CHRONIC HFREF (HEART FAILURE WITH REDUCED EJECTION FRACTION): ICD-10-CM

## 2024-01-22 LAB
ALBUMIN SERPL-MCNC: 4.2 G/DL (ref 3.5–5.2)
ALBUMIN/GLOB SERPL: 1.6 G/DL
ALP SERPL-CCNC: 47 U/L (ref 39–117)
ALT SERPL W P-5'-P-CCNC: 20 U/L (ref 1–33)
ANION GAP SERPL CALCULATED.3IONS-SCNC: 12.2 MMOL/L (ref 5–15)
AST SERPL-CCNC: 28 U/L (ref 1–32)
BASOPHILS # BLD AUTO: 0.08 10*3/MM3 (ref 0–0.2)
BASOPHILS NFR BLD AUTO: 0.8 % (ref 0–1.5)
BILIRUB SERPL-MCNC: 0.7 MG/DL (ref 0–1.2)
BUN SERPL-MCNC: 27 MG/DL (ref 8–23)
BUN/CREAT SERPL: 22.9 (ref 7–25)
CALCIUM SPEC-SCNC: 9.6 MG/DL (ref 8.6–10.5)
CHLORIDE SERPL-SCNC: 104 MMOL/L (ref 98–107)
CO2 SERPL-SCNC: 25.8 MMOL/L (ref 22–29)
CREAT SERPL-MCNC: 1.18 MG/DL (ref 0.57–1)
DEPRECATED RDW RBC AUTO: 71.5 FL (ref 37–54)
EGFRCR SERPLBLD CKD-EPI 2021: 47.1 ML/MIN/1.73
EOSINOPHIL # BLD AUTO: 0.15 10*3/MM3 (ref 0–0.4)
EOSINOPHIL NFR BLD AUTO: 1.5 % (ref 0.3–6.2)
ERYTHROCYTE [DISTWIDTH] IN BLOOD BY AUTOMATED COUNT: 19.2 % (ref 12.3–15.4)
GLOBULIN UR ELPH-MCNC: 2.7 GM/DL
GLUCOSE SERPL-MCNC: 102 MG/DL (ref 65–99)
HCT VFR BLD AUTO: 33.8 % (ref 34–46.6)
HGB BLD-MCNC: 10.7 G/DL (ref 12–15.9)
IMM GRANULOCYTES # BLD AUTO: 0.03 10*3/MM3 (ref 0–0.05)
IMM GRANULOCYTES NFR BLD AUTO: 0.3 % (ref 0–0.5)
LYMPHOCYTES # BLD AUTO: 3.15 10*3/MM3 (ref 0.7–3.1)
LYMPHOCYTES NFR BLD AUTO: 30.9 % (ref 19.6–45.3)
MAGNESIUM SERPL-MCNC: 1.6 MG/DL (ref 1.6–2.4)
MCH RBC QN AUTO: 31.8 PG (ref 26.6–33)
MCHC RBC AUTO-ENTMCNC: 31.7 G/DL (ref 31.5–35.7)
MCV RBC AUTO: 100.6 FL (ref 79–97)
MONOCYTES # BLD AUTO: 1.04 10*3/MM3 (ref 0.1–0.9)
MONOCYTES NFR BLD AUTO: 10.2 % (ref 5–12)
NEUTROPHILS NFR BLD AUTO: 5.75 10*3/MM3 (ref 1.7–7)
NEUTROPHILS NFR BLD AUTO: 56.3 % (ref 42.7–76)
NRBC BLD AUTO-RTO: 0 /100 WBC (ref 0–0.2)
NT-PROBNP SERPL-MCNC: 8183 PG/ML (ref 0–1800)
PLATELET # BLD AUTO: 262 10*3/MM3 (ref 140–450)
PMV BLD AUTO: 11.2 FL (ref 6–12)
POTASSIUM SERPL-SCNC: 4.1 MMOL/L (ref 3.5–5.2)
PROT SERPL-MCNC: 6.9 G/DL (ref 6–8.5)
RBC # BLD AUTO: 3.36 10*6/MM3 (ref 3.77–5.28)
SODIUM SERPL-SCNC: 142 MMOL/L (ref 136–145)
WBC NRBC COR # BLD AUTO: 10.2 10*3/MM3 (ref 3.4–10.8)

## 2024-01-22 PROCEDURE — 85025 COMPLETE CBC W/AUTO DIFF WBC: CPT

## 2024-01-22 PROCEDURE — 36415 COLL VENOUS BLD VENIPUNCTURE: CPT

## 2024-01-22 PROCEDURE — 80053 COMPREHEN METABOLIC PANEL: CPT

## 2024-01-22 PROCEDURE — 83735 ASSAY OF MAGNESIUM: CPT

## 2024-01-22 PROCEDURE — 83880 ASSAY OF NATRIURETIC PEPTIDE: CPT

## 2024-01-26 ENCOUNTER — LAB (OUTPATIENT)
Dept: LAB | Facility: HOSPITAL | Age: 79
End: 2024-01-26
Payer: MEDICARE

## 2024-01-26 ENCOUNTER — OFFICE VISIT (OUTPATIENT)
Dept: ONCOLOGY | Facility: CLINIC | Age: 79
End: 2024-01-26
Payer: MEDICARE

## 2024-01-26 VITALS
WEIGHT: 179 LBS | TEMPERATURE: 98 F | DIASTOLIC BLOOD PRESSURE: 79 MMHG | RESPIRATION RATE: 18 BRPM | OXYGEN SATURATION: 95 % | HEART RATE: 60 BPM | HEIGHT: 64 IN | SYSTOLIC BLOOD PRESSURE: 169 MMHG | BODY MASS INDEX: 30.56 KG/M2

## 2024-01-26 DIAGNOSIS — C91.Z0 LARGE GRANULAR LYMPHOCYTE DISORDER: ICD-10-CM

## 2024-01-26 DIAGNOSIS — D50.8 IRON DEFICIENCY ANEMIA SECONDARY TO INADEQUATE DIETARY IRON INTAKE: ICD-10-CM

## 2024-01-26 DIAGNOSIS — D50.8 IRON DEFICIENCY ANEMIA SECONDARY TO INADEQUATE DIETARY IRON INTAKE: Primary | ICD-10-CM

## 2024-01-26 LAB
ALBUMIN SERPL-MCNC: 4.1 G/DL (ref 3.5–5.2)
ALBUMIN/GLOB SERPL: 1.5 G/DL
ALP SERPL-CCNC: 47 U/L (ref 39–117)
ALT SERPL W P-5'-P-CCNC: 17 U/L (ref 1–33)
ANION GAP SERPL CALCULATED.3IONS-SCNC: 8.8 MMOL/L (ref 5–15)
AST SERPL-CCNC: 26 U/L (ref 1–32)
BASOPHILS # BLD AUTO: 0.05 10*3/MM3 (ref 0–0.2)
BASOPHILS NFR BLD AUTO: 0.5 % (ref 0–1.5)
BILIRUB SERPL-MCNC: 0.7 MG/DL (ref 0–1.2)
BUN SERPL-MCNC: 29 MG/DL (ref 8–23)
BUN/CREAT SERPL: 23.8 (ref 7–25)
CALCIUM SPEC-SCNC: 9.2 MG/DL (ref 8.6–10.5)
CHLORIDE SERPL-SCNC: 107 MMOL/L (ref 98–107)
CO2 SERPL-SCNC: 28.2 MMOL/L (ref 22–29)
CREAT SERPL-MCNC: 1.22 MG/DL (ref 0.57–1)
DEPRECATED RDW RBC AUTO: 71 FL (ref 37–54)
EGFRCR SERPLBLD CKD-EPI 2021: 45.2 ML/MIN/1.73
EOSINOPHIL # BLD AUTO: 0.14 10*3/MM3 (ref 0–0.4)
EOSINOPHIL NFR BLD AUTO: 1.3 % (ref 0.3–6.2)
ERYTHROCYTE [DISTWIDTH] IN BLOOD BY AUTOMATED COUNT: 19 % (ref 12.3–15.4)
GLOBULIN UR ELPH-MCNC: 2.7 GM/DL
GLUCOSE SERPL-MCNC: 110 MG/DL (ref 65–99)
HCT VFR BLD AUTO: 34.4 % (ref 34–46.6)
HGB BLD-MCNC: 11.1 G/DL (ref 12–15.9)
IMM GRANULOCYTES # BLD AUTO: 0.03 10*3/MM3 (ref 0–0.05)
IMM GRANULOCYTES NFR BLD AUTO: 0.3 % (ref 0–0.5)
LYMPHOCYTES # BLD AUTO: 2.92 10*3/MM3 (ref 0.7–3.1)
LYMPHOCYTES NFR BLD AUTO: 27.9 % (ref 19.6–45.3)
MCH RBC QN AUTO: 32.9 PG (ref 26.6–33)
MCHC RBC AUTO-ENTMCNC: 32.3 G/DL (ref 31.5–35.7)
MCV RBC AUTO: 102.1 FL (ref 79–97)
MONOCYTES # BLD AUTO: 0.9 10*3/MM3 (ref 0.1–0.9)
MONOCYTES NFR BLD AUTO: 8.6 % (ref 5–12)
NEUTROPHILS NFR BLD AUTO: 6.41 10*3/MM3 (ref 1.7–7)
NEUTROPHILS NFR BLD AUTO: 61.4 % (ref 42.7–76)
NRBC BLD AUTO-RTO: 0 /100 WBC (ref 0–0.2)
PLATELET # BLD AUTO: 251 10*3/MM3 (ref 140–450)
PMV BLD AUTO: 10.5 FL (ref 6–12)
POTASSIUM SERPL-SCNC: 4.4 MMOL/L (ref 3.5–5.2)
PROT SERPL-MCNC: 6.8 G/DL (ref 6–8.5)
RBC # BLD AUTO: 3.37 10*6/MM3 (ref 3.77–5.28)
SODIUM SERPL-SCNC: 144 MMOL/L (ref 136–145)
WBC NRBC COR # BLD AUTO: 10.45 10*3/MM3 (ref 3.4–10.8)

## 2024-01-26 PROCEDURE — 80053 COMPREHEN METABOLIC PANEL: CPT

## 2024-01-26 PROCEDURE — 85025 COMPLETE CBC W/AUTO DIFF WBC: CPT

## 2024-01-26 PROCEDURE — 36415 COLL VENOUS BLD VENIPUNCTURE: CPT

## 2024-01-26 NOTE — PROGRESS NOTES
Subjective         REASONS FOR FOLLOWUP:    1. LEUKOCYTOSIS , NEUTROPHILS HAVE NO GRANULES  2. ANEMIA FOR 3 YEARS,NORMAL MCV: VERY ATYPICAL RED BLOOD CELL MORPHOLOGY  3. CHF AND PULMONARY EDEMA , CAD, ELEVATED CREATININE.    HISTORY OF PRESENT ILLNESS:    On 01/26/2024 this 78-year-old female who has history of anemia associated with chronic illness and also large granular lymphocytic cell excess returns to the office in company of her  and her daughter. She has remained on a low dose prednisone 5 mg every day. The patient at this time actually feels much better. Her appetite finally came back, she has been able to gain weight. Gaining weight has triggered more physical activity and more endurance around the house and much better cardiovascular outcome. Her bowel activity is normal, urination is normal, no recent respiratory infections. No clinical bleeding with her blood thinner medication. The patient has lesser degree of shortness of breath upon exercise and she has minimal fluid accumulation in her legs at this point.     She is grateful about how far she has come from previous visit.                    HEMATOLOGY HISTORY:   On 08/04/2023 I had the opportunity to see this 77-year-old female who has been admitted to the hospital in pulmonary edema, congestive heart failure, associated with cardiac disease. She has undergone recently a cardiac bypass. We have been consulted because the patient has been noticed to have leukocytosis, new issue for her in absence of any other fever or infection and she also has been noticed to have anemia. On further questioning to the patient her primary care physician in [employer] has told her for many months that she has been anemic but no specific investigation has been made about that that she is aware of. She has no notion of passage of blood in the stool, no hematemesis, no melena, no hematuria, no vaginal bleeding. Her diet is complete in nutrients. She has lost  some weight because she has been very fatigued lately. The fatigue was associated with her heart operation but even before that she was very tired. The patient also states that she has not had any definitive infection even though she had pneumonia  weeks before her cardiac operation but she got better from this. She has not had any fevers, chills, night sweats, pruritus, adenopathies or rashes. She complains of some shortness of breath upon exertion but this is dramatically better since diuresis was established for the treatment of her CHF. Urinary output has been abundant. She has no difficulty with miction. Her bowel activity has been normal. Her surgical sites in her leg have had some ecchymosis and swelling and she has had significant fluid accumulation in her legs.      The patient has no previous history of hematological disorder. She is not aware of any organ enlargement or anything of this nature.  The patient returned to the office on 08/17/2023 in company of her  and her son. In the interim she was discharged from [facility] a few days ago after her cardiac surgery and her CHF and she has been at home with no appetite whatsoever and significant fatigue. Her appetite is minimum. Her blood sugar has been in the 120 category. She has not had any nausea, vomiting, diarrhea, melena, enterorrhagia, abdominal pain. Urination is normal in volume, urine is normal color. She has minimal cough and some shortness of breath. She has no energy for anything. She requires total care for bathing, dressing and so forth by the son and . She has not had any fever, chills or infection. Her surgical sites in the chest wall and her lower extremities are all dry with no evidence of infection.   On 09/13/2023 the patient returned to the office in company of her  and her son. In the meantime since the previous visit the patient has had a remarkable clinical improvement. Her appetite has returned to  normality. The patient actually has been up and about in the kitchen doing some cooking and doing a little bit of dishes. The patient has been able to get around in her walker and her rollator. Her blood sugar has remained below 200 in spite of the minimal dose of prednisone. She has lesser degree of shortness of breath and near complete resolution of the anasarca. Her bowel activity is normal. Urination is abundant. She has no fevers, chills or infection.     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        Past Medical History:   Diagnosis Date    Acne rosacea 08/17/2021    DR.JEFF MOON     Arteriosclerosis of abdominal aorta     B12 deficiency 08/17/2021    DJD (degenerative joint disease)     Hx of smoking 08/17/2021    QUIT IN 1976 AT AGE 31    Hyperlipidemia 08/17/2021    Hypertension     Hypothyroidism     Metabolic syndrome 08/17/2021    KAREN (stress urinary incontinence, female) 08/17/2021    UTI (urinary tract infection) 08/17/2021    Vitamin D deficiency 08/17/2021        Past Surgical History:   Procedure Laterality Date    BACK SURGERY  2013    CARDIAC CATHETERIZATION N/A 07/17/2023    Procedure: Left Heart Cath;  Surgeon: Dinh Andres MD;  Location: [facility] CATH INVASIVE LOCATION;  Service: Cardiovascular;  Laterality: N/A;    CARPAL TUNNEL RELEASE      COLONOSCOPY  2011    CORONARY ARTERY BYPASS GRAFT WITH MITRAL VALVE REPAIR/REPLACEMENT N/A 07/20/2023    Procedure: REZA STERNOTOMY OFF-PUMP CORONARY ARTERY BYPASS GRAFT TIMES        USING LEFFT INTERNAL MAMMARY ARTERY AND     GREATER SAPHENOUS VEIN GRAFT PER EMDOSCOPIC VEIN HARVESTING, MAZE PROCEDURE AND PRP;  Surgeon: Shane Alexander MD;  Location: [facility] CVOR;  Service: Cardiothoracic;  Laterality: N/A;    KNEE SURGERY      LUMBAR DISCECTOMY      NECK SURGERY      Cervical    POSTERIOR LUMBAR/THORACIC SPINE FUSION          Current  Outpatient Medications on File Prior to Visit   Medication Sig Dispense Refill    acetaminophen (TYLENOL) 325 MG tablet Take 2 tablets by mouth Every 4 (Four) Hours As Needed for Mild Pain.      albuterol sulfate  (90 Base) MCG/ACT inhaler Inhale 2 puffs Every 4 (Four) Hours As Needed for Wheezing. 18 g 0    apixaban (Eliquis) 5 MG tablet tablet TAKE 1 TABLET BY MOUTH EVERY 12 HOURS 60 tablet 3    aspirin 81 MG EC tablet Take 1 tablet by mouth Daily.      atorvastatin (LIPITOR) 40 MG tablet Take 1 tablet by mouth Daily. 90 tablet 3    buPROPion XL (WELLBUTRIN XL) 300 MG 24 hr tablet Take 1 tablet by mouth Daily. 90 tablet 1    Cranberry-Vitamin C-Probiotic (AZO CRANBERRY PO) Take  by mouth.      dapagliflozin Propanediol (Farxiga) 10 MG tablet TAKE 1 TABLET BY MOUTH EVERY DAY 90 tablet 3    folic acid (FOLVITE) 1 MG tablet TAKE 1 TABLET BY MOUTH DAILY 30 tablet 1    furosemide (LASIX) 40 MG tablet Take 1 tablet by mouth Daily. 45 tablet 2    metFORMIN ER (GLUCOPHAGE-XR) 500 MG 24 hr tablet TAKE 1 TABLET BY MOUTH EVERY MORNING AND 2 TABLETS AT SUPPER (Patient taking differently: 1 tablet 2 (Two) Times a Day.) 270 tablet 4    metoprolol succinate XL (TOPROL-XL) 50 MG 24 hr tablet Take 1 tablet by mouth Every 12 (Twelve) Hours for 90 days. 60 tablet 2    montelukast (SINGULAIR) 10 MG tablet Take 1 tablet by mouth Daily. 90 tablet 3    multivitamin with minerals tablet tablet Take 1 tablet by mouth Daily.      predniSONE (DELTASONE) 5 MG tablet TAKE 5 MG EVERY DAY 60 tablet 1    Probiotic Product ([employer] PO) Take 1 capsule by mouth Daily.      spironolactone (ALDACTONE) 25 MG tablet Take 1 tablet by mouth Daily.      Synthroid 75 MCG tablet Take 1 tablet by mouth Daily. 90 tablet 3     No current facility-administered medications on file prior to visit.        ALLERGIES:    Allergies   Allergen Reactions    Penicillins Palpitations     1. When was your reaction? > 10 years ago  2. Did your reaction  "happen after the first dose or after several doses? After first dose  3. Did your reaction require ED or hospital care to manage your reaction? Do not know  4. Did your reaction require treatment with epinephrine? Do not know  5. Have you taken amoxicillin (Amoxil) or amoxicillin-clavulanate (Augmentin) without issue since? Yes  6. Have you taken cephalexin (Keflex) without issue since?  Do not know         Social History     Socioeconomic History    Marital status:      Spouse name: Dinh   Tobacco Use    Smoking status: Former     Packs/day: 1.00     Years: 12.00     Additional pack years: 0.00     Total pack years: 12.00     Types: Cigarettes     Quit date:      Years since quittin.1    Smokeless tobacco: Never   Vaping Use    Vaping Use: Never used   Substance and Sexual Activity    Alcohol use: Never    Drug use: Never    Sexual activity: Defer        Family History   Problem Relation Age of Onset    Heart disease Mother     Arthritis Mother     Heart attack Mother     Arthritis Father         Objective     Vitals:    24 0918   Temp: 98 °F (36.7 °C)   TempSrc: Temporal   Weight: 81.2 kg (179 lb)   Height: 162.6 cm (64\")   PainSc: 0-No pain         2024     9:08 AM   Current Status   ECOG score 0       Physical Exam                GENERAL:  Well-developed, Patient  in chronic illness using a walker no longer needs wheel chair  SKIN:  Warm, dry ,NO purpura ,no rash.  HEENT:  Pupils were equal and reactive to light and accomodation, conjunctivae noninjected,  normal visual acuity.   NECK:  Supple with good range of motion; no thyromegaly , no JVD or bruits,.No carotid artery pain, no carotid abnormal pulsation   LYMPHATICS:  No cervical, NO supraclavicular, NO axillary, NO inguinal adenopathies.  CARDIAC   normal rate , regular rhythm, without murmur,NO rubs NO S3 NO S4   LUNGS: normal breath sounds bilateral, no wheezing, NO rhonchi, NO crackles ,NO rubs.  VASCULAR VENOUS: no cyanosis, " NO collateral circulation, NO varicosities, NO edema, NO palpable cords, NO pain,NO erythema, NO pigmentation of the skin.  ABDOMEN:  Soft, NO pain,no hepatomegaly, no splenomegaly,no masses, no ascites, no collateral circulation,no distention.  EXTREMITIES  AND SPINE:  No clubbing, no cyanosis ,no deformities , no pain .No kyphosis,  no pain in spine, no pain in ribs , no pain in pelvic bone.  NEUROLOGICAL:  Patient was awake, alert, oriented to time, person and place.        RECENT LABS:  Hematology WBC   Date Value Ref Range Status   01/22/2024 10.20 3.40 - 10.80 10*3/mm3 Final     RBC   Date Value Ref Range Status   01/22/2024 3.36 (L) 3.77 - 5.28 10*6/mm3 Final     Hemoglobin   Date Value Ref Range Status   01/22/2024 10.7 (L) 12.0 - 15.9 g/dL Final     Hematocrit   Date Value Ref Range Status   01/22/2024 33.8 (L) 34.0 - 46.6 % Final     Platelets   Date Value Ref Range Status   01/22/2024 262 140 - 450 10*3/mm3 Final       CBC:    WBC   Date Value Ref Range Status   01/22/2024 10.20 3.40 - 10.80 10*3/mm3 Final     RBC   Date Value Ref Range Status   01/22/2024 3.36 (L) 3.77 - 5.28 10*6/mm3 Final     Hemoglobin   Date Value Ref Range Status   01/22/2024 10.7 (L) 12.0 - 15.9 g/dL Final     Hematocrit   Date Value Ref Range Status   01/22/2024 33.8 (L) 34.0 - 46.6 % Final     MCV   Date Value Ref Range Status   01/22/2024 100.6 (H) 79.0 - 97.0 fL Final     MCH   Date Value Ref Range Status   01/22/2024 31.8 26.6 - 33.0 pg Final     MCHC   Date Value Ref Range Status   01/22/2024 31.7 31.5 - 35.7 g/dL Final     RDW   Date Value Ref Range Status   01/22/2024 19.2 (H) 12.3 - 15.4 % Final     RDW-SD   Date Value Ref Range Status   01/22/2024 71.5 (H) 37.0 - 54.0 fl Final     MPV   Date Value Ref Range Status   01/22/2024 11.2 6.0 - 12.0 fL Final     Platelets   Date Value Ref Range Status   01/22/2024 262 140 - 450 10*3/mm3 Final     Neutrophil %   Date Value Ref Range Status   01/22/2024 56.3 42.7 - 76.0 % Final      Lymphocyte %   Date Value Ref Range Status   01/22/2024 30.9 19.6 - 45.3 % Final     Monocyte %   Date Value Ref Range Status   01/22/2024 10.2 5.0 - 12.0 % Final     Eosinophil %   Date Value Ref Range Status   01/22/2024 1.5 0.3 - 6.2 % Final     Basophil %   Date Value Ref Range Status   01/22/2024 0.8 0.0 - 1.5 % Final     Immature Grans %   Date Value Ref Range Status   01/22/2024 0.3 0.0 - 0.5 % Final     Neutrophils, Absolute   Date Value Ref Range Status   01/22/2024 5.75 1.70 - 7.00 10*3/mm3 Final     Lymphocytes, Absolute   Date Value Ref Range Status   01/22/2024 3.15 (H) 0.70 - 3.10 10*3/mm3 Final     Monocytes, Absolute   Date Value Ref Range Status   01/22/2024 1.04 (H) 0.10 - 0.90 10*3/mm3 Final     Eosinophils, Absolute   Date Value Ref Range Status   01/22/2024 0.15 0.00 - 0.40 10*3/mm3 Final     Basophils, Absolute   Date Value Ref Range Status   01/22/2024 0.08 0.00 - 0.20 10*3/mm3 Final     Immature Grans, Absolute   Date Value Ref Range Status   01/22/2024 0.03 0.00 - 0.05 10*3/mm3 Final     nRBC   Date Value Ref Range Status   01/22/2024 0.0 0.0 - 0.2 /100 WBC Final        CMP:    Glucose   Date Value Ref Range Status   01/22/2024 102 (H) 65 - 99 mg/dL Final     BUN   Date Value Ref Range Status   01/22/2024 27 (H) 8 - 23 mg/dL Final     Creatinine   Date Value Ref Range Status   01/22/2024 1.18 (H) 0.57 - 1.00 mg/dL Final     Sodium   Date Value Ref Range Status   01/22/2024 142 136 - 145 mmol/L Final     Potassium   Date Value Ref Range Status   01/22/2024 4.1 3.5 - 5.2 mmol/L Final     Chloride   Date Value Ref Range Status   01/22/2024 104 98 - 107 mmol/L Final     CO2   Date Value Ref Range Status   01/22/2024 25.8 22.0 - 29.0 mmol/L Final     Calcium   Date Value Ref Range Status   01/22/2024 9.6 8.6 - 10.5 mg/dL Final     Total Protein   Date Value Ref Range Status   01/22/2024 6.9 6.0 - 8.5 g/dL Final     Albumin   Date Value Ref Range Status   01/22/2024 4.2 3.5 - 5.2 g/dL Final      ALT (SGPT)   Date Value Ref Range Status   01/22/2024 20 1 - 33 U/L Final     AST (SGOT)   Date Value Ref Range Status   01/22/2024 28 1 - 32 U/L Final     Alkaline Phosphatase   Date Value Ref Range Status   01/22/2024 47 39 - 117 U/L Final     Total Bilirubin   Date Value Ref Range Status   01/22/2024 0.7 0.0 - 1.2 mg/dL Final     Globulin   Date Value Ref Range Status   01/22/2024 2.7 gm/dL Final     A/G Ratio   Date Value Ref Range Status   01/22/2024 1.6 g/dL Final     BUN/Creatinine Ratio   Date Value Ref Range Status   01/22/2024 22.9 7.0 - 25.0 Final     Anion Gap   Date Value Ref Range Status   01/22/2024 12.2 5.0 - 15.0 mmol/L Final                 Assessment & Plan     Diagnoses and all orders for this visit:    1. Iron deficiency anemia secondary to inadequate dietary iron intake (Primary)  -     CBC & Differential; Future  -     Comprehensive Metabolic Panel; Future    2. Large granular lymphocyte disorder       On 08/17/2023 I reviewed all of the issues pertinent to her care after being in the hospital with congestive heart failure and being significantly anemic. We documented that her anemia has been a slow process for the last 3 years and her leukocytosis has also been present for the same period of time. In the hospital we documented that the patient had a normal B12, normal folate, normal ferritin, normal iron profile. Her LDH was normal. Serum protein immunoelectrophoresis was negative for monoclonal protein. On the other hand the patient had a Coomb's test that was positive, complement was positive.    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 Today her peripheral blood has an element of lymphocytosis and raises the question if she has a lymphoid malignancy like for example chronic lymphoid leukemia that will trigger leukocytosis, lymphocytosis and hemolytic anemia. To me that is the most likely case. The hemoglobin remains  low. The patient remains significantly fatigued. She is minimally jaundice and she remains anemic. Her platelet count is normal. She has no peripheral adenopathy palpable. No obvious splenomegaly clinically. Nevertheless my advice to this patient and discussed with the son and the patient's  in the room is as follows:    1. I would like for her to take a multivitamin like prenatal 3 times a week.  2. I would like her to take folic acid 1 mg a day.  3. I have sent a prescription for prednisone 10 mg a day. The patient is diabetic. I do not think she can take more than 10 mg a day. She will need to continue checking her blood sugar on a daily basis and if her blood sugar is above 200 she will need to notify us and will notify her primary physician to help us to take care of her blood sugar.     She will require laboratory assessment in [place] on weekly basis including CBC and a retic count and we will review her back her in a month with similar features.     I am going to go ahead and send a peripheral blood flow cytometry today. A BCR/ABL was going to be sent, I find no reason for this given the differential in the white count.     I am not going to request any radiological assessment of her abdomen and pelvis pending to see how she responds to this simple regimen.     If the patient fails to respond to this dose of prednisone or if the hemoglobin does not get any better she will require therapy with Rituxan.     Otherwise I discussed with the patient the need for her to try to eat as she is not and try to get up and about with some walking.    She does not have any ongoing infection. Her surgical sites are all well clean and hopefully they will continue healing as the time goes by.     We will review her back in a month as posted.     I reviewed the patient on 09/13/2023. Since the previous visit several weeks ago the patient has had a remarkable clinical improvement. Her appetite is back triggered by  prednisone utilization. She has been able to gain back strength to the point that she is able to get around the house using her rollator and she has been able to cook simple meals and do some dishes. The patient is sleeping poorly. Besides this she has no obvious aches and pains. No jaundice, no rashes. She has minimal degree of shortness of breath and her energy level has substantially improved. Her anasarca has resolved. Her bowel activity is appropriate. Urination is appropriate. Mentation has remained or improved substantially.     During the previous visit we discussed the fact that we needed to have further analysis of her blood. This included a CCP antibody that was negative, an NORMA that was positive, a DNA-DSA confirmation that was negative. 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     I provided reports of all these tests to the patient and in simple words I explained to her and her family what this means. It is very likely that this patient has an autoimmune condition that is triggering also LGL showing up. This explains her persistent leukocytosis. Her anemia has substantially improved and I do not believe that the patient has significant degree of hemolysis with a reticulocyte count that is normal, LDH that is normal, bilirubin that is normal.     The patient otherwise actually feels as posted dramatically better.     RECOMMENDATIONS:    1. I have advised her to have continuous persistent proper diet at this time. She is eating much better and functioning much better.   2. I advised her to remain on her prednisone and the dose that she will take now is 5 mg a day. Eventually the goal will be to move this upon visit in 6 weeks to every other day.   3. The patient will require for her incisional site below the chest bone from the chest tube, local therapy with honey and brown  sugar. Hopefully this will minimize any possibility of infection and will allow this area to clean and close definitively. She will apply this after washing this with soap and water, saline solution and using this concoction only once a day.   4. I would like for the patient to return to see us back in 6 weeks with a CBC, CMP and a Cristina test.   5. I advised the patient that she does not need to have any more blood work in between her next visit in her local [facility] hospital.     I discussed all these facts with the patient's  and son in the room.  On 11/01/2023, the patient was further reviewed. Since the previous visit, the patient does not find too much of anything that she can eat because she is on a glucose-restricted diet given her diabetes and now on a potassium-restricted diet given the fact that she has been found to have a high potassium and this is probably related to the combination and utilization of Aldactone along with Entresto.     The patient obviously has in my opinion poor nutrition given this fact and she is not hungry. She is depressed and she is trying to do some minimal housework that she is capable of.    She found that the prednisone every other day is not as good as the prednisone everyday to try to stimulate her appetite and make her to feel better and I think at this point of the game, I have no other option than give her prednisone 5 mg every day to try to see if she perks up a little bit and feels better. Overall, her large granular lymphocytic leukemia numbers are not changing. Her white count remains minimally elevated and her hemoglobin remains still on the low side. On the other hand, it is a little bit better than during the previous visit. Her platelet count is stable.     The patient has been advised to then proceed with prednisone 5 mg every day. She will be advised as well to continue her multivitamin on daily basis and I advised her that she needs to talk with her  cardiologist in regard how to control the issues pertinent to her potassium without having to have a potassium restricted diet. Again, Aldactone and Entresto are probably the reason for this phenomenon in the first place.     I would like to review the patient back in a couple of months with a CBC and a CMP.     I discussed all these facts with the patient, her  and her daughter. I highlighted in her medication profile the combination of Entresto and Aldactone triggering hyperkalemia and maybe if she has still the need for a potassium-restricted diet, I think she should be referred to be seen by a dietician in her local hospital.  On 01/26/2024 the patient feels and looks dramatically better. She is no longer here in a wheelchair, she is able to get around on her own using her walker and she is doing more and more activity around the house. Her appetite finally improved, her weight came back, her energy level came back, her mind came back and she was no longer exhausted anymore through anytime of the day.    Her clinical examination today shows very clear lung auscultation, minimal crackles in the left base and no modification in her heart sounds. She has no fluid accumulation in her legs. Her mind is sharp and she is able to mobilize herself in the office with no difficulties. This is a major change. Most importantly her white count is normal, her hemoglobin has improved and her platelet count is normal. Her white count differential is no different than before. Given all of these facts I do believe that this patient has turned around big time in comparison how her previous visit was. The patient is now eating, she has gained strength, she has gained appetite, she has gained proper nutrition, she has gained her mind back and she is no longer exhausted and ready to die. It is good to see these changes in her. Her family was very appreciative today in regard to all of the care that we provided. I do believe  that the prednisone at the present dose will remain ongoing, I think this medicine has made a big difference even in the present settings. Prednisone 5 mg will be continued returning to see us back in 3 months with CBC, CMP, reticulated hemoglobin, ferritin, iron profile and a new B12 level.    Discussed with the patient, her  and her daughter in the room.

## 2024-01-31 RX ORDER — FOLIC ACID 1 MG/1
1000 TABLET ORAL DAILY
Qty: 30 TABLET | Refills: 1 | Status: SHIPPED | OUTPATIENT
Start: 2024-01-31

## 2024-02-09 ENCOUNTER — TRANSCRIBE ORDERS (OUTPATIENT)
Dept: ADMINISTRATIVE | Facility: HOSPITAL | Age: 79
End: 2024-02-09
Payer: MEDICARE

## 2024-02-09 ENCOUNTER — LAB (OUTPATIENT)
Dept: LAB | Facility: HOSPITAL | Age: 79
End: 2024-02-09
Payer: MEDICARE

## 2024-02-09 DIAGNOSIS — I10 ESSENTIAL HYPERTENSION, MALIGNANT: ICD-10-CM

## 2024-02-09 DIAGNOSIS — I12.9 HYPERTENSIVE RENAL DISEASE: ICD-10-CM

## 2024-02-09 DIAGNOSIS — I25.10 DISEASE OF CARDIOVASCULAR SYSTEM: ICD-10-CM

## 2024-02-09 DIAGNOSIS — N18.31 CHRONIC KIDNEY DISEASE (CKD) STAGE G3A/A1, MODERATELY DECREASED GLOMERULAR FILTRATION RATE (GFR) BETWEEN 45-59 ML/MIN/1.73 SQUARE METER AND ALBUMINURIA CREATININE RATIO LESS THAN 30 MG/G (CMS/H*: Primary | ICD-10-CM

## 2024-02-09 DIAGNOSIS — N18.31 CHRONIC KIDNEY DISEASE (CKD) STAGE G3A/A1, MODERATELY DECREASED GLOMERULAR FILTRATION RATE (GFR) BETWEEN 45-59 ML/MIN/1.73 SQUARE METER AND ALBUMINURIA CREATININE RATIO LESS THAN 30 MG/G (CMS/H*: ICD-10-CM

## 2024-02-09 LAB
25(OH)D3 SERPL-MCNC: 42.1 NG/ML (ref 30–100)
ALBUMIN SERPL-MCNC: 4.2 G/DL (ref 3.5–5.2)
ANION GAP SERPL CALCULATED.3IONS-SCNC: 10.5 MMOL/L (ref 5–15)
BACTERIA UR QL AUTO: ABNORMAL /HPF
BILIRUB UR QL STRIP: NEGATIVE
BUN SERPL-MCNC: 24 MG/DL (ref 8–23)
BUN/CREAT SERPL: 21.4 (ref 7–25)
CALCIUM SPEC-SCNC: 9 MG/DL (ref 8.6–10.5)
CHLORIDE SERPL-SCNC: 106 MMOL/L (ref 98–107)
CLARITY UR: CLEAR
CO2 SERPL-SCNC: 25.5 MMOL/L (ref 22–29)
COLOR UR: YELLOW
CREAT SERPL-MCNC: 1.12 MG/DL (ref 0.57–1)
CREAT UR-MCNC: 37.7 MG/DL
EGFRCR SERPLBLD CKD-EPI 2021: 50.1 ML/MIN/1.73
GLUCOSE SERPL-MCNC: 103 MG/DL (ref 65–99)
GLUCOSE UR STRIP-MCNC: ABNORMAL MG/DL
HGB UR QL STRIP.AUTO: NEGATIVE
HYALINE CASTS UR QL AUTO: ABNORMAL /LPF
KETONES UR QL STRIP: NEGATIVE
LEUKOCYTE ESTERASE UR QL STRIP.AUTO: ABNORMAL
NITRITE UR QL STRIP: NEGATIVE
PH UR STRIP.AUTO: 7 [PH] (ref 5–8)
PHOSPHATE SERPL-MCNC: 3 MG/DL (ref 2.5–4.5)
POTASSIUM SERPL-SCNC: 4 MMOL/L (ref 3.5–5.2)
PROT ?TM UR-MCNC: 27.9 MG/DL
PROT UR QL STRIP: ABNORMAL
PROT/CREAT UR: 0.74 MG/G{CREAT}
RBC # UR STRIP: ABNORMAL /HPF
REF LAB TEST METHOD: ABNORMAL
SODIUM SERPL-SCNC: 142 MMOL/L (ref 136–145)
SP GR UR STRIP: 1.02 (ref 1–1.03)
SQUAMOUS #/AREA URNS HPF: ABNORMAL /HPF
UROBILINOGEN UR QL STRIP: ABNORMAL
VIT B12 BLD-MCNC: 373 PG/ML (ref 211–946)
WBC # UR STRIP: ABNORMAL /HPF

## 2024-02-09 PROCEDURE — 80069 RENAL FUNCTION PANEL: CPT

## 2024-02-09 PROCEDURE — 82570 ASSAY OF URINE CREATININE: CPT

## 2024-02-09 PROCEDURE — 82306 VITAMIN D 25 HYDROXY: CPT

## 2024-02-09 PROCEDURE — 84156 ASSAY OF PROTEIN URINE: CPT

## 2024-02-09 PROCEDURE — 82607 VITAMIN B-12: CPT | Performed by: INTERNAL MEDICINE

## 2024-02-09 PROCEDURE — 81001 URINALYSIS AUTO W/SCOPE: CPT

## 2024-02-12 ENCOUNTER — TELEPHONE (OUTPATIENT)
Dept: ONCOLOGY | Facility: CLINIC | Age: 79
End: 2024-02-12
Payer: MEDICARE

## 2024-02-14 ENCOUNTER — TELEPHONE (OUTPATIENT)
Dept: ONCOLOGY | Facility: CLINIC | Age: 79
End: 2024-02-14
Payer: MEDICARE

## 2024-02-14 RX ORDER — CYANOCOBALAMIN 1000 UG/ML
1000 INJECTION, SOLUTION INTRAMUSCULAR; SUBCUTANEOUS ONCE
Status: CANCELLED | OUTPATIENT
Start: 2024-02-21

## 2024-02-15 ENCOUNTER — INFUSION (OUTPATIENT)
Dept: ONCOLOGY | Facility: HOSPITAL | Age: 79
End: 2024-02-15
Payer: MEDICARE

## 2024-02-15 DIAGNOSIS — E53.8 B12 DEFICIENCY: Primary | ICD-10-CM

## 2024-02-15 PROCEDURE — 96372 THER/PROPH/DIAG INJ SC/IM: CPT

## 2024-02-15 PROCEDURE — 25010000002 CYANOCOBALAMIN PER 1000 MCG: Performed by: INTERNAL MEDICINE

## 2024-02-15 RX ORDER — SYRINGE W-NEEDLE,DISPOSAB,3 ML 25GX5/8"
1 SYRINGE, EMPTY DISPOSABLE MISCELLANEOUS
Qty: 3 EACH | Refills: 4 | Status: SHIPPED | OUTPATIENT
Start: 2024-02-15

## 2024-02-15 RX ORDER — CYANOCOBALAMIN 1000 UG/ML
1000 INJECTION, SOLUTION INTRAMUSCULAR; SUBCUTANEOUS
Qty: 3 ML | Refills: 4 | Status: SHIPPED | OUTPATIENT
Start: 2024-02-15

## 2024-02-15 RX ORDER — CYANOCOBALAMIN 1000 UG/ML
1000 INJECTION, SOLUTION INTRAMUSCULAR; SUBCUTANEOUS ONCE
Status: COMPLETED | OUTPATIENT
Start: 2024-02-15 | End: 2024-02-15

## 2024-02-15 RX ORDER — CYANOCOBALAMIN 1000 UG/ML
1000 INJECTION, SOLUTION INTRAMUSCULAR; SUBCUTANEOUS ONCE
OUTPATIENT
Start: 2024-03-14

## 2024-02-15 RX ADMIN — CYANOCOBALAMIN 1000 MCG: 1000 INJECTION INTRAMUSCULAR; SUBCUTANEOUS at 09:44

## 2024-02-16 ENCOUNTER — TELEPHONE (OUTPATIENT)
Dept: CARDIOLOGY | Facility: CLINIC | Age: 79
End: 2024-02-16
Payer: MEDICARE

## 2024-02-16 ENCOUNTER — LAB (OUTPATIENT)
Dept: LAB | Facility: HOSPITAL | Age: 79
End: 2024-02-16
Payer: MEDICARE

## 2024-02-16 DIAGNOSIS — I50.22 CHRONIC HFREF (HEART FAILURE WITH REDUCED EJECTION FRACTION): ICD-10-CM

## 2024-02-16 DIAGNOSIS — I50.22 CHRONIC HFREF (HEART FAILURE WITH REDUCED EJECTION FRACTION): Primary | ICD-10-CM

## 2024-02-16 LAB
ALBUMIN SERPL-MCNC: 4 G/DL (ref 3.5–5.2)
ALBUMIN/GLOB SERPL: 1.3 G/DL
ALP SERPL-CCNC: 51 U/L (ref 39–117)
ALT SERPL W P-5'-P-CCNC: 15 U/L (ref 1–33)
ANION GAP SERPL CALCULATED.3IONS-SCNC: 9 MMOL/L (ref 5–15)
AST SERPL-CCNC: 25 U/L (ref 1–32)
BASOPHILS # BLD AUTO: 0.07 10*3/MM3 (ref 0–0.2)
BASOPHILS NFR BLD AUTO: 0.7 % (ref 0–1.5)
BILIRUB SERPL-MCNC: 0.6 MG/DL (ref 0–1.2)
BUN SERPL-MCNC: 25 MG/DL (ref 8–23)
BUN/CREAT SERPL: 20.2 (ref 7–25)
CALCIUM SPEC-SCNC: 9.8 MG/DL (ref 8.6–10.5)
CHLORIDE SERPL-SCNC: 108 MMOL/L (ref 98–107)
CO2 SERPL-SCNC: 28 MMOL/L (ref 22–29)
CREAT SERPL-MCNC: 1.24 MG/DL (ref 0.57–1)
DEPRECATED RDW RBC AUTO: 69.7 FL (ref 37–54)
EGFRCR SERPLBLD CKD-EPI 2021: 44.4 ML/MIN/1.73
EOSINOPHIL # BLD AUTO: 0.13 10*3/MM3 (ref 0–0.4)
EOSINOPHIL NFR BLD AUTO: 1.3 % (ref 0.3–6.2)
ERYTHROCYTE [DISTWIDTH] IN BLOOD BY AUTOMATED COUNT: 18.5 % (ref 12.3–15.4)
GLOBULIN UR ELPH-MCNC: 3.1 GM/DL
GLUCOSE SERPL-MCNC: 141 MG/DL (ref 65–99)
HCT VFR BLD AUTO: 32.7 % (ref 34–46.6)
HGB BLD-MCNC: 10.2 G/DL (ref 12–15.9)
IMM GRANULOCYTES # BLD AUTO: 0.05 10*3/MM3 (ref 0–0.05)
IMM GRANULOCYTES NFR BLD AUTO: 0.5 % (ref 0–0.5)
LYMPHOCYTES # BLD AUTO: 2.72 10*3/MM3 (ref 0.7–3.1)
LYMPHOCYTES NFR BLD AUTO: 27.5 % (ref 19.6–45.3)
MAGNESIUM SERPL-MCNC: 2 MG/DL (ref 1.6–2.4)
MCH RBC QN AUTO: 31.7 PG (ref 26.6–33)
MCHC RBC AUTO-ENTMCNC: 31.2 G/DL (ref 31.5–35.7)
MCV RBC AUTO: 101.6 FL (ref 79–97)
MONOCYTES # BLD AUTO: 1 10*3/MM3 (ref 0.1–0.9)
MONOCYTES NFR BLD AUTO: 10.1 % (ref 5–12)
NEUTROPHILS NFR BLD AUTO: 5.93 10*3/MM3 (ref 1.7–7)
NEUTROPHILS NFR BLD AUTO: 59.9 % (ref 42.7–76)
NRBC BLD AUTO-RTO: 0 /100 WBC (ref 0–0.2)
NT-PROBNP SERPL-MCNC: ABNORMAL PG/ML (ref 0–1800)
PLATELET # BLD AUTO: 259 10*3/MM3 (ref 140–450)
PMV BLD AUTO: 10.9 FL (ref 6–12)
POTASSIUM SERPL-SCNC: 4.6 MMOL/L (ref 3.5–5.2)
PROT SERPL-MCNC: 7.1 G/DL (ref 6–8.5)
RBC # BLD AUTO: 3.22 10*6/MM3 (ref 3.77–5.28)
SODIUM SERPL-SCNC: 145 MMOL/L (ref 136–145)
WBC NRBC COR # BLD AUTO: 9.9 10*3/MM3 (ref 3.4–10.8)

## 2024-02-16 PROCEDURE — 83735 ASSAY OF MAGNESIUM: CPT

## 2024-02-16 PROCEDURE — 85025 COMPLETE CBC W/AUTO DIFF WBC: CPT

## 2024-02-16 PROCEDURE — 36415 COLL VENOUS BLD VENIPUNCTURE: CPT

## 2024-02-16 PROCEDURE — 80053 COMPREHEN METABOLIC PANEL: CPT

## 2024-02-16 PROCEDURE — 83880 ASSAY OF NATRIURETIC PEPTIDE: CPT

## 2024-02-19 ENCOUNTER — OFFICE VISIT (OUTPATIENT)
Dept: CARDIOLOGY | Facility: CLINIC | Age: 79
End: 2024-02-19
Payer: MEDICARE

## 2024-02-19 VITALS
SYSTOLIC BLOOD PRESSURE: 118 MMHG | HEIGHT: 64 IN | HEART RATE: 56 BPM | DIASTOLIC BLOOD PRESSURE: 60 MMHG | BODY MASS INDEX: 31.24 KG/M2 | WEIGHT: 183 LBS

## 2024-02-19 DIAGNOSIS — I10 PRIMARY HYPERTENSION: ICD-10-CM

## 2024-02-19 DIAGNOSIS — I25.810 CORONARY ARTERY DISEASE INVOLVING AUTOLOGOUS VEIN CORONARY BYPASS GRAFT WITHOUT ANGINA PECTORIS: ICD-10-CM

## 2024-02-19 DIAGNOSIS — N18.32 STAGE 3B CHRONIC KIDNEY DISEASE: ICD-10-CM

## 2024-02-19 DIAGNOSIS — I48.20 CHRONIC ATRIAL FIBRILLATION: ICD-10-CM

## 2024-02-19 DIAGNOSIS — I50.22 CHRONIC HFREF (HEART FAILURE WITH REDUCED EJECTION FRACTION): Primary | ICD-10-CM

## 2024-02-19 DIAGNOSIS — E78.2 MIXED HYPERLIPIDEMIA: ICD-10-CM

## 2024-02-19 DIAGNOSIS — E66.2 CLASS 1 OBESITY WITH ALVEOLAR HYPOVENTILATION, SERIOUS COMORBIDITY, AND BODY MASS INDEX (BMI) OF 32.0 TO 32.9 IN ADULT: ICD-10-CM

## 2024-02-19 PROCEDURE — 1159F MED LIST DOCD IN RCRD: CPT | Performed by: INTERNAL MEDICINE

## 2024-02-19 PROCEDURE — 1160F RVW MEDS BY RX/DR IN RCRD: CPT | Performed by: INTERNAL MEDICINE

## 2024-02-19 PROCEDURE — 3074F SYST BP LT 130 MM HG: CPT | Performed by: INTERNAL MEDICINE

## 2024-02-19 PROCEDURE — 3078F DIAST BP <80 MM HG: CPT | Performed by: INTERNAL MEDICINE

## 2024-02-19 PROCEDURE — 99214 OFFICE O/P EST MOD 30 MIN: CPT | Performed by: INTERNAL MEDICINE

## 2024-02-19 RX ORDER — SACUBITRIL AND VALSARTAN 24; 26 MG/1; MG/1
1 TABLET, FILM COATED ORAL 2 TIMES DAILY
Qty: 180 TABLET | Refills: 3 | Status: SHIPPED | OUTPATIENT
Start: 2024-02-19

## 2024-02-19 RX ORDER — SACUBITRIL AND VALSARTAN 49; 51 MG/1; MG/1
1 TABLET, FILM COATED ORAL 2 TIMES DAILY
Qty: 84 TABLET | Refills: 0 | COMMUNITY
Start: 2024-02-19 | End: 2024-02-19 | Stop reason: SDUPTHER

## 2024-02-19 RX ORDER — SACUBITRIL AND VALSARTAN 49; 51 MG/1; MG/1
1 TABLET, FILM COATED ORAL 2 TIMES DAILY
COMMUNITY
End: 2024-02-19 | Stop reason: ALTCHOICE

## 2024-02-19 NOTE — ASSESSMENT & PLAN NOTE
She has chronic atrial fibrillation with controlled ventricular response.  Continue metoprolol and the current dosing.  She is bruising a lot so I am going to cut down her aspirin to every other day

## 2024-02-19 NOTE — PATIENT INSTRUCTIONS
1.  Decrease Entresto to 24-26 mg 1 tablet twice a day.  2.  Restrict fluids to 1600 mL a day.  3.  Continue to maintain heart rate blood pressure and weight log and bring it to us.  4.  Do blood work in 10 days and also few days before your next visit in 1 month.

## 2024-02-19 NOTE — ASSESSMENT & PLAN NOTE
Her blood pressure is well-regulated.  She will continue her current medication.  We are cutting down the dose of her Entresto and therefore she will need blood pressure and heart rate monitoring

## 2024-02-19 NOTE — ASSESSMENT & PLAN NOTE
Patient has chronic kidney disease stage IIIb.  Her creatinine got a little worse with the increase in diuretic dose.  She continues to require the loop diuretic and therefore I am going to cut down her dose of Entresto to 24-26 mg 1 tablet twice a day she will maintain a blood pressure log and do lab work in 10 days and few days before her appointment in 1 month

## 2024-02-19 NOTE — ASSESSMENT & PLAN NOTE
Patient has chronic heart failure with reduced ejection fraction.  She has chronic A-fib.  Clinically she is euvolemic.  She feels a lot better.  However her renal function got a little worse with the increase in diuretic.  She still needs her diuretic and therefore I am cutting down her Entresto dose to 24-26 mg 1 tablet twice a day.  She will get a repeat labs in 10 days and a few days before her next appointment in 1 month

## 2024-02-19 NOTE — PROGRESS NOTES
Office Visit    Chief Complaint  Chronic HFrEF    Subjective            Ann-Marie Hughes presents to [facility] CARDIOLOGY  History of Present Illness  Ms. Ann-Marie Hughes is a 78 years old female with congestive heart failure with reduced ejection fraction, s/p bypass surgery who is doing better.  She states that clinically she feels a lot better with less swelling in her feet and less shortness breath.  However her renal function has got a little worse and antibiotic is elevated.  She seems to be euvolemic currently.  She has chronic kidney disease stage IIIb      Past Medical History:   Diagnosis Date    Acne rosacea 08/17/2021    DR.JEFF MOON     Arteriosclerosis of abdominal aorta     B12 deficiency 08/17/2021    DJD (degenerative joint disease)     Hx of smoking 08/17/2021    QUIT IN 1976 AT AGE 31    Hyperlipidemia 08/17/2021    Hypertension     Hypothyroidism     Metabolic syndrome 08/17/2021    KAREN (stress urinary incontinence, female) 08/17/2021    UTI (urinary tract infection) 08/17/2021    Vitamin D deficiency 08/17/2021       Allergies   Allergen Reactions    Penicillins Palpitations     1. When was your reaction? > 10 years ago  2. Did your reaction happen after the first dose or after several doses? After first dose  3. Did your reaction require ED or hospital care to manage your reaction? Do not know  4. Did your reaction require treatment with epinephrine? Do not know  5. Have you taken amoxicillin (Amoxil) or amoxicillin-clavulanate (Augmentin) without issue since? Yes  6. Have you taken cephalexin (Keflex) without issue since?  Do not know         Past Surgical History:   Procedure Laterality Date    BACK SURGERY  2013    CARDIAC CATHETERIZATION N/A 07/17/2023    Procedure: Left Heart Cath;  Surgeon: Dinh Andres MD;  Location: [facility] CATH INVASIVE LOCATION;  Service: Cardiovascular;  Laterality: N/A;    CARPAL TUNNEL RELEASE      COLONOSCOPY  2011    CORONARY  ARTERY BYPASS GRAFT WITH MITRAL VALVE REPAIR/REPLACEMENT N/A 2023    Procedure: REZA STERNOTOMY OFF-PUMP CORONARY ARTERY BYPASS GRAFT TIMES        USING LEFFT INTERNAL MAMMARY ARTERY AND     GREATER SAPHENOUS VEIN GRAFT PER EMDOSCOPIC VEIN HARVESTING, MAZE PROCEDURE AND PRP;  Surgeon: Shane Alexander MD;  Location: [facility];  Service: Cardiothoracic;  Laterality: N/A;    KNEE SURGERY      LUMBAR DISCECTOMY      NECK SURGERY      Cervical    POSTERIOR LUMBAR/THORACIC SPINE FUSION          Social History     Tobacco Use    Smoking status: Former     Packs/day: 1.00     Years: 12.00     Additional pack years: 0.00     Total pack years: 12.00     Types: Cigarettes     Quit date:      Years since quittin.1    Smokeless tobacco: Never   Vaping Use    Vaping Use: Never used   Substance Use Topics    Alcohol use: Never    Drug use: Never       Family History   Problem Relation Age of Onset    Heart disease Mother     Arthritis Mother     Heart attack Mother     Arthritis Father         Prior to Admission medications    Medication Sig Start Date End Date Taking? Authorizing Provider   acetaminophen (TYLENOL) 325 MG tablet Take 2 tablets by mouth Every 4 (Four) Hours As Needed for Mild Pain. 23   Anjelica Chase APRN   albuterol sulfate  (90 Base) MCG/ACT inhaler Inhale 2 puffs Every 4 (Four) Hours As Needed for Wheezing. 23   Les Moreno DO   apixaban (Eliquis) 5 MG tablet tablet TAKE 1 TABLET BY MOUTH EVERY 12 HOURS 23   Valerie Valera MD   aspirin 81 MG EC tablet Take 1 tablet by mouth Daily.    Provider, MD Kanwal   atorvastatin (LIPITOR) 40 MG tablet Take 1 tablet by mouth Daily. 23   Valerie Valera MD   buPROPion XL (WELLBUTRIN XL) 300 MG 24 hr tablet Take 1 tablet by mouth Daily. 23   Romario Valdez, DO   Cranberry-Vitamin C-Probiotic (AZO CRANBERRY PO) Take  by mouth.    Provider, MD Kanwal   cyanocobalamin 1000 MCG/ML injection  "Inject 1 mL into the appropriate muscle as directed by prescriber Every 28 (Twenty-Eight) Days. 2/15/24   Berry Heredia MD   dapagliflozin Propanediol (Farxiga) 10 MG tablet TAKE 1 TABLET BY MOUTH EVERY DAY 12/4/23   Valerie Valera MD   folic acid (FOLVITE) 1 MG tablet TAKE 1 TABLET BY MOUTH EVERY DAY 1/31/24   Boston Can MD   furosemide (LASIX) 40 MG tablet Take 1 tablet by mouth Daily. 1/8/24   Sara Lubin APRN   metFORMIN ER (GLUCOPHAGE-XR) 500 MG 24 hr tablet TAKE 1 TABLET BY MOUTH EVERY MORNING AND 2 TABLETS AT SUPPER  Patient taking differently: 1 tablet 2 (Two) Times a Day. 1/19/23   Roseanna Hernandez MD   metoprolol succinate XL (TOPROL-XL) 50 MG 24 hr tablet Take 1 tablet by mouth Every 12 (Twelve) Hours for 90 days. 1/8/24 4/7/24  Sara Lubin APRN   montelukast (SINGULAIR) 10 MG tablet Take 1 tablet by mouth Daily. 7/5/23   Rochelle Brown APRN   multivitamin with minerals tablet tablet Take 1 tablet by mouth Daily.    Provider, MD Kanwal   Needle, Disp, 25G X 1-1/2\" misc Use 1 each Every 30 (Thirty) Days for 3 doses. 2/15/24 4/16/24  Berry Heredia MD   predniSONE (DELTASONE) 5 MG tablet TAKE 5 MG EVERY DAY 11/1/23   Boston Can MD   Probiotic Product (Inventergy PO) Take 1 capsule by mouth Daily.    Provider, MD Kanwal   spironolactone (ALDACTONE) 25 MG tablet Take 1 tablet by mouth Daily. 10/10/23   Valerie Valera MD   Synthroid 75 MCG tablet Take 1 tablet by mouth Daily. 10/31/23   Romario Valdez, DO   Syringe 25G X 1\" 3 ML misc Use 1 each Every 30 (Thirty) Days. 2/15/24   Berry Heredia MD        Review of Systems   Constitutional:  Negative for fatigue.   Respiratory:  Positive for cough and shortness of breath.    Cardiovascular:  Negative for chest pain, palpitations and leg swelling.   Neurological:  Negative for dizziness.        Symptom Course: Improved    Weight Trend: Stable     Objective     /60   Pulse " "56   Ht 162.6 cm (64\")   Wt 83 kg (183 lb)   BMI 31.41 kg/m²       Physical Exam  Constitutional:       General: She is awake.      Appearance: Normal appearance.   Neck:      Thyroid: No thyromegaly.      Vascular: No carotid bruit or JVD.   Cardiovascular:      Rate and Rhythm: Normal rate and regular rhythm.      Chest Wall: PMI is not displaced.      Pulses: Normal pulses.      Heart sounds: Normal heart sounds, S1 normal and S2 normal. No murmur heard.     No friction rub. No gallop. No S3 or S4 sounds.   Pulmonary:      Effort: Pulmonary effort is normal.      Breath sounds: Normal breath sounds and air entry. No wheezing, rhonchi or rales.   Abdominal:      General: Bowel sounds are normal.      Palpations: Abdomen is soft. There is no mass.      Tenderness: There is no abdominal tenderness.   Musculoskeletal:      Cervical back: Neck supple.      Right lower leg: No edema.      Left lower leg: No edema.   Neurological:      Mental Status: She is alert and oriented to person, place, and time.   Psychiatric:         Mood and Affect: Mood normal.         Behavior: Behavior is cooperative.           Result Review :                           Assessment and Plan        Diagnoses and all orders for this visit:    1. Chronic HFrEF (heart failure with reduced ejection fraction) (Primary)  Assessment & Plan:  Patient has chronic heart failure with reduced ejection fraction.  She has chronic A-fib.  Clinically she is euvolemic.  She feels a lot better.  However her renal function got a little worse with the increase in diuretic.  She still needs her diuretic and therefore I am cutting down her Entresto dose to 24-26 mg 1 tablet twice a day.  She will get a repeat labs in 10 days and a few days before her next appointment in 1 month    Orders:  -     Adult Transthoracic Echo Complete W/ Cont if Necessary Per Protocol; Future  -     Magnesium; Future  -     Basic Metabolic Panel; Future  -     Cancel: Lipid Panel; " Future  -     Comprehensive Metabolic Panel; Future  -     Magnesium; Future  -     proBNP; Future  -     proBNP; Future    2. Primary hypertension  Assessment & Plan:  Her blood pressure is well-regulated.  She will continue her current medication.  We are cutting down the dose of her Entresto and therefore she will need blood pressure and heart rate monitoring      3. Mixed hyperlipidemia  Assessment & Plan:  Her lipids are at goal and she will continue atorvastatin 40 mg at bedtime    Orders:  -     Cancel: Lipid Panel; Future    4. Stage 3b chronic kidney disease  Assessment & Plan:  Patient has chronic kidney disease stage IIIb.  Her creatinine got a little worse with the increase in diuretic dose.  She continues to require the loop diuretic and therefore I am going to cut down her dose of Entresto to 24-26 mg 1 tablet twice a day she will maintain a blood pressure log and do lab work in 10 days and few days before her appointment in 1 month    Orders:  -     Adult Transthoracic Echo Complete W/ Cont if Necessary Per Protocol; Future  -     Magnesium; Future  -     Basic Metabolic Panel; Future  -     Cancel: Lipid Panel; Future  -     Comprehensive Metabolic Panel; Future  -     Magnesium; Future  -     proBNP; Future  -     proBNP; Future    5. CAD status post CABG x3 vessels    6. Class 1 obesity with alveolar hypoventilation, serious comorbidity, and body mass index (BMI) of 32.0 to 32.9 in adult    7. Chronic atrial fibrillation  Assessment & Plan:  She has chronic atrial fibrillation with controlled ventricular response.  Continue metoprolol and the current dosing.  She is bruising a lot so I am going to cut down her aspirin to every other day      Other orders  -     [facility] - CARDIOLOGY  -     Discontinue: sacubitril-valsartan (Entresto) 49-51 MG tablet; Take 1 tablet by mouth 2 (Two) Times a Day. Indications: Cardiac Failure, [id number] EXP: 08-  Dispense: 84 tablet; Refill: 0  -      sacubitril-valsartan (Entresto) 24-26 MG tablet; Take 1 tablet by mouth 2 (Two) Times a Day.  Dispense: 180 tablet; Refill: 3            Follow Up     Return for Follow-up with Alyssa 1 month T-F.    Patient was given instructions and counseling regarding her condition or for health maintenance advice. Please see specific information pulled into the AVS if appropriate.     Electronically signed by Valerie Valera MD, 02/19/24, 10:50 AM EST.

## 2024-02-28 NOTE — TELEPHONE ENCOUNTER
Spoke with Ann-Marie and confirmed the PCP on file is Romario Valdez DO. Informed pt to contact PCP office for RX refill. Pt verbalized understanding.

## 2024-03-04 ENCOUNTER — LAB (OUTPATIENT)
Dept: LAB | Facility: HOSPITAL | Age: 79
End: 2024-03-04
Payer: MEDICARE

## 2024-03-04 DIAGNOSIS — N18.32 STAGE 3B CHRONIC KIDNEY DISEASE: ICD-10-CM

## 2024-03-04 DIAGNOSIS — I50.22 CHRONIC HFREF (HEART FAILURE WITH REDUCED EJECTION FRACTION): ICD-10-CM

## 2024-03-04 LAB
ALBUMIN SERPL-MCNC: 4.1 G/DL (ref 3.5–5.2)
ALBUMIN/GLOB SERPL: 1.5 G/DL
ALP SERPL-CCNC: 51 U/L (ref 39–117)
ALT SERPL W P-5'-P-CCNC: 21 U/L (ref 1–33)
ANION GAP SERPL CALCULATED.3IONS-SCNC: 9.1 MMOL/L (ref 5–15)
AST SERPL-CCNC: 22 U/L (ref 1–32)
BILIRUB SERPL-MCNC: 1 MG/DL (ref 0–1.2)
BUN SERPL-MCNC: 27 MG/DL (ref 8–23)
BUN/CREAT SERPL: 21.6 (ref 7–25)
CALCIUM SPEC-SCNC: 9.4 MG/DL (ref 8.6–10.5)
CHLORIDE SERPL-SCNC: 105 MMOL/L (ref 98–107)
CO2 SERPL-SCNC: 26.9 MMOL/L (ref 22–29)
CREAT SERPL-MCNC: 1.25 MG/DL (ref 0.57–1)
EGFRCR SERPLBLD CKD-EPI 2021: 43.9 ML/MIN/1.73
GLOBULIN UR ELPH-MCNC: 2.8 GM/DL
GLUCOSE SERPL-MCNC: 132 MG/DL (ref 65–99)
MAGNESIUM SERPL-MCNC: 1.8 MG/DL (ref 1.6–2.4)
NT-PROBNP SERPL-MCNC: ABNORMAL PG/ML (ref 0–1800)
POTASSIUM SERPL-SCNC: 4.9 MMOL/L (ref 3.5–5.2)
PROT SERPL-MCNC: 6.9 G/DL (ref 6–8.5)
SODIUM SERPL-SCNC: 141 MMOL/L (ref 136–145)

## 2024-03-04 PROCEDURE — 36415 COLL VENOUS BLD VENIPUNCTURE: CPT

## 2024-03-04 PROCEDURE — 80053 COMPREHEN METABOLIC PANEL: CPT

## 2024-03-04 PROCEDURE — 83880 ASSAY OF NATRIURETIC PEPTIDE: CPT

## 2024-03-04 PROCEDURE — 83735 ASSAY OF MAGNESIUM: CPT

## 2024-03-04 RX ORDER — METFORMIN HYDROCHLORIDE 500 MG/1
TABLET, EXTENDED RELEASE ORAL
Qty: 270 TABLET | Refills: 0 | OUTPATIENT
Start: 2024-03-04

## 2024-03-04 RX ORDER — FOLIC ACID 1 MG/1
1000 TABLET ORAL DAILY
Qty: 30 TABLET | Refills: 1 | Status: SHIPPED | OUTPATIENT
Start: 2024-03-04

## 2024-03-04 RX ORDER — METFORMIN HYDROCHLORIDE 500 MG/1
500 TABLET, EXTENDED RELEASE ORAL 2 TIMES DAILY
Qty: 180 TABLET | Refills: 3 | Status: SHIPPED | OUTPATIENT
Start: 2024-03-04

## 2024-03-04 RX ORDER — APIXABAN 5 MG/1
5 TABLET, FILM COATED ORAL EVERY 12 HOURS
Qty: 60 TABLET | Refills: 3 | Status: SHIPPED | OUTPATIENT
Start: 2024-03-04

## 2024-03-14 ENCOUNTER — LAB (OUTPATIENT)
Dept: LAB | Facility: HOSPITAL | Age: 79
End: 2024-03-14
Payer: MEDICARE

## 2024-03-14 DIAGNOSIS — C91.Z0 LARGE GRANULAR LYMPHOCYTE DISORDER: ICD-10-CM

## 2024-03-14 DIAGNOSIS — D50.8 IRON DEFICIENCY ANEMIA SECONDARY TO INADEQUATE DIETARY IRON INTAKE: ICD-10-CM

## 2024-03-14 LAB
BASOPHILS # BLD AUTO: 0.05 10*3/MM3 (ref 0–0.2)
BASOPHILS NFR BLD AUTO: 0.4 % (ref 0–1.5)
DEPRECATED RDW RBC AUTO: 57.2 FL (ref 37–54)
EOSINOPHIL # BLD AUTO: 0.1 10*3/MM3 (ref 0–0.4)
EOSINOPHIL NFR BLD AUTO: 0.8 % (ref 0.3–6.2)
ERYTHROCYTE [DISTWIDTH] IN BLOOD BY AUTOMATED COUNT: 16.6 % (ref 12.3–15.4)
HCT VFR BLD AUTO: 29.6 % (ref 34–46.6)
HGB BLD-MCNC: 9.6 G/DL (ref 12–15.9)
IMM GRANULOCYTES # BLD AUTO: 0.06 10*3/MM3 (ref 0–0.05)
IMM GRANULOCYTES NFR BLD AUTO: 0.5 % (ref 0–0.5)
LYMPHOCYTES # BLD AUTO: 2.02 10*3/MM3 (ref 0.7–3.1)
LYMPHOCYTES NFR BLD AUTO: 16.9 % (ref 19.6–45.3)
MCH RBC QN AUTO: 31.3 PG (ref 26.6–33)
MCHC RBC AUTO-ENTMCNC: 32.4 G/DL (ref 31.5–35.7)
MCV RBC AUTO: 96.4 FL (ref 79–97)
MONOCYTES # BLD AUTO: 1.09 10*3/MM3 (ref 0.1–0.9)
MONOCYTES NFR BLD AUTO: 9.1 % (ref 5–12)
NEUTROPHILS NFR BLD AUTO: 72.3 % (ref 42.7–76)
NEUTROPHILS NFR BLD AUTO: 8.64 10*3/MM3 (ref 1.7–7)
NRBC BLD AUTO-RTO: 0.5 /100 WBC (ref 0–0.2)
PLATELET # BLD AUTO: 252 10*3/MM3 (ref 140–450)
PMV BLD AUTO: 11.3 FL (ref 6–12)
RBC # BLD AUTO: 3.07 10*6/MM3 (ref 3.77–5.28)
WBC NRBC COR # BLD AUTO: 11.96 10*3/MM3 (ref 3.4–10.8)

## 2024-03-14 PROCEDURE — 36415 COLL VENOUS BLD VENIPUNCTURE: CPT

## 2024-03-14 PROCEDURE — 85025 COMPLETE CBC W/AUTO DIFF WBC: CPT

## 2024-03-19 ENCOUNTER — TELEPHONE (OUTPATIENT)
Dept: ONCOLOGY | Facility: CLINIC | Age: 79
End: 2024-03-19
Payer: MEDICARE

## 2024-03-19 DIAGNOSIS — D50.8 IRON DEFICIENCY ANEMIA SECONDARY TO INADEQUATE DIETARY IRON INTAKE: ICD-10-CM

## 2024-03-19 DIAGNOSIS — E53.8 B12 DEFICIENCY: Primary | ICD-10-CM

## 2024-03-19 NOTE — TELEPHONE ENCOUNTER
Called patient to relay message. She is agreeable to have more blood work at [facility] in [facility]. Orders placed. Advised patient call me should she run into any issues having her labs drawn. Jaelyn Luna RN

## 2024-03-19 NOTE — TELEPHONE ENCOUNTER
----- Message from Boston Can MD sent at 3/19/2024  8:05 AM EDT -----  Call and ask how she ids doing maybe she needs to have tomorrow new cbc cmp ferr iron b12 folate she lives in [facility] local [facility]

## 2024-03-20 ENCOUNTER — LAB (OUTPATIENT)
Dept: LAB | Facility: HOSPITAL | Age: 79
End: 2024-03-20
Payer: MEDICARE

## 2024-03-20 ENCOUNTER — TELEPHONE (OUTPATIENT)
Dept: ONCOLOGY | Facility: CLINIC | Age: 79
End: 2024-03-20
Payer: MEDICARE

## 2024-03-20 DIAGNOSIS — E53.8 B12 DEFICIENCY: ICD-10-CM

## 2024-03-20 DIAGNOSIS — I50.22 CHRONIC HFREF (HEART FAILURE WITH REDUCED EJECTION FRACTION): Primary | ICD-10-CM

## 2024-03-20 DIAGNOSIS — I50.22 CHRONIC HFREF (HEART FAILURE WITH REDUCED EJECTION FRACTION): ICD-10-CM

## 2024-03-20 DIAGNOSIS — D50.8 IRON DEFICIENCY ANEMIA SECONDARY TO INADEQUATE DIETARY IRON INTAKE: ICD-10-CM

## 2024-03-20 LAB
ALBUMIN SERPL-MCNC: 4.3 G/DL (ref 3.5–5.2)
ALBUMIN/GLOB SERPL: 1.7 G/DL
ALP SERPL-CCNC: 55 U/L (ref 39–117)
ALT SERPL W P-5'-P-CCNC: 17 U/L (ref 1–33)
ANION GAP SERPL CALCULATED.3IONS-SCNC: 14.1 MMOL/L (ref 5–15)
AST SERPL-CCNC: 26 U/L (ref 1–32)
BASOPHILS # BLD AUTO: 0.08 10*3/MM3 (ref 0–0.2)
BASOPHILS NFR BLD AUTO: 0.6 % (ref 0–1.5)
BILIRUB SERPL-MCNC: 0.8 MG/DL (ref 0–1.2)
BUN SERPL-MCNC: 40 MG/DL (ref 8–23)
BUN/CREAT SERPL: 29.9 (ref 7–25)
CALCIUM SPEC-SCNC: 9.8 MG/DL (ref 8.6–10.5)
CHLORIDE SERPL-SCNC: 100 MMOL/L (ref 98–107)
CO2 SERPL-SCNC: 28.9 MMOL/L (ref 22–29)
CREAT SERPL-MCNC: 1.34 MG/DL (ref 0.57–1)
DEPRECATED RDW RBC AUTO: 60.6 FL (ref 37–54)
EGFRCR SERPLBLD CKD-EPI 2021: 40.4 ML/MIN/1.73
EOSINOPHIL # BLD AUTO: 0.08 10*3/MM3 (ref 0–0.4)
EOSINOPHIL NFR BLD AUTO: 0.6 % (ref 0.3–6.2)
ERYTHROCYTE [DISTWIDTH] IN BLOOD BY AUTOMATED COUNT: 17.1 % (ref 12.3–15.4)
FERRITIN SERPL-MCNC: 495 NG/ML (ref 13–150)
FOLATE SERPL-MCNC: >20 NG/ML (ref 4.78–24.2)
GLOBULIN UR ELPH-MCNC: 2.6 GM/DL
GLUCOSE SERPL-MCNC: 124 MG/DL (ref 65–99)
HCT VFR BLD AUTO: 33.6 % (ref 34–46.6)
HGB BLD-MCNC: 11.1 G/DL (ref 12–15.9)
IMM GRANULOCYTES # BLD AUTO: 0.06 10*3/MM3 (ref 0–0.05)
IMM GRANULOCYTES NFR BLD AUTO: 0.5 % (ref 0–0.5)
LYMPHOCYTES # BLD AUTO: 2.73 10*3/MM3 (ref 0.7–3.1)
LYMPHOCYTES NFR BLD AUTO: 21.1 % (ref 19.6–45.3)
MAGNESIUM SERPL-MCNC: 1.5 MG/DL (ref 1.6–2.4)
MCH RBC QN AUTO: 32.1 PG (ref 26.6–33)
MCHC RBC AUTO-ENTMCNC: 33 G/DL (ref 31.5–35.7)
MCV RBC AUTO: 97.1 FL (ref 79–97)
MONOCYTES # BLD AUTO: 1.43 10*3/MM3 (ref 0.1–0.9)
MONOCYTES NFR BLD AUTO: 11.1 % (ref 5–12)
NEUTROPHILS NFR BLD AUTO: 66.1 % (ref 42.7–76)
NEUTROPHILS NFR BLD AUTO: 8.54 10*3/MM3 (ref 1.7–7)
NRBC BLD AUTO-RTO: 0.3 /100 WBC (ref 0–0.2)
NT-PROBNP SERPL-MCNC: ABNORMAL PG/ML (ref 0–1800)
PLATELET # BLD AUTO: 270 10*3/MM3 (ref 140–450)
PMV BLD AUTO: 10.9 FL (ref 6–12)
POTASSIUM SERPL-SCNC: 4 MMOL/L (ref 3.5–5.2)
PROT SERPL-MCNC: 6.9 G/DL (ref 6–8.5)
RBC # BLD AUTO: 3.46 10*6/MM3 (ref 3.77–5.28)
SODIUM SERPL-SCNC: 143 MMOL/L (ref 136–145)
VIT B12 BLD-MCNC: 752 PG/ML (ref 211–946)
WBC NRBC COR # BLD AUTO: 12.92 10*3/MM3 (ref 3.4–10.8)

## 2024-03-20 PROCEDURE — 82728 ASSAY OF FERRITIN: CPT

## 2024-03-20 PROCEDURE — 36415 COLL VENOUS BLD VENIPUNCTURE: CPT

## 2024-03-20 PROCEDURE — 83880 ASSAY OF NATRIURETIC PEPTIDE: CPT

## 2024-03-20 PROCEDURE — 82607 VITAMIN B-12: CPT

## 2024-03-20 PROCEDURE — 82746 ASSAY OF FOLIC ACID SERUM: CPT

## 2024-03-20 PROCEDURE — 83735 ASSAY OF MAGNESIUM: CPT

## 2024-03-20 PROCEDURE — 80053 COMPREHEN METABOLIC PANEL: CPT

## 2024-03-20 PROCEDURE — 85025 COMPLETE CBC W/AUTO DIFF WBC: CPT

## 2024-03-20 NOTE — PROGRESS NOTES
Lab work ordered per HAY Lubin NP for upcoming appt.  LVM to remind pt of appt for Thursday 3/21 @ 10:00, to get lab work done and to bring medications and bp log to appt.

## 2024-03-20 NOTE — TELEPHONE ENCOUNTER
Called Ayesha at Dr. Valera's office. Relayed proBNP level per Dr. Can. Patient will be in clinic tomorrow. Ayesha will forward to Dr. Valera. Jaelyn Luna RN

## 2024-03-21 ENCOUNTER — TELEPHONE (OUTPATIENT)
Dept: CARDIOLOGY | Facility: CLINIC | Age: 79
End: 2024-03-21

## 2024-03-21 ENCOUNTER — TELEPHONE (OUTPATIENT)
Dept: ONCOLOGY | Facility: CLINIC | Age: 79
End: 2024-03-21
Payer: MEDICARE

## 2024-03-21 ENCOUNTER — OFFICE VISIT (OUTPATIENT)
Dept: CARDIOLOGY | Facility: CLINIC | Age: 79
End: 2024-03-21
Payer: MEDICARE

## 2024-03-21 VITALS
DIASTOLIC BLOOD PRESSURE: 72 MMHG | HEART RATE: 56 BPM | SYSTOLIC BLOOD PRESSURE: 136 MMHG | HEIGHT: 64 IN | WEIGHT: 177 LBS | BODY MASS INDEX: 30.22 KG/M2

## 2024-03-21 DIAGNOSIS — E78.2 MIXED HYPERLIPIDEMIA: ICD-10-CM

## 2024-03-21 DIAGNOSIS — I10 PRIMARY HYPERTENSION: ICD-10-CM

## 2024-03-21 DIAGNOSIS — N18.32 STAGE 3B CHRONIC KIDNEY DISEASE: ICD-10-CM

## 2024-03-21 DIAGNOSIS — I50.22 CHRONIC HFREF (HEART FAILURE WITH REDUCED EJECTION FRACTION): Primary | ICD-10-CM

## 2024-03-21 DIAGNOSIS — I48.20 CHRONIC ATRIAL FIBRILLATION: ICD-10-CM

## 2024-03-21 DIAGNOSIS — I25.810 CORONARY ARTERY DISEASE INVOLVING AUTOLOGOUS VEIN CORONARY BYPASS GRAFT WITHOUT ANGINA PECTORIS: ICD-10-CM

## 2024-03-21 RX ORDER — METOPROLOL SUCCINATE 50 MG/1
50 TABLET, EXTENDED RELEASE ORAL EVERY 12 HOURS SCHEDULED
Qty: 60 TABLET | Refills: 2 | Status: SHIPPED | OUTPATIENT
Start: 2024-03-21 | End: 2024-03-21

## 2024-03-21 RX ORDER — FUROSEMIDE 40 MG/1
40 TABLET ORAL DAILY
Qty: 45 TABLET | Refills: 2 | Status: SHIPPED | OUTPATIENT
Start: 2024-03-21

## 2024-03-21 RX ORDER — METOPROLOL SUCCINATE 50 MG/1
50 TABLET, EXTENDED RELEASE ORAL EVERY 12 HOURS SCHEDULED
Qty: 60 TABLET | Refills: 2 | Status: SHIPPED | OUTPATIENT
Start: 2024-03-21

## 2024-03-21 NOTE — ASSESSMENT & PLAN NOTE
Her kidney function continues to decline slowly.  She does have bilateral pedal edema but it is minimal.  I will omit Lasix on Sundays and recheck her kidney function prior to her next visit in 2 weeks.

## 2024-03-21 NOTE — PATIENT INSTRUCTIONS
1.  Restart metoprolol 50 mg take 1 tablet twice daily.  2.  Decrease Lasix/furosemide 40 mg omit on Sundays, take 6 days a week.  3.  Do a heart rate blood pressure and daily weight log and bring it to next appointment.  4.  Do blood work 1 to 2 days prior to next visit.  5.  Increase magnesium in your diet.

## 2024-03-21 NOTE — TELEPHONE ENCOUNTER
Called to check on pt heart rate, she states that it is now 115, she took a metoprolol when she got home and is aware to take another one tonight and go to twice a day dosing.  Instructed pt to call office tomorrow if heart rate is still above 100.  HAY Lubin NP present during phone call.

## 2024-03-21 NOTE — ASSESSMENT & PLAN NOTE
Ms. Hughes has heart failure with reduced ejection fraction.  Her pedal edema has improved but is still present.  Ms. Hughes does get short of air with exertion, but I believe it is related to her accelerated heart rate.  She has not had her metoprolol in a few days.  Will restart her metoprolol and check on her later this afternoon.  Her kidney function continues to decline slowly.  I will make the following changes to her heart failure medications:    1.  Restart metoprolol 50 mg take 1 tablet twice daily.  2.  Decrease Lasix/furosemide 40 mg omit on Sundays, take 6 days a week.  3.  Do a heart rate blood pressure and daily weight log and bring it to next appointment.  4.  Do blood work 1 to 2 days prior to next visit.  5.  Increase magnesium in your diet.

## 2024-03-21 NOTE — ASSESSMENT & PLAN NOTE
Ms. Hughes is status post CABG last year.  She had been doing a little better until she ran out of her metoprolol.  Will restart metoprolol and monitor her heart rate.  She denies any chest pain or syncopal episodes.

## 2024-03-21 NOTE — TELEPHONE ENCOUNTER
----- Message from Boston Can MD sent at 3/20/2024  6:44 PM EDT -----  CALL HER HB MUCH BETTER , BE SURE SHE SEES HER CARDIOLOGIST

## 2024-03-21 NOTE — ASSESSMENT & PLAN NOTE
Her blood pressure is better since decreasing the Entresto at last visit.  I will continue the current dose.

## 2024-03-21 NOTE — PROGRESS NOTES
Office Visit    Chief Complaint  Chronic HFrEF (heart failure with reduced ejection fraction)    Subjective            Ann-Marie Leanne Hughes presents to [facility]  History of Present Illness  Ms. Hughes is a 78-year-old female that presented to the office today for follow-up due to heart failure with reduced ejection fraction status post bypass surgery who reports that she is doing okay.  On examination today her heart rate is in the 150s and she has not taken her metoprolol for quite some time.  She was under the impression that she was supposed to discontinue it once the prescription was complete.  After sitting and resting apical heart rate was 120.  I will restart her metoprolol.  We have called the pharmacy to ensure that it will be ready immediately.  She has been instructed to  metoprolol and take a dose as soon as she receives the prescription.  She will call us before the end of the day to ensure that her heart rate has come down.  I have advised her that if the heart rate does not come down or she starts to develop chest pain to seek emergency medical care immediately.  She denies any chest pain, dizziness or syncopal episodes.      Past Medical History:   Diagnosis Date    Acne rosacea 08/17/2021    DR.JEFF MOON     Arteriosclerosis of abdominal aorta     B12 deficiency 08/17/2021    DJD (degenerative joint disease)     Hx of smoking 08/17/2021    QUIT IN 1976 AT AGE 31    Hyperlipidemia 08/17/2021    Hypertension     Hypothyroidism     Metabolic syndrome 08/17/2021    KAREN (stress urinary incontinence, female) 08/17/2021    UTI (urinary tract infection) 08/17/2021    Vitamin D deficiency 08/17/2021       Allergies   Allergen Reactions    Penicillins Palpitations     1. When was your reaction? > 10 years ago  2. Did your reaction happen after the first dose or after several doses? After first dose  3. Did your reaction require ED or hospital care to manage your  reaction? Do not know  4. Did your reaction require treatment with epinephrine? Do not know  5. Have you taken amoxicillin (Amoxil) or amoxicillin-clavulanate (Augmentin) without issue since? Yes  6. Have you taken cephalexin (Keflex) without issue since?  Do not know         Past Surgical History:   Procedure Laterality Date    BACK SURGERY  2013    CARDIAC CATHETERIZATION N/A 2023    Procedure: Left Heart Cath;  Surgeon: Dinh Andres MD;  Location:  [facility];  Service: Cardiovascular;  Laterality: N/A;    CARPAL TUNNEL RELEASE      COLONOSCOPY      CORONARY ARTERY BYPASS GRAFT WITH MITRAL VALVE REPAIR/REPLACEMENT N/A 2023    Procedure: REZA STERNOTOMY OFF-PUMP CORONARY ARTERY BYPASS GRAFT TIMES        USING LEFFT INTERNAL MAMMARY ARTERY AND     GREATER SAPHENOUS VEIN GRAFT PER EMDOSCOPIC VEIN HARVESTING, MAZE PROCEDURE AND PRP;  Surgeon: Shane Alexander MD;  Location:  [facility];  Service: Cardiothoracic;  Laterality: N/A;    KNEE SURGERY      LUMBAR DISCECTOMY      NECK SURGERY      Cervical    POSTERIOR LUMBAR/THORACIC SPINE FUSION          Social History     Tobacco Use    Smoking status: Former     Current packs/day: 0.00     Average packs/day: 1 pack/day for 12.0 years (12.0 ttl pk-yrs)     Types: Cigarettes     Start date:      Quit date:      Years since quittin.2    Smokeless tobacco: Never   Vaping Use    Vaping status: Never Used   Substance Use Topics    Alcohol use: Never    Drug use: Never       Family History   Problem Relation Age of Onset    Heart disease Mother     Arthritis Mother     Heart attack Mother     Arthritis Father         Prior to Admission medications    Medication Sig Start Date End Date Taking? Authorizing Provider   acetaminophen (TYLENOL) 325 MG tablet Take 2 tablets by mouth Every 4 (Four) Hours As Needed for Mild Pain. 23  Yes Anjelica Chase APRN   albuterol sulfate  (90 Base) MCG/ACT inhaler  "Inhale 2 puffs Every 4 (Four) Hours As Needed for Wheezing. 5/30/23  Yes Les Moreno DO   aspirin 81 MG EC tablet Take 1 tablet by mouth Every Other Day.   Yes Kanwal Orozco MD   atorvastatin (LIPITOR) 40 MG tablet Take 1 tablet by mouth Daily. 11/7/23  Yes Valerie Valera MD   buPROPion XL (WELLBUTRIN XL) 300 MG 24 hr tablet Take 1 tablet by mouth Daily. 12/13/23  Yes Romario Valdez DO   cyanocobalamin 1000 MCG/ML injection Inject 1 mL into the appropriate muscle as directed by prescriber Every 28 (Twenty-Eight) Days. 2/15/24  Yes Berry Heredia MD   dapagliflozin Propanediol (Farxiga) 10 MG tablet TAKE 1 TABLET BY MOUTH EVERY DAY 12/4/23  Yes Valerie Valera MD   Eliquis 5 MG tablet tablet TAKE 1 TABLET BY MOUTH EVERY 12 HOURS 3/4/24  Yes Valerie Valera MD   folic acid (FOLVITE) 1 MG tablet TAKE 1 TABLET BY MOUTH DAILY 3/4/24  Yes Boston Can MD   furosemide (LASIX) 40 MG tablet Take 1 tablet by mouth Daily. 1/8/24  Yes Sara Lubin APRN   metFORMIN ER (GLUCOPHAGE-XR) 500 MG 24 hr tablet Take 1 tablet by mouth 2 (Two) Times a Day.  Patient taking differently: Take 1 tablet by mouth 2 (Two) Times a Day. 1 tab in the am, 2 tabs in the pm 3/4/24  Yes Romario Valdez DO   montelukast (SINGULAIR) 10 MG tablet Take 1 tablet by mouth Daily. 7/5/23  Yes Rochelle Brown APRN   Needle, Disp, 25G X 1-1/2\" misc Use 1 each Every 30 (Thirty) Days for 3 doses. 2/15/24 4/16/24 Yes Berry Heredia MD   predniSONE (DELTASONE) 5 MG tablet TAKE 5 MG EVERY DAY  Patient taking differently: Take 0.5 tablets by mouth Every Other Day. TAKE 5 MG EVERY DAY 11/1/23  Yes Boston Can MD   Probiotic Product (Pathwright PO) Take 1 capsule by mouth Daily.   Yes ProviderKanwal MD   sacubitril-valsartan (Entresto) 24-26 MG tablet Take 1 tablet by mouth 2 (Two) Times a Day. 2/19/24  Yes Valerie Valera MD   spironolactone (ALDACTONE) 25 MG tablet Take 1 tablet by mouth Daily. " "10/10/23  Yes Valerie Valera MD   Synthroid 75 MCG tablet Take 1 tablet by mouth Daily. 10/31/23  Yes Romario Valdez,    Syringe 25G X 1\" 3 ML misc Use 1 each Every 30 (Thirty) Days. 2/15/24  Yes Berry Heredia MD   Cranberry-Vitamin C-Probiotic (AZO CRANBERRY PO) Take  by mouth.    Provider, MD Kanwal   metoprolol succinate XL (TOPROL-XL) 50 MG 24 hr tablet Take 1 tablet by mouth Every 12 (Twelve) Hours for 90 days.  Patient not taking: Reported on 3/21/2024 1/8/24 4/7/24  Sara Lubin APRN   multivitamin with minerals tablet tablet Take 1 tablet by mouth Daily.    Provider, MD Kanwal        Review of Systems   Constitutional:  Positive for fatigue.   Respiratory:  Positive for cough and shortness of breath.    Cardiovascular:  Positive for chest pain and palpitations. Negative for leg swelling.   Neurological:  Negative for dizziness.        Symptom Course: Worsened    Weight Trend: Fluctuating Minimally     Objective     /72   Pulse 56   Ht 162.6 cm (64\")   Wt 80.3 kg (177 lb)   BMI 30.38 kg/m²       Physical Exam  Constitutional:       General: She is awake.      Appearance: Normal appearance.   Neck:      Thyroid: No thyromegaly.      Vascular: No carotid bruit or JVD.   Cardiovascular:      Rate and Rhythm: Tachycardia present. Rhythm irregularly irregular.      Chest Wall: PMI is not displaced.      Pulses: Normal pulses.      Heart sounds: Normal heart sounds, S1 normal and S2 normal. No murmur heard.     No friction rub. No gallop. No S3 or S4 sounds.   Pulmonary:      Effort: Pulmonary effort is normal.      Breath sounds: Normal breath sounds and air entry. No wheezing, rhonchi or rales.   Abdominal:      General: Bowel sounds are normal.      Palpations: Abdomen is soft. There is no mass.      Tenderness: There is no abdominal tenderness.   Musculoskeletal:      Cervical back: Neck supple.      Right lower leg: Edema present.      Left lower leg: Edema present. "   Neurological:      Mental Status: She is alert and oriented to person, place, and time.   Psychiatric:         Mood and Affect: Mood normal.         Behavior: Behavior is cooperative.           Result Review :                    ECG 12 Lead    Date/Time: 3/21/2024 12:53 PM  Performed by: Sara Lubin APRN    Authorized by: Sara Lubin APRN  Comments: Atrial fibrillation with RVR, LVH secondary to repolarization abnormalities ST depression probably related to rate.  Compared to prior EKG rate is much faster.         Lab Results   Component Value Date    PROBNP 13,412.0 (H) 03/20/2024    PROBNP 19,763.0 (H) 03/04/2024    PROBNP 15,908.0 (H) 02/16/2024     CMP          2/16/2024    11:05 3/4/2024    10:46 3/20/2024    09:32   CMP   Glucose 141  132  124    BUN 25  27  40    Creatinine 1.24  1.25  1.34    EGFR 44.4  43.9  40.4    Sodium 145  141  143    Potassium 4.6  4.9  4.0    Chloride 108  105  100    Calcium 9.8  9.4  9.8    Total Protein 7.1  6.9  6.9    Albumin 4.0  4.1  4.3    Globulin 3.1  2.8  2.6    Total Bilirubin 0.6  1.0  0.8    Alkaline Phosphatase 51  51  55    AST (SGOT) 25  22  26    ALT (SGPT) 15  21  17    Albumin/Globulin Ratio 1.3  1.5  1.7    BUN/Creatinine Ratio 20.2  21.6  29.9    Anion Gap 9.0  9.1  14.1      CBC w/diff          2/16/2024    11:05 3/14/2024    09:27 3/20/2024    09:32   CBC w/Diff   WBC 9.90  11.96  12.92    RBC 3.22  3.07  3.46    Hemoglobin 10.2  9.6  11.1    Hematocrit 32.7  29.6  33.6    .6  96.4  97.1    MCH 31.7  31.3  32.1    MCHC 31.2  32.4  33.0    RDW 18.5  16.6  17.1    Platelets 259  252  270    Neutrophil Rel % 59.9  72.3  66.1    Immature Granulocyte Rel % 0.5  0.5  0.5    Lymphocyte Rel % 27.5  16.9  21.1    Monocyte Rel % 10.1  9.1  11.1    Eosinophil Rel % 1.3  0.8  0.6    Basophil Rel % 0.7  0.4  0.6       Lipid Panel          7/18/2023    07:25 11/6/2023    09:17 12/27/2023    08:48   Lipid Panel   Total Cholesterol 93  143  120   "  Triglycerides 71  122  81    HDL Cholesterol 39  40  61    VLDL Cholesterol 15  22  16    LDL Cholesterol  39  81  43    LDL/HDL Ratio 1.02  1.97  0.70       Lab Results   Component Value Date    TSH 2.210 12/29/2023    TSH 1.130 06/15/2023    TSH 1.940 01/11/2023      Lab Results   Component Value Date    FREET4 1.74 (H) 12/29/2023    FREET4 1.31 01/11/2023    FREET4 1.27 08/17/2022      No results found for: \"DDIMERQUANT\"  Magnesium   Date Value Ref Range Status   03/20/2024 1.5 (L) 1.6 - 2.4 mg/dL Final      No results found for: \"DIGOXIN\"   A1C Last 3 Results          6/15/2023    10:05 7/18/2023    07:25 12/29/2023    14:46   HGBA1C Last 3 Results   Hemoglobin A1C 6.10  5.90  6.00           Results for orders placed in visit on 10/11/23    Adult Transthoracic Echo Limited W/ Cont if Necessary Per Protocol    Interpretation Summary    Left ventricular systolic function is low normal. Left ventricular ejection fraction appears to be 51 - 55%.    Left ventricular wall thickness is consistent with mild concentric hypertrophy.    Left ventricular diastolic function is consistent with (grade I) impaired relaxation.    There is bileaflet mitral valve thickening present.  Mild mitral regurgitation is noted    There is a trivial pericardial effusion.        Assessment and Plan        Diagnoses and all orders for this visit:    1. Chronic HFrEF (heart failure with reduced ejection fraction) (Primary)  Assessment & Plan:  Ms. Hughes has heart failure with reduced ejection fraction.  Her pedal edema has improved but is still present.  Ms. Hughes does get short of air with exertion, but I believe it is related to her accelerated heart rate.  She has not had her metoprolol in a few days.  Will restart her metoprolol and check on her later this afternoon.  Her kidney function continues to decline slowly.  I will make the following changes to her heart failure medications:    1.  Restart metoprolol 50 mg take 1 tablet twice " daily.  2.  Decrease Lasix/furosemide 40 mg omit on Sundays, take 6 days a week.  3.  Do a heart rate blood pressure and daily weight log and bring it to next appointment.  4.  Do blood work 1 to 2 days prior to next visit.  5.  Increase magnesium in your diet.    Orders:  -     CBC & Differential; Future  -     Comprehensive Metabolic Panel; Future  -     proBNP; Future  -     Magnesium; Future    2. Primary hypertension  Assessment & Plan:  Her blood pressure is better since decreasing the Entresto at last visit.  I will continue the current dose.      3. Mixed hyperlipidemia  Assessment & Plan:  Her lipids are at goal.  I will continue atorvastatin at the current dose.      4. CAD status post CABG x3 vessels  Assessment & Plan:  Ms. Hughes is status post CABG last year.  She had been doing a little better until she ran out of her metoprolol.  Will restart metoprolol and monitor her heart rate.  She denies any chest pain or syncopal episodes.      5. Chronic atrial fibrillation  Assessment & Plan:  Ms. Hughes has atrial fibrillation with RVR.  She is not sure the last time she took metoprolol.  She has been instructed to  the metoprolol prescription from the pharmacy and take 1 dose immediately.  Will call and check on her later this afternoon to ensure her heart rate comes down.  If her heart rate remains elevated or she develops any other symptoms she has been instructed to go to the emergency room.      6. Stage 3b chronic kidney disease  Assessment & Plan:  Her kidney function continues to decline slowly.  She does have bilateral pedal edema but it is minimal.  I will omit Lasix on Sundays and recheck her kidney function prior to her next visit in 2 weeks.    Orders:  -     CBC & Differential; Future  -     Comprehensive Metabolic Panel; Future  -     proBNP; Future  -     Magnesium; Future    Other orders  -     Discontinue: metoprolol succinate XL (TOPROL-XL) 50 MG 24 hr tablet; Take 1 tablet by mouth  Every 12 (Twelve) Hours for 90 days.  Dispense: 60 tablet; Refill: 2  -     furosemide (LASIX) 40 MG tablet; Take 1 tablet by mouth Daily. Omit on Sundays  Dispense: 45 tablet; Refill: 2  -     metoprolol succinate XL (TOPROL-XL) 50 MG 24 hr tablet; Take 1 tablet by mouth Every 12 (Twelve) Hours.  Dispense: 60 tablet; Refill: 2            Follow Up     Return in about 2 weeks (around 4/4/2024) for with Dr Valera.    Patient was given instructions and counseling regarding her condition or for health maintenance advice. Please see specific information pulled into the AVS if appropriate.     Electronically signed by LONA Bocanegra, 03/21/24, 12:55 PM EDT.

## 2024-03-21 NOTE — ASSESSMENT & PLAN NOTE
Ms. Hughes has atrial fibrillation with RVR.  She is not sure the last time she took metoprolol.  She has been instructed to  the metoprolol prescription from the pharmacy and take 1 dose immediately.  Will call and check on her later this afternoon to ensure her heart rate comes down.  If her heart rate remains elevated or she develops any other symptoms she has been instructed to go to the emergency room.

## 2024-03-24 ENCOUNTER — APPOINTMENT (OUTPATIENT)
Dept: GENERAL RADIOLOGY | Facility: HOSPITAL | Age: 79
End: 2024-03-24
Payer: MEDICARE

## 2024-03-24 ENCOUNTER — HOSPITAL ENCOUNTER (EMERGENCY)
Facility: HOSPITAL | Age: 79
Discharge: HOME | End: 2024-03-24
Attending: EMERGENCY MEDICINE | Admitting: EMERGENCY MEDICINE
Payer: MEDICARE

## 2024-03-24 VITALS
WEIGHT: 177.69 LBS | BODY MASS INDEX: 30.34 KG/M2 | HEART RATE: 82 BPM | OXYGEN SATURATION: 98 % | RESPIRATION RATE: 18 BRPM | SYSTOLIC BLOOD PRESSURE: 126 MMHG | TEMPERATURE: 98 F | HEIGHT: 64 IN | DIASTOLIC BLOOD PRESSURE: 93 MMHG

## 2024-03-24 DIAGNOSIS — I48.91 ATRIAL FIBRILLATION, UNSPECIFIED TYPE: Primary | ICD-10-CM

## 2024-03-24 LAB
ALBUMIN SERPL-MCNC: 4.2 G/DL (ref 3.5–5.2)
ALBUMIN/GLOB SERPL: 1.6 G/DL
ALP SERPL-CCNC: 49 U/L (ref 39–117)
ALT SERPL W P-5'-P-CCNC: 17 U/L (ref 1–33)
ANION GAP SERPL CALCULATED.3IONS-SCNC: 13.7 MMOL/L (ref 5–15)
AST SERPL-CCNC: 24 U/L (ref 1–32)
BASOPHILS # BLD AUTO: 0.06 10*3/MM3 (ref 0–0.2)
BASOPHILS NFR BLD AUTO: 0.5 % (ref 0–1.5)
BILIRUB SERPL-MCNC: 0.8 MG/DL (ref 0–1.2)
BUN SERPL-MCNC: 46 MG/DL (ref 8–23)
BUN/CREAT SERPL: 32.2 (ref 7–25)
CALCIUM SPEC-SCNC: 8.7 MG/DL (ref 8.6–10.5)
CHLORIDE SERPL-SCNC: 102 MMOL/L (ref 98–107)
CO2 SERPL-SCNC: 25.3 MMOL/L (ref 22–29)
CREAT SERPL-MCNC: 1.43 MG/DL (ref 0.57–1)
DEPRECATED RDW RBC AUTO: 71.9 FL (ref 37–54)
EGFRCR SERPLBLD CKD-EPI 2021: 37.4 ML/MIN/1.73
EOSINOPHIL # BLD AUTO: 0.04 10*3/MM3 (ref 0–0.4)
EOSINOPHIL NFR BLD AUTO: 0.3 % (ref 0.3–6.2)
ERYTHROCYTE [DISTWIDTH] IN BLOOD BY AUTOMATED COUNT: 18.9 % (ref 12.3–15.4)
GLOBULIN UR ELPH-MCNC: 2.6 GM/DL
GLUCOSE SERPL-MCNC: 143 MG/DL (ref 65–99)
HCT VFR BLD AUTO: 34.5 % (ref 34–46.6)
HGB BLD-MCNC: 10.6 G/DL (ref 12–15.9)
HOLD SPECIMEN: NORMAL
HOLD SPECIMEN: NORMAL
IMM GRANULOCYTES # BLD AUTO: 0.04 10*3/MM3 (ref 0–0.05)
IMM GRANULOCYTES NFR BLD AUTO: 0.3 % (ref 0–0.5)
LYMPHOCYTES # BLD AUTO: 2.66 10*3/MM3 (ref 0.7–3.1)
LYMPHOCYTES NFR BLD AUTO: 22.8 % (ref 19.6–45.3)
MAGNESIUM SERPL-MCNC: 1.7 MG/DL (ref 1.6–2.4)
MCH RBC QN AUTO: 31.5 PG (ref 26.6–33)
MCHC RBC AUTO-ENTMCNC: 30.7 G/DL (ref 31.5–35.7)
MCV RBC AUTO: 102.4 FL (ref 79–97)
MONOCYTES # BLD AUTO: 1.06 10*3/MM3 (ref 0.1–0.9)
MONOCYTES NFR BLD AUTO: 9.1 % (ref 5–12)
NEUTROPHILS NFR BLD AUTO: 67 % (ref 42.7–76)
NEUTROPHILS NFR BLD AUTO: 7.82 10*3/MM3 (ref 1.7–7)
NRBC BLD AUTO-RTO: 0.2 /100 WBC (ref 0–0.2)
PLATELET # BLD AUTO: 253 10*3/MM3 (ref 140–450)
PMV BLD AUTO: 11.3 FL (ref 6–12)
POTASSIUM SERPL-SCNC: 4.2 MMOL/L (ref 3.5–5.2)
PROT SERPL-MCNC: 6.8 G/DL (ref 6–8.5)
QT INTERVAL: 361 MS
QTC INTERVAL: 476 MS
RBC # BLD AUTO: 3.37 10*6/MM3 (ref 3.77–5.28)
SODIUM SERPL-SCNC: 141 MMOL/L (ref 136–145)
TROPONIN T SERPL HS-MCNC: 316 NG/L
TROPONIN T SERPL HS-MCNC: 330 NG/L
TSH SERPL DL<=0.05 MIU/L-ACNC: 1.65 UIU/ML (ref 0.27–4.2)
WBC NRBC COR # BLD AUTO: 11.68 10*3/MM3 (ref 3.4–10.8)
WHOLE BLOOD HOLD COAG: NORMAL
WHOLE BLOOD HOLD SPECIMEN: NORMAL

## 2024-03-24 PROCEDURE — 71045 X-RAY EXAM CHEST 1 VIEW: CPT

## 2024-03-24 PROCEDURE — 83735 ASSAY OF MAGNESIUM: CPT | Performed by: EMERGENCY MEDICINE

## 2024-03-24 PROCEDURE — 99284 EMERGENCY DEPT VISIT MOD MDM: CPT | Performed by: INTERNAL MEDICINE

## 2024-03-24 PROCEDURE — 85025 COMPLETE CBC W/AUTO DIFF WBC: CPT | Performed by: EMERGENCY MEDICINE

## 2024-03-24 PROCEDURE — 84443 ASSAY THYROID STIM HORMONE: CPT | Performed by: EMERGENCY MEDICINE

## 2024-03-24 PROCEDURE — 80053 COMPREHEN METABOLIC PANEL: CPT | Performed by: EMERGENCY MEDICINE

## 2024-03-24 PROCEDURE — 93005 ELECTROCARDIOGRAM TRACING: CPT | Performed by: EMERGENCY MEDICINE

## 2024-03-24 PROCEDURE — 84484 ASSAY OF TROPONIN QUANT: CPT | Performed by: EMERGENCY MEDICINE

## 2024-03-24 PROCEDURE — 99284 EMERGENCY DEPT VISIT MOD MDM: CPT

## 2024-03-24 PROCEDURE — G0378 HOSPITAL OBSERVATION PER HR: HCPCS

## 2024-03-24 PROCEDURE — 96374 THER/PROPH/DIAG INJ IV PUSH: CPT

## 2024-03-24 PROCEDURE — 36415 COLL VENOUS BLD VENIPUNCTURE: CPT

## 2024-03-24 RX ORDER — ONDANSETRON 2 MG/ML
4 INJECTION INTRAMUSCULAR; INTRAVENOUS EVERY 6 HOURS PRN
Status: CANCELLED | OUTPATIENT
Start: 2024-03-24

## 2024-03-24 RX ORDER — ASPIRIN 81 MG/1
324 TABLET, CHEWABLE ORAL ONCE
Status: COMPLETED | OUTPATIENT
Start: 2024-03-24 | End: 2024-03-24

## 2024-03-24 RX ORDER — DILTIAZEM HYDROCHLORIDE 5 MG/ML
20 INJECTION INTRAVENOUS ONCE
Status: COMPLETED | OUTPATIENT
Start: 2024-03-24 | End: 2024-03-24

## 2024-03-24 RX ORDER — BISACODYL 10 MG
10 SUPPOSITORY, RECTAL RECTAL DAILY PRN
Status: CANCELLED | OUTPATIENT
Start: 2024-03-24

## 2024-03-24 RX ORDER — SODIUM CHLORIDE 0.9 % (FLUSH) 0.9 %
10 SYRINGE (ML) INJECTION AS NEEDED
Status: CANCELLED | OUTPATIENT
Start: 2024-03-24

## 2024-03-24 RX ORDER — MULTIPLE VITAMINS W/ MINERALS TAB 9MG-400MCG
1 TAB ORAL DAILY
Status: CANCELLED | OUTPATIENT
Start: 2024-03-24

## 2024-03-24 RX ORDER — BISACODYL 5 MG/1
5 TABLET, DELAYED RELEASE ORAL DAILY PRN
Status: CANCELLED | OUTPATIENT
Start: 2024-03-24

## 2024-03-24 RX ORDER — FAMOTIDINE 20 MG/1
40 TABLET, FILM COATED ORAL DAILY
Status: CANCELLED | OUTPATIENT
Start: 2024-03-24

## 2024-03-24 RX ORDER — CHOLECALCIFEROL (VITAMIN D3) 125 MCG
5 CAPSULE ORAL NIGHTLY PRN
Status: CANCELLED | OUTPATIENT
Start: 2024-03-24

## 2024-03-24 RX ORDER — AMOXICILLIN 250 MG
2 CAPSULE ORAL 2 TIMES DAILY PRN
Status: CANCELLED | OUTPATIENT
Start: 2024-03-24

## 2024-03-24 RX ORDER — SODIUM CHLORIDE 9 MG/ML
40 INJECTION, SOLUTION INTRAVENOUS AS NEEDED
Status: CANCELLED | OUTPATIENT
Start: 2024-03-24

## 2024-03-24 RX ORDER — POLYETHYLENE GLYCOL 3350 17 G/17G
17 POWDER, FOR SOLUTION ORAL DAILY PRN
Status: CANCELLED | OUTPATIENT
Start: 2024-03-24

## 2024-03-24 RX ORDER — SODIUM CHLORIDE 0.9 % (FLUSH) 0.9 %
10 SYRINGE (ML) INJECTION AS NEEDED
Status: DISCONTINUED | OUTPATIENT
Start: 2024-03-24 | End: 2024-03-24 | Stop reason: HOSPADM

## 2024-03-24 RX ORDER — SODIUM CHLORIDE 0.9 % (FLUSH) 0.9 %
10 SYRINGE (ML) INJECTION EVERY 12 HOURS SCHEDULED
Status: CANCELLED | OUTPATIENT
Start: 2024-03-24

## 2024-03-24 RX ADMIN — ASPIRIN 324 MG: 81 TABLET, CHEWABLE ORAL at 11:12

## 2024-03-24 RX ADMIN — DILTIAZEM HYDROCHLORIDE 20 MG: 5 INJECTION, SOLUTION INTRAVENOUS at 10:31

## 2024-03-24 NOTE — CASE MANAGEMENT/SOCIAL WORK
Discharge Planning Assessment   Teresa     Patient Name: Ann-Marie Hughes  MRN: [id number]  Today's Date: 3/24/2024    Admit Date: 3/24/2024        Discharge Needs Assessment       Row Name 03/24/24 1218       Living Environment    People in Home spouse (P)     Potentially Unsafe Housing Conditions none (P)     In the past 12 months has the electric, gas, oil, or water company threatened to shut off services in your home? No (P)     Primary Care Provided by self (P)     Provides Primary Care For no one (P)        Resource/Environmental Concerns    Transportation Concerns none (P)        Transportation Needs    In the past 12 months, has lack of transportation kept you from medical appointments or from getting medications? no (P)     In the past 12 months, has lack of transportation kept you from meetings, work, or from getting things needed for daily living? No (P)        Food Insecurity    Within the past 12 months, you worried that your food would run out before you got the money to buy more. Never true (P)     Within the past 12 months, the food you bought just didn't last and you didn't have money to get more. Never true (P)        Transition Planning    Patient/Family Anticipates Transition to home (P)     Patient/Family Anticipated Services at Transition other (see comments) (P)   pt has a house keeper come once a month    Transportation Anticipated family or friend will provide (P)        Discharge Needs Assessment    Readmission Within the Last 30 Days no previous admission in last 30 days (P)     Equipment Currently Used at Home rollator (P)     Concerns to be Addressed denies needs/concerns at this time (P)     Equipment Needed After Discharge none (P)       Row Name 03/24/24 1211       Living Environment    People in Home spouse (P)     Current Living Arrangements home (P)     Potentially Unsafe Housing Conditions none (P)     In the past 12 months has the electric, gas, oil, or water company  threatened to shut off services in your home? No (P)     Primary Care Provided by self (P)     Provides Primary Care For no one (P)        Resource/Environmental Concerns    Transportation Concerns none (P)        Transportation Needs    In the past 12 months, has lack of transportation kept you from medical appointments or from getting medications? no (P)     In the past 12 months, has lack of transportation kept you from meetings, work, or from getting things needed for daily living? No (P)        Food Insecurity    Within the past 12 months, you worried that your food would run out before you got the money to buy more. Never true (P)     Within the past 12 months, the food you bought just didn't last and you didn't have money to get more. Never true (P)        Transition Planning    Patient/Family Anticipates Transition to home (P)     Patient/Family Anticipated Services at Transition other (see comments) (P)   pt says she has a house keeper that comes once a month    Transportation Anticipated family or friend will provide (P)        Discharge Needs Assessment    Readmission Within the Last 30 Days no previous admission in last 30 days (P)     Equipment Currently Used at Home rollator (P)     Equipment Needed After Discharge none (P)                    Discharge Plan    No documentation.                 Continued Care and Services - Admitted Since 3/24/2024    No active coordination exists for this encounter.          Demographic Summary       Row Name 03/24/24 1215       General Information    Admission Type inpatient (P)     Arrived From home (P)     Referral Source emergency department (P)     Reason for Consult discharge planning (P)     Preferred Language English (P)                    Functional Status       Row Name 03/24/24 1217       Functional Status    Usual Activity Tolerance moderate (P)     Current Activity Tolerance moderate (P)        Physical Activity    On average, how many days per week do you  engage in moderate to strenuous exercise (like a brisk walk)? 0 days (P)     On average, how many minutes do you engage in exercise at this level? 0 min (P)     Number of minutes of exercise per week 0 (P)        Assessment of Health Literacy    How often do you have someone help you read hospital materials? Sometimes (P)     How often do you have problems learning about your medical condition because of difficulty understanding written information? Sometimes (P)     How often do you have a problem understanding what is told to you about your medical condition? Sometimes (P)     How confident are you filling out medical forms by yourself? Quite a bit (P)     Health Literacy Moderate (P)        Functional Status, IADL    Medications assistive equipment and person (P)     Meal Preparation assistive equipment and person (P)     Housekeeping assistive person (P)     Laundry assistive person (P)     Shopping assistive person (P)        Mental Status    General Appearance WDL WDL (P)        Mental Status Summary    Recent Changes in Mental Status/Cognitive Functioning no changes (P)       Row Name 03/24/24 1208       Functional Status    Usual Activity Tolerance moderate (P)     Current Activity Tolerance moderate (P)        Physical Activity    On average, how many days per week do you engage in moderate to strenuous exercise (like a brisk walk)? 0 days (P)     On average, how many minutes do you engage in exercise at this level? 0 min (P)     Number of minutes of exercise per week 0 (P)        Assessment of Health Literacy    How confident are you filling out medical forms by yourself? Quite a bit (P)        Functional Status, IADL    Housekeeping assistive person (P)     Laundry assistive person (P)     Shopping assistive person (P)        Mental Status    General Appearance WDL WDL (P)        Mental Status Summary    Recent Changes in Mental Status/Cognitive Functioning no changes (P)                    Psychosocial     No documentation.                  Abuse/Neglect       Row Name 03/24/24 1218       Personal Safety    Feels Unsafe at Home or Work/School no (P)     Feels Threatened by Someone no (P)     Does Anyone Try to Keep You From Having Contact with Others or Doing Things Outside Your Home? no (P)     Physical Signs of Abuse Present no (P)       Row Name 03/24/24 1210       Personal Safety    Feels Unsafe at Home or Work/School no (P)     Feels Threatened by Someone no (P)     Does Anyone Try to Keep You From Having Contact with Others or Doing Things Outside Your Home? no (P)     Physical Signs of Abuse Present no (P)                    Legal       Row Name 03/24/24 1218       Financial Resource Strain    How hard is it for you to pay for the very basics like food, housing, medical care, and heating? Not hard (P)       Row Name 03/24/24 1210       Financial Resource Strain    How hard is it for you to pay for the very basics like food, housing, medical care, and heating? Not hard (P)                    Substance Abuse    No documentation.                  Patient Forms    No documentation.                 SW student met with pt and spouse at bedside. Pt lives at home with her spouse. Pt denies transportation being an issue. Pt denies being interested in home health. Pt has a house keeper that comes once a month. Pt reports having multiple rollators in the home and one in the car. Pt denies needing any equipment upon discharge. Pt's PCP is Dr. Romario Valdez. Pt's preferred pharmacy is [facility]. Pt denies exercising. Pt goes to and from the car with a rollator. Pt feels safe at home. Spouse helps pt with grocery shopping. Pt has a living will/POA on file.    Carrie Cosme, Social Work Student

## 2024-03-24 NOTE — Clinical Note
Level of Care: Telemetry [5]   Diagnosis: NSTEMI (non-ST elevated myocardial infarction) [738203]   Admitting Physician: VIANEY BALBUENA [[id number]]   Attending Physician: VIANEY BALBUENA [[id number]]

## 2024-03-24 NOTE — ED PROVIDER NOTES
Time: 12:59 PM EDT  Date of encounter:  3/24/2024  Independent Historian/Clinical History and Information was obtained by:   Patient    History is limited by: N/A    Chief Complaint: Palpitations      History of Present Illness:  Patient is a 78 y.o. year old female who presents to the emergency department for evaluation of palpitations that started earlier today.  Patient denies chest pain.  Patient has no nausea or vomiting.  Patient denies cough or hemoptysis.  Patient has no fever or chills.  Patient denies leg pain and swelling.    HPI    Patient Care Team  Primary Care Provider: Romario Valdez DO    Past Medical History:     Allergies   Allergen Reactions    Penicillins Palpitations     1. When was your reaction? > 10 years ago  2. Did your reaction happen after the first dose or after several doses? After first dose  3. Did your reaction require ED or hospital care to manage your reaction? Do not know  4. Did your reaction require treatment with epinephrine? Do not know  5. Have you taken amoxicillin (Amoxil) or amoxicillin-clavulanate (Augmentin) without issue since? Yes  6. Have you taken cephalexin (Keflex) without issue since?  Do not know      Past Medical History:   Diagnosis Date    Acne rosacea 08/17/2021    DR.JEFF MOON     Arteriosclerosis of abdominal aorta     B12 deficiency 08/17/2021    DJD (degenerative joint disease)     Hx of smoking 08/17/2021    QUIT IN 1976 AT AGE 31    Hyperlipidemia 08/17/2021    Hypertension     Hypothyroidism     Metabolic syndrome 08/17/2021    KAREN (stress urinary incontinence, female) 08/17/2021    UTI (urinary tract infection) 08/17/2021    Vitamin D deficiency 08/17/2021     Past Surgical History:   Procedure Laterality Date    BACK SURGERY  2013    CARDIAC CATHETERIZATION N/A 07/17/2023    Procedure: Left Heart Cath;  Surgeon: Dinh Andres MD;  Location: [facility] CATH INVASIVE LOCATION;  Service: Cardiovascular;  Laterality: N/A;    CARPAL  TUNNEL RELEASE      COLONOSCOPY  2011    CORONARY ARTERY BYPASS GRAFT WITH MITRAL VALVE REPAIR/REPLACEMENT N/A 07/20/2023    Procedure: REZA STERNOTOMY OFF-PUMP CORONARY ARTERY BYPASS GRAFT TIMES        USING LEFFT INTERNAL MAMMARY ARTERY AND     GREATER SAPHENOUS VEIN GRAFT PER EMDOSCOPIC VEIN HARVESTING, MAZE PROCEDURE AND PRP;  Surgeon: Shane Alexander MD;  Location: [facility];  Service: Cardiothoracic;  Laterality: N/A;    KNEE SURGERY      LUMBAR DISCECTOMY      NECK SURGERY      Cervical    POSTERIOR LUMBAR/THORACIC SPINE FUSION       Family History   Problem Relation Age of Onset    Heart disease Mother     Arthritis Mother     Heart attack Mother     Arthritis Father        Home Medications:  Prior to Admission medications    Medication Sig Start Date End Date Taking? Authorizing Provider   atorvastatin (LIPITOR) 40 MG tablet Take 1 tablet by mouth Daily. 11/7/23  Yes Valerie Valera MD   buPROPion XL (WELLBUTRIN XL) 300 MG 24 hr tablet Take 1 tablet by mouth Daily. 12/13/23  Yes Romario Valdez DO   dapagliflozin Propanediol (Farxiga) 10 MG tablet TAKE 1 TABLET BY MOUTH EVERY DAY 12/4/23  Yes Valerie Valera MD   Eliquis 5 MG tablet tablet TAKE 1 TABLET BY MOUTH EVERY 12 HOURS 3/4/24  Yes Valerie Valera MD   folic acid (FOLVITE) 1 MG tablet TAKE 1 TABLET BY MOUTH DAILY 3/4/24  Yes Boston Can MD   furosemide (LASIX) 40 MG tablet Take 1 tablet by mouth Daily. Omit on Sundays 3/21/24  Yes Sara Lubin APRN   metFORMIN ER (GLUCOPHAGE-XR) 500 MG 24 hr tablet Take 1 tablet by mouth 2 (Two) Times a Day.  Patient taking differently: Take 1 tablet by mouth 2 (Two) Times a Day. 1 tab in the am, 2 tabs in the pm 3/4/24  Yes Romario Valdez DO   metoprolol succinate XL (TOPROL-XL) 50 MG 24 hr tablet Take 1 tablet by mouth Every 12 (Twelve) Hours. 3/21/24  Yes Sara Lubin APRN   montelukast (SINGULAIR) 10 MG tablet Take 1 tablet by mouth Daily. 7/5/23  Yes Rochelle Brown APRN  "  Probiotic Product (FlowMetric PO) Take 1 capsule by mouth Daily.   Yes Provider, MD Kanwal   sacubitril-valsartan (Entresto) 24-26 MG tablet Take 1 tablet by mouth 2 (Two) Times a Day. 24  Yes Valerie Valera MD   spironolactone (ALDACTONE) 25 MG tablet Take 1 tablet by mouth Daily. 10/10/23  Yes Valerie Valera MD   Synthroid 75 MCG tablet Take 1 tablet by mouth Daily. 10/31/23  Yes Romario Valdez,    acetaminophen (TYLENOL) 325 MG tablet Take 2 tablets by mouth Every 4 (Four) Hours As Needed for Mild Pain. 23   Anjelica Chase APRN   albuterol sulfate  (90 Base) MCG/ACT inhaler Inhale 2 puffs Every 4 (Four) Hours As Needed for Wheezing. 23   Les Moreno,    aspirin 81 MG EC tablet Take 1 tablet by mouth Every Other Day.    Provider, MD Kanwal   cyanocobalamin 1000 MCG/ML injection Inject 1 mL into the appropriate muscle as directed by prescriber Every 28 (Twenty-Eight) Days. 2/15/24   Berry Heredia MD   predniSONE (DELTASONE) 5 MG tablet TAKE 5 MG EVERY DAY  Patient taking differently: Take 0.5 tablets by mouth Every Other Day. TAKE 5 MG EVERY DAY 23   Boston Can MD   Needle, Disp, 25G X 1-1/2\" misc Use 1 each Every 30 (Thirty) Days for 3 doses. 2/15/24 3/24/24  Berry Heredia MD   Syringe 25G X 1\" 3 ML misc Use 1 each Every 30 (Thirty) Days. 2/15/24 3/24/24  Berry Heredia MD        Social History:   Social History     Tobacco Use    Smoking status: Former     Current packs/day: 0.00     Average packs/day: 1 pack/day for 12.0 years (12.0 ttl pk-yrs)     Types: Cigarettes     Start date:      Quit date:      Years since quittin.2    Smokeless tobacco: Never   Vaping Use    Vaping status: Never Used   Substance Use Topics    Alcohol use: Never    Drug use: Never         Review of Systems:  Review of Systems   Constitutional:  Negative for chills and fever.   HENT:  Negative for congestion, rhinorrhea and sore " "throat.    Eyes:  Negative for pain and visual disturbance.   Respiratory:  Negative for apnea, cough, chest tightness and shortness of breath.    Cardiovascular:  Positive for palpitations. Negative for chest pain.   Gastrointestinal:  Negative for abdominal pain, diarrhea, nausea and vomiting.   Genitourinary:  Negative for difficulty urinating and dysuria.   Musculoskeletal:  Negative for joint swelling and myalgias.   Skin:  Negative for color change.   Neurological:  Negative for seizures and headaches.   Psychiatric/Behavioral: Negative.     All other systems reviewed and are negative.       Physical Exam:  /88   Pulse 65   Temp 97.6 °F (36.4 °C) (Oral)   Resp 17   Ht 162.6 cm (64\")   Wt 80.6 kg (177 lb 11.1 oz)   LMP  (LMP Unknown)   SpO2 97%   BMI 30.50 kg/m²     Physical Exam  Vitals and nursing note reviewed.   Constitutional:       General: She is not in acute distress.     Appearance: Normal appearance. She is not toxic-appearing.   HENT:      Head: Normocephalic and atraumatic.      Jaw: There is normal jaw occlusion.   Eyes:      General: Lids are normal.      Extraocular Movements: Extraocular movements intact.      Conjunctiva/sclera: Conjunctivae normal.      Pupils: Pupils are equal, round, and reactive to light.   Cardiovascular:      Rate and Rhythm: Regular rhythm. Tachycardia present.      Pulses: Normal pulses.      Heart sounds: Normal heart sounds.   Pulmonary:      Effort: Pulmonary effort is normal. No respiratory distress.      Breath sounds: Normal breath sounds. No wheezing or rhonchi.   Abdominal:      General: Abdomen is flat.      Palpations: Abdomen is soft.      Tenderness: There is no abdominal tenderness. There is no guarding or rebound.   Musculoskeletal:         General: Normal range of motion.      Cervical back: Normal range of motion and neck supple.      Right lower leg: No edema.      Left lower leg: No edema.   Skin:     General: Skin is warm and dry. "   Neurological:      Mental Status: She is alert and oriented to person, place, and time. Mental status is at baseline.   Psychiatric:         Mood and Affect: Mood normal.                  Procedures:  Procedures      Medical Decision Making:      Comorbidities that affect care:    Atrial fibrillation    External Notes reviewed:    Previous Clinic Note: Patient was last seen in clinic for congestive heart failure and atrial fibrillation.      The following orders were placed and all results were independently analyzed by me:  Orders Placed This Encounter   Procedures    XR Chest 1 View    [place] Draw    Comprehensive Metabolic Panel    Magnesium    Single High Sensitivity Troponin T    TSH    CBC Auto Differential    Single High Sensitivity Troponin T    High Sensitivity Troponin T 2Hr    NPO Diet NPO Type: Strict NPO    Undress & Gown    Continuous Pulse Oximetry    Code Status and Medical Interventions:    Inpatient Hospitalist Consult    Inpatient Cardiology Consult    Oxygen Therapy- Nasal Cannula; Titrate 1-6 LPM Per SpO2; 90 - 95%    ECG 12 Lead ED Triage Standing Order; Dysrhythmia    Insert Peripheral IV    CBC & Differential    Green Top (Gel)    Lavender Top    Gold Top - SST    Light Blue Top       Medications Given in the Emergency Department:  Medications   sodium chloride 0.9 % flush 10 mL (has no administration in time range)   dilTIAZem (CARDIZEM) injection 20 mg (20 mg Intravenous Given 3/24/24 1031)   aspirin chewable tablet 324 mg (324 mg Oral Given 3/24/24 1112)        ED Course:         Labs:    Lab Results (last 24 hours)       Procedure Component Value Units Date/Time    CBC & Differential [[id number]]  (Abnormal) Collected: 03/24/24 1007    Specimen: Blood Updated: 03/24/24 1021    Narrative:      The following orders were created for panel order CBC & Differential.  Procedure                               Abnormality         Status                     ---------                                -----------         ------                     CBC Auto Differential[[id number]]        Abnormal            Final result                 Please view results for these tests on the individual orders.    Comprehensive Metabolic Panel [[id number]]  (Abnormal) Collected: 03/24/24 1007    Specimen: Blood Updated: 03/24/24 1041     Glucose 143 mg/dL      BUN 46 mg/dL      Creatinine 1.43 mg/dL      Sodium 141 mmol/L      Potassium 4.2 mmol/L      Chloride 102 mmol/L      CO2 25.3 mmol/L      Calcium 8.7 mg/dL      Total Protein 6.8 g/dL      Albumin 4.2 g/dL      ALT (SGPT) 17 U/L      AST (SGOT) 24 U/L      Alkaline Phosphatase 49 U/L      Total Bilirubin 0.8 mg/dL      Globulin 2.6 gm/dL      A/G Ratio 1.6 g/dL      BUN/Creatinine Ratio 32.2     Anion Gap 13.7 mmol/L      eGFR 37.4 mL/min/1.73     Narrative:      GFR Normal >60  Chronic Kidney Disease <60  Kidney Failure <15    The GFR formula is only valid for adults with stable renal function between ages 18 and 70.    Magnesium [[id number]]  (Normal) Collected: 03/24/24 1007    Specimen: Blood Updated: 03/24/24 1041     Magnesium 1.7 mg/dL     Single High Sensitivity Troponin T [[id number]]  (Abnormal) Collected: 03/24/24 1007    Specimen: Blood Updated: 03/24/24 1103     HS Troponin T 330 ng/L     Narrative:      High Sensitive Troponin T Reference Range:  <14.0 ng/L- Negative Female for AMI  <22.0 ng/L- Negative Male for AMI  >=14 - Abnormal Female indicating possible myocardial injury.  >=22 - Abnormal Male indicating possible myocardial injury.   Clinicians would have to utilize clinical acumen, EKG, Troponin, and serial changes to determine if it is an Acute Myocardial Infarction or myocardial injury due to an underlying chronic condition.         TSH [[id number]]  (Normal) Collected: 03/24/24 1007    Specimen: Blood Updated: 03/24/24 1046     TSH 1.650 uIU/mL     CBC Auto Differential [[id number]]  (Abnormal) Collected: 03/24/24 1007    Specimen: Blood Updated:  03/24/24 1021     WBC 11.68 10*3/mm3      RBC 3.37 10*6/mm3      Hemoglobin 10.6 g/dL      Hematocrit 34.5 %      .4 fL      MCH 31.5 pg      MCHC 30.7 g/dL      RDW 18.9 %      RDW-SD 71.9 fl      MPV 11.3 fL      Platelets 253 10*3/mm3      Neutrophil % 67.0 %      Lymphocyte % 22.8 %      Monocyte % 9.1 %      Eosinophil % 0.3 %      Basophil % 0.5 %      Immature Grans % 0.3 %      Neutrophils, Absolute 7.82 10*3/mm3      Lymphocytes, Absolute 2.66 10*3/mm3      Monocytes, Absolute 1.06 10*3/mm3      Eosinophils, Absolute 0.04 10*3/mm3      Basophils, Absolute 0.06 10*3/mm3      Immature Grans, Absolute 0.04 10*3/mm3      nRBC 0.2 /100 WBC     Single High Sensitivity Troponin T [[id number]]  (Abnormal) Collected: 03/24/24 1211    Specimen: Blood from Hand, Right Updated: 03/24/24 1252     HS Troponin T 316 ng/L     Narrative:      High Sensitive Troponin T Reference Range:  <14.0 ng/L- Negative Female for AMI  <22.0 ng/L- Negative Male for AMI  >=14 - Abnormal Female indicating possible myocardial injury.  >=22 - Abnormal Male indicating possible myocardial injury.   Clinicians would have to utilize clinical acumen, EKG, Troponin, and serial changes to determine if it is an Acute Myocardial Infarction or myocardial injury due to an underlying chronic condition.                  Imaging:    XR Chest 1 View    Result Date: 3/24/2024  AP PORTABLE CHEST  HISTORY: Irregular heartbeat.  COMPARISON: 8/5/2023  TECHNIQUE: AP portable chest x-ray.  FINDINGS: Heart is enlarged. Patient is status post sternotomy and left atrial appendage clipping. Patient is also status post cervical spinal fusion. Pulmonary vascularity is normal. The lungs are clear. No pneumothorax.      No acute cardiopulmonary findings.  Electronically Signed By-Dr. Bib Minor MD On:3/24/2024 10:50 AM         Differential Diagnosis and Discussion:    Palpitations: Differential diagnosis includes but is not limited to anxiety, atrioventricular  blocks, mitral valve disease, hypoxia, coronary artery disease, hypokalemia, anemia, fever, COPD, congestive heart failure, pericarditis, Brett-Parkinson-White syndrome, pulmonary embolism, SVT, atrial fibrillation, atrial flutter, sinus tachycardia, thyrotoxicosis, and pheochromocytoma.    All labs were reviewed and interpreted by me.  All X-rays impressions were independently interpreted by me.  EKG was interpreted by me.    MDM     Amount and/or Complexity of Data Reviewed  Decide to obtain previous medical records or to obtain history from someone other than the patient: yes       The patient´s CBC that was reviewed and interpreted by me shows no abnormalities of critical concern. Of note, there is no anemia requiring a blood transfusion and the platelet count is acceptable.  The patient´s CMP that was reviewed and interpretted by me shows no abnormalities of critical concern. Of note, the patient´s sodium and potassium are acceptable. The patient´s liver enzymes are unremarkable. The patient´s renal function (creatinine) is preserved. The patient has a normal anion gap.  Troponin is 330 and repeat has decreased.  Patient was given Cardizem IV in the emergency department.    Patient was placed on the cardiac monitor after given Cardizem.  They were monitored for ventricular ectopy, arrhythmia, tachycardia, hypoxia, and changes in blood pressure.  Patient was rechecked several times throughout their stay.          Patient Care Considerations:    PERC: I used the PERC score to risk stratify the patient for PE and a CT of the chest was considered but ultimately not indicated in today's visit.      Consultants/Shared Management Plan:    Case was discussed with Dr. Oliveira who states that the patient can be discharged as she would like to go home with close follow-up.    Social Determinants of Health:    Patient is independent, reliable, and has access to care.       Disposition and Care Coordination:    Discharged:  I considered escalation of care by admitting this patient to the hospital, however repeat troponin is decreased and the patient would like to be discharged.    I have explained the patient´s condition, diagnoses and treatment plan based on the information available to me at this time. I have answered questions and addressed any concerns. The patient has a good  understanding of the patient´s diagnosis, condition, and treatment plan as can be expected at this point. The vital signs have been stable. The patient´s condition is stable and appropriate for discharge from the emergency department.      The patient will pursue further outpatient evaluation with the primary care physician or other designated or consulting physician as outlined in the discharge instructions. They are agreeable to this plan of care and follow-up instructions have been explained in detail. The patient has received these instructions in written format and has expressed an understanding of the discharge instructions. The patient is aware that any significant change in condition or worsening of symptoms should prompt an immediate return to this or the closest emergency department or call to 911.  I have explained discharge medications and the need for follow up with the patient/caretakers. This was also printed in the discharge instructions. Patient was discharged with the following medications and follow up:      Medication List        Changed      metFORMIN  MG 24 hr tablet  Commonly known as: GLUCOPHAGE-XR  Take 1 tablet by mouth 2 (Two) Times a Day.  What changed: additional instructions     predniSONE 5 MG tablet  Commonly known as: DELTASONE  TAKE 5 MG EVERY DAY  What changed:   how much to take  how to take this  when to take this           Roderick Brand MD  [address] [place] [address]  [phone]             Final diagnoses:   Atrial fibrillation, unspecified type        ED Disposition       ED Disposition    Discharge    Condition   Stable    Comment   Level of Care: Telemetry [5]  Diagnosis: NSTEMI (non-ST elevated myocardial infarction) [103008]  Admitting Physician: VIANEY BALBUENA [[id number]]  Attending Physician: VIANEY BALBUENA [[id number]]                 This medical record created using voice recognition software.             Bridgett Blancas MD  03/24/24 [id number]

## 2024-03-24 NOTE — CONSULTS
[facility]  Date: 3/24/2024   Patient Name: Ann-Marie Hughes  : 1945  MRN: [id number]  Primary Care Physician:  Romario Valdez DO  Date of admission: 3/24/2024    Subjective   Subjective     Chief Complaint: Palpitations    HPI:    Ann-Marie Hughes is a 78 y.o. female with a past medical history significant for coronary artery disease status post three-vessel CABG 2023 who presents to the hospital with a 1 day history of palpitations, upon arrival to the hospital patient was found to be in atrial fibrillation with rapid ventricular response.  Patient has a baseline troponin of around 100, patient's troponin today was noted to be 300, patient's EKG was consistent with atrial fibrillation with rapid ventricular response, no ischemia was noted.  Patient denied any shortness of breath, patient was given 20 mg of IV diltiazem with improvement of her heart rate, patient was asymptomatic at time of my evaluation and requesting to go home.      Personal History     Past Medical History:  Past Medical History:   Diagnosis Date    Acne rosacea 2021    Arteriosclerosis of abdominal aorta     B12 deficiency 2021    DJD (degenerative joint disease)     Hx of smoking 2021    Hyperlipidemia 2021    Hypertension     Hypothyroidism     Metabolic syndrome 2021    KAREN (stress urinary incontinence, female) 2021    UTI (urinary tract infection) 2021    Vitamin D deficiency 2021       Past Surgical History:  Past Surgical History:   Procedure Laterality Date    BACK SURGERY      CARDIAC CATHETERIZATION N/A 2023    Procedure: Left Heart Cath;  Surgeon: Dinh Andres MD;  Location: [facility] CATH INVASIVE LOCATION;  Service: Cardiovascular;  Laterality: N/A;    CARPAL TUNNEL RELEASE      COLONOSCOPY      CORONARY ARTERY BYPASS GRAFT WITH MITRAL VALVE REPAIR/REPLACEMENT N/A 2023    Procedure: REZA  STERNOTOMY OFF-PUMP CORONARY ARTERY BYPASS GRAFT TIMES        USING LEFFT INTERNAL MAMMARY ARTERY AND     GREATER SAPHENOUS VEIN GRAFT PER EMDOSCOPIC VEIN HARVESTING, MAZE PROCEDURE AND PRP;  Surgeon: Shane Alexander MD;  Location: [facility];  Service: Cardiothoracic;  Laterality: N/A;    KNEE SURGERY      LUMBAR DISCECTOMY      NECK SURGERY      Cervical    POSTERIOR LUMBAR/THORACIC SPINE FUSION         Family History:   family history includes Arthritis in her father and mother; Heart attack in her mother; Heart disease in her mother.    Social History:    reports that she quit smoking about 48 years ago. Her smoking use included cigarettes. She started smoking about 60 years ago. She has a 12 pack-year smoking history. She has never used smokeless tobacco. She reports that she does not drink alcohol and does not use drugs.    Home Medications:  Probiotic Product, acetaminophen, albuterol sulfate HFA, apixaban, aspirin, atorvastatin, buPROPion XL, cyanocobalamin, dapagliflozin Propanediol, folic acid, furosemide, levothyroxine, metFORMIN ER, metoprolol succinate XL, montelukast, predniSONE, sacubitril-valsartan, and spironolactone    Allergies:  Allergies   Allergen Reactions    Penicillins Palpitations     1. When was your reaction? > 10 years ago  2. Did your reaction happen after the first dose or after several doses? After first dose  3. Did your reaction require ED or hospital care to manage your reaction? Do not know  4. Did your reaction require treatment with epinephrine? Do not know  5. Have you taken amoxicillin (Amoxil) or amoxicillin-clavulanate (Augmentin) without issue since? Yes  6. Have you taken cephalexin (Keflex) without issue since?  Do not know        Review of Systems:  All systems reviewed and negative other than stated in HPI    Objective   Objective     Vitals:   Temp:  [97.6 °F (36.4 °C)] 97.6 °F (36.4 °C)  Heart Rate:  [] 65  Resp:  [17] 17  BP: (125-155)/()  136/88    Physical Exam   GEN: No acute distress  HEENT: Moist mucous membranes  LUNGS: Equal chest rise bilaterally  CARDIAC: Regular rate and rhythm  NEURO: Moving all 4 extremities spontaneously  SKIN: No obvious breakdown    Result Review:  I have personally reviewed the results from the time of this admission to 3/24/2024 11:48 EDT and agree with these findings:  [x]  Laboratory  CMP          3/4/2024    10:46 3/20/2024    09:32 3/24/2024    10:07   CMP   Glucose 132  124  143    BUN 27  40  46    Creatinine 1.25  1.34  1.43    EGFR 43.9  40.4  37.4    Sodium 141  143  141    Potassium 4.9  4.0  4.2    Chloride 105  100  102    Calcium 9.4  9.8  8.7    Total Protein 6.9  6.9  6.8    Albumin 4.1  4.3  4.2    Globulin 2.8  2.6  2.6    Total Bilirubin 1.0  0.8  0.8    Alkaline Phosphatase 51  55  49    AST (SGOT) 22  26  24    ALT (SGPT) 21  17  17    Albumin/Globulin Ratio 1.5  1.7  1.6    BUN/Creatinine Ratio 21.6  29.9  32.2    Anion Gap 9.1  14.1  13.7      CBC          3/14/2024    09:27 3/20/2024    09:32 3/24/2024    10:07   CBC   WBC 11.96  12.92  11.68    RBC 3.07  3.46  3.37    Hemoglobin 9.6  11.1  10.6    Hematocrit 29.6  33.6  34.5    MCV 96.4  97.1  102.4    MCH 31.3  32.1  31.5    MCHC 32.4  33.0  30.7    RDW 16.6  17.1  18.9    Platelets 252  270  253      []  Microbiology  []  Radiology  []  EKG/Telemetry   []  Cardiology/Vascular   []  Pathology  []  Old records  []  Other:      Assessment & Plan   Assessment / Plan     Assessment:   Atrial fibrillation with rapid ventricular response  Coronary artery disease status post three-vessel CABG  Heart failure with reduced ejection fraction  CKD  Hypertension  Hyperlipidemia  NSTEMI likely demand    Plan:  Discussed management plan with cardiology, patient may be able to discharge home now that her rate is controlled  Patient scheduled for outpatient echocardiogram already, recommend keeping this appointment  Would have patient see her primary  cardiologist Dr. Valera in the heart failure clinic in the next 72 hours  Recommend continuing  Farxiga, Lasix, metoprolol  Continue Entresto at current dose  Continue Aldactone  Continue aspirin and statin  Elevated troponin likely secondary to elevated heart rate, patient has no chest pain, is at her baseline respiratory status      Reviewed patients labs and imaging, and discussed with patient and nurse at bedside, discussed with on-call cardiologist Dr. Solorio and he is in agreement    CODE STATUS:    Code Status (Patient has no pulse and is not breathing): CPR (Attempt to Resuscitate)  Medical Interventions (Patient has pulse or is breathing): Full Support        Electronically signed by Sergo Schulte MD, 03/24/24, 11:48 AM EDT.

## 2024-03-26 ENCOUNTER — OFFICE VISIT (OUTPATIENT)
Dept: CARDIOLOGY | Facility: CLINIC | Age: 79
End: 2024-03-26
Payer: MEDICARE

## 2024-03-26 VITALS
SYSTOLIC BLOOD PRESSURE: 134 MMHG | HEART RATE: 98 BPM | BODY MASS INDEX: 29.88 KG/M2 | HEIGHT: 64 IN | WEIGHT: 175 LBS | DIASTOLIC BLOOD PRESSURE: 87 MMHG

## 2024-03-26 DIAGNOSIS — I48.0 PAF (PAROXYSMAL ATRIAL FIBRILLATION): Primary | ICD-10-CM

## 2024-03-26 PROBLEM — J96.01 ACUTE RESPIRATORY FAILURE WITH HYPOXIA: Status: RESOLVED | Noted: 2023-07-31 | Resolved: 2024-03-26

## 2024-03-26 PROBLEM — I31.39 PERICARDIAL EFFUSION: Status: RESOLVED | Noted: 2023-10-10 | Resolved: 2024-03-26

## 2024-03-26 PROBLEM — I25.118 CORONARY ARTERY DISEASE OF NATIVE ARTERY OF NATIVE HEART WITH STABLE ANGINA PECTORIS: Status: RESOLVED | Noted: 2023-07-12 | Resolved: 2024-03-26

## 2024-03-26 PROBLEM — M79.609 PAIN IN SOFT TISSUES OF LIMB: Status: RESOLVED | Noted: 2021-07-28 | Resolved: 2024-03-26

## 2024-03-26 PROBLEM — I21.4 NSTEMI (NON-ST ELEVATED MYOCARDIAL INFARCTION): Status: RESOLVED | Noted: 2023-07-17 | Resolved: 2024-03-26

## 2024-03-26 PROBLEM — R93.1 ABNORMAL FINDINGS ON DIAGNOSTIC IMAGING OF HEART AND CORONARY CIRCULATION: Status: RESOLVED | Noted: 2023-07-17 | Resolved: 2024-03-26

## 2024-03-26 PROBLEM — E88.810 METABOLIC SYNDROME: Status: RESOLVED | Noted: 2021-08-17 | Resolved: 2024-03-26

## 2024-03-26 PROBLEM — I25.10 CORONARY HEART DISEASE: Status: RESOLVED | Noted: 2023-07-18 | Resolved: 2024-03-26

## 2024-03-26 PROBLEM — I48.20 CHRONIC ATRIAL FIBRILLATION: Status: RESOLVED | Noted: 2024-02-19 | Resolved: 2024-03-26

## 2024-03-26 PROBLEM — N39.0 UTI (URINARY TRACT INFECTION): Status: RESOLVED | Noted: 2021-08-17 | Resolved: 2024-03-26

## 2024-03-26 PROCEDURE — 1160F RVW MEDS BY RX/DR IN RCRD: CPT | Performed by: NURSE PRACTITIONER

## 2024-03-26 PROCEDURE — 1159F MED LIST DOCD IN RCRD: CPT | Performed by: NURSE PRACTITIONER

## 2024-03-26 PROCEDURE — 3079F DIAST BP 80-89 MM HG: CPT | Performed by: NURSE PRACTITIONER

## 2024-03-26 PROCEDURE — 3075F SYST BP GE 130 - 139MM HG: CPT | Performed by: NURSE PRACTITIONER

## 2024-03-26 PROCEDURE — 99213 OFFICE O/P EST LOW 20 MIN: CPT | Performed by: NURSE PRACTITIONER

## 2024-03-26 RX ORDER — METOPROLOL SUCCINATE 50 MG/1
75 TABLET, EXTENDED RELEASE ORAL EVERY 12 HOURS SCHEDULED
Qty: 135 TABLET | Refills: 2 | Status: SHIPPED | OUTPATIENT
Start: 2024-03-26

## 2024-03-26 NOTE — PROGRESS NOTES
"Chief Complaint  Hospital Follow Up Visit    Subjective            History of Present Illness  Ann-Marie Hughes is a 78-year-old female patient who presents to the office today for ER follow-up.  She presented to [facility] emergency room on 3/24/2024 for complaint of palpitations.  She was found to be in atrial fibrillation with RVR and was administered IV Cardizem.  Her troponins were elevated but trended downward and since she was not having any chest pain she was sent home with instructions to follow-up outpatient.  Today she reports continued palpitations with the atrial fibrillation and associated shortness of breath. She states it isn't \"bothering\" her but she is noticing it more often now.  She is currently scheduled for echocardiogram to be performed tomorrow.    [facility]  Past Medical History:   Diagnosis Date    Acne rosacea 08/17/2021    DR.JEFF MOON     Acute respiratory failure with hypoxia 07/31/2023    Arteriosclerosis of abdominal aorta     B12 deficiency 08/17/2021    DJD (degenerative joint disease)     Hx of smoking 08/17/2021    QUIT IN 1976 AT AGE 31    Hyperlipidemia 08/17/2021    Hypertension     Hypothyroidism     Metabolic syndrome 08/17/2021    NSTEMI (non-ST elevated myocardial infarction) 07/17/2023    Pericardial effusion 10/10/2023    KAREN (stress urinary incontinence, female) 08/17/2021    UTI (urinary tract infection) 08/17/2021    Vitamin D deficiency 08/17/2021         ALLERGY  Allergies   Allergen Reactions    Penicillins Palpitations     1. When was your reaction? > 10 years ago  2. Did your reaction happen after the first dose or after several doses? After first dose  3. Did your reaction require ED or hospital care to manage your reaction? Do not know  4. Did your reaction require treatment with epinephrine? Do not know  5. Have you taken amoxicillin (Amoxil) or amoxicillin-clavulanate (Augmentin) without issue since? Yes  6. Have you taken cephalexin (Keflex) " without issue since?  Do not know           SURGICALHX  Past Surgical History:   Procedure Laterality Date    BACK SURGERY      CARDIAC CATHETERIZATION N/A 2023    Procedure: Left Heart Cath;  Surgeon: Dinh Andres MD;  Location:  [facility] CATH INVASIVE LOCATION;  Service: Cardiovascular;  Laterality: N/A;    CARPAL TUNNEL RELEASE      COLONOSCOPY      CORONARY ARTERY BYPASS GRAFT WITH MITRAL VALVE REPAIR/REPLACEMENT N/A 2023    Procedure: REZA STERNOTOMY OFF-PUMP CORONARY ARTERY BYPASS GRAFT TIMES        USING LEFFT INTERNAL MAMMARY ARTERY AND     GREATER SAPHENOUS VEIN GRAFT PER EMDOSCOPIC VEIN HARVESTING, MAZE PROCEDURE AND PRP;  Surgeon: Shane Alexander MD;  Location: [facility];  Service: Cardiothoracic;  Laterality: N/A;    KNEE SURGERY      LUMBAR DISCECTOMY      NECK SURGERY      Cervical    POSTERIOR LUMBAR/THORACIC SPINE FUSION            SOC  Social History     Socioeconomic History    Marital status:      Spouse name: Dinh   Tobacco Use    Smoking status: Former     Current packs/day: 0.00     Average packs/day: 1 pack/day for 12.0 years (12.0 ttl pk-yrs)     Types: Cigarettes     Start date:      Quit date:      Years since quittin.2    Smokeless tobacco: Never   Vaping Use    Vaping status: Never Used   Substance and Sexual Activity    Alcohol use: Never    Drug use: Never    Sexual activity: Defer         FAMHX  Family History   Problem Relation Age of Onset    Heart disease Mother     Arthritis Mother     Heart attack Mother     Arthritis Father           MEDSIGONLY  Current Outpatient Medications on File Prior to Visit   Medication Sig    acetaminophen (TYLENOL) 325 MG tablet Take 2 tablets by mouth Every 4 (Four) Hours As Needed for Mild Pain.    albuterol sulfate  (90 Base) MCG/ACT inhaler Inhale 2 puffs Every 4 (Four) Hours As Needed for Wheezing.    aspirin 81 MG EC tablet Take 1 tablet by mouth Every Other Day.    atorvastatin  "(LIPITOR) 40 MG tablet Take 1 tablet by mouth Daily.    buPROPion XL (WELLBUTRIN XL) 300 MG 24 hr tablet Take 1 tablet by mouth Daily.    cyanocobalamin 1000 MCG/ML injection Inject 1 mL into the appropriate muscle as directed by prescriber Every 28 (Twenty-Eight) Days.    dapagliflozin Propanediol (Farxiga) 10 MG tablet TAKE 1 TABLET BY MOUTH EVERY DAY    Eliquis 5 MG tablet tablet TAKE 1 TABLET BY MOUTH EVERY 12 HOURS    folic acid (FOLVITE) 1 MG tablet TAKE 1 TABLET BY MOUTH DAILY    furosemide (LASIX) 40 MG tablet Take 1 tablet by mouth Daily. Omit on Sundays    metFORMIN ER (GLUCOPHAGE-XR) 500 MG 24 hr tablet Take 1 tablet by mouth 2 (Two) Times a Day. (Patient taking differently: Take 1 tablet by mouth 2 (Two) Times a Day. 1 tab in the am, 2 tabs in the pm)    montelukast (SINGULAIR) 10 MG tablet Take 1 tablet by mouth Daily.    predniSONE (DELTASONE) 5 MG tablet TAKE 5 MG EVERY DAY (Patient taking differently: Take 0.5 tablets by mouth Every Other Day. TAKE 5 MG EVERY DAY)    Probiotic Product ([employer] PO) Take 1 capsule by mouth Daily.    sacubitril-valsartan (Entresto) 24-26 MG tablet Take 1 tablet by mouth 2 (Two) Times a Day.    spironolactone (ALDACTONE) 25 MG tablet Take 1 tablet by mouth Daily.    Synthroid 75 MCG tablet Take 1 tablet by mouth Daily.    [DISCONTINUED] metoprolol succinate XL (TOPROL-XL) 50 MG 24 hr tablet Take 1 tablet by mouth Every 12 (Twelve) Hours.     No current facility-administered medications on file prior to visit.         Objective   /87   Pulse 98   Ht 162.6 cm (64\")   Wt 79.4 kg (175 lb)   BMI 30.04 kg/m²       Physical Exam  HENT:      Head: Normocephalic.   Neck:      Vascular: No carotid bruit.   Cardiovascular:      Rate and Rhythm: Normal rate and regular rhythm.      Pulses: Normal pulses.      Heart sounds: Normal heart sounds. No murmur heard.  Pulmonary:      Effort: Pulmonary effort is normal.      Breath sounds: Normal breath sounds. " "  Musculoskeletal:      Cervical back: Neck supple.      Right lower leg: No edema.      Left lower leg: No edema.   Skin:     General: Skin is dry.   Neurological:      Mental Status: She is alert and oriented to person, place, and time.   Psychiatric:         Behavior: Behavior normal.       Result Review :   The following data was reviewed by: LONA Heath on 03/26/2024:  proBNP   Date Value Ref Range Status   03/20/2024 13,412.0 (H) 0.0 - 1,800.0 pg/mL Final     CMP          3/24/2024    10:07   CMP   Glucose 143    BUN 46    Creatinine 1.43    EGFR 37.4    Sodium 141    Potassium 4.2    Chloride 102    Calcium 8.7    Total Protein 6.8    Albumin 4.2    Globulin 2.6    Total Bilirubin 0.8    Alkaline Phosphatase 49    AST (SGOT) 24    ALT (SGPT) 17    Albumin/Globulin Ratio 1.6    BUN/Creatinine Ratio 32.2    Anion Gap 13.7      CBC w/diff          3/24/2024    10:07   CBC w/Diff   WBC 11.68    RBC 3.37    Hemoglobin 10.6    Hematocrit 34.5    .4    MCH 31.5    MCHC 30.7    RDW 18.9    Platelets 253    Neutrophil Rel % 67.0    Immature Granulocyte Rel % 0.3    Lymphocyte Rel % 22.8    Monocyte Rel % 9.1    Eosinophil Rel % 0.3    Basophil Rel % 0.5       Lab Results   Component Value Date    TSH 1.650 03/24/2024      Lab Results   Component Value Date    FREET4 1.74 (H) 12/29/2023      No results found for: \"DDIMERQUANT\"  Magnesium   Date Value Ref Range Status   03/24/2024 1.7 1.6 - 2.4 mg/dL Final      No results found for: \"DIGOXIN\"   Lab Results   Component Value Date    TROPONINT 316 (C) 03/24/2024           Lipid Panel          12/27/2023    08:48   Lipid Panel   Total Cholesterol 120    Triglycerides 81    HDL Cholesterol 61    VLDL Cholesterol 16    LDL Cholesterol  43    LDL/HDL Ratio 0.70        Results for orders placed in visit on 10/11/23    Adult Transthoracic Echo Limited W/ Cont if Necessary Per Protocol    Interpretation Summary    Left ventricular systolic function is low " normal. Left ventricular ejection fraction appears to be 51 - 55%.    Left ventricular wall thickness is consistent with mild concentric hypertrophy.    Left ventricular diastolic function is consistent with (grade I) impaired relaxation.    There is bileaflet mitral valve thickening present.  Mild mitral regurgitation is noted    There is a trivial pericardial effusion.           Assessment and Plan    Diagnoses and all orders for this visit:    1. PAF (paroxysmal atrial fibrillation) (Primary)  Her resting heart rate today is 98 which is on the higher end of normal range.  Recommend increasing metoprolol dose from 50 mg twice daily to 75 mg twice daily.  She will wear a 48-hour Holter monitor to assess heart rate trend.  Continue Eliquis for CVA prevention.  -     Holter Monitor - 48 Hour; Future    Other orders  -     metoprolol succinate XL (TOPROL-XL) 50 MG 24 hr tablet; Take 1.5 tablets by mouth Every 12 (Twelve) Hours.  Dispense: 135 tablet; Refill: 2            Follow Up   Return in about 6 months (around 9/26/2024) for Follow up with Dr Brand.    Patient was given instructions and counseling regarding her condition or for health maintenance advice. Please see specific information pulled into the AVS if appropriate.     Ann-Marie Hughes  reports that she quit smoking about 48 years ago. Her smoking use included cigarettes. She started smoking about 60 years ago. She has a 12 pack-year smoking history. She has never used smokeless tobacco.            LONA Heath  03/26/24  12:58 EDT    Dictated Utilizing Dragon Dictation

## 2024-03-27 ENCOUNTER — HOSPITAL ENCOUNTER (OUTPATIENT)
Dept: CARDIOLOGY | Facility: HOSPITAL | Age: 79
Discharge: HOME | End: 2024-03-27
Admitting: INTERNAL MEDICINE
Payer: MEDICARE

## 2024-03-27 DIAGNOSIS — N18.32 STAGE 3B CHRONIC KIDNEY DISEASE: ICD-10-CM

## 2024-03-27 DIAGNOSIS — I50.22 CHRONIC HFREF (HEART FAILURE WITH REDUCED EJECTION FRACTION): ICD-10-CM

## 2024-03-27 PROCEDURE — 93306 TTE W/DOPPLER COMPLETE: CPT

## 2024-03-29 LAB
BH CV ECHO MEAS - AO MAX PG: 17.3 MMHG
BH CV ECHO MEAS - AO MEAN PG: 9 MMHG
BH CV ECHO MEAS - AO V2 MAX: 207.7 CM/SEC
BH CV ECHO MEAS - AO V2 VTI: 40.1 CM
BH CV ECHO MEAS - AVA(I,D): 1.01 CM2
BH CV ECHO MEAS - EDV(CUBED): 176.1 ML
BH CV ECHO MEAS - EDV(MOD-SP2): 103 ML
BH CV ECHO MEAS - EDV(MOD-SP4): 71 ML
BH CV ECHO MEAS - EF(MOD-BP): 38.1 %
BH CV ECHO MEAS - EF(MOD-SP2): 31.9 %
BH CV ECHO MEAS - EF(MOD-SP4): 47.9 %
BH CV ECHO MEAS - ESV(CUBED): 104.5 ML
BH CV ECHO MEAS - ESV(MOD-SP2): 70.1 ML
BH CV ECHO MEAS - ESV(MOD-SP4): 37 ML
BH CV ECHO MEAS - FS: 16 %
BH CV ECHO MEAS - IVS/LVPW: 1.02 CM
BH CV ECHO MEAS - IVSD: 0.87 CM
BH CV ECHO MEAS - LA DIMENSION: 5.4 CM
BH CV ECHO MEAS - LAT PEAK E' VEL: 8.7 CM/SEC
BH CV ECHO MEAS - LV DIASTOLIC VOL/BSA (35-75): 103.5 CM2
BH CV ECHO MEAS - LV MASS(C)D: 181.9 GRAMS
BH CV ECHO MEAS - LV MAX PG: 1.79 MMHG
BH CV ECHO MEAS - LV MEAN PG: 1.11 MMHG
BH CV ECHO MEAS - LV SYSTOLIC VOL/BSA (12-30): 53.9 CM2
BH CV ECHO MEAS - LV V1 MAX: 66.8 CM/SEC
BH CV ECHO MEAS - LV V1 VTI: 12.9 CM
BH CV ECHO MEAS - LVIDD: 5.6 CM
BH CV ECHO MEAS - LVIDS: 4.7 CM
BH CV ECHO MEAS - LVOT AREA: 3.1 CM2
BH CV ECHO MEAS - LVOT DIAM: 2 CM
BH CV ECHO MEAS - LVPWD: 0.85 CM
BH CV ECHO MEAS - MED PEAK E' VEL: 4.1 CM/SEC
BH CV ECHO MEAS - MV A MAX VEL: 41.5 CM/SEC
BH CV ECHO MEAS - MV DEC SLOPE: 671.8 CM/SEC2
BH CV ECHO MEAS - MV DEC TIME: 0.21 SEC
BH CV ECHO MEAS - MV E MAX VEL: 140 CM/SEC
BH CV ECHO MEAS - MV E/A: 3.4
BH CV ECHO MEAS - PA V2 MAX: 108.6 CM/SEC
BH CV ECHO MEAS - RVDD: 3.8 CM
BH CV ECHO MEAS - SI(MOD-SP2): 48 ML/M2
BH CV ECHO MEAS - SI(MOD-SP4): 49.6 ML/M2
BH CV ECHO MEAS - SV(LVOT): 40.6 ML
BH CV ECHO MEAS - SV(MOD-SP2): 32.9 ML
BH CV ECHO MEAS - SV(MOD-SP4): 34 ML
BH CV ECHO MEAS - TAPSE (>1.6): 0.74 CM
BH CV ECHO MEAS - TR MAX PG: 26.4 MMHG
BH CV ECHO MEAS - TR MAX VEL: 256.7 CM/SEC
BH CV ECHO MEASUREMENTS AVERAGE E/E' RATIO: 21.88
LEFT ATRIUM VOLUME INDEX: 114.3 ML/M2
LV EF 2D ECHO EST: 30 %

## 2024-04-02 ENCOUNTER — LAB (OUTPATIENT)
Dept: LAB | Facility: HOSPITAL | Age: 79
End: 2024-04-02
Payer: MEDICARE

## 2024-04-02 DIAGNOSIS — C91.Z0 LARGE GRANULAR LYMPHOCYTE DISORDER: ICD-10-CM

## 2024-04-02 DIAGNOSIS — I50.22 CHRONIC HFREF (HEART FAILURE WITH REDUCED EJECTION FRACTION): ICD-10-CM

## 2024-04-02 DIAGNOSIS — D50.8 IRON DEFICIENCY ANEMIA SECONDARY TO INADEQUATE DIETARY IRON INTAKE: ICD-10-CM

## 2024-04-02 DIAGNOSIS — N18.32 STAGE 3B CHRONIC KIDNEY DISEASE: ICD-10-CM

## 2024-04-02 LAB
ALBUMIN SERPL-MCNC: 4.3 G/DL (ref 3.5–5.2)
ALBUMIN/GLOB SERPL: 1.6 G/DL
ALP SERPL-CCNC: 61 U/L (ref 39–117)
ALT SERPL W P-5'-P-CCNC: 27 U/L (ref 1–33)
ANION GAP SERPL CALCULATED.3IONS-SCNC: 12.6 MMOL/L (ref 5–15)
AST SERPL-CCNC: 24 U/L (ref 1–32)
BASOPHILS # BLD AUTO: 0.05 10*3/MM3 (ref 0–0.2)
BASOPHILS NFR BLD AUTO: 0.4 % (ref 0–1.5)
BILIRUB SERPL-MCNC: 0.9 MG/DL (ref 0–1.2)
BUN SERPL-MCNC: 41 MG/DL (ref 8–23)
BUN/CREAT SERPL: 24 (ref 7–25)
CALCIUM SPEC-SCNC: 9.4 MG/DL (ref 8.6–10.5)
CHLORIDE SERPL-SCNC: 107 MMOL/L (ref 98–107)
CO2 SERPL-SCNC: 24.4 MMOL/L (ref 22–29)
CREAT SERPL-MCNC: 1.71 MG/DL (ref 0.57–1)
DEPRECATED RDW RBC AUTO: 57.9 FL (ref 37–54)
EGFRCR SERPLBLD CKD-EPI 2021: 30.2 ML/MIN/1.73
EOSINOPHIL # BLD AUTO: 0.13 10*3/MM3 (ref 0–0.4)
EOSINOPHIL NFR BLD AUTO: 1.1 % (ref 0.3–6.2)
ERYTHROCYTE [DISTWIDTH] IN BLOOD BY AUTOMATED COUNT: 16.8 % (ref 12.3–15.4)
FERRITIN SERPL-MCNC: 600 NG/ML (ref 13–150)
GLOBULIN UR ELPH-MCNC: 2.7 GM/DL
GLUCOSE SERPL-MCNC: 126 MG/DL (ref 65–99)
HCT VFR BLD AUTO: 31.2 % (ref 34–46.6)
HGB BLD-MCNC: 10.1 G/DL (ref 12–15.9)
HGB RETIC QN AUTO: 31.1 PG (ref 29.8–36.1)
IMM GRANULOCYTES # BLD AUTO: 0.04 10*3/MM3 (ref 0–0.05)
IMM GRANULOCYTES NFR BLD AUTO: 0.3 % (ref 0–0.5)
IMM RETICS NFR: 24.8 % (ref 3–15.8)
IRON 24H UR-MRATE: 51 MCG/DL (ref 37–145)
IRON SATN MFR SERPL: 15 % (ref 20–50)
LYMPHOCYTES # BLD AUTO: 2.29 10*3/MM3 (ref 0.7–3.1)
LYMPHOCYTES NFR BLD AUTO: 19.9 % (ref 19.6–45.3)
MAGNESIUM SERPL-MCNC: 2.3 MG/DL (ref 1.6–2.4)
MCH RBC QN AUTO: 31.1 PG (ref 26.6–33)
MCHC RBC AUTO-ENTMCNC: 32.4 G/DL (ref 31.5–35.7)
MCV RBC AUTO: 96 FL (ref 79–97)
MONOCYTES # BLD AUTO: 1.14 10*3/MM3 (ref 0.1–0.9)
MONOCYTES NFR BLD AUTO: 9.9 % (ref 5–12)
NEUTROPHILS NFR BLD AUTO: 68.4 % (ref 42.7–76)
NEUTROPHILS NFR BLD AUTO: 7.88 10*3/MM3 (ref 1.7–7)
NRBC BLD AUTO-RTO: 0.2 /100 WBC (ref 0–0.2)
NT-PROBNP SERPL-MCNC: ABNORMAL PG/ML (ref 0–1800)
PLATELET # BLD AUTO: 247 10*3/MM3 (ref 140–450)
PMV BLD AUTO: 11.7 FL (ref 6–12)
POTASSIUM SERPL-SCNC: 4.5 MMOL/L (ref 3.5–5.2)
PROT SERPL-MCNC: 7 G/DL (ref 6–8.5)
RBC # BLD AUTO: 3.25 10*6/MM3 (ref 3.77–5.28)
RETICS # AUTO: 0.06 10*6/MM3 (ref 0.02–0.13)
RETICS/RBC NFR AUTO: 1.78 % (ref 0.7–1.9)
SODIUM SERPL-SCNC: 144 MMOL/L (ref 136–145)
TIBC SERPL-MCNC: 340 MCG/DL (ref 298–536)
TRANSFERRIN SERPL-MCNC: 228 MG/DL (ref 200–360)
VIT B12 BLD-MCNC: 640 PG/ML (ref 211–946)
WBC NRBC COR # BLD AUTO: 11.53 10*3/MM3 (ref 3.4–10.8)

## 2024-04-02 PROCEDURE — 82728 ASSAY OF FERRITIN: CPT

## 2024-04-02 PROCEDURE — 85046 RETICYTE/HGB CONCENTRATE: CPT

## 2024-04-02 PROCEDURE — 84466 ASSAY OF TRANSFERRIN: CPT

## 2024-04-02 PROCEDURE — 83540 ASSAY OF IRON: CPT

## 2024-04-02 PROCEDURE — 83880 ASSAY OF NATRIURETIC PEPTIDE: CPT

## 2024-04-02 PROCEDURE — 83735 ASSAY OF MAGNESIUM: CPT

## 2024-04-02 PROCEDURE — 80053 COMPREHEN METABOLIC PANEL: CPT

## 2024-04-02 PROCEDURE — 36415 COLL VENOUS BLD VENIPUNCTURE: CPT

## 2024-04-02 PROCEDURE — 82607 VITAMIN B-12: CPT

## 2024-04-02 PROCEDURE — 85025 COMPLETE CBC W/AUTO DIFF WBC: CPT

## 2024-04-03 ENCOUNTER — TELEPHONE (OUTPATIENT)
Dept: ONCOLOGY | Facility: CLINIC | Age: 79
End: 2024-04-03
Payer: MEDICARE

## 2024-04-03 ENCOUNTER — TELEPHONE (OUTPATIENT)
Dept: CARDIOLOGY | Facility: CLINIC | Age: 79
End: 2024-04-03
Payer: MEDICARE

## 2024-04-03 ENCOUNTER — OFFICE VISIT (OUTPATIENT)
Dept: CARDIOLOGY | Facility: CLINIC | Age: 79
End: 2024-04-03
Payer: MEDICARE

## 2024-04-03 VITALS
HEART RATE: 94 BPM | HEIGHT: 64 IN | SYSTOLIC BLOOD PRESSURE: 120 MMHG | BODY MASS INDEX: 30.73 KG/M2 | DIASTOLIC BLOOD PRESSURE: 70 MMHG | WEIGHT: 180 LBS

## 2024-04-03 DIAGNOSIS — I50.22 CHRONIC HFREF (HEART FAILURE WITH REDUCED EJECTION FRACTION): Primary | ICD-10-CM

## 2024-04-03 DIAGNOSIS — I10 PRIMARY HYPERTENSION: ICD-10-CM

## 2024-04-03 DIAGNOSIS — E78.2 MIXED HYPERLIPIDEMIA: ICD-10-CM

## 2024-04-03 DIAGNOSIS — I25.810 CORONARY ARTERY DISEASE INVOLVING AUTOLOGOUS VEIN CORONARY BYPASS GRAFT WITHOUT ANGINA PECTORIS: ICD-10-CM

## 2024-04-03 DIAGNOSIS — N18.32 STAGE 3B CHRONIC KIDNEY DISEASE: ICD-10-CM

## 2024-04-03 DIAGNOSIS — I48.0 PAF (PAROXYSMAL ATRIAL FIBRILLATION): ICD-10-CM

## 2024-04-03 RX ORDER — MAGNESIUM OXIDE 400 MG/1
400 TABLET ORAL DAILY
COMMUNITY

## 2024-04-03 RX ORDER — DIGOXIN 125 MCG
125 TABLET ORAL DAILY
Qty: 90 TABLET | Refills: 3 | Status: SHIPPED | OUTPATIENT
Start: 2024-04-03

## 2024-04-03 RX ORDER — METOPROLOL SUCCINATE 100 MG/1
100 TABLET, EXTENDED RELEASE ORAL EVERY 12 HOURS SCHEDULED
Qty: 180 TABLET | Refills: 3 | Status: SHIPPED | OUTPATIENT
Start: 2024-04-03

## 2024-04-03 NOTE — TELEPHONE ENCOUNTER
Unable to reach patient or leave voicemail. Calling to relay message that labs were great. No changes at this time. Jaelyn Luna RN

## 2024-04-03 NOTE — ASSESSMENT & PLAN NOTE
She has stage IIIb chronic kidney disease which has become a little worse due to decompensation of her heart failure with atrial fibrillation.  Once we control her heart rate she should get better.  Will recheck her labs in 10 to 14 days along with the digoxin level

## 2024-04-03 NOTE — PATIENT INSTRUCTIONS
1.  Start digoxin 0.125 mg 3 tablets today 3 tablets tomorrow and 1 tablet in the evening every day from then on.  2.  Increase Toprol/metoprolol XL to 100 mg twice a day.  3.  Continue heart rate blood pressure and weight monitoring.  4.  Do blood work 1 or 2 days before your next appointment

## 2024-04-03 NOTE — ASSESSMENT & PLAN NOTE
Ms. Hughes has chronic heart failure with reduced ejection fraction.  She went into atrial fibrillation rapid ventricular response because of misunderstanding on her part and she did not get her Toprol refilled.  She is back on it and doing a little better but still getting episodes of rapid ventricular response.  Will increase her Toprol to 100 twice daily and started on digoxin after loading her today and tomorrow.  She will return back to the clinic in 2 weeks and if she is stable and in A-fib we will refer her for EP evaluation and ablation

## 2024-04-03 NOTE — ASSESSMENT & PLAN NOTE
She was in paroxysmal atrial fibs in the past until she discontinued her Toprol.  She is now in persistent atrial fibrillation with moderate ventricular response and episodes of rapid ventricular response intermittently.  Will increase her Toprol dosage to 100 mg twice daily, digitalize her and see how she does.  Will refer her for ablation if she is stable  Next visit

## 2024-04-03 NOTE — ASSESSMENT & PLAN NOTE
Her lipids are at goal on high intensity atorvastatin 40 mg at bedtime.  She will continue the same.

## 2024-04-03 NOTE — PROGRESS NOTES
Office Visit    Chief Complaint  Chronic HFrEF (heart failure with reduced ejection fraction)    Subjective            Ann-Marie Hughes presents to [facility] CARDIOLOGY  History of Present Illness  Ms. Ann-Marie Hughes is a 78 years old female with coronary disease, multivessel, status post bypass surgery was doing reasonably well until she forgot to get her Toprol refilled.  Read through her into atrial fibrillation rapid ventricular response.  She was seen in the office by Alyssa and restarted on metoprolol.  Currently she is back for follow-up and states that she still gets periodic episodes of palpitations and a funny sensation in her chest.  This last for a few seconds only.  She denies dizziness or syncope.  Her blood pressure is borderline.      Past Medical History:   Diagnosis Date    Acne rosacea 08/17/2021    DR.JEFF MOON     Acute respiratory failure with hypoxia 07/31/2023    Arteriosclerosis of abdominal aorta     B12 deficiency 08/17/2021    DJD (degenerative joint disease)     Hx of smoking 08/17/2021    QUIT IN 1976 AT AGE 31    Hyperlipidemia 08/17/2021    Hypertension     Hypothyroidism     Metabolic syndrome 08/17/2021    NSTEMI (non-ST elevated myocardial infarction) 07/17/2023    Pericardial effusion 10/10/2023    KAREN (stress urinary incontinence, female) 08/17/2021    UTI (urinary tract infection) 08/17/2021    Vitamin D deficiency 08/17/2021       Allergies   Allergen Reactions    Penicillins Palpitations     1. When was your reaction? > 10 years ago  2. Did your reaction happen after the first dose or after several doses? After first dose  3. Did your reaction require ED or hospital care to manage your reaction? Do not know  4. Did your reaction require treatment with epinephrine? Do not know  5. Have you taken amoxicillin (Amoxil) or amoxicillin-clavulanate (Augmentin) without issue since? Yes  6. Have you taken cephalexin (Keflex) without issue since?  Do not  know         Past Surgical History:   Procedure Laterality Date    BACK SURGERY      CARDIAC CATHETERIZATION N/A 2023    Procedure: Left Heart Cath;  Surgeon: Dinh Andres MD;  Location:  [facility];  Service: Cardiovascular;  Laterality: N/A;    CARPAL TUNNEL RELEASE      COLONOSCOPY      CORONARY ARTERY BYPASS GRAFT WITH MITRAL VALVE REPAIR/REPLACEMENT N/A 2023    Procedure: REZA STERNOTOMY OFF-PUMP CORONARY ARTERY BYPASS GRAFT TIMES        USING LEFFT INTERNAL MAMMARY ARTERY AND     GREATER SAPHENOUS VEIN GRAFT PER EMDOSCOPIC VEIN HARVESTING, MAZE PROCEDURE AND PRP;  Surgeon: Shane Alexander MD;  Location:  [facility];  Service: Cardiothoracic;  Laterality: N/A;    KNEE SURGERY      LUMBAR DISCECTOMY      NECK SURGERY      Cervical    POSTERIOR LUMBAR/THORACIC SPINE FUSION          Social History     Tobacco Use    Smoking status: Former     Current packs/day: 0.00     Average packs/day: 1 pack/day for 12.0 years (12.0 ttl pk-yrs)     Types: Cigarettes     Start date:      Quit date:      Years since quittin.2    Smokeless tobacco: Never   Vaping Use    Vaping status: Never Used   Substance Use Topics    Alcohol use: Never    Drug use: Never       Family History   Problem Relation Age of Onset    Heart disease Mother     Arthritis Mother     Heart attack Mother     Arthritis Father         Prior to Admission medications    Medication Sig Start Date End Date Taking? Authorizing Provider   acetaminophen (TYLENOL) 325 MG tablet Take 2 tablets by mouth Every 4 (Four) Hours As Needed for Mild Pain. 23  Yes Anjelica Chase APRN   albuterol sulfate  (90 Base) MCG/ACT inhaler Inhale 2 puffs Every 4 (Four) Hours As Needed for Wheezing. 23  Yes Les Moreno,    aspirin 81 MG EC tablet Take 1 tablet by mouth Every Other Day.   Yes Provider, MD Kanwal   atorvastatin (LIPITOR) 40 MG tablet Take 1 tablet by mouth Daily. 23  Yes  Valerie Valera MD   buPROPion XL (WELLBUTRIN XL) 300 MG 24 hr tablet Take 1 tablet by mouth Daily. 12/13/23  Yes Romario Valdez DO   cyanocobalamin 1000 MCG/ML injection Inject 1 mL into the appropriate muscle as directed by prescriber Every 28 (Twenty-Eight) Days. 2/15/24  Yes Berry Heredia MD   dapagliflozin Propanediol (Farxiga) 10 MG tablet TAKE 1 TABLET BY MOUTH EVERY DAY 12/4/23  Yes Valerie Valera MD   Eliquis 5 MG tablet tablet TAKE 1 TABLET BY MOUTH EVERY 12 HOURS 3/4/24  Yes Valerie Valera MD   folic acid (FOLVITE) 1 MG tablet TAKE 1 TABLET BY MOUTH DAILY 3/4/24  Yes Boston Can MD   furosemide (LASIX) 40 MG tablet Take 1 tablet by mouth Daily. Omit on Sundays 3/21/24  Yes Sara Lubin APRN   magnesium oxide (MAG-OX) 400 MG tablet Take 1 tablet by mouth Daily.   Yes ProviderKanwal MD   metFORMIN ER (GLUCOPHAGE-XR) 500 MG 24 hr tablet Take 1 tablet by mouth 2 (Two) Times a Day. 3/4/24  Yes Romario Valdez DO   metoprolol succinate XL (TOPROL-XL) 50 MG 24 hr tablet Take 1.5 tablets by mouth Every 12 (Twelve) Hours. 3/26/24  Yes Mahogany Tinsley APRN   montelukast (SINGULAIR) 10 MG tablet Take 1 tablet by mouth Daily. 7/5/23  Yes Rochelle Brown APRN   predniSONE (DELTASONE) 5 MG tablet TAKE 5 MG EVERY DAY  Patient taking differently: Take 1 tablet by mouth Every Other Day. 11/1/23  Yes Boston Can MD   Probiotic Product (FlockOfBirds PO) Take 1 capsule by mouth Daily.   Yes ProviderKanwal MD   sacubitril-valsartan (Entresto) 24-26 MG tablet Take 1 tablet by mouth 2 (Two) Times a Day. 2/19/24  Yes Valerie Valera MD   spironolactone (ALDACTONE) 25 MG tablet Take 1 tablet by mouth Daily. 10/10/23  Yes Valerie Valera MD   Synthroid 75 MCG tablet Take 1 tablet by mouth Daily. 10/31/23  Yes Romario Valdez,         Review of Systems   Constitutional:  Positive for fatigue.   Respiratory:  Positive for shortness of breath. Negative for  "cough.    Cardiovascular:  Positive for chest pain and palpitations. Negative for leg swelling.   Neurological:  Negative for dizziness.        Symptom Course: Been Unchanged    Weight Trend: Stable     Objective     /70   Pulse 94   Ht 162.6 cm (64\")   Wt 81.6 kg (180 lb)   BMI 30.90 kg/m²       Physical Exam  Constitutional:       General: She is awake.      Appearance: Normal appearance.   Neck:      Thyroid: No thyromegaly.      Vascular: No carotid bruit or JVD.   Cardiovascular:      Rate and Rhythm: Normal rate. Rhythm irregularly irregular.      Chest Wall: PMI is not displaced.      Pulses: Normal pulses.      Heart sounds: S1 normal and S2 normal. Murmur heard.      Systolic murmur is present.      No friction rub. No gallop. No S3 or S4 sounds.   Pulmonary:      Effort: Pulmonary effort is normal.      Breath sounds: Normal breath sounds and air entry. No wheezing, rhonchi or rales.   Abdominal:      General: Bowel sounds are normal.      Palpations: Abdomen is soft. There is no mass.      Tenderness: There is no abdominal tenderness.   Musculoskeletal:      Cervical back: Neck supple.      Right lower leg: Edema present.      Left lower leg: Edema present.   Neurological:      Mental Status: She is alert and oriented to person, place, and time.   Psychiatric:         Mood and Affect: Mood normal.         Behavior: Behavior is cooperative.           Result Review :                      Lab Results   Component Value Date    PROBNP 16,179.0 (H) 04/02/2024    PROBNP 13,412.0 (H) 03/20/2024    PROBNP 19,763.0 (H) 03/04/2024     CMP          3/20/2024    09:32 3/24/2024    10:07 4/2/2024    09:23   CMP   Glucose 124  143  126    BUN 40  46  41    Creatinine 1.34  1.43  1.71    EGFR 40.4  37.4  30.2    Sodium 143  141  144    Potassium 4.0  4.2  4.5    Chloride 100  102  107    Calcium 9.8  8.7  9.4    Total Protein 6.9  6.8  7.0    Albumin 4.3  4.2  4.3    Globulin 2.6  2.6  2.7    Total Bilirubin " "0.8  0.8  0.9    Alkaline Phosphatase 55  49  61    AST (SGOT) 26  24  24    ALT (SGPT) 17  17  27    Albumin/Globulin Ratio 1.7  1.6  1.6    BUN/Creatinine Ratio 29.9  32.2  24.0    Anion Gap 14.1  13.7  12.6      CBC w/diff          3/20/2024    09:32 3/24/2024    10:07 4/2/2024    09:23   CBC w/Diff   WBC 12.92  11.68  11.53    RBC 3.46  3.37  3.25    Hemoglobin 11.1  10.6  10.1    Hematocrit 33.6  34.5  31.2    MCV 97.1  102.4  96.0    MCH 32.1  31.5  31.1    MCHC 33.0  30.7  32.4    RDW 17.1  18.9  16.8    Platelets 270  253  247    Neutrophil Rel % 66.1  67.0  68.4    Immature Granulocyte Rel % 0.5  0.3  0.3    Lymphocyte Rel % 21.1  22.8  19.9    Monocyte Rel % 11.1  9.1  9.9    Eosinophil Rel % 0.6  0.3  1.1    Basophil Rel % 0.6  0.5  0.4       Lipid Panel          7/18/2023    07:25 11/6/2023    09:17 12/27/2023    08:48   Lipid Panel   Total Cholesterol 93  143  120    Triglycerides 71  122  81    HDL Cholesterol 39  40  61    VLDL Cholesterol 15  22  16    LDL Cholesterol  39  81  43    LDL/HDL Ratio 1.02  1.97  0.70       Lab Results   Component Value Date    TSH 1.650 03/24/2024    TSH 2.210 12/29/2023    TSH 1.130 06/15/2023      Lab Results   Component Value Date    FREET4 1.74 (H) 12/29/2023    FREET4 1.31 01/11/2023    FREET4 1.27 08/17/2022      No results found for: \"DDIMERQUANT\"  Magnesium   Date Value Ref Range Status   04/02/2024 2.3 1.6 - 2.4 mg/dL Final      No results found for: \"DIGOXIN\"   A1C Last 3 Results          6/15/2023    10:05 7/18/2023    07:25 12/29/2023    14:46   HGBA1C Last 3 Results   Hemoglobin A1C 6.10  5.90  6.00           Results for orders placed during the hospital encounter of 03/27/24    Adult Transthoracic Echo Complete W/ Cont if Necessary Per Protocol    Interpretation Summary    The study is technically difficult for diagnosis    Left ventricular systolic function is moderately decreased. Estimated left ventricular EF = 30% with global hypokinesis and " dyssynchrony and more severe hypokinesis involving what appeared to be the inferior wall.  Would consider repeating with Definity contrast for more accurate assessment of wall motion and ejection fraction if thought to be clinically indicated    The left ventricular cavity is mildly dilated.    Left ventricular diastolic dysfunction is noted.    The right ventricular cavity is borderline dilated.    The left atrial cavity is moderately dilated.    Mild aortic valve stenosis is present by gradients however reduced ejection fraction may underestimate severity        Assessment and Plan        Diagnoses and all orders for this visit:    1. Chronic HFrEF (heart failure with reduced ejection fraction) (Primary)  Assessment & Plan:  Ms. Hughes has chronic heart failure with reduced ejection fraction.  She went into atrial fibrillation rapid ventricular response because of misunderstanding on her part and she did not get her Toprol refilled.  She is back on it and doing a little better but still getting episodes of rapid ventricular response.  Will increase her Toprol to 100 twice daily and started on digoxin after loading her today and tomorrow.  She will return back to the clinic in 2 weeks and if she is stable and in A-fib we will refer her for EP evaluation and ablation    Orders:  -     CBC & Differential; Future  -     Comprehensive Metabolic Panel; Future  -     Digoxin Level; Future  -     proBNP; Future    2. Primary hypertension    3. Mixed hyperlipidemia  Assessment & Plan:  Her lipids are at goal on high intensity atorvastatin 40 mg at bedtime.  She will continue the same.      4. CAD status post CABG x3 vessels    5. PAF (paroxysmal atrial fibrillation)  Assessment & Plan:  She was in paroxysmal atrial fibs in the past until she discontinued her Toprol.  She is now in persistent atrial fibrillation with moderate ventricular response and episodes of rapid ventricular response intermittently.  Will increase her  Toprol dosage to 100 mg twice daily, digitalize her and see how she does.  Will refer her for ablation if she is stable  Next visit    Orders:  -     CBC & Differential; Future  -     Comprehensive Metabolic Panel; Future  -     Digoxin Level; Future  -     proBNP; Future    6. Stage 3b chronic kidney disease  Assessment & Plan:  She has stage IIIb chronic kidney disease which has become a little worse due to decompensation of her heart failure with atrial fibrillation.  Once we control her heart rate she should get better.  Will recheck her labs in 10 to 14 days along with the digoxin level      Other orders  -     metoprolol succinate XL (TOPROL-XL) 100 MG 24 hr tablet; Take 1 tablet by mouth Every 12 (Twelve) Hours.  Dispense: 180 tablet; Refill: 3  -     digoxin (LANOXIN) 125 MCG tablet; Take 1 tablet by mouth Daily.  Dispense: 90 tablet; Refill: 3        She has multiple cardiovascular conditions that require close follow-up.  We will do the same.    Follow Up     Return for Follow-up with Alyssa on 4/18/2024., EKG with next office visit.    Patient was given instructions and counseling regarding her condition or for health maintenance advice. Please see specific information pulled into the AVS if appropriate.     Electronically signed by Valerie Valera MD, 04/03/24, 11:07 AM EDT.

## 2024-04-03 NOTE — TELEPHONE ENCOUNTER
----- Message from LONA Polk sent at 4/3/2024  4:21 AM EDT -----  Holter shows atrial fibrillation with average heart rate 98, would recommend increasing metoprolol dose from 75 mg twice daily to 100 mg twice daily. Avoid caffeine products

## 2024-04-12 ENCOUNTER — LAB (OUTPATIENT)
Dept: LAB | Facility: HOSPITAL | Age: 79
End: 2024-04-12
Payer: MEDICARE

## 2024-04-12 DIAGNOSIS — I50.22 CHRONIC HFREF (HEART FAILURE WITH REDUCED EJECTION FRACTION): ICD-10-CM

## 2024-04-12 DIAGNOSIS — I48.0 PAF (PAROXYSMAL ATRIAL FIBRILLATION): ICD-10-CM

## 2024-04-12 LAB
ALBUMIN SERPL-MCNC: 4.2 G/DL (ref 3.5–5.2)
ALBUMIN/GLOB SERPL: 1.7 G/DL
ALP SERPL-CCNC: 56 U/L (ref 39–117)
ALT SERPL W P-5'-P-CCNC: 37 U/L (ref 1–33)
ANION GAP SERPL CALCULATED.3IONS-SCNC: 9.2 MMOL/L (ref 5–15)
AST SERPL-CCNC: 30 U/L (ref 1–32)
BASOPHILS # BLD AUTO: 0.06 10*3/MM3 (ref 0–0.2)
BASOPHILS NFR BLD AUTO: 0.6 % (ref 0–1.5)
BILIRUB SERPL-MCNC: 0.8 MG/DL (ref 0–1.2)
BUN SERPL-MCNC: 36 MG/DL (ref 8–23)
BUN/CREAT SERPL: 23.8 (ref 7–25)
CALCIUM SPEC-SCNC: 9.2 MG/DL (ref 8.6–10.5)
CHLORIDE SERPL-SCNC: 105 MMOL/L (ref 98–107)
CO2 SERPL-SCNC: 29.8 MMOL/L (ref 22–29)
CREAT SERPL-MCNC: 1.51 MG/DL (ref 0.57–1)
DEPRECATED RDW RBC AUTO: 59.4 FL (ref 37–54)
DIGOXIN SERPL-MCNC: 1.56 NG/ML (ref 0.6–1.2)
EGFRCR SERPLBLD CKD-EPI 2021: 35 ML/MIN/1.73
EOSINOPHIL # BLD AUTO: 0.13 10*3/MM3 (ref 0–0.4)
EOSINOPHIL NFR BLD AUTO: 1.2 % (ref 0.3–6.2)
ERYTHROCYTE [DISTWIDTH] IN BLOOD BY AUTOMATED COUNT: 16.7 % (ref 12.3–15.4)
GLOBULIN UR ELPH-MCNC: 2.5 GM/DL
GLUCOSE SERPL-MCNC: 112 MG/DL (ref 65–99)
HCT VFR BLD AUTO: 31.4 % (ref 34–46.6)
HGB BLD-MCNC: 10.4 G/DL (ref 12–15.9)
IMM GRANULOCYTES # BLD AUTO: 0.04 10*3/MM3 (ref 0–0.05)
IMM GRANULOCYTES NFR BLD AUTO: 0.4 % (ref 0–0.5)
LYMPHOCYTES # BLD AUTO: 2.53 10*3/MM3 (ref 0.7–3.1)
LYMPHOCYTES NFR BLD AUTO: 23.2 % (ref 19.6–45.3)
MCH RBC QN AUTO: 32.2 PG (ref 26.6–33)
MCHC RBC AUTO-ENTMCNC: 33.1 G/DL (ref 31.5–35.7)
MCV RBC AUTO: 97.2 FL (ref 79–97)
MONOCYTES # BLD AUTO: 1.21 10*3/MM3 (ref 0.1–0.9)
MONOCYTES NFR BLD AUTO: 11.1 % (ref 5–12)
NEUTROPHILS NFR BLD AUTO: 6.92 10*3/MM3 (ref 1.7–7)
NEUTROPHILS NFR BLD AUTO: 63.5 % (ref 42.7–76)
NRBC BLD AUTO-RTO: 0 /100 WBC (ref 0–0.2)
NT-PROBNP SERPL-MCNC: 9772 PG/ML (ref 0–1800)
PLATELET # BLD AUTO: 221 10*3/MM3 (ref 140–450)
PMV BLD AUTO: 11.6 FL (ref 6–12)
POTASSIUM SERPL-SCNC: 4.8 MMOL/L (ref 3.5–5.2)
PROT SERPL-MCNC: 6.7 G/DL (ref 6–8.5)
RBC # BLD AUTO: 3.23 10*6/MM3 (ref 3.77–5.28)
SODIUM SERPL-SCNC: 144 MMOL/L (ref 136–145)
WBC NRBC COR # BLD AUTO: 10.89 10*3/MM3 (ref 3.4–10.8)

## 2024-04-12 PROCEDURE — 80162 ASSAY OF DIGOXIN TOTAL: CPT

## 2024-04-12 PROCEDURE — 80053 COMPREHEN METABOLIC PANEL: CPT

## 2024-04-12 PROCEDURE — 36415 COLL VENOUS BLD VENIPUNCTURE: CPT

## 2024-04-12 PROCEDURE — 83880 ASSAY OF NATRIURETIC PEPTIDE: CPT

## 2024-04-12 PROCEDURE — 85025 COMPLETE CBC W/AUTO DIFF WBC: CPT

## 2024-04-17 ENCOUNTER — OFFICE VISIT (OUTPATIENT)
Dept: CARDIOLOGY | Facility: CLINIC | Age: 79
End: 2024-04-17
Payer: MEDICARE

## 2024-04-17 VITALS
DIASTOLIC BLOOD PRESSURE: 68 MMHG | HEIGHT: 64 IN | SYSTOLIC BLOOD PRESSURE: 128 MMHG | HEART RATE: 60 BPM | BODY MASS INDEX: 30.22 KG/M2 | WEIGHT: 177 LBS

## 2024-04-17 DIAGNOSIS — I48.0 PAF (PAROXYSMAL ATRIAL FIBRILLATION): ICD-10-CM

## 2024-04-17 DIAGNOSIS — N18.32 STAGE 3B CHRONIC KIDNEY DISEASE: ICD-10-CM

## 2024-04-17 DIAGNOSIS — I50.22 CHRONIC HFREF (HEART FAILURE WITH REDUCED EJECTION FRACTION): Primary | ICD-10-CM

## 2024-04-17 DIAGNOSIS — E78.2 MIXED HYPERLIPIDEMIA: ICD-10-CM

## 2024-04-17 RX ORDER — DIGOXIN 125 MCG
125 TABLET ORAL DAILY
Qty: 90 TABLET | Refills: 3 | Status: SHIPPED | OUTPATIENT
Start: 2024-04-17

## 2024-04-17 NOTE — PROGRESS NOTES
Office Visit    Chief Complaint  Chronic HFrEF    Subjective            Ann-Marie Hughes presents to [facility] CARDIOLOGY  History of Present Illness  Ms. Hughes is a 78-year-old female that presented to the office today for follow-up due to heart failure with reduced ejection fraction who is status post bypass surgery.  She is doing about the same.  She continues to experience short of air with moderate exertion as well as bilateral pedal edema.  Her BNP continues to come down and her weight is stable.  Heart rate and blood pressure is good today but per her home log it is very labile, with a systolic as low as 79.  I have asked her to repeat her blood pressure and heart rate if she has an abnormal reading.  She denies any chest pain, dizziness, orthopnea or syncopal episodes.      Past Medical History:   Diagnosis Date    Acne rosacea 08/17/2021    DR.JEFF MOON     Acute respiratory failure with hypoxia 07/31/2023    Arteriosclerosis of abdominal aorta     B12 deficiency 08/17/2021    DJD (degenerative joint disease)     Hx of smoking 08/17/2021    QUIT IN 1976 AT AGE 31    Hyperlipidemia 08/17/2021    Hypertension     Hypothyroidism     Metabolic syndrome 08/17/2021    NSTEMI (non-ST elevated myocardial infarction) 07/17/2023    Pericardial effusion 10/10/2023    KAREN (stress urinary incontinence, female) 08/17/2021    UTI (urinary tract infection) 08/17/2021    Vitamin D deficiency 08/17/2021       Allergies   Allergen Reactions    Penicillins Palpitations     1. When was your reaction? > 10 years ago  2. Did your reaction happen after the first dose or after several doses? After first dose  3. Did your reaction require ED or hospital care to manage your reaction? Do not know  4. Did your reaction require treatment with epinephrine? Do not know  5. Have you taken amoxicillin (Amoxil) or amoxicillin-clavulanate (Augmentin) without issue since? Yes  6. Have you taken cephalexin (Keflex)  without issue since?  Do not know         Past Surgical History:   Procedure Laterality Date    BACK SURGERY  2013    CARDIAC CATHETERIZATION N/A 2023    Procedure: Left Heart Cath;  Surgeon: Dinh Andres MD;  Location:  [facility];  Service: Cardiovascular;  Laterality: N/A;    CARPAL TUNNEL RELEASE      COLONOSCOPY      CORONARY ARTERY BYPASS GRAFT WITH MITRAL VALVE REPAIR/REPLACEMENT N/A 2023    Procedure: REZA STERNOTOMY OFF-PUMP CORONARY ARTERY BYPASS GRAFT TIMES        USING LEFFT INTERNAL MAMMARY ARTERY AND     GREATER SAPHENOUS VEIN GRAFT PER EMDOSCOPIC VEIN HARVESTING, MAZE PROCEDURE AND PRP;  Surgeon: Shane Alexander MD;  [facility];  Service: Cardiothoracic;  Laterality: N/A;    KNEE SURGERY      LUMBAR DISCECTOMY      NECK SURGERY      Cervical    POSTERIOR LUMBAR/THORACIC SPINE FUSION          Social History     Tobacco Use    Smoking status: Former     Current packs/day: 0.00     Average packs/day: 1 pack/day for 12.0 years (12.0 ttl pk-yrs)     Types: Cigarettes     Start date:      Quit date:      Years since quittin.3    Smokeless tobacco: Never   Vaping Use    Vaping status: Never Used   Substance Use Topics    Alcohol use: Never    Drug use: Never       Family History   Problem Relation Age of Onset    Heart disease Mother     Arthritis Mother     Heart attack Mother     Arthritis Father         Prior to Admission medications    Medication Sig Start Date End Date Taking? Authorizing Provider   acetaminophen (TYLENOL) 325 MG tablet Take 2 tablets by mouth Every 4 (Four) Hours As Needed for Mild Pain. 23   Anjelica Chase APRN   albuterol sulfate  (90 Base) MCG/ACT inhaler Inhale 2 puffs Every 4 (Four) Hours As Needed for Wheezing. 23   Les Moreno,    aspirin 81 MG EC tablet Take 1 tablet by mouth Every Other Day.    Provider, MD Kanwal   atorvastatin (LIPITOR) 40 MG tablet Take 1 tablet by mouth  Daily. 11/7/23   Valerie Valera MD   buPROPion XL (WELLBUTRIN XL) 300 MG 24 hr tablet Take 1 tablet by mouth Daily. 12/13/23   Romario Valdez DO   cyanocobalamin 1000 MCG/ML injection Inject 1 mL into the appropriate muscle as directed by prescriber Every 28 (Twenty-Eight) Days. 2/15/24   Berry Heredia MD   dapagliflozin Propanediol (Farxiga) 10 MG tablet TAKE 1 TABLET BY MOUTH EVERY DAY 12/4/23   Valerie Valera MD   digoxin (LANOXIN) 125 MCG tablet Take 1 tablet by mouth Daily. 4/3/24   Valerie Valera MD   Eliquis 5 MG tablet tablet TAKE 1 TABLET BY MOUTH EVERY 12 HOURS 3/4/24   Valerie Valera MD   folic acid (FOLVITE) 1 MG tablet TAKE 1 TABLET BY MOUTH DAILY 3/4/24   Boston Can MD   furosemide (LASIX) 40 MG tablet Take 1 tablet by mouth Daily. Omit on Sundays 3/21/24   Sara Lubin APRN   magnesium oxide (MAG-OX) 400 MG tablet Take 1 tablet by mouth Daily.    Provider, MD Kanwal   metFORMIN ER (GLUCOPHAGE-XR) 500 MG 24 hr tablet Take 1 tablet by mouth 2 (Two) Times a Day. 3/4/24   Romario Valdez,    metoprolol succinate XL (TOPROL-XL) 100 MG 24 hr tablet Take 1 tablet by mouth Every 12 (Twelve) Hours. 4/3/24   Valerie Valera MD   montelukast (SINGULAIR) 10 MG tablet Take 1 tablet by mouth Daily. 7/5/23   Rochelle Brown APRN   predniSONE (DELTASONE) 5 MG tablet TAKE 5 MG EVERY DAY  Patient taking differently: Take 1 tablet by mouth Every Other Day. 11/1/23   Boston Can MD   Probiotic Product (Followap PO) Take 1 capsule by mouth Daily.    Provider, MD Kanwal   sacubitril-valsartan (Entresto) 24-26 MG tablet Take 1 tablet by mouth 2 (Two) Times a Day. 2/19/24   Valerie Valera MD   spironolactone (ALDACTONE) 25 MG tablet Take 1 tablet by mouth Daily. 10/10/23   Valerie Valera MD   Synthroid 75 MCG tablet Take 1 tablet by mouth Daily. 10/31/23   Romario Valdez DO        Review of Systems   Constitutional:  Negative for fatigue.  "  Respiratory:  Positive for shortness of breath. Negative for cough.    Cardiovascular:  Positive for leg swelling. Negative for chest pain and palpitations.   Neurological:  Negative for dizziness.        Symptom Course: Improved    Weight Trend: Stable     Objective     /68   Pulse 60   Ht 162.6 cm (64\")   Wt 80.3 kg (177 lb)   BMI 30.38 kg/m²       Physical Exam  Constitutional:       General: She is awake.      Appearance: Normal appearance.   Neck:      Thyroid: No thyromegaly.      Vascular: No carotid bruit or JVD.   Cardiovascular:      Rate and Rhythm: Normal rate. Rhythm irregularly irregular.      Chest Wall: PMI is not displaced.      Pulses: Normal pulses.      Heart sounds: S1 normal and S2 normal. Murmur heard.      Systolic murmur is present.      No friction rub. No gallop. No S3 or S4 sounds.   Pulmonary:      Effort: Pulmonary effort is normal.      Breath sounds: Normal breath sounds and air entry. No wheezing, rhonchi or rales.   Abdominal:      General: Bowel sounds are normal.      Palpations: Abdomen is soft. There is no mass.      Tenderness: There is no abdominal tenderness.   Musculoskeletal:      Cervical back: Neck supple.      Right lower leg: Edema present.      Left lower leg: Edema present.   Neurological:      Mental Status: She is alert and oriented to person, place, and time.   Psychiatric:         Mood and Affect: Mood normal.         Behavior: Behavior is cooperative.           Result Review :                  Lab Results   Component Value Date    PROBNP 9,772.0 (H) 04/12/2024    PROBNP 16,179.0 (H) 04/02/2024    PROBNP 13,412.0 (H) 03/20/2024     CMP          3/24/2024    10:07 4/2/2024    09:23 4/12/2024    09:41   CMP   Glucose 143  126  112    BUN 46  41  36    Creatinine 1.43  1.71  1.51    EGFR 37.4  30.2  35.0    Sodium 141  144  144    Potassium 4.2  4.5  4.8    Chloride 102  107  105    Calcium 8.7  9.4  9.2    Total Protein 6.8  7.0  6.7    Albumin 4.2  4.3  " "4.2    Globulin 2.6  2.7  2.5    Total Bilirubin 0.8  0.9  0.8    Alkaline Phosphatase 49  61  56    AST (SGOT) 24  24  30    ALT (SGPT) 17  27  37    Albumin/Globulin Ratio 1.6  1.6  1.7    BUN/Creatinine Ratio 32.2  24.0  23.8    Anion Gap 13.7  12.6  9.2      CBC w/diff          3/24/2024    10:07 4/2/2024    09:23 4/12/2024    09:41   CBC w/Diff   WBC 11.68  11.53  10.89    RBC 3.37  3.25  3.23    Hemoglobin 10.6  10.1  10.4    Hematocrit 34.5  31.2  31.4    .4  96.0  97.2    MCH 31.5  31.1  32.2    MCHC 30.7  32.4  33.1    RDW 18.9  16.8  16.7    Platelets 253  247  221    Neutrophil Rel % 67.0  68.4  63.5    Immature Granulocyte Rel % 0.3  0.3  0.4    Lymphocyte Rel % 22.8  19.9  23.2    Monocyte Rel % 9.1  9.9  11.1    Eosinophil Rel % 0.3  1.1  1.2    Basophil Rel % 0.5  0.4  0.6       Lipid Panel          7/18/2023    07:25 11/6/2023    09:17 12/27/2023    08:48   Lipid Panel   Total Cholesterol 93  143  120    Triglycerides 71  122  81    HDL Cholesterol 39  40  61    VLDL Cholesterol 15  22  16    LDL Cholesterol  39  81  43    LDL/HDL Ratio 1.02  1.97  0.70       Lab Results   Component Value Date    TSH 1.650 03/24/2024    TSH 2.210 12/29/2023    TSH 1.130 06/15/2023      Lab Results   Component Value Date    FREET4 1.74 (H) 12/29/2023    FREET4 1.31 01/11/2023    FREET4 1.27 08/17/2022      No results found for: \"DDIMERQUANT\"  Magnesium   Date Value Ref Range Status   04/02/2024 2.3 1.6 - 2.4 mg/dL Final      Digoxin   Date Value Ref Range Status   04/12/2024 1.56 (H) 0.60 - 1.20 ng/mL Final      A1C Last 3 Results          6/15/2023    10:05 7/18/2023    07:25 12/29/2023    14:46   HGBA1C Last 3 Results   Hemoglobin A1C 6.10  5.90  6.00               Results for orders placed during the hospital encounter of 03/27/24    Adult Transthoracic Echo Complete W/ Cont if Necessary Per Protocol    Interpretation Summary    The study is technically difficult for diagnosis    Left ventricular systolic " function is moderately decreased. Estimated left ventricular EF = 30% with global hypokinesis and dyssynchrony and more severe hypokinesis involving what appeared to be the inferior wall.  Would consider repeating with Definity contrast for more accurate assessment of wall motion and ejection fraction if thought to be clinically indicated    The left ventricular cavity is mildly dilated.    Left ventricular diastolic dysfunction is noted.    The right ventricular cavity is borderline dilated.    The left atrial cavity is moderately dilated.    Mild aortic valve stenosis is present by gradients however reduced ejection fraction may underestimate severity        Assessment and Plan        Diagnoses and all orders for this visit:    1. Chronic HFrEF (heart failure with reduced ejection fraction) (Primary)  Assessment & Plan:  Ms. Hughes has heart failure with reduced ejection fraction.  She is doing about the same.  She continues to experience pedal edema and short of air with moderate exertion.  Her heart rate is much better controlled since increasing the metoprolol to 100 mg twice daily.  Her digoxin level was a little high we will decrease by 2 days.  I will make the following changes to her heart failure medications.    1.  Decrease digoxin 125 mcg do not take on Thursdays and Saturdays.  2.  Take Lasix 40 mg twice a day for 2 days then return to once a day dosing  3.  Do heart rate blood pressure and daily weight log and bring it to next appointment.  4.  Do blood work 1 to 2 days prior to next visit.    Orders:  -     Comprehensive Metabolic Panel; Future  -     Magnesium; Future  -     CBC & Differential; Future  -     proBNP; Future  -     Digoxin Level; Future    2. Mixed hyperlipidemia  Assessment & Plan:  Ms. Hughes's lipids are at goal we will continue the current dose of atorvastatin.      3. PAF (paroxysmal atrial fibrillation)  Assessment & Plan:  Ms. Hughes has paroxysmal atrial fibrillation.  Her rate  is now controlled on 100 mg of metoprolol twice daily.  Will continue the same.    Orders:  -     Comprehensive Metabolic Panel; Future  -     Magnesium; Future  -     CBC & Differential; Future  -     proBNP; Future  -     Digoxin Level; Future    4. Stage 3b chronic kidney disease  Assessment & Plan:  Ms. Hughes's kidney function has improved slightly but remains low.  We will continue to monitor with labs.    Orders:  -     Comprehensive Metabolic Panel; Future  -     Magnesium; Future  -     CBC & Differential; Future  -     proBNP; Future  -     Digoxin Level; Future    Other orders  -     digoxin (LANOXIN) 125 MCG tablet; Take 1 tablet by mouth Daily. Omit on Thursdays and Saturdays  Dispense: 90 tablet; Refill: 3            Follow Up     Return in about 4 weeks (around 5/15/2024) for with Dr Valera.    Patient was given instructions and counseling regarding her condition or for health maintenance advice. Please see specific information pulled into the AVS if appropriate.     Electronically signed by LONA Bocanegra, 04/18/24, 3:31 PM EDT.

## 2024-04-17 NOTE — PATIENT INSTRUCTIONS
1.  Decrease digoxin 125 mcg do not take on Thursdays and Saturdays.  2.  Take Lasix 40 mg twice a day for 2 days then return to once a day dosing  3.  Do heart rate blood pressure and daily weight log and bring it to next appointment.  4.  Do blood work 1 to 2 days prior to next visit.

## 2024-04-18 NOTE — ASSESSMENT & PLAN NOTE
Ms. Hughes has paroxysmal atrial fibrillation.  Her rate is now controlled on 100 mg of metoprolol twice daily.  Will continue the same.

## 2024-04-18 NOTE — ASSESSMENT & PLAN NOTE
Ms. Hughes has heart failure with reduced ejection fraction.  She is doing about the same.  She continues to experience pedal edema and short of air with moderate exertion.  Her heart rate is much better controlled since increasing the metoprolol to 100 mg twice daily.  Her digoxin level was a little high we will decrease by 2 days.  I will make the following changes to her heart failure medications.    1.  Decrease digoxin 125 mcg do not take on Thursdays and Saturdays.  2.  Take Lasix 40 mg twice a day for 2 days then return to once a day dosing  3.  Do heart rate blood pressure and daily weight log and bring it to next appointment.  4.  Do blood work 1 to 2 days prior to next visit.

## 2024-04-18 NOTE — PROGRESS NOTES
Subjective         REASONS FOR FOLLOWUP:    1. LEUKOCYTOSIS , NEUTROPHILS HAVE NO GRANULES  2. ANEMIA FOR 3 YEARS,NORMAL MCV: VERY ATYPICAL RED BLOOD CELL MORPHOLOGY  3. CHF AND PULMONARY EDEMA , CAD, ELEVATED CREATININE.    HISTORY OF PRESENT ILLNESS:    The patient returns today for lab review and follow-up.  She continues on prednisone 5 mg daily.  She has been feeling overall well.  Her energy level continues to improve.  She is ambulating with rollator walker.  Appetite is adequate, and her weight is stable at 175 pounds.  She has a granddaughter that is getting  later this year, and she plans to go dress shopping after her appointment today.  She does note about 2 weeks ago she started to have diarrhea.  She is having approximately 3 episodes of diarrhea a day.  She has not been utilizing Imodium.  After further discussion, she was recently started on oral magnesium by her cardiologist around the time that her diarrhea started.  We did review that magnesium can certainly cause diarrhea, and that she should notify her cardiologist about the diarrhea.  Also recommended she start utilizing Imodium to help slow down the diarrhea.  She is staying well-hydrated.  She denies fever, chills, nausea, vomiting.    HEMATOLOGY HISTORY:   On 08/04/2023 I had the opportunity to see this 77-year-old female who has been admitted to the hospital in pulmonary edema, congestive heart failure, associated with cardiac disease. She has undergone recently a cardiac bypass. We have been consulted because the patient has been noticed to have leukocytosis, new issue for her in absence of any other fever or infection and she also has been noticed to have anemia. On further questioning to the patient her primary care physician in [place] has told her for many months that she has been anemic but no specific investigation has been made about that that she is aware of. She has no notion of passage of blood in the stool, no  hematemesis, no melena, no hematuria, no vaginal bleeding. Her diet is complete in nutrients. She has lost some weight because she has been very fatigued lately. The fatigue was associated with her heart operation but even before that she was very tired. The patient also states that she has not had any definitive infection even though she had pneumonia  weeks before her cardiac operation but she got better from this. She has not had any fevers, chills, night sweats, pruritus, adenopathies or rashes. She complains of some shortness of breath upon exertion but this is dramatically better since diuresis was established for the treatment of her CHF. Urinary output has been abundant. She has no difficulty with miction. Her bowel activity has been normal. Her surgical sites in her leg have had some ecchymosis and swelling and she has had significant fluid accumulation in her legs.      The patient has no previous history of hematological disorder. She is not aware of any organ enlargement or anything of this nature.  The patient returned to the office on 08/17/2023 in company of her  and her son. In the interim she was discharged from [facility] a few days ago after her cardiac surgery and her CHF and she has been at home with no appetite whatsoever and significant fatigue. Her appetite is minimum. Her blood sugar has been in the 120 category. She has not had any nausea, vomiting, diarrhea, melena, enterorrhagia, abdominal pain. Urination is normal in volume, urine is normal color. She has minimal cough and some shortness of breath. She has no energy for anything. She requires total care for bathing, dressing and so forth by the son and . She has not had any fever, chills or infection. Her surgical sites in the chest wall and her lower extremities are all dry with no evidence of infection.   On 09/13/2023 the patient returned to the office in company of her  and her son. In the meantime since the  previous visit the patient has had a remarkable clinical improvement. Her appetite has returned to normality. The patient actually has been up and about in the kitchen doing some cooking and doing a little bit of dishes. The patient has been able to get around in her walker and her rollator. Her blood sugar has remained below 200 in spite of the minimal dose of prednisone. She has lesser degree of shortness of breath and near complete resolution of the anasarca. Her bowel activity is normal. Urination is abundant. She has no fevers, chills or infection.     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    Past Medical History:   Diagnosis Date    Acne rosacea 08/17/2021    DR.JEFF MOON     Acute respiratory failure with hypoxia 07/31/2023    Arteriosclerosis of abdominal aorta     B12 deficiency 08/17/2021    DJD (degenerative joint disease)     Hx of smoking 08/17/2021    QUIT IN 1976 AT AGE 31    Hyperlipidemia 08/17/2021    Hypertension     Hypothyroidism     Metabolic syndrome 08/17/2021    NSTEMI (non-ST elevated myocardial infarction) 07/17/2023    Pericardial effusion 10/10/2023    KAREN (stress urinary incontinence, female) 08/17/2021    UTI (urinary tract infection) 08/17/2021    Vitamin D deficiency 08/17/2021        Past Surgical History:   Procedure Laterality Date    BACK SURGERY  2013    CARDIAC CATHETERIZATION N/A 07/17/2023    Procedure: Left Heart Cath;  Surgeon: Dinh Andres MD;  Location: [facility] CATH INVASIVE LOC[facility];  Service: Cardiovascular;  Laterality: N/A;    CARPAL TUNNEL RELEASE      COLONOSCOPY  2011    CORONARY ARTERY BYPASS GRAFT WITH MITRAL VALVE REPAIR/REPLACEMENT N/A 07/20/2023    Procedure: REZA STERNOTOMY OFF-PUMP CORONARY ARTERY BYPASS GRAFT TIMES        USING LEFFT INTERNAL MAMMARY ARTERY AND     GREATER SAPHENOUS VEIN GRAFT PER EMDOSCOPIC VEIN HARVESTING, MAZE PROCEDURE AND  PRP;  Surgeon: Shane Alexander MD;  Location: [facility];  Service: Cardiothoracic;  Laterality: N/A;    KNEE SURGERY      LUMBAR DISCECTOMY      NECK SURGERY      Cervical    POSTERIOR LUMBAR/THORACIC SPINE FUSION          Current Outpatient Medications on File Prior to Visit   Medication Sig Dispense Refill    acetaminophen (TYLENOL) 325 MG tablet Take 2 tablets by mouth Every 4 (Four) Hours As Needed for Mild Pain.      albuterol sulfate  (90 Base) MCG/ACT inhaler Inhale 2 puffs Every 4 (Four) Hours As Needed for Wheezing. 18 g 0    aspirin 81 MG EC tablet Take 1 tablet by mouth Every Other Day.      atorvastatin (LIPITOR) 40 MG tablet Take 1 tablet by mouth Daily. 90 tablet 3    buPROPion XL (WELLBUTRIN XL) 300 MG 24 hr tablet Take 1 tablet by mouth Daily. 90 tablet 1    cyanocobalamin 1000 MCG/ML injection Inject 1 mL into the appropriate muscle as directed by prescriber Every 28 (Twenty-Eight) Days. 3 mL 4    dapagliflozin Propanediol (Farxiga) 10 MG tablet TAKE 1 TABLET BY MOUTH EVERY DAY 90 tablet 3    digoxin (LANOXIN) 125 MCG tablet Take 1 tablet by mouth Daily. Omit on Thursdays and Saturdays 90 tablet 3    Eliquis 5 MG tablet tablet TAKE 1 TABLET BY MOUTH EVERY 12 HOURS 60 tablet 3    folic acid (FOLVITE) 1 MG tablet TAKE 1 TABLET BY MOUTH DAILY 30 tablet 1    furosemide (LASIX) 40 MG tablet Take 1 tablet by mouth Daily. Omit on Sundays 45 tablet 2    magnesium oxide (MAG-OX) 400 MG tablet Take 1 tablet by mouth Daily.      metFORMIN ER (GLUCOPHAGE-XR) 500 MG 24 hr tablet Take 1 tablet by mouth 2 (Two) Times a Day. 180 tablet 3    metoprolol succinate XL (TOPROL-XL) 100 MG 24 hr tablet Take 1 tablet by mouth Every 12 (Twelve) Hours. 180 tablet 3    montelukast (SINGULAIR) 10 MG tablet Take 1 tablet by mouth Daily. 90 tablet 3    predniSONE (DELTASONE) 5 MG tablet TAKE 5 MG EVERY DAY (Patient taking differently: Take 1 tablet by mouth Every Other Day.) 60 tablet 1    Probiotic Product  "(LENZ [facility] PO) Take 1 capsule by mouth Daily.      sacubitril-valsartan (Entresto) 24-26 MG tablet Take 1 tablet by mouth 2 (Two) Times a Day. 180 tablet 3    spironolactone (ALDACTONE) 25 MG tablet Take 1 tablet by mouth Daily.      Synthroid 75 MCG tablet Take 1 tablet by mouth Daily. 90 tablet 3     No current facility-administered medications on file prior to visit.        ALLERGIES:    Allergies   Allergen Reactions    Penicillins Palpitations     1. When was your reaction? > 10 years ago  2. Did your reaction happen after the first dose or after several doses? After first dose  3. Did your reaction require ED or hospital care to manage your reaction? Do not know  4. Did your reaction require treatment with epinephrine? Do not know  5. Have you taken amoxicillin (Amoxil) or amoxicillin-clavulanate (Augmentin) without issue since? Yes  6. Have you taken cephalexin (Keflex) without issue since?  Do not know         Social History     Socioeconomic History    Marital status:      Spouse name: Dinh   Tobacco Use    Smoking status: Former     Current packs/day: 0.00     Average packs/day: 1 pack/day for 12.0 years (12.0 ttl pk-yrs)     Types: Cigarettes     Start date:      Quit date:      Years since quittin.3    Smokeless tobacco: Never   Vaping Use    Vaping status: Never Used   Substance and Sexual Activity    Alcohol use: Never    Drug use: Never    Sexual activity: Defer        Family History   Problem Relation Age of Onset    Heart disease Mother     Arthritis Mother     Heart attack Mother     Arthritis Father         Objective     Vitals:    24 0927   BP: 157/73   Pulse: 87   Temp: 98 °F (36.7 °C)   TempSrc: Temporal   SpO2: 95%   Weight: 79.4 kg (175 lb)   Height: 162.6 cm (64.02\")   PainSc: 0-No pain           2024    11:16 AM   Current Status   ECOG score 1     Physical Exam  Vitals reviewed.   HENT:      Head: Normocephalic.      Nose: Nose normal.      " Mouth/Throat:      Mouth: Mucous membranes are moist.      Pharynx: Oropharynx is clear.   Eyes:      Conjunctiva/sclera: Conjunctivae normal.      Pupils: Pupils are equal, round, and reactive to light.   Cardiovascular:      Rate and Rhythm: Normal rate.      Heart sounds: Normal heart sounds. Murmur heard.   Pulmonary:      Effort: Pulmonary effort is normal.      Breath sounds: Normal breath sounds.   Abdominal:      General: Bowel sounds are normal.   Skin:     General: Skin is warm and dry.      Findings: No rash.   Neurological:      General: No focal deficit present.      Mental Status: She is alert and oriented to person, place, and time. Mental status is at baseline.      Motor: Weakness (rollator) present.   Psychiatric:         Mood and Affect: Mood normal.         Behavior: Behavior normal.         Thought Content: Thought content normal.         Judgment: Judgment normal.           RECENT LABS:  Hematology WBC   Date Value Ref Range Status   04/19/2024 10.40 3.40 - 10.80 10*3/mm3 Final     RBC   Date Value Ref Range Status   04/19/2024 3.25 (L) 3.77 - 5.28 10*6/mm3 Final     Hemoglobin   Date Value Ref Range Status   04/19/2024 10.7 (L) 12.0 - 15.9 g/dL Final     Hematocrit   Date Value Ref Range Status   04/19/2024 33.3 (L) 34.0 - 46.6 % Final     Platelets   Date Value Ref Range Status   04/19/2024 230 140 - 450 10*3/mm3 Final       CBC:    WBC   Date Value Ref Range Status   04/19/2024 10.40 3.40 - 10.80 10*3/mm3 Final     RBC   Date Value Ref Range Status   04/19/2024 3.25 (L) 3.77 - 5.28 10*6/mm3 Final     Hemoglobin   Date Value Ref Range Status   04/19/2024 10.7 (L) 12.0 - 15.9 g/dL Final     Hematocrit   Date Value Ref Range Status   04/19/2024 33.3 (L) 34.0 - 46.6 % Final     MCV   Date Value Ref Range Status   04/19/2024 102.5 (H) 79.0 - 97.0 fL Final     MCH   Date Value Ref Range Status   04/19/2024 32.9 26.6 - 33.0 pg Final     MCHC   Date Value Ref Range Status   04/19/2024 32.1 31.5 -  35.7 g/dL Final     RDW   Date Value Ref Range Status   04/19/2024 18.1 (H) 12.3 - 15.4 % Final     RDW-SD   Date Value Ref Range Status   04/19/2024 68.0 (H) 37.0 - 54.0 fl Final     MPV   Date Value Ref Range Status   04/19/2024 10.6 6.0 - 12.0 fL Final     Platelets   Date Value Ref Range Status   04/19/2024 230 140 - 450 10*3/mm3 Final     Neutrophil %   Date Value Ref Range Status   04/19/2024 61.1 42.7 - 76.0 % Final     Lymphocyte %   Date Value Ref Range Status   04/19/2024 26.9 19.6 - 45.3 % Final     Monocyte %   Date Value Ref Range Status   04/19/2024 10.3 5.0 - 12.0 % Final     Eosinophil %   Date Value Ref Range Status   04/19/2024 0.9 0.3 - 6.2 % Final     Basophil %   Date Value Ref Range Status   04/19/2024 0.4 0.0 - 1.5 % Final     Immature Grans %   Date Value Ref Range Status   04/19/2024 0.4 0.0 - 0.5 % Final     Neutrophils, Absolute   Date Value Ref Range Status   04/19/2024 6.36 1.70 - 7.00 10*3/mm3 Final     Lymphocytes, Absolute   Date Value Ref Range Status   04/19/2024 2.80 0.70 - 3.10 10*3/mm3 Final     Monocytes, Absolute   Date Value Ref Range Status   04/19/2024 1.07 (H) 0.10 - 0.90 10*3/mm3 Final     Eosinophils, Absolute   Date Value Ref Range Status   04/19/2024 0.09 0.00 - 0.40 10*3/mm3 Final     Basophils, Absolute   Date Value Ref Range Status   04/19/2024 0.04 0.00 - 0.20 10*3/mm3 Final     Immature Grans, Absolute   Date Value Ref Range Status   04/19/2024 0.04 0.00 - 0.05 10*3/mm3 Final     nRBC   Date Value Ref Range Status   04/19/2024 0.0 0.0 - 0.2 /100 WBC Final        CMP:    Glucose   Date Value Ref Range Status   04/12/2024 112 (H) 65 - 99 mg/dL Final     BUN   Date Value Ref Range Status   04/12/2024 36 (H) 8 - 23 mg/dL Final     Creatinine   Date Value Ref Range Status   04/12/2024 1.51 (H) 0.57 - 1.00 mg/dL Final     Sodium   Date Value Ref Range Status   04/12/2024 144 136 - 145 mmol/L Final     Potassium   Date Value Ref Range Status   04/12/2024 4.8 3.5 - 5.2  mmol/L Final     Chloride   Date Value Ref Range Status   04/12/2024 105 98 - 107 mmol/L Final     CO2   Date Value Ref Range Status   04/12/2024 29.8 (H) 22.0 - 29.0 mmol/L Final     Calcium   Date Value Ref Range Status   04/12/2024 9.2 8.6 - 10.5 mg/dL Final     Total Protein   Date Value Ref Range Status   04/12/2024 6.7 6.0 - 8.5 g/dL Final     Albumin   Date Value Ref Range Status   04/12/2024 4.2 3.5 - 5.2 g/dL Final     ALT (SGPT)   Date Value Ref Range Status   04/12/2024 37 (H) 1 - 33 U/L Final     AST (SGOT)   Date Value Ref Range Status   04/12/2024 30 1 - 32 U/L Final     Alkaline Phosphatase   Date Value Ref Range Status   04/12/2024 56 39 - 117 U/L Final     Total Bilirubin   Date Value Ref Range Status   04/12/2024 0.8 0.0 - 1.2 mg/dL Final     Globulin   Date Value Ref Range Status   04/12/2024 2.5 gm/dL Final     A/G Ratio   Date Value Ref Range Status   04/12/2024 1.7 g/dL Final     BUN/Creatinine Ratio   Date Value Ref Range Status   04/12/2024 23.8 7.0 - 25.0 Final     Anion Gap   Date Value Ref Range Status   04/12/2024 9.2 5.0 - 15.0 mmol/L Final                 Assessment & Plan     Diagnoses and all orders for this visit:    1. Iron deficiency anemia secondary to inadequate dietary iron intake (Primary)  -     CBC & Differential; Future  -     Comprehensive Metabolic Panel; Future  -     Retic With IRF & RET-He; Future  -     Ferritin; Future  -     Iron Profile; Future  -     Vitamin B12; Future        *Anemia  anemia has been a slow process for the last 3 years and her leukocytosis has also been present for the same period of time.   Normal B12, normal folate, normal ferritin, normal iron profile. Her LDH was normal. Serum protein immunoelectrophoresis was negative for monoclonal protein.   Coomb's test that was positive, complement was positive.  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  Her peripheral blood has an element of lymphocytosis and raises the  question if she has a lymphoid malignancy like for example chronic lymphoid leukemia that will trigger leukocytosis, lymphocytosis and hemolytic anemia.  Her hemoglobin does remain low and she remains significantly fatigued.  She has no peripheral adenopathy palpable. No obvious splenomegaly clinically.   Will have her start a multivitamin like prenatal 3 times a week, start folic acid 1 mg daily, start prednisone 10 mg daily.  Of note, patient is diabetic so would not recommend more than 10 mg of prednisone daily.  Recommend she continue checking blood sugars on a regular basis and if blood sugars above 200 she will need to notify her PCP.  Peripheral blood flow cytometry sent.  Will hold off on sending BCR/ABL given the differential and the white count.  Will hold off on any radiological assessment of the abdomen/pelvis.  I am going to go ahead and send a peripheral blood flow cytometry today. A BCR/ABL was going to be sent, I find no reason for this given the differential in the white count.   CCP antibody negative, NORMA was positive, a DNA-DSA confirmation that was negative.  Peripheral blood flow cytometry showed large granular lymphocytic disorder consistent with LGL.  This was reviewed with the patient, it was explained that she likely has an autoimmune condition that is triggering also LGL showing up.  This would explain persistent leukocytosis.  If she does not respond to prednisone, she will likely require Rituxan.  9/13/2023: Appetite significantly improved with prednisone.  Starting to regain strength and able to get around the house with rollator.  Her anemia has substantially improved and we do not believe that the patient has significant degree of hemolysis with a reticulocyte count that is normal, LDH that is normal, bilirubin that is normal.  Prednisone will be reduced to 5 mg daily with a goal to then transition to 5 mg every other day.  11/01/2023, she is on a glucose-restricted diet given her  diabetes and now on a potassium-restricted diet given the fact that she has been found to have a high potassium and this is probably related to the combination and utilization of Aldactone along with Entresto.  Overall, her large granular lymphocytic leukemia numbers are not changing. Her white count remains minimally elevated and her hemoglobin remains still on the low side.  Reduce prednisone to 5 mg every other day and continue multivitamin.  01/26/2024 the patient feels and looks dramatically better.  She is ambulating with use of a walker and more active around the home.  Her appetite is improved, weight improved.  Most importantly her white count is normal, her hemoglobin has improved and her platelet count is normal. Her white count differential is no different than before.  We will continue prednisone 5 mg daily.  4/19/2024: Continues prednisone 5 mg daily.  Appetite and energy level doing well.  Hemoglobin 10.7.  Iron saturation 8%, TIBC 335, ferritin 756.  Currently no need for iron replacement.    *Congestive heart failure-following with cardiology, on diuretics.    *Chronic [id number] with cardiology.  On metoprolol and Eliquis.    *Hyperkalemia-Aldactone and Entresto are probably the reason for this.  Recommended discussing further with cardiology.    *Diabetic-following with PCP.  Monitoring glucose levels closely while on prenisone.    *Diarrhea-ongoing for about 2 weeks.  Having about 3 episodes of diarrhea most days.  Has not tried utilizing antidiarrheals.  She did start oral magnesium around the same time her diarrhea presented.  She was told by cardiology to start on oral magnesium daily.  Recommended she stay well-hydrated, start utilizing Imodium A-D as needed for diarrhea.  Also recommended she notify cardiology about the diarrhea in regards to the oral magnesium.    PLAN:   Continue prednisone 5 mg daily.  Continue folic acid 1 mg daily.  Continue B12 injections, done at home.  Return in  3 months with CBC, CMP, reticulated hemoglobin, ferritin, iron profile and a new B12 level.  Continue follow-up with cardiology as scheduled.  Did recommend she notify cardiology about new onset of diarrhea that occurred after initiating oral magnesium that they have recommended she take.  Start Imodium A-D as needed for diarrhea.  Stable hydrated.

## 2024-04-18 NOTE — ASSESSMENT & PLAN NOTE
Ms. Hughes's kidney function has improved slightly but remains low.  We will continue to monitor with labs.

## 2024-04-19 ENCOUNTER — LAB (OUTPATIENT)
Dept: LAB | Facility: HOSPITAL | Age: 79
End: 2024-04-19
Payer: MEDICARE

## 2024-04-19 ENCOUNTER — OFFICE VISIT (OUTPATIENT)
Dept: ONCOLOGY | Facility: CLINIC | Age: 79
End: 2024-04-19
Payer: MEDICARE

## 2024-04-19 VITALS
HEIGHT: 64 IN | BODY MASS INDEX: 29.88 KG/M2 | OXYGEN SATURATION: 95 % | DIASTOLIC BLOOD PRESSURE: 73 MMHG | SYSTOLIC BLOOD PRESSURE: 157 MMHG | TEMPERATURE: 98 F | WEIGHT: 175 LBS | HEART RATE: 87 BPM

## 2024-04-19 DIAGNOSIS — R19.7 DIARRHEA, UNSPECIFIED TYPE: ICD-10-CM

## 2024-04-19 DIAGNOSIS — D50.8 IRON DEFICIENCY ANEMIA SECONDARY TO INADEQUATE DIETARY IRON INTAKE: ICD-10-CM

## 2024-04-19 DIAGNOSIS — D50.8 IRON DEFICIENCY ANEMIA SECONDARY TO INADEQUATE DIETARY IRON INTAKE: Primary | ICD-10-CM

## 2024-04-19 LAB
ALBUMIN SERPL-MCNC: 4.2 G/DL (ref 3.5–5.2)
ALBUMIN/GLOB SERPL: 1.5 G/DL
ALP SERPL-CCNC: 48 U/L (ref 39–117)
ALT SERPL W P-5'-P-CCNC: 24 U/L (ref 1–33)
ANION GAP SERPL CALCULATED.3IONS-SCNC: 12.4 MMOL/L (ref 5–15)
AST SERPL-CCNC: 26 U/L (ref 1–32)
BASOPHILS # BLD AUTO: 0.04 10*3/MM3 (ref 0–0.2)
BASOPHILS NFR BLD AUTO: 0.4 % (ref 0–1.5)
BILIRUB SERPL-MCNC: 1.2 MG/DL (ref 0–1.2)
BUN SERPL-MCNC: 33 MG/DL (ref 8–23)
BUN/CREAT SERPL: 22.1 (ref 7–25)
CALCIUM SPEC-SCNC: 9.5 MG/DL (ref 8.6–10.5)
CHLORIDE SERPL-SCNC: 103 MMOL/L (ref 98–107)
CO2 SERPL-SCNC: 25.6 MMOL/L (ref 22–29)
CREAT SERPL-MCNC: 1.49 MG/DL (ref 0.57–1)
DEPRECATED RDW RBC AUTO: 68 FL (ref 37–54)
EGFRCR SERPLBLD CKD-EPI 2021: 35.6 ML/MIN/1.73
EOSINOPHIL # BLD AUTO: 0.09 10*3/MM3 (ref 0–0.4)
EOSINOPHIL NFR BLD AUTO: 0.9 % (ref 0.3–6.2)
ERYTHROCYTE [DISTWIDTH] IN BLOOD BY AUTOMATED COUNT: 18.1 % (ref 12.3–15.4)
FERRITIN SERPL-MCNC: 756 NG/ML (ref 13–150)
GLOBULIN UR ELPH-MCNC: 2.8 GM/DL
GLUCOSE SERPL-MCNC: 110 MG/DL (ref 65–99)
HCT VFR BLD AUTO: 33.3 % (ref 34–46.6)
HGB BLD-MCNC: 10.7 G/DL (ref 12–15.9)
HGB RETIC QN AUTO: 33.8 PG (ref 29.8–36.1)
IMM GRANULOCYTES # BLD AUTO: 0.04 10*3/MM3 (ref 0–0.05)
IMM GRANULOCYTES NFR BLD AUTO: 0.4 % (ref 0–0.5)
IMM RETICS NFR: 13.4 % (ref 3–15.8)
IRON 24H UR-MRATE: 127 MCG/DL (ref 37–145)
IRON SATN MFR SERPL: 38 % (ref 20–50)
LYMPHOCYTES # BLD AUTO: 2.8 10*3/MM3 (ref 0.7–3.1)
LYMPHOCYTES NFR BLD AUTO: 26.9 % (ref 19.6–45.3)
MCH RBC QN AUTO: 32.9 PG (ref 26.6–33)
MCHC RBC AUTO-ENTMCNC: 32.1 G/DL (ref 31.5–35.7)
MCV RBC AUTO: 102.5 FL (ref 79–97)
MONOCYTES # BLD AUTO: 1.07 10*3/MM3 (ref 0.1–0.9)
MONOCYTES NFR BLD AUTO: 10.3 % (ref 5–12)
NEUTROPHILS NFR BLD AUTO: 6.36 10*3/MM3 (ref 1.7–7)
NEUTROPHILS NFR BLD AUTO: 61.1 % (ref 42.7–76)
NRBC BLD AUTO-RTO: 0 /100 WBC (ref 0–0.2)
PLATELET # BLD AUTO: 230 10*3/MM3 (ref 140–450)
PMV BLD AUTO: 10.6 FL (ref 6–12)
POTASSIUM SERPL-SCNC: 4.6 MMOL/L (ref 3.5–5.2)
PROT SERPL-MCNC: 7 G/DL (ref 6–8.5)
RBC # BLD AUTO: 3.25 10*6/MM3 (ref 3.77–5.28)
RETICS # AUTO: 0.03 10*6/MM3 (ref 0.02–0.13)
RETICS/RBC NFR AUTO: 0.83 % (ref 0.7–1.9)
SODIUM SERPL-SCNC: 141 MMOL/L (ref 136–145)
TIBC SERPL-MCNC: 335 MCG/DL (ref 298–536)
TRANSFERRIN SERPL-MCNC: 225 MG/DL (ref 200–360)
VIT B12 BLD-MCNC: 1251 PG/ML (ref 211–946)
WBC NRBC COR # BLD AUTO: 10.4 10*3/MM3 (ref 3.4–10.8)

## 2024-04-19 PROCEDURE — 84466 ASSAY OF TRANSFERRIN: CPT

## 2024-04-19 PROCEDURE — 80053 COMPREHEN METABOLIC PANEL: CPT

## 2024-04-19 PROCEDURE — 85046 RETICYTE/HGB CONCENTRATE: CPT

## 2024-04-19 PROCEDURE — 82607 VITAMIN B-12: CPT | Performed by: NURSE PRACTITIONER

## 2024-04-19 PROCEDURE — 36415 COLL VENOUS BLD VENIPUNCTURE: CPT

## 2024-04-19 PROCEDURE — 83540 ASSAY OF IRON: CPT

## 2024-04-19 PROCEDURE — 82728 ASSAY OF FERRITIN: CPT

## 2024-04-19 PROCEDURE — 85025 COMPLETE CBC W/AUTO DIFF WBC: CPT

## 2024-04-29 ENCOUNTER — TELEPHONE (OUTPATIENT)
Dept: FAMILY MEDICINE CLINIC | Facility: CLINIC | Age: 79
End: 2024-04-29
Payer: MEDICARE

## 2024-04-30 RX ORDER — LEVOTHYROXINE SODIUM 75 MCG
75 TABLET ORAL DAILY
Qty: 90 TABLET | Refills: 3 | Status: SHIPPED | OUTPATIENT
Start: 2024-04-30

## 2024-04-30 NOTE — TELEPHONE ENCOUNTER
Caller: Ann-Marie Hughes    Relationship: Self    Best call back number: [phone]     Requested Prescriptions:   Requested Prescriptions     Pending Prescriptions Disp Refills    Synthroid 75 MCG tablet 90 tablet 3     Sig: Take 1 tablet by mouth Daily.        Pharmacy where request should be sent: [facility] - [place], [place] - [address] - [phone]  - [phone]     Last office visit with prescribing clinician: 12/29/2023   Last telemedicine visit with prescribing clinician: Visit date not found   Next office visit with prescribing clinician: 6/28/2024     Does the patient have less than a 3 day supply:  [] Yes  [x] No    Oliverio Leon Rep   04/30/24 10:30 EDT

## 2024-05-08 RX ORDER — FOLIC ACID 1 MG/1
1000 TABLET ORAL DAILY
Qty: 30 TABLET | Refills: 1 | Status: SHIPPED | OUTPATIENT
Start: 2024-05-08

## 2024-05-25 ENCOUNTER — HOSPITAL ENCOUNTER (EMERGENCY)
Facility: HOSPITAL | Age: 79
Discharge: HOME | End: 2024-05-25
Attending: EMERGENCY MEDICINE
Payer: MEDICARE

## 2024-05-25 ENCOUNTER — APPOINTMENT (OUTPATIENT)
Dept: GENERAL RADIOLOGY | Facility: HOSPITAL | Age: 79
End: 2024-05-25
Payer: MEDICARE

## 2024-05-25 VITALS
OXYGEN SATURATION: 96 % | TEMPERATURE: 98.6 F | WEIGHT: 183.42 LBS | DIASTOLIC BLOOD PRESSURE: 70 MMHG | HEART RATE: 62 BPM | BODY MASS INDEX: 31.31 KG/M2 | RESPIRATION RATE: 13 BRPM | SYSTOLIC BLOOD PRESSURE: 194 MMHG | HEIGHT: 64 IN

## 2024-05-25 DIAGNOSIS — R07.89 ATYPICAL CHEST PAIN: Primary | ICD-10-CM

## 2024-05-25 DIAGNOSIS — M25.512 ACUTE PAIN OF LEFT SHOULDER: ICD-10-CM

## 2024-05-25 LAB
ALBUMIN SERPL-MCNC: 4 G/DL (ref 3.5–5.2)
ALBUMIN/GLOB SERPL: 1.7 G/DL
ALP SERPL-CCNC: 50 U/L (ref 39–117)
ALT SERPL W P-5'-P-CCNC: 14 U/L (ref 1–33)
ANION GAP SERPL CALCULATED.3IONS-SCNC: 11 MMOL/L (ref 5–15)
AST SERPL-CCNC: 15 U/L (ref 1–32)
BASOPHILS # BLD AUTO: 0.07 10*3/MM3 (ref 0–0.2)
BASOPHILS NFR BLD AUTO: 0.5 % (ref 0–1.5)
BILIRUB SERPL-MCNC: 0.7 MG/DL (ref 0–1.2)
BUN SERPL-MCNC: 32 MG/DL (ref 8–23)
BUN/CREAT SERPL: 26 (ref 7–25)
CALCIUM SPEC-SCNC: 9.2 MG/DL (ref 8.6–10.5)
CHLORIDE SERPL-SCNC: 109 MMOL/L (ref 98–107)
CO2 SERPL-SCNC: 22 MMOL/L (ref 22–29)
CREAT SERPL-MCNC: 1.23 MG/DL (ref 0.57–1)
DEPRECATED RDW RBC AUTO: 69.6 FL (ref 37–54)
EGFRCR SERPLBLD CKD-EPI 2021: 44.8 ML/MIN/1.73
EOSINOPHIL # BLD AUTO: 0.18 10*3/MM3 (ref 0–0.4)
EOSINOPHIL NFR BLD AUTO: 1.3 % (ref 0.3–6.2)
ERYTHROCYTE [DISTWIDTH] IN BLOOD BY AUTOMATED COUNT: 18.6 % (ref 12.3–15.4)
GEN 5 2HR TROPONIN T REFLEX: 22 NG/L
GLOBULIN UR ELPH-MCNC: 2.3 GM/DL
GLUCOSE SERPL-MCNC: 125 MG/DL (ref 65–99)
HCT VFR BLD AUTO: 26.8 % (ref 34–46.6)
HGB BLD-MCNC: 8.5 G/DL (ref 12–15.9)
HOLD SPECIMEN: NORMAL
HOLD SPECIMEN: NORMAL
IMM GRANULOCYTES # BLD AUTO: 0.1 10*3/MM3 (ref 0–0.05)
IMM GRANULOCYTES NFR BLD AUTO: 0.7 % (ref 0–0.5)
LIPASE SERPL-CCNC: 24 U/L (ref 13–60)
LYMPHOCYTES # BLD AUTO: 2.84 10*3/MM3 (ref 0.7–3.1)
LYMPHOCYTES NFR BLD AUTO: 19.8 % (ref 19.6–45.3)
MAGNESIUM SERPL-MCNC: 1.9 MG/DL (ref 1.6–2.4)
MCH RBC QN AUTO: 32.1 PG (ref 26.6–33)
MCHC RBC AUTO-ENTMCNC: 31.7 G/DL (ref 31.5–35.7)
MCV RBC AUTO: 101.1 FL (ref 79–97)
MONOCYTES # BLD AUTO: 1.36 10*3/MM3 (ref 0.1–0.9)
MONOCYTES NFR BLD AUTO: 9.5 % (ref 5–12)
NEUTROPHILS NFR BLD AUTO: 68.2 % (ref 42.7–76)
NEUTROPHILS NFR BLD AUTO: 9.8 10*3/MM3 (ref 1.7–7)
NRBC BLD AUTO-RTO: 0.5 /100 WBC (ref 0–0.2)
NT-PROBNP SERPL-MCNC: ABNORMAL PG/ML (ref 0–1800)
PLATELET # BLD AUTO: 217 10*3/MM3 (ref 140–450)
PMV BLD AUTO: 10.9 FL (ref 6–12)
POTASSIUM SERPL-SCNC: 3.9 MMOL/L (ref 3.5–5.2)
PROT SERPL-MCNC: 6.3 G/DL (ref 6–8.5)
QT INTERVAL: 362 MS
QT INTERVAL: 372 MS
QTC INTERVAL: 364 MS
QTC INTERVAL: 369 MS
RBC # BLD AUTO: 2.65 10*6/MM3 (ref 3.77–5.28)
SODIUM SERPL-SCNC: 142 MMOL/L (ref 136–145)
TROPONIN T DELTA: 0 NG/L
TROPONIN T SERPL HS-MCNC: 22 NG/L
WBC NRBC COR # BLD AUTO: 14.35 10*3/MM3 (ref 3.4–10.8)
WHOLE BLOOD HOLD COAG: NORMAL
WHOLE BLOOD HOLD SPECIMEN: NORMAL

## 2024-05-25 PROCEDURE — 96374 THER/PROPH/DIAG INJ IV PUSH: CPT

## 2024-05-25 PROCEDURE — 83735 ASSAY OF MAGNESIUM: CPT | Performed by: EMERGENCY MEDICINE

## 2024-05-25 PROCEDURE — 80053 COMPREHEN METABOLIC PANEL: CPT | Performed by: EMERGENCY MEDICINE

## 2024-05-25 PROCEDURE — 71045 X-RAY EXAM CHEST 1 VIEW: CPT

## 2024-05-25 PROCEDURE — 73030 X-RAY EXAM OF SHOULDER: CPT

## 2024-05-25 PROCEDURE — 99284 EMERGENCY DEPT VISIT MOD MDM: CPT

## 2024-05-25 PROCEDURE — 83880 ASSAY OF NATRIURETIC PEPTIDE: CPT | Performed by: EMERGENCY MEDICINE

## 2024-05-25 PROCEDURE — 93010 ELECTROCARDIOGRAM REPORT: CPT | Performed by: INTERNAL MEDICINE

## 2024-05-25 PROCEDURE — 36415 COLL VENOUS BLD VENIPUNCTURE: CPT

## 2024-05-25 PROCEDURE — 84484 ASSAY OF TROPONIN QUANT: CPT | Performed by: EMERGENCY MEDICINE

## 2024-05-25 PROCEDURE — 25010000002 FUROSEMIDE PER 20 MG: Performed by: EMERGENCY MEDICINE

## 2024-05-25 PROCEDURE — 83690 ASSAY OF LIPASE: CPT | Performed by: EMERGENCY MEDICINE

## 2024-05-25 PROCEDURE — 85025 COMPLETE CBC W/AUTO DIFF WBC: CPT | Performed by: EMERGENCY MEDICINE

## 2024-05-25 PROCEDURE — 93005 ELECTROCARDIOGRAM TRACING: CPT | Performed by: EMERGENCY MEDICINE

## 2024-05-25 RX ORDER — FUROSEMIDE 10 MG/ML
40 INJECTION INTRAMUSCULAR; INTRAVENOUS ONCE
Status: COMPLETED | OUTPATIENT
Start: 2024-05-25 | End: 2024-05-25

## 2024-05-25 RX ORDER — SODIUM CHLORIDE 0.9 % (FLUSH) 0.9 %
10 SYRINGE (ML) INJECTION AS NEEDED
Status: DISCONTINUED | OUTPATIENT
Start: 2024-05-25 | End: 2024-05-25 | Stop reason: HOSPADM

## 2024-05-25 RX ORDER — HYDROCODONE BITARTRATE AND ACETAMINOPHEN 5; 325 MG/1; MG/1
1 TABLET ORAL ONCE
Status: COMPLETED | OUTPATIENT
Start: 2024-05-25 | End: 2024-05-25

## 2024-05-25 RX ORDER — ASPIRIN 81 MG/1
324 TABLET, CHEWABLE ORAL ONCE
Status: DISCONTINUED | OUTPATIENT
Start: 2024-05-25 | End: 2024-05-25 | Stop reason: HOSPADM

## 2024-05-25 RX ADMIN — FUROSEMIDE 40 MG: 10 INJECTION, SOLUTION INTRAMUSCULAR; INTRAVENOUS at 11:54

## 2024-05-25 RX ADMIN — HYDROCODONE BITARTRATE AND ACETAMINOPHEN 1 TABLET: 5; 325 TABLET ORAL at 14:15

## 2024-05-25 NOTE — ED NOTES
Pt was brought to split flow for shoulder pain, upon talking to pt she reports she has chest tightness with increased shortness of air and has had increased swelling in her feet this am. I educated pt the importance of letting staff know when she was having any type of chest pain/tightness. Pt taken to room 9 and placed on cardiac monitoring and EKG done. FELICIA Vaughn at bedside.

## 2024-05-25 NOTE — ED PROVIDER NOTES
Time: 11:22 AM EDT  Date of encounter:  5/25/2024  Independent Historian/Clinical History and Information was obtained by:   Patient    History is limited by: N/A    Chief Complaint: Chest pain, shoulder pain      History of Present Illness:  Patient is a 78 y.o. year old female who presents to the emergency department for evaluation of chest pain and shoulder pain.  Reports this been an ongoing for the last few days.  States that she has had left shoulder pain has been ongoing for about a week.  States that it hurts worse when she moves it.  She also reports that she has been having some chest pressure that has been ongoing for the last couple days.  Denies any nausea or vomiting but states that she has chronic shortness of breath.  Also reported that she has had a little bit of weight gain.  Does have a history of respiratory failure as well as CHF and coronary artery disease.  Has been compliant with her medications.  Denies any known injuries.  No other complaints this time.    HPI    Patient Care Team  Primary Care Provider: Romario Valdez,     Past Medical History:     Allergies   Allergen Reactions    Penicillins Palpitations     1. When was your reaction? > 10 years ago  2. Did your reaction happen after the first dose or after several doses? After first dose  3. Did your reaction require ED or hospital care to manage your reaction? Do not know  4. Did your reaction require treatment with epinephrine? Do not know  5. Have you taken amoxicillin (Amoxil) or amoxicillin-clavulanate (Augmentin) without issue since? Yes  6. Have you taken cephalexin (Keflex) without issue since?  Do not know      Past Medical History:   Diagnosis Date    Acne rosacea 08/17/2021    DR.JEFF MOON     Acute respiratory failure with hypoxia 07/31/2023    Arteriosclerosis of abdominal aorta     B12 deficiency 08/17/2021    DJD (degenerative joint disease)     Hx of smoking 08/17/2021    QUIT IN 1976 AT AGE 31     Hyperlipidemia 08/17/2021    Hypertension     Hypothyroidism     Metabolic syndrome 08/17/2021    NSTEMI (non-ST elevated myocardial infarction) 07/17/2023    Pericardial effusion 10/10/2023    KAREN (stress urinary incontinence, female) 08/17/2021    UTI (urinary tract infection) 08/17/2021    Vitamin D deficiency 08/17/2021     Past Surgical History:   Procedure Laterality Date    BACK SURGERY  2013    CARDIAC CATHETERIZATION N/A 07/17/2023    Procedure: Left Heart Cath;  Surgeon: Dinh Andres MD;  Location:  [facility];  Service: Cardiovascular;  Laterality: N/A;    CARPAL TUNNEL RELEASE      COLONOSCOPY  2011    CORONARY ARTERY BYPASS GRAFT WITH MITRAL VALVE REPAIR/REPLACEMENT N/A 07/20/2023    Procedure: REZA STERNOTOMY OFF-PUMP CORONARY ARTERY BYPASS GRAFT TIMES        USING LEFFT INTERNAL MAMMARY ARTERY AND     GREATER SAPHENOUS VEIN GRAFT PER EMDOSCOPIC VEIN HARVESTING, MAZE PROCEDURE AND PRP;  Surgeon: Shane Alexander MD;  Location: [facility];  Service: Cardiothoracic;  Laterality: N/A;    KNEE SURGERY      LUMBAR DISCECTOMY      NECK SURGERY      Cervical    POSTERIOR LUMBAR/THORACIC SPINE FUSION       Family History   Problem Relation Age of Onset    Heart disease Mother     Arthritis Mother     Heart attack Mother     Arthritis Father        Home Medications:  Prior to Admission medications    Medication Sig Start Date End Date Taking? Authorizing Provider   acetaminophen (TYLENOL) 325 MG tablet Take 2 tablets by mouth Every 4 (Four) Hours As Needed for Mild Pain. 7/28/23   Anjelica Chase APRN   albuterol sulfate  (90 Base) MCG/ACT inhaler Inhale 2 puffs Every 4 (Four) Hours As Needed for Wheezing. 5/30/23   Les Moreno,    aspirin 81 MG EC tablet Take 1 tablet by mouth Every Other Day.    Provider, MD Kanwal   atorvastatin (LIPITOR) 40 MG tablet Take 1 tablet by mouth Daily. 11/7/23   Valerie Valera MD   buPROPion XL (WELLBUTRIN XL) 300 MG 24 hr  tablet Take 1 tablet by mouth Daily. 12/13/23   Romario Valdez DO   cyanocobalamin 1000 MCG/ML injection Inject 1 mL into the appropriate muscle as directed by prescriber Every 28 (Twenty-Eight) Days. 2/15/24   Berry Heredia MD   dapagliflozin Propanediol (Farxiga) 10 MG tablet TAKE 1 TABLET BY MOUTH EVERY DAY 12/4/23   Valerie Valera MD   digoxin (LANOXIN) 125 MCG tablet Take 1 tablet by mouth Daily. Omit on Thursdays and Saturdays 4/17/24   Sara Lubin APRN   Eliquis 5 MG tablet tablet TAKE 1 TABLET BY MOUTH EVERY 12 HOURS 3/4/24   Valerie Valera MD   folic acid (FOLVITE) 1 MG tablet TAKE 1 TABLET BY MOUTH EVERY DAY 5/8/24   Boston Can MD   furosemide (LASIX) 40 MG tablet Take 1 tablet by mouth Daily. Omit on Sundays 3/21/24   Sara Lubin APRN   glucose blood test strip Check blood sugar once daily and record. 4/29/24   Romario Valdez DO   magnesium oxide (MAG-OX) 400 MG tablet Take 1 tablet by mouth Daily.    ProviderKanwal MD   metFORMIN ER (GLUCOPHAGE-XR) 500 MG 24 hr tablet Take 1 tablet by mouth 2 (Two) Times a Day. 3/4/24   Romario Valdez DO   metoprolol succinate XL (TOPROL-XL) 100 MG 24 hr tablet Take 1 tablet by mouth Every 12 (Twelve) Hours. 4/3/24   Valerie Valera MD   montelukast (SINGULAIR) 10 MG tablet Take 1 tablet by mouth Daily. 7/5/23   Rochelle Brown APRN   predniSONE (DELTASONE) 5 MG tablet TAKE 5 MG EVERY DAY  Patient taking differently: Take 1 tablet by mouth Every Other Day. 11/1/23   Boston Can MD   Probiotic Product (MycooN PO) Take 1 capsule by mouth Daily.    ProviderKanwal MD   sacubitril-valsartan (Entresto) 24-26 MG tablet Take 1 tablet by mouth 2 (Two) Times a Day. 2/19/24   Valerie Valera MD   spironolactone (ALDACTONE) 25 MG tablet Take 1 tablet by mouth Daily. 10/10/23   Valerie Valera MD   Synthroid 75 MCG tablet Take 1 tablet by mouth Daily. 4/30/24   Romario Valdez, DO     "    Social History:   Social History     Tobacco Use    Smoking status: Former     Current packs/day: 0.00     Average packs/day: 1 pack/day for 12.0 years (12.0 ttl pk-yrs)     Types: Cigarettes     Start date:      Quit date:      Years since quittin.4    Smokeless tobacco: Never   Vaping Use    Vaping status: Never Used   Substance Use Topics    Alcohol use: Never    Drug use: Never         Review of Systems:  Review of Systems   Cardiovascular:  Positive for chest pain and leg swelling.   Musculoskeletal:  Positive for arthralgias.        Physical Exam:  BP (!) 194/70   Pulse 62   Temp 98.6 °F (37 °C) (Oral)   Resp 13   Ht 162.6 cm (64\")   Wt 83.2 kg (183 lb 6.8 oz)   LMP  (LMP Unknown)   SpO2 96%   BMI 31.48 kg/m²     Physical Exam  Vitals and nursing note reviewed.   Constitutional:       Appearance: Normal appearance.   HENT:      Head: Normocephalic and atraumatic.   Eyes:      General: No scleral icterus.  Cardiovascular:      Rate and Rhythm: Normal rate and regular rhythm.      Heart sounds: Normal heart sounds.   Pulmonary:      Effort: Pulmonary effort is normal.      Breath sounds: Normal breath sounds.   Abdominal:      Palpations: Abdomen is soft.      Tenderness: There is no abdominal tenderness.   Musculoskeletal:         General: Normal range of motion.      Cervical back: Normal range of motion.      Right lower leg: Edema present.      Left lower leg: Edema present.      Comments: Mild tenderness to palpation of left shoulder.  Neurovascular intact distally   Skin:     Findings: No rash.   Neurological:      General: No focal deficit present.      Mental Status: She is alert.                  Procedures:  Procedures      Medical Decision Making:      Comorbidities that affect care:    Congestive Heart Failure, Coronary Artery Disease, Hypertension    External Notes reviewed:      Reviewed cardiology note from 2024    The following orders were placed and all results were " independently analyzed by me:  Orders Placed This Encounter   Procedures    XR Shoulder 2+ View Left    XR Chest 1 View    [place] Draw    High Sensitivity Troponin T    Comprehensive Metabolic Panel    Lipase    BNP    Magnesium    CBC Auto Differential    High Sensitivity Troponin T 2Hr    NPO Diet NPO Type: Strict NPO    Undress & Gown    Continuous Pulse Oximetry    Oxygen Therapy- Nasal Cannula; Titrate 1-6 LPM Per SpO2; 90 - 95%    ECG 12 Lead ED Triage Standing Order; Chest Pain    ECG 12 Lead ED Triage Standing Order; Chest Pain    Insert Peripheral IV    CBC & Differential    Green Top (Gel)    Lavender Top    Gold Top - SST    Light Blue Top       Medications Given in the Emergency Department:  Medications   sodium chloride 0.9 % flush 10 mL (has no administration in time range)   aspirin chewable tablet 324 mg (0 mg Oral Hold 5/25/24 1139)   furosemide (LASIX) injection 40 mg (40 mg Intravenous Given 5/25/24 1154)   HYDROcodone-acetaminophen (NORCO) 5-325 MG per tablet 1 tablet (1 tablet Oral Given 5/25/24 1415)        ED Course:    ED Course as of 05/25/24 1421   Sat May 25, 2024   1241 EKG interpreted by me  Time: 1115  Heart rate 59  Sinus bradycardia, nonspecific ST changes, no acute ischemia [MA]   1419 Patient reporting to me that she is really not here for her chest but states that she is here for shoulder and also her head.  States that this been ongoing for at least a week plus has happened previously.  Denies any known injury.  X-ray of the shoulder is negative.  She is neurovascular intact distally.  She is otherwise well-appearing at this time with unremarkable cardiac workup except some slight fluid noted.  Given Lasix with improvement.  She states that she is at her baseline from a cardiac standpoint.  I did recommend that she follow-up with orthopedics [MA]      ED Course User Index  [MA] Chester Dickinson MD       Labs:    Lab Results (last 24 hours)       Procedure Component Value  Units Date/Time    High Sensitivity Troponin T [[id number]]  (Abnormal) Collected: 05/25/24 1119    Specimen: Blood from Arm, Right Updated: 05/25/24 1149     HS Troponin T 22 ng/L     Narrative:      High Sensitive Troponin T Reference Range:  <14.0 ng/L- Negative Female for AMI  <22.0 ng/L- Negative Male for AMI  >=14 - Abnormal Female indicating possible myocardial injury.  >=22 - Abnormal Male indicating possible myocardial injury.   Clinicians would have to utilize clinical acumen, EKG, Troponin, and serial changes to determine if it is an Acute Myocardial Infarction or myocardial injury due to an underlying chronic condition.         CBC & Differential [[id number]]  (Abnormal) Collected: 05/25/24 1119    Specimen: Blood from Arm, Right Updated: 05/25/24 1131    Narrative:      The following orders were created for panel order CBC & Differential.  Procedure                               Abnormality         Status                     ---------                               -----------         ------                     CBC Auto Differential[[id number]]        Abnormal            Final result                 Please view results for these tests on the individual orders.    Comprehensive Metabolic Panel [[id number]]  (Abnormal) Collected: 05/25/24 1119    Specimen: Blood from Arm, Right Updated: 05/25/24 1149     Glucose 125 mg/dL      BUN 32 mg/dL      Creatinine 1.23 mg/dL      Sodium 142 mmol/L      Potassium 3.9 mmol/L      Chloride 109 mmol/L      CO2 22.0 mmol/L      Calcium 9.2 mg/dL      Total Protein 6.3 g/dL      Albumin 4.0 g/dL      ALT (SGPT) 14 U/L      AST (SGOT) 15 U/L      Alkaline Phosphatase 50 U/L      Total Bilirubin 0.7 mg/dL      Globulin 2.3 gm/dL      A/G Ratio 1.7 g/dL      BUN/Creatinine Ratio 26.0     Anion Gap 11.0 mmol/L      eGFR 44.8 mL/min/1.73     Narrative:      GFR Normal >60  Chronic Kidney Disease <60  Kidney Failure <15    The GFR formula is only valid for adults with stable renal  function between ages 18 and 70.    Lipase [[id number]]  (Normal) Collected: 05/25/24 1119    Specimen: Blood from Arm, Right Updated: 05/25/24 1149     Lipase 24 U/L     BNP [[id number]]  (Abnormal) Collected: 05/25/24 1119    Specimen: Blood from Arm, Right Updated: 05/25/24 1147     proBNP 19,819.0 pg/mL     Narrative:      This assay is used as an aid in the diagnosis of individuals suspected of having heart failure. It can be used as an aid in the diagnosis of acute decompensated heart failure (ADHF) in patients presenting with signs and symptoms of ADHF to the emergency department (ED). In addition, NT-proBNP of <300 pg/mL indicates ADHF is not likely.    Age Range Result Interpretation  NT-proBNP Concentration (pg/mL:      <50             Positive            >450                   Gray                 300-450                    Negative             <300    50-75           Positive            >900                  Gray                300-900                  Negative            <300      >75             Positive            >1800                  Gray                300-1800                  Negative            <300    Magnesium [[id number]]  (Normal) Collected: 05/25/24 1119    Specimen: Blood from Arm, Right Updated: 05/25/24 1149     Magnesium 1.9 mg/dL     CBC Auto Differential [[id number]]  (Abnormal) Collected: 05/25/24 1119    Specimen: Blood from Arm, Right Updated: 05/25/24 1131     WBC 14.35 10*3/mm3      RBC 2.65 10*6/mm3      Hemoglobin 8.5 g/dL      Hematocrit 26.8 %      .1 fL      MCH 32.1 pg      MCHC 31.7 g/dL      RDW 18.6 %      RDW-SD 69.6 fl      MPV 10.9 fL      Platelets 217 10*3/mm3      Neutrophil % 68.2 %      Lymphocyte % 19.8 %      Monocyte % 9.5 %      Eosinophil % 1.3 %      Basophil % 0.5 %      Immature Grans % 0.7 %      Neutrophils, Absolute 9.80 10*3/mm3      Lymphocytes, Absolute 2.84 10*3/mm3      Monocytes, Absolute 1.36 10*3/mm3      Eosinophils, Absolute 0.18 10*3/mm3       Basophils, Absolute 0.07 10*3/mm3      Immature Grans, Absolute 0.10 10*3/mm3      nRBC 0.5 /100 WBC     High Sensitivity Troponin T 2Hr [[id number]]  (Abnormal) Collected: 05/25/24 1344    Specimen: Blood Updated: 05/25/24 1405     HS Troponin T 22 ng/L      Troponin T Delta 0 ng/L     Narrative:      High Sensitive Troponin T Reference Range:  <14.0 ng/L- Negative Female for AMI  <22.0 ng/L- Negative Male for AMI  >=14 - Abnormal Female indicating possible myocardial injury.  >=22 - Abnormal Male indicating possible myocardial injury.   Clinicians would have to utilize clinical acumen, EKG, Troponin, and serial changes to determine if it is an Acute Myocardial Infarction or myocardial injury due to an underlying chronic condition.                  Imaging:    XR Chest 1 View    Result Date: 5/25/2024  XR CHEST 1 VW-  Date of Exam: 5/25/2024 11:30 AM  Indication: Chest Pain Triage Protocol.  Comparison Exams: March 24, 2024  Technique: Single AP chest radiograph  FINDINGS: The lungs are clear. The heart and mediastinal contours appear normal. The pulmonary vasculature appears normal. The osseous structures appear intact. Median sternotomy wires appear intact. Cervical spinal hardware is noted.      No acute cardiopulmonary process identified.   Electronically Signed ByDonna De León MD On:5/25/2024 12:03 PM      XR Shoulder 2+ View Left    Result Date: 5/25/2024  XR SHOULDER 2+ VW LEFT-  Date of Exam: 5/25/2024 10:40 AM  Indication: l shoulder pain  Comparison: None available.  Findings: No acute fracture is identified. There is degenerative subchondral cystic formation along the humeral head. There are mild degenerative changes along the glenohumeral joint, and moderate degenerative changes along the acromioclavicular joint. The soft tissues are unremarkable.      Impression: 1.  No acute osseous process identified. 2.  Degenerative changes as described above.   Electronically Signed ByDonna De León MD  On:5/25/2024 11:08 AM         Differential Diagnosis and Discussion:    Chest Pain:  Based on the patient's signs and symptoms, I considered aortic dissection, myocardial infaction, pulmonary embolism, cardiac tamponade, pericarditis, pneumothorax, musculoskeletal chest pain and other differential diagnosis as an etiology of the patient's chest pain.   Orthopedic Injuries: Differential diagnosis includes but is not limited to fractures, soft tissue injuries, dislocations, contusions, ligamentous injuries, tendon injuries, nerve injuries, compartment syndrome, bursitis, and vascular injuries.    All labs were reviewed and interpreted by me.  All X-rays impressions were independently interpreted by me.  EKG was interpreted by me.    MDM     Amount and/or Complexity of Data Reviewed  Clinical lab tests: reviewed  Tests in the radiology section of CPT®: reviewed  Tests in the medicine section of CPT®: reviewed  Decide to obtain previous medical records or to obtain history from someone other than the patient: yes       Patient is a 78-year-old female who presents with complaints of chest pain as well as shoulder pain.  Reports has been ongoing for about 1 week on the shoulder.  States that she has tried multiple medications for this.  X-ray does not show any acute findings.  She is neurovascular intact distally and there is no acute findings on exam.  She also reports that she has been having some intermittent chest pain.  Does have a history of congestive heart failure.  Did appear to be slightly overloaded with some lower extremity swelling.  Was given Lasix here with improvement.  She states that she is just mainly here for her shoulder.  Was given a pain pill here but will likely need to continue topical anti-inflammatories and also see orthopedics as an outpatient.  I do not see any emergent reason to put her in the hospital at this time or do more imaging.  Will have the patient follow-up as an  outpatient.          Patient Care Considerations:          Consultants/Shared Management Plan:    None    Social Determinants of Health:    Patient is independent, reliable, and has access to care.       Disposition and Care Coordination:    Discharged: The patient is suitable and stable for discharge with no need for consideration of admission.    I have explained the patient´s condition, diagnoses and treatment plan based on the information available to me at this time. I have answered questions and addressed any concerns. The patient has a good  understanding of the patient´s diagnosis, condition, and treatment plan as can be expected at this point. The vital signs have been stable. The patient´s condition is stable and appropriate for discharge from the emergency department.      The patient will pursue further outpatient evaluation with the primary care physician or other designated or consulting physician as outlined in the discharge instructions. They are agreeable to this plan of care and follow-up instructions have been explained in detail. The patient has received these instructions in written format and have expressed an understanding of the discharge instructions. The patient is aware that any significant change in condition or worsening of symptoms should prompt an immediate return to this or the closest emergency department or call to 911.      Final diagnoses:   Atypical chest pain   Acute pain of left shoulder        ED Disposition       ED Disposition   Discharge    Condition   Stable    Comment   --               This medical record created using voice recognition software.             Chester Dickinson MD  05/25/24 [id number]

## 2024-05-31 ENCOUNTER — TELEPHONE (OUTPATIENT)
Dept: CARDIOLOGY | Facility: CLINIC | Age: 79
End: 2024-05-31
Payer: MEDICARE

## 2024-06-03 ENCOUNTER — OFFICE VISIT (OUTPATIENT)
Dept: CARDIOLOGY | Facility: CLINIC | Age: 79
End: 2024-06-03
Payer: MEDICARE

## 2024-06-03 VITALS
BODY MASS INDEX: 31.07 KG/M2 | DIASTOLIC BLOOD PRESSURE: 66 MMHG | OXYGEN SATURATION: 96 % | HEART RATE: 70 BPM | SYSTOLIC BLOOD PRESSURE: 162 MMHG | WEIGHT: 182 LBS | HEIGHT: 64 IN

## 2024-06-03 DIAGNOSIS — I25.810 CORONARY ARTERY DISEASE INVOLVING AUTOLOGOUS VEIN CORONARY BYPASS GRAFT WITHOUT ANGINA PECTORIS: ICD-10-CM

## 2024-06-03 DIAGNOSIS — I10 PRIMARY HYPERTENSION: ICD-10-CM

## 2024-06-03 DIAGNOSIS — N18.32 STAGE 3B CHRONIC KIDNEY DISEASE: ICD-10-CM

## 2024-06-03 DIAGNOSIS — I50.22 CHRONIC HFREF (HEART FAILURE WITH REDUCED EJECTION FRACTION): Primary | ICD-10-CM

## 2024-06-03 DIAGNOSIS — E78.2 MIXED HYPERLIPIDEMIA: ICD-10-CM

## 2024-06-03 PROCEDURE — 99215 OFFICE O/P EST HI 40 MIN: CPT | Performed by: INTERNAL MEDICINE

## 2024-06-03 PROCEDURE — 3077F SYST BP >= 140 MM HG: CPT | Performed by: INTERNAL MEDICINE

## 2024-06-03 PROCEDURE — 3078F DIAST BP <80 MM HG: CPT | Performed by: INTERNAL MEDICINE

## 2024-06-03 RX ORDER — METOPROLOL SUCCINATE 100 MG/1
100 TABLET, EXTENDED RELEASE ORAL EVERY 12 HOURS SCHEDULED
Start: 2024-06-03 | End: 2024-06-03 | Stop reason: ALTCHOICE

## 2024-06-03 RX ORDER — METOPROLOL SUCCINATE 50 MG/1
50 TABLET, EXTENDED RELEASE ORAL 2 TIMES DAILY
COMMUNITY

## 2024-06-03 NOTE — PROGRESS NOTES
Office Visit    Chief Complaint  Chronic HFrEF (heart failure with reduced ejection fraction    Subjective            Ann-Marie Hughes presents to [facility] CARDIOLOGY  History of Present Illness  Ms. Ann-Marie Hughes is a 78 years old female with heart failure with reduced ejection fraction who some days does not feel well.  It is intermittent and she just does not feel like doing anything the day she feels bad.  She has noticed some pedal edema lately.  Her diet has not been very good.  She denies chest pain palpitation dizziness syncope      Past Medical History:   Diagnosis Date    Acne rosacea 08/17/2021    DR.JEFF MOON     Acute respiratory failure with hypoxia 07/31/2023    Arteriosclerosis of abdominal aorta     B12 deficiency 08/17/2021    DJD (degenerative joint disease)     Hx of smoking 08/17/2021    QUIT IN 1976 AT AGE 31    Hyperlipidemia 08/17/2021    Hypertension     Hypothyroidism     Metabolic syndrome 08/17/2021    NSTEMI (non-ST elevated myocardial infarction) 07/17/2023    Pericardial effusion 10/10/2023    KAREN (stress urinary incontinence, female) 08/17/2021    UTI (urinary tract infection) 08/17/2021    Vitamin D deficiency 08/17/2021       Allergies   Allergen Reactions    Penicillins Palpitations     1. When was your reaction? > 10 years ago  2. Did your reaction happen after the first dose or after several doses? After first dose  3. Did your reaction require ED or hospital care to manage your reaction? Do not know  4. Did your reaction require treatment with epinephrine? Do not know  5. Have you taken amoxicillin (Amoxil) or amoxicillin-clavulanate (Augmentin) without issue since? Yes  6. Have you taken cephalexin (Keflex) without issue since?  Do not know         Past Surgical History:   Procedure Laterality Date    BACK SURGERY  2013    CARDIAC CATHETERIZATION N/A 07/17/2023    Procedure: Left Heart Cath;  Surgeon: Dinh Andres MD;  Location: [facility]  CATH INVASIVE LOCATION;  Service: Cardiovascular;  Laterality: N/A;    CARPAL TUNNEL RELEASE      COLONOSCOPY      CORONARY ARTERY BYPASS GRAFT WITH MITRAL VALVE REPAIR/REPLACEMENT N/A 2023    Procedure: REZA STERNOTOMY OFF-PUMP CORONARY ARTERY BYPASS GRAFT TIMES        USING LEFFT INTERNAL MAMMARY ARTERY AND     GREATER SAPHENOUS VEIN GRAFT PER EMDOSCOPIC VEIN HARVESTING, MAZE PROCEDURE AND PRP;  Surgeon: Shane Alexander MD;  Location: [facility];  Service: Cardiothoracic;  Laterality: N/A;    KNEE SURGERY      LUMBAR DISCECTOMY      NECK SURGERY      Cervical    POSTERIOR LUMBAR/THORACIC SPINE FUSION          Social History     Tobacco Use    Smoking status: Former     Current packs/day: 0.00     Average packs/day: 1 pack/day for 12.0 years (12.0 ttl pk-yrs)     Types: Cigarettes     Start date:      Quit date:      Years since quittin.4    Smokeless tobacco: Never   Vaping Use    Vaping status: Never Used   Substance Use Topics    Alcohol use: Never    Drug use: Never       Family History   Problem Relation Age of Onset    Heart disease Mother     Arthritis Mother     Heart attack Mother     Arthritis Father         Prior to Admission medications    Medication Sig Start Date End Date Taking? Authorizing Provider   acetaminophen (TYLENOL) 325 MG tablet Take 2 tablets by mouth Every 4 (Four) Hours As Needed for Mild Pain. 23  Yes Anjelica Chase APRN   albuterol sulfate  (90 Base) MCG/ACT inhaler Inhale 2 puffs Every 4 (Four) Hours As Needed for Wheezing. 23  Yes Les Moreno, DO   aspirin 81 MG EC tablet Take 1 tablet by mouth Every Other Day.   Yes Provider, MD Kanwal   atorvastatin (LIPITOR) 40 MG tablet Take 1 tablet by mouth Daily. 23  Yes Valerie Valera MD   buPROPion XL (WELLBUTRIN XL) 300 MG 24 hr tablet Take 1 tablet by mouth Daily. 23  Yes Romario Valdez, DO   CRANBERRY EXTRACT PO Take 1 tablet by mouth Daily.   Yes Provider,  MD Kanwal   dapagliflozin Propanediol (Farxiga) 10 MG tablet TAKE 1 TABLET BY MOUTH EVERY DAY 12/4/23  Yes Valerie Valera MD   digoxin (LANOXIN) 125 MCG tablet Take 1 tablet by mouth Daily. Omit on Thursdays and Saturdays 4/17/24  Yes Sara Lubin APRN   Eliquis 5 MG tablet tablet TAKE 1 TABLET BY MOUTH EVERY 12 HOURS 3/4/24  Yes Valerie Valera MD   folic acid (FOLVITE) 1 MG tablet TAKE 1 TABLET BY MOUTH EVERY DAY 5/8/24  Yes Boston Can MD   furosemide (LASIX) 40 MG tablet Take 1 tablet by mouth Daily. Omit on Sundays 3/21/24  Yes Sara Lubin APRN   glucose blood test strip Check blood sugar once daily and record. 4/29/24  Yes Romario Valdez DO   metFORMIN ER (GLUCOPHAGE-XR) 500 MG 24 hr tablet Take 1 tablet by mouth 2 (Two) Times a Day. 3/4/24  Yes Romario Valdez DO   metoprolol succinate XL (TOPROL-XL) 100 MG 24 hr tablet Take 1 tablet by mouth Every 12 (Twelve) Hours. 4/3/24  Yes Valerie Valera MD   montelukast (SINGULAIR) 10 MG tablet Take 1 tablet by mouth Daily. 7/5/23  Yes Rochelle Brown APRN   predniSONE (DELTASONE) 5 MG tablet TAKE 5 MG EVERY DAY  Patient taking differently: Take 1 tablet by mouth Daily. 11/1/23  Yes Boston Can MD   Probiotic Product (Tissue Genesis PO) Take 1 capsule by mouth Daily.   Yes ProviderKanwal MD   sacubitril-valsartan (Entresto) 24-26 MG tablet Take 1 tablet by mouth 2 (Two) Times a Day. 2/19/24  Yes Valerie Valera MD   spironolactone (ALDACTONE) 25 MG tablet Take 1 tablet by mouth Daily. 10/10/23  Yes Valerie Valera MD   Synthroid 75 MCG tablet Take 1 tablet by mouth Daily. 4/30/24  Yes Romario Valdez DO   cyanocobalamin 1000 MCG/ML injection Inject 1 mL into the appropriate muscle as directed by prescriber Every 28 (Twenty-Eight) Days. 2/15/24   Berry Heredia MD   magnesium oxide (MAG-OX) 400 MG tablet Take 1 tablet by mouth Daily.    Provider, Kanwal, MD        Review of Systems  "  Constitutional:  Positive for fatigue.   Respiratory:  Positive for cough and shortness of breath.    Cardiovascular:  Positive for leg swelling. Negative for chest pain and palpitations.   Neurological:  Negative for dizziness.        Symptom Course: Been Stable    Weight Trend: Fluctuating Minimally     Objective     /66   Pulse 70   Ht 162.6 cm (64\")   Wt 82.6 kg (182 lb)   SpO2 96%   BMI 31.24 kg/m²       Physical Exam  Constitutional:       General: She is awake.      Appearance: Normal appearance.   Neck:      Thyroid: No thyromegaly.      Vascular: No carotid bruit or JVD.   Cardiovascular:      Rate and Rhythm: Normal rate and regular rhythm.      Chest Wall: PMI is not displaced.      Pulses: Normal pulses.      Heart sounds: Normal heart sounds, S1 normal and S2 normal. No murmur heard.     No friction rub. No gallop. No S3 or S4 sounds.   Pulmonary:      Effort: Pulmonary effort is normal.      Breath sounds: Normal breath sounds and air entry. No wheezing, rhonchi or rales.   Abdominal:      General: Bowel sounds are normal.      Palpations: Abdomen is soft. There is no mass.      Tenderness: There is no abdominal tenderness.   Musculoskeletal:      Cervical back: Neck supple.      Right lower leg: Edema present.      Left lower leg: Edema present.   Neurological:      Mental Status: She is alert and oriented to person, place, and time.   Psychiatric:         Mood and Affect: Mood normal.         Behavior: Behavior is cooperative.           Result Review :                      Lab Results   Component Value Date    PROBNP 19,819.0 (H) 05/25/2024    PROBNP 9,772.0 (H) 04/12/2024    PROBNP 16,179.0 (H) 04/02/2024     CMP          4/12/2024    09:41 4/19/2024    10:54 5/25/2024    11:19   CMP   Glucose 112  110  125    BUN 36  33  32    Creatinine 1.51  1.49  1.23    EGFR 35.0  35.6  44.8    Sodium 144  141  142    Potassium 4.8  4.6  3.9    Chloride 105  103  109    Calcium 9.2  9.5  9.2  " "  Total Protein 6.7  7.0  6.3    Albumin 4.2  4.2  4.0    Globulin 2.5  2.8  2.3    Total Bilirubin 0.8  1.2  0.7    Alkaline Phosphatase 56  48  50    AST (SGOT) 30  26  15    ALT (SGPT) 37  24  14    Albumin/Globulin Ratio 1.7  1.5  1.7    BUN/Creatinine Ratio 23.8  22.1  26.0    Anion Gap 9.2  12.4  11.0      CBC w/diff          4/12/2024    09:41 4/19/2024    10:54 5/25/2024    11:19   CBC w/Diff   WBC 10.89  10.40  14.35    RBC 3.23  3.25  2.65    Hemoglobin 10.4  10.7  8.5    Hematocrit 31.4  33.3  26.8    MCV 97.2  102.5  101.1    MCH 32.2  32.9  32.1    MCHC 33.1  32.1  31.7    RDW 16.7  18.1  18.6    Platelets 221  230  217    Neutrophil Rel % 63.5  61.1  68.2    Immature Granulocyte Rel % 0.4  0.4  0.7    Lymphocyte Rel % 23.2  26.9  19.8    Monocyte Rel % 11.1  10.3  9.5    Eosinophil Rel % 1.2  0.9  1.3    Basophil Rel % 0.6  0.4  0.5       Lipid Panel          7/18/2023    07:25 11/6/2023    09:17 12/27/2023    08:48   Lipid Panel   Total Cholesterol 93  143  120    Triglycerides 71  122  81    HDL Cholesterol 39  40  61    VLDL Cholesterol 15  22  16    LDL Cholesterol  39  81  43    LDL/HDL Ratio 1.02  1.97  0.70       Lab Results   Component Value Date    TSH 1.650 03/24/2024    TSH 2.210 12/29/2023    TSH 1.130 06/15/2023      Lab Results   Component Value Date    FREET4 1.74 (H) 12/29/2023    FREET4 1.31 01/11/2023    FREET4 1.27 08/17/2022      No results found for: \"DDIMERQUANT\"  Magnesium   Date Value Ref Range Status   05/25/2024 1.9 1.6 - 2.4 mg/dL Final      Digoxin   Date Value Ref Range Status   04/12/2024 1.56 (H) 0.60 - 1.20 ng/mL Final      A1C Last 3 Results          6/15/2023    10:05 7/18/2023    07:25 12/29/2023    14:46   HGBA1C Last 3 Results   Hemoglobin A1C 6.10  5.90  6.00           Results for orders placed during the hospital encounter of 03/27/24    Adult Transthoracic Echo Complete W/ Cont if Necessary Per Protocol    Interpretation Summary    The study is technically " difficult for diagnosis    Left ventricular systolic function is moderately decreased. Estimated left ventricular EF = 30% with global hypokinesis and dyssynchrony and more severe hypokinesis involving what appeared to be the inferior wall.  Would consider repeating with Definity contrast for more accurate assessment of wall motion and ejection fraction if thought to be clinically indicated    The left ventricular cavity is mildly dilated.    Left ventricular diastolic dysfunction is noted.    The right ventricular cavity is borderline dilated.    The left atrial cavity is moderately dilated.    Mild aortic valve stenosis is present by gradients however reduced ejection fraction may underestimate severity        Assessment and Plan        Diagnoses and all orders for this visit:    1. Chronic HFrEF (heart failure with reduced ejection fraction) (Primary)  Assessment & Plan:  Patient has had chronic heart failure with reduced ejection fraction.  Her symptoms have become a little worse and her ejection fraction has gone down.  She has more pedal edema than before.  Her labs also suggest worsening CHF.  Will try to titrate up her medications as much as possible stable.  She has a low heart rate at home so I am going to cut down her metoprolol a little bit.  The following changes were made to her medications:    1.  Take the digoxin in the evening.  2.  Increase Entresto to 24-26 mg 1 tablet in the morning and 2 tablets at night starting this Thursday.  3.  Reduce metoprolol XL to half a tablet in the morning and a whole tablet in in the evening.  4.  Reduce salt intake in diet.  5.  Continue heart rate and blood pressure and weight monitoring.  6.  Do blood work in 1 week and also 1 or 2 days before your next appointment    Orders:  -     Comprehensive Metabolic Panel; Future  -     Comprehensive Metabolic Panel; Future  -     Magnesium; Future  -     Digoxin Level; Future  -     proBNP; Future  -     Digoxin Level;  Future  -     proBNP; Future    2. Primary hypertension  Assessment & Plan:  Her blood pressure has been stable based on her blood pressure monitor at home.  Will continue to monitor blood pressure at home      3. Mixed hyperlipidemia  Assessment & Plan:  Her lipids are at goal on atorvastatin 40 mg at bedtime.  She will continue the same.      4. CAD status post CABG x3 vessels  Assessment & Plan:   her coronary disease is stable.  She does not have any symptoms of angina.  She will continue Eliquis and every other day aspirin along with high intensity statin and beta-blocker    Orders:  -     Comprehensive Metabolic Panel; Future  -     Comprehensive Metabolic Panel; Future  -     Magnesium; Future  -     Digoxin Level; Future  -     proBNP; Future  -     Digoxin Level; Future  -     proBNP; Future    5. Stage 3b chronic kidney disease  Assessment & Plan:  She has chronic kidney disease stage IIIb and is stable currently.  Will continue to follow it closely    Orders:  -     Comprehensive Metabolic Panel; Future  -     Comprehensive Metabolic Panel; Future  -     Magnesium; Future  -     Digoxin Level; Future  -     proBNP; Future  -     Digoxin Level; Future  -     proBNP; Future            Follow Up     Return for Follow-up with Alyssa HOLLAND in 3 weeks., EKG with next office visit.    Patient was given instructions and counseling regarding her condition or for health maintenance advice. Please see specific information pulled into the AVS if appropriate.     Total time spent, 40 minutes.This time includes time spent by me for the following activities:  Reviewing past records including hospitalizations, performing a cardiac focused examination and/or evaluation, educating and counselling the patient and family, independently interpreting results and diagnostic tests and communicating that information to patient and family, documenting information in the medical record, etc. E/M time spent excludes any time spent on  other reported services.    Electronically signed by Valerie Valera MD, 06/03/24, 11:13 AM EDT.

## 2024-06-03 NOTE — ASSESSMENT & PLAN NOTE
her coronary disease is stable.  She does not have any symptoms of angina.  She will continue Eliquis and every other day aspirin along with high intensity statin and beta-blocker

## 2024-06-03 NOTE — ASSESSMENT & PLAN NOTE
Patient has had chronic heart failure with reduced ejection fraction.  Her symptoms have become a little worse and her ejection fraction has gone down.  She has more pedal edema than before.  Her labs also suggest worsening CHF.  Will try to titrate up her medications as much as possible stable.  She has a low heart rate at home so I am going to cut down her metoprolol a little bit.  The following changes were made to her medications:    1.  Take the digoxin in the evening.  2.  Increase Entresto to 24-26 mg 1 tablet in the morning and 2 tablets at night starting this Thursday.  3.  Reduce metoprolol XL to half a tablet in the morning and a whole tablet in in the evening.  4.  Reduce salt intake in diet.  5.  Continue heart rate and blood pressure and weight monitoring.  6.  Do blood work in 1 week and also 1 or 2 days before your next appointment

## 2024-06-03 NOTE — PATIENT INSTRUCTIONS
1.  Take the digoxin in the evening.  2.  Increase Entresto to 24-26 mg 1 tablet in the morning and 2 tablets at night starting this Thursday.  3.  Reduce metoprolol XL to half a tablet in the morning and a whole tablet in in the evening.  4.  Reduce salt intake in diet.  5.  Continue heart rate and blood pressure and weight monitoring.  6.  Do blood work in 1 week and also 1 or 2 days before your next appointment

## 2024-06-03 NOTE — ASSESSMENT & PLAN NOTE
She has chronic kidney disease stage IIIb and is stable currently.  Will continue to follow it closely

## 2024-06-03 NOTE — ASSESSMENT & PLAN NOTE
Her blood pressure has been stable based on her blood pressure monitor at home.  Will continue to monitor blood pressure at home

## 2024-06-05 RX ORDER — BUPROPION HYDROCHLORIDE 300 MG/1
300 TABLET ORAL DAILY
Qty: 90 TABLET | Refills: 1 | Status: SHIPPED | OUTPATIENT
Start: 2024-06-05

## 2024-06-12 ENCOUNTER — LAB (OUTPATIENT)
Dept: LAB | Facility: HOSPITAL | Age: 79
End: 2024-06-12
Payer: MEDICARE

## 2024-06-12 DIAGNOSIS — N18.32 STAGE 3B CHRONIC KIDNEY DISEASE: ICD-10-CM

## 2024-06-12 DIAGNOSIS — I50.22 CHRONIC HFREF (HEART FAILURE WITH REDUCED EJECTION FRACTION): ICD-10-CM

## 2024-06-12 DIAGNOSIS — I25.810 CORONARY ARTERY DISEASE INVOLVING AUTOLOGOUS VEIN CORONARY BYPASS GRAFT WITHOUT ANGINA PECTORIS: ICD-10-CM

## 2024-06-12 LAB
ALBUMIN SERPL-MCNC: 4.1 G/DL (ref 3.5–5.2)
ALBUMIN/GLOB SERPL: 1.5 G/DL
ALP SERPL-CCNC: 60 U/L (ref 39–117)
ALT SERPL W P-5'-P-CCNC: 15 U/L (ref 1–33)
ANION GAP SERPL CALCULATED.3IONS-SCNC: 14 MMOL/L (ref 5–15)
AST SERPL-CCNC: 18 U/L (ref 1–32)
BILIRUB SERPL-MCNC: 0.8 MG/DL (ref 0–1.2)
BUN SERPL-MCNC: 46 MG/DL (ref 8–23)
BUN/CREAT SERPL: 36.8 (ref 7–25)
CALCIUM SPEC-SCNC: 9.2 MG/DL (ref 8.6–10.5)
CHLORIDE SERPL-SCNC: 99 MMOL/L (ref 98–107)
CO2 SERPL-SCNC: 26 MMOL/L (ref 22–29)
CREAT SERPL-MCNC: 1.25 MG/DL (ref 0.57–1)
DIGOXIN SERPL-MCNC: 1.06 NG/ML (ref 0.6–1.2)
EGFRCR SERPLBLD CKD-EPI 2021: 43.9 ML/MIN/1.73
GLOBULIN UR ELPH-MCNC: 2.7 GM/DL
GLUCOSE SERPL-MCNC: 157 MG/DL (ref 65–99)
MAGNESIUM SERPL-MCNC: 1.6 MG/DL (ref 1.6–2.4)
NT-PROBNP SERPL-MCNC: 5053 PG/ML (ref 0–1800)
POTASSIUM SERPL-SCNC: 4.2 MMOL/L (ref 3.5–5.2)
PROT SERPL-MCNC: 6.8 G/DL (ref 6–8.5)
SODIUM SERPL-SCNC: 139 MMOL/L (ref 136–145)

## 2024-06-12 PROCEDURE — 83880 ASSAY OF NATRIURETIC PEPTIDE: CPT

## 2024-06-12 PROCEDURE — 36415 COLL VENOUS BLD VENIPUNCTURE: CPT

## 2024-06-12 PROCEDURE — 80162 ASSAY OF DIGOXIN TOTAL: CPT

## 2024-06-12 PROCEDURE — 83735 ASSAY OF MAGNESIUM: CPT

## 2024-06-12 PROCEDURE — 80053 COMPREHEN METABOLIC PANEL: CPT

## 2024-06-13 NOTE — PROGRESS NOTES
Pt informed labs are stable.  Pt states that she feels ok, some days are good and some days she is tired.  Reminded pt to get blood work again 1-2 days before appt.

## 2024-06-24 RX ORDER — FUROSEMIDE 40 MG/1
40 TABLET ORAL DAILY
Qty: 45 TABLET | Refills: 2 | Status: SHIPPED | OUTPATIENT
Start: 2024-06-24 | End: 2024-06-27

## 2024-06-25 ENCOUNTER — LAB (OUTPATIENT)
Dept: LAB | Facility: HOSPITAL | Age: 79
End: 2024-06-25
Payer: MEDICARE

## 2024-06-25 DIAGNOSIS — I50.22 CHRONIC HFREF (HEART FAILURE WITH REDUCED EJECTION FRACTION): ICD-10-CM

## 2024-06-25 DIAGNOSIS — D50.8 IRON DEFICIENCY ANEMIA SECONDARY TO INADEQUATE DIETARY IRON INTAKE: ICD-10-CM

## 2024-06-25 LAB
ALBUMIN SERPL-MCNC: 4 G/DL (ref 3.5–5.2)
ALBUMIN/GLOB SERPL: 1.4 G/DL
ALP SERPL-CCNC: 55 U/L (ref 39–117)
ALT SERPL W P-5'-P-CCNC: 11 U/L (ref 1–33)
ANION GAP SERPL CALCULATED.3IONS-SCNC: 12.1 MMOL/L (ref 5–15)
AST SERPL-CCNC: 16 U/L (ref 1–32)
BASOPHILS # BLD AUTO: 0.06 10*3/MM3 (ref 0–0.2)
BASOPHILS NFR BLD AUTO: 0.5 % (ref 0–1.5)
BILIRUB SERPL-MCNC: 0.7 MG/DL (ref 0–1.2)
BUN SERPL-MCNC: 38 MG/DL (ref 8–23)
BUN/CREAT SERPL: 26.2 (ref 7–25)
CALCIUM SPEC-SCNC: 9.4 MG/DL (ref 8.6–10.5)
CHLORIDE SERPL-SCNC: 103 MMOL/L (ref 98–107)
CO2 SERPL-SCNC: 26.9 MMOL/L (ref 22–29)
CREAT SERPL-MCNC: 1.45 MG/DL (ref 0.57–1)
DEPRECATED RDW RBC AUTO: 63.2 FL (ref 37–54)
EGFRCR SERPLBLD CKD-EPI 2021: 36.8 ML/MIN/1.73
EOSINOPHIL # BLD AUTO: 0.23 10*3/MM3 (ref 0–0.4)
EOSINOPHIL NFR BLD AUTO: 1.9 % (ref 0.3–6.2)
ERYTHROCYTE [DISTWIDTH] IN BLOOD BY AUTOMATED COUNT: 18 % (ref 12.3–15.4)
FERRITIN SERPL-MCNC: 719.6 NG/ML (ref 13–150)
FOLATE SERPL-MCNC: >20 NG/ML (ref 4.78–24.2)
GLOBULIN UR ELPH-MCNC: 2.8 GM/DL
GLUCOSE SERPL-MCNC: 119 MG/DL (ref 65–99)
HCT VFR BLD AUTO: 29.5 % (ref 34–46.6)
HGB BLD-MCNC: 9.9 G/DL (ref 12–15.9)
HGB RETIC QN AUTO: 30.9 PG (ref 29.8–36.1)
IMM GRANULOCYTES # BLD AUTO: 0.07 10*3/MM3 (ref 0–0.05)
IMM GRANULOCYTES NFR BLD AUTO: 0.6 % (ref 0–0.5)
IMM RETICS NFR: 26.5 % (ref 3–15.8)
IRON 24H UR-MRATE: 100 MCG/DL (ref 37–145)
IRON SATN MFR SERPL: 32 % (ref 20–50)
LYMPHOCYTES # BLD AUTO: 2.29 10*3/MM3 (ref 0.7–3.1)
LYMPHOCYTES NFR BLD AUTO: 19.2 % (ref 19.6–45.3)
MCH RBC QN AUTO: 32.8 PG (ref 26.6–33)
MCHC RBC AUTO-ENTMCNC: 33.6 G/DL (ref 31.5–35.7)
MCV RBC AUTO: 97.7 FL (ref 79–97)
MONOCYTES # BLD AUTO: 1.51 10*3/MM3 (ref 0.1–0.9)
MONOCYTES NFR BLD AUTO: 12.6 % (ref 5–12)
NEUTROPHILS NFR BLD AUTO: 65.2 % (ref 42.7–76)
NEUTROPHILS NFR BLD AUTO: 7.78 10*3/MM3 (ref 1.7–7)
NRBC BLD AUTO-RTO: 0.8 /100 WBC (ref 0–0.2)
PLATELET # BLD AUTO: 267 10*3/MM3 (ref 140–450)
PMV BLD AUTO: 11 FL (ref 6–12)
POTASSIUM SERPL-SCNC: 4 MMOL/L (ref 3.5–5.2)
PROT SERPL-MCNC: 6.8 G/DL (ref 6–8.5)
RBC # BLD AUTO: 3.02 10*6/MM3 (ref 3.77–5.28)
RETICS # AUTO: 0.07 10*6/MM3 (ref 0.02–0.13)
RETICS/RBC NFR AUTO: 2.44 % (ref 0.7–1.9)
SODIUM SERPL-SCNC: 142 MMOL/L (ref 136–145)
TIBC SERPL-MCNC: 317 MCG/DL (ref 298–536)
TRANSFERRIN SERPL-MCNC: 213 MG/DL (ref 200–360)
VIT B12 BLD-MCNC: 516 PG/ML (ref 211–946)
WBC NRBC COR # BLD AUTO: 11.94 10*3/MM3 (ref 3.4–10.8)

## 2024-06-25 PROCEDURE — 83880 ASSAY OF NATRIURETIC PEPTIDE: CPT

## 2024-06-25 PROCEDURE — 85025 COMPLETE CBC W/AUTO DIFF WBC: CPT

## 2024-06-25 PROCEDURE — 36415 COLL VENOUS BLD VENIPUNCTURE: CPT

## 2024-06-25 PROCEDURE — 80053 COMPREHEN METABOLIC PANEL: CPT

## 2024-06-25 PROCEDURE — 82728 ASSAY OF FERRITIN: CPT

## 2024-06-25 PROCEDURE — 83540 ASSAY OF IRON: CPT

## 2024-06-25 PROCEDURE — 82607 VITAMIN B-12: CPT

## 2024-06-25 PROCEDURE — 85046 RETICYTE/HGB CONCENTRATE: CPT

## 2024-06-25 PROCEDURE — 84466 ASSAY OF TRANSFERRIN: CPT

## 2024-06-25 PROCEDURE — 82746 ASSAY OF FOLIC ACID SERUM: CPT

## 2024-06-26 ENCOUNTER — OFFICE VISIT (OUTPATIENT)
Dept: ONCOLOGY | Facility: CLINIC | Age: 79
End: 2024-06-26
Payer: MEDICARE

## 2024-06-26 VITALS
DIASTOLIC BLOOD PRESSURE: 67 MMHG | SYSTOLIC BLOOD PRESSURE: 130 MMHG | TEMPERATURE: 97.5 F | OXYGEN SATURATION: 92 % | BODY MASS INDEX: 30.73 KG/M2 | HEART RATE: 65 BPM | WEIGHT: 179 LBS

## 2024-06-26 DIAGNOSIS — E53.8 B12 DEFICIENCY: ICD-10-CM

## 2024-06-26 DIAGNOSIS — D59.0 DRUG-INDUCED AUTOANTIBODY TYPE HEMOLYTIC ANEMIA: ICD-10-CM

## 2024-06-26 DIAGNOSIS — D50.8 IRON DEFICIENCY ANEMIA SECONDARY TO INADEQUATE DIETARY IRON INTAKE: Primary | ICD-10-CM

## 2024-06-26 DIAGNOSIS — C91.Z0 LARGE GRANULAR LYMPHOCYTE DISORDER: ICD-10-CM

## 2024-06-26 PROCEDURE — 3078F DIAST BP <80 MM HG: CPT | Performed by: INTERNAL MEDICINE

## 2024-06-26 PROCEDURE — 1159F MED LIST DOCD IN RCRD: CPT | Performed by: INTERNAL MEDICINE

## 2024-06-26 PROCEDURE — 1126F AMNT PAIN NOTED NONE PRSNT: CPT | Performed by: INTERNAL MEDICINE

## 2024-06-26 PROCEDURE — 3075F SYST BP GE 130 - 139MM HG: CPT | Performed by: INTERNAL MEDICINE

## 2024-06-26 PROCEDURE — 99214 OFFICE O/P EST MOD 30 MIN: CPT | Performed by: INTERNAL MEDICINE

## 2024-06-26 PROCEDURE — 1160F RVW MEDS BY RX/DR IN RCRD: CPT | Performed by: INTERNAL MEDICINE

## 2024-06-26 RX ORDER — PREDNISONE 5 MG/1
TABLET ORAL
Qty: 60 TABLET | Refills: 1 | Status: SHIPPED | OUTPATIENT
Start: 2024-06-26

## 2024-06-26 RX ORDER — FERROUS SULFATE 325(65) MG
325 TABLET ORAL 3 TIMES WEEKLY
Qty: 60 TABLET | Refills: 1 | Status: SHIPPED | OUTPATIENT
Start: 2024-06-26

## 2024-06-26 NOTE — PROGRESS NOTES
Subjective         REASONS FOR FOLLOWUP:    1. LEUKOCYTOSIS , NEUTROPHILS HAVE NO GRANULES  2. ANEMIA FOR 3 YEARS,NORMAL MCV: VERY ATYPICAL RED BLOOD CELL MORPHOLOGY  3. CHF AND PULMONARY EDEMA , CAD, ELEVATED CREATININE.    HISTORY OF PRESENT ILLNESS:    On 06/26/2024 this 78-year-old female who has history of anemia associated with chronic illness and also large granular lymphocytic cell excess returns to the office in company of her  and her daughter. She has remained on a low dose prednisone 5 mg every day.   For the last few days the patient has been experiencing occasional neck pain posteriorly on the left side without any rashes or lesions otherwise. No crepitation in the cervical spine. She has had a new bed and maybe she is trying to accommodate to the new situation. Her appetite has been acceptable, her weight is stable, her bowel function is appropriate even though she had diarrhea on 2 occasions with no passage of blood in the stool. She has not had any fever or cramping. She has not had any urinary symptoms. She remains active around the house but she remains low in energy overall. She has not had any evidence of congestive heart failure and she is to be seen by Cardiology tomorrow.                      HEMATOLOGY HISTORY:   On 08/04/2023 I had the opportunity to see this 77-year-old female who has been admitted to the hospital in pulmonary edema, congestive heart failure, associated with cardiac disease. She has undergone recently a cardiac bypass. We have been consulted because the patient has been noticed to have leukocytosis, new issue for her in absence of any other fever or infection and she also has been noticed to have anemia. On further questioning to the patient her primary care physician in [place] has told her for many months that she has been anemic but no specific investigation has been made about that that she is aware of. She has no notion of passage of blood in the stool,  no hematemesis, no melena, no hematuria, no vaginal bleeding. Her diet is complete in nutrients. She has lost some weight because she has been very fatigued lately. The fatigue was associated with her heart operation but even before that she was very tired. The patient also states that she has not had any definitive infection even though she had pneumonia  weeks before her cardiac operation but she got better from this. She has not had any fevers, chills, night sweats, pruritus, adenopathies or rashes. She complains of some shortness of breath upon exertion but this is dramatically better since diuresis was established for the treatment of her CHF. Urinary output has been abundant. She has no difficulty with miction. Her bowel activity has been normal. Her surgical sites in her leg have had some ecchymosis and swelling and she has had significant fluid accumulation in her legs.      The patient has no previous history of hematological disorder. She is not aware of any organ enlargement or anything of this nature.  The patient returned to the office on 08/17/2023 in company of her  and her son. In the interim she was discharged from [facility] a few days ago after her cardiac surgery and her CHF and she has been at home with no appetite whatsoever and significant fatigue. Her appetite is minimum. Her blood sugar has been in the 120 category. She has not had any nausea, vomiting, diarrhea, melena, enterorrhagia, abdominal pain. Urination is normal in volume, urine is normal color. She has minimal cough and some shortness of breath. She has no energy for anything. She requires total care for bathing, dressing and so forth by the son and . She has not had any fever, chills or infection. Her surgical sites in the chest wall and her lower extremities are all dry with no evidence of infection.   On 09/13/2023 the patient returned to the office in company of her  and her son. In the meantime since  the previous visit the patient has had a remarkable clinical improvement. Her appetite has returned to normality. The patient actually has been up and about in the kitchen doing some cooking and doing a little bit of dishes. The patient has been able to get around in her walker and her rollator. Her blood sugar has remained below 200 in spite of the minimal dose of prednisone. She has lesser degree of shortness of breath and near complete resolution of the anasarca. Her bowel activity is normal. Urination is abundant. She has no fevers, chills or infection.     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        Past Medical History:   Diagnosis Date    Acne rosacea 08/17/2021    DR.JEFF MOON     Acute respiratory failure with hypoxia 07/31/2023    Arteriosclerosis of abdominal aorta     B12 deficiency 08/17/2021    DJD (degenerative joint disease)     Hx of smoking 08/17/2021    QUIT IN 1976 AT AGE 31    Hyperlipidemia 08/17/2021    Hypertension     Hypothyroidism     Metabolic syndrome 08/17/2021    NSTEMI (non-ST elevated myocardial infarction) 07/17/2023    Pericardial effusion 10/10/2023    KAREN (stress urinary incontinence, female) 08/17/2021    UTI (urinary tract infection) 08/17/2021    Vitamin D deficiency 08/17/2021        Past Surgical History:   Procedure Laterality Date    BACK SURGERY  2013    CARDIAC CATHETERIZATION N/A 07/17/2023    Procedure: Left Heart Cath;  Surgeon: Dinh Andres MD;  Location: [facility] CATH INVASIVE LOC[facility];  Service: Cardiovascular;  Laterality: N/A;    CARPAL TUNNEL RELEASE      COLONOSCOPY  2011    CORONARY ARTERY BYPASS GRAFT WITH MITRAL VALVE REPAIR/REPLACEMENT N/A 07/20/2023    Procedure: REZA STERNOTOMY OFF-PUMP CORONARY ARTERY BYPASS GRAFT TIMES        USING LEFFT INTERNAL MAMMARY ARTERY AND     GREATER SAPHENOUS VEIN GRAFT PER EMDOSCOPIC VEIN HARVESTING, MAZE PROCEDURE  AND PRP;  Surgeon: Shane Alexander MD;  Location: [facility];  Service: Cardiothoracic;  Laterality: N/A;    KNEE SURGERY      LUMBAR DISCECTOMY      NECK SURGERY      Cervical    POSTERIOR LUMBAR/THORACIC SPINE FUSION          Current Outpatient Medications on File Prior to Visit   Medication Sig Dispense Refill    acetaminophen (TYLENOL) 325 MG tablet Take 2 tablets by mouth Every 4 (Four) Hours As Needed for Mild Pain.      albuterol sulfate  (90 Base) MCG/ACT inhaler Inhale 2 puffs Every 4 (Four) Hours As Needed for Wheezing. 18 g 0    aspirin 81 MG EC tablet Take 1 tablet by mouth Every Other Day.      atorvastatin (LIPITOR) 40 MG tablet Take 1 tablet by mouth Daily. 90 tablet 3    buPROPion XL (WELLBUTRIN XL) 300 MG 24 hr tablet TAKE 1 TABLET BY MOUTH EVERY DAY 90 tablet 1    CRANBERRY EXTRACT PO Take 1 tablet by mouth Daily.      dapagliflozin Propanediol (Farxiga) 10 MG tablet TAKE 1 TABLET BY MOUTH EVERY DAY 90 tablet 3    digoxin (LANOXIN) 125 MCG tablet Take 1 tablet by mouth Daily. Omit on Thursdays and Saturdays 90 tablet 3    Eliquis 5 MG tablet tablet TAKE 1 TABLET BY MOUTH EVERY 12 HOURS 60 tablet 3    folic acid (FOLVITE) 1 MG tablet TAKE 1 TABLET BY MOUTH EVERY DAY 30 tablet 1    furosemide (LASIX) 40 MG tablet Take 1 tablet by mouth Daily. Omit on Sundays 45 tablet 2    glucose blood test strip Check blood sugar once daily and record. 100 each 3    metFORMIN ER (GLUCOPHAGE-XR) 500 MG 24 hr tablet Take 1 tablet by mouth 2 (Two) Times a Day. 180 tablet 3    metoprolol succinate XL (TOPROL-XL) 50 MG 24 hr tablet Take 1 tablet by mouth 2 (Two) Times a Day.      montelukast (SINGULAIR) 10 MG tablet Take 1 tablet by mouth Daily. 90 tablet 3    predniSONE (DELTASONE) 5 MG tablet TAKE 5 MG EVERY DAY (Patient taking differently: Take 1 tablet by mouth Daily.) 60 tablet 1    Probiotic Product (LoanTek PO) Take 1 capsule by mouth Daily.      sacubitril-valsartan (Entresto) 24-26  MG tablet Take 1 tablet by mouth 2 (Two) Times a Day. 180 tablet 3    spironolactone (ALDACTONE) 25 MG tablet Take 1 tablet by mouth Daily.      Synthroid 75 MCG tablet Take 1 tablet by mouth Daily. 90 tablet 3     No current facility-administered medications on file prior to visit.        ALLERGIES:    Allergies   Allergen Reactions    Penicillins Palpitations     1. When was your reaction? > 10 years ago  2. Did your reaction happen after the first dose or after several doses? After first dose  3. Did your reaction require ED or hospital care to manage your reaction? Do not know  4. Did your reaction require treatment with epinephrine? Do not know  5. Have you taken amoxicillin (Amoxil) or amoxicillin-clavulanate (Augmentin) without issue since? Yes  6. Have you taken cephalexin (Keflex) without issue since?  Do not know         Social History     Socioeconomic History    Marital status:      Spouse name: Dinh   Tobacco Use    Smoking status: Former     Current packs/day: 0.00     Average packs/day: 1 pack/day for 12.0 years (12.0 ttl pk-yrs)     Types: Cigarettes     Start date:      Quit date:      Years since quittin.5    Smokeless tobacco: Never   Vaping Use    Vaping status: Never Used   Substance and Sexual Activity    Alcohol use: Never    Drug use: Never    Sexual activity: Defer        Family History   Problem Relation Age of Onset    Heart disease Mother     Arthritis Mother     Heart attack Mother     Arthritis Father         Objective     Vitals:    24 1028   BP: 130/67   Pulse: 65   Temp: 97.5 °F (36.4 °C)   TempSrc: Temporal   SpO2: 92%   Weight: 81.2 kg (179 lb)         2024    10:31 AM   Current Status   ECOG score 3       Physical Exam            GENERAL:  Well-developed, Patient  in no acute distress. In a wheel chair  SKIN:  Warm, dry ,NO purpura ,no rash.pale not jaundiced  HEENT:  Pupils were equal and reactive to light and accomodation, conjunctivae  noninjected,  normal visual acuity.   NECK:  Supple with good range of motion; no thyromegaly , no JVD or bruits,.No carotid artery pain, no carotid abnormal pulsation , no temporal artery pain or tortuosity.  LYMPHATICS:  No cervical, NO supraclavicular, NO axillary, NO inguinal adenopathies.  CARDIAC   normal rate , regular rhythm, with systolic grade 2/6 aortic  murmur,NO rubs NO S3 NO S4   LUNGS: normal breath sounds bilateral, no wheezing, NO rhonchi, NO crackles ,NO rubs.  VASCULAR VENOUS: no cyanosis, NO collateral circulation, NO varicosities, 1+ both le edema, NO palpable cords, NO pain,NO erythema, NO pigmentation of the skin.  ABDOMEN:  Soft, NO pain,no hepatomegaly, no splenomegaly,no masses, no ascites, no collateral circulation,no distention.  EXTREMITIES  AND SPINE:  No clubbing, no cyanosis ,no deformities , no pain .No kyphosis,  no pain in spine, no pain in ribs , no pain in pelvic bone.  NEUROLOGICAL:  Patient was awake, alert, oriented to time, person and place.        RECENT LABS:  Hematology WBC   Date Value Ref Range Status   06/25/2024 11.94 (H) 3.40 - 10.80 10*3/mm3 Final     RBC   Date Value Ref Range Status   06/25/2024 3.02 (L) 3.77 - 5.28 10*6/mm3 Final     Hemoglobin   Date Value Ref Range Status   06/25/2024 9.9 (L) 12.0 - 15.9 g/dL Final     Hematocrit   Date Value Ref Range Status   06/25/2024 29.5 (L) 34.0 - 46.6 % Final     Platelets   Date Value Ref Range Status   06/25/2024 267 140 - 450 10*3/mm3 Final       CBC:    WBC   Date Value Ref Range Status   06/25/2024 11.94 (H) 3.40 - 10.80 10*3/mm3 Final     RBC   Date Value Ref Range Status   06/25/2024 3.02 (L) 3.77 - 5.28 10*6/mm3 Final     Hemoglobin   Date Value Ref Range Status   06/25/2024 9.9 (L) 12.0 - 15.9 g/dL Final     Hematocrit   Date Value Ref Range Status   06/25/2024 29.5 (L) 34.0 - 46.6 % Final     MCV   Date Value Ref Range Status   06/25/2024 97.7 (H) 79.0 - 97.0 fL Final     MCH   Date Value Ref Range Status    06/25/2024 32.8 26.6 - 33.0 pg Final     MCHC   Date Value Ref Range Status   06/25/2024 33.6 31.5 - 35.7 g/dL Final     RDW   Date Value Ref Range Status   06/25/2024 18.0 (H) 12.3 - 15.4 % Final     RDW-SD   Date Value Ref Range Status   06/25/2024 63.2 (H) 37.0 - 54.0 fl Final     MPV   Date Value Ref Range Status   06/25/2024 11.0 6.0 - 12.0 fL Final     Platelets   Date Value Ref Range Status   06/25/2024 267 140 - 450 10*3/mm3 Final     Neutrophil %   Date Value Ref Range Status   06/25/2024 65.2 42.7 - 76.0 % Final     Lymphocyte %   Date Value Ref Range Status   06/25/2024 19.2 (L) 19.6 - 45.3 % Final     Monocyte %   Date Value Ref Range Status   06/25/2024 12.6 (H) 5.0 - 12.0 % Final     Eosinophil %   Date Value Ref Range Status   06/25/2024 1.9 0.3 - 6.2 % Final     Basophil %   Date Value Ref Range Status   06/25/2024 0.5 0.0 - 1.5 % Final     Immature Grans %   Date Value Ref Range Status   06/25/2024 0.6 (H) 0.0 - 0.5 % Final     Neutrophils, Absolute   Date Value Ref Range Status   06/25/2024 7.78 (H) 1.70 - 7.00 10*3/mm3 Final     Lymphocytes, Absolute   Date Value Ref Range Status   06/25/2024 2.29 0.70 - 3.10 10*3/mm3 Final     Monocytes, Absolute   Date Value Ref Range Status   06/25/2024 1.51 (H) 0.10 - 0.90 10*3/mm3 Final     Eosinophils, Absolute   Date Value Ref Range Status   06/25/2024 0.23 0.00 - 0.40 10*3/mm3 Final     Basophils, Absolute   Date Value Ref Range Status   06/25/2024 0.06 0.00 - 0.20 10*3/mm3 Final     Immature Grans, Absolute   Date Value Ref Range Status   06/25/2024 0.07 (H) 0.00 - 0.05 10*3/mm3 Final     nRBC   Date Value Ref Range Status   06/25/2024 0.8 (H) 0.0 - 0.2 /100 WBC Final        CMP:    Glucose   Date Value Ref Range Status   06/25/2024 119 (H) 65 - 99 mg/dL Final     BUN   Date Value Ref Range Status   06/25/2024 38 (H) 8 - 23 mg/dL Final     Creatinine   Date Value Ref Range Status   06/25/2024 1.45 (H) 0.57 - 1.00 mg/dL Final     Sodium   Date Value Ref  Range Status   06/25/2024 142 136 - 145 mmol/L Final     Potassium   Date Value Ref Range Status   06/25/2024 4.0 3.5 - 5.2 mmol/L Final     Chloride   Date Value Ref Range Status   06/25/2024 103 98 - 107 mmol/L Final     CO2   Date Value Ref Range Status   06/25/2024 26.9 22.0 - 29.0 mmol/L Final     Calcium   Date Value Ref Range Status   06/25/2024 9.4 8.6 - 10.5 mg/dL Final     Total Protein   Date Value Ref Range Status   06/25/2024 6.8 6.0 - 8.5 g/dL Final     Albumin   Date Value Ref Range Status   06/25/2024 4.0 3.5 - 5.2 g/dL Final     ALT (SGPT)   Date Value Ref Range Status   06/25/2024 11 1 - 33 U/L Final     AST (SGOT)   Date Value Ref Range Status   06/25/2024 16 1 - 32 U/L Final     Alkaline Phosphatase   Date Value Ref Range Status   06/25/2024 55 39 - 117 U/L Final     Total Bilirubin   Date Value Ref Range Status   06/25/2024 0.7 0.0 - 1.2 mg/dL Final     Globulin   Date Value Ref Range Status   06/25/2024 2.8 gm/dL Final     A/G Ratio   Date Value Ref Range Status   06/25/2024 1.4 g/dL Final     BUN/Creatinine Ratio   Date Value Ref Range Status   06/25/2024 26.2 (H) 7.0 - 25.0 Final     Anion Gap   Date Value Ref Range Status   06/25/2024 12.1 5.0 - 15.0 mmol/L Final           Retic With IRF & RET-He  Order: [id number]  Status: Final result       Visible to patient: Yes (not seen)       Next appt: 06/27/2024 at 09:30 AM in Cardiology ([facility])       Dx: Iron deficiency anemia secondary to i...    Specimen Information: Blood   0 Result Notes            Component  Ref Range & Units 1 d ago  (6/25/24) 2 mo ago  (4/19/24) 2 mo ago  (4/2/24) 9 mo ago  (9/6/23) 10 mo ago  (8/28/23) 10 mo ago  (8/21/23) 10 mo ago  (8/17/23)   Immature Reticulocyte Fraction  3.0 - 15.8 % 26.5 High  13.4 24.8 High  31.4 High  32.6 High  29.3 High  33.3 High    Reticulocyte %  0.70 - 1.90 % 2.44 High  0.83 1.78 2.77 High  3.35 High  3.26 High  3.09 High    Reticulocyte Absolute  0.0200 - 0.1300 10*6/mm3  0.0744 0.0270 0.0579 0.0922 0.0975 0.0926 0.0933   Reticulocyte Hgb  29.8 - 36.1 pg 30.9 33.8 31.1 35.9 34.0 33.9 34.7   Resulting Agency [facility]  CBC LAB  [facility]  [facility]  [facility]  CBC LAB                Specimen Collected: 06/25/24 08:20 EDT               Assessment & Plan     Diagnoses and all orders for this visit:    1. Iron deficiency anemia secondary to inadequate dietary iron intake (Primary)  -     CBC & Differential; Future  -     Comprehensive Metabolic Panel; Future  -     Ferritin; Future  -     Iron Profile; Future  -     Retic With IRF & RET-He; Future  -     Folate; Future  -     Vitamin B12; Future    2. Large granular lymphocyte disorder    3. B12 deficiency    4. Drug-induced autoantibody type hemolytic anemia       On 08/17/2023 I reviewed all of the issues pertinent to her care after being in the hospital with congestive heart failure and being significantly anemic. We documented that her anemia has been a slow process for the last 3 years and her leukocytosis has also been present for the same period of time. In the hospital we documented that the patient had a normal B12, normal folate, normal ferritin, normal iron profile. Her LDH was normal. Serum protein immunoelectrophoresis was negative for monoclonal protein. On the other hand the patient had a Coomb's test that was positive, complement was positive.    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 Today her peripheral blood has an element of lymphocytosis and raises the question if she has a lymphoid malignancy like for example chronic lymphoid leukemia that will trigger leukocytosis, lymphocytosis and hemolytic anemia. To me that is the most likely case. The hemoglobin remains low. The patient remains significantly fatigued. She is minimally jaundice and she remains anemic. Her platelet count is normal. She has no peripheral adenopathy palpable.  No obvious splenomegaly clinically. Nevertheless my advice to this patient and discussed with the son and the patient's  in the room is as follows:    1. I would like for her to take a multivitamin like prenatal 3 times a week.  2. I would like her to take folic acid 1 mg a day.  3. I have sent a prescription for prednisone 10 mg a day. The patient is diabetic. I do not think she can take more than 10 mg a day. She will need to continue checking her blood sugar on a daily basis and if her blood sugar is above 200 she will need to notify us and will notify her primary physician to help us to take care of her blood sugar.     She will require laboratory assessment in [place] on weekly basis including CBC and a retic count and we will review her back her in a month with similar features.     I am going to go ahead and send a peripheral blood flow cytometry today. A BCR/ABL was going to be sent, I find no reason for this given the differential in the white count.     I am not going to request any radiological assessment of her abdomen and pelvis pending to see how she responds to this simple regimen.     If the patient fails to respond to this dose of prednisone or if the hemoglobin does not get any better she will require therapy with Rituxan.     Otherwise I discussed with the patient the need for her to try to eat as she is not and try to get up and about with some walking.    She does not have any ongoing infection. Her surgical sites are all well clean and hopefully they will continue healing as the time goes by.     We will review her back in a month as posted.     I reviewed the patient on 09/13/2023. Since the previous visit several weeks ago the patient has had a remarkable clinical improvement. Her appetite is back triggered by prednisone utilization. She has been able to gain back strength to the point that she is able to get around the house using her rollator and she has been able to cook  simple meals and do some dishes. The patient is sleeping poorly. Besides this she has no obvious aches and pains. No jaundice, no rashes. She has minimal degree of shortness of breath and her energy level has substantially improved. Her anasarca has resolved. Her bowel activity is appropriate. Urination is appropriate. Mentation has remained or improved substantially.     During the previous visit we discussed the fact that we needed to have further analysis of her blood. This included a CCP antibody that was negative, an NORMA that was positive, a DNA-DSA confirmation that was negative. 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     I provided reports of all these tests to the patient and in simple words I explained to her and her family what this means. It is very likely that this patient has an autoimmune condition that is triggering also LGL showing up. This explains her persistent leukocytosis. Her anemia has substantially improved and I do not believe that the patient has significant degree of hemolysis with a reticulocyte count that is normal, LDH that is normal, bilirubin that is normal.     The patient otherwise actually feels as posted dramatically better.     RECOMMENDATIONS:    1. I have advised her to have continuous persistent proper diet at this time. She is eating much better and functioning much better.   2. I advised her to remain on her prednisone and the dose that she will take now is 5 mg a day. Eventually the goal will be to move this upon visit in 6 weeks to every other day.   3. The patient will require for her incisional site below the chest bone from the chest tube, local therapy with honey and brown sugar. Hopefully this will minimize any possibility of infection and will allow this area to clean and close definitively. She will apply this after washing this with  soap and water, saline solution and using this concoction only once a day.   4. I would like for the patient to return to see us back in 6 weeks with a CBC, CMP and a Cristina test.   5. I advised the patient that she does not need to have any more blood work in between her next visit in her local [facility] hospital.     I discussed all these facts with the patient's  and son in the room.  On 11/01/2023, the patient was further reviewed. Since the previous visit, the patient does not find too much of anything that she can eat because she is on a glucose-restricted diet given her diabetes and now on a potassium-restricted diet given the fact that she has been found to have a high potassium and this is probably related to the combination and utilization of Aldactone along with Entresto.     The patient obviously has in my opinion poor nutrition given this fact and she is not hungry. She is depressed and she is trying to do some minimal housework that she is capable of.    She found that the prednisone every other day is not as good as the prednisone everyday to try to stimulate her appetite and make her to feel better and I think at this point of the game, I have no other option than give her prednisone 5 mg every day to try to see if she perks up a little bit and feels better. Overall, her large granular lymphocytic leukemia numbers are not changing. Her white count remains minimally elevated and her hemoglobin remains still on the low side. On the other hand, it is a little bit better than during the previous visit. Her platelet count is stable.     The patient has been advised to then proceed with prednisone 5 mg every day. She will be advised as well to continue her multivitamin on daily basis and I advised her that she needs to talk with her cardiologist in regard how to control the issues pertinent to her potassium without having to have a potassium restricted diet. Again, Aldactone and Entresto are  probably the reason for this phenomenon in the first place.     I would like to review the patient back in a couple of months with a CBC and a CMP.     I discussed all these facts with the patient, her  and her daughter. I highlighted in her medication profile the combination of Entresto and Aldactone triggering hyperkalemia and maybe if she has still the need for a potassium-restricted diet, I think she should be referred to be seen by a dietician in her local hospital.  On 01/26/2024 the patient feels and looks dramatically better. She is no longer here in a wheelchair, she is able to get around on her own using her walker and she is doing more and more activity around the house. Her appetite finally improved, her weight came back, her energy level came back, her mind came back and she was no longer exhausted anymore through anytime of the day.    Her clinical examination today shows very clear lung auscultation, minimal crackles in the left base and no modification in her heart sounds. She has no fluid accumulation in her legs. Her mind is sharp and she is able to mobilize herself in the office with no difficulties. This is a major change. Most importantly her white count is normal, her hemoglobin has improved and her platelet count is normal. Her white count differential is no different than before. Given all of these facts I do believe that this patient has turned around big time in comparison how her previous visit was. The patient is now eating, she has gained strength, she has gained appetite, she has gained proper nutrition, she has gained her mind back and she is no longer exhausted and ready to die. It is good to see these changes in her. Her family was very appreciative today in regard to all of the care that we provided. I do believe that the prednisone at the present dose will remain ongoing, I think this medicine has made a big difference even in the present settings. Prednisone 5 mg will be  continued returning to see us back in 3 months with CBC, CMP, reticulated hemoglobin, ferritin, iron profile and a new B12 level.    Discussed with the patient, her  and her daughter in the room.  On 06/26/2024 the patient was further reviewed along with her  and her son. She continues being fatigued and tired and she is able to function and take care of her bath and take care of her simple things around the house. She is able to go to the bathroom, get dressed and so forth. Clinical examination today besides the minimal murmur in her aortic area discloses no new areas of abnormalities. Her hemoglobin is in the 9 category, reticulated hemoglobin was in the 40s now is 30.9 telling me that very likely there is a component of iron deficiency and I would not be surprised if she has an element of GI blood loss in the background of anticoagulation use. I think the patient needs to take some ferrous sulfate 325 mg every other day and absorption of this is much better with this kind of schedule. It will be worth it to repeat her reticulated hemoglobin and CBC in 2 months. She would like to continue doing this through her primary care physician and I find no reason why this could not be done there.     I do not want to put this patient with her comorbidities through endoscopies unless that it is absolutely necessary and unless that the blood loss becomes more notorious. I pointed out to her that the stool color is going to change taking the iron and be aware of that and that way she does not get alarmed.     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 I think this is giving her a little bit of pep, a little bit of appetite and is keeping this condition relatively quiet. There is no peripheral adenopathy or hepatosplenomegaly neither infection or febrile  illness in this patient as far as I can tell.    I think the pain in the neck is probably mechanical from the muscle from the new bed no more than that. There is no temporal artery tenderness, there is no crepitation and the range of motion is appropriate. Local massage or heat could be sufficient as well.     She says that she had diarrhea yesterday on 2 different occasions. I do not see anything in regard to infection that could be the culprit in this regard. On the other hand she takes medicines that can produce diarrhea, I highlighted them to her and if this continues maybe she will need to start to work with her primary physician into figuring out the cause of this.     Finally the patient will not need to return back to see us unless that it is absolutely necessary. She wants to keep all of her care in [place] and that is perfectly fine with me.     My encouragement to her primary physician is to repeat her reticulated hemoglobin in 2 months along with a CBC.

## 2024-06-27 ENCOUNTER — OFFICE VISIT (OUTPATIENT)
Dept: CARDIOLOGY | Facility: CLINIC | Age: 79
End: 2024-06-27
Payer: MEDICARE

## 2024-06-27 VITALS
BODY MASS INDEX: 30.22 KG/M2 | WEIGHT: 177 LBS | HEIGHT: 64 IN | DIASTOLIC BLOOD PRESSURE: 60 MMHG | HEART RATE: 60 BPM | SYSTOLIC BLOOD PRESSURE: 138 MMHG

## 2024-06-27 DIAGNOSIS — E78.2 MIXED HYPERLIPIDEMIA: ICD-10-CM

## 2024-06-27 DIAGNOSIS — I50.22 CHRONIC HFREF (HEART FAILURE WITH REDUCED EJECTION FRACTION): Primary | ICD-10-CM

## 2024-06-27 DIAGNOSIS — N18.32 STAGE 3B CHRONIC KIDNEY DISEASE: ICD-10-CM

## 2024-06-27 DIAGNOSIS — I10 PRIMARY HYPERTENSION: ICD-10-CM

## 2024-06-27 DIAGNOSIS — I25.810 CORONARY ARTERY DISEASE INVOLVING AUTOLOGOUS VEIN CORONARY BYPASS GRAFT WITHOUT ANGINA PECTORIS: ICD-10-CM

## 2024-06-27 LAB — NT-PROBNP SERPL-MCNC: 5294 PG/ML (ref 0–1800)

## 2024-06-27 RX ORDER — FUROSEMIDE 40 MG/1
40 TABLET ORAL 2 TIMES DAILY
Qty: 135 TABLET | Refills: 2 | Status: SHIPPED | OUTPATIENT
Start: 2024-06-27

## 2024-06-27 NOTE — ASSESSMENT & PLAN NOTE
Ms. Hughes has a history of CAD status post bypass.  She denies any symptoms associated with angina.  She will continue Eliquis, beta-blocker, statin and aspirin every other day.

## 2024-06-27 NOTE — PATIENT INSTRUCTIONS
1.  Increase furosemide/Lasix 40 mg take 1 tablet in the morning and half a tablet in the afternoon.  2.  Increase Entresto 24/26 mg take 1 tablet in the morning and 2 at night if systolic blood pressure is greater than 100.  3.  Do a heart rate blood pressure twice daily and daily weight log and bring it to next appointment.  4.  Do blood work in 1 week and 1 to 2 days prior to next visit.

## 2024-06-27 NOTE — ASSESSMENT & PLAN NOTE
Ms. Hughes kidney function has declined slightly although she continues to experience bilateral lower extremity edema and her BNP has gone up slightly.  I will increase her Lasix and repeat labs Monday or Tuesday to check her kidney function and then again prior to her next visit.   previously intubated - no problems

## 2024-06-27 NOTE — ASSESSMENT & PLAN NOTE
Ms. Hughes has heart failure with reduced ejection fraction who appears to be doing a little better.  She continues to experience short of air with mild to moderate activity and bilateral pedal edema although her fatigue has improved.  Due to borderline blood pressures I am going to slowly titrate Entresto.  If she does not tolerate the increased dose at next visit we can try Verquvo.  I will make the following changes to her heart failure medications:    1.  Increase furosemide/Lasix 40 mg take 1 tablet in the morning and half a tablet in the afternoon.  2.  Increase Entresto 24/26 mg take 1 tablet in the morning and 2 at night if systolic blood pressure is greater than 100.  3.  Do a heart rate blood pressure twice daily and daily weight log and bring it to next appointment.  4.  Do blood work in 1 week and 1 to 2 days prior to next visit.

## 2024-06-27 NOTE — ASSESSMENT & PLAN NOTE
Ms. Hughes has a history of hyperlipidemia.  Her lipids are at goal on atorvastatin 40 mg nightly.  Will continue the same.

## 2024-06-27 NOTE — ASSESSMENT & PLAN NOTE
Ms. Hughes's blood pressure is stable per her home log although she only does her pressures once a day.  I have asked her to do it twice daily and increase her nighttime dose of Entresto if systolic is greater than 100.  She has been agreeable.

## 2024-06-27 NOTE — PROGRESS NOTES
Office Visit    Chief Complaint  Chronic HFrEF (heart failure with reduced ejection fraction)    Subjective            Ann-Marie Leanne Hughes presents to [facility]  History of Present Illness  Ms. Hughes is a 78-year-old female that presented to the office today for follow-up due to heart failure with reduced ejection fraction who reports that she is feeling a little better.  She continues to experience bilateral pedal edema and short of air with mild to moderate activity.  She reports that she got a new bed and is sleeping a little better.  The bed is adjustable and she continues to sleep with the head of the bed and foot of the bed both elevated.  She denies any chest pain, dizziness, orthopnea or syncopal episodes.      Past Medical History:   Diagnosis Date    Acne rosacea 08/17/2021    DR.JEFF MOON     Acute respiratory failure with hypoxia 07/31/2023    Arteriosclerosis of abdominal aorta     B12 deficiency 08/17/2021    DJD (degenerative joint disease)     Hx of smoking 08/17/2021    QUIT IN 1976 AT AGE 31    Hyperlipidemia 08/17/2021    Hypertension     Hypothyroidism     Metabolic syndrome 08/17/2021    NSTEMI (non-ST elevated myocardial infarction) 07/17/2023    Pericardial effusion 10/10/2023    KAREN (stress urinary incontinence, female) 08/17/2021    UTI (urinary tract infection) 08/17/2021    Vitamin D deficiency 08/17/2021       Allergies   Allergen Reactions    Penicillins Palpitations     1. When was your reaction? > 10 years ago  2. Did your reaction happen after the first dose or after several doses? After first dose  3. Did your reaction require ED or hospital care to manage your reaction? Do not know  4. Did your reaction require treatment with epinephrine? Do not know  5. Have you taken amoxicillin (Amoxil) or amoxicillin-clavulanate (Augmentin) without issue since? Yes  6. Have you taken cephalexin (Keflex) without issue since?  Do not know         Past Surgical  History:   Procedure Laterality Date    BACK SURGERY      CARDIAC CATHETERIZATION N/A 2023    Procedure: Left Heart Cath;  Surgeon: Dinh Andres MD;  Location:  [facility];  Service: Cardiovascular;  Laterality: N/A;    CARPAL TUNNEL RELEASE      COLONOSCOPY      CORONARY ARTERY BYPASS GRAFT WITH MITRAL VALVE REPAIR/REPLACEMENT N/A 2023    Procedure: REZA STERNOTOMY OFF-PUMP CORONARY ARTERY BYPASS GRAFT TIMES        USING LEFFT INTERNAL MAMMARY ARTERY AND     GREATER SAPHENOUS VEIN GRAFT PER EMDOSCOPIC VEIN HARVESTING, MAZE PROCEDURE AND PRP;  Surgeon: Shane Alexander MD;  Location:  [facility];  Service: Cardiothoracic;  Laterality: N/A;    KNEE SURGERY      LUMBAR DISCECTOMY      NECK SURGERY      Cervical    POSTERIOR LUMBAR/THORACIC SPINE FUSION          Social History     Tobacco Use    Smoking status: Former     Current packs/day: 0.00     Average packs/day: 1 pack/day for 12.0 years (12.0 ttl pk-yrs)     Types: Cigarettes     Start date:      Quit date:      Years since quittin.5    Smokeless tobacco: Never   Vaping Use    Vaping status: Never Used   Substance Use Topics    Alcohol use: Never    Drug use: Never       Family History   Problem Relation Age of Onset    Heart disease Mother     Arthritis Mother     Heart attack Mother     Arthritis Father         Prior to Admission medications    Medication Sig Start Date End Date Taking? Authorizing Provider   acetaminophen (TYLENOL) 325 MG tablet Take 2 tablets by mouth Every 4 (Four) Hours As Needed for Mild Pain. 23  Yes Anjelica Chase APRN   albuterol sulfate  (90 Base) MCG/ACT inhaler Inhale 2 puffs Every 4 (Four) Hours As Needed for Wheezing. 23  Yes Les Moreno,    aspirin 81 MG EC tablet Take 1 tablet by mouth Every Other Day.   Yes Provider, MD Kanwal   atorvastatin (LIPITOR) 40 MG tablet Take 1 tablet by mouth Daily. 23  Yes Valerie Valera MD    buPROPion XL (WELLBUTRIN XL) 300 MG 24 hr tablet TAKE 1 TABLET BY MOUTH EVERY DAY 6/5/24  Yes Romario Valdez, DO   CRANBERRY EXTRACT PO Take 1 tablet by mouth Daily.   Yes Kanwal Orozco MD   dapagliflozin Propanediol (Farxiga) 10 MG tablet TAKE 1 TABLET BY MOUTH EVERY DAY 12/4/23  Yes Valerie Valera MD   digoxin (LANOXIN) 125 MCG tablet Take 1 tablet by mouth Daily. Omit on Thursdays and Saturdays 4/17/24  Yes Sara Lubin APRN   Eliquis 5 MG tablet tablet TAKE 1 TABLET BY MOUTH EVERY 12 HOURS 3/4/24  Yes Valerie Valera MD   folic acid (FOLVITE) 1 MG tablet TAKE 1 TABLET BY MOUTH EVERY DAY 5/8/24  Yes Boston Can MD   furosemide (LASIX) 40 MG tablet Take 1 tablet by mouth Daily. Omit on Sundays 6/24/24  Yes Sara Lubin APRN   glucose blood test strip Check blood sugar once daily and record. 4/29/24  Yes Romario Valdez DO   metFORMIN ER (GLUCOPHAGE-XR) 500 MG 24 hr tablet Take 1 tablet by mouth 2 (Two) Times a Day. 3/4/24  Yes Romario Valdez DO   metoprolol succinate XL (TOPROL-XL) 50 MG 24 hr tablet Take 1 tablet by mouth 2 (Two) Times a Day. 1/2 tab in the am, 1 tab in the pm   Yes Kanwal Orozco MD   montelukast (SINGULAIR) 10 MG tablet Take 1 tablet by mouth Daily. 7/5/23  Yes Rochelle Brown APRN   predniSONE (DELTASONE) 5 MG tablet TAKE 5 MG EVERY DAY 6/26/24  Yes Boston Can MD   sacubitril-valsartan (Entresto) 24-26 MG tablet Take 1 tablet by mouth 2 (Two) Times a Day. 2/19/24  Yes Valerie Valera MD   spironolactone (ALDACTONE) 25 MG tablet Take 1 tablet by mouth Daily. 10/10/23  Yes Valerie Valera MD   Synthroid 75 MCG tablet Take 1 tablet by mouth Daily. 4/30/24  Yes Romario Valdez DO   ferrous sulfate 325 (65 FE) MG tablet Take 1 tablet by mouth 3 (Three) Times a Week. 6/26/24   Boston Can MD   Probiotic Product (Oravel PO) Take 1 capsule by mouth Daily.    Provider, MD Kanwal        Review of Systems  "  Constitutional:  Positive for fatigue.   Respiratory:  Positive for shortness of breath. Negative for cough.    Cardiovascular:  Positive for leg swelling. Negative for chest pain and palpitations.   Neurological:  Negative for dizziness.        Symptom Course: Been Unchanged    Weight Trend: Stable     Objective     /60   Pulse 60   Ht 162.6 cm (64\")   Wt 80.3 kg (177 lb)   BMI 30.38 kg/m²       Physical Exam  Constitutional:       General: She is awake.      Appearance: Normal appearance.   Neck:      Thyroid: No thyromegaly.      Vascular: No carotid bruit or JVD.   Cardiovascular:      Rate and Rhythm: Normal rate and regular rhythm.      Chest Wall: PMI is not displaced.      Pulses: Normal pulses.      Heart sounds: Normal heart sounds, S1 normal and S2 normal. No murmur heard.     No friction rub. No gallop. No S3 or S4 sounds.   Pulmonary:      Effort: Pulmonary effort is normal.      Breath sounds: Normal breath sounds and air entry. No wheezing, rhonchi or rales.   Abdominal:      General: Bowel sounds are normal.      Palpations: Abdomen is soft. There is no mass.      Tenderness: There is no abdominal tenderness.   Musculoskeletal:      Cervical back: Neck supple.      Right lower le+ Edema present.      Left lower le+ Edema present.   Neurological:      Mental Status: She is alert and oriented to person, place, and time.   Psychiatric:         Mood and Affect: Mood normal.         Behavior: Behavior is cooperative.           Result Review :                    ECG 12 Lead    Date/Time: 2024 10:09 AM  Performed by: Sara Lubin APRN    Authorized by: Sara Lubin APRN  Comments: Sinus rhythm, repolarization abnormalities, rate of 58.  Compared to previous EKGs heart rate is slower         Lab Results   Component Value Date    PROBNP 5,294.0 (H) 2024    PROBNP 5,053.0 (H) 2024    PROBNP 19,819.0 (H) 2024     CMP          2024    11:19 2024    " "09:47 6/25/2024    08:20   CMP   Glucose 125  157  119    BUN 32  46  38    Creatinine 1.23  1.25  1.45    EGFR 44.8  43.9  36.8    Sodium 142  139  142    Potassium 3.9  4.2  4.0    Chloride 109  99  103    Calcium 9.2  9.2  9.4    Total Protein 6.3  6.8  6.8    Albumin 4.0  4.1  4.0    Globulin 2.3  2.7  2.8    Total Bilirubin 0.7  0.8  0.7    Alkaline Phosphatase 50  60  55    AST (SGOT) 15  18  16    ALT (SGPT) 14  15  11    Albumin/Globulin Ratio 1.7  1.5  1.4    BUN/Creatinine Ratio 26.0  36.8  26.2    Anion Gap 11.0  14.0  12.1      CBC w/diff          4/19/2024    10:54 5/25/2024    11:19 6/25/2024    08:20   CBC w/Diff   WBC 10.40  14.35  11.94    RBC 3.25  2.65  3.02    Hemoglobin 10.7  8.5  9.9    Hematocrit 33.3  26.8  29.5    .5  101.1  97.7    MCH 32.9  32.1  32.8    MCHC 32.1  31.7  33.6    RDW 18.1  18.6  18.0    Platelets 230  217  267    Neutrophil Rel % 61.1  68.2  65.2    Immature Granulocyte Rel % 0.4  0.7  0.6    Lymphocyte Rel % 26.9  19.8  19.2    Monocyte Rel % 10.3  9.5  12.6    Eosinophil Rel % 0.9  1.3  1.9    Basophil Rel % 0.4  0.5  0.5       Lipid Panel          7/18/2023    07:25 11/6/2023    09:17 12/27/2023    08:48   Lipid Panel   Total Cholesterol 93  143  120    Triglycerides 71  122  81    HDL Cholesterol 39  40  61    VLDL Cholesterol 15  22  16    LDL Cholesterol  39  81  43    LDL/HDL Ratio 1.02  1.97  0.70       Lab Results   Component Value Date    TSH 1.650 03/24/2024    TSH 2.210 12/29/2023    TSH 1.130 06/15/2023      Lab Results   Component Value Date    FREET4 1.74 (H) 12/29/2023    FREET4 1.31 01/11/2023    FREET4 1.27 08/17/2022      No results found for: \"DDIMERQUANT\"  Magnesium   Date Value Ref Range Status   06/12/2024 1.6 1.6 - 2.4 mg/dL Final      Digoxin   Date Value Ref Range Status   06/12/2024 1.06 0.60 - 1.20 ng/mL Final      A1C Last 3 Results          7/18/2023    07:25 12/29/2023    14:46   HGBA1C Last 3 Results   Hemoglobin A1C 5.90  6.00     "       Results for orders placed during the hospital encounter of 03/27/24    Adult Transthoracic Echo Complete W/ Cont if Necessary Per Protocol    Interpretation Summary    The study is technically difficult for diagnosis    Left ventricular systolic function is moderately decreased. Estimated left ventricular EF = 30% with global hypokinesis and dyssynchrony and more severe hypokinesis involving what appeared to be the inferior wall.  Would consider repeating with Definity contrast for more accurate assessment of wall motion and ejection fraction if thought to be clinically indicated    The left ventricular cavity is mildly dilated.    Left ventricular diastolic dysfunction is noted.    The right ventricular cavity is borderline dilated.    The left atrial cavity is moderately dilated.    Mild aortic valve stenosis is present by gradients however reduced ejection fraction may underestimate severity        Assessment and Plan        Diagnoses and all orders for this visit:    1. Chronic HFrEF (heart failure with reduced ejection fraction) (Primary)  Assessment & Plan:  Ms. Hughes has heart failure with reduced ejection fraction who appears to be doing a little better.  She continues to experience short of air with mild to moderate activity and bilateral pedal edema although her fatigue has improved.  Due to borderline blood pressures I am going to slowly titrate Entresto.  If she does not tolerate the increased dose at next visit we can try Verquvo.  I will make the following changes to her heart failure medications:    1.  Increase furosemide/Lasix 40 mg take 1 tablet in the morning and half a tablet in the afternoon.  2.  Increase Entresto 24/26 mg take 1 tablet in the morning and 2 at night if systolic blood pressure is greater than 100.  3.  Do a heart rate blood pressure twice daily and daily weight log and bring it to next appointment.  4.  Do blood work in 1 week and 1 to 2 days prior to next  visit.    Orders:  -     BNP; Future  -     Comprehensive Metabolic Panel; Future  -     Comprehensive Metabolic Panel; Future  -     Magnesium; Future  -     CBC & Differential; Future  -     proBNP; Future  -     Digoxin Level; Future    2. Primary hypertension  Assessment & Plan:  Ms. Hughes's blood pressure is stable per her home log although she only does her pressures once a day.  I have asked her to do it twice daily and increase her nighttime dose of Entresto if systolic is greater than 100.  She has been agreeable.      3. CAD status post CABG x3 vessels  Assessment & Plan:  Ms. Hughes has a history of CAD status post bypass.  She denies any symptoms associated with angina.  She will continue Eliquis, beta-blocker, statin and aspirin every other day.    Orders:  -     Comprehensive Metabolic Panel; Future  -     Comprehensive Metabolic Panel; Future  -     Magnesium; Future  -     CBC & Differential; Future  -     proBNP; Future  -     Digoxin Level; Future    4. Mixed hyperlipidemia  Assessment & Plan:  Ms. Hughes has a history of hyperlipidemia.  Her lipids are at goal on atorvastatin 40 mg nightly.  Will continue the same.      5. Stage 3b chronic kidney disease  Assessment & Plan:  Ms. Hughes kidney function has declined slightly although she continues to experience bilateral lower extremity edema and her BNP has gone up slightly.  I will increase her Lasix and repeat labs Monday or Tuesday to check her kidney function and then again prior to her next visit.    Orders:  -     Comprehensive Metabolic Panel; Future  -     Comprehensive Metabolic Panel; Future  -     Magnesium; Future  -     CBC & Differential; Future  -     proBNP; Future  -     Digoxin Level; Future    Other orders  -     furosemide (LASIX) 40 MG tablet; Take 1 tablet by mouth 2 (Two) Times a Day. Take 1 tablet in the morning and half tablet in the afternoon.  Dispense: 135 tablet; Refill: 2            Follow Up     Return for 7/16 or  17with Dr Valera, with EKG.    Patient was given instructions and counseling regarding her condition or for health maintenance advice. Please see specific information pulled into the AVS if appropriate.     Electronically signed by LONA Bocanegra, 06/27/24, 10:09 AM EDT.

## 2024-06-28 ENCOUNTER — OFFICE VISIT (OUTPATIENT)
Dept: FAMILY MEDICINE CLINIC | Facility: CLINIC | Age: 79
End: 2024-06-28
Payer: MEDICARE

## 2024-06-28 VITALS
WEIGHT: 178 LBS | HEART RATE: 66 BPM | OXYGEN SATURATION: 94 % | TEMPERATURE: 96.9 F | SYSTOLIC BLOOD PRESSURE: 153 MMHG | DIASTOLIC BLOOD PRESSURE: 74 MMHG | BODY MASS INDEX: 30.39 KG/M2 | HEIGHT: 64 IN

## 2024-06-28 DIAGNOSIS — I48.0 PAF (PAROXYSMAL ATRIAL FIBRILLATION): Primary | ICD-10-CM

## 2024-06-28 DIAGNOSIS — I25.810 CORONARY ARTERY DISEASE INVOLVING AUTOLOGOUS VEIN CORONARY BYPASS GRAFT WITHOUT ANGINA PECTORIS: ICD-10-CM

## 2024-06-28 DIAGNOSIS — N18.32 STAGE 3B CHRONIC KIDNEY DISEASE: ICD-10-CM

## 2024-06-28 DIAGNOSIS — E11.9 TYPE 2 DIABETES MELLITUS WITHOUT COMPLICATION, WITHOUT LONG-TERM CURRENT USE OF INSULIN: ICD-10-CM

## 2024-06-28 DIAGNOSIS — E78.2 MIXED HYPERLIPIDEMIA: ICD-10-CM

## 2024-06-28 DIAGNOSIS — I25.10 CORONARY ARTERY DISEASE INVOLVING NATIVE HEART WITHOUT ANGINA PECTORIS, UNSPECIFIED VESSEL OR LESION TYPE: ICD-10-CM

## 2024-06-28 DIAGNOSIS — E03.9 ACQUIRED HYPOTHYROIDISM: ICD-10-CM

## 2024-06-28 DIAGNOSIS — I50.22 CHRONIC HFREF (HEART FAILURE WITH REDUCED EJECTION FRACTION): ICD-10-CM

## 2024-06-28 DIAGNOSIS — I10 PRIMARY HYPERTENSION: ICD-10-CM

## 2024-06-28 DIAGNOSIS — Z78.0 POSTMENOPAUSAL: ICD-10-CM

## 2024-06-28 DIAGNOSIS — C91.Z0 LARGE GRANULAR LYMPHOCYTE DISORDER: ICD-10-CM

## 2024-06-28 LAB
ALBUMIN SERPL-MCNC: 4.1 G/DL (ref 3.5–5.2)
ALBUMIN/GLOB SERPL: 1.5 G/DL
ALP SERPL-CCNC: 56 U/L (ref 39–117)
ALT SERPL W P-5'-P-CCNC: 13 U/L (ref 1–33)
ANION GAP SERPL CALCULATED.3IONS-SCNC: 12.7 MMOL/L (ref 5–15)
AST SERPL-CCNC: 17 U/L (ref 1–32)
BILIRUB SERPL-MCNC: 0.7 MG/DL (ref 0–1.2)
BUN SERPL-MCNC: 45 MG/DL (ref 8–23)
BUN/CREAT SERPL: 31.5 (ref 7–25)
CALCIUM SPEC-SCNC: 9.2 MG/DL (ref 8.6–10.5)
CHLORIDE SERPL-SCNC: 106 MMOL/L (ref 98–107)
CHOLEST SERPL-MCNC: 113 MG/DL (ref 0–200)
CO2 SERPL-SCNC: 23.3 MMOL/L (ref 22–29)
CREAT SERPL-MCNC: 1.43 MG/DL (ref 0.57–1)
EGFRCR SERPLBLD CKD-EPI 2021: 37.4 ML/MIN/1.73
GLOBULIN UR ELPH-MCNC: 2.7 GM/DL
GLUCOSE SERPL-MCNC: 110 MG/DL (ref 65–99)
HBA1C MFR BLD: 6.4 % (ref 4.8–5.6)
HDLC SERPL-MCNC: 50 MG/DL (ref 40–60)
LDLC SERPL CALC-MCNC: 41 MG/DL (ref 0–100)
LDLC/HDLC SERPL: 0.77 {RATIO}
POTASSIUM SERPL-SCNC: 4.6 MMOL/L (ref 3.5–5.2)
PROT SERPL-MCNC: 6.8 G/DL (ref 6–8.5)
SODIUM SERPL-SCNC: 142 MMOL/L (ref 136–145)
TRIGL SERPL-MCNC: 123 MG/DL (ref 0–150)
TSH SERPL DL<=0.05 MIU/L-ACNC: 0.62 UIU/ML (ref 0.27–4.2)
VLDLC SERPL-MCNC: 22 MG/DL (ref 5–40)

## 2024-06-28 PROCEDURE — 3078F DIAST BP <80 MM HG: CPT | Performed by: FAMILY MEDICINE

## 2024-06-28 PROCEDURE — 1126F AMNT PAIN NOTED NONE PRSNT: CPT | Performed by: FAMILY MEDICINE

## 2024-06-28 PROCEDURE — G2211 COMPLEX E/M VISIT ADD ON: HCPCS | Performed by: FAMILY MEDICINE

## 2024-06-28 PROCEDURE — 3077F SYST BP >= 140 MM HG: CPT | Performed by: FAMILY MEDICINE

## 2024-06-28 PROCEDURE — 83036 HEMOGLOBIN GLYCOSYLATED A1C: CPT | Performed by: FAMILY MEDICINE

## 2024-06-28 PROCEDURE — 84443 ASSAY THYROID STIM HORMONE: CPT | Performed by: FAMILY MEDICINE

## 2024-06-28 PROCEDURE — 99214 OFFICE O/P EST MOD 30 MIN: CPT | Performed by: FAMILY MEDICINE

## 2024-06-28 PROCEDURE — 80061 LIPID PANEL: CPT | Performed by: FAMILY MEDICINE

## 2024-06-28 PROCEDURE — 80053 COMPREHEN METABOLIC PANEL: CPT | Performed by: FAMILY MEDICINE

## 2024-06-28 PROCEDURE — 36415 COLL VENOUS BLD VENIPUNCTURE: CPT | Performed by: FAMILY MEDICINE

## 2024-06-28 NOTE — ASSESSMENT & PLAN NOTE
Her blood pressure is a little elevated here today but her home blood pressure readings have been consistently good.  Will not make any changes in her meds.

## 2024-06-28 NOTE — ASSESSMENT & PLAN NOTE
Her blood sugars have been a little bit higher as of late.  It was summertime she has eaten some ice cream.  Will have her cut back on that and watch her carbohydrate intake.  Will update her A1c.

## 2024-06-28 NOTE — PROGRESS NOTES
Chief Complaint   Patient presents with    Follow-up     6 mo f/u    Hypothyroidism    Hypertension    Hematologist     Patient stated her hematologist is retiring, asking you to take over her care for that. - SHRADDHA WOOTEN MD        Subjective     Ann-Marie Hughes  has a past medical history of Acne rosacea (08/17/2021), Acute respiratory failure with hypoxia (07/31/2023), Arteriosclerosis of abdominal aorta, B12 deficiency (08/17/2021), DJD (degenerative joint disease), smoking (08/17/2021), Hyperlipidemia (08/17/2021), Hypertension, Hypothyroidism, Metabolic syndrome (08/17/2021), NSTEMI (non-ST elevated myocardial infarction) (07/17/2023), Pericardial effusion (10/10/2023), KAREN (stress urinary incontinence, female) (08/17/2021), UTI (urinary tract infection) (08/17/2021), and Vitamin D deficiency (08/17/2021).    Hypertension-she does check her blood pressure at home on a daily basis.  She states is typically 120-130/60.    Hyperlipidemia-she takes her atorvastatin daily.    CAD-she denies any chest pain tightness or heaviness.    Atrial fibrillation-she denies any palpitations or tachycardia.  She does take her Eliquis twice a day every day.  She denies any recurrent nosebleeds, gross hematuria, or blood per rectum.    Hypothyroid-she takes her levothyroxine every morning as directed.    Type 2 diabetes-she does check her blood sugars on a regular basis.  She states they have been a little high up into the 160s.  She denies any blurred vision excessive thirst or excessive urination.        PHQ-2 Depression Screening  Little interest or pleasure in doing things?     Feeling down, depressed, or hopeless?     PHQ-2 Total Score     PHQ-9 Depression Screening  Little interest or pleasure in doing things?     Feeling down, depressed, or hopeless?     Trouble falling or staying asleep, or sleeping too much?     Feeling tired or having little energy?     Poor appetite or overeating?     Feeling bad about yourself  - or that you are a failure or have let yourself or your family down?     Trouble concentrating on things, such as reading the newspaper or watching television?     Moving or speaking so slowly that other people could have noticed? Or the opposite - being so fidgety or restless that you have been moving around a lot more than usual?     Thoughts that you would be better off dead, or of hurting yourself in some way?     PHQ-9 Total Score     If you checked off any problems, how difficult have these problems made it for you to do your work, take care of things at home, or get along with other people?       Allergies   Allergen Reactions    Penicillins Palpitations     1. When was your reaction? > 10 years ago  2. Did your reaction happen after the first dose or after several doses? After first dose  3. Did your reaction require ED or hospital care to manage your reaction? Do not know  4. Did your reaction require treatment with epinephrine? Do not know  5. Have you taken amoxicillin (Amoxil) or amoxicillin-clavulanate (Augmentin) without issue since? Yes  6. Have you taken cephalexin (Keflex) without issue since?  Do not know        Prior to Admission medications    Medication Sig Start Date End Date Taking? Authorizing Provider   acetaminophen (TYLENOL) 325 MG tablet Take 2 tablets by mouth Every 4 (Four) Hours As Needed for Mild Pain. 7/28/23  Yes Anjelica Chase APRN   albuterol sulfate  (90 Base) MCG/ACT inhaler Inhale 2 puffs Every 4 (Four) Hours As Needed for Wheezing. 5/30/23  Yes Les Moreno, DO   aspirin 81 MG EC tablet Take 1 tablet by mouth Every Other Day.   Yes Kanwal Orozco MD   atorvastatin (LIPITOR) 40 MG tablet Take 1 tablet by mouth Daily. 11/7/23  Yes Valerie Valera MD   buPROPion XL (WELLBUTRIN XL) 300 MG 24 hr tablet TAKE 1 TABLET BY MOUTH EVERY DAY 6/5/24  Yes Romario Valdez, DO   CRANBERRY EXTRACT PO Take 1 tablet by mouth Daily.   Yes Kanwal Orozco MD    dapagliflozin Propanediol (Farxiga) 10 MG tablet TAKE 1 TABLET BY MOUTH EVERY DAY 12/4/23  Yes Valerie Valera MD   digoxin (LANOXIN) 125 MCG tablet Take 1 tablet by mouth Daily. Omit on Thursdays and Saturdays 4/17/24  Yes Sara Lubin APRN   Eliquis 5 MG tablet tablet TAKE 1 TABLET BY MOUTH EVERY 12 HOURS 3/4/24  Yes Valerie Valera MD   ferrous sulfate 325 (65 FE) MG tablet Take 1 tablet by mouth 3 (Three) Times a Week. 6/26/24  Yes Boston Can MD   folic acid (FOLVITE) 1 MG tablet TAKE 1 TABLET BY MOUTH EVERY DAY 5/8/24  Yes Boston Can MD   furosemide (LASIX) 40 MG tablet Take 1 tablet by mouth 2 (Two) Times a Day. Take 1 tablet in the morning and half tablet in the afternoon. 6/27/24  Yes Sara Lubin APRN   glucose blood test strip Check blood sugar once daily and record. 4/29/24  Yes Romario Valdez DO   metFORMIN ER (GLUCOPHAGE-XR) 500 MG 24 hr tablet Take 1 tablet by mouth 2 (Two) Times a Day. 3/4/24  Yes Romario Valdez DO   metoprolol succinate XL (TOPROL-XL) 50 MG 24 hr tablet Take 1 tablet by mouth 2 (Two) Times a Day. 1/2 tab in the am, 1 tab in the pm   Yes Kanwal Orozco MD   montelukast (SINGULAIR) 10 MG tablet Take 1 tablet by mouth Daily. 7/5/23  Yes Rochelle Brown APRN   predniSONE (DELTASONE) 5 MG tablet TAKE 5 MG EVERY DAY 6/26/24  Yes Boston Can MD   sacubitril-valsartan (Entresto) 24-26 MG tablet Take 1 tablet by mouth 2 (Two) Times a Day. 2/19/24  Yes Valerie Valera MD   spironolactone (ALDACTONE) 25 MG tablet Take 1 tablet by mouth Daily. 10/10/23  Yes Valerie Valera MD   Synthroid 75 MCG tablet Take 1 tablet by mouth Daily. 4/30/24  Yes José Miguel, Romario Don, DO        Patient Active Problem List   Diagnosis    Arthritis    Chronic pain syndrome    Diabetes mellitus, type II    Gastric reflux    Herniated disc    Hypertension    Hypothyroidism    Pain in joint, multiple sites    Hyperlipidemia    B12 deficiency    Vitamin D  deficiency    Acne rosacea    Hx of smoking    Stress incontinence of urine    Arteriosclerosis of abdominal aorta    Iron deficiency anemia secondary to inadequate dietary iron intake    Seasonal allergies    Hyponatremia    Leg length discrepancy    Reactive depression    Encounter for subsequent annual wellness visit (AWV) in Medicare patient    Class 1 obesity with alveolar hypoventilation, serious comorbidity, and body mass index (BMI) of 32.0 to 32.9 in adult    Chronic HFrEF (heart failure with reduced ejection fraction)    CAD status post CABG x3 vessels    PAF (paroxysmal atrial fibrillation)    Encounter for medical examination to establish care    Drug-induced autoantibody type hemolytic anemia    Need for RSV vaccination    Stage 3b chronic kidney disease    Large granular lymphocyte disorder    Cervicogenic headache        Past Surgical History:   Procedure Laterality Date    BACK SURGERY  2013    CARDIAC CATHETERIZATION N/A 07/17/2023    Procedure: Left Heart Cath;  Surgeon: Dinh Andres MD;  Location: [facility] CATH INVASIVE LOCATION;  Service: Cardiovascular;  Laterality: N/A;    CARPAL TUNNEL RELEASE      COLONOSCOPY  2011    CORONARY ARTERY BYPASS GRAFT WITH MITRAL VALVE REPAIR/REPLACEMENT N/A 07/20/2023    Procedure: REZA STERNOTOMY OFF-PUMP CORONARY ARTERY BYPASS GRAFT TIMES        USING LEFFT INTERNAL MAMMARY ARTERY AND     GREATER SAPHENOUS VEIN GRAFT PER EMDOSCOPIC VEIN HARVESTING, MAZE PROCEDURE AND PRP;  Surgeon: Shane Alexander MD;  Location: [facility];  Service: Cardiothoracic;  Laterality: N/A;    KNEE SURGERY      LUMBAR DISCECTOMY      NECK SURGERY      Cervical    POSTERIOR LUMBAR/THORACIC SPINE FUSION         Social History     Socioeconomic History    Marital status:      Spouse name: Dinh   Tobacco Use    Smoking status: Former     Current packs/day: 0.00     Average packs/day: 1 pack/day for 12.0 years (12.0 ttl pk-yrs)     Types: Cigarettes     Start date:  "     Quit date:      Years since quittin.5    Smokeless tobacco: Never   Vaping Use    Vaping status: Never Used   Substance and Sexual Activity    Alcohol use: Never    Drug use: Never    Sexual activity: Defer       Family History   Problem Relation Age of Onset    Heart disease Mother     Arthritis Mother     Heart attack Mother     Arthritis Father        Family history, surgical history, past medical history, Allergies and meds reviewed with patient today and updated in [facility] EMR.     ROS:  Review of Systems   Constitutional:  Positive for fatigue.   HENT:  Positive for congestion, postnasal drip and rhinorrhea. Negative for nosebleeds.    Eyes:  Negative for blurred vision and visual disturbance.   Respiratory:  Positive for shortness of breath. Negative for cough, chest tightness and wheezing.    Cardiovascular:  Negative for chest pain and palpitations.   Gastrointestinal:  Negative for blood in stool.   Endocrine: Negative for polydipsia and polyuria.   Genitourinary:  Negative for hematuria.   Allergic/Immunologic: Negative for environmental allergies.   Neurological:  Negative for headache.   Psychiatric/Behavioral:  Negative for depressed mood. The patient is not nervous/anxious.        OBJECTIVE:  Vitals:    24 1010   BP: 153/74   BP Location: Left arm   Patient Position: Sitting   Cuff Size: Adult   Pulse: 66   Temp: 96.9 °F (36.1 °C)   TempSrc: Temporal   SpO2: 94%   Weight: 80.7 kg (178 lb)   Height: 162.6 cm (64\")     No results found.   Body mass index is 30.55 kg/m².  No LMP recorded (lmp unknown). Patient is postmenopausal.    The ASCVD Risk score (Hamilton DK, et al., 2019) failed to calculate.     Physical Exam  Vitals and nursing note reviewed.   Constitutional:       General: She is not in acute distress.     Appearance: Normal appearance. She is normal weight.   HENT:      Head: Normocephalic.      Right Ear: Tympanic membrane, ear canal and external ear normal.      Left " Ear: Tympanic membrane, ear canal and external ear normal.      Nose: Nose normal.      Mouth/Throat:      Mouth: Mucous membranes are moist.      Dentition: Has dentures.      Pharynx: Oropharynx is clear.   Eyes:      General: No scleral icterus.     Conjunctiva/sclera: Conjunctivae normal.      Pupils: Pupils are equal, round, and reactive to light.   Cardiovascular:      Rate and Rhythm: Normal rate and regular rhythm.      Pulses: Normal pulses.      Heart sounds: Normal heart sounds. No murmur heard.      with a grade of 1/6.   Pulmonary:      Effort: Pulmonary effort is normal.      Breath sounds: Normal breath sounds. No wheezing, rhonchi or rales.   Musculoskeletal:      Cervical back: Neck supple. No rigidity or tenderness.   Lymphadenopathy:      Cervical: No cervical adenopathy.   Skin:     General: Skin is warm and dry.      Coloration: Skin is not jaundiced.      Findings: No rash.   Neurological:      General: No focal deficit present.      Mental Status: She is alert and oriented to person, place, and time.   Psychiatric:         Mood and Affect: Mood normal.         Thought Content: Thought content normal.         Judgment: Judgment normal.         Procedures    Lab on 06/25/2024   Component Date Value Ref Range Status    Glucose 06/25/2024 119 (H)  65 - 99 mg/dL Final    BUN 06/25/2024 38 (H)  8 - 23 mg/dL Final    Creatinine 06/25/2024 1.45 (H)  0.57 - 1.00 mg/dL Final    Sodium 06/25/2024 142  136 - 145 mmol/L Final    Potassium 06/25/2024 4.0  3.5 - 5.2 mmol/L Final    Chloride 06/25/2024 103  98 - 107 mmol/L Final    CO2 06/25/2024 26.9  22.0 - 29.0 mmol/L Final    Calcium 06/25/2024 9.4  8.6 - 10.5 mg/dL Final    Total Protein 06/25/2024 6.8  6.0 - 8.5 g/dL Final    Albumin 06/25/2024 4.0  3.5 - 5.2 g/dL Final    ALT (SGPT) 06/25/2024 11  1 - 33 U/L Final    AST (SGOT) 06/25/2024 16  1 - 32 U/L Final    Alkaline Phosphatase 06/25/2024 55  39 - 117 U/L Final    Total Bilirubin 06/25/2024 0.7   0.0 - 1.2 mg/dL Final    Globulin 06/25/2024 2.8  gm/dL Final    A/G Ratio 06/25/2024 1.4  g/dL Final    BUN/Creatinine Ratio 06/25/2024 26.2 (H)  7.0 - 25.0 Final    Anion Gap 06/25/2024 12.1  5.0 - 15.0 mmol/L Final    eGFR 06/25/2024 36.8 (L)  >60.0 mL/min/1.73 Final    Ferritin 06/25/2024 719.60 (H)  13.00 - 150.00 ng/mL Final    Iron 06/25/2024 100  37 - 145 mcg/dL Final    Iron Saturation (TSAT) 06/25/2024 32  20 - 50 % Final    Transferrin 06/25/2024 213  200 - 360 mg/dL Final    TIBC 06/25/2024 317  298 - 536 mcg/dL Final    Immature Reticulocyte Fraction 06/25/2024 26.5 (H)  3.0 - 15.8 % Final    Reticulocyte % 06/25/2024 2.44 (H)  0.70 - 1.90 % Final    Reticulocyte Absolute 06/25/2024 0.0744  0.0200 - 0.1300 10*6/mm3 Final    Reticulocyte Hgb 06/25/2024 30.9  29.8 - 36.1 pg Final    Folate 06/25/2024 >20.00  4.78 - 24.20 ng/mL Final    Vitamin B-12 06/25/2024 516  211 - 946 pg/mL Final    WBC 06/25/2024 11.94 (H)  3.40 - 10.80 10*3/mm3 Final    RBC 06/25/2024 3.02 (L)  3.77 - 5.28 10*6/mm3 Final    Hemoglobin 06/25/2024 9.9 (L)  12.0 - 15.9 g/dL Final    Hematocrit 06/25/2024 29.5 (L)  34.0 - 46.6 % Final    MCV 06/25/2024 97.7 (H)  79.0 - 97.0 fL Final    MCH 06/25/2024 32.8  26.6 - 33.0 pg Final    MCHC 06/25/2024 33.6  31.5 - 35.7 g/dL Final    RDW 06/25/2024 18.0 (H)  12.3 - 15.4 % Final    RDW-SD 06/25/2024 63.2 (H)  37.0 - 54.0 fl Final    MPV 06/25/2024 11.0  6.0 - 12.0 fL Final    Platelets 06/25/2024 267  140 - 450 10*3/mm3 Final    Neutrophil % 06/25/2024 65.2  42.7 - 76.0 % Final    Lymphocyte % 06/25/2024 19.2 (L)  19.6 - 45.3 % Final    Monocyte % 06/25/2024 12.6 (H)  5.0 - 12.0 % Final    Eosinophil % 06/25/2024 1.9  0.3 - 6.2 % Final    Basophil % 06/25/2024 0.5  0.0 - 1.5 % Final    Immature Grans % 06/25/2024 0.6 (H)  0.0 - 0.5 % Final    Neutrophils, Absolute 06/25/2024 7.78 (H)  1.70 - 7.00 10*3/mm3 Final    Lymphocytes, Absolute 06/25/2024 2.29  0.70 - 3.10 10*3/mm3 Final    Monocytes,  Absolute 06/25/2024 1.51 (H)  0.10 - 0.90 10*3/mm3 Final    Eosinophils, Absolute 06/25/2024 0.23  0.00 - 0.40 10*3/mm3 Final    Basophils, Absolute 06/25/2024 0.06  0.00 - 0.20 10*3/mm3 Final    Immature Grans, Absolute 06/25/2024 0.07 (H)  0.00 - 0.05 10*3/mm3 Final    nRBC 06/25/2024 0.8 (H)  0.0 - 0.2 /100 WBC Final    proBNP 06/25/2024 5,294.0 (H)  0.0 - 1,800.0 pg/mL Final   Lab on 06/12/2024   Component Date Value Ref Range Status    Glucose 06/12/2024 157 (H)  65 - 99 mg/dL Final    BUN 06/12/2024 46 (H)  8 - 23 mg/dL Final    Creatinine 06/12/2024 1.25 (H)  0.57 - 1.00 mg/dL Final    Sodium 06/12/2024 139  136 - 145 mmol/L Final    Potassium 06/12/2024 4.2  3.5 - 5.2 mmol/L Final    Chloride 06/12/2024 99  98 - 107 mmol/L Final    CO2 06/12/2024 26.0  22.0 - 29.0 mmol/L Final    Calcium 06/12/2024 9.2  8.6 - 10.5 mg/dL Final    Total Protein 06/12/2024 6.8  6.0 - 8.5 g/dL Final    Albumin 06/12/2024 4.1  3.5 - 5.2 g/dL Final    ALT (SGPT) 06/12/2024 15  1 - 33 U/L Final    AST (SGOT) 06/12/2024 18  1 - 32 U/L Final    Alkaline Phosphatase 06/12/2024 60  39 - 117 U/L Final    Total Bilirubin 06/12/2024 0.8  0.0 - 1.2 mg/dL Final    Globulin 06/12/2024 2.7  gm/dL Final    A/G Ratio 06/12/2024 1.5  g/dL Final    BUN/Creatinine Ratio 06/12/2024 36.8 (H)  7.0 - 25.0 Final    Anion Gap 06/12/2024 14.0  5.0 - 15.0 mmol/L Final    eGFR 06/12/2024 43.9 (L)  >60.0 mL/min/1.73 Final    Magnesium 06/12/2024 1.6  1.6 - 2.4 mg/dL Final    proBNP 06/12/2024 5,053.0 (H)  0.0 - 1,800.0 pg/mL Final    Digoxin 06/12/2024 1.06  0.60 - 1.20 ng/mL Final       ASSESSMENT/ PLAN:    Diagnoses and all orders for this visit:    1. PAF (paroxysmal atrial fibrillation) (Primary)  Assessment & Plan:  Clinically she sounds regular today.  She will continue her Eliquis twice daily.      2. Primary hypertension  Assessment & Plan:  Her blood pressure is a little elevated here today but her home blood pressure readings have been  consistently good.  Will not make any changes in her meds.    Orders:  -     Lipid Panel    3. Mixed hyperlipidemia  Assessment & Plan:   Her last lipid profile was 6 months ago.  Will get that updated.    Orders:  -     Lipid Panel    4. Coronary artery disease involving native heart without angina pectoris, unspecified vessel or lesion type  Assessment & Plan:  She is doing well and denies any current anginal complaints.  She recently saw cardiology just yesterday.      5. Acquired hypothyroidism  Assessment & Plan:  Will update her thyroid profile with her labs.    Orders:  -     TSH Rfx On Abnormal To Free T4    6. Stage 3b chronic kidney disease    7. Type 2 diabetes mellitus without complication, without long-term current use of insulin  Assessment & Plan:  Her blood sugars have been a little bit higher as of late.  It was summertime she has eaten some ice cream.  Will have her cut back on that and watch her carbohydrate intake.  Will update her A1c.    Orders:  -     Hemoglobin A1c    8. Large granular lymphocyte disorder  Assessment & Plan:  She has chronic anemia related to this.  Will have her see one of the local hematology oncologist.    Orders:  -     Ambulatory Referral to Hematology / Oncology    9. Postmenopausal  -     DEXA Bone Density Axial; Future               Orders Placed Today:     No orders of the defined types were placed in this encounter.       Management Plan:     An After Visit Summary was printed and given to the patient at discharge.    Follow-up: Return in about 6 months (around 12/28/2024) for Recheck.    Romario Valdez, DO 6/28/2024 10:59 EDT  This note was electronically signed.

## 2024-06-28 NOTE — ASSESSMENT & PLAN NOTE
She is doing well and denies any current anginal complaints.  She recently saw cardiology just yesterday.

## 2024-07-05 ENCOUNTER — TELEPHONE (OUTPATIENT)
Dept: FAMILY MEDICINE CLINIC | Facility: CLINIC | Age: 79
End: 2024-07-05
Payer: MEDICARE

## 2024-07-05 NOTE — TELEPHONE ENCOUNTER
Jeannie from Oncology called to report they tried to schedule the appointment 3 times and were unable to get a hold of her, so they are closing the referral.     The patient can call [phone] to schedule if your staff can get a hold of her.

## 2024-07-05 NOTE — TELEPHONE ENCOUNTER
Hub to relay:   Per dr crawford: ok to cancel, if patient ok with it we will place new order in the future

## 2024-07-08 DIAGNOSIS — I50.22 CHRONIC HFREF (HEART FAILURE WITH REDUCED EJECTION FRACTION): ICD-10-CM

## 2024-07-08 RX ORDER — SPIRONOLACTONE 25 MG/1
25 TABLET ORAL DAILY
Qty: 90 TABLET | Refills: 3 | Status: SHIPPED | OUTPATIENT
Start: 2024-07-08

## 2024-07-15 ENCOUNTER — LAB (OUTPATIENT)
Dept: LAB | Facility: HOSPITAL | Age: 79
End: 2024-07-15
Payer: MEDICARE

## 2024-07-15 DIAGNOSIS — N18.32 STAGE 3B CHRONIC KIDNEY DISEASE: ICD-10-CM

## 2024-07-15 DIAGNOSIS — I50.22 CHRONIC HFREF (HEART FAILURE WITH REDUCED EJECTION FRACTION): ICD-10-CM

## 2024-07-15 DIAGNOSIS — I25.810 CORONARY ARTERY DISEASE INVOLVING AUTOLOGOUS VEIN CORONARY BYPASS GRAFT WITHOUT ANGINA PECTORIS: ICD-10-CM

## 2024-07-15 LAB
ALBUMIN SERPL-MCNC: 4.1 G/DL (ref 3.5–5.2)
ALBUMIN/GLOB SERPL: 1.4 G/DL
ALP SERPL-CCNC: 65 U/L (ref 39–117)
ALT SERPL W P-5'-P-CCNC: 10 U/L (ref 1–33)
ANION GAP SERPL CALCULATED.3IONS-SCNC: 14 MMOL/L (ref 5–15)
AST SERPL-CCNC: 15 U/L (ref 1–32)
BASOPHILS # BLD AUTO: 0.07 10*3/MM3 (ref 0–0.2)
BASOPHILS NFR BLD AUTO: 0.6 % (ref 0–1.5)
BILIRUB SERPL-MCNC: 0.7 MG/DL (ref 0–1.2)
BUN SERPL-MCNC: 62 MG/DL (ref 8–23)
BUN/CREAT SERPL: 37.3 (ref 7–25)
CALCIUM SPEC-SCNC: 9.5 MG/DL (ref 8.6–10.5)
CHLORIDE SERPL-SCNC: 105 MMOL/L (ref 98–107)
CO2 SERPL-SCNC: 23 MMOL/L (ref 22–29)
CREAT SERPL-MCNC: 1.66 MG/DL (ref 0.57–1)
DEPRECATED RDW RBC AUTO: 69 FL (ref 37–54)
DIGOXIN SERPL-MCNC: 1.3 NG/ML (ref 0.6–1.2)
EGFRCR SERPLBLD CKD-EPI 2021: 31.3 ML/MIN/1.73
EOSINOPHIL # BLD AUTO: 0.15 10*3/MM3 (ref 0–0.4)
EOSINOPHIL NFR BLD AUTO: 1.3 % (ref 0.3–6.2)
ERYTHROCYTE [DISTWIDTH] IN BLOOD BY AUTOMATED COUNT: 18.9 % (ref 12.3–15.4)
GLOBULIN UR ELPH-MCNC: 2.9 GM/DL
GLUCOSE SERPL-MCNC: 115 MG/DL (ref 65–99)
HCT VFR BLD AUTO: 29.8 % (ref 34–46.6)
HGB BLD-MCNC: 9.4 G/DL (ref 12–15.9)
IMM GRANULOCYTES # BLD AUTO: 0.14 10*3/MM3 (ref 0–0.05)
IMM GRANULOCYTES NFR BLD AUTO: 1.2 % (ref 0–0.5)
LYMPHOCYTES # BLD AUTO: 3.1 10*3/MM3 (ref 0.7–3.1)
LYMPHOCYTES NFR BLD AUTO: 26.1 % (ref 19.6–45.3)
MAGNESIUM SERPL-MCNC: 1.6 MG/DL (ref 1.6–2.4)
MCH RBC QN AUTO: 31.6 PG (ref 26.6–33)
MCHC RBC AUTO-ENTMCNC: 31.5 G/DL (ref 31.5–35.7)
MCV RBC AUTO: 100.3 FL (ref 79–97)
MONOCYTES # BLD AUTO: 1.33 10*3/MM3 (ref 0.1–0.9)
MONOCYTES NFR BLD AUTO: 11.2 % (ref 5–12)
NEUTROPHILS NFR BLD AUTO: 59.6 % (ref 42.7–76)
NEUTROPHILS NFR BLD AUTO: 7.1 10*3/MM3 (ref 1.7–7)
NRBC BLD AUTO-RTO: 0.7 /100 WBC (ref 0–0.2)
NT-PROBNP SERPL-MCNC: 2766 PG/ML (ref 0–1800)
PLATELET # BLD AUTO: 308 10*3/MM3 (ref 140–450)
PMV BLD AUTO: 11.8 FL (ref 6–12)
POTASSIUM SERPL-SCNC: 4.9 MMOL/L (ref 3.5–5.2)
PROT SERPL-MCNC: 7 G/DL (ref 6–8.5)
RBC # BLD AUTO: 2.97 10*6/MM3 (ref 3.77–5.28)
SODIUM SERPL-SCNC: 142 MMOL/L (ref 136–145)
WBC NRBC COR # BLD AUTO: 11.89 10*3/MM3 (ref 3.4–10.8)

## 2024-07-15 PROCEDURE — 83735 ASSAY OF MAGNESIUM: CPT

## 2024-07-15 PROCEDURE — 83880 ASSAY OF NATRIURETIC PEPTIDE: CPT

## 2024-07-15 PROCEDURE — 36415 COLL VENOUS BLD VENIPUNCTURE: CPT

## 2024-07-15 PROCEDURE — 80162 ASSAY OF DIGOXIN TOTAL: CPT

## 2024-07-15 PROCEDURE — 80053 COMPREHEN METABOLIC PANEL: CPT

## 2024-07-15 PROCEDURE — 85025 COMPLETE CBC W/AUTO DIFF WBC: CPT

## 2024-07-16 ENCOUNTER — OFFICE VISIT (OUTPATIENT)
Dept: CARDIOLOGY | Facility: CLINIC | Age: 79
End: 2024-07-16
Payer: MEDICARE

## 2024-07-16 VITALS
HEART RATE: 61 BPM | WEIGHT: 173 LBS | BODY MASS INDEX: 29.53 KG/M2 | SYSTOLIC BLOOD PRESSURE: 138 MMHG | DIASTOLIC BLOOD PRESSURE: 64 MMHG | HEIGHT: 64 IN

## 2024-07-16 DIAGNOSIS — I10 PRIMARY HYPERTENSION: ICD-10-CM

## 2024-07-16 DIAGNOSIS — I25.810 CORONARY ARTERY DISEASE INVOLVING AUTOLOGOUS VEIN CORONARY BYPASS GRAFT WITHOUT ANGINA PECTORIS: ICD-10-CM

## 2024-07-16 DIAGNOSIS — I50.22 CHRONIC HFREF (HEART FAILURE WITH REDUCED EJECTION FRACTION): Primary | ICD-10-CM

## 2024-07-16 DIAGNOSIS — N18.32 STAGE 3B CHRONIC KIDNEY DISEASE: ICD-10-CM

## 2024-07-16 DIAGNOSIS — E78.2 MIXED HYPERLIPIDEMIA: ICD-10-CM

## 2024-07-16 DIAGNOSIS — I48.0 PAF (PAROXYSMAL ATRIAL FIBRILLATION): ICD-10-CM

## 2024-07-16 RX ORDER — FUROSEMIDE 40 MG/1
40 TABLET ORAL DAILY
Qty: 135 TABLET | Refills: 2 | Status: SHIPPED | OUTPATIENT
Start: 2024-07-16

## 2024-07-16 RX ORDER — DIGOXIN 125 MCG
125 TABLET ORAL DAILY
Qty: 50 TABLET | Refills: 3 | Status: SHIPPED | OUTPATIENT
Start: 2024-07-16

## 2024-07-16 NOTE — ASSESSMENT & PLAN NOTE
She has chronic kidney disease stage IIIb with some worsening of her creatinine.  Will adjust her diuretic dosage and repeat labs in 2 weeks.  She will need close monitoring

## 2024-07-16 NOTE — PATIENT INSTRUCTIONS
1.  Decrease Lasix/furosemide 40 mg take every day except Thursdays and Sundays  2.  Decrease digoxin 125 mcg take every day except Tuesday Thursday and Saturday.  3.  Continue with heart rate blood pressure and weight log and bring it to next appointment.  4.  Do blood work in 2 weeks and couple of days prior to next visit.

## 2024-07-16 NOTE — ASSESSMENT & PLAN NOTE
Patient has known coronary disease with status post bypass surgery.  She does not have any symptoms of angina.  She will continue with her apixaban and high intensity statin.

## 2024-07-16 NOTE — ASSESSMENT & PLAN NOTE
Ms. Hughes is doing okay with her CHF.  She seems to be slightly volume contracted based on her labs and clinical findings.  I am going to adjust her medications and also adjust the dosage of digoxin in light of her increasing levels.  She will get blood work done in 2 weeks.  The following changes were made to her medications:    1.  Decrease Lasix/furosemide 40 mg take every day except Thursdays and Sundays  2.  Decrease digoxin 125 mcg take every day except Tuesday Thursday and Saturday.  3.  Continue with heart rate blood pressure and weight log and bring it to next appointment.  4.  Do blood work in 2 weeks and couple of days prior to next visit.

## 2024-07-16 NOTE — PROGRESS NOTES
Office Visit    Chief Complaint  Chronic HFrEF (heart failure with reduced ejection fraction)    Subjective            Ann-Marie Hughes presents to [facility] CARDIOLOGY  History of Present Illness  Ms. Ann-Marie Hughes is a 78 years old female with heart failure with reduced ejection fraction who is doing reasonably well.  She gets short of breath with moderate to heavy exertion which she says is chronic.  Overall she is better now than what she was a few months ago.  Her pedal edema is almost resolved.  She denies chest pain palpitation dizziness syncope.  Her blood pressure log was reviewed.      Past Medical History:   Diagnosis Date    Acne rosacea 08/17/2021    DR.JEFF MOON     Acute respiratory failure with hypoxia 07/31/2023    Arteriosclerosis of abdominal aorta     B12 deficiency 08/17/2021    DJD (degenerative joint disease)     Hx of smoking 08/17/2021    QUIT IN 1976 AT AGE 31    Hyperlipidemia 08/17/2021    Hypertension     Hypothyroidism     Metabolic syndrome 08/17/2021    NSTEMI (non-ST elevated myocardial infarction) 07/17/2023    Pericardial effusion 10/10/2023    KAREN (stress urinary incontinence, female) 08/17/2021    UTI (urinary tract infection) 08/17/2021    Vitamin D deficiency 08/17/2021       Allergies   Allergen Reactions    Penicillins Palpitations     1. When was your reaction? > 10 years ago  2. Did your reaction happen after the first dose or after several doses? After first dose  3. Did your reaction require ED or hospital care to manage your reaction? Do not know  4. Did your reaction require treatment with epinephrine? Do not know  5. Have you taken amoxicillin (Amoxil) or amoxicillin-clavulanate (Augmentin) without issue since? Yes  6. Have you taken cephalexin (Keflex) without issue since?  Do not know         Past Surgical History:   Procedure Laterality Date    BACK SURGERY  2013    CARDIAC CATHETERIZATION N/A 07/17/2023    Procedure: Left Heart Cath;   Surgeon: Dinh Andres MD;  Location:  [facility];  Service: Cardiovascular;  Laterality: N/A;    CARPAL TUNNEL RELEASE      COLONOSCOPY      CORONARY ARTERY BYPASS GRAFT WITH MITRAL VALVE REPAIR/REPLACEMENT N/A 2023    Procedure: REZA STERNOTOMY OFF-PUMP CORONARY ARTERY BYPASS GRAFT TIMES        USING LEFFT INTERNAL MAMMARY ARTERY AND     GREATER SAPHENOUS VEIN GRAFT PER EMDOSCOPIC VEIN HARVESTING, MAZE PROCEDURE AND PRP;  Surgeon: Shane Alexander MD;  [facility];  Service: Cardiothoracic;  Laterality: N/A;    KNEE SURGERY      LUMBAR DISCECTOMY      NECK SURGERY      Cervical    POSTERIOR LUMBAR/THORACIC SPINE FUSION          Social History     Tobacco Use    Smoking status: Former     Current packs/day: 0.00     Average packs/day: 1 pack/day for 12.0 years (12.0 ttl pk-yrs)     Types: Cigarettes     Start date:      Quit date:      Years since quittin.5    Smokeless tobacco: Never   Vaping Use    Vaping status: Never Used   Substance Use Topics    Alcohol use: Never    Drug use: Never       Family History   Problem Relation Age of Onset    Heart disease Mother     Arthritis Mother     Heart attack Mother     Arthritis Father         Prior to Admission medications    Medication Sig Start Date End Date Taking? Authorizing Provider   acetaminophen (TYLENOL) 325 MG tablet Take 2 tablets by mouth Every 4 (Four) Hours As Needed for Mild Pain. 23  Yes Anjelica Chase APRN   albuterol sulfate  (90 Base) MCG/ACT inhaler Inhale 2 puffs Every 4 (Four) Hours As Needed for Wheezing. 23  Yes Les Moreno,    aspirin 81 MG EC tablet Take 1 tablet by mouth Every Other Day.   Yes Provider, MD Kanwal   atorvastatin (LIPITOR) 40 MG tablet Take 1 tablet by mouth Daily. 23  Yes Valerie Valera MD   buPROPion XL (WELLBUTRIN XL) 300 MG 24 hr tablet TAKE 1 TABLET BY MOUTH EVERY DAY 24  Yes Romario Valdez,    CRANBERRY  EXTRACT PO Take 1 tablet by mouth Daily.   Yes Kanwal Orozco MD   dapagliflozin Propanediol (Farxiga) 10 MG tablet TAKE 1 TABLET BY MOUTH EVERY DAY 12/4/23  Yes Valerie Valera MD   digoxin (LANOXIN) 125 MCG tablet Take 1 tablet by mouth Daily. Omit on Thursdays and Saturdays 4/17/24  Yes Sara Lubin APRN   Eliquis 5 MG tablet tablet TAKE 1 TABLET BY MOUTH EVERY 12 HOURS 3/4/24  Yes Valerie Valera MD   ferrous sulfate 325 (65 FE) MG tablet Take 1 tablet by mouth 3 (Three) Times a Week. 6/26/24  Yes [place] Can MD   folic acid (FOLVITE) 1 MG tablet TAKE 1 TABLET BY MOUTH EVERY DAY 5/8/24  Yes [place] Can MD   furosemide (LASIX) 40 MG tablet Take 1 tablet by mouth 2 (Two) Times a Day. Take 1 tablet in the morning and half tablet in the afternoon.  Patient taking differently: Take 1 tablet by mouth Daily. Omits on Sundays. 6/27/24  Yes Sara Lubin APRN   glucose blood test strip Check blood sugar once daily and record. 4/29/24  Yes Romario Valdez, DO   metFORMIN ER (GLUCOPHAGE-XR) 500 MG 24 hr tablet Take 1 tablet by mouth 2 (Two) Times a Day. 3/4/24  Yes Romario Valdez, DO   metoprolol succinate XL (TOPROL-XL) 50 MG 24 hr tablet Take 1 tablet by mouth 2 (Two) Times a Day. 1/2 tab in the am, 1 tab in the pm   Yes Kanwal Orozco MD   montelukast (SINGULAIR) 10 MG tablet Take 1 tablet by mouth Daily. 7/5/23  Yes Rochelle Brown APRN   predniSONE (DELTASONE) 5 MG tablet TAKE 5 MG EVERY DAY 6/26/24  Yes [place] Can MD   sacubitril-valsartan (Entresto) 24-26 MG tablet Take 1 tablet by mouth 2 (Two) Times a Day. 2/19/24  Yes Valerie Valera MD   spironolactone (ALDACTONE) 25 MG tablet TAKE 1 TABLET BY MOUTH EVERY DAY 7/8/24  Yes Valerie Valera MD   Synthroid 75 MCG tablet Take 1 tablet by mouth Daily. 4/30/24  Yes Romario Valdez,         Review of Systems   Constitutional:  Positive for fatigue.   Respiratory:  Positive for shortness of breath. Negative  "for cough.    Cardiovascular:  Negative for chest pain, palpitations and leg swelling.   Neurological:  Negative for dizziness.        Symptom Course: Improved    Weight Trend: Fluctuating Minimally     Objective     /64   Pulse 61   Ht 162.6 cm (64\")   Wt 78.5 kg (173 lb)   BMI 29.70 kg/m²       Physical Exam  Constitutional:       General: She is awake.      Appearance: Normal appearance.   Neck:      Thyroid: No thyromegaly.      Vascular: No carotid bruit or JVD.   Cardiovascular:      Rate and Rhythm: Normal rate and regular rhythm.      Chest Wall: PMI is not displaced.      Pulses: Normal pulses.      Heart sounds: Normal heart sounds, S1 normal and S2 normal. No murmur heard.     No friction rub. No gallop. No S3 or S4 sounds.   Pulmonary:      Effort: Pulmonary effort is normal.      Breath sounds: Normal breath sounds and air entry. No wheezing, rhonchi or rales.   Abdominal:      General: Bowel sounds are normal.      Palpations: Abdomen is soft. There is no mass.      Tenderness: There is no abdominal tenderness.   Musculoskeletal:      Cervical back: Neck supple.      Right lower leg: No edema.      Left lower leg: No edema.   Neurological:      Mental Status: She is alert and oriented to person, place, and time.   Psychiatric:         Mood and Affect: Mood normal.         Behavior: Behavior is cooperative.           Result Review :                    ECG 12 Lead    Date/Time: 7/16/2024 9:30 AM  Performed by: Valerie Valera MD    Authorized by: Valerie Valera MD  Comments: Sinus rhythm with a mean heart rate of 50 bpm anterolateral T wave changes.  No significant change compared to previous EKG         Lab Results   Component Value Date    PROBNP 2,766.0 (H) 07/15/2024    PROBNP 5,294.0 (H) 06/25/2024    PROBNP 5,053.0 (H) 06/12/2024     CMP          6/25/2024    08:20 6/28/2024    11:25 7/15/2024    08:29   CMP   Glucose 119  110  115    BUN 38  45  62    Creatinine 1.45  1.43  1.66    EGFR " "36.8  37.4  31.3    Sodium 142  142  142    Potassium 4.0  4.6  4.9    Chloride 103  106  105    Calcium 9.4  9.2  9.5    Total Protein 6.8  6.8  7.0    Albumin 4.0  4.1  4.1    Globulin 2.8  2.7  2.9    Total Bilirubin 0.7  0.7  0.7    Alkaline Phosphatase 55  56  65    AST (SGOT) 16  17  15    ALT (SGPT) 11  13  10    Albumin/Globulin Ratio 1.4  1.5  1.4    BUN/Creatinine Ratio 26.2  31.5  37.3    Anion Gap 12.1  12.7  14.0      CBC w/diff          5/25/2024    11:19 6/25/2024    08:20 7/15/2024    08:29   CBC w/Diff   WBC 14.35  11.94  11.89    RBC 2.65  3.02  2.97    Hemoglobin 8.5  9.9  9.4    Hematocrit 26.8  29.5  29.8    .1  97.7  100.3    MCH 32.1  32.8  31.6    MCHC 31.7  33.6  31.5    RDW 18.6  18.0  18.9    Platelets 217  267  308    Neutrophil Rel % 68.2  65.2  59.6    Immature Granulocyte Rel % 0.7  0.6  1.2    Lymphocyte Rel % 19.8  19.2  26.1    Monocyte Rel % 9.5  12.6  11.2    Eosinophil Rel % 1.3  1.9  1.3    Basophil Rel % 0.5  0.5  0.6       Lipid Panel          11/6/2023    09:17 12/27/2023    08:48 6/28/2024    11:25   Lipid Panel   Total Cholesterol 143  120  113    Triglycerides 122  81  123    HDL Cholesterol 40  61  50    VLDL Cholesterol 22  16  22    LDL Cholesterol  81  43  41    LDL/HDL Ratio 1.97  0.70  0.77       Lab Results   Component Value Date    TSH 0.625 06/28/2024    TSH 1.650 03/24/2024    TSH 2.210 12/29/2023      Lab Results   Component Value Date    FREET4 1.74 (H) 12/29/2023    FREET4 1.31 01/11/2023    FREET4 1.27 08/17/2022      No results found for: \"DDIMERQUANT\"  Magnesium   Date Value Ref Range Status   07/15/2024 1.6 1.6 - 2.4 mg/dL Final      Digoxin   Date Value Ref Range Status   07/15/2024 1.30 (H) 0.60 - 1.20 ng/mL Final      A1C Last 3 Results          12/29/2023    14:46 6/28/2024    11:25   HGBA1C Last 3 Results   Hemoglobin A1C 6.00  6.40           Results for orders placed during the hospital encounter of 03/27/24    Adult Transthoracic Echo " Complete W/ Cont if Necessary Per Protocol    Interpretation Summary    The study is technically difficult for diagnosis    Left ventricular systolic function is moderately decreased. Estimated left ventricular EF = 30% with global hypokinesis and dyssynchrony and more severe hypokinesis involving what appeared to be the inferior wall.  Would consider repeating with Definity contrast for more accurate assessment of wall motion and ejection fraction if thought to be clinically indicated    The left ventricular cavity is mildly dilated.    Left ventricular diastolic dysfunction is noted.    The right ventricular cavity is borderline dilated.    The left atrial cavity is moderately dilated.    Mild aortic valve stenosis is present by gradients however reduced ejection fraction may underestimate severity        Assessment and Plan        Diagnoses and all orders for this visit:    1. Chronic HFrEF (heart failure with reduced ejection fraction) (Primary)  Assessment & Plan:  Ms. Hughes is doing okay with her CHF.  She seems to be slightly volume contracted based on her labs and clinical findings.  I am going to adjust her medications and also adjust the dosage of digoxin in light of her increasing levels.  She will get blood work done in 2 weeks.  The following changes were made to her medications:    1.  Decrease Lasix/furosemide 40 mg take every day except Thursdays and Sundays  2.  Decrease digoxin 125 mcg take every day except Tuesday Thursday and Saturday.  3.  Continue with heart rate blood pressure and weight log and bring it to next appointment.  4.  Do blood work in 2 weeks and couple of days prior to next visit.    Orders:  -     CBC & Differential; Future  -     Comprehensive Metabolic Panel; Future  -     Comprehensive Metabolic Panel; Future  -     Magnesium; Future  -     proBNP; Future  -     Digoxin Level; Future  -     CBC & Differential; Future  -     Digoxin Level; Future  -     proBNP; Future  -      Adult Transthoracic Echo Complete W/ Cont if Necessary Per Protocol; Future    2. CAD status post CABG x3 vessels  Assessment & Plan:  Patient has known coronary disease with status post bypass surgery.  She does not have any symptoms of angina.  She will continue with her apixaban and high intensity statin.      3. Mixed hyperlipidemia    4. Primary hypertension  Assessment & Plan:  Her blood pressure log looks good with well-maintained blood pressure except for some low blood pressure reading over the last 1 week      5. PAF (paroxysmal atrial fibrillation)  Assessment & Plan:  She has paroxysmal atrial fibrillation.  Currently she is staying in sinus rhythm.  She will continue to take apixaban to prevent stroke.  We may have to adjust the dosage if her renal function stays stays worse with creatinine greater than 1.5.  She will get labs in 2 weeks with the changes in her diuretic      6. Stage 3b chronic kidney disease  Assessment & Plan:  She has chronic kidney disease stage IIIb with some worsening of her creatinine.  Will adjust her diuretic dosage and repeat labs in 2 weeks.  She will need close monitoring    Orders:  -     CBC & Differential; Future  -     Comprehensive Metabolic Panel; Future  -     Comprehensive Metabolic Panel; Future  -     Magnesium; Future  -     proBNP; Future  -     Digoxin Level; Future  -     CBC & Differential; Future  -     Digoxin Level; Future  -     proBNP; Future    Other orders  -     digoxin (LANOXIN) 125 MCG tablet; Take 1 tablet by mouth Daily. Omit on Tuesdays, Thursdays and Saturdays  Dispense: 50 tablet; Refill: 3  -     ECG 12 Lead    Patient has multiple cardiovascular conditions that require close monitoring        Follow Up     Return in about 4 weeks (around 8/13/2024).    Patient was given instructions and counseling regarding her condition or for health maintenance advice. Please see specific information pulled into the AVS if appropriate.     Electronically  signed by Valerie Valera MD, 07/16/24, 9:37 AM EDT.

## 2024-07-16 NOTE — ASSESSMENT & PLAN NOTE
She has paroxysmal atrial fibrillation.  Currently she is staying in sinus rhythm.  She will continue to take apixaban to prevent stroke.  We may have to adjust the dosage if her renal function stays stays worse with creatinine greater than 1.5.  She will get labs in 2 weeks with the changes in her diuretic

## 2024-07-16 NOTE — ASSESSMENT & PLAN NOTE
Her blood pressure log looks good with well-maintained blood pressure except for some low blood pressure reading over the last 1 week

## 2024-07-26 ENCOUNTER — HOSPITAL ENCOUNTER (OUTPATIENT)
Dept: BONE DENSITY | Facility: HOSPITAL | Age: 79
Discharge: HOME | End: 2024-07-26
Payer: MEDICARE

## 2024-07-26 DIAGNOSIS — Z78.0 POSTMENOPAUSAL: ICD-10-CM

## 2024-07-26 PROCEDURE — 77080 DXA BONE DENSITY AXIAL: CPT

## 2024-07-31 ENCOUNTER — TRANSCRIBE ORDERS (OUTPATIENT)
Dept: LAB | Facility: HOSPITAL | Age: 79
End: 2024-07-31
Payer: MEDICARE

## 2024-07-31 ENCOUNTER — LAB (OUTPATIENT)
Dept: LAB | Facility: HOSPITAL | Age: 79
End: 2024-07-31
Payer: MEDICARE

## 2024-07-31 DIAGNOSIS — I50.22 CHRONIC HFREF (HEART FAILURE WITH REDUCED EJECTION FRACTION): ICD-10-CM

## 2024-07-31 DIAGNOSIS — N18.31 CHRONIC KIDNEY DISEASE (CKD) STAGE G3A/A1, MODERATELY DECREASED GLOMERULAR FILTRATION RATE (GFR) BETWEEN 45-59 ML/MIN/1.73 SQUARE METER AND ALBUMINURIA CREATININE RATIO LESS THAN 30 MG/G (CMS/H*: ICD-10-CM

## 2024-07-31 DIAGNOSIS — N18.32 STAGE 3B CHRONIC KIDNEY DISEASE: ICD-10-CM

## 2024-07-31 DIAGNOSIS — N18.31 CHRONIC KIDNEY DISEASE (CKD) STAGE G3A/A1, MODERATELY DECREASED GLOMERULAR FILTRATION RATE (GFR) BETWEEN 45-59 ML/MIN/1.73 SQUARE METER AND ALBUMINURIA CREATININE RATIO LESS THAN 30 MG/G (CMS/H*: Primary | ICD-10-CM

## 2024-07-31 LAB
ALBUMIN SERPL-MCNC: 3.9 G/DL (ref 3.5–5.2)
ALBUMIN/GLOB SERPL: 1.3 G/DL
ALP SERPL-CCNC: 56 U/L (ref 39–117)
ALT SERPL W P-5'-P-CCNC: 9 U/L (ref 1–33)
ANION GAP SERPL CALCULATED.3IONS-SCNC: 13.7 MMOL/L (ref 5–15)
AST SERPL-CCNC: 19 U/L (ref 1–32)
BACTERIA UR QL AUTO: ABNORMAL /HPF
BASOPHILS # BLD AUTO: 0.05 10*3/MM3 (ref 0–0.2)
BASOPHILS NFR BLD AUTO: 0.4 % (ref 0–1.5)
BILIRUB SERPL-MCNC: 0.6 MG/DL (ref 0–1.2)
BILIRUB UR QL STRIP: NEGATIVE
BUN SERPL-MCNC: 74 MG/DL (ref 8–23)
BUN/CREAT SERPL: 42.5 (ref 7–25)
CALCIUM SPEC-SCNC: 9.4 MG/DL (ref 8.6–10.5)
CHLORIDE SERPL-SCNC: 105 MMOL/L (ref 98–107)
CLARITY UR: CLEAR
CO2 SERPL-SCNC: 21.3 MMOL/L (ref 22–29)
COLOR UR: YELLOW
CREAT SERPL-MCNC: 1.74 MG/DL (ref 0.57–1)
DEPRECATED RDW RBC AUTO: 68.2 FL (ref 37–54)
DIGOXIN SERPL-MCNC: 0.67 NG/ML (ref 0.6–1.2)
EGFRCR SERPLBLD CKD-EPI 2021: 29.5 ML/MIN/1.73
EOSINOPHIL # BLD AUTO: 0.05 10*3/MM3 (ref 0–0.4)
EOSINOPHIL NFR BLD AUTO: 0.4 % (ref 0.3–6.2)
ERYTHROCYTE [DISTWIDTH] IN BLOOD BY AUTOMATED COUNT: 18.6 % (ref 12.3–15.4)
GLOBULIN UR ELPH-MCNC: 2.9 GM/DL
GLUCOSE SERPL-MCNC: 117 MG/DL (ref 65–99)
GLUCOSE UR STRIP-MCNC: ABNORMAL MG/DL
HCT VFR BLD AUTO: 26.7 % (ref 34–46.6)
HGB BLD-MCNC: 8.4 G/DL (ref 12–15.9)
HGB UR QL STRIP.AUTO: NEGATIVE
HOLD SPECIMEN: NORMAL
HYALINE CASTS UR QL AUTO: ABNORMAL /LPF
IMM GRANULOCYTES # BLD AUTO: 0.06 10*3/MM3 (ref 0–0.05)
IMM GRANULOCYTES NFR BLD AUTO: 0.5 % (ref 0–0.5)
KETONES UR QL STRIP: NEGATIVE
LEUKOCYTE ESTERASE UR QL STRIP.AUTO: ABNORMAL
LYMPHOCYTES # BLD AUTO: 2.64 10*3/MM3 (ref 0.7–3.1)
LYMPHOCYTES NFR BLD AUTO: 21.4 % (ref 19.6–45.3)
MAGNESIUM SERPL-MCNC: 1.8 MG/DL (ref 1.6–2.4)
MCH RBC QN AUTO: 31.5 PG (ref 26.6–33)
MCHC RBC AUTO-ENTMCNC: 31.5 G/DL (ref 31.5–35.7)
MCV RBC AUTO: 100 FL (ref 79–97)
MONOCYTES # BLD AUTO: 1.05 10*3/MM3 (ref 0.1–0.9)
MONOCYTES NFR BLD AUTO: 8.5 % (ref 5–12)
NEUTROPHILS NFR BLD AUTO: 68.8 % (ref 42.7–76)
NEUTROPHILS NFR BLD AUTO: 8.47 10*3/MM3 (ref 1.7–7)
NITRITE UR QL STRIP: NEGATIVE
NRBC BLD AUTO-RTO: 0.4 /100 WBC (ref 0–0.2)
NT-PROBNP SERPL-MCNC: 2236 PG/ML (ref 0–1800)
PH UR STRIP.AUTO: 6 [PH] (ref 5–8)
PLATELET # BLD AUTO: 266 10*3/MM3 (ref 140–450)
PMV BLD AUTO: 12 FL (ref 6–12)
POTASSIUM SERPL-SCNC: 5 MMOL/L (ref 3.5–5.2)
PROT SERPL-MCNC: 6.8 G/DL (ref 6–8.5)
PROT UR QL STRIP: NEGATIVE
RBC # BLD AUTO: 2.67 10*6/MM3 (ref 3.77–5.28)
RBC # UR STRIP: ABNORMAL /HPF
REF LAB TEST METHOD: ABNORMAL
SODIUM SERPL-SCNC: 140 MMOL/L (ref 136–145)
SP GR UR STRIP: 1.01 (ref 1–1.03)
SQUAMOUS #/AREA URNS HPF: ABNORMAL /HPF
UROBILINOGEN UR QL STRIP: ABNORMAL
WBC # UR STRIP: ABNORMAL /HPF
WBC NRBC COR # BLD AUTO: 12.32 10*3/MM3 (ref 3.4–10.8)

## 2024-07-31 PROCEDURE — 83880 ASSAY OF NATRIURETIC PEPTIDE: CPT

## 2024-07-31 PROCEDURE — 87086 URINE CULTURE/COLONY COUNT: CPT

## 2024-07-31 PROCEDURE — 81001 URINALYSIS AUTO W/SCOPE: CPT

## 2024-07-31 PROCEDURE — 36415 COLL VENOUS BLD VENIPUNCTURE: CPT

## 2024-07-31 PROCEDURE — 83735 ASSAY OF MAGNESIUM: CPT

## 2024-07-31 PROCEDURE — 80053 COMPREHEN METABOLIC PANEL: CPT

## 2024-07-31 PROCEDURE — 80162 ASSAY OF DIGOXIN TOTAL: CPT

## 2024-07-31 PROCEDURE — 85025 COMPLETE CBC W/AUTO DIFF WBC: CPT

## 2024-07-31 RX ORDER — LEVOTHYROXINE SODIUM 75 MCG
75 TABLET ORAL DAILY
Qty: 90 TABLET | Refills: 3 | Status: SHIPPED | OUTPATIENT
Start: 2024-07-31

## 2024-07-31 RX ORDER — MONTELUKAST SODIUM 10 MG/1
10 TABLET ORAL DAILY
Qty: 90 TABLET | Refills: 3 | Status: SHIPPED | OUTPATIENT
Start: 2024-07-31

## 2024-07-31 NOTE — TELEPHONE ENCOUNTER
PATIENT CAME IN AND IS ASKING FOR THESE PRESCRIPTION BE SENT TO [facility]

## 2024-08-01 NOTE — PROGRESS NOTES
Please let patient know that her labs are stable.  Her kidney function remains very low.  Please ask if she feels like she is short of air or has increased edema, if not have her decrease the furosemide to every other day.  Her RBCs have declined as well.  She will need to contact her PCP.

## 2024-08-02 LAB — BACTERIA SPEC AEROBE CULT: NORMAL

## 2024-08-06 ENCOUNTER — HOSPITAL ENCOUNTER (OUTPATIENT)
Dept: CARDIOLOGY | Facility: HOSPITAL | Age: 79
Discharge: HOME | End: 2024-08-06
Admitting: INTERNAL MEDICINE
Payer: MEDICARE

## 2024-08-06 DIAGNOSIS — I50.22 CHRONIC HFREF (HEART FAILURE WITH REDUCED EJECTION FRACTION): ICD-10-CM

## 2024-08-06 PROCEDURE — 93306 TTE W/DOPPLER COMPLETE: CPT

## 2024-08-08 DIAGNOSIS — I50.22 CHRONIC HFREF (HEART FAILURE WITH REDUCED EJECTION FRACTION): Primary | ICD-10-CM

## 2024-08-08 LAB
BH CV ECHO MEAS - AO MAX PG: 17.6 MMHG
BH CV ECHO MEAS - AO MEAN PG: 9 MMHG
BH CV ECHO MEAS - AO ROOT DIAM: 3.2 CM
BH CV ECHO MEAS - AO V2 MAX: 210 CM/SEC
BH CV ECHO MEAS - AO V2 VTI: 45.5 CM
BH CV ECHO MEAS - AVA(I,D): 1.93 CM2
BH CV ECHO MEAS - EDV(CUBED): 103.8 ML
BH CV ECHO MEAS - EDV(MOD-SP2): 53.8 ML
BH CV ECHO MEAS - EDV(MOD-SP4): 61.9 ML
BH CV ECHO MEAS - EF(MOD-BP): 54.8 %
BH CV ECHO MEAS - EF(MOD-SP2): 59.7 %
BH CV ECHO MEAS - EF(MOD-SP4): 56.1 %
BH CV ECHO MEAS - ESV(CUBED): 32.8 ML
BH CV ECHO MEAS - ESV(MOD-SP2): 21.7 ML
BH CV ECHO MEAS - ESV(MOD-SP4): 27.2 ML
BH CV ECHO MEAS - FS: 31.9 %
BH CV ECHO MEAS - IVS/LVPW: 1 CM
BH CV ECHO MEAS - IVSD: 1.1 CM
BH CV ECHO MEAS - LA DIMENSION: 4.5 CM
BH CV ECHO MEAS - LAT PEAK E' VEL: 9.4 CM/SEC
BH CV ECHO MEAS - LV DIASTOLIC VOL/BSA (35-75): 33.5 CM2
BH CV ECHO MEAS - LV MASS(C)D: 187.5 GRAMS
BH CV ECHO MEAS - LV MAX PG: 6.4 MMHG
BH CV ECHO MEAS - LV MEAN PG: 3 MMHG
BH CV ECHO MEAS - LV SYSTOLIC VOL/BSA (12-30): 14.7 CM2
BH CV ECHO MEAS - LV V1 MAX: 126 CM/SEC
BH CV ECHO MEAS - LV V1 VTI: 27.9 CM
BH CV ECHO MEAS - LVIDD: 4.7 CM
BH CV ECHO MEAS - LVIDS: 3.2 CM
BH CV ECHO MEAS - LVOT AREA: 3.1 CM2
BH CV ECHO MEAS - LVOT DIAM: 2 CM
BH CV ECHO MEAS - LVPWD: 1.1 CM
BH CV ECHO MEAS - MED PEAK E' VEL: 5 CM/SEC
BH CV ECHO MEAS - MR MAX PG: 159.3 MMHG
BH CV ECHO MEAS - MR MAX VEL: 631 CM/SEC
BH CV ECHO MEAS - MR MEAN PG: 100 MMHG
BH CV ECHO MEAS - MR MEAN VEL: 472 CM/SEC
BH CV ECHO MEAS - MR VTI: 221 CM
BH CV ECHO MEAS - MV A MAX VEL: 140 CM/SEC
BH CV ECHO MEAS - MV DEC TIME: 0.29 SEC
BH CV ECHO MEAS - MV E MAX VEL: 119 CM/SEC
BH CV ECHO MEAS - MV E/A: 0.85
BH CV ECHO MEAS - RAP SYSTOLE: 3 MMHG
BH CV ECHO MEAS - RVSP: 33.9 MMHG
BH CV ECHO MEAS - SV(LVOT): 87.7 ML
BH CV ECHO MEAS - SV(MOD-SP2): 32.1 ML
BH CV ECHO MEAS - SV(MOD-SP4): 34.7 ML
BH CV ECHO MEAS - SVI(LVOT): 47.4 ML/M2
BH CV ECHO MEAS - SVI(MOD-SP2): 17.4 ML/M2
BH CV ECHO MEAS - SVI(MOD-SP4): 18.8 ML/M2
BH CV ECHO MEAS - TAPSE (>1.6): 1.87 CM
BH CV ECHO MEAS - TR MAX PG: 30.9 MMHG
BH CV ECHO MEAS - TR MAX VEL: 278 CM/SEC
BH CV ECHO MEASUREMENTS AVERAGE E/E' RATIO: 16.53
LEFT ATRIUM VOLUME INDEX: 35.6 ML/M2

## 2024-08-09 ENCOUNTER — LAB (OUTPATIENT)
Dept: LAB | Facility: HOSPITAL | Age: 79
End: 2024-08-09
Payer: MEDICARE

## 2024-08-09 DIAGNOSIS — I50.22 CHRONIC HFREF (HEART FAILURE WITH REDUCED EJECTION FRACTION): ICD-10-CM

## 2024-08-09 LAB
ALBUMIN SERPL-MCNC: 3.9 G/DL (ref 3.5–5.2)
ALBUMIN/GLOB SERPL: 1.5 G/DL
ALP SERPL-CCNC: 48 U/L (ref 39–117)
ALT SERPL W P-5'-P-CCNC: 9 U/L (ref 1–33)
ANION GAP SERPL CALCULATED.3IONS-SCNC: 12.5 MMOL/L (ref 5–15)
AST SERPL-CCNC: 16 U/L (ref 1–32)
BASOPHILS # BLD AUTO: 0.08 10*3/MM3 (ref 0–0.2)
BASOPHILS NFR BLD AUTO: 0.7 % (ref 0–1.5)
BILIRUB SERPL-MCNC: 0.8 MG/DL (ref 0–1.2)
BUN SERPL-MCNC: 38 MG/DL (ref 8–23)
BUN/CREAT SERPL: 29.7 (ref 7–25)
CALCIUM SPEC-SCNC: 8.6 MG/DL (ref 8.6–10.5)
CHLORIDE SERPL-SCNC: 105 MMOL/L (ref 98–107)
CO2 SERPL-SCNC: 25.5 MMOL/L (ref 22–29)
CREAT SERPL-MCNC: 1.28 MG/DL (ref 0.57–1)
DEPRECATED RDW RBC AUTO: 72 FL (ref 37–54)
DIGOXIN SERPL-MCNC: 0.64 NG/ML (ref 0.6–1.2)
EGFRCR SERPLBLD CKD-EPI 2021: 42.7 ML/MIN/1.73
EOSINOPHIL # BLD AUTO: 0.19 10*3/MM3 (ref 0–0.4)
EOSINOPHIL NFR BLD AUTO: 1.6 % (ref 0.3–6.2)
ERYTHROCYTE [DISTWIDTH] IN BLOOD BY AUTOMATED COUNT: 19.2 % (ref 12.3–15.4)
GLOBULIN UR ELPH-MCNC: 2.6 GM/DL
GLUCOSE SERPL-MCNC: 115 MG/DL (ref 65–99)
HCT VFR BLD AUTO: 25.4 % (ref 34–46.6)
HGB BLD-MCNC: 8.1 G/DL (ref 12–15.9)
IMM GRANULOCYTES # BLD AUTO: 0.12 10*3/MM3 (ref 0–0.05)
IMM GRANULOCYTES NFR BLD AUTO: 1 % (ref 0–0.5)
LYMPHOCYTES # BLD AUTO: 2.63 10*3/MM3 (ref 0.7–3.1)
LYMPHOCYTES NFR BLD AUTO: 22.7 % (ref 19.6–45.3)
MCH RBC QN AUTO: 32.7 PG (ref 26.6–33)
MCHC RBC AUTO-ENTMCNC: 31.9 G/DL (ref 31.5–35.7)
MCV RBC AUTO: 102.4 FL (ref 79–97)
MONOCYTES # BLD AUTO: 1.17 10*3/MM3 (ref 0.1–0.9)
MONOCYTES NFR BLD AUTO: 10.1 % (ref 5–12)
NEUTROPHILS NFR BLD AUTO: 63.9 % (ref 42.7–76)
NEUTROPHILS NFR BLD AUTO: 7.4 10*3/MM3 (ref 1.7–7)
NRBC BLD AUTO-RTO: 0.9 /100 WBC (ref 0–0.2)
NT-PROBNP SERPL-MCNC: 2813 PG/ML (ref 0–1800)
PLATELET # BLD AUTO: 286 10*3/MM3 (ref 140–450)
PMV BLD AUTO: 11.7 FL (ref 6–12)
POTASSIUM SERPL-SCNC: 4 MMOL/L (ref 3.5–5.2)
PROT SERPL-MCNC: 6.5 G/DL (ref 6–8.5)
RBC # BLD AUTO: 2.48 10*6/MM3 (ref 3.77–5.28)
SODIUM SERPL-SCNC: 143 MMOL/L (ref 136–145)
WBC NRBC COR # BLD AUTO: 11.59 10*3/MM3 (ref 3.4–10.8)

## 2024-08-09 PROCEDURE — 80053 COMPREHEN METABOLIC PANEL: CPT

## 2024-08-09 PROCEDURE — 80162 ASSAY OF DIGOXIN TOTAL: CPT

## 2024-08-09 PROCEDURE — 83880 ASSAY OF NATRIURETIC PEPTIDE: CPT

## 2024-08-09 PROCEDURE — 85025 COMPLETE CBC W/AUTO DIFF WBC: CPT

## 2024-08-09 PROCEDURE — 36415 COLL VENOUS BLD VENIPUNCTURE: CPT

## 2024-08-13 ENCOUNTER — OFFICE VISIT (OUTPATIENT)
Dept: CARDIOLOGY | Facility: CLINIC | Age: 79
End: 2024-08-13
Payer: MEDICARE

## 2024-08-13 VITALS
WEIGHT: 176 LBS | HEIGHT: 64 IN | HEART RATE: 60 BPM | SYSTOLIC BLOOD PRESSURE: 126 MMHG | DIASTOLIC BLOOD PRESSURE: 50 MMHG | BODY MASS INDEX: 30.05 KG/M2

## 2024-08-13 DIAGNOSIS — N18.32 STAGE 3B CHRONIC KIDNEY DISEASE: ICD-10-CM

## 2024-08-13 DIAGNOSIS — I10 PRIMARY HYPERTENSION: ICD-10-CM

## 2024-08-13 DIAGNOSIS — E78.2 MIXED HYPERLIPIDEMIA: ICD-10-CM

## 2024-08-13 DIAGNOSIS — I48.0 PAF (PAROXYSMAL ATRIAL FIBRILLATION): ICD-10-CM

## 2024-08-13 DIAGNOSIS — I50.22 CHRONIC HFREF (HEART FAILURE WITH REDUCED EJECTION FRACTION): Primary | ICD-10-CM

## 2024-08-13 DIAGNOSIS — I25.810 CORONARY ARTERY DISEASE INVOLVING AUTOLOGOUS VEIN CORONARY BYPASS GRAFT WITHOUT ANGINA PECTORIS: ICD-10-CM

## 2024-08-13 PROCEDURE — 1160F RVW MEDS BY RX/DR IN RCRD: CPT

## 2024-08-13 PROCEDURE — 99214 OFFICE O/P EST MOD 30 MIN: CPT

## 2024-08-13 PROCEDURE — 1159F MED LIST DOCD IN RCRD: CPT

## 2024-08-13 PROCEDURE — 3074F SYST BP LT 130 MM HG: CPT

## 2024-08-13 PROCEDURE — 3078F DIAST BP <80 MM HG: CPT

## 2024-08-13 RX ORDER — FUROSEMIDE 40 MG/1
40 TABLET ORAL EVERY OTHER DAY
Start: 2024-08-13

## 2024-08-13 RX ORDER — SACUBITRIL AND VALSARTAN 24; 26 MG/1; MG/1
1 TABLET, FILM COATED ORAL 2 TIMES DAILY
Qty: 28 TABLET | Refills: 0 | Status: SHIPPED | OUTPATIENT
Start: 2024-08-13

## 2024-08-13 NOTE — ASSESSMENT & PLAN NOTE
Ms. Hughes's blood pressure is well-controlled with the current medication regimen.  Her systolic generally runs in the upper 90s low 100s.  She did have 3 days with systolics in the upper 70s and low 80s.  She did not call at that time, but she has rebounded nicely.  I am unable to increase her Entresto or metoprolol due to borderline heart rate and blood pressure.  I have asked her to continue to do a heart rate blood pressure and weight log and bring it to her next appointment.

## 2024-08-13 NOTE — ASSESSMENT & PLAN NOTE
Ms. Hughes's renal function has improved with decreasing her diuretics.  Now that her creatinine is less than 1.5 we will not have to adjust the apixaban.  Will continue diuretics and apixaban at the current doses.

## 2024-08-13 NOTE — PATIENT INSTRUCTIONS
1.  Continue to take Lasix/furosemide 40 mg every other day.  If experience weight gain of 3 pounds overnight, 5 pounds in a week or develop more pedal edema take 1 extra Lasix/furosemide.  2.  Continue rest of medication as prescribed.  3.  Do a heart rate blood pressure and weight log and bring it to next appointment.  4.  Do blood work 1 to 2 days prior to next visit.

## 2024-08-13 NOTE — ASSESSMENT & PLAN NOTE
Ms. Hughes has heart failure with previously reduced ejection fraction of 30% that has increased to 54.8% with her most recent echocardiogram.  Her short of air and pedal edema have both significantly improved.  Ms. Hughes continues to feel fatigued but does report more energy over the last 2 weeks.  Will continue Entresto, digoxin, metoprolol, Farxiga and spironolactone at the current doses.  Will make the following changes to her heart failure medications:    1.  Continue to take Lasix/furosemide 40 mg every other day.  If experience weight gain of 3 pounds overnight, 5 pounds in a week or develop more pedal edema take 1 extra Lasix/furosemide.  2.  Continue rest of medication as prescribed.  3.  Do a heart rate blood pressure and weight log and bring it to next appointment.  4.  Do blood work 1 to 2 days prior to next visit.

## 2024-08-13 NOTE — ASSESSMENT & PLAN NOTE
Ms. Hughes has known CAD status post bypass surgery.  She denies any symptoms of angina.  Will continue apixaban and statin at the current dose.

## 2024-08-13 NOTE — ASSESSMENT & PLAN NOTE
Ms. Hughes does have paroxysmal atrial fibrillation.  She is currently staying in sinus rhythm.  Will continue apixaban for stroke prevention.

## 2024-08-13 NOTE — PROGRESS NOTES
Office Visit    Chief Complaint  Chronic HFrEF (heart failure with reduced ejection fraction)    Subjective            Ann-Marie Hughes presents to [facility] CARDIOLOGY  History of Present Illness  Ms. Ann-Marie Hughes is a 78-year-old female that presented to the office today for heart failure with reduced ejection fraction who is doing much better.  She continues to experience short of air with moderate exertion.  Her pedal edema is minimal today.  She has had much more energy over the past couple weeks.  She did have 3 days of which she reported very low blood pressures.  She cannot remember what happened at that time but over the last 2 weeks her heart rate and blood pressure have been sustained in a normal rate.  Will continue to monitor.  She denies any chest pain, dizziness, orthopnea or syncopal episodes.      Past Medical History:   Diagnosis Date    Acne rosacea 08/17/2021    DR.JEFF MOON     Acute respiratory failure with hypoxia 07/31/2023    Arteriosclerosis of abdominal aorta     B12 deficiency 08/17/2021    DJD (degenerative joint disease)     Hx of smoking 08/17/2021    QUIT IN 1976 AT AGE 31    Hyperlipidemia 08/17/2021    Hypertension     Hypothyroidism     Metabolic syndrome 08/17/2021    NSTEMI (non-ST elevated myocardial infarction) 07/17/2023    Pericardial effusion 10/10/2023    KAREN (stress urinary incontinence, female) 08/17/2021    UTI (urinary tract infection) 08/17/2021    Vitamin D deficiency 08/17/2021       Allergies   Allergen Reactions    Penicillins Palpitations     1. When was your reaction? > 10 years ago  2. Did your reaction happen after the first dose or after several doses? After first dose  3. Did your reaction require ED or hospital care to manage your reaction? Do not know  4. Did your reaction require treatment with epinephrine? Do not know  5. Have you taken amoxicillin (Amoxil) or amoxicillin-clavulanate (Augmentin) without issue  since? Yes  6. Have you taken cephalexin (Keflex) without issue since?  Do not know         Past Surgical History:   Procedure Laterality Date    BACK SURGERY      CARDIAC CATHETERIZATION N/A 2023    Procedure: Left Heart Cath;  Surgeon: Dinh Andres MD;  Location:  [facility];  Service: Cardiovascular;  Laterality: N/A;    CARPAL TUNNEL RELEASE      COLONOSCOPY      CORONARY ARTERY BYPASS GRAFT WITH MITRAL VALVE REPAIR/REPLACEMENT N/A 2023    Procedure: REZA STERNOTOMY OFF-PUMP CORONARY ARTERY BYPASS GRAFT TIMES        USING LEFFT INTERNAL MAMMARY ARTERY AND     GREATER SAPHENOUS VEIN GRAFT PER EMDOSCOPIC VEIN HARVESTING, MAZE PROCEDURE AND PRP;  Surgeon: Shane Alexander MD;  [facility];  Service: Cardiothoracic;  Laterality: N/A;    KNEE SURGERY      LUMBAR DISCECTOMY      NECK SURGERY      Cervical    POSTERIOR LUMBAR/THORACIC SPINE FUSION          Social History     Tobacco Use    Smoking status: Former     Current packs/day: 0.00     Average packs/day: 1 pack/day for 12.0 years (12.0 ttl pk-yrs)     Types: Cigarettes     Start date:      Quit date:      Years since quittin.6    Smokeless tobacco: Never   Vaping Use    Vaping status: Never Used   Substance Use Topics    Alcohol use: Never    Drug use: Never       Family History   Problem Relation Age of Onset    Heart disease Mother     Arthritis Mother     Heart attack Mother     Arthritis Father         Prior to Admission medications    Medication Sig Start Date End Date Taking? Authorizing Provider   acetaminophen (TYLENOL) 325 MG tablet Take 2 tablets by mouth Every 4 (Four) Hours As Needed for Mild Pain. 23   Anjelica Chase APRN   albuterol sulfate  (90 Base) MCG/ACT inhaler Inhale 2 puffs Every 4 (Four) Hours As Needed for Wheezing. 23   Les Moreno,    apixaban (Eliquis) 5 MG tablet tablet Take 1 tablet by mouth Every 12 (Twelve) Hours. 24   Tristian  LONA Hernández   aspirin 81 MG EC tablet Take 1 tablet by mouth Every Other Day.    Kanwal Orozco MD   atorvastatin (LIPITOR) 40 MG tablet Take 1 tablet by mouth Daily. 11/7/23   Valerie Valera MD   buPROPion XL (WELLBUTRIN XL) 300 MG 24 hr tablet TAKE 1 TABLET BY MOUTH EVERY DAY 6/5/24   Romario Valdez DO   CRANBERRY EXTRACT PO Take 1 tablet by mouth Daily.    Kanwal Orozco MD   dapagliflozin Propanediol (Farxiga) 10 MG tablet TAKE 1 TABLET BY MOUTH EVERY DAY 12/4/23   Valerie Valera MD   digoxin (LANOXIN) 125 MCG tablet Take 1 tablet by mouth Daily. Omit on Tuesdays, Thursdays and Saturdays 7/16/24   Valerie Valera MD   ferrous sulfate 325 (65 FE) MG tablet Take 1 tablet by mouth 3 (Three) Times a Week. 6/26/24   Boston Can MD   folic acid (FOLVITE) 1 MG tablet TAKE 1 TABLET BY MOUTH EVERY DAY 5/8/24   Boston Can MD   furosemide (LASIX) 40 MG tablet Take 1 tablet by mouth Daily. Take 1 tablet in the morning.  Omit on Thursdays and Sundays 7/16/24   Valerie Valera MD   glucose blood test strip Check blood sugar once daily and record. 4/29/24   Romario Valdez DO   metFORMIN ER (GLUCOPHAGE-XR) 500 MG 24 hr tablet Take 1 tablet by mouth 2 (Two) Times a Day. 3/4/24   Romario Valdez DO   metoprolol succinate XL (TOPROL-XL) 50 MG 24 hr tablet Take 1 tablet by mouth 2 (Two) Times a Day. 1/2 tab in the am, 1 tab in the pm    Kanwal Orozco MD   montelukast (SINGULAIR) 10 MG tablet Take 1 tablet by mouth Daily. 7/31/24   Romario Valdez DO   predniSONE (DELTASONE) 5 MG tablet TAKE 5 MG EVERY DAY 6/26/24   Boston Can MD   sacubitril-valsartan (Entresto) 24-26 MG tablet Take 1 tablet by mouth 2 (Two) Times a Day. 2/19/24   Valerie Valera MD   spironolactone (ALDACTONE) 25 MG tablet TAKE 1 TABLET BY MOUTH EVERY DAY 7/8/24   Valerie Valera MD   Synthroid 75 MCG tablet Take 1 tablet by mouth Daily. 7/31/24   Romario Valdez DO     "    Review of Systems   Constitutional:  Positive for fatigue.   Respiratory:  Positive for cough and shortness of breath.    Cardiovascular:  Positive for leg swelling. Negative for chest pain and palpitations.   Neurological:  Negative for dizziness.        Symptom Course: Improved    Weight Trend: Fluctuating Minimally     Objective     /50   Pulse 60   Ht 162.6 cm (64\")   Wt 79.8 kg (176 lb)   BMI 30.21 kg/m²       Physical Exam  Constitutional:       General: She is awake.      Appearance: Normal appearance.   Neck:      Thyroid: No thyromegaly.      Vascular: No carotid bruit or JVD.   Cardiovascular:      Rate and Rhythm: Normal rate and regular rhythm.      Chest Wall: PMI is not displaced.      Pulses: Normal pulses.      Heart sounds: Normal heart sounds, S1 normal and S2 normal. No murmur heard.     No friction rub. No gallop. No S3 or S4 sounds.   Pulmonary:      Effort: Pulmonary effort is normal.      Breath sounds: Normal breath sounds and air entry. No wheezing, rhonchi or rales.   Abdominal:      General: Bowel sounds are normal.      Palpations: Abdomen is soft. There is no mass.      Tenderness: There is no abdominal tenderness.   Musculoskeletal:      Cervical back: Neck supple.      Right lower leg: No edema.      Left lower leg: No edema.   Neurological:      Mental Status: She is alert and oriented to person, place, and time.   Psychiatric:         Mood and Affect: Mood normal.         Behavior: Behavior is cooperative.       Lab Results   Component Value Date    PROBNP 2,813.0 (H) 08/09/2024    PROBNP 2,236.0 (H) 07/31/2024    PROBNP 2,766.0 (H) 07/15/2024     CMP          7/15/2024    08:29 7/31/2024    09:21 8/9/2024    09:45   CMP   Glucose 115  117  115    BUN 62  74  38    Creatinine 1.66  1.74  1.28    EGFR 31.3  29.5  42.7    Sodium 142  140  143    Potassium 4.9  5.0  4.0    Chloride 105  105  105    Calcium 9.5  9.4  8.6    Total Protein 7.0  6.8  6.5    Albumin 4.1  3.9  " "3.9    Globulin 2.9  2.9  2.6    Total Bilirubin 0.7  0.6  0.8    Alkaline Phosphatase 65  56  48    AST (SGOT) 15  19  16    ALT (SGPT) 10  9  9    Albumin/Globulin Ratio 1.4  1.3  1.5    BUN/Creatinine Ratio 37.3  42.5  29.7    Anion Gap 14.0  13.7  12.5      CBC w/diff          7/15/2024    08:29 7/31/2024    09:21 8/9/2024    09:45   CBC w/Diff   WBC 11.89  12.32  11.59    RBC 2.97  2.67  2.48    Hemoglobin 9.4  8.4  8.1    Hematocrit 29.8  26.7  25.4    .3  100.0  102.4    MCH 31.6  31.5  32.7    MCHC 31.5  31.5  31.9    RDW 18.9  18.6  19.2    Platelets 308  266  286    Neutrophil Rel % 59.6  68.8  63.9    Immature Granulocyte Rel % 1.2  0.5  1.0    Lymphocyte Rel % 26.1  21.4  22.7    Monocyte Rel % 11.2  8.5  10.1    Eosinophil Rel % 1.3  0.4  1.6    Basophil Rel % 0.6  0.4  0.7       Lipid Panel          11/6/2023    09:17 12/27/2023    08:48 6/28/2024    11:25   Lipid Panel   Total Cholesterol 143  120  113    Triglycerides 122  81  123    HDL Cholesterol 40  61  50    VLDL Cholesterol 22  16  22    LDL Cholesterol  81  43  41    LDL/HDL Ratio 1.97  0.70  0.77       Lab Results   Component Value Date    TSH 0.625 06/28/2024    TSH 1.650 03/24/2024    TSH 2.210 12/29/2023      Lab Results   Component Value Date    FREET4 1.74 (H) 12/29/2023    FREET4 1.31 01/11/2023    FREET4 1.27 08/17/2022      No results found for: \"DDIMERQUANT\"  Magnesium   Date Value Ref Range Status   07/31/2024 1.8 1.6 - 2.4 mg/dL Final      Digoxin   Date Value Ref Range Status   08/09/2024 0.64 0.60 - 1.20 ng/mL Final      A1C Last 3 Results          12/29/2023    14:46 6/28/2024    11:25   HGBA1C Last 3 Results   Hemoglobin A1C 6.00  6.40           Results for orders placed during the hospital encounter of 08/06/24    Adult Transthoracic Echo Complete W/ Cont if Necessary Per Protocol    Interpretation Summary    Left ventricular systolic function is normal. Calculated left ventricular EF = 54.8%    Left ventricular wall " thickness is consistent with borderline concentric hypertrophy.    Left ventricular diastolic function is consistent with (grade Ia w/high LAP) impaired relaxation.    The left atrial cavity is mildly dilated.    Mild aortic valve stenosis is present.    Estimated right ventricular systolic pressure from tricuspid regurgitation is normal (<35 mmHg).       Result Review :                           Assessment and Plan        Diagnoses and all orders for this visit:    1. Chronic HFrEF (heart failure with reduced ejection fraction) (Primary)  Assessment & Plan:  Ms. Hughes has heart failure with previously reduced ejection fraction of 30% that has increased to 54.8% with her most recent echocardiogram.  Her short of air and pedal edema have both significantly improved.  Ms. Hughes continues to feel fatigued but does report more energy over the last 2 weeks.  Will continue Entresto, digoxin, metoprolol, Farxiga and spironolactone at the current doses.  Will make the following changes to her heart failure medications:    1.  Continue to take Lasix/furosemide 40 mg every other day.  If experience weight gain of 3 pounds overnight, 5 pounds in a week or develop more pedal edema take 1 extra Lasix/furosemide.  2.  Continue rest of medication as prescribed.  3.  Do a heart rate blood pressure and weight log and bring it to next appointment.  4.  Do blood work 1 to 2 days prior to next visit.    Orders:  -     CBC & Differential; Future  -     Comprehensive Metabolic Panel; Future  -     Digoxin Level; Future  -     Magnesium; Future  -     proBNP; Future    2. Primary hypertension  Assessment & Plan:  Ms. Hughes's blood pressure is well-controlled with the current medication regimen.  Her systolic generally runs in the upper 90s low 100s.  She did have 3 days with systolics in the upper 70s and low 80s.  She did not call at that time, but she has rebounded nicely.  I am unable to increase her Entresto or metoprolol due to  borderline heart rate and blood pressure.  I have asked her to continue to do a heart rate blood pressure and weight log and bring it to her next appointment.      3. Mixed hyperlipidemia  Assessment & Plan:  Ms. Hughes utilizes atorvastatin 40 mg daily.  I will continue the same.      4. CAD status post CABG x3 vessels  Assessment & Plan:  Ms. Hughes has known CAD status post bypass surgery.  She denies any symptoms of angina.  Will continue apixaban and statin at the current dose.      5. PAF (paroxysmal atrial fibrillation)  Assessment & Plan:  Ms. Hughes does have paroxysmal atrial fibrillation.  She is currently staying in sinus rhythm.  Will continue apixaban for stroke prevention.    Orders:  -     CBC & Differential; Future  -     Comprehensive Metabolic Panel; Future  -     Digoxin Level; Future  -     Magnesium; Future  -     proBNP; Future    6. Stage 3b chronic kidney disease  Assessment & Plan:  Ms. Hughes's renal function has improved with decreasing her diuretics.  Now that her creatinine is less than 1.5 we will not have to adjust the apixaban.  Will continue diuretics and apixaban at the current doses.    Orders:  -     CBC & Differential; Future  -     Comprehensive Metabolic Panel; Future  -     Digoxin Level; Future  -     Magnesium; Future  -     proBNP; Future    Other orders  -     furosemide (LASIX) 40 MG tablet; Take 1 tablet by mouth Every Other Day.  -     sacubitril-valsartan (Entresto) 24-26 MG tablet; Take 1 tablet by mouth 2 (Two) Times a Day. Indications: Cardiac Failure, lot [id number]  exp 11/2026  Dispense: 28 tablet; Refill: 0            Follow Up     Return for 6-8 weeks.    Patient was given instructions and counseling regarding her condition or for health maintenance advice. Please see specific information pulled into the AVS if appropriate.     Electronically signed by LONA Bocanegra, 08/13/24, 9:06 AM EDT.

## 2024-08-27 DIAGNOSIS — C91.Z0 LARGE GRANULAR LYMPHOCYTE DISORDER: Primary | ICD-10-CM

## 2024-08-27 DIAGNOSIS — D50.8 IRON DEFICIENCY ANEMIA SECONDARY TO INADEQUATE DIETARY IRON INTAKE: ICD-10-CM

## 2024-09-09 ENCOUNTER — TELEPHONE (OUTPATIENT)
Dept: ONCOLOGY | Facility: CLINIC | Age: 79
End: 2024-09-09
Payer: MEDICARE

## 2024-09-09 DIAGNOSIS — D50.8 IRON DEFICIENCY ANEMIA SECONDARY TO INADEQUATE DIETARY IRON INTAKE: ICD-10-CM

## 2024-09-09 DIAGNOSIS — R19.7 DIARRHEA, UNSPECIFIED TYPE: ICD-10-CM

## 2024-09-09 DIAGNOSIS — C91.Z0 LARGE GRANULAR LYMPHOCYTE DISORDER: Primary | ICD-10-CM

## 2024-09-09 DIAGNOSIS — D59.10 HEMOLYTIC ANEMIA DUE TO ANTIBODY: ICD-10-CM

## 2024-09-09 NOTE — TELEPHONE ENCOUNTER
Caller: Ann-Marie Hughes    Relationship: Self    Best call back number: [phone]     Who are you requesting to speak with (clinical staff, provider,  specific staff member): CLINICAL    What was the call regarding: PATIENT REQUESTING REFERRAL TO [facility]

## 2024-09-09 NOTE — TELEPHONE ENCOUNTER
Returned call to pt. [facility] Advised pt we referred her back in June but they were unable to get a hold of her so the referral was closed. Pt is aware and will try to answer when she is called about an apt. She would like for all of her apts here to be cancelled. Message sent to scheduling.

## 2024-09-18 ENCOUNTER — LAB (OUTPATIENT)
Dept: ONCOLOGY | Facility: HOSPITAL | Age: 79
End: 2024-09-18
Payer: MEDICARE

## 2024-09-18 ENCOUNTER — CONSULT (OUTPATIENT)
Dept: ONCOLOGY | Facility: HOSPITAL | Age: 79
End: 2024-09-18
Payer: MEDICARE

## 2024-09-18 VITALS
TEMPERATURE: 97 F | RESPIRATION RATE: 18 BRPM | WEIGHT: 180.6 LBS | HEART RATE: 57 BPM | DIASTOLIC BLOOD PRESSURE: 45 MMHG | SYSTOLIC BLOOD PRESSURE: 166 MMHG | HEIGHT: 64 IN | BODY MASS INDEX: 30.83 KG/M2 | OXYGEN SATURATION: 99 %

## 2024-09-18 DIAGNOSIS — E53.8 B12 DEFICIENCY: ICD-10-CM

## 2024-09-18 DIAGNOSIS — D59.10 HEMOLYTIC ANEMIA DUE TO ANTIBODY: ICD-10-CM

## 2024-09-18 DIAGNOSIS — D50.8 IRON DEFICIENCY ANEMIA SECONDARY TO INADEQUATE DIETARY IRON INTAKE: ICD-10-CM

## 2024-09-18 DIAGNOSIS — I48.0 PAF (PAROXYSMAL ATRIAL FIBRILLATION): ICD-10-CM

## 2024-09-18 DIAGNOSIS — C91.Z0 LARGE GRANULAR LYMPHOCYTE DISORDER: Primary | ICD-10-CM

## 2024-09-18 DIAGNOSIS — N18.32 STAGE 3B CHRONIC KIDNEY DISEASE: ICD-10-CM

## 2024-09-18 LAB
ALBUMIN SERPL-MCNC: 4.4 G/DL (ref 3.5–5.2)
ALBUMIN/GLOB SERPL: 1.7 G/DL
ALP SERPL-CCNC: 57 U/L (ref 39–117)
ALT SERPL W P-5'-P-CCNC: 16 U/L (ref 1–33)
ANION GAP SERPL CALCULATED.3IONS-SCNC: 7.4 MMOL/L (ref 5–15)
AST SERPL-CCNC: 15 U/L (ref 1–32)
BASOPHILS # BLD AUTO: 0.08 10*3/MM3 (ref 0–0.2)
BASOPHILS NFR BLD AUTO: 0.7 % (ref 0–1.5)
BILIRUB SERPL-MCNC: 0.7 MG/DL (ref 0–1.2)
BUN SERPL-MCNC: 42 MG/DL (ref 8–23)
BUN/CREAT SERPL: 33.3 (ref 7–25)
CALCIUM SPEC-SCNC: 9.4 MG/DL (ref 8.6–10.5)
CHLORIDE SERPL-SCNC: 107 MMOL/L (ref 98–107)
CO2 SERPL-SCNC: 27.6 MMOL/L (ref 22–29)
CREAT SERPL-MCNC: 1.26 MG/DL (ref 0.57–1)
CRP SERPL-MCNC: <0.3 MG/DL (ref 0–0.5)
DAT C3: NEGATIVE
DAT IGG GEL: NEGATIVE
DEPRECATED RDW RBC AUTO: 75.5 FL (ref 37–54)
EGFRCR SERPLBLD CKD-EPI 2021: 43.5 ML/MIN/1.73
EOSINOPHIL # BLD AUTO: 0.31 10*3/MM3 (ref 0–0.4)
EOSINOPHIL NFR BLD AUTO: 2.6 % (ref 0.3–6.2)
ERYTHROCYTE [DISTWIDTH] IN BLOOD BY AUTOMATED COUNT: 19.2 % (ref 12.3–15.4)
ERYTHROCYTE [SEDIMENTATION RATE] IN BLOOD: 8 MM/HR (ref 0–30)
FERRITIN SERPL-MCNC: 748.5 NG/ML (ref 13–150)
FOLATE SERPL-MCNC: >20 NG/ML (ref 4.78–24.2)
GLOBULIN UR ELPH-MCNC: 2.6 GM/DL
GLUCOSE SERPL-MCNC: 126 MG/DL (ref 65–99)
HAPTOGLOB SERPL-MCNC: 117 MG/DL (ref 30–200)
HCT VFR BLD AUTO: 30.6 % (ref 34–46.6)
HGB BLD-MCNC: 9.8 G/DL (ref 12–15.9)
HGB RETIC QN AUTO: 30.7 PG (ref 29.8–36.1)
IMM GRANULOCYTES # BLD AUTO: 0.06 10*3/MM3 (ref 0–0.05)
IMM GRANULOCYTES NFR BLD AUTO: 0.5 % (ref 0–0.5)
IMM RETICS NFR: 32.5 % (ref 3–15.8)
IRON 24H UR-MRATE: 94 MCG/DL (ref 37–145)
IRON SATN MFR SERPL: 27 % (ref 20–50)
LDH SERPL-CCNC: 148 U/L (ref 135–214)
LYMPHOCYTES # BLD AUTO: 2.7 10*3/MM3 (ref 0.7–3.1)
LYMPHOCYTES NFR BLD AUTO: 22.5 % (ref 19.6–45.3)
MCH RBC QN AUTO: 33.4 PG (ref 26.6–33)
MCHC RBC AUTO-ENTMCNC: 32 G/DL (ref 31.5–35.7)
MCV RBC AUTO: 104.4 FL (ref 79–97)
MONOCYTES # BLD AUTO: 1.23 10*3/MM3 (ref 0.1–0.9)
MONOCYTES NFR BLD AUTO: 10.3 % (ref 5–12)
NEUTROPHILS NFR BLD AUTO: 63.4 % (ref 42.7–76)
NEUTROPHILS NFR BLD AUTO: 7.6 10*3/MM3 (ref 1.7–7)
PLATELET # BLD AUTO: 276 10*3/MM3 (ref 140–450)
PMV BLD AUTO: 11.2 FL (ref 6–12)
POTASSIUM SERPL-SCNC: 4.3 MMOL/L (ref 3.5–5.2)
PROT SERPL-MCNC: 7 G/DL (ref 6–8.5)
RBC # BLD AUTO: 2.93 10*6/MM3 (ref 3.77–5.28)
RETICS # AUTO: 0.05 10*6/MM3 (ref 0.02–0.13)
RETICS/RBC NFR AUTO: 1.71 % (ref 0.7–1.9)
SODIUM SERPL-SCNC: 142 MMOL/L (ref 136–145)
TIBC SERPL-MCNC: 343 MCG/DL (ref 298–536)
TRANSFERRIN SERPL-MCNC: 230 MG/DL (ref 200–360)
VIT B12 BLD-MCNC: 415 PG/ML (ref 211–946)
WBC NRBC COR # BLD AUTO: 11.98 10*3/MM3 (ref 3.4–10.8)

## 2024-09-18 PROCEDURE — 83615 LACTATE (LD) (LDH) ENZYME: CPT | Performed by: PHYSICIAN ASSISTANT

## 2024-09-18 PROCEDURE — 80053 COMPREHEN METABOLIC PANEL: CPT | Performed by: PHYSICIAN ASSISTANT

## 2024-09-18 PROCEDURE — 85025 COMPLETE CBC W/AUTO DIFF WBC: CPT | Performed by: PHYSICIAN ASSISTANT

## 2024-09-18 PROCEDURE — 82728 ASSAY OF FERRITIN: CPT | Performed by: PHYSICIAN ASSISTANT

## 2024-09-18 PROCEDURE — 86140 C-REACTIVE PROTEIN: CPT | Performed by: PHYSICIAN ASSISTANT

## 2024-09-18 PROCEDURE — 83010 ASSAY OF HAPTOGLOBIN QUANT: CPT | Performed by: PHYSICIAN ASSISTANT

## 2024-09-18 PROCEDURE — 82746 ASSAY OF FOLIC ACID SERUM: CPT | Performed by: PHYSICIAN ASSISTANT

## 2024-09-18 PROCEDURE — 83540 ASSAY OF IRON: CPT | Performed by: PHYSICIAN ASSISTANT

## 2024-09-18 PROCEDURE — G0463 HOSPITAL OUTPT CLINIC VISIT: HCPCS | Performed by: PHYSICIAN ASSISTANT

## 2024-09-18 PROCEDURE — 84466 ASSAY OF TRANSFERRIN: CPT | Performed by: PHYSICIAN ASSISTANT

## 2024-09-18 PROCEDURE — 86880 COOMBS TEST DIRECT: CPT

## 2024-09-18 PROCEDURE — 36415 COLL VENOUS BLD VENIPUNCTURE: CPT | Performed by: PHYSICIAN ASSISTANT

## 2024-09-18 PROCEDURE — 85046 RETICYTE/HGB CONCENTRATE: CPT

## 2024-09-18 PROCEDURE — 82607 VITAMIN B-12: CPT | Performed by: PHYSICIAN ASSISTANT

## 2024-09-18 PROCEDURE — 85652 RBC SED RATE AUTOMATED: CPT | Performed by: PHYSICIAN ASSISTANT

## 2024-09-18 RX ORDER — PREDNISONE 5 MG/1
TABLET ORAL
Qty: 30 TABLET | Refills: 1 | Status: SHIPPED | OUTPATIENT
Start: 2024-09-18

## 2024-09-18 RX ORDER — LANOLIN ALCOHOL/MO/W.PET/CERES
1000 CREAM (GRAM) TOPICAL DAILY
Qty: 90 TABLET | Refills: 1 | Status: SHIPPED | OUTPATIENT
Start: 2024-09-18

## 2024-09-18 RX ORDER — FOLIC ACID 1 MG/1
1 TABLET ORAL DAILY
Qty: 90 TABLET | Refills: 1 | Status: SHIPPED | OUTPATIENT
Start: 2024-09-18

## 2024-09-18 RX ORDER — PNV NO.95/FERROUS FUM/FOLIC AC 28MG-0.8MG
1 TABLET ORAL EVERY OTHER DAY
Qty: 30 TABLET | Refills: 5 | Status: SHIPPED | OUTPATIENT
Start: 2024-09-18

## 2024-10-03 ENCOUNTER — OFFICE VISIT (OUTPATIENT)
Dept: CARDIOLOGY | Facility: CLINIC | Age: 79
End: 2024-10-03
Payer: MEDICARE

## 2024-10-03 VITALS
BODY MASS INDEX: 31.07 KG/M2 | HEART RATE: 63 BPM | SYSTOLIC BLOOD PRESSURE: 156 MMHG | WEIGHT: 182 LBS | DIASTOLIC BLOOD PRESSURE: 49 MMHG | HEIGHT: 64 IN

## 2024-10-03 DIAGNOSIS — I48.0 PAF (PAROXYSMAL ATRIAL FIBRILLATION): Primary | ICD-10-CM

## 2024-10-03 DIAGNOSIS — I35.0 AORTIC STENOSIS, MILD: ICD-10-CM

## 2024-10-03 DIAGNOSIS — I50.22 CHRONIC HFREF (HEART FAILURE WITH REDUCED EJECTION FRACTION): ICD-10-CM

## 2024-10-03 DIAGNOSIS — I25.810 CORONARY ARTERY DISEASE INVOLVING AUTOLOGOUS VEIN CORONARY BYPASS GRAFT WITHOUT ANGINA PECTORIS: ICD-10-CM

## 2024-10-03 DIAGNOSIS — I10 PRIMARY HYPERTENSION: ICD-10-CM

## 2024-10-03 NOTE — ASSESSMENT & PLAN NOTE
Patient without any anginal symptoms status post surgery on chronic Aspir 81 milligrams once a day

## 2024-10-03 NOTE — ASSESSMENT & PLAN NOTE
With no recurrent events after surgery she has a left atrial appendage ligation as well during CABG decreasing her risk for further embolic events.  Currently on Eliquis 5 twice daily consider possible discontinuation in the future if no recurrent A-fib episodes repeat EKG next visit

## 2024-10-03 NOTE — ASSESSMENT & PLAN NOTE
Patient with normalization of ejection fraction on last echo doing well symptom wise class II symptoms continue with Toprol 50 daily, Farxiga 10 mg daily, Entresto 24-26 daily, and Aldactone 25 daily.  Patient is currently managing her volume status with Lasix 40 mg every other day

## 2024-10-03 NOTE — PROGRESS NOTES
Chief Complaint  Follow-up (Chronic HFrEF (heart failure with reduced ejection fraction))    Subjective    Patient reports stable symptoms of breathing ability no significant weight gain problems.  She does have chronic but unchanged lower extremity edema    Past Medical History:   Diagnosis Date    Acne rosacea 08/17/2021    DR.JEFF MOON     Acute respiratory failure with hypoxia 07/31/2023    Arteriosclerosis of abdominal aorta     B12 deficiency 08/17/2021    DJD (degenerative joint disease)     Hx of smoking 08/17/2021    QUIT IN 1976 AT AGE 31    Hyperlipidemia 08/17/2021    Hypertension     Hypothyroidism     Metabolic syndrome 08/17/2021    NSTEMI (non-ST elevated myocardial infarction) 07/17/2023    Pericardial effusion 10/10/2023    KAREN (stress urinary incontinence, female) 08/17/2021    UTI (urinary tract infection) 08/17/2021    Vitamin D deficiency 08/17/2021         Current Outpatient Medications:     acetaminophen (TYLENOL) 325 MG tablet, Take 2 tablets by mouth Every 4 (Four) Hours As Needed for Mild Pain., Disp: , Rfl:     albuterol sulfate  (90 Base) MCG/ACT inhaler, Inhale 2 puffs Every 4 (Four) Hours As Needed for Wheezing., Disp: 18 g, Rfl: 0    apixaban (Eliquis) 5 MG tablet tablet, Take 1 tablet by mouth Every 12 (Twelve) Hours., Disp: 60 tablet, Rfl: 3    aspirin 81 MG EC tablet, Take 1 tablet by mouth Daily., Disp: , Rfl:     atorvastatin (LIPITOR) 40 MG tablet, Take 1 tablet by mouth Daily., Disp: 90 tablet, Rfl: 3    buPROPion XL (WELLBUTRIN XL) 300 MG 24 hr tablet, TAKE 1 TABLET BY MOUTH EVERY DAY, Disp: 90 tablet, Rfl: 1    CRANBERRY EXTRACT PO, Take 1 tablet by mouth Daily., Disp: , Rfl:     dapagliflozin Propanediol (Farxiga) 10 MG tablet, TAKE 1 TABLET BY MOUTH EVERY DAY, Disp: 90 tablet, Rfl: 3    digoxin (LANOXIN) 125 MCG tablet, Take 1 tablet by mouth Daily. Omit on Tuesdays, Thursdays and Saturdays, Disp: 50 tablet, Rfl: 3    folic acid (FOLVITE) 1 MG tablet, Take 1  tablet by mouth Daily., Disp: 90 tablet, Rfl: 1    furosemide (LASIX) 40 MG tablet, Take 1 tablet by mouth Every Other Day., Disp: , Rfl:     glucose blood test strip, Check blood sugar once daily and record., Disp: 100 each, Rfl: 3    metFORMIN ER (GLUCOPHAGE-XR) 500 MG 24 hr tablet, Take 1 tablet by mouth 2 (Two) Times a Day., Disp: 180 tablet, Rfl: 3    metoprolol succinate XL (TOPROL-XL) 50 MG 24 hr tablet, Take 1 tablet by mouth 2 (Two) Times a Day., Disp: , Rfl:     montelukast (SINGULAIR) 10 MG tablet, Take 1 tablet by mouth Daily., Disp: 90 tablet, Rfl: 3    predniSONE (DELTASONE) 5 MG tablet, TAKE 5 MG EVERY DAY, Disp: 30 tablet, Rfl: 1    Prenatal Vit-Fe Fumarate-FA (prenatal vitamin 28-0.8) 28-0.8 MG tablet tablet, Take 1 tablet by mouth Every Other Day., Disp: 30 tablet, Rfl: 5    Probiotic Product ([employer] PO), Take 1 capsule by mouth Daily., Disp: , Rfl:     sacubitril-valsartan (Entresto) 24-26 MG tablet, Take 1 tablet by mouth 2 (Two) Times a Day., Disp: 180 tablet, Rfl: 3    spironolactone (ALDACTONE) 25 MG tablet, TAKE 1 TABLET BY MOUTH EVERY DAY, Disp: 90 tablet, Rfl: 3    Synthroid 75 MCG tablet, Take 1 tablet by mouth Daily., Disp: 90 tablet, Rfl: 3    vitamin B-12 (CYANOCOBALAMIN) 1000 MCG tablet, Take 1 tablet by mouth Daily., Disp: 90 tablet, Rfl: 1    Medications Discontinued During This Encounter   Medication Reason    sacubitril-valsartan (Entresto) 24-26 MG tablet      Allergies   Allergen Reactions    Penicillins Palpitations     1. When was your reaction? > 10 years ago  2. Did your reaction happen after the first dose or after several doses? After first dose  3. Did your reaction require ED or hospital care to manage your reaction? Do not know  4. Did your reaction require treatment with epinephrine? Do not know  5. Have you taken amoxicillin (Amoxil) or amoxicillin-clavulanate (Augmentin) without issue since? Yes  6. Have you taken cephalexin (Keflex) without issue  "since?  Do not know         Social History     Tobacco Use    Smoking status: Former     Current packs/day: 0.00     Average packs/day: 1 pack/day for 12.0 years (12.0 ttl pk-yrs)     Types: Cigarettes     Start date:      Quit date:      Years since quittin.7    Smokeless tobacco: Never   Vaping Use    Vaping status: Never Used   Substance Use Topics    Alcohol use: Never    Drug use: Never       Family History   Problem Relation Age of Onset    Heart disease Mother     Arthritis Mother     Heart attack Mother     Arthritis Father         Objective     /49   Pulse 63   Ht 162.6 cm (64.02\")   Wt 82.6 kg (182 lb)   BMI 31.22 kg/m²       Physical Exam    General Appearance:   no acute distress  Alert and oriented x3  HENT:   lips not cyanotic  Atraumatic  Neck:  No jvd   supple  Respiratory:  no respiratory distress  normal breath sounds  no rales  Cardiovascular:  Regular rate and rhythm  no S3, no S4   n 2/6 early peaking systolic murmur  no rub  Extremities  No cyanosis  lower extremity edema: +1  Skin:   warm, dry  No rashes      Result Review :     proBNP   Date Value Ref Range Status   2024 2,813.0 (H) 0.0 - 1,800.0 pg/mL Final     CMP          2024    09:21 2024    09:45 2024    09:52   CMP   Glucose 117  115  126    BUN 74  38  42    Creatinine 1.74  1.28  1.26    EGFR 29.5  42.7  43.5    Sodium 140  143  142    Potassium 5.0  4.0  4.3    Chloride 105  105  107    Calcium 9.4  8.6  9.4    Total Protein 6.8  6.5  7.0    Albumin 3.9  3.9  4.4    Globulin 2.9  2.6  2.6    Total Bilirubin 0.6  0.8  0.7    Alkaline Phosphatase 56  48  57    AST (SGOT) 19  16  15    ALT (SGPT) 9  9  16    Albumin/Globulin Ratio 1.3  1.5  1.7    BUN/Creatinine Ratio 42.5  29.7  33.3    Anion Gap 13.7  12.5  7.4      CBC w/diff          2024    09:21 2024    09:45 2024    09:52   CBC w/Diff   WBC 12.32  11.59  11.98    RBC 2.67  2.48  2.93    Hemoglobin 8.4  8.1  9.8  " "  Hematocrit 26.7  25.4  30.6    .0  102.4  104.4    MCH 31.5  32.7  33.4    MCHC 31.5  31.9  32.0    RDW 18.6  19.2  19.2    Platelets 266  286  276    Neutrophil Rel % 68.8  63.9  63.4    Immature Granulocyte Rel % 0.5  1.0  0.5    Lymphocyte Rel % 21.4  22.7  22.5    Monocyte Rel % 8.5  10.1  10.3    Eosinophil Rel % 0.4  1.6  2.6    Basophil Rel % 0.4  0.7  0.7       Lab Results   Component Value Date    TSH 0.625 06/28/2024      Lab Results   Component Value Date    FREET4 1.74 (H) 12/29/2023      No results found for: \"DDIMERQUANT\"  Magnesium   Date Value Ref Range Status   07/31/2024 1.8 1.6 - 2.4 mg/dL Final      Digoxin   Date Value Ref Range Status   08/09/2024 0.64 0.60 - 1.20 ng/mL Final      Lab Results   Component Value Date    TROPONINT 22 (H) 05/25/2024           Lipid Panel          11/6/2023    09:17 12/27/2023    08:48 6/28/2024    11:25   Lipid Panel   Total Cholesterol 143  120  113    Triglycerides 122  81  123    HDL Cholesterol 40  61  50    VLDL Cholesterol 22  16  22    LDL Cholesterol  81  43  41    LDL/HDL Ratio 1.97  0.70  0.77      No results found for: \"POCTROP\"  Results for orders placed during the hospital encounter of 07/10/23    Stress Test With Myocardial Perfusion One Day    Interpretation Summary    Diaphragmatic attenuation artifact is present.    Myocardial perfusion imaging indicates a large-sized, severe area of ischemia located in the inferior wall and lateral wall.    Abnormal LV wall motion consistent with severe hypokinesis of the lateral and inferior wall.    Left ventricular ejection fraction is moderately reduced (Calculated EF = 37%).    Impressions are consistent with a high risk study.    Results for orders placed during the hospital encounter of 08/06/24    Adult Transthoracic Echo Complete W/ Cont if Necessary Per Protocol    Interpretation Summary    Left ventricular systolic function is normal. Calculated left ventricular EF = 54.8%    Left ventricular " wall thickness is consistent with borderline concentric hypertrophy.    Left ventricular diastolic function is consistent with (grade Ia w/high LAP) impaired relaxation.    The left atrial cavity is mildly dilated.    Mild aortic valve stenosis is present.    Estimated right ventricular systolic pressure from tricuspid regurgitation is normal (<35 mmHg).                 Diagnoses and all orders for this visit:    1. PAF (paroxysmal atrial fibrillation) (Primary)  Assessment & Plan:  With no recurrent events after surgery she has a left atrial appendage ligation as well during CABG decreasing her risk for further embolic events.  Currently on Eliquis 5 twice daily consider possible discontinuation in the future if no recurrent A-fib episodes repeat EKG next visit    Orders:  -     Digoxin Level; Future  -     Basic Metabolic Panel; Future    2. Chronic HFrEF (heart failure with reduced ejection fraction)  Assessment & Plan:  Patient with normalization of ejection fraction on last echo doing well symptom wise class II symptoms continue with Toprol 50 daily, Farxiga 10 mg daily, Entresto 24-26 daily, and Aldactone 25 daily.  Patient is currently managing her volume status with Lasix 40 mg every other day      3. CAD status post CABG x3 vessels  Assessment & Plan:  Patient without any anginal symptoms status post surgery on chronic Aspir 81 milligrams once a day       4. Primary hypertension    5. Aortic stenosis, mild            Follow Up     Return in about 6 months (around 4/3/2025) for Follow with Mahogany Tinsley.          Patient was given instructions and counseling regarding her condition or for health maintenance advice. Please see specific information pulled into the AVS if appropriate.

## 2024-10-07 ENCOUNTER — LAB (OUTPATIENT)
Dept: LAB | Facility: HOSPITAL | Age: 79
End: 2024-10-07
Payer: MEDICARE

## 2024-10-07 DIAGNOSIS — N18.32 STAGE 3B CHRONIC KIDNEY DISEASE: ICD-10-CM

## 2024-10-07 DIAGNOSIS — I48.0 PAF (PAROXYSMAL ATRIAL FIBRILLATION): ICD-10-CM

## 2024-10-07 DIAGNOSIS — I50.22 CHRONIC HFREF (HEART FAILURE WITH REDUCED EJECTION FRACTION): ICD-10-CM

## 2024-10-07 DIAGNOSIS — D50.8 IRON DEFICIENCY ANEMIA SECONDARY TO INADEQUATE DIETARY IRON INTAKE: ICD-10-CM

## 2024-10-07 DIAGNOSIS — C91.Z0 LARGE GRANULAR LYMPHOCYTE DISORDER: ICD-10-CM

## 2024-10-07 LAB
ALBUMIN SERPL-MCNC: 3.9 G/DL (ref 3.5–5.2)
ALBUMIN/GLOB SERPL: 1.5 G/DL
ALP SERPL-CCNC: 58 U/L (ref 39–117)
ALT SERPL W P-5'-P-CCNC: 12 U/L (ref 1–33)
ANION GAP SERPL CALCULATED.3IONS-SCNC: 12.6 MMOL/L (ref 5–15)
AST SERPL-CCNC: 22 U/L (ref 1–32)
BASOPHILS # BLD AUTO: 0.05 10*3/MM3 (ref 0–0.2)
BASOPHILS NFR BLD AUTO: 0.5 % (ref 0–1.5)
BILIRUB SERPL-MCNC: 0.7 MG/DL (ref 0–1.2)
BUN SERPL-MCNC: 30 MG/DL (ref 8–23)
BUN/CREAT SERPL: 22.7 (ref 7–25)
CALCIUM SPEC-SCNC: 9.1 MG/DL (ref 8.6–10.5)
CHLORIDE SERPL-SCNC: 106 MMOL/L (ref 98–107)
CO2 SERPL-SCNC: 25.4 MMOL/L (ref 22–29)
CREAT SERPL-MCNC: 1.32 MG/DL (ref 0.57–1)
DEPRECATED RDW RBC AUTO: 64.4 FL (ref 37–54)
DIGOXIN SERPL-MCNC: 1.7 NG/ML (ref 0.6–1.2)
EGFRCR SERPLBLD CKD-EPI 2021: 41.2 ML/MIN/1.73
EOSINOPHIL # BLD AUTO: 0.1 10*3/MM3 (ref 0–0.4)
EOSINOPHIL NFR BLD AUTO: 0.9 % (ref 0.3–6.2)
ERYTHROCYTE [DISTWIDTH] IN BLOOD BY AUTOMATED COUNT: 17.6 % (ref 12.3–15.4)
GLOBULIN UR ELPH-MCNC: 2.6 GM/DL
GLUCOSE SERPL-MCNC: 187 MG/DL (ref 65–99)
HCT VFR BLD AUTO: 27.7 % (ref 34–46.6)
HGB BLD-MCNC: 9.4 G/DL (ref 12–15.9)
HGB RETIC QN AUTO: 30.5 PG (ref 29.8–36.1)
IMM GRANULOCYTES # BLD AUTO: 0.07 10*3/MM3 (ref 0–0.05)
IMM GRANULOCYTES NFR BLD AUTO: 0.6 % (ref 0–0.5)
IMM RETICS NFR: 34 % (ref 3–15.8)
LYMPHOCYTES # BLD AUTO: 2.95 10*3/MM3 (ref 0.7–3.1)
LYMPHOCYTES NFR BLD AUTO: 26.6 % (ref 19.6–45.3)
MAGNESIUM SERPL-MCNC: 1.4 MG/DL (ref 1.6–2.4)
MCH RBC QN AUTO: 34.2 PG (ref 26.6–33)
MCHC RBC AUTO-ENTMCNC: 33.9 G/DL (ref 31.5–35.7)
MCV RBC AUTO: 100.7 FL (ref 79–97)
MONOCYTES # BLD AUTO: 1.03 10*3/MM3 (ref 0.1–0.9)
MONOCYTES NFR BLD AUTO: 9.3 % (ref 5–12)
NEUTROPHILS NFR BLD AUTO: 6.87 10*3/MM3 (ref 1.7–7)
NEUTROPHILS NFR BLD AUTO: 62.1 % (ref 42.7–76)
NT-PROBNP SERPL-MCNC: 4486 PG/ML (ref 0–1800)
PLATELET # BLD AUTO: 228 10*3/MM3 (ref 140–450)
PMV BLD AUTO: 11.7 FL (ref 6–12)
POTASSIUM SERPL-SCNC: 4.2 MMOL/L (ref 3.5–5.2)
PROT SERPL-MCNC: 6.5 G/DL (ref 6–8.5)
RBC # BLD AUTO: 2.75 10*6/MM3 (ref 3.77–5.28)
RETICS # AUTO: 0.07 10*6/MM3 (ref 0.02–0.13)
RETICS/RBC NFR AUTO: 2.45 % (ref 0.7–1.9)
SODIUM SERPL-SCNC: 144 MMOL/L (ref 136–145)
WBC NRBC COR # BLD AUTO: 11.07 10*3/MM3 (ref 3.4–10.8)

## 2024-10-07 PROCEDURE — 36415 COLL VENOUS BLD VENIPUNCTURE: CPT

## 2024-10-07 PROCEDURE — 80053 COMPREHEN METABOLIC PANEL: CPT

## 2024-10-07 PROCEDURE — 80162 ASSAY OF DIGOXIN TOTAL: CPT

## 2024-10-07 PROCEDURE — 85025 COMPLETE CBC W/AUTO DIFF WBC: CPT

## 2024-10-07 PROCEDURE — 83880 ASSAY OF NATRIURETIC PEPTIDE: CPT

## 2024-10-07 PROCEDURE — 85046 RETICYTE/HGB CONCENTRATE: CPT

## 2024-10-07 PROCEDURE — 83735 ASSAY OF MAGNESIUM: CPT

## 2024-10-08 ENCOUNTER — OFFICE VISIT (OUTPATIENT)
Dept: CARDIOLOGY | Facility: CLINIC | Age: 79
End: 2024-10-08
Payer: MEDICARE

## 2024-10-08 VITALS
WEIGHT: 184 LBS | HEART RATE: 66 BPM | SYSTOLIC BLOOD PRESSURE: 135 MMHG | BODY MASS INDEX: 31.41 KG/M2 | HEIGHT: 64 IN | DIASTOLIC BLOOD PRESSURE: 90 MMHG

## 2024-10-08 DIAGNOSIS — N18.32 STAGE 3B CHRONIC KIDNEY DISEASE: ICD-10-CM

## 2024-10-08 DIAGNOSIS — I10 PRIMARY HYPERTENSION: ICD-10-CM

## 2024-10-08 DIAGNOSIS — I48.0 PAF (PAROXYSMAL ATRIAL FIBRILLATION): ICD-10-CM

## 2024-10-08 DIAGNOSIS — E78.2 MIXED HYPERLIPIDEMIA: ICD-10-CM

## 2024-10-08 DIAGNOSIS — I50.22 CHRONIC HFREF (HEART FAILURE WITH REDUCED EJECTION FRACTION): Primary | ICD-10-CM

## 2024-10-08 PROCEDURE — 99214 OFFICE O/P EST MOD 30 MIN: CPT

## 2024-10-08 PROCEDURE — 3075F SYST BP GE 130 - 139MM HG: CPT

## 2024-10-08 PROCEDURE — 1160F RVW MEDS BY RX/DR IN RCRD: CPT

## 2024-10-08 PROCEDURE — 3080F DIAST BP >= 90 MM HG: CPT

## 2024-10-08 PROCEDURE — 1159F MED LIST DOCD IN RCRD: CPT

## 2024-10-08 RX ORDER — FUROSEMIDE 40 MG
40 TABLET ORAL EVERY OTHER DAY
Qty: 90 TABLET | Refills: 3 | Status: SHIPPED | OUTPATIENT
Start: 2024-10-08

## 2024-10-08 NOTE — PROGRESS NOTES
Office Visit    Chief Complaint  Chronic HFrEF (heart failure with reduced ejection fraction)    Abner Hughes presents to [facility] CARDIOLOG[facility]  History of Present Illness  Ms. Ann-Marie Hughes is a 78-year-old female that presented to the office for heart failure with reduced ejection fraction who is doing much better.  Patient's ejection fraction has significantly improved over the past year.  Her short of air and pedal edema appear to be her baseline.  Ms. Hughes denies any chest pain, dizziness, orthopnea or syncopal episodes.      Past Medical History:   Diagnosis Date    Acne rosacea 08/17/2021    DR.JEFF MOON     Acute respiratory failure with hypoxia 07/31/2023    Arteriosclerosis of abdominal aorta     B12 deficiency 08/17/2021    DJD (degenerative joint disease)     Hx of smoking 08/17/2021    QUIT IN 1976 AT AGE 31    Hyperlipidemia 08/17/2021    Hypertension     Hypothyroidism     Metabolic syndrome 08/17/2021    NSTEMI (non-ST elevated myocardial infarction) 07/17/2023    Pericardial effusion 10/10/2023    KAREN (stress urinary incontinence, female) 08/17/2021    UTI (urinary tract infection) 08/17/2021    Vitamin D deficiency 08/17/2021       Allergies   Allergen Reactions    Penicillins Palpitations     1. When was your reaction? > 10 years ago  2. Did your reaction happen after the first dose or after several doses? After first dose  3. Did your reaction require ED or hospital care to manage your reaction? Do not know  4. Did your reaction require treatment with epinephrine? Do not know  5. Have you taken amoxicillin (Amoxil) or amoxicillin-clavulanate (Augmentin) without issue since? Yes  6. Have you taken cephalexin (Keflex) without issue since?  Do not know         Past Surgical History:   Procedure Laterality Date    BACK SURGERY  2013    CARDIAC CATHETERIZATION N/A 07/17/2023    Procedure: Left Heart Cath;  Surgeon: Dinh Andres MD;   Location:  [facility];  Service: Cardiovascular;  Laterality: N/A;    CARPAL TUNNEL RELEASE      COLONOSCOPY      CORONARY ARTERY BYPASS GRAFT WITH MITRAL VALVE REPAIR/REPLACEMENT N/A 2023    Procedure: REZA STERNOTOMY OFF-PUMP CORONARY ARTERY BYPASS GRAFT TIMES        USING LEFFT INTERNAL MAMMARY ARTERY AND     GREATER SAPHENOUS VEIN GRAFT PER EMDOSCOPIC VEIN HARVESTING, MAZE PROCEDURE AND PRP;  Surgeon: Shane Alexander MD;  Location:  [facility];  Service: Cardiothoracic;  Laterality: N/A;    KNEE SURGERY      LUMBAR DISCECTOMY      NECK SURGERY      Cervical    POSTERIOR LUMBAR/THORACIC SPINE FUSION          Social History     Tobacco Use    Smoking status: Former     Current packs/day: 0.00     Average packs/day: 1 pack/day for 12.0 years (12.0 ttl pk-yrs)     Types: Cigarettes     Start date:      Quit date:      Years since quittin.8    Smokeless tobacco: Never   Vaping Use    Vaping status: Never Used   Substance Use Topics    Alcohol use: Never    Drug use: Never       Family History   Problem Relation Age of Onset    Heart disease Mother     Arthritis Mother     Heart attack Mother     Arthritis Father         Prior to Admission medications    Medication Sig Start Date End Date Taking? Authorizing Provider   acetaminophen (TYLENOL) 325 MG tablet Take 2 tablets by mouth Every 4 (Four) Hours As Needed for Mild Pain. 23  Yes Anjelica Chase APRN   albuterol sulfate  (90 Base) MCG/ACT inhaler Inhale 2 puffs Every 4 (Four) Hours As Needed for Wheezing. 23  Yes Les Moreno,    apixaban (Eliquis) 5 MG tablet tablet Take 1 tablet by mouth Every 12 (Twelve) Hours. 24  Yes Sara Lubin APRN   aspirin 81 MG EC tablet Take 1 tablet by mouth Daily.   Yes Provider, MD Kanwal   atorvastatin (LIPITOR) 40 MG tablet Take 1 tablet by mouth Daily. 23  Yes Valerie Valera MD   buPROPion XL (WELLBUTRIN XL) 300 MG 24 hr tablet TAKE 1  TABLET BY MOUTH EVERY DAY 6/5/24  Yes Romario Valdez DO   CRANBERRY EXTRACT PO Take 1 tablet by mouth Daily.   Yes Kanwal Orozco MD   dapagliflozin Propanediol (Farxiga) 10 MG tablet TAKE 1 TABLET BY MOUTH EVERY DAY 12/4/23  Yes Valerie Valera MD   digoxin (LANOXIN) 125 MCG tablet Take 1 tablet by mouth Daily. Omit on Tuesdays, Thursdays and Saturdays 7/16/24  Yes Valerie Valera MD   folic acid (FOLVITE) 1 MG tablet Take 1 tablet by mouth Daily. 9/18/24  Yes Manny Soto PA-C   furosemide (LASIX) 40 MG tablet Take 1 tablet by mouth Every Other Day. 8/13/24  Yes Sara Lubin APRN   glucose blood test strip Check blood sugar once daily and record. 4/29/24  Yes Romario Valdez DO   metFORMIN ER (GLUCOPHAGE-XR) 500 MG 24 hr tablet Take 1 tablet by mouth 2 (Two) Times a Day. 3/4/24  Yes Romario Valdez DO   metoprolol succinate XL (TOPROL-XL) 50 MG 24 hr tablet Take 1 tablet by mouth 2 (Two) Times a Day.   Yes Kanwal Orozco MD   montelukast (SINGULAIR) 10 MG tablet Take 1 tablet by mouth Daily. 7/31/24  Yes Romario Valdez DO   predniSONE (DELTASONE) 5 MG tablet TAKE 5 MG EVERY DAY 9/18/24  Yes Manny Soto PA-C   Prenatal Vit-Fe Fumarate-FA (prenatal vitamin 28-0.8) 28-0.8 MG tablet tablet Take 1 tablet by mouth Every Other Day. 9/18/24  Yes Manny Soto PA-C   Probiotic Product ([employer]formerly [employer]) PO) Take 1 capsule by mouth Daily.   Yes Kanwal Orozco MD   sacubitril-valsartan (Entresto) 24-26 MG tablet Take 1 tablet by mouth 2 (Two) Times a Day. 2/19/24  Yes Valerie Valera MD   spironolactone (ALDACTONE) 25 MG tablet TAKE 1 TABLET BY MOUTH EVERY DAY 7/8/24  Yes Valerie Valera MD   Synthroid 75 MCG tablet Take 1 tablet by mouth Daily. 7/31/24  Yes Romario Valdez, DO   vitamin B-12 (CYANOCOBALAMIN) 1000 MCG tablet Take 1 tablet by mouth Daily. 9/18/24  Yes Manny Soto PA-C        Review of Systems   Constitutional: Negative.   "Negative for fatigue.   Respiratory:  Positive for shortness of breath. Negative for cough.    Cardiovascular: Negative.  Negative for chest pain, palpitations and leg swelling.   Neurological: Negative.  Negative for dizziness.        Symptom Course: Been Stable    Weight Trend: Stable     Objective     /90   Pulse 66   Ht 162.6 cm (64.02\")   Wt 83.5 kg (184 lb)   BMI 31.57 kg/m²       Physical Exam  Constitutional:       General: She is awake.      Appearance: Normal appearance.   Neck:      Thyroid: No thyromegaly.      Vascular: No carotid bruit or JVD.   Cardiovascular:      Rate and Rhythm: Normal rate and regular rhythm.      Chest Wall: PMI is not displaced.      Pulses: Normal pulses.      Heart sounds: Normal heart sounds, S1 normal and S2 normal. No murmur heard.     No friction rub. No gallop. No S3 or S4 sounds.   Pulmonary:      Effort: Pulmonary effort is normal.      Breath sounds: Normal breath sounds and air entry. No wheezing, rhonchi or rales.   Abdominal:      General: Bowel sounds are normal.      Palpations: Abdomen is soft. There is no mass.      Tenderness: There is no abdominal tenderness.   Musculoskeletal:      Cervical back: Neck supple.      Right lower leg: No edema.      Left lower leg: No edema.   Neurological:      Mental Status: She is alert and oriented to person, place, and time.   Psychiatric:         Mood and Affect: Mood normal.         Behavior: Behavior is cooperative.           Result Review :                           Assessment and Plan        Diagnoses and all orders for this visit:    1. Chronic HFrEF (heart failure with reduced ejection fraction) (Primary)  Assessment & Plan:  Ms. Hughes has heart failure with previously reduced ejection fraction that has improved over the past year.  Heart rate and blood pressure tend to run on the lower side of normal.  I am unable to titrate her medications up at this time.  I have asked her to keep a little bit better log " in hopes to titrate either her beta-blocker or Entresto up.  BNP and dig level was elevated.  Patient reports that she did take the digoxin shortly before having her labs drawn.  Will have her repeat a dig level in 1 week and resume digoxin on Wednesday night.    1.  Take 1 extra Lasix/furosemide 40 mg tablet on a day that is not scheduled.  2.  Restart digoxin 125 Wednesday night.  3.  Do a heart rate blood pressure and weight log and bring it to next appointment.  4.  Do blood work 1 to 2 days prior to next visit.  5.  To take over-the-counter magnesium.    Orders:  -     Comprehensive Metabolic Panel; Future  -     Magnesium; Future  -     Digoxin Level; Future  -     proBNP; Future  -     Comprehensive Metabolic Panel; Future  -     Magnesium; Future  -     CBC & Differential; Future  -     Digoxin Level; Future  -     proBNP; Future    2. PAF (paroxysmal atrial fibrillation)  Assessment & Plan:  Ms. Pedrazas heart rate is well-controlled with the current dose of metoprolol.  She is doing well since her left atrial appendage ligation and CABG.  Continue Eliquis for stroke prevention.  May need to discontinue in the future if no recurrent A-fib episodes.    Orders:  -     Comprehensive Metabolic Panel; Future  -     Magnesium; Future  -     Digoxin Level; Future  -     proBNP; Future  -     Comprehensive Metabolic Panel; Future  -     Magnesium; Future  -     CBC & Differential; Future  -     Digoxin Level; Future  -     proBNP; Future    3. Primary hypertension  Assessment & Plan:  Ms. Hughes's blood pressure is stable today.  She did not keep a very good log but the 1 she did have have been more labile.  I have asked her to repeat her blood pressure if she is an abnormal reading.  She is going to do a heart rate blood pressure and weight log and bring it to her next appointment.      4. Mixed hyperlipidemia  Assessment & Plan:  Ms. Hughes utilizes atorvastatin 40 mg for hyperlipidemia.      5. Stage 3b chronic  kidney disease  Assessment & Plan:  Ms. Hughes's kidney function has declined slightly.  Will continue to monitor.    Orders:  -     Comprehensive Metabolic Panel; Future  -     Magnesium; Future  -     Digoxin Level; Future  -     proBNP; Future  -     Comprehensive Metabolic Panel; Future  -     Magnesium; Future  -     CBC & Differential; Future  -     Digoxin Level; Future  -     proBNP; Future    Other orders  -     furosemide (LASIX) 40 MG tablet; Take 1 tablet by mouth Every Other Day.  Dispense: 90 tablet; Refill: 3            Follow Up     Return in about 3 months (around 1/8/2025).    Patient was given instructions and counseling regarding her condition or for health maintenance advice. Please see specific information pulled into the AVS if appropriate.     Electronically signed by LONA Bocanegra, 10/08/24, 10:35 AM EDT.

## 2024-10-08 NOTE — ASSESSMENT & PLAN NOTE
Ms. Hughes's heart rate is well-controlled with the current dose of metoprolol.  She is doing well since her left atrial appendage ligation and CABG.  Continue Eliquis for stroke prevention.  May need to discontinue in the future if no recurrent A-fib episodes.

## 2024-10-08 NOTE — ASSESSMENT & PLAN NOTE
Ms. Hughes's blood pressure is stable today.  She did not keep a very good log but the 1 she did have have been more labile.  I have asked her to repeat her blood pressure if she is an abnormal reading.  She is going to do a heart rate blood pressure and weight log and bring it to her next appointment.

## 2024-10-08 NOTE — ASSESSMENT & PLAN NOTE
Ms. Hughes has heart failure with previously reduced ejection fraction that has improved over the past year.  Heart rate and blood pressure tend to run on the lower side of normal.  I am unable to titrate her medications up at this time.  I have asked her to keep a little bit better log in hopes to titrate either her beta-blocker or Entresto up.  BNP and dig level was elevated.  Patient reports that she did take the digoxin shortly before having her labs drawn.  Will have her repeat a dig level in 1 week and resume digoxin on Wednesday night.    1.  Take 1 extra Lasix/furosemide 40 mg tablet on a day that is not scheduled.  2.  Restart digoxin 125 Wednesday night.  3.  Do a heart rate blood pressure and weight log and bring it to next appointment.  4.  Do blood work 1 to 2 days prior to next visit.  5.  To take over-the-counter magnesium.

## 2024-10-08 NOTE — PATIENT INSTRUCTIONS
1.  Take 1 extra Lasix/furosemide 40 mg tablet on a day that is not scheduled.  2.  Restart digoxin 125 Wednesday night.  3.  Do a heart rate blood pressure and weight log and bring it to next appointment.  4.  Do blood work 1 to 2 days prior to next visit.  5.  To take over-the-counter magnesium.

## 2024-10-21 ENCOUNTER — LAB (OUTPATIENT)
Dept: LAB | Facility: HOSPITAL | Age: 79
End: 2024-10-21
Payer: MEDICARE

## 2024-10-21 DIAGNOSIS — I48.0 PAF (PAROXYSMAL ATRIAL FIBRILLATION): ICD-10-CM

## 2024-10-21 DIAGNOSIS — I50.22 CHRONIC HFREF (HEART FAILURE WITH REDUCED EJECTION FRACTION): ICD-10-CM

## 2024-10-21 DIAGNOSIS — N18.32 STAGE 3B CHRONIC KIDNEY DISEASE: ICD-10-CM

## 2024-10-21 LAB
ALBUMIN SERPL-MCNC: 3.9 G/DL (ref 3.5–5.2)
ALBUMIN/GLOB SERPL: 1.4 G/DL
ALP SERPL-CCNC: 58 U/L (ref 39–117)
ALT SERPL W P-5'-P-CCNC: 27 U/L (ref 1–33)
ANION GAP SERPL CALCULATED.3IONS-SCNC: 13.7 MMOL/L (ref 5–15)
AST SERPL-CCNC: 27 U/L (ref 1–32)
BILIRUB SERPL-MCNC: 0.7 MG/DL (ref 0–1.2)
BUN SERPL-MCNC: 20 MG/DL (ref 8–23)
BUN/CREAT SERPL: 15.9 (ref 7–25)
CALCIUM SPEC-SCNC: 8.7 MG/DL (ref 8.6–10.5)
CHLORIDE SERPL-SCNC: 104 MMOL/L (ref 98–107)
CO2 SERPL-SCNC: 24.3 MMOL/L (ref 22–29)
CREAT SERPL-MCNC: 1.26 MG/DL (ref 0.57–1)
DIGOXIN SERPL-MCNC: 0.97 NG/ML (ref 0.6–1.2)
EGFRCR SERPLBLD CKD-EPI 2021: 43.5 ML/MIN/1.73
GLOBULIN UR ELPH-MCNC: 2.8 GM/DL
GLUCOSE SERPL-MCNC: 107 MG/DL (ref 65–99)
MAGNESIUM SERPL-MCNC: 1.6 MG/DL (ref 1.6–2.4)
NT-PROBNP SERPL-MCNC: ABNORMAL PG/ML (ref 0–1800)
POTASSIUM SERPL-SCNC: 3.5 MMOL/L (ref 3.5–5.2)
PROT SERPL-MCNC: 6.7 G/DL (ref 6–8.5)
SODIUM SERPL-SCNC: 142 MMOL/L (ref 136–145)

## 2024-10-21 PROCEDURE — 80162 ASSAY OF DIGOXIN TOTAL: CPT

## 2024-10-21 PROCEDURE — 83880 ASSAY OF NATRIURETIC PEPTIDE: CPT

## 2024-10-21 PROCEDURE — 80053 COMPREHEN METABOLIC PANEL: CPT

## 2024-10-21 PROCEDURE — 83735 ASSAY OF MAGNESIUM: CPT

## 2024-10-21 PROCEDURE — 36415 COLL VENOUS BLD VENIPUNCTURE: CPT

## 2024-10-22 NOTE — PROGRESS NOTES
Patient states that she has soa but no worse than usual, that it is a given, she does have swelling in her feet.  Informed her that she may increase her lasix to daily and to call office if she has any problems.

## 2024-10-23 ENCOUNTER — TELEPHONE (OUTPATIENT)
Dept: ONCOLOGY | Facility: HOSPITAL | Age: 79
End: 2024-10-23
Payer: MEDICARE

## 2024-10-29 ENCOUNTER — OFFICE VISIT (OUTPATIENT)
Dept: ONCOLOGY | Facility: HOSPITAL | Age: 79
End: 2024-10-29
Payer: MEDICARE

## 2024-10-29 VITALS
BODY MASS INDEX: 32.27 KG/M2 | RESPIRATION RATE: 20 BRPM | WEIGHT: 189 LBS | OXYGEN SATURATION: 99 % | HEIGHT: 64 IN | TEMPERATURE: 96.9 F | HEART RATE: 89 BPM

## 2024-10-29 DIAGNOSIS — D59.10 AUTOIMMUNE HEMOLYTIC ANEMIA: Primary | ICD-10-CM

## 2024-10-29 DIAGNOSIS — C91.Z0 LARGE GRANULAR LYMPHOCYTE DISORDER: ICD-10-CM

## 2024-10-29 PROCEDURE — G0463 HOSPITAL OUTPT CLINIC VISIT: HCPCS | Performed by: INTERNAL MEDICINE

## 2024-10-29 NOTE — PROGRESS NOTES
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  She is on chronic steroid 5 mg every other day per her report.  She would like to be off of steroids.  She notes low energy level.  She gets winded with heavier activity.  She denies any obvious bleeding.  She denies jaundice or dark cola colored urine.    Review of Systems   Constitutional:  Positive for fatigue.   Respiratory:  Positive for shortness of breath.      Current Outpatient Medications on File Prior to Visit   Medication Sig Dispense Refill    acetaminophen (TYLENOL) 325 MG tablet Take 2 tablets by mouth Every 4 (Four) Hours As Needed for Mild Pain.      albuterol sulfate  (90 Base) MCG/ACT inhaler Inhale 2 puffs Every 4 (Four) Hours As Needed for Wheezing. 18 g 0    apixaban (Eliquis) 5 MG tablet tablet Take 1 tablet by mouth Every 12 (Twelve) Hours. 60 tablet 3    aspirin 81 MG EC tablet Take 1 tablet by mouth Daily.      atorvastatin (LIPITOR) 40 MG tablet Take 1 tablet by mouth Daily. 90 tablet 3    buPROPion XL (WELLBUTRIN XL) 300 MG 24 hr tablet TAKE 1 TABLET BY MOUTH EVERY DAY 90 tablet 1    CRANBERRY EXTRACT PO Take 1 tablet by mouth Daily.      dapagliflozin Propanediol (Farxiga) 10 MG tablet TAKE 1 TABLET BY MOUTH EVERY DAY 90 tablet 3    digoxin (LANOXIN) 125 MCG tablet Take 1 tablet by mouth Daily. Omit on Tuesdays, Thursdays and Saturdays 50 tablet 3    folic acid (FOLVITE) 1 MG tablet Take 1 tablet by mouth Daily. 90 tablet 1    furosemide (LASIX) 40 MG tablet Take 1 tablet by mouth Every Other Day. 90 tablet 3    glucose blood test strip Check blood sugar once daily and record. 100 each 3    metFORMIN ER (GLUCOPHAGE-XR) 500 MG 24 hr tablet Take 1 tablet by mouth 2 (Two) Times a Day. 180  tablet 3    metoprolol succinate XL (TOPROL-XL) 50 MG 24 hr tablet Take 1 tablet by mouth 2 (Two) Times a Day.      montelukast (SINGULAIR) 10 MG tablet Take 1 tablet by mouth Daily. 90 tablet 3    predniSONE (DELTASONE) 5 MG tablet TAKE 5 MG EVERY DAY 30 tablet 1    Prenatal Vit-Fe Fumarate-FA (prenatal vitamin 28-0.8) 28-0.8 MG tablet tablet Take 1 tablet by mouth Every Other Day. 30 tablet 5    Probiotic Product (Spectraseis PO) Take 1 capsule by mouth Daily.      sacubitril-valsartan (Entresto) 24-26 MG tablet Take 1 tablet by mouth 2 (Two) Times a Day. 180 tablet 3    spironolactone (ALDACTONE) 25 MG tablet TAKE 1 TABLET BY MOUTH EVERY DAY 90 tablet 3    Synthroid 75 MCG tablet Take 1 tablet by mouth Daily. 90 tablet 3    vitamin B-12 (CYANOCOBALAMIN) 1000 MCG tablet Take 1 tablet by mouth Daily. 90 tablet 1     No current facility-administered medications on file prior to visit.       Allergies   Allergen Reactions    Penicillins Palpitations     1. When was your reaction? > 10 years ago  2. Did your reaction happen after the first dose or after several doses? After first dose  3. Did your reaction require ED or hospital care to manage your reaction? Do not know  4. Did your reaction require treatment with epinephrine? Do not know  5. Have you taken amoxicillin (Amoxil) or amoxicillin-clavulanate (Augmentin) without issue since? Yes  6. Have you taken cephalexin (Keflex) without issue since?  Do not know      Past Medical History:   Diagnosis Date    Acne rosacea 08/17/2021    DR.JEFF MOON     Acute respiratory failure with hypoxia 07/31/2023    Arteriosclerosis of abdominal aorta     B12 deficiency 08/17/2021    DJD (degenerative joint disease)     Hx of smoking 08/17/2021    QUIT IN 1976 AT AGE 31    Hyperlipidemia 08/17/2021    Hypertension     Hypothyroidism     Metabolic syndrome 08/17/2021    NSTEMI (non-ST elevated myocardial infarction) 07/17/2023    Pericardial effusion 10/10/2023     KAREN (stress urinary incontinence, female) 2021    UTI (urinary tract infection) 2021    Vitamin D deficiency 2021     Past Surgical History:   Procedure Laterality Date    BACK SURGERY      CARDIAC CATHETERIZATION N/A 2023    Procedure: Left Heart Cath;  Surgeon: Dinh Andres MD;  Location: [facility] CATH INVASIVE LOCATION;  Service: Cardiovascular;  Laterality: N/A;    CARPAL TUNNEL RELEASE      COLONOSCOPY      CORONARY ARTERY BYPASS GRAFT WITH MITRAL VALVE REPAIR/REPLACEMENT N/A 2023    Procedure: REZA STERNOTOMY OFF-PUMP CORONARY ARTERY BYPASS GRAFT TIMES        USING LEFFT INTERNAL MAMMARY ARTERY AND     GREATER SAPHENOUS VEIN GRAFT PER EMDOSCOPIC VEIN HARVESTING, MAZE PROCEDURE AND PRP;  Surgeon: Shane Alexander MD;  Location: [facility] CVOR;  Service: Cardiothoracic;  Laterality: N/A;    KNEE SURGERY      LUMBAR DISCECTOMY      NECK SURGERY      Cervical    POSTERIOR LUMBAR/THORACIC SPINE FUSION       Social History     Socioeconomic History    Marital status:      Spouse name: Dinh   Tobacco Use    Smoking status: Former     Current packs/day: 0.00     Average packs/day: 1 pack/day for 12.0 years (12.0 ttl pk-yrs)     Types: Cigarettes     Start date:      Quit date:      Years since quittin.8    Smokeless tobacco: Never   Vaping Use    Vaping status: Never Used   Substance and Sexual Activity    Alcohol use: Never    Drug use: Never    Sexual activity: Defer     Family History   Problem Relation Age of Onset    Heart disease Mother     Arthritis Mother     Heart attack Mother     Arthritis Father        Objective   Physical Exam  Vitals reviewed. Exam conducted with a chaperone present.   Cardiovascular:      Rate and Rhythm: Normal rate and regular rhythm.      Heart sounds: Normal heart sounds. No murmur heard.     No gallop.   Pulmonary:      Effort: Pulmonary effort is normal.      Breath sounds: Normal breath sounds.   Abdominal:       "General: Bowel sounds are normal.   Lymphadenopathy:      Cervical: No cervical adenopathy.   Psychiatric:         Mood and Affect: Mood normal.         Behavior: Behavior normal.         Vitals:    10/29/24 1420   Pulse: 89   Resp: 20   Temp: 96.9 °F (36.1 °C)   TempSrc: Temporal   SpO2: 99%   Weight: 85.7 kg (189 lb)   Height: 162.6 cm (64\")   PainSc: 0-No pain     ECOG score: 0         PHQ-9 Total Score:                      Result Review :   The following data was reviewed by: Agustin Ayala MD on 10/29/2024:  Lab Results   Component Value Date    HGB 9.4 (L) 10/07/2024    HCT 27.7 (L) 10/07/2024    .7 (H) 10/07/2024     10/07/2024    WBC 11.07 (H) 10/07/2024    NEUTROABS 6.87 10/07/2024    LYMPHSABS 2.95 10/07/2024    MONOSABS 1.03 (H) 10/07/2024    EOSABS 0.10 10/07/2024    BASOSABS 0.05 10/07/2024     Lab Results   Component Value Date    GLUCOSE 107 (H) 10/21/2024    BUN 20 10/21/2024    CREATININE 1.26 (H) 10/21/2024     10/21/2024    K 3.5 10/21/2024     10/21/2024    CO2 24.3 10/21/2024    CALCIUM 8.7 10/21/2024    PROTEINTOT 6.7 10/21/2024    ALBUMIN 3.9 10/21/2024    BILITOT 0.7 10/21/2024    ALKPHOS 58 10/21/2024    AST 27 10/21/2024    ALT 27 10/21/2024     Lab Results   Component Value Date    MG 1.6 10/21/2024    PHOS 3.0 02/09/2024    FREET4 1.74 (H) 12/29/2023    TSH 0.625 06/28/2024     Lab Results   Component Value Date    IRON 94 09/18/2024    LABIRON 27 09/18/2024    TRANSFERRIN 230 09/18/2024    TIBC 343 09/18/2024     Lab Results   Component Value Date     09/18/2024    FERRITIN 748.50 (H) 09/18/2024    NXFXOZYA64 415 09/18/2024    FOLATE >20.00 09/18/2024     No results found for: \"PSA\", \"CEA\", \"AFP\", \"\", \"\"            Assessment and Plan    Diagnoses and all orders for this visit:    1. Autoimmune hemolytic anemia (Primary)  Assessment & Plan:  Patient is on prednisone.  She was unable to come off of prednisone earlier this year but has now " been on low-dose treatment for some time.  Hemoglobin is stable in the 9-10 g/dL range.  She does have underlying chronic kidney disease so her hemoglobin never normalized.  I will try to taper her slowly off of prednisone.  She states she is taking 5 mg every other day currently, I will have her go to every third day and repeat CBC, CMP, direct antibody test, haptoglobin, reticulocyte count in 1 month.  If stable I would continue with taper.  I will see her back in 2 months with the same lab work to reassess.  We did discuss the possibility of rituximab if she is unable to come off of steroid.    Orders:  -     CBC & Differential; Future  -     CBC & Differential; Future  -     Comprehensive Metabolic Panel; Future  -     Haptoglobin; Future  -     DIRECT DANIELLE, IGG, C3; Future  -     Reticulocytes; Future  -     Comprehensive Metabolic Panel; Future  -     Reticulocytes; Future  -     DIRECT DANIELLE, IGG, C3; Future  -     Haptoglobin; Future    2. Large granular lymphocyte disorder  Assessment & Plan:  Small clonal population noted on previous flow cytometry.  Repeat flow cytometry in 2 months when she returns.    Orders:  -     Flow Cytometry, Blood; Future            Patient Follow Up: 2 months    Patient was given instructions and counseling regarding her condition or for health maintenance advice. Please see specific information pulled into the AVS if appropriate.     Agustin Ayala MD    10/29/2024

## 2024-10-29 NOTE — ASSESSMENT & PLAN NOTE
Patient is on prednisone.  She was unable to come off of prednisone earlier this year but has now been on low-dose treatment for some time.  Hemoglobin is stable in the 9-10 g/dL range.  She does have underlying chronic kidney disease so her hemoglobin never normalized.  I will try to taper her slowly off of prednisone.  She states she is taking 5 mg every other day currently, I will have her go to every third day and repeat CBC, CMP, direct antibody test, haptoglobin, reticulocyte count in 1 month.  If stable I would continue with taper.  I will see her back in 2 months with the same lab work to reassess.  We did discuss the possibility of rituximab if she is unable to come off of steroid.

## 2024-10-29 NOTE — ASSESSMENT & PLAN NOTE
Small clonal population noted on previous flow cytometry.  Repeat flow cytometry in 2 months when she returns.

## 2024-10-31 ENCOUNTER — TELEPHONE (OUTPATIENT)
Dept: FAMILY MEDICINE CLINIC | Facility: CLINIC | Age: 79
End: 2024-10-31
Payer: MEDICARE

## 2024-10-31 NOTE — TELEPHONE ENCOUNTER
Caller: Ann-Marie Hughes    Relationship: Self    Best call back number: [phone]    What was the call regarding: PATIENT IS CHECKING IF THE HEARING TEST ORDER HAS BEEN SIGNED AND FAXED.

## 2024-11-09 ENCOUNTER — APPOINTMENT (OUTPATIENT)
Dept: GENERAL RADIOLOGY | Facility: HOSPITAL | Age: 79
End: 2024-11-09
Payer: MEDICARE

## 2024-11-09 ENCOUNTER — HOSPITAL ENCOUNTER (EMERGENCY)
Facility: HOSPITAL | Age: 79
Discharge: HOME | End: 2024-11-09
Attending: EMERGENCY MEDICINE
Payer: MEDICARE

## 2024-11-09 VITALS
SYSTOLIC BLOOD PRESSURE: 136 MMHG | HEART RATE: 58 BPM | OXYGEN SATURATION: 99 % | DIASTOLIC BLOOD PRESSURE: 83 MMHG | TEMPERATURE: 97.8 F | HEIGHT: 64 IN | WEIGHT: 187.17 LBS | BODY MASS INDEX: 31.95 KG/M2 | RESPIRATION RATE: 18 BRPM

## 2024-11-09 DIAGNOSIS — L03.116 CELLULITIS OF LEFT FOOT: Primary | ICD-10-CM

## 2024-11-09 LAB
ALBUMIN SERPL-MCNC: 4.3 G/DL (ref 3.5–5.2)
ALBUMIN/GLOB SERPL: 1.5 G/DL
ALP SERPL-CCNC: 57 U/L (ref 39–117)
ALT SERPL W P-5'-P-CCNC: 15 U/L (ref 1–33)
ANION GAP SERPL CALCULATED.3IONS-SCNC: 9.5 MMOL/L (ref 5–15)
AST SERPL-CCNC: 26 U/L (ref 1–32)
BASOPHILS # BLD AUTO: 0.05 10*3/MM3 (ref 0–0.2)
BASOPHILS NFR BLD AUTO: 0.4 % (ref 0–1.5)
BILIRUB SERPL-MCNC: 1.1 MG/DL (ref 0–1.2)
BUN SERPL-MCNC: 27 MG/DL (ref 8–23)
BUN/CREAT SERPL: 22 (ref 7–25)
CALCIUM SPEC-SCNC: 8.9 MG/DL (ref 8.6–10.5)
CHLORIDE SERPL-SCNC: 107 MMOL/L (ref 98–107)
CO2 SERPL-SCNC: 25.5 MMOL/L (ref 22–29)
CREAT SERPL-MCNC: 1.23 MG/DL (ref 0.57–1)
DEPRECATED RDW RBC AUTO: 71.9 FL (ref 37–54)
EGFRCR SERPLBLD CKD-EPI 2021: 44.8 ML/MIN/1.73
EOSINOPHIL # BLD AUTO: 0.14 10*3/MM3 (ref 0–0.4)
EOSINOPHIL NFR BLD AUTO: 1.2 % (ref 0.3–6.2)
ERYTHROCYTE [DISTWIDTH] IN BLOOD BY AUTOMATED COUNT: 18.9 % (ref 12.3–15.4)
GLOBULIN UR ELPH-MCNC: 2.9 GM/DL
GLUCOSE SERPL-MCNC: 136 MG/DL (ref 65–99)
HCT VFR BLD AUTO: 32.4 % (ref 34–46.6)
HGB BLD-MCNC: 9.9 G/DL (ref 12–15.9)
HOLD SPECIMEN: NORMAL
HOLD SPECIMEN: NORMAL
IMM GRANULOCYTES # BLD AUTO: 0.05 10*3/MM3 (ref 0–0.05)
IMM GRANULOCYTES NFR BLD AUTO: 0.4 % (ref 0–0.5)
LYMPHOCYTES # BLD AUTO: 2.37 10*3/MM3 (ref 0.7–3.1)
LYMPHOCYTES NFR BLD AUTO: 20.4 % (ref 19.6–45.3)
MCH RBC QN AUTO: 31.6 PG (ref 26.6–33)
MCHC RBC AUTO-ENTMCNC: 30.6 G/DL (ref 31.5–35.7)
MCV RBC AUTO: 103.5 FL (ref 79–97)
MONOCYTES # BLD AUTO: 1.12 10*3/MM3 (ref 0.1–0.9)
MONOCYTES NFR BLD AUTO: 9.6 % (ref 5–12)
NEUTROPHILS NFR BLD AUTO: 68 % (ref 42.7–76)
NEUTROPHILS NFR BLD AUTO: 7.88 10*3/MM3 (ref 1.7–7)
NRBC BLD AUTO-RTO: 0 /100 WBC (ref 0–0.2)
PLATELET # BLD AUTO: 253 10*3/MM3 (ref 140–450)
PMV BLD AUTO: 11.2 FL (ref 6–12)
POTASSIUM SERPL-SCNC: 4.1 MMOL/L (ref 3.5–5.2)
PROT SERPL-MCNC: 7.2 G/DL (ref 6–8.5)
RBC # BLD AUTO: 3.13 10*6/MM3 (ref 3.77–5.28)
SODIUM SERPL-SCNC: 142 MMOL/L (ref 136–145)
WBC NRBC COR # BLD AUTO: 11.61 10*3/MM3 (ref 3.4–10.8)
WHOLE BLOOD HOLD COAG: NORMAL
WHOLE BLOOD HOLD SPECIMEN: NORMAL

## 2024-11-09 PROCEDURE — 80053 COMPREHEN METABOLIC PANEL: CPT

## 2024-11-09 PROCEDURE — 36415 COLL VENOUS BLD VENIPUNCTURE: CPT

## 2024-11-09 PROCEDURE — 85025 COMPLETE CBC W/AUTO DIFF WBC: CPT

## 2024-11-09 PROCEDURE — 73620 X-RAY EXAM OF FOOT: CPT

## 2024-11-09 PROCEDURE — 73600 X-RAY EXAM OF ANKLE: CPT

## 2024-11-09 PROCEDURE — 99283 EMERGENCY DEPT VISIT LOW MDM: CPT

## 2024-11-09 RX ORDER — SODIUM CHLORIDE 0.9 % (FLUSH) 0.9 %
10 SYRINGE (ML) INJECTION AS NEEDED
Status: DISCONTINUED | OUTPATIENT
Start: 2024-11-09 | End: 2024-11-09 | Stop reason: HOSPADM

## 2024-11-09 RX ORDER — SULFAMETHOXAZOLE AND TRIMETHOPRIM 800; 160 MG/1; MG/1
1 TABLET ORAL 2 TIMES DAILY
Qty: 28 TABLET | Refills: 0 | Status: SHIPPED | OUTPATIENT
Start: 2024-11-09 | End: 2024-11-23

## 2024-11-09 RX ORDER — SULFAMETHOXAZOLE AND TRIMETHOPRIM 800; 160 MG/1; MG/1
1 TABLET ORAL 2 TIMES DAILY
Qty: 28 TABLET | Refills: 0 | Status: SHIPPED | OUTPATIENT
Start: 2024-11-09 | End: 2024-11-09

## 2024-11-09 NOTE — DISCHARGE INSTRUCTIONS
You were evaluated in the emergency department today.  Your x-ray does not show any fractures or broken bones.  You have cellulitis which is infection of the skin.  We treat this with antibiotics, I have sent these to your pharmacy.  Please take these all the way to completion, even if you begin feeling better.  Please monitor closely for signs of worsening which would include extending redness, worsening color changes, worsening pain, drainage of pus, fevers or any other symptoms that concern you and return to the emergency department at once if you noticed these.  Please follow-up with your primary care provider.

## 2024-11-09 NOTE — ED PROVIDER NOTES
Time: 12:37 PM EST  Date of encounter:  11/9/2024  Independent Historian/Clinical History and Information was obtained by:   Patient    History is limited by: N/A    Chief Complaint: Left foot pain      History of Present Illness:  Patient is a 78 y.o. year old female who presents to the emergency department for evaluation of left foot pain.  Patient reports that 1 week ago she slipped her foot underneath her bathroom vanity and scratched it on the top of her foot.  She did not fall or hit her head, no loss of consciousness.  She reports that it just scratched the hide off of the top of her foot, had minimal bleeding.  She states she thought she could take care of it at home however noted worsening pain today with extending redness and bruising.  She has been ambulatory since the event.  At urgent care, they were concerned for infection so they referred her to the emergency department.  Patient reports it is painful, denies fevers, chills, shortness of breath, chest pain.  She is on blood thinners.  She is a diabetic and has neuropathy at baseline.  She reports there is pain into her ankle as well.  She denies chest pain or shortness of breath.      Patient Care Team  Primary Care Provider: Romario Valdez, DO    Past Medical History:     Allergies   Allergen Reactions    Penicillins Palpitations     1. When was your reaction? > 10 years ago  2. Did your reaction happen after the first dose or after several doses? After first dose  3. Did your reaction require ED or hospital care to manage your reaction? Do not know  4. Did your reaction require treatment with epinephrine? Do not know  5. Have you taken amoxicillin (Amoxil) or amoxicillin-clavulanate (Augmentin) without issue since? Yes  6. Have you taken cephalexin (Keflex) without issue since?  Do not know      Past Medical History:   Diagnosis Date    Acne rosacea 08/17/2021    DR.JEFF MOON     Acute respiratory failure with hypoxia 07/31/2023     Arteriosclerosis of abdominal aorta     B12 deficiency 08/17/2021    DJD (degenerative joint disease)     Hx of smoking 08/17/2021    QUIT IN 1976 AT AGE 31    Hyperlipidemia 08/17/2021    Hypertension     Hypothyroidism     Metabolic syndrome 08/17/2021    NSTEMI (non-ST elevated myocardial infarction) 07/17/2023    Pericardial effusion 10/10/2023    KAREN (stress urinary incontinence, female) 08/17/2021    UTI (urinary tract infection) 08/17/2021    Vitamin D deficiency 08/17/2021     Past Surgical History:   Procedure Laterality Date    BACK SURGERY  2013    CARDIAC CATHETERIZATION N/A 07/17/2023    Procedure: Left Heart Cath;  Surgeon: Dinh Andres MD;  Location: [facility] CATH INVASIVE LOC[facility];  Service: Cardiovascular;  Laterality: N/A;    CARPAL TUNNEL RELEASE      COLONOSCOPY  2011    CORONARY ARTERY BYPASS GRAFT WITH MITRAL VALVE REPAIR/REPLACEMENT N/A 07/20/2023    Procedure: REZA STERNOTOMY OFF-PUMP CORONARY ARTERY BYPASS GRAFT TIMES        USING LEFFT INTERNAL MAMMARY ARTERY AND     GREATER SAPHENOUS VEIN GRAFT PER EMDOSCOPIC VEIN HARVESTING, MAZE PROCEDURE AND PRP;  Surgeon: Shane Alexander MD;  Location: [facility] CVOR;  Service: Cardiothoracic;  Laterality: N/A;    KNEE SURGERY      LUMBAR DISCECTOMY      NECK SURGERY      Cervical    POSTERIOR LUMBAR/THORACIC SPINE FUSION       Family History   Problem Relation Age of Onset    Heart disease Mother     Arthritis Mother     Heart attack Mother     Arthritis Father        Home Medications:  Prior to Admission medications    Medication Sig Start Date End Date Taking? Authorizing Provider   acetaminophen (TYLENOL) 325 MG tablet Take 2 tablets by mouth Every 4 (Four) Hours As Needed for Mild Pain. 7/28/23   Anjelica Chase, LONA   albuterol sulfate  (90 Base) MCG/ACT inhaler Inhale 2 puffs Every 4 (Four) Hours As Needed for Wheezing. 5/30/23   Les Moreno,    apixaban (Eliquis) 5 MG tablet tablet Take 1 tablet by mouth Every 12  (Twelve) Hours. 8/6/24   Sara Lubin APRN   aspirin 81 MG EC tablet Take 1 tablet by mouth Daily.    ProviderKanwal MD   atorvastatin (LIPITOR) 40 MG tablet Take 1 tablet by mouth Daily. 11/7/23   Valerie Valera MD   buPROPion XL (WELLBUTRIN XL) 300 MG 24 hr tablet TAKE 1 TABLET BY MOUTH EVERY DAY 6/5/24   Romario Valdez DO   CRANBERRY EXTRACT PO Take 1 tablet by mouth Daily.    ProviderKanwal MD   dapagliflozin Propanediol (Farxiga) 10 MG tablet TAKE 1 TABLET BY MOUTH EVERY DAY 12/4/23   Valerie Valera MD   digoxin (LANOXIN) 125 MCG tablet Take 1 tablet by mouth Daily. Omit on Tuesdays, Thursdays and Saturdays 7/16/24   Valerie Valera MD   folic acid (FOLVITE) 1 MG tablet Take 1 tablet by mouth Daily. 9/18/24   Manny Soto PA-C   furosemide (LASIX) 40 MG tablet Take 1 tablet by mouth Every Other Day. 10/8/24   Sara Lubin APRN   glucose blood test strip Check blood sugar once daily and record. 4/29/24   Romario Valdez DO   Hearing Aid Device device Use 1 Device Daily. 10/31/24   Romario Valdez DO   metFORMIN ER (GLUCOPHAGE-XR) 500 MG 24 hr tablet Take 1 tablet by mouth 2 (Two) Times a Day. 3/4/24   Romario Valdez DO   metoprolol succinate XL (TOPROL-XL) 50 MG 24 hr tablet Take 1 tablet by mouth 2 (Two) Times a Day.    ProviderKanwal MD   montelukast (SINGULAIR) 10 MG tablet Take 1 tablet by mouth Daily. 7/31/24   Romario Valdez DO   predniSONE (DELTASONE) 5 MG tablet TAKE 5 MG EVERY DAY 9/18/24   Manny Soto PA-C   Prenatal Vit-Fe Fumarate-FA (prenatal vitamin 28-0.8) 28-0.8 MG tablet tablet Take 1 tablet by mouth Every Other Day. 9/18/24   Manny Soto PA-C   Probiotic Product (Wildfang PO) Take 1 capsule by mouth Daily.    ProviderKanwal MD   sacubitril-valsartan (Entresto) 24-26 MG tablet Take 1 tablet by mouth 2 (Two) Times a Day. 2/19/24   Valerie Valera MD   spironolactone (ALDACTONE) 25 MG  "tablet TAKE 1 TABLET BY MOUTH EVERY DAY 24   Valerie Valera MD   Synthroid 75 MCG tablet Take 1 tablet by mouth Daily. 24   Romario Valdez DO   vitamin B-12 (CYANOCOBALAMIN) 1000 MCG tablet Take 1 tablet by mouth Daily. 24   Manny Soto PA-C        Social History:   Social History     Tobacco Use    Smoking status: Former     Current packs/day: 0.00     Average packs/day: 1 pack/day for 12.0 years (12.0 ttl pk-yrs)     Types: Cigarettes     Start date:      Quit date:      Years since quittin.8    Smokeless tobacco: Never   Vaping Use    Vaping status: Never Used   Substance Use Topics    Alcohol use: Never    Drug use: Never         Review of Systems:  Review of Systems   Constitutional:  Negative for activity change, appetite change, fatigue and fever.   HENT:  Negative for congestion, ear pain, sore throat and trouble swallowing.    Respiratory:  Negative for cough and shortness of breath.    Cardiovascular:  Negative for chest pain and palpitations.   Gastrointestinal:  Negative for abdominal pain, nausea and vomiting.   Genitourinary:  Negative for dysuria and urgency.   Musculoskeletal:  Positive for arthralgias and gait problem. Negative for back pain and neck pain.   Skin:  Positive for color change and wound.   Allergic/Immunologic: Negative for immunocompromised state.   Neurological:  Negative for dizziness, facial asymmetry and headaches.   Psychiatric/Behavioral:  Negative for agitation and confusion.         Physical Exam:  /83 (BP Location: Right arm, Patient Position: Sitting)   Pulse 58   Temp 97.8 °F (36.6 °C) (Oral)   Resp 18   Ht 162.6 cm (64\")   Wt 84.9 kg (187 lb 2.7 oz)   LMP  (LMP Unknown)   SpO2 99%   BMI 32.13 kg/m²     Physical Exam  Vitals and nursing note reviewed.   Constitutional:       General: She is not in acute distress.     Appearance: Normal appearance. She is not ill-appearing.   HENT:      Head: Normocephalic and " atraumatic.      Nose: Nose normal.      Mouth/Throat:      Mouth: Mucous membranes are moist.      Pharynx: Oropharynx is clear.   Eyes:      Extraocular Movements: Extraocular movements intact.      Conjunctiva/sclera: Conjunctivae normal.      Pupils: Pupils are equal, round, and reactive to light.   Cardiovascular:      Rate and Rhythm: Normal rate and regular rhythm.      Heart sounds: Normal heart sounds.      Comments: DP pulses present with Doppler.  Pulmonary:      Effort: Pulmonary effort is normal. No respiratory distress.      Breath sounds: Normal breath sounds. No wheezing or rales.   Chest:      Chest wall: No tenderness.   Abdominal:      Palpations: Abdomen is soft.   Musculoskeletal:         General: Swelling, tenderness and signs of injury present. Normal range of motion.      Cervical back: Normal range of motion and neck supple.      Right lower leg: Edema present.      Left lower leg: Edema present.      Comments: Diffuse ecchymosis and erythema to left foot to lower leg.  Wound to dorsal aspect of left foot with surrounding induration, no fluctuance appreciated.  No active drainage.  Erythema to dorsal aspect of foot and to lower leg.   Skin:     General: Skin is warm.      Capillary Refill: Capillary refill takes 2 to 3 seconds.      Findings: Bruising, erythema and lesion present.   Neurological:      General: No focal deficit present.      Mental Status: She is alert and oriented to person, place, and time.   Psychiatric:         Mood and Affect: Mood normal.         Behavior: Behavior normal.                  Procedures:  Procedures      Medical Decision Making:      Comorbidities that affect care:    Coronary Artery Disease, Diabetes, Hypertension    External Notes reviewed:    Previous ED Note: Urgent care note from today.  Presented with same symptoms as today, was recommended to go to ED for evaluation of cellulitis and possible complications.      The following orders were placed and  all results were independently analyzed by me:  Orders Placed This Encounter   Procedures    XR Foot 2 View Left    XR Ankle 2 View Left    [place] Draw    Comprehensive Metabolic Panel    CBC Auto Differential    Insert peripheral IV    CBC & Differential    Green Top (Gel)    Lavender Top    Gold Top - SST    Light Blue Top       Medications Given in the Emergency Department:  Medications   sodium chloride 0.9 % flush 10 mL (has no administration in time range)        ED Course:    ED Course as of 11/09/24 1543   Sat Nov 09, 2024   1230 BP: 136/83  Patient arrived to the emergency department awake, alert, hemodynamically stable.  Afebrile. [AS]   1242 CBC & Differential(!)  The patient´s CBC that was reviewed and interpreted by me shows no abnormalities of critical concern. Of note, there is no anemia requiring a blood transfusion and the platelet count is acceptable. [AS]   1242 Comprehensive Metabolic Panel(!)  The patient´s CMP that was reviewed and interpretted by me shows no abnormalities of critical concern. Of note, the patient´s sodium and potassium are acceptable. The patient´s liver enzymes are unremarkable. The patient´s renal function (creatinine) is preserved. The patient has a normal anion gap. [AS]   1243 XR Foot 2 View Left  Diffuse swelling, no acute fracture or dislocation noted.  Area of swelling to dorsal aspect of left foot which could represent infection. [AS]      ED Course User Index  [AS] Sheila Pelaez PA-C       Labs:    Lab Results (last 24 hours)       Procedure Component Value Units Date/Time    CBC & Differential [[id number]]  (Abnormal) Collected: 11/09/24 1148    Specimen: Blood Updated: 11/09/24 1153    Narrative:      The following orders were created for panel order CBC & Differential.  Procedure                               Abnormality         Status                     ---------                               -----------         ------                     CBC Auto  Differential[[id number]]        Abnormal            Final result                 Please view results for these tests on the individual orders.    Comprehensive Metabolic Panel [[id number]]  (Abnormal) Collected: 11/09/24 1148    Specimen: Blood Updated: 11/09/24 1212     Glucose 136 mg/dL      BUN 27 mg/dL      Creatinine 1.23 mg/dL      Sodium 142 mmol/L      Potassium 4.1 mmol/L      Comment: Slight hemolysis detected by analyzer. Result may be falsely elevated.        Chloride 107 mmol/L      CO2 25.5 mmol/L      Calcium 8.9 mg/dL      Total Protein 7.2 g/dL      Albumin 4.3 g/dL      ALT (SGPT) 15 U/L      AST (SGOT) 26 U/L      Comment: Slight hemolysis detected by analyzer. Result may be falsely elevated.        Alkaline Phosphatase 57 U/L      Total Bilirubin 1.1 mg/dL      Globulin 2.9 gm/dL      A/G Ratio 1.5 g/dL      BUN/Creatinine Ratio 22.0     Anion Gap 9.5 mmol/L      eGFR 44.8 mL/min/1.73     Narrative:      GFR Normal >60  Chronic Kidney Disease <60  Kidney Failure <15    The GFR formula is only valid for adults with stable renal function between ages 18 and 70.    CBC Auto Differential [[id number]]  (Abnormal) Collected: 11/09/24 1148    Specimen: Blood Updated: 11/09/24 1153     WBC 11.61 10*3/mm3      RBC 3.13 10*6/mm3      Hemoglobin 9.9 g/dL      Hematocrit 32.4 %      .5 fL      MCH 31.6 pg      MCHC 30.6 g/dL      RDW 18.9 %      RDW-SD 71.9 fl      MPV 11.2 fL      Platelets 253 10*3/mm3      Neutrophil % 68.0 %      Lymphocyte % 20.4 %      Monocyte % 9.6 %      Eosinophil % 1.2 %      Basophil % 0.4 %      Immature Grans % 0.4 %      Neutrophils, Absolute 7.88 10*3/mm3      Lymphocytes, Absolute 2.37 10*3/mm3      Monocytes, Absolute 1.12 10*3/mm3      Eosinophils, Absolute 0.14 10*3/mm3      Basophils, Absolute 0.05 10*3/mm3      Immature Grans, Absolute 0.05 10*3/mm3      nRBC 0.0 /100 WBC              Imaging:    XR Foot 2 View Left    Result Date: 11/9/2024  XR ANKLE 2 VW LEFT, XR  FOOT 2 VW LEFT Date of Exam: 11/9/2024 12:15 PM EST Indication: fall left foot and ankle pain. Soft tissue wound to the dorsum of the foot. Comparison: None available. Findings: Left ankle: No definite acute fracture or dislocation is noted at the ankle. There is diffuse soft tissue swelling of the lower leg extending to the ankle and foot. Vascular clips are noted overlying the medial aspect of the lower leg extending to the ankle. Mild degenerative changes are noted in the medial and the lateral aspects of the ankle as well as anteriorly at the ankle joint. There is no significant joint effusion. There is peripheral vascular calcifications noted. Spurring noted along the plantar fascial and Achilles attachment to the calcaneus. Left foot: There is a large amount of focal soft tissue swelling over the dorsum of the mid to distal foot overlying the tarsals and metatarsals. No subcutaneous emphysema is noted. Moderate degenerative changes are noted of the midfoot     Impression: 1. There is diffuse soft tissue swelling over the lower leg extending to the ankle and foot. 2. There is a more focal rounded area of soft tissue swelling overlying the distal aspect of the foot extending from the distal aspect of the tarsal bones and overlying the metatarsals. Findings could represent a contusion although underlying infection cannot be excluded. Please correlate with the clinical exam. 3. Degenerative changes as above with no definite acute fracture or dislocation Electronically Signed: James Parmar MD  11/9/2024 12:35 PM EST  Workstation ID: [id number]    XR Ankle 2 View Left    Result Date: 11/9/2024  XR ANKLE 2 VW LEFT, XR FOOT 2 VW LEFT Date of Exam: 11/9/2024 12:15 PM EST Indication: fall left foot and ankle pain. Soft tissue wound to the dorsum of the foot. Comparison: None available. Findings: Left ankle: No definite acute fracture or dislocation is noted at the ankle. There is diffuse soft tissue swelling of the  lower leg extending to the ankle and foot. Vascular clips are noted overlying the medial aspect of the lower leg extending to the ankle. Mild degenerative changes are noted in the medial and the lateral aspects of the ankle as well as anteriorly at the ankle joint. There is no significant joint effusion. There is peripheral vascular calcifications noted. Spurring noted along the plantar fascial and Achilles attachment to the calcaneus. Left foot: There is a large amount of focal soft tissue swelling over the dorsum of the mid to distal foot overlying the tarsals and metatarsals. No subcutaneous emphysema is noted. Moderate degenerative changes are noted of the midfoot     Impression: 1. There is diffuse soft tissue swelling over the lower leg extending to the ankle and foot. 2. There is a more focal rounded area of soft tissue swelling overlying the distal aspect of the foot extending from the distal aspect of the tarsal bones and overlying the metatarsals. Findings could represent a contusion although underlying infection cannot be excluded. Please correlate with the clinical exam. 3. Degenerative changes as above with no definite acute fracture or dislocation Electronically Signed: James Parmar MD  11/9/2024 12:35 PM EST  Workstation ID: [id number]       Differential Diagnosis and Discussion:    Extremity Pain: Differential diagnosis includes but is not limited to soft tissue sprain, tendonitis, tendon injury, dislocation, fracture, deep vein thrombosis, arterial insufficiency, osteoarthritis, bursitis, and ligamentous damage.  Orthopedic Injuries: Differential diagnosis includes but is not limited to fractures, soft tissue injuries, dislocations, contusions, ligamentous injuries, tendon injuries, nerve injuries, compartment syndrome, bursitis, and vascular injuries.    All labs were reviewed and interpreted by me.  All X-rays impressions were independently interpreted by me.    MDM     Amount and/or Complexity  of Data Reviewed  Clinical lab tests: ordered and reviewed  Tests in the radiology section of CPT®: ordered and reviewed  Decide to obtain previous medical records or to obtain history from someone other than the patient: yes  Review and summarize past medical records: yes  Independent visualization of images, tracings, or specimens: yes    Risk of Complications, Morbidity, and/or Mortality  Presenting problems: low  Diagnostic procedures: low  Management options: low    Patient Progress  Patient progress: stable                       Patient Care Considerations:    DUPLEX ULTRASOUND: I considered ordering a duplex ultrasound, however the patient is low risk for dvt and has no signs of arterial occlusion.  She is on blood thinners.  She has no chest pain or shortness of breath.  She has a known injury to the area.      Consultants/Shared Management Plan:    None    Social Determinants of Health:    Patient is independent, reliable, and has access to care.       Disposition and Care Coordination:    Discharged: The patient is suitable and stable for discharge with no need for consideration of admission.    I have explained the patient´s condition, diagnoses and treatment plan based on the information available to me at this time. I have answered questions and addressed any concerns. The patient has a good  understanding of the patient´s diagnosis, condition, and treatment plan as can be expected at this point. The vital signs have been stable. The patient´s condition is stable and appropriate for discharge from the emergency department.      The patient will pursue further outpatient evaluation with the primary care physician or other designated or consulting physician as outlined in the discharge instructions. They are agreeable to this plan of care and follow-up instructions have been explained in detail. The patient has received these instructions in written format and has expressed an understanding of the  discharge instructions. The patient is aware that any significant change in condition or worsening of symptoms should prompt an immediate return to this or the closest emergency department or call to 911.  I have explained discharge medications and the need for follow up with the patient/caretakers. This was also printed in the discharge instructions. Patient was discharged with the following medications and follow up:      Medication List        New Prescriptions      sulfamethoxazole-trimethoprim 800-160 MG per tablet  Commonly known as: BACTRIM DS,SEPTRA DS  Take 1 tablet by mouth 2 (Two) Times a Day for 14 days.               Where to Get Your Medications        These medications were sent to [facility] - [facility] - [address] - [phone]  - [phone]   [address], [place] [address]      Phone: [phone]   sulfamethoxazole-trimethoprim 800-160 MG per tablet      No follow-up provider specified.     Final diagnoses:   Cellulitis of left foot        ED Disposition       ED Disposition   Discharge    Condition   Stable    Comment   --               This medical record created using voice recognition software.             Sheila Pelaez PA-C  11/09/24 154

## 2024-11-19 RX ORDER — METFORMIN HYDROCHLORIDE 500 MG/1
500 TABLET, EXTENDED RELEASE ORAL 2 TIMES DAILY
Qty: 180 TABLET | Refills: 3 | Status: SHIPPED | OUTPATIENT
Start: 2024-11-19

## 2024-11-21 ENCOUNTER — OFFICE VISIT (OUTPATIENT)
Dept: FAMILY MEDICINE CLINIC | Facility: CLINIC | Age: 79
End: 2024-11-21
Payer: MEDICARE

## 2024-11-21 VITALS
HEART RATE: 77 BPM | OXYGEN SATURATION: 97 % | WEIGHT: 184 LBS | HEIGHT: 64 IN | BODY MASS INDEX: 31.41 KG/M2 | DIASTOLIC BLOOD PRESSURE: 73 MMHG | TEMPERATURE: 97.8 F | SYSTOLIC BLOOD PRESSURE: 160 MMHG

## 2024-11-21 DIAGNOSIS — S90.32XA CONTUSION OF LEFT FOOT, INITIAL ENCOUNTER: Primary | ICD-10-CM

## 2024-11-21 DIAGNOSIS — L03.119 CELLULITIS OF FOOT: ICD-10-CM

## 2024-11-21 NOTE — PROGRESS NOTES
Chief Complaint   Patient presents with    Follow-up     Foot injury         Subjective     Ann-Marie Hughes  has a past medical history of Acne rosacea (08/17/2021), Acute respiratory failure with hypoxia (07/31/2023), Arteriosclerosis of abdominal aorta, B12 deficiency (08/17/2021), DJD (degenerative joint disease), smoking (08/17/2021), Hyperlipidemia (08/17/2021), Hypertension, Hypothyroidism, Metabolic syndrome (08/17/2021), NSTEMI (non-ST elevated myocardial infarction) (07/17/2023), Pericardial effusion (10/10/2023), KAREN (stress urinary incontinence, female) (08/17/2021), UTI (urinary tract infection) (08/17/2021), and Vitamin D deficiency (08/17/2021).    ER [id number] was to [facility] emergency room 11/9/2024.  She states that a week before her foot had slipped and went under her bathroom vanity causing an abrasion on the top of her foot.  She had developed increasing pain and swelling and became concerned.  She went to the emergency room for evaluation.  There her x-rays were negative not suggestive any fracture.  She was discharged home on a course of Bactrim DS.        PHQ-2 Depression Screening  Little interest or pleasure in doing things?     Feeling down, depressed, or hopeless?     PHQ-2 Total Score     PHQ-9 Depression Screening  Little interest or pleasure in doing things?     Feeling down, depressed, or hopeless?     Trouble falling or staying asleep, or sleeping too much?     Feeling tired or having little energy?     Poor appetite or overeating?     Feeling bad about yourself - or that you are a failure or have let yourself or your family down?     Trouble concentrating on things, such as reading the newspaper or watching television?     Moving or speaking so slowly that other people could have noticed? Or the opposite - being so fidgety or restless that you have been moving around a lot more than usual?     Thoughts that you would be better off dead, or of hurting yourself in some  way?     PHQ-9 Total Score     If you checked off any problems, how difficult have these problems made it for you to do your work, take care of things at home, or get along with other people?       Allergies   Allergen Reactions    Penicillins Palpitations     1. When was your reaction? > 10 years ago  2. Did your reaction happen after the first dose or after several doses? After first dose  3. Did your reaction require ED or hospital care to manage your reaction? Do not know  4. Did your reaction require treatment with epinephrine? Do not know  5. Have you taken amoxicillin (Amoxil) or amoxicillin-clavulanate (Augmentin) without issue since? Yes  6. Have you taken cephalexin (Keflex) without issue since?  Do not know        Prior to Admission medications    Medication Sig Start Date End Date Taking? Authorizing Provider   acetaminophen (TYLENOL) 325 MG tablet Take 2 tablets by mouth Every 4 (Four) Hours As Needed for Mild Pain. 7/28/23  Yes Anjelica Chase APRN   albuterol sulfate  (90 Base) MCG/ACT inhaler Inhale 2 puffs Every 4 (Four) Hours As Needed for Wheezing. 5/30/23  Yes Les Moreno, DO   apixaban (Eliquis) 5 MG tablet tablet Take 1 tablet by mouth Every 12 (Twelve) Hours. 8/6/24  Yes Sara Lubin APRN   aspirin 81 MG EC tablet Take 1 tablet by mouth Daily.   Yes Kanwal Orozco MD   atorvastatin (LIPITOR) 40 MG tablet Take 1 tablet by mouth Daily. 11/7/23  Yes Valerie Valera MD   buPROPion XL (WELLBUTRIN XL) 300 MG 24 hr tablet TAKE 1 TABLET BY MOUTH EVERY DAY 6/5/24  Yes Romario Valdez, DO   CRANBERRY EXTRACT PO Take 1 tablet by mouth Daily.   Yes Kanwal Orozco MD   dapagliflozin Propanediol (Farxiga) 10 MG tablet TAKE 1 TABLET BY MOUTH EVERY DAY 12/4/23  Yes Valerie Valera MD   digoxin (LANOXIN) 125 MCG tablet Take 1 tablet by mouth Daily. Omit on Tuesdays, Thursdays and Saturdays 7/16/24  Yes Valerie Valera MD   folic acid (FOLVITE) 1 MG tablet Take 1 tablet  by mouth Daily. 9/18/24  Yes Manny Soto PA-C   furosemide (LASIX) 40 MG tablet Take 1 tablet by mouth Every Other Day. 10/8/24  Yes Sara Lubin APRN   glucose blood test strip Check blood sugar once daily and record. 4/29/24  Yes Romario Valdez DO   Hearing Aid Device device Use 1 Device Daily. 10/31/24  Yes Romario Valdez DO   metFORMIN ER (GLUCOPHAGE-XR) 500 MG 24 hr tablet TAKE 1 TABLET BY MOUTH TWICE DAILY 11/19/24  Yes Romario Valdez DO   metoprolol succinate XL (TOPROL-XL) 50 MG 24 hr tablet Take 1 tablet by mouth 2 (Two) Times a Day.   Yes ProviderKanwal MD   montelukast (SINGULAIR) 10 MG tablet Take 1 tablet by mouth Daily. 7/31/24  Yes Romario Valdez DO   predniSONE (DELTASONE) 5 MG tablet TAKE 5 MG EVERY DAY 9/18/24  Yes Manny Soto PA-C   Prenatal Vit-Fe Fumarate-FA (prenatal vitamin 28-0.8) 28-0.8 MG tablet tablet Take 1 tablet by mouth Every Other Day. 9/18/24  Yes Manny Soto PA-C   Probiotic Product ([employer] PO) Take 1 capsule by mouth Daily.   Yes ProviderKanwal MD   sacubitril-valsartan (Entresto) 24-26 MG tablet Take 1 tablet by mouth 2 (Two) Times a Day. 2/19/24  Yes Valerie Valera MD   spironolactone (ALDACTONE) 25 MG tablet TAKE 1 TABLET BY MOUTH EVERY DAY 7/8/24  Yes Valerie Valera MD   sulfamethoxazole-trimethoprim (BACTRIM DS,SEPTRA DS) 800-160 MG per tablet Take 1 tablet by mouth 2 (Two) Times a Day for 14 days. 11/9/24 11/23/24 Yes Sheila Pelaez PA-C   Synthroid 75 MCG tablet Take 1 tablet by mouth Daily. 7/31/24  Yes Romario Valdez DO   vitamin B-12 (CYANOCOBALAMIN) 1000 MCG tablet Take 1 tablet by mouth Daily. 9/18/24  Yes Manny Soto PA-C        Patient Active Problem List   Diagnosis    Arthritis    Chronic pain syndrome    Diabetes mellitus, type II    Gastric reflux    Herniated disc    Primary hypertension    Hypothyroidism    Pain in joint, multiple sites    Hyperlipidemia     B12 deficiency    Vitamin D deficiency    Acne rosacea    Hx of smoking    Stress incontinence of urine    Arteriosclerosis of abdominal aorta    Iron deficiency anemia secondary to inadequate dietary iron intake    Seasonal allergies    Hyponatremia    Leg length discrepancy    Reactive depression    Encounter for subsequent annual wellness visit (AWV) in Medicare patient    Class 1 obesity with alveolar hypoventilation, serious comorbidity, and body mass index (BMI) of 32.0 to 32.9 in adult    Chronic HFrEF (heart failure with reduced ejection fraction)    CAD status post CABG x3 vessels    PAF (paroxysmal atrial fibrillation)    Encounter for medical examination to establish care    Autoimmune hemolytic anemia    Need for RSV vaccination    Stage 3b chronic kidney disease    Large granular lymphocyte disorder    Cervicogenic headache    Aortic stenosis, mild    Contusion of left foot    Cellulitis of foot        Past Surgical History:   Procedure Laterality Date    BACK SURGERY  2013    CARDIAC CATHETERIZATION N/A 07/17/2023    Procedure: Left Heart Cath;  Surgeon: Dinh Andres MD;  Location: [facility] CATH INVASIVE LOC[facility];  Service: Cardiovascular;  Laterality: N/A;    CARPAL TUNNEL RELEASE      COLONOSCOPY  2011    CORONARY ARTERY BYPASS GRAFT WITH MITRAL VALVE REPAIR/REPLACEMENT N/A 07/20/2023    Procedure: REZA STERNOTOMY OFF-PUMP CORONARY ARTERY BYPASS GRAFT TIMES        USING LEFFT INTERNAL MAMMARY ARTERY AND     GREATER SAPHENOUS VEIN GRAFT PER EMDOSCOPIC VEIN HARVESTING, MAZE PROCEDURE AND PRP;  Surgeon: Shane Alexander MD;  Location: [facility];  Service: Cardiothoracic;  Laterality: N/A;    KNEE SURGERY      LUMBAR DISCECTOMY      NECK SURGERY      Cervical    POSTERIOR LUMBAR/THORACIC SPINE FUSION         Social History     Socioeconomic History    Marital status:      Spouse name: Dinh   Tobacco Use    Smoking status: Former     Current packs/day: 0.00     Average packs/day: 1  "pack/day for 12.0 years (12.0 ttl pk-yrs)     Types: Cigarettes     Start date:      Quit date:      Years since quittin.9    Smokeless tobacco: Never   Vaping Use    Vaping status: Never Used   Substance and Sexual Activity    Alcohol use: Never    Drug use: Never    Sexual activity: Defer       Family History   Problem Relation Age of Onset    Heart disease Mother     Arthritis Mother     Heart attack Mother     Arthritis Father        Family history, surgical history, past medical history, Allergies and meds reviewed with patient today and updated in [facility] EMR.     ROS:  Review of Systems   Constitutional:  Negative for chills and fever.   Musculoskeletal:  Positive for arthralgias.   Skin:  Positive for wound and bruise.       OBJECTIVE:  Vitals:    24 1046   BP: 160/73   BP Location: Left arm   Patient Position: Sitting   Pulse: 77   Temp: 97.8 °F (36.6 °C)   SpO2: 97%   Weight: 83.5 kg (184 lb)   Height: 162.6 cm (64\")     No results found.   Body mass index is 31.58 kg/m².  No LMP recorded (lmp unknown). Patient is postmenopausal.    The ASCVD Risk score (Manuel DK, et al., 2019) failed to calculate for the following reasons:    The 2019 ASCVD risk score is only valid for ages 40 to 79    Risk score cannot be calculated because patient has a medical history suggesting prior/existing ASCVD     Physical Exam  Vitals and nursing note reviewed.   Constitutional:       General: She is not in acute distress.     Appearance: Normal appearance. She is obese.   HENT:      Head: Normocephalic.   Cardiovascular:      Pulses:           Dorsalis pedis pulses are 2+ on the left side.   Musculoskeletal:        Feet:    Feet:      Comments: See picture in media  Neurological:      Mental Status: She is alert.           Procedures    Admission on 2024, Discharged on 2024   Component Date Value Ref Range Status    Glucose 2024 136 (H)  65 - 99 mg/dL Final    BUN 2024 27 (H)  8 - 23 " mg/dL Final    Creatinine 11/09/2024 1.23 (H)  0.57 - 1.00 mg/dL Final    Sodium 11/09/2024 142  136 - 145 mmol/L Final    Potassium 11/09/2024 4.1  3.5 - 5.2 mmol/L Final    Slight hemolysis detected by analyzer. Result may be falsely elevated.    Chloride 11/09/2024 107  98 - 107 mmol/L Final    CO2 11/09/2024 25.5  22.0 - 29.0 mmol/L Final    Calcium 11/09/2024 8.9  8.6 - 10.5 mg/dL Final    Total Protein 11/09/2024 7.2  6.0 - 8.5 g/dL Final    Albumin 11/09/2024 4.3  3.5 - 5.2 g/dL Final    ALT (SGPT) 11/09/2024 15  1 - 33 U/L Final    AST (SGOT) 11/09/2024 26  1 - 32 U/L Final    Slight hemolysis detected by analyzer. Result may be falsely elevated.    Alkaline Phosphatase 11/09/2024 57  39 - 117 U/L Final    Total Bilirubin 11/09/2024 1.1  0.0 - 1.2 mg/dL Final    Globulin 11/09/2024 2.9  gm/dL Final    A/G Ratio 11/09/2024 1.5  g/dL Final    BUN/Creatinine Ratio 11/09/2024 22.0  7.0 - 25.0 Final    Anion Gap 11/09/2024 9.5  5.0 - 15.0 mmol/L Final    eGFR 11/09/2024 44.8 (L)  >60.0 mL/min/1.73 Final    Extra Tube 11/09/2024 Hold for add-ons.   Final    Auto resulted.    Extra Tube 11/09/2024 hold for add-on   Final    Auto resulted    Extra Tube 11/09/2024 Hold for add-ons.   Final    Auto resulted.    Extra Tube 11/09/2024 Hold for add-ons.   Final    Auto resulted    WBC 11/09/2024 11.61 (H)  3.40 - 10.80 10*3/mm3 Final    RBC 11/09/2024 3.13 (L)  3.77 - 5.28 10*6/mm3 Final    Hemoglobin 11/09/2024 9.9 (L)  12.0 - 15.9 g/dL Final    Hematocrit 11/09/2024 32.4 (L)  34.0 - 46.6 % Final    MCV 11/09/2024 103.5 (H)  79.0 - 97.0 fL Final    MCH 11/09/2024 31.6  26.6 - 33.0 pg Final    MCHC 11/09/2024 30.6 (L)  31.5 - 35.7 g/dL Final    RDW 11/09/2024 18.9 (H)  12.3 - 15.4 % Final    RDW-SD 11/09/2024 71.9 (H)  37.0 - 54.0 fl Final    MPV 11/09/2024 11.2  6.0 - 12.0 fL Final    Platelets 11/09/2024 253  140 - 450 10*3/mm3 Final    Neutrophil % 11/09/2024 68.0  42.7 - 76.0 % Final    Lymphocyte % 11/09/2024 20.4   19.6 - 45.3 % Final    Monocyte % 11/09/2024 9.6  5.0 - 12.0 % Final    Eosinophil % 11/09/2024 1.2  0.3 - 6.2 % Final    Basophil % 11/09/2024 0.4  0.0 - 1.5 % Final    Immature Grans % 11/09/2024 0.4  0.0 - 0.5 % Final    Neutrophils, Absolute 11/09/2024 7.88 (H)  1.70 - 7.00 10*3/mm3 Final    Lymphocytes, Absolute 11/09/2024 2.37  0.70 - 3.10 10*3/mm3 Final    Monocytes, Absolute 11/09/2024 1.12 (H)  0.10 - 0.90 10*3/mm3 Final    Eosinophils, Absolute 11/09/2024 0.14  0.00 - 0.40 10*3/mm3 Final    Basophils, Absolute 11/09/2024 0.05  0.00 - 0.20 10*3/mm3 Final    Immature Grans, Absolute 11/09/2024 0.05  0.00 - 0.05 10*3/mm3 Final    nRBC 11/09/2024 0.0  0.0 - 0.2 /100 WBC Final       ASSESSMENT/ PLAN:    Diagnoses and all orders for this visit:    1. Contusion of left foot, initial encounter (Primary)  Assessment & Plan:  She feels that her foot is is better.  She still has a lot of swelling.  And a hematoma on the dorsum of her foot.  She really needs to work on elevation and some warm compresses.  Her x-rays were negative for any acute fracture.      2. Cellulitis of foot  Assessment & Plan:  I did not see her foot from initial.  But they do feel that it looks better.  She is not having any significant erythema or warmth but is .  She does appear to have a hematoma.  Instructed keep it elevated and warm compresses to help breakdown this hematoma.                 Orders Placed Today:     No orders of the defined types were placed in this encounter.       Management Plan:     An After Visit Summary was printed and given to the patient at discharge.    Follow-up: Return in about 2 weeks (around 12/5/2024) for Recheck.    Romario Valdez,  11/21/2024 11:10 EST  This note was electronically signed.

## 2024-11-21 NOTE — ASSESSMENT & PLAN NOTE
I did not see her foot from initial.  But they do feel that it looks better.  She is not having any significant erythema or warmth but is .  She does appear to have a hematoma.  Instructed keep it elevated and warm compresses to help breakdown this hematoma.

## 2024-11-21 NOTE — ASSESSMENT & PLAN NOTE
She feels that her foot is is better.  She still has a lot of swelling.  And a hematoma on the dorsum of her foot.  She really needs to work on elevation and some warm compresses.  Her x-rays were negative for any acute fracture.

## 2024-12-12 RX ORDER — BUPROPION HYDROCHLORIDE 300 MG/1
300 TABLET ORAL DAILY
Qty: 90 TABLET | Refills: 0 | Status: SHIPPED | OUTPATIENT
Start: 2024-12-12

## 2024-12-12 RX ORDER — DAPAGLIFLOZIN 10 MG/1
1 TABLET, FILM COATED ORAL DAILY
Qty: 90 TABLET | Refills: 0 | Status: SHIPPED | OUTPATIENT
Start: 2024-12-12

## 2024-12-16 ENCOUNTER — HOSPITAL ENCOUNTER (OUTPATIENT)
Dept: CARDIOLOGY | Facility: HOSPITAL | Age: 79
Discharge: HOME | End: 2024-12-16
Admitting: FAMILY MEDICINE
Payer: MEDICARE

## 2024-12-16 ENCOUNTER — OFFICE VISIT (OUTPATIENT)
Dept: FAMILY MEDICINE CLINIC | Facility: CLINIC | Age: 79
End: 2024-12-16
Payer: MEDICARE

## 2024-12-16 VITALS
SYSTOLIC BLOOD PRESSURE: 187 MMHG | HEIGHT: 64 IN | TEMPERATURE: 98.1 F | OXYGEN SATURATION: 97 % | DIASTOLIC BLOOD PRESSURE: 58 MMHG | BODY MASS INDEX: 30.73 KG/M2 | WEIGHT: 180 LBS | HEART RATE: 73 BPM

## 2024-12-16 DIAGNOSIS — L03.116 CELLULITIS OF LEFT LOWER EXTREMITY: ICD-10-CM

## 2024-12-16 DIAGNOSIS — L03.119 CELLULITIS OF FOOT: Primary | ICD-10-CM

## 2024-12-16 LAB
BH CV LOWER VASCULAR LEFT COMMON FEMORAL AUGMENT: NORMAL
BH CV LOWER VASCULAR LEFT COMMON FEMORAL COMPETENT: NORMAL
BH CV LOWER VASCULAR LEFT COMMON FEMORAL COMPRESS: NORMAL
BH CV LOWER VASCULAR LEFT COMMON FEMORAL PHASIC: NORMAL
BH CV LOWER VASCULAR LEFT COMMON FEMORAL SPONT: NORMAL
BH CV LOWER VASCULAR LEFT DISTAL FEMORAL COMPRESS: NORMAL
BH CV LOWER VASCULAR LEFT GASTRONEMIUS COMPRESS: NORMAL
BH CV LOWER VASCULAR LEFT GREATER SAPH AK COMPRESS: NORMAL
BH CV LOWER VASCULAR LEFT GREATER SAPH BK COMPRESS: NORMAL
BH CV LOWER VASCULAR LEFT LESSER SAPH COMPRESS: NORMAL
BH CV LOWER VASCULAR LEFT MID FEMORAL AUGMENT: NORMAL
BH CV LOWER VASCULAR LEFT MID FEMORAL COMPETENT: NORMAL
BH CV LOWER VASCULAR LEFT MID FEMORAL COMPRESS: NORMAL
BH CV LOWER VASCULAR LEFT MID FEMORAL PHASIC: NORMAL
BH CV LOWER VASCULAR LEFT MID FEMORAL SPONT: NORMAL
BH CV LOWER VASCULAR LEFT PERONEAL COMPRESS: NORMAL
BH CV LOWER VASCULAR LEFT POPLITEAL AUGMENT: NORMAL
BH CV LOWER VASCULAR LEFT POPLITEAL COMPETENT: NORMAL
BH CV LOWER VASCULAR LEFT POPLITEAL COMPRESS: NORMAL
BH CV LOWER VASCULAR LEFT POPLITEAL PHASIC: NORMAL
BH CV LOWER VASCULAR LEFT POPLITEAL SPONT: NORMAL
BH CV LOWER VASCULAR LEFT POSTERIOR TIBIAL COMPRESS: NORMAL
BH CV LOWER VASCULAR LEFT PROXIMAL FEMORAL COMPRESS: NORMAL
BH CV LOWER VASCULAR LEFT SAPHENOFEMORAL JUNCTION COMPRESS: NORMAL
BH CV LOWER VASCULAR RIGHT COMMON FEMORAL AUGMENT: NORMAL
BH CV LOWER VASCULAR RIGHT COMMON FEMORAL COMPETENT: NORMAL
BH CV LOWER VASCULAR RIGHT COMMON FEMORAL COMPRESS: NORMAL
BH CV LOWER VASCULAR RIGHT COMMON FEMORAL PHASIC: NORMAL
BH CV LOWER VASCULAR RIGHT COMMON FEMORAL SPONT: NORMAL

## 2024-12-16 PROCEDURE — 3078F DIAST BP <80 MM HG: CPT | Performed by: FAMILY MEDICINE

## 2024-12-16 PROCEDURE — 93971 EXTREMITY STUDY: CPT

## 2024-12-16 PROCEDURE — 99214 OFFICE O/P EST MOD 30 MIN: CPT | Performed by: FAMILY MEDICINE

## 2024-12-16 PROCEDURE — 3077F SYST BP >= 140 MM HG: CPT | Performed by: FAMILY MEDICINE

## 2024-12-16 PROCEDURE — 1126F AMNT PAIN NOTED NONE PRSNT: CPT | Performed by: FAMILY MEDICINE

## 2024-12-16 RX ORDER — ATORVASTATIN CALCIUM 40 MG/1
40 TABLET, FILM COATED ORAL DAILY
Qty: 90 TABLET | Refills: 3 | Status: SHIPPED | OUTPATIENT
Start: 2024-12-16

## 2024-12-16 RX ORDER — SULFAMETHOXAZOLE AND TRIMETHOPRIM 800; 160 MG/1; MG/1
1 TABLET ORAL 2 TIMES DAILY
Qty: 28 TABLET | Refills: 0 | Status: SHIPPED | OUTPATIENT
Start: 2024-12-16

## 2024-12-16 NOTE — ASSESSMENT & PLAN NOTE
She no longer has a lot of erythema or swelling of the left foot.  She still has this wound.  It may just take longer for it to heal.  At this period of time show just continue his simple dressing and will reevaluated in 2 weeks.

## 2024-12-16 NOTE — PROGRESS NOTES
Chief Complaint   Patient presents with    Follow-up     2 week / Contusion of left foot        Subjective     Ann-Marie Hughes  has a past medical history of Acne rosacea (08/17/2021), Acute respiratory failure with hypoxia (07/31/2023), Arteriosclerosis of abdominal aorta, B12 deficiency (08/17/2021), DJD (degenerative joint disease), smoking (08/17/2021), Hyperlipidemia (08/17/2021), Hypertension, Hypothyroidism, Metabolic syndrome (08/17/2021), NSTEMI (non-ST elevated myocardial infarction) (07/17/2023), Pericardial effusion (10/10/2023), KAREN (stress urinary incontinence, female) (08/17/2021), UTI (urinary tract infection) (08/17/2021), and Vitamin D deficiency (08/17/2021).    Cellulitis foot-she feels that the left foot is somewhat better.  Is no longer red.  She feels that the wound is somewhat smaller.  She has finished all of her antibiotics.    Swelling thigh-she has noticed an area up in her left distal medial thigh.  She noticed that yesterday it has become swollen red and tender.  She states is an area that she had saphenous vein harvested for bypass.  It is never really felt right but has never done this before.        PHQ-2 Depression Screening  Little interest or pleasure in doing things?     Feeling down, depressed, or hopeless?     PHQ-2 Total Score     PHQ-9 Depression Screening  Little interest or pleasure in doing things?     Feeling down, depressed, or hopeless?     Trouble falling or staying asleep, or sleeping too much?     Feeling tired or having little energy?     Poor appetite or overeating?     Feeling bad about yourself - or that you are a failure or have let yourself or your family down?     Trouble concentrating on things, such as reading the newspaper or watching television?     Moving or speaking so slowly that other people could have noticed? Or the opposite - being so fidgety or restless that you have been moving around a lot more than usual?     Thoughts that you would be  better off dead, or of hurting yourself in some way?     PHQ-9 Total Score     If you checked off any problems, how difficult have these problems made it for you to do your work, take care of things at home, or get along with other people?       Allergies   Allergen Reactions    Penicillins Palpitations     1. When was your reaction? > 10 years ago  2. Did your reaction happen after the first dose or after several doses? After first dose  3. Did your reaction require ED or hospital care to manage your reaction? Do not know  4. Did your reaction require treatment with epinephrine? Do not know  5. Have you taken amoxicillin (Amoxil) or amoxicillin-clavulanate (Augmentin) without issue since? Yes  6. Have you taken cephalexin (Keflex) without issue since?  Do not know        Prior to Admission medications    Medication Sig Start Date End Date Taking? Authorizing Provider   acetaminophen (TYLENOL) 325 MG tablet Take 2 tablets by mouth Every 4 (Four) Hours As Needed for Mild Pain. 7/28/23  Yes Anjelica Chase APRN   albuterol sulfate  (90 Base) MCG/ACT inhaler Inhale 2 puffs Every 4 (Four) Hours As Needed for Wheezing. 5/30/23  Yes Les Moreno, DO   apixaban (Eliquis) 5 MG tablet tablet Take 1 tablet by mouth Every 12 (Twelve) Hours. 12/12/24  Yes Sara Lubin APRN   aspirin 81 MG EC tablet Take 1 tablet by mouth Daily.   Yes ProviderKanwal MD   atorvastatin (LIPITOR) 40 MG tablet Take 1 tablet by mouth Daily. 11/7/23  Yes Valerie Valera MD   buPROPion XL (WELLBUTRIN XL) 300 MG 24 hr tablet TAKE 1 TABLET BY MOUTH EVERY DAY 12/12/24  Yes Romario Valdez, DO   CRANBERRY EXTRACT PO Take 1 tablet by mouth Daily.   Yes ProviderKanwal MD   dapagliflozin Propanediol (Farxiga) 10 MG tablet Take 10 mg by mouth Daily. 12/12/24  Yes Sara Lubin APRN   digoxin (LANOXIN) 125 MCG tablet Take 1 tablet by mouth Daily. Omit on Tuesdays, Thursdays and Saturdays 7/16/24  Yes Valerie Valera  MD   folic acid (FOLVITE) 1 MG tablet Take 1 tablet by mouth Daily. 9/18/24  Yes Manny Soto PA-C   furosemide (LASIX) 40 MG tablet Take 1 tablet by mouth Every Other Day. 10/8/24  Yes Sara Lubin APRN   glucose blood test strip Check blood sugar once daily and record. 4/29/24  Yes Romario Valdez DO   Hearing Aid Device device Use 1 Device Daily. 10/31/24  Yes Romario Valdez DO   metFORMIN ER (GLUCOPHAGE-XR) 500 MG 24 hr tablet TAKE 1 TABLET BY MOUTH TWICE DAILY 11/19/24  Yes Romario Valdez DO   metoprolol succinate XL (TOPROL-XL) 50 MG 24 hr tablet Take 1 tablet by mouth 2 (Two) Times a Day.   Yes Kanwal Orozco MD   montelukast (SINGULAIR) 10 MG tablet Take 1 tablet by mouth Daily. 7/31/24  Yes Romario Valdez DO   predniSONE (DELTASONE) 5 MG tablet TAKE 5 MG EVERY DAY 9/18/24  Yes Manny Soto PA-C   Prenatal Vit-Fe Fumarate-FA (prenatal vitamin 28-0.8) 28-0.8 MG tablet tablet Take 1 tablet by mouth Every Other Day. 9/18/24  Yes Manny Soto PA-C   Probiotic Product (judge.me PO) Take 1 capsule by mouth Daily.   Yes ProviderKanwal MD   sacubitril-valsartan (Entresto) 24-26 MG tablet Take 1 tablet by mouth 2 (Two) Times a Day. 2/19/24  Yes Valerie Valera MD   spironolactone (ALDACTONE) 25 MG tablet TAKE 1 TABLET BY MOUTH EVERY DAY 7/8/24  Yes Valerie Valera MD   Synthroid 75 MCG tablet Take 1 tablet by mouth Daily. 7/31/24  Yes Romario Valdez DO   vitamin B-12 (CYANOCOBALAMIN) 1000 MCG tablet Take 1 tablet by mouth Daily. 9/18/24  Yes Manny Soto PA-C        Patient Active Problem List   Diagnosis    Arthritis    Chronic pain syndrome    Diabetes mellitus, type II    Gastric reflux    Herniated disc    Primary hypertension    Hypothyroidism    Pain in joint, multiple sites    Hyperlipidemia    B12 deficiency    Vitamin D deficiency    Acne rosacea    Hx of smoking    Stress incontinence of urine    Arteriosclerosis of  abdominal aorta    Iron deficiency anemia secondary to inadequate dietary iron intake    Seasonal allergies    Hyponatremia    Leg length discrepancy    Reactive depression    Encounter for subsequent annual wellness visit (AWV) in Medicare patient    Class 1 obesity with alveolar hypoventilation, serious comorbidity, and body mass index (BMI) of 32.0 to 32.9 in adult    Chronic HFrEF (heart failure with reduced ejection fraction)    CAD status post CABG x3 vessels    PAF (paroxysmal atrial fibrillation)    Encounter for medical examination to establish care    Autoimmune hemolytic anemia    Need for RSV vaccination    Stage 3b chronic kidney disease    Large granular lymphocyte disorder    Cervicogenic headache    Aortic stenosis, mild    Contusion of left foot    Cellulitis of foot    Cellulitis of left lower extremity        Past Surgical History:   Procedure Laterality Date    BACK SURGERY  2013    CARDIAC CATHETERIZATION N/A 07/17/2023    Procedure: Left Heart Cath;  Surgeon: Dinh Andres MD;  Location: [facility] CATH INVASIVE LOCATION;  Service: Cardiovascular;  Laterality: N/A;    CARPAL TUNNEL RELEASE      COLONOSCOPY  2011    CORONARY ARTERY BYPASS GRAFT WITH MITRAL VALVE REPAIR/REPLACEMENT N/A 07/20/2023    Procedure: REZA STERNOTOMY OFF-PUMP CORONARY ARTERY BYPASS GRAFT TIMES        USING LEFFT INTERNAL MAMMARY ARTERY AND     GREATER SAPHENOUS VEIN GRAFT PER EMDOSCOPIC VEIN HARVESTING, MAZE PROCEDURE AND PRP;  Surgeon: Shane Alexander MD;  Location: [facility];  Service: Cardiothoracic;  Laterality: N/A;    KNEE SURGERY      LUMBAR DISCECTOMY      NECK SURGERY      Cervical    POSTERIOR LUMBAR/THORACIC SPINE FUSION         Social History     Socioeconomic History    Marital status:      Spouse name: Dinh   Tobacco Use    Smoking status: Former     Current packs/day: 0.00     Average packs/day: 1 pack/day for 12.0 years (12.0 ttl pk-yrs)     Types: Cigarettes     Start date: 1964      "Quit date:      Years since quittin.9    Smokeless tobacco: Never   Vaping Use    Vaping status: Never Used   Substance and Sexual Activity    Alcohol use: Never    Drug use: Never    Sexual activity: Defer       Family History   Problem Relation Age of Onset    Heart disease Mother     Arthritis Mother     Heart attack Mother     Arthritis Father        Family history, surgical history, past medical history, Allergies and meds reviewed with patient today and updated in [facility] EMR.     ROS:  Review of Systems   Constitutional:  Positive for chills. Negative for fever.   Cardiovascular:  Positive for leg swelling.   Skin:  Positive for wound.       OBJECTIVE:  Vitals:    24 0818   BP: (!) 187/58   BP Location: Left arm   Patient Position: Sitting   Pulse: 73   Temp: 98.1 °F (36.7 °C)   SpO2: 97%   Weight: 81.6 kg (180 lb)   Height: 162.6 cm (64\")     No results found.   Body mass index is 30.9 kg/m².  No LMP recorded (lmp unknown). Patient is postmenopausal.    The ASCVD Risk score (Manuel DK, et al., 2019) failed to calculate for the following reasons:    The 2019 ASCVD risk score is only valid for ages 40 to 79    Risk score cannot be calculated because patient has a medical history suggesting prior/existing ASCVD     Physical Exam  Vitals and nursing note reviewed.   Constitutional:       General: She is not in acute distress.     Appearance: Normal appearance. She is obese.   HENT:      Head: Normocephalic.   Musculoskeletal:        Legs:    Skin:         Neurological:      Mental Status: She is alert.           Procedures    No visits with results within 30 Day(s) from this visit.   Latest known visit with results is:   Admission on 2024, Discharged on 2024   Component Date Value Ref Range Status    Glucose 2024 136 (H)  65 - 99 mg/dL Final    BUN 2024 27 (H)  8 - 23 mg/dL Final    Creatinine 2024 1.23 (H)  0.57 - 1.00 mg/dL Final    Sodium 2024 142  136 - 145 " mmol/L Final    Potassium 11/09/2024 4.1  3.5 - 5.2 mmol/L Final    Slight hemolysis detected by analyzer. Result may be falsely elevated.    Chloride 11/09/2024 107  98 - 107 mmol/L Final    CO2 11/09/2024 25.5  22.0 - 29.0 mmol/L Final    Calcium 11/09/2024 8.9  8.6 - 10.5 mg/dL Final    Total Protein 11/09/2024 7.2  6.0 - 8.5 g/dL Final    Albumin 11/09/2024 4.3  3.5 - 5.2 g/dL Final    ALT (SGPT) 11/09/2024 15  1 - 33 U/L Final    AST (SGOT) 11/09/2024 26  1 - 32 U/L Final    Slight hemolysis detected by analyzer. Result may be falsely elevated.    Alkaline Phosphatase 11/09/2024 57  39 - 117 U/L Final    Total Bilirubin 11/09/2024 1.1  0.0 - 1.2 mg/dL Final    Globulin 11/09/2024 2.9  gm/dL Final    A/G Ratio 11/09/2024 1.5  g/dL Final    BUN/Creatinine Ratio 11/09/2024 22.0  7.0 - 25.0 Final    Anion Gap 11/09/2024 9.5  5.0 - 15.0 mmol/L Final    eGFR 11/09/2024 44.8 (L)  >60.0 mL/min/1.73 Final    Extra Tube 11/09/2024 Hold for add-ons.   Final    Auto resulted.    Extra Tube 11/09/2024 hold for add-on   Final    Auto resulted    Extra Tube 11/09/2024 Hold for add-ons.   Final    Auto resulted.    Extra Tube 11/09/2024 Hold for add-ons.   Final    Auto resulted    WBC 11/09/2024 11.61 (H)  3.40 - 10.80 10*3/mm3 Final    RBC 11/09/2024 3.13 (L)  3.77 - 5.28 10*6/mm3 Final    Hemoglobin 11/09/2024 9.9 (L)  12.0 - 15.9 g/dL Final    Hematocrit 11/09/2024 32.4 (L)  34.0 - 46.6 % Final    MCV 11/09/2024 103.5 (H)  79.0 - 97.0 fL Final    MCH 11/09/2024 31.6  26.6 - 33.0 pg Final    MCHC 11/09/2024 30.6 (L)  31.5 - 35.7 g/dL Final    RDW 11/09/2024 18.9 (H)  12.3 - 15.4 % Final    RDW-SD 11/09/2024 71.9 (H)  37.0 - 54.0 fl Final    MPV 11/09/2024 11.2  6.0 - 12.0 fL Final    Platelets 11/09/2024 253  140 - 450 10*3/mm3 Final    Neutrophil % 11/09/2024 68.0  42.7 - 76.0 % Final    Lymphocyte % 11/09/2024 20.4  19.6 - 45.3 % Final    Monocyte % 11/09/2024 9.6  5.0 - 12.0 % Final    Eosinophil % 11/09/2024 1.2  0.3 -  6.2 % Final    Basophil % 11/09/2024 0.4  0.0 - 1.5 % Final    Immature Grans % 11/09/2024 0.4  0.0 - 0.5 % Final    Neutrophils, Absolute 11/09/2024 7.88 (H)  1.70 - 7.00 10*3/mm3 Final    Lymphocytes, Absolute 11/09/2024 2.37  0.70 - 3.10 10*3/mm3 Final    Monocytes, Absolute 11/09/2024 1.12 (H)  0.10 - 0.90 10*3/mm3 Final    Eosinophils, Absolute 11/09/2024 0.14  0.00 - 0.40 10*3/mm3 Final    Basophils, Absolute 11/09/2024 0.05  0.00 - 0.20 10*3/mm3 Final    Immature Grans, Absolute 11/09/2024 0.05  0.00 - 0.05 10*3/mm3 Final    nRBC 11/09/2024 0.0  0.0 - 0.2 /100 WBC Final       ASSESSMENT/ PLAN:    Diagnoses and all orders for this visit:    1. Cellulitis of foot (Primary)  Assessment & Plan:  She no longer has a lot of erythema or swelling of the left foot.  She still has this wound.  It may just take longer for it to heal.  At this period of time show just continue his simple dressing and will reevaluated in 2 weeks.      2. Cellulitis of left lower extremity  Assessment & Plan:  She has a lot of induration and tenderness and warmth in this area.  This could just be a thrombophlebitis.  We cannot do anti-inflammatories because of the Eliquis.  Will give her a course of antibiotics and check a venous duplex to rule out a clot.    Orders:  -     Duplex Venous Lower Extremity - Left CAR; Future    Other orders  -     sulfamethoxazole-trimethoprim (Bactrim DS) 800-160 MG per tablet; Take 1 tablet by mouth 2 (Two) Times a Day.  Dispense: 28 tablet; Refill: 0               Orders Placed Today:     New Medications Ordered This Visit   Medications    sulfamethoxazole-trimethoprim (Bactrim DS) 800-160 MG per tablet     Sig: Take 1 tablet by mouth 2 (Two) Times a Day.     Dispense:  28 tablet     Refill:  0        Management Plan:     An After Visit Summary was printed and given to the patient at discharge.    Follow-up: Return in about 2 weeks (around 12/30/2024) for Recheck.    Romario Valdez, DO 12/16/2024  08:45 EST  This note was electronically signed.

## 2024-12-16 NOTE — ASSESSMENT & PLAN NOTE
She has a lot of induration and tenderness and warmth in this area.  This could just be a thrombophlebitis.  We cannot do anti-inflammatories because of the Eliquis.  Will give her a course of antibiotics and check a venous duplex to rule out a clot.

## 2024-12-24 ENCOUNTER — ANESTHESIA (OUTPATIENT)
Dept: PERIOP | Facility: HOSPITAL | Age: 79
End: 2024-12-24
Payer: MEDICARE

## 2024-12-24 ENCOUNTER — APPOINTMENT (OUTPATIENT)
Dept: CT IMAGING | Facility: HOSPITAL | Age: 79
End: 2024-12-24
Payer: MEDICARE

## 2024-12-24 ENCOUNTER — APPOINTMENT (OUTPATIENT)
Dept: GENERAL RADIOLOGY | Facility: HOSPITAL | Age: 79
End: 2024-12-24
Payer: MEDICARE

## 2024-12-24 ENCOUNTER — HOSPITAL ENCOUNTER (INPATIENT)
Facility: HOSPITAL | Age: 79
LOS: 3 days | Discharge: HOME HEALTH | End: 2024-12-27
Attending: EMERGENCY MEDICINE | Admitting: INTERNAL MEDICINE
Payer: MEDICARE

## 2024-12-24 ENCOUNTER — ANESTHESIA EVENT (OUTPATIENT)
Dept: PERIOP | Facility: HOSPITAL | Age: 79
End: 2024-12-24
Payer: MEDICARE

## 2024-12-24 DIAGNOSIS — L02.416 ABSCESS OF LEFT LEG: ICD-10-CM

## 2024-12-24 DIAGNOSIS — L02.416 ABSCESS OF LEFT LOWER EXTREMITY: Primary | ICD-10-CM

## 2024-12-24 DIAGNOSIS — R26.2 DIFFICULTY WALKING: ICD-10-CM

## 2024-12-24 PROBLEM — L03.90 WOUND CELLULITIS: Status: ACTIVE | Noted: 2024-12-24

## 2024-12-24 LAB
ALBUMIN SERPL-MCNC: 3.7 G/DL (ref 3.5–5.2)
ALBUMIN/GLOB SERPL: 1.1 G/DL
ALP SERPL-CCNC: 49 U/L (ref 39–117)
ALT SERPL W P-5'-P-CCNC: 8 U/L (ref 1–33)
ANION GAP SERPL CALCULATED.3IONS-SCNC: 15.2 MMOL/L (ref 5–15)
AST SERPL-CCNC: 19 U/L (ref 1–32)
BASOPHILS # BLD AUTO: 0.07 10*3/MM3 (ref 0–0.2)
BASOPHILS NFR BLD AUTO: 0.4 % (ref 0–1.5)
BILIRUB SERPL-MCNC: 0.4 MG/DL (ref 0–1.2)
BUN SERPL-MCNC: 36 MG/DL (ref 8–23)
BUN/CREAT SERPL: 22.8 (ref 7–25)
CALCIUM SPEC-SCNC: 8.9 MG/DL (ref 8.6–10.5)
CHLORIDE SERPL-SCNC: 103 MMOL/L (ref 98–107)
CO2 SERPL-SCNC: 20.8 MMOL/L (ref 22–29)
CREAT SERPL-MCNC: 1.58 MG/DL (ref 0.57–1)
CRP SERPL-MCNC: 10.05 MG/DL (ref 0–0.5)
D-LACTATE SERPL-SCNC: 1.8 MMOL/L (ref 0.5–2)
DEPRECATED RDW RBC AUTO: 69.3 FL (ref 37–54)
EGFRCR SERPLBLD CKD-EPI 2021: 33 ML/MIN/1.73
EOSINOPHIL # BLD AUTO: 0.09 10*3/MM3 (ref 0–0.4)
EOSINOPHIL NFR BLD AUTO: 0.6 % (ref 0.3–6.2)
ERYTHROCYTE [DISTWIDTH] IN BLOOD BY AUTOMATED COUNT: 19.4 % (ref 12.3–15.4)
ERYTHROCYTE [SEDIMENTATION RATE] IN BLOOD: 52 MM/HR (ref 0–30)
GLOBULIN UR ELPH-MCNC: 3.4 GM/DL
GLUCOSE SERPL-MCNC: 123 MG/DL (ref 65–99)
HCT VFR BLD AUTO: 27.7 % (ref 34–46.6)
HGB BLD-MCNC: 8.7 G/DL (ref 12–15.9)
HOLD SPECIMEN: NORMAL
IMM GRANULOCYTES # BLD AUTO: 0.11 10*3/MM3 (ref 0–0.05)
IMM GRANULOCYTES NFR BLD AUTO: 0.7 % (ref 0–0.5)
INR PPP: 1.26 (ref 0.86–1.15)
LYMPHOCYTES # BLD AUTO: 1.75 10*3/MM3 (ref 0.7–3.1)
LYMPHOCYTES NFR BLD AUTO: 11.2 % (ref 19.6–45.3)
MCH RBC QN AUTO: 30.6 PG (ref 26.6–33)
MCHC RBC AUTO-ENTMCNC: 31.4 G/DL (ref 31.5–35.7)
MCV RBC AUTO: 97.5 FL (ref 79–97)
MONOCYTES # BLD AUTO: 1.33 10*3/MM3 (ref 0.1–0.9)
MONOCYTES NFR BLD AUTO: 8.5 % (ref 5–12)
NEUTROPHILS NFR BLD AUTO: 12.22 10*3/MM3 (ref 1.7–7)
NEUTROPHILS NFR BLD AUTO: 78.6 % (ref 42.7–76)
NRBC BLD AUTO-RTO: 0.1 /100 WBC (ref 0–0.2)
PLATELET # BLD AUTO: 309 10*3/MM3 (ref 140–450)
PMV BLD AUTO: 11.6 FL (ref 6–12)
POTASSIUM SERPL-SCNC: 4.3 MMOL/L (ref 3.5–5.2)
PROT SERPL-MCNC: 7.1 G/DL (ref 6–8.5)
PROTHROMBIN TIME: 16.1 SECONDS (ref 11.8–14.9)
RBC # BLD AUTO: 2.84 10*6/MM3 (ref 3.77–5.28)
SODIUM SERPL-SCNC: 139 MMOL/L (ref 136–145)
WBC NRBC COR # BLD AUTO: 15.57 10*3/MM3 (ref 3.4–10.8)
WHOLE BLOOD HOLD COAG: NORMAL

## 2024-12-24 PROCEDURE — 80053 COMPREHEN METABOLIC PANEL: CPT | Performed by: EMERGENCY MEDICINE

## 2024-12-24 PROCEDURE — 87076 CULTURE ANAEROBE IDENT EACH: CPT | Performed by: STUDENT IN AN ORGANIZED HEALTH CARE EDUCATION/TRAINING PROGRAM

## 2024-12-24 PROCEDURE — 99285 EMERGENCY DEPT VISIT HI MDM: CPT

## 2024-12-24 PROCEDURE — 73630 X-RAY EXAM OF FOOT: CPT

## 2024-12-24 PROCEDURE — 25010000002 CEFEPIME PER 500 MG: Performed by: STUDENT IN AN ORGANIZED HEALTH CARE EDUCATION/TRAINING PROGRAM

## 2024-12-24 PROCEDURE — 25010000002 CEFEPIME PER 500 MG: Performed by: EMERGENCY MEDICINE

## 2024-12-24 PROCEDURE — 25010000002 DEXAMETHASONE PER 1 MG

## 2024-12-24 PROCEDURE — 25510000001 IOPAMIDOL PER 1 ML: Performed by: EMERGENCY MEDICINE

## 2024-12-24 PROCEDURE — 94761 N-INVAS EAR/PLS OXIMETRY MLT: CPT

## 2024-12-24 PROCEDURE — 87040 BLOOD CULTURE FOR BACTERIA: CPT | Performed by: EMERGENCY MEDICINE

## 2024-12-24 PROCEDURE — 36415 COLL VENOUS BLD VENIPUNCTURE: CPT | Performed by: EMERGENCY MEDICINE

## 2024-12-24 PROCEDURE — 73701 CT LOWER EXTREMITY W/DYE: CPT

## 2024-12-24 PROCEDURE — 87077 CULTURE AEROBIC IDENTIFY: CPT | Performed by: EMERGENCY MEDICINE

## 2024-12-24 PROCEDURE — 83605 ASSAY OF LACTIC ACID: CPT | Performed by: EMERGENCY MEDICINE

## 2024-12-24 PROCEDURE — 25010000002 LIDOCAINE PF 2% 2 % SOLUTION

## 2024-12-24 PROCEDURE — 85025 COMPLETE CBC W/AUTO DIFF WBC: CPT | Performed by: EMERGENCY MEDICINE

## 2024-12-24 PROCEDURE — 87205 SMEAR GRAM STAIN: CPT | Performed by: STUDENT IN AN ORGANIZED HEALTH CARE EDUCATION/TRAINING PROGRAM

## 2024-12-24 PROCEDURE — 85652 RBC SED RATE AUTOMATED: CPT | Performed by: EMERGENCY MEDICINE

## 2024-12-24 PROCEDURE — 25010000002 VANCOMYCIN 5 G RECONSTITUTED SOLUTION: Performed by: EMERGENCY MEDICINE

## 2024-12-24 PROCEDURE — 25810000003 LACTATED RINGERS PER 1000 ML

## 2024-12-24 PROCEDURE — 25010000002 PROPOFOL 10 MG/ML EMULSION

## 2024-12-24 PROCEDURE — 25010000002 GLYCOPYRROLATE 0.2 MG/ML SOLUTION

## 2024-12-24 PROCEDURE — 85610 PROTHROMBIN TIME: CPT | Performed by: INTERNAL MEDICINE

## 2024-12-24 PROCEDURE — 25010000002 MORPHINE PER 10 MG

## 2024-12-24 PROCEDURE — 87075 CULTR BACTERIA EXCEPT BLOOD: CPT | Performed by: STUDENT IN AN ORGANIZED HEALTH CARE EDUCATION/TRAINING PROGRAM

## 2024-12-24 PROCEDURE — 87015 SPECIMEN INFECT AGNT CONCNTJ: CPT | Performed by: STUDENT IN AN ORGANIZED HEALTH CARE EDUCATION/TRAINING PROGRAM

## 2024-12-24 PROCEDURE — 25810000003 SODIUM CHLORIDE 0.9 % SOLUTION: Performed by: EMERGENCY MEDICINE

## 2024-12-24 PROCEDURE — 87070 CULTURE OTHR SPECIMN AEROBIC: CPT | Performed by: EMERGENCY MEDICINE

## 2024-12-24 PROCEDURE — 0JBR0ZZ EXCISION OF LEFT FOOT SUBCUTANEOUS TISSUE AND FASCIA, OPEN APPROACH: ICD-10-PCS | Performed by: STUDENT IN AN ORGANIZED HEALTH CARE EDUCATION/TRAINING PROGRAM

## 2024-12-24 PROCEDURE — 94799 UNLISTED PULMONARY SVC/PX: CPT

## 2024-12-24 PROCEDURE — 87102 FUNGUS ISOLATION CULTURE: CPT | Performed by: STUDENT IN AN ORGANIZED HEALTH CARE EDUCATION/TRAINING PROGRAM

## 2024-12-24 PROCEDURE — 87206 SMEAR FLUORESCENT/ACID STAI: CPT | Performed by: STUDENT IN AN ORGANIZED HEALTH CARE EDUCATION/TRAINING PROGRAM

## 2024-12-24 PROCEDURE — 99223 1ST HOSP IP/OBS HIGH 75: CPT | Performed by: INTERNAL MEDICINE

## 2024-12-24 PROCEDURE — 25010000002 FENTANYL CITRATE (PF) 50 MCG/ML SOLUTION

## 2024-12-24 PROCEDURE — 87070 CULTURE OTHR SPECIMN AEROBIC: CPT | Performed by: STUDENT IN AN ORGANIZED HEALTH CARE EDUCATION/TRAINING PROGRAM

## 2024-12-24 PROCEDURE — 82948 REAGENT STRIP/BLOOD GLUCOSE: CPT

## 2024-12-24 PROCEDURE — 25010000002 ONDANSETRON PER 1 MG

## 2024-12-24 PROCEDURE — 87116 MYCOBACTERIA CULTURE: CPT | Performed by: STUDENT IN AN ORGANIZED HEALTH CARE EDUCATION/TRAINING PROGRAM

## 2024-12-24 PROCEDURE — 86140 C-REACTIVE PROTEIN: CPT | Performed by: EMERGENCY MEDICINE

## 2024-12-24 PROCEDURE — 25010000002 LIDOCAINE 1 % SOLUTION: Performed by: EMERGENCY MEDICINE

## 2024-12-24 PROCEDURE — 87205 SMEAR GRAM STAIN: CPT | Performed by: EMERGENCY MEDICINE

## 2024-12-24 PROCEDURE — 99291 CRITICAL CARE FIRST HOUR: CPT

## 2024-12-24 RX ORDER — PROMETHAZINE HYDROCHLORIDE 12.5 MG/1
25 TABLET ORAL ONCE AS NEEDED
Status: DISCONTINUED | OUTPATIENT
Start: 2024-12-24 | End: 2024-12-24 | Stop reason: HOSPADM

## 2024-12-24 RX ORDER — DEXAMETHASONE SODIUM PHOSPHATE 4 MG/ML
INJECTION, SOLUTION INTRA-ARTICULAR; INTRALESIONAL; INTRAMUSCULAR; INTRAVENOUS; SOFT TISSUE AS NEEDED
Status: DISCONTINUED | OUTPATIENT
Start: 2024-12-24 | End: 2024-12-24 | Stop reason: SURG

## 2024-12-24 RX ORDER — SODIUM CHLORIDE, SODIUM LACTATE, POTASSIUM CHLORIDE, CALCIUM CHLORIDE 600; 310; 30; 20 MG/100ML; MG/100ML; MG/100ML; MG/100ML
INJECTION, SOLUTION INTRAVENOUS CONTINUOUS PRN
Status: DISCONTINUED | OUTPATIENT
Start: 2024-12-24 | End: 2024-12-24 | Stop reason: SURG

## 2024-12-24 RX ORDER — POLYETHYLENE GLYCOL 3350 17 G/17G
17 POWDER, FOR SOLUTION ORAL DAILY PRN
Status: DISCONTINUED | OUTPATIENT
Start: 2024-12-24 | End: 2024-12-27 | Stop reason: HOSPADM

## 2024-12-24 RX ORDER — AMOXICILLIN 250 MG
2 CAPSULE ORAL 2 TIMES DAILY PRN
Status: DISCONTINUED | OUTPATIENT
Start: 2024-12-24 | End: 2024-12-27 | Stop reason: HOSPADM

## 2024-12-24 RX ORDER — LIDOCAINE HYDROCHLORIDE 10 MG/ML
10 INJECTION, SOLUTION INFILTRATION; PERINEURAL ONCE
Status: COMPLETED | OUTPATIENT
Start: 2024-12-24 | End: 2024-12-24

## 2024-12-24 RX ORDER — SODIUM CHLORIDE 0.9 % (FLUSH) 0.9 %
10 SYRINGE (ML) INJECTION EVERY 12 HOURS SCHEDULED
Status: DISCONTINUED | OUTPATIENT
Start: 2024-12-24 | End: 2024-12-27 | Stop reason: HOSPADM

## 2024-12-24 RX ORDER — BISACODYL 10 MG
10 SUPPOSITORY, RECTAL RECTAL DAILY PRN
Status: DISCONTINUED | OUTPATIENT
Start: 2024-12-24 | End: 2024-12-27 | Stop reason: HOSPADM

## 2024-12-24 RX ORDER — SODIUM CHLORIDE 9 MG/ML
40 INJECTION, SOLUTION INTRAVENOUS AS NEEDED
Status: DISCONTINUED | OUTPATIENT
Start: 2024-12-24 | End: 2024-12-27 | Stop reason: HOSPADM

## 2024-12-24 RX ORDER — MORPHINE SULFATE 2 MG/ML
2 INJECTION, SOLUTION INTRAMUSCULAR; INTRAVENOUS ONCE
Status: COMPLETED | OUTPATIENT
Start: 2024-12-24 | End: 2024-12-24

## 2024-12-24 RX ORDER — LIDOCAINE HYDROCHLORIDE 20 MG/ML
INJECTION, SOLUTION EPIDURAL; INFILTRATION; INTRACAUDAL; PERINEURAL AS NEEDED
Status: DISCONTINUED | OUTPATIENT
Start: 2024-12-24 | End: 2024-12-24 | Stop reason: SURG

## 2024-12-24 RX ORDER — ASPIRIN 81 MG/1
81 TABLET ORAL DAILY
Status: DISCONTINUED | OUTPATIENT
Start: 2024-12-25 | End: 2024-12-27 | Stop reason: HOSPADM

## 2024-12-24 RX ORDER — OXYCODONE HYDROCHLORIDE 5 MG/1
5 TABLET ORAL
Status: DISCONTINUED | OUTPATIENT
Start: 2024-12-24 | End: 2024-12-24 | Stop reason: HOSPADM

## 2024-12-24 RX ORDER — EPHEDRINE SULFATE 50 MG/ML
INJECTION INTRAVENOUS AS NEEDED
Status: DISCONTINUED | OUTPATIENT
Start: 2024-12-24 | End: 2024-12-24 | Stop reason: SURG

## 2024-12-24 RX ORDER — IOPAMIDOL 755 MG/ML
100 INJECTION, SOLUTION INTRAVASCULAR
Status: COMPLETED | OUTPATIENT
Start: 2024-12-24 | End: 2024-12-24

## 2024-12-24 RX ORDER — ONDANSETRON 2 MG/ML
INJECTION INTRAMUSCULAR; INTRAVENOUS AS NEEDED
Status: DISCONTINUED | OUTPATIENT
Start: 2024-12-24 | End: 2024-12-24 | Stop reason: SURG

## 2024-12-24 RX ORDER — GLYCOPYRROLATE 0.2 MG/ML
INJECTION INTRAMUSCULAR; INTRAVENOUS AS NEEDED
Status: DISCONTINUED | OUTPATIENT
Start: 2024-12-24 | End: 2024-12-24 | Stop reason: SURG

## 2024-12-24 RX ORDER — MULTIPLE VITAMINS W/ MINERALS TAB 9MG-400MCG
1 TAB ORAL DAILY
Status: DISCONTINUED | OUTPATIENT
Start: 2024-12-24 | End: 2024-12-27

## 2024-12-24 RX ORDER — FENTANYL CITRATE 50 UG/ML
INJECTION, SOLUTION INTRAMUSCULAR; INTRAVENOUS AS NEEDED
Status: DISCONTINUED | OUTPATIENT
Start: 2024-12-24 | End: 2024-12-24 | Stop reason: SURG

## 2024-12-24 RX ORDER — PROPOFOL 10 MG/ML
VIAL (ML) INTRAVENOUS AS NEEDED
Status: DISCONTINUED | OUTPATIENT
Start: 2024-12-24 | End: 2024-12-24 | Stop reason: SURG

## 2024-12-24 RX ORDER — BISACODYL 5 MG/1
5 TABLET, DELAYED RELEASE ORAL DAILY PRN
Status: DISCONTINUED | OUTPATIENT
Start: 2024-12-24 | End: 2024-12-27 | Stop reason: HOSPADM

## 2024-12-24 RX ORDER — ONDANSETRON 2 MG/ML
4 INJECTION INTRAMUSCULAR; INTRAVENOUS ONCE AS NEEDED
Status: DISCONTINUED | OUTPATIENT
Start: 2024-12-24 | End: 2024-12-24 | Stop reason: HOSPADM

## 2024-12-24 RX ORDER — PROMETHAZINE HYDROCHLORIDE 25 MG/1
25 SUPPOSITORY RECTAL ONCE AS NEEDED
Status: DISCONTINUED | OUTPATIENT
Start: 2024-12-24 | End: 2024-12-24 | Stop reason: HOSPADM

## 2024-12-24 RX ORDER — SODIUM CHLORIDE 0.9 % (FLUSH) 0.9 %
10 SYRINGE (ML) INJECTION AS NEEDED
Status: DISCONTINUED | OUTPATIENT
Start: 2024-12-24 | End: 2024-12-27 | Stop reason: HOSPADM

## 2024-12-24 RX ORDER — MORPHINE SULFATE 2 MG/ML
INJECTION, SOLUTION INTRAMUSCULAR; INTRAVENOUS
Status: COMPLETED
Start: 2024-12-24 | End: 2024-12-24

## 2024-12-24 RX ORDER — MAGNESIUM HYDROXIDE 1200 MG/15ML
LIQUID ORAL AS NEEDED
Status: DISCONTINUED | OUTPATIENT
Start: 2024-12-24 | End: 2024-12-24 | Stop reason: HOSPADM

## 2024-12-24 RX ORDER — ONDANSETRON 2 MG/ML
4 INJECTION INTRAMUSCULAR; INTRAVENOUS EVERY 6 HOURS PRN
Status: DISCONTINUED | OUTPATIENT
Start: 2024-12-24 | End: 2024-12-27 | Stop reason: HOSPADM

## 2024-12-24 RX ORDER — FAMOTIDINE 20 MG/1
40 TABLET, FILM COATED ORAL DAILY
Status: DISCONTINUED | OUTPATIENT
Start: 2024-12-24 | End: 2024-12-27 | Stop reason: HOSPADM

## 2024-12-24 RX ORDER — DIGOXIN 125 MCG
125 TABLET ORAL EVERY OTHER DAY
COMMUNITY

## 2024-12-24 RX ADMIN — SODIUM CHLORIDE, POTASSIUM CHLORIDE, SODIUM LACTATE AND CALCIUM CHLORIDE: 600; 310; 30; 20 INJECTION, SOLUTION INTRAVENOUS at 15:04

## 2024-12-24 RX ADMIN — EPHEDRINE SULFATE 10 MG: 50 INJECTION INTRAVENOUS at 15:25

## 2024-12-24 RX ADMIN — FENTANYL CITRATE 25 MCG: 50 INJECTION, SOLUTION INTRAMUSCULAR; INTRAVENOUS at 15:18

## 2024-12-24 RX ADMIN — MORPHINE SULFATE 2 MG: 2 INJECTION, SOLUTION INTRAMUSCULAR; INTRAVENOUS at 11:11

## 2024-12-24 RX ADMIN — PROPOFOL 80 MG: 10 INJECTION, EMULSION INTRAVENOUS at 15:12

## 2024-12-24 RX ADMIN — Medication 1 EACH: at 21:23

## 2024-12-24 RX ADMIN — SODIUM CHLORIDE 500 ML: 9 INJECTION, SOLUTION INTRAVENOUS at 12:49

## 2024-12-24 RX ADMIN — EPHEDRINE SULFATE 10 MG: 50 INJECTION INTRAVENOUS at 15:23

## 2024-12-24 RX ADMIN — CEFEPIME 2000 MG: 2 INJECTION, POWDER, FOR SOLUTION INTRAVENOUS at 11:53

## 2024-12-24 RX ADMIN — FENTANYL CITRATE 25 MCG: 50 INJECTION, SOLUTION INTRAMUSCULAR; INTRAVENOUS at 15:25

## 2024-12-24 RX ADMIN — LIDOCAINE HYDROCHLORIDE 10 ML: 10 INJECTION, SOLUTION INFILTRATION; PERINEURAL at 11:22

## 2024-12-24 RX ADMIN — CEFEPIME 1000 MG: 1 INJECTION, POWDER, FOR SOLUTION INTRAMUSCULAR; INTRAVENOUS at 22:07

## 2024-12-24 RX ADMIN — FENTANYL CITRATE 25 MCG: 50 INJECTION, SOLUTION INTRAMUSCULAR; INTRAVENOUS at 15:12

## 2024-12-24 RX ADMIN — DEXAMETHASONE SODIUM PHOSPHATE 4 MG: 4 INJECTION, SOLUTION INTRAMUSCULAR; INTRAVENOUS at 15:18

## 2024-12-24 RX ADMIN — Medication 10 ML: at 20:00

## 2024-12-24 RX ADMIN — GLYCOPYRROLATE 0.1 MG: 0.2 INJECTION INTRAMUSCULAR; INTRAVENOUS at 15:21

## 2024-12-24 RX ADMIN — FENTANYL CITRATE 25 MCG: 50 INJECTION, SOLUTION INTRAMUSCULAR; INTRAVENOUS at 15:31

## 2024-12-24 RX ADMIN — LIDOCAINE HYDROCHLORIDE 100 MG: 20 INJECTION, SOLUTION INTRAVENOUS at 15:12

## 2024-12-24 RX ADMIN — VANCOMYCIN HYDROCHLORIDE 1750 MG: 5 INJECTION, POWDER, LYOPHILIZED, FOR SOLUTION INTRAVENOUS at 12:50

## 2024-12-24 RX ADMIN — IOPAMIDOL 100 ML: 755 INJECTION, SOLUTION INTRAVENOUS at 12:27

## 2024-12-24 RX ADMIN — ONDANSETRON 4 MG: 2 INJECTION INTRAMUSCULAR; INTRAVENOUS at 15:18

## 2024-12-24 NOTE — H&P
[facility]   HOSPITALIST HISTORY AND PHYSICAL  Date: 2024   Patient Name: Ann-Marie Hughes  : 1945  MRN: [id number]  Primary Care Physician:  Romario Valdez DO  Date of admission: 2024    Subjective   Subjective     Chief Complaint: Left lower extremity pain    HPI:    Ann-Marie Hughes is a 79 y.o. female with a past medical history of coronary artery disease with CABG with left GSV harvest approximately 1.5 years ago, hypertension, diabetes who presented to the emergency department due to ongoing left lower extremity pain.  Patient states she fell several weeks ago and developed a wound on the dorsal aspect of her left foot.  She then noticed increasing swelling and redness along the medial aspect of the left knee.  Patient underwent I&D in the ED, however, CT reveals subcu abscess measuring approximately 6.5 cm with surrounding subcutaneous suspicious for cellulitis.  General surgery consulted and patient admitted to hospitalist service for further workup and management of left lower extremity abscess.      Personal History     Past Medical History:  Past Medical History:   Diagnosis Date    Acne rosacea 2021    DR.JEFF MOON     Acute respiratory failure with hypoxia 2023    Arteriosclerosis of abdominal aorta     B12 deficiency 2021    DJD (degenerative joint disease)     Hx of smoking 2021    QUIT IN  AT AGE 31    Hyperlipidemia 2021    Hypertension     Hypothyroidism     Metabolic syndrome 2021    NSTEMI (non-ST elevated myocardial infarction) 2023    Pericardial effusion 10/10/2023    KAREN (stress urinary incontinence, female) 2021    UTI (urinary tract infection) 2021    Vitamin D deficiency 2021       Past Surgical History:  Past Surgical History:   Procedure Laterality Date    BACK SURGERY  2013    CARDIAC CATHETERIZATION N/A 2023    Procedure: Left Heart Cath;  Surgeon: Dinh Andres  MD;  Location:  [facility] CATH INVASIVE LOCATION;  Service: Cardiovascular;  Laterality: N/A;    CARPAL TUNNEL RELEASE      COLONOSCOPY      CORONARY ARTERY BYPASS GRAFT WITH MITRAL VALVE REPAIR/REPLACEMENT N/A 2023    Procedure: REZA STERNOTOMY OFF-PUMP CORONARY ARTERY BYPASS GRAFT TIMES        USING LEFFT INTERNAL MAMMARY ARTERY AND     GREATER SAPHENOUS VEIN GRAFT PER EMDOSCOPIC VEIN HARVESTING, MAZE PROCEDURE AND PRP;  Surgeon: Shane Alexander MD;  Location:  [facility];  Service: Cardiothoracic;  Laterality: N/A;    KNEE SURGERY      LUMBAR DISCECTOMY      NECK SURGERY      Cervical    POSTERIOR LUMBAR/THORACIC SPINE FUSION          Family History:   Family History   Problem Relation Age of Onset    Heart disease Mother     Arthritis Mother     Heart attack Mother     Arthritis Father         Social History:   Social History     Socioeconomic History    Marital status:      Spouse name: Dinh   Tobacco Use    Smoking status: Former     Current packs/day: 0.00     Average packs/day: 1 pack/day for 12.0 years (12.0 ttl pk-yrs)     Types: Cigarettes     Start date:      Quit date:      Years since quittin.0    Smokeless tobacco: Never   Vaping Use    Vaping status: Never Used   Substance and Sexual Activity    Alcohol use: Never    Drug use: Never    Sexual activity: Defer        Home Medications:  Hearing Aid Device, Probiotic Product, albuterol sulfate HFA, apixaban, aspirin, atorvastatin, buPROPion XL, dapagliflozin Propanediol, digoxin, folic acid, furosemide, glucose blood, levothyroxine, metFORMIN ER, metoprolol succinate XL, montelukast, predniSONE, prenatal vitamin 28-0.8, sacubitril-valsartan, spironolactone, sulfamethoxazole-trimethoprim, and vitamin B-12    Allergies:  Allergies   Allergen Reactions    Penicillins Palpitations     1. When was your reaction? > 10 years ago  2. Did your reaction happen after the first dose or after several doses? After first dose  3. Did  your reaction require ED or hospital care to manage your reaction? Do not know  4. Did your reaction require treatment with epinephrine? Do not know  5. Have you taken amoxicillin (Amoxil) or amoxicillin-clavulanate (Augmentin) without issue since? Yes  6. Have you taken cephalexin (Keflex) without issue since?  Do not know        Review of Systems   All systems were reviewed and negative except for: Those listed above    Objective   Objective     Vitals:   Temp:  [97.5 °F (36.4 °C)-98.2 °F (36.8 °C)] 97.5 °F (36.4 °C)  Heart Rate:  [66-74] 66  Resp:  [16-18] 18  BP: (154-186)/(64-76) 162/64  Flow (L/min) (Oxygen Therapy):  [2] 2    Physical Exam   GEN: No acute distress  HEENT: Moist mucous membranes  LUNGS: Equal chest rise bilaterally  CARDIAC: Regular rate and rhythm  NEURO: Moving all 4 extremities spontaneously  SKIN: No obvious breakdown  MSK: Dorsal left foot ulceration.  Medial left knee erythema and swelling    Result Review    Result Review:  I have personally reviewed the results from the time of this admission to 12/24/2024 14:31 EST and agree with these findings:  []  Laboratory  []  Microbiology  []  Radiology  []  EKG/Telemetry   []  Cardiology/Vascular   []  Pathology  []  Old records  []  Other:      Assessment & Plan   Assessment / Plan     Assessment:  Left lower extremity abscess  CAD with CABG with left GSV harvest 1.5 years ago  Hypertension  Diabetes  CKD stage IIIb    Plan:   Patient admitted to the hospital for further workup and management of the above  Left foot x-ray negative for osteomyelitis  Order MRI of left foot to further assess osteomyelitis  CT LLE revealing abscess of the distal left thigh measuring approximately 6.5 cm.  With surrounding subcu suspicious for cellulitis.  Continue to monitor, as, abscess is close to prosthetic knee. May need to consult ortho.  General surgery consulted.  Recommending formal I&D in the OR  Continue vancomycin and cefepime  Podiatry  consulted  Blood cultures pending  Wound cultures positive for gram-positive cocci in pairs and clusters  CBC, CMP, mag, Phos in AM.        VTE Prophylaxis:  Pharmacologic & mechanical VTE prophylaxis orders are present.        CODE STATUS:         Admission Status:  I believe this patient meets admission status.    Electronically signed by Amanda Gar PA-C, 12/24/24, 2:31 PM EST.    Patient independently seen and evaluated, agree with assessment and plan, above documentation reflects plan put forth during bedside rounds.  More than 51% of the time of this patient encounter was performed by me.    History:  Patient presents to the ER where she was found to have a thigh wound as well as a foot wound, CT scan performed in the emergency department, consistent with abscess, general surgery consulted and plan taking patient to the OR today    GEN: No acute distress  HEENT: Moist mucous membranes  LUNGS: Equal chest rise bilaterally  CARDIAC: Regular rate and rhythm  NEURO: Moving all 4 extremities spontaneously    Plan;  Agree with assessment plan as above  General Surgery consulted  Podiatry consulted  to OR today for debridement  Will plan for MRI of the foot with contrast tomorrow to further evaluate for osteo  Close observation of thigh given close proximity to knee joint replacement  Blood cultures ordered and pending  Clinical course will dictate further management      Electronically signed by Sergo Schulte MD, 12/24/2024, 15:42 EST.

## 2024-12-24 NOTE — OP NOTE
INCISION AND DRAINAGE LOWER EXTREMITY  Procedure Report    Patient Name:  Ann-Marie Hughes  YOB: 1945    Date of Surgery:  12/24/2024     Indications: Left lower extremity abscess    Pre-op Diagnosis:   Abscess of left lower extremity [L02.416]       Post-Op Diagnosis Codes:     * Abscess of left lower extremity [L02.416]    Procedure/CPT® Codes:      Procedure(s):  Incision and drainage of left lower extremity abscess  Sharp excisional debridement through skin and subcutaneous tissue, 7 x 7 x 4 cm        Staff:  Surgeon(s):  Sergo Altamirano MD         Anesthesia: Choice    Estimated Blood Loss: 10 mL    Implants:    Nothing was implanted during the procedure    Specimen:          Specimens       ID Source Type Tests Collected By Collected At Frozen?    1 Leg, Left Swab ANAEROBIC CULTURE  FUNGAL CULTURE  GRAM STAIN - NO CULTURE (Canceled)  WOUND CULTURE  AFB CULTURE  ANAEROBIC CULTURE 10 DAY INCUBATION (Canceled)   Sergo Altamirano MD 12/24/24 1526     Description: Left leg culture swab                Findings: Large amount of purulence along the medial aspect of the left lower thigh with grossly nonviable subcutaneous tissue surrounding the abscess cavity    Complications: None apparent at the time of surgery    Description of Procedure: Informed consent was obtained for the patient was brought the operating suite and placed in the supine position.  General endotracheal anesthesia was induced maintained by procedure.  Patient's left lower extremity was prepped and draped in a standard sterile fashion before a timeout procedure was performed, verifying correct patient, procedure, operative information.    Using a #10 blade scalpel, a 7 cm incision was made along the lower aspect of the left thigh medially along the area of fluctuance.  Large amount of purulence was immediately expressed.  Wound culture was obtained from this fluid and sent for analysis.  There was approximately 4 to 5 cm deep  abscess cavity with grossly nonviable subcutaneous tissue circumferentially.  Again using a #10 blade scalpel, sharp excisional debridement through the skin and subcutaneous tissue totaling 7 x 7 x 4 cm was performed until healthy, bleeding tissue was encountered.  There was exposed muscle at the base of the wound but this appeared viable.  Wound was scrubbed with a Betadine soaked scrub brush.  Wound was then thoroughly irrigated with warm saline and dried.  Hemostasis verified.  Wound was packed with a Betadine soaked Kerlix before application of a sterile dressing and Ace wrap over top to conclude the procedure.    Patient tolerated the procedure well and there were no immediate complications.  All counts were correct x 2 at the end of the procedure.    Disposition: Stable in PACU    Electronically signed by Sergo Altamirano MD, 12/24/24, 3:47 PM EST.

## 2024-12-24 NOTE — ANESTHESIA PREPROCEDURE EVALUATION
Anesthesia Evaluation     Patient summary reviewed and Nursing notes reviewed   no history of anesthetic complications:   NPO Solid Status: > 8 hours  NPO Liquid Status: > 2 hours           Airway   Mallampati: II  TM distance: <3 FB  Possible difficult intubation  Dental    (+) implants    Pulmonary - negative pulmonary ROS   Cardiovascular   Exercise tolerance: good (4-7 METS)    Patient on routine beta blocker and Beta blocker given within 24 hours of surgery    (+) hypertension, valvular problems/murmurs (s/p MVR), past MI  >12 months, CAD, dysrhythmias (s/p  left atrial appendage ligation; on Eliquis for stroke prevention) Paroxysmal Atrial Fib, pericardial effusion, hyperlipidemia  CAB.    ROS comment: Echo (2024; CHF):  ·  Left ventricular systolic function is normal. Calculated left ventricular EF = 54.8%  ·  Left ventricular wall thickness is consistent with borderline concentric hypertrophy.  ·  Left ventricular diastolic function is consistent with (grade Ia w/high LAP) impaired relaxation.  ·  The left atrial cavity is mildly dilated.  ·  Mild aortic valve stenosis is present.  ·  Estimated right ventricular systolic pressure from tricuspid regurgitation is normal (<35 mmHg).          Neuro/Psych  (+) headaches, psychiatric history Depression  GI/Hepatic/Renal/Endo    (+) obesity, renal disease- CRI, diabetes mellitus type 2 well controlled, thyroid problem hypothyroidism    Musculoskeletal     Abdominal    Substance History - negative use     OB/GYN negative ob/gyn ROS         Other   arthritis, blood dyscrasia,chronic steroid use (Prednisone 5mg qd)    history of cancer    ROS/Med Hx Other: PAT Nursing Notes unavailable.                 Anesthesia Plan    ASA 4     general     (Patient understands anesthesia not responsible for dental damage.  On eliquis receiving kcentra in OR)  intravenous induction     Anesthetic plan, risks, benefits, and alternatives have been provided, discussed and  informed consent has been obtained with: patient.  Pre-procedure education provided  Plan discussed with CRNA.    CODE STATUS:

## 2024-12-24 NOTE — PROGRESS NOTES
"[facility]: Vancomycin Pharmacokinetic Initial Consult Note    Ann-Marie Hughes is a 79 y.o. female who is on day 1 of pharmacy to dose vancomycin for Skin and Soft Tissue.    Consult Information  Consulting Provider: Eris  Planned Duration of Therapy: 7 days per consult  Was Patient Receiving Prior to Admission/Consult?: No  Loading Dose Ordered or Given: 1750 mg on  at 1100  PK/PD Target: Dose by Levels  Relevant ID History: left thigh wound and foot wound, has been on bactrim (prescribed )  Other Antimicrobials: Cefepime    Imaging Reviewed?: Yes   CT Left Lower Extremity: pending   XR Left foot: soft tissue swelling of the dorsum of the foot; no conventional radiographic evidence of osteomyelitis    Microbiology Data  MRSA PCR performed: No; Result: Not ordered due to excluded indication or presence of suspected abscess  Culture/Source:    Blood cx 2: in process   Wound cx, left thigh swab: in process   Wound cx, left foot swab: in process    Vitals/Labs  Ht: 162.6 cm (64\"); Wt: 86.8 kg (191 lb 5.8 oz)  Temp (24hrs), Av.9 °F (36.6 °C), Min:97.9 °F (36.6 °C), Max:97.9 °F (36.6 °C)   Estimated Creatinine Clearance: 30.3 mL/min (A) (by C-G formula based on SCr of 1.58 mg/dL (H)).     Results from last 7 days   Lab Units 24  1053   CREATININE mg/dL 1.58*   WBC 10*3/mm3 15.57*     Assessment/Plan:  Vancomycin Dose: pulse dosing with 1750 mg IV once on  at 1100  secondary to poor renal function  Vanc Random ordered for  at 0600 with AM labs  Patient has order for Complete Metabolic Panel    Pharmacy will follow patient's kidney function and will adjust doses and obtain levels as necessary. Thank you for involving pharmacy in this patient's care. Please contact pharmacy with any questions or concerns.                           Ellen Evans RPH  Clinical Pharmacist  "

## 2024-12-24 NOTE — CONSULTS
[facility]   Consult    Patient Name: Ann-Marie Hughes  : 1945  MRN: [id number]  Primary Care Physician:  Romario Valdez DO  Date of admission: 2024    Subjective   Subjective     Chief Complaint: Left lower extremity pain    Consult Reason: Left leg abscess    HPI: Ann-Marie Hughes is a 79 y.o. female who presented to the ED due to ongoing issues of left lower extremity pain.  Patient states she fell several weeks ago and developed a wound on the dorsal aspect of her left foot.  For the past several days she has had increasing swelling and redness along the medial aspect of her left lower extremity near her knee.  She states the area has drained purulence.  She underwent incision and drainage in the ER earlier today, but she still has quite a large fluid collection on CT of this extremity.  She denies any nausea, vomiting, fevers, chills.  She does have a history of a CABG with left GSV harvest approximately 1.5 years ago.    Review of Systems   HENT:  Negative for sore throat.    Respiratory:  Negative for shortness of breath.    Cardiovascular:  Negative for chest pain.   Genitourinary:  Negative for difficulty urinating.   Musculoskeletal:         Left lower extremity pain, erythema   Neurological:  Negative for dizziness.   Psychiatric/Behavioral:  Negative for confusion.        Personal History     Past Medical History:   Diagnosis Date    Acne rosacea 2021    DR.JEFF MOON     Acute respiratory failure with hypoxia 2023    Arteriosclerosis of abdominal aorta     B12 deficiency 2021    DJD (degenerative joint disease)     Hx of smoking 2021    QUIT IN 1976 AT AGE 31    Hyperlipidemia 2021    Hypertension     Hypothyroidism     Metabolic syndrome 2021    NSTEMI (non-ST elevated myocardial infarction) 2023    Pericardial effusion 10/10/2023    KRAEN (stress urinary incontinence, female) 2021    UTI (urinary tract infection)  08/17/2021    Vitamin D deficiency 08/17/2021       Past Surgical History:   Procedure Laterality Date    BACK SURGERY  2013    CARDIAC CATHETERIZATION N/A 07/17/2023    Procedure: Left Heart Cath;  Surgeon: Dinh Andres MD;  Location:  [facility];  Service: Cardiovascular;  Laterality: N/A;    CARPAL TUNNEL RELEASE      COLONOSCOPY  2011    CORONARY ARTERY BYPASS GRAFT WITH MITRAL VALVE REPAIR/REPLACEMENT N/A 07/20/2023    Procedure: REZA STERNOTOMY OFF-PUMP CORONARY ARTERY BYPASS GRAFT TIMES        USING LEFFT INTERNAL MAMMARY ARTERY AND     GREATER SAPHENOUS VEIN GRAFT PER EMDOSCOPIC VEIN HARVESTING, MAZE PROCEDURE AND PRP;  Surgeon: Shane Alexander MD;  Location: [facility];  Service: Cardiothoracic;  Laterality: N/A;    KNEE SURGERY      LUMBAR DISCECTOMY      NECK SURGERY      Cervical    POSTERIOR LUMBAR/THORACIC SPINE FUSION         Family History: family history includes Arthritis in her father and mother; Heart attack in her mother; Heart disease in her mother. Otherwise pertinent FHx was reviewed and not pertinent to current issue.    Social History:  reports that she quit smoking about 49 years ago. Her smoking use included cigarettes. She started smoking about 61 years ago. She has a 12 pack-year smoking history. She has never used smokeless tobacco. She reports that she does not drink alcohol and does not use drugs.    Home Medications:  Cranberry, Hearing Aid Device, Probiotic Product, acetaminophen, albuterol sulfate HFA, apixaban, aspirin, atorvastatin, buPROPion XL, dapagliflozin Propanediol, digoxin, folic acid, furosemide, glucose blood, levothyroxine, metFORMIN ER, metoprolol succinate XL, montelukast, predniSONE, prenatal vitamin 28-0.8, sacubitril-valsartan, spironolactone, sulfamethoxazole-trimethoprim, and vitamin B-12    Allergies:  Allergies   Allergen Reactions    Penicillins Palpitations     1. When was your reaction? > 10 years ago  2. Did your reaction  happen after the first dose or after several doses? After first dose  3. Did your reaction require ED or hospital care to manage your reaction? Do not know  4. Did your reaction require treatment with epinephrine? Do not know  5. Have you taken amoxicillin (Amoxil) or amoxicillin-clavulanate (Augmentin) without issue since? Yes  6. Have you taken cephalexin (Keflex) without issue since?  Do not know        Objective    Objective     Vitals:   Temp:  [97.9 °F (36.6 °C)-98.2 °F (36.8 °C)] 98.2 °F (36.8 °C)  Heart Rate:  [69-74] 69  Resp:  [16-18] 18  BP: (154-186)/(67-76) 154/67    Physical Exam  Constitutional:       Appearance: Normal appearance.   HENT:      Head: Normocephalic and atraumatic.      Mouth/Throat:      Mouth: Mucous membranes are moist.      Pharynx: Oropharynx is clear.   Cardiovascular:      Rate and Rhythm: Normal rate and regular rhythm.   Pulmonary:      Effort: Pulmonary effort is normal. No respiratory distress.   Abdominal:      General: There is no distension.      Palpations: Abdomen is soft.      Tenderness: There is no abdominal tenderness.   Musculoskeletal:      Cervical back: Normal range of motion and neck supple.      Comments: Left medial thigh abscess with large amount of induration and surrounding cellulitis; fluctuance along the middle of the wound with small amount of expressible drainage   Skin:     General: Skin is warm and dry.   Neurological:      General: No focal deficit present.      Mental Status: She is alert and oriented to person, place, and time.   Psychiatric:         Mood and Affect: Mood normal.         Behavior: Behavior normal.         Result Review    Result Review:  I have personally reviewed the results from the time of this admission to 12/24/2024 13:24 EST and agree with these findings:  [x]  Laboratory  []  Microbiology  [x]  Radiology  []  EKG/Telemetry   []  Cardiology/Vascular   []  Pathology  []  Old records  []  Other:  Most notable findings include:  Left thigh abscess    Assessment & Plan   Assessment / Plan       Active Hospital Problems:  Active Hospital Problems    Diagnosis     **Wound cellulitis        Plan:   Left lower extremity abscess  -Afebrile, vitals stable, leukocytosis to 15,000 with left shift  -CT reviewed and consistent with persistent abscess  -Recommend formal incision and drainage in the operating room  -Kcentra ordered by ED for patient's history of Eliquis use  -Admit to medicine, broad-spectrum IV antibiotics  -Risks/benefits/alternatives of the procedure were explained to the patient and she was agreeable to proceed    Electronically signed by Sergo Altamirano MD, 12/24/24, 1:24 PM EST.

## 2024-12-24 NOTE — ED PROVIDER NOTES
Time: 10:34 AM EST  Date of encounter:  12/24/2024  Independent Historian/Clinical History and Information was obtained by:   Patient    History is limited by: N/A    Chief Complaint: Wound      History of Present Illness:  Patient is a 79 y.o. year old female who presents to the emergency department for evaluation of left-sided wound as well as a foot wound.  States that she has been antibiotics for roughly a week now.  States that she had a hard area on her left thigh that has been there for a little while.  States its gotten bigger over the last few days.  Is currently on antibiotics.  States that a bus this morning and is draining.  She also reports that she has a chronic wound to her foot that appears to be not getting any better as well.  Does have a history of coronary artery disease, hypertension, diabetes.      Patient Care Team  Primary Care Provider: Romario Valdez,     Past Medical History:     Allergies   Allergen Reactions    Penicillins Palpitations     1. When was your reaction? > 10 years ago  2. Did your reaction happen after the first dose or after several doses? After first dose  3. Did your reaction require ED or hospital care to manage your reaction? Do not know  4. Did your reaction require treatment with epinephrine? Do not know  5. Have you taken amoxicillin (Amoxil) or amoxicillin-clavulanate (Augmentin) without issue since? Yes  6. Have you taken cephalexin (Keflex) without issue since?  Do not know      Past Medical History:   Diagnosis Date    Acne rosacea 08/17/2021    DR.JEFF MOON     Acute respiratory failure with hypoxia 07/31/2023    Arteriosclerosis of abdominal aorta     B12 deficiency 08/17/2021    DJD (degenerative joint disease)     Hx of smoking 08/17/2021    QUIT IN 1976 AT AGE 31    Hyperlipidemia 08/17/2021    Hypertension     Hypothyroidism     Metabolic syndrome 08/17/2021    NSTEMI (non-ST elevated myocardial infarction) 07/17/2023    Pericardial  effusion 10/10/2023    KAREN (stress urinary incontinence, female) 08/17/2021    UTI (urinary tract infection) 08/17/2021    Vitamin D deficiency 08/17/2021     Past Surgical History:   Procedure Laterality Date    BACK SURGERY  2013    CARDIAC CATHETERIZATION N/A 07/17/2023    Procedure: Left Heart Cath;  Surgeon: Dinh Andres MD;  Location:  [facility];  Service: Cardiovascular;  Laterality: N/A;    CARPAL TUNNEL RELEASE      COLONOSCOPY  2011    CORONARY ARTERY BYPASS GRAFT WITH MITRAL VALVE REPAIR/REPLACEMENT N/A 07/20/2023    Procedure: REZA STERNOTOMY OFF-PUMP CORONARY ARTERY BYPASS GRAFT TIMES        USING LEFFT INTERNAL MAMMARY ARTERY AND     GREATER SAPHENOUS VEIN GRAFT PER EMDOSCOPIC VEIN HARVESTING, MAZE PROCEDURE AND PRP;  Surgeon: Shane Alexander MD;  Location: [facility];  Service: Cardiothoracic;  Laterality: N/A;    KNEE SURGERY      LUMBAR DISCECTOMY      NECK SURGERY      Cervical    POSTERIOR LUMBAR/THORACIC SPINE FUSION       Family History   Problem Relation Age of Onset    Heart disease Mother     Arthritis Mother     Heart attack Mother     Arthritis Father        Home Medications:  Prior to Admission medications    Medication Sig Start Date End Date Taking? Authorizing Provider   acetaminophen (TYLENOL) 325 MG tablet Take 2 tablets by mouth Every 4 (Four) Hours As Needed for Mild Pain. 7/28/23   Anjelica Chase APRN   albuterol sulfate  (90 Base) MCG/ACT inhaler Inhale 2 puffs Every 4 (Four) Hours As Needed for Wheezing. 5/30/23   Les Moreno, DO   apixaban (Eliquis) 5 MG tablet tablet Take 1 tablet by mouth Every 12 (Twelve) Hours. 12/12/24   Sara Lubin APRN   aspirin 81 MG EC tablet Take 1 tablet by mouth Daily.    Provider, MD Kanwal   atorvastatin (LIPITOR) 40 MG tablet TAKE 1 TABLET BY MOUTH EVERY DAY 12/16/24   Romario Valdez, DO   buPROPion XL (WELLBUTRIN XL) 300 MG 24 hr tablet TAKE 1 TABLET BY MOUTH EVERY DAY 12/12/24    Romario Valdez DO   CRANBERRY EXTRACT PO Take 1 tablet by mouth Daily.    ProviderKanwal MD   dapagliflozin Propanediol (Farxiga) 10 MG tablet Take 10 mg by mouth Daily. 12/12/24   Sara Lubin APRN   digoxin (LANOXIN) 125 MCG tablet Take 1 tablet by mouth Daily. Omit on Tuesdays, Thursdays and Saturdays 7/16/24   Valerie Valera MD   folic acid (FOLVITE) 1 MG tablet Take 1 tablet by mouth Daily. 9/18/24   Manny Soto PA-C   furosemide (LASIX) 40 MG tablet Take 1 tablet by mouth Every Other Day. 10/8/24   Sara Lubin APRN   glucose blood test strip Check blood sugar once daily and record. 4/29/24   Romario Valdez DO   Hearing Aid Device device Use 1 Device Daily. 10/31/24   Romario Valdez DO   metFORMIN ER (GLUCOPHAGE-XR) 500 MG 24 hr tablet TAKE 1 TABLET BY MOUTH TWICE DAILY 11/19/24   Romario Valdez DO   metoprolol succinate XL (TOPROL-XL) 50 MG 24 hr tablet Take 1 tablet by mouth 2 (Two) Times a Day.    Kanwal Orozco MD   montelukast (SINGULAIR) 10 MG tablet Take 1 tablet by mouth Daily. 7/31/24   Romario Valdez DO   predniSONE (DELTASONE) 5 MG tablet TAKE 5 MG EVERY DAY 9/18/24   Manny Soto PA-C   Prenatal Vit-Fe Fumarate-FA (prenatal vitamin 28-0.8) 28-0.8 MG tablet tablet Take 1 tablet by mouth Every Other Day. 9/18/24   Manny Soto PA-C   Probiotic Product (SkySQL PO) Take 1 capsule by mouth Daily.    Kanwal Orozco MD   sacubitril-valsartan (Entresto) 24-26 MG tablet Take 1 tablet by mouth 2 (Two) Times a Day. 2/19/24   Valerie Valrea MD   spironolactone (ALDACTONE) 25 MG tablet TAKE 1 TABLET BY MOUTH EVERY DAY 7/8/24   Valerie Valera MD   sulfamethoxazole-trimethoprim (Bactrim DS) 800-160 MG per tablet Take 1 tablet by mouth 2 (Two) Times a Day. 12/16/24   Romario Valdez, DO   Synthroid 75 MCG tablet Take 1 tablet by mouth Daily. 7/31/24   Romario Valdez, DO   vitamin B-12  "(CYANOCOBALAMIN) 1000 MCG tablet Take 1 tablet by mouth Daily. 24   Manny Soto PA-C        Social History:   Social History     Tobacco Use    Smoking status: Former     Current packs/day: 0.00     Average packs/day: 1 pack/day for 12.0 years (12.0 ttl pk-yrs)     Types: Cigarettes     Start date:      Quit date:      Years since quittin.0    Smokeless tobacco: Never   Vaping Use    Vaping status: Never Used   Substance Use Topics    Alcohol use: Never    Drug use: Never         Review of Systems:  Review of Systems     Physical Exam:  /64 (BP Location: Right arm, Patient Position: Lying)   Pulse 66   Temp 97.5 °F (36.4 °C) (Oral)   Resp 18   Ht 162.6 cm (64\")   Wt 86.8 kg (191 lb 5.8 oz)   LMP  (LMP Unknown)   SpO2 97%   BMI 32.85 kg/m²     Physical Exam  Vitals and nursing note reviewed.   Constitutional:       Appearance: Normal appearance.   HENT:      Head: Normocephalic and atraumatic.   Eyes:      General: No scleral icterus.  Cardiovascular:      Rate and Rhythm: Normal rate and regular rhythm.      Heart sounds: Normal heart sounds.   Pulmonary:      Effort: Pulmonary effort is normal.      Breath sounds: Normal breath sounds.   Abdominal:      Palpations: Abdomen is soft.      Tenderness: There is no abdominal tenderness.   Musculoskeletal:      Cervical back: Normal range of motion.      Comments: There is a left-sided medial thigh abscess and surrounding cellulitis.  See photo.  There is more serosanguineous fluid draining from it currently.  There is area of fluctuance noted.  There is also necrotic ulceration to the top of the left foot.  See photo.   Skin:     Findings: No rash.   Neurological:      General: No focal deficit present.      Mental Status: She is alert.                              Medical Decision Making:      Comorbidities that affect care:    Coronary Artery Disease, Diabetes, Hypertension    External Notes reviewed:    Reviewed note from " 12/16/2024      The following orders were placed and all results were independently analyzed by me:  Orders Placed This Encounter   Procedures    Incision & Drainage    Blood Culture - Blood,    Blood Culture - Blood,    Wound Culture - Swab, Foot, Left    Wound Culture - Swab, Thigh, Left    XR Foot 3+ View Left    CT Lower Extremity Left With Contrast    Comprehensive Metabolic Panel    Sedimentation Rate    C-reactive Protein    Lactic Acid, Plasma    CBC Auto Differential    CBC Auto Differential    Comprehensive Metabolic Panel    Magnesium    Phosphorus    Protime-INR    Vancomycin, Random    NPO Diet NPO Type: Strict NPO    Vital Signs    Notify Provider (With Default Parameters)    Intake & Output    Weigh Patient    Daily Weights    Oral Care    Saline Lock & Maintain IV Access    Place Sequential Compression Device    Maintain Sequential Compression Device    IP Consult to General Surgery    Inpatient Podiatry Consult    Inpatient Hospitalist Consult    Inpatient General Surgery Consult    Inpatient Podiatry Consult    Insert Peripheral IV    Inpatient Admission    CBC & Differential    Extra Tubes    Gold Top - SST    Light Blue Top       Medications Given in the Emergency Department:  Medications   apixaban (ELIQUIS) tablet 5 mg ( Oral Dose Auto Held 1/1/25 2100)   aspirin EC tablet 81 mg (has no administration in time range)   Pharmacy to dose vancomycin (has no administration in time range)   sodium chloride 0.9 % flush 10 mL (has no administration in time range)   sodium chloride 0.9 % flush 10 mL (has no administration in time range)   sodium chloride 0.9 % infusion 40 mL (has no administration in time range)   famotidine (PEPCID) tablet 40 mg (has no administration in time range)   ondansetron (ZOFRAN) injection 4 mg (has no administration in time range)   multivitamin with minerals 1 tablet (has no administration in time range)   sennosides-docusate (PERICOLACE) 8.6-50 MG per tablet 2 tablet (has  no administration in time range)     And   polyethylene glycol (MIRALAX) packet 17 g (has no administration in time range)     And   bisacodyl (DULCOLAX) EC tablet 5 mg (has no administration in time range)     And   bisacodyl (DULCOLAX) suppository 10 mg (has no administration in time range)   cefepime 1000 mg IVPB in 100 mL NS (VTB) (has no administration in time range)   prothrombin complex conc human (KCentra) IV solution 2,288 Units (has no administration in time range)   cefepime 2000 mg IVPB in 100 mL NS (VTB) (0 mg Intravenous Stopped 12/24/24 1223)   vancomycin 1750 mg/500 mL 0.9% NS IVPB ([facility]) (1,750 mg Intravenous New Bag 12/24/24 1250)   lidocaine (XYLOCAINE) 1 % injection 10 mL (10 mL Infiltration Given by Other 12/24/24 1122)   morphine injection 2 mg (2 mg Intravenous Given 12/24/24 1111)   sodium chloride 0.9 % bolus 500 mL (500 mL Intravenous New Bag 12/24/24 1249)   iopamidol (ISOVUE-370) 76 % injection 100 mL (100 mL Intravenous Given 12/24/24 1227)        ED Course:    ED Course as of 12/24/24 1438   Tue Dec 24, 2024   1131 Spoke with Dr. Go who will consult.  If able  recommends MRI [MA]   1132 Spoke with Dr. Altamirano who recommends CT of the lower extremity.  Admission to medicine, antibiotics [MA]   1206 Spoke with Dr. Schulte who agrees to admit.  [MA]      ED Course User Index  [MA] Chester Dickinson MD       Labs:    Lab Results (last 24 hours)       Procedure Component Value Units Date/Time    CBC & Differential [[id number]]  (Abnormal) Collected: 12/24/24 1053    Specimen: Blood Updated: 12/24/24 1105    Narrative:      The following orders were created for panel order CBC & Differential.  Procedure                               Abnormality         Status                     ---------                               -----------         ------                     CBC Auto Differential[[id number]]        Abnormal            Final result                 Please view results for these tests  on the individual orders.    Comprehensive Metabolic Panel [[id number]]  (Abnormal) Collected: 12/24/24 1053    Specimen: Blood Updated: 12/24/24 1140     Glucose 123 mg/dL      BUN 36 mg/dL      Creatinine 1.58 mg/dL      Sodium 139 mmol/L      Potassium 4.3 mmol/L      Chloride 103 mmol/L      CO2 20.8 mmol/L      Calcium 8.9 mg/dL      Total Protein 7.1 g/dL      Albumin 3.7 g/dL      ALT (SGPT) 8 U/L      AST (SGOT) 19 U/L      Alkaline Phosphatase 49 U/L      Total Bilirubin 0.4 mg/dL      Globulin 3.4 gm/dL      A/G Ratio 1.1 g/dL      BUN/Creatinine Ratio 22.8     Anion Gap 15.2 mmol/L      eGFR 33.0 mL/min/1.73     Narrative:      GFR Categories in Chronic Kidney Disease (CKD)      GFR Category          GFR (mL/min/1.73)    Interpretation  G1                     90 or greater         Normal or high (1)  G2                      60-89                Mild decrease (1)  G3a                   45-59                Mild to moderate decrease  G3b                   30-44                Moderate to severe decrease  G4                    15-29                Severe decrease  G5                    14 or less           Kidney failure          (1)In the absence of evidence of kidney disease, neither GFR category G1 or G2 fulfill the criteria for CKD.    eGFR calculation 2021 CKD-EPI creatinine equation, which does not include race as a factor    Blood Culture - Blood, Arm, Left [[id number]] Collected: 12/24/24 1053    Specimen: Blood from Arm, Left Updated: 12/24/24 1102    Sedimentation Rate [[id number]]  (Abnormal) Collected: 12/24/24 1053    Specimen: Blood Updated: 12/24/24 1112     Sed Rate 52 mm/hr     C-reactive Protein [[id number]]  (Abnormal) Collected: 12/24/24 1053    Specimen: Blood Updated: 12/24/24 1140     C-Reactive Protein 10.05 mg/dL     Lactic Acid, Plasma [[id number]]  (Normal) Collected: 12/24/24 1053    Specimen: Blood Updated: 12/24/24 1125     Lactate 1.8 mmol/L     CBC Auto Differential [[id number]]   (Abnormal) Collected: 12/24/24 1053    Specimen: Blood Updated: 12/24/24 1105     WBC 15.57 10*3/mm3      RBC 2.84 10*6/mm3      Hemoglobin 8.7 g/dL      Hematocrit 27.7 %      MCV 97.5 fL      MCH 30.6 pg      MCHC 31.4 g/dL      RDW 19.4 %      RDW-SD 69.3 fl      MPV 11.6 fL      Platelets 309 10*3/mm3      Neutrophil % 78.6 %      Lymphocyte % 11.2 %      Monocyte % 8.5 %      Eosinophil % 0.6 %      Basophil % 0.4 %      Immature Grans % 0.7 %      Neutrophils, Absolute 12.22 10*3/mm3      Lymphocytes, Absolute 1.75 10*3/mm3      Monocytes, Absolute 1.33 10*3/mm3      Eosinophils, Absolute 0.09 10*3/mm3      Basophils, Absolute 0.07 10*3/mm3      Immature Grans, Absolute 0.11 10*3/mm3      nRBC 0.1 /100 WBC     Wound Culture - Swab, Foot, Left [[id number]] Collected: 12/24/24 1116    Specimen: Swab from Foot, Left Updated: 12/24/24 1330     Gram Stain Few (2+) Gram positive cocci in pairs and clusters      Few (2+) Gram positive bacilli resembling diphtheroids      Few (2+) WBCs seen    Wound Culture - Swab, Thigh, Left [[id number]] Collected: 12/24/24 1116    Specimen: Swab from Thigh, Left Updated: 12/24/24 1327     Gram Stain Moderate (3+) WBCs seen      Moderate (3+) Gram negative bacilli      Few (2+) Gram positive cocci in pairs and clusters    Blood Culture - Blood, Hand, Right [[id number]] Collected: 12/24/24 1129    Specimen: Blood from Hand, Right Updated: 12/24/24 1131             Imaging:    CT Lower Extremity Left With Contrast    Result Date: 12/24/2024  CT LOWER EXTREMITY LEFT W CONTRAST Date of Exam: 12/24/2024 12:12 PM EST Indication: Left thigh abscess and cellulitis. Comparison: None available. Technique: Axial CT images were obtained of the left lower extremity after the uneventful intravenous administration of iodinated contrast.  Reconstructed coronal and sagittal images were also obtained. Automated exposure control and iterative construction methods were used. Findings: At the  anterior-medial aspect of the distal left thigh, there appears to be a subcutaneous collection with fat and fluid attenuating components, several tiny foci of gas, and irregular peripheral enhancement suspicious for a subcutaneous abscess. This measures approximately 3.9 x 3.6 cm in maximal axial dimensions and approximately 6.5 cm craniocaudal. There is overlying skin thickening and enhancement suspicious for cellulitis. There is also surrounding subcutaneous edema. There is some mild diffuse subcu edema throughout the lower leg, primarily along the medial aspect. The abscess appears superficial to the medial thigh musculature. There does not appear to be significant intramuscular edema or perifascial fluid on this exam. No other significant localized collection in the visualized lower extremity. Status post bilateral knee arthroplasties. No significant knee effusion is seen on this exam. No definite hip effusion. There is spinal fusion hardware in the lower lumbar spine, incompletely visualized. No definite acute hardware complication is identified at the knee. No definite periosteal reaction, fracture, or other findings of acute osseous abnormality. There is moderate osteoarthritis of the left hip. There are moderate degenerative changes at the sacroiliac joints and pubic symphysis. There is calcific atherosclerosis of the visualized iliac and lower extremity arteries there is suspected multifocal mild to moderate luminal narrowing in the left iliac and proximal lower extremity arteries. The popliteus artery is not well evaluated due to artifact from the knee hardware. There does appear to be some patent opacification of the visualized tibial and peroneal arteries.     Impression: 1.Subcutaneous abscess at the anterior-medial aspect of the distal left thigh measuring up to approximately 6.5 cm. Surrounding skin and subcutaneous findings suspicious for cellulitis. 2.No definite findings of acute osseous  abnormality. Status post knee arthroplasty without definite findings of acute hardware complication or visible knee effusion. 3.Calcific atherosclerosis with suspected multifocal mild to moderate luminal narrowing in the left iliac and proximal lower extremity arteries. The popliteus artery is not well evaluated due to artifact from the knee hardware. There does appear to be patent opacification of the visualized tibial and peroneal arteries. Electronically Signed: Chester Cesar  12/24/2024 1:09 PM EST  Workstation ID: [id number]    XR Foot 3+ View Left    Result Date: 12/24/2024  XR FOOT 3+ VW LEFT Date of Exam: 12/24/2024 10:58 AM EST Indication: wound 11/9/2024 Comparison: 11/9/2024 Findings: There is no acute fracture or dislocation. No bony erosion or abnormal periosteal reaction. There is degenerative spurring along the dorsal aspect of the tarsal bones. There is enthesophyte formation of the calcaneus. There is soft tissue swelling along dorsum of the foot. No radiopaque foreign body.     Impression: 1. Soft tissue swelling of the dorsum of the foot. No conventional radiographic evidence of osteomyelitis. Electronically Signed: Andrea Knutson MD  12/24/2024 11:13 AM EST  Workstation ID: [id number]       Differential Diagnosis and Discussion:    Abscess: Differential diagnosis for an abscess includes but is not limited to bacterial or fungal infections, foreign body reactions, malignancies, and autoimmune or inflammatory conditions.  Wound Evaluation: Differential diagnosis includes but is not limited to laceration, abrasion, puncture, burn, ulcer, cellulitis, abscess, vasculitis, malignancy, and rash.    PROCEDURES:    Labs were collected in the emergency department and all labs were reviewed and interpreted by me.  X-ray were performed in the emergency department and all X-ray impressions were independently interpreted by me.    No orders to display       Incision & Drainage    Date/Time: 12/24/2024 11:24  AM    Performed by: Chester Dickinson MD  Authorized by: Chester Dickinson MD    Consent:     Consent obtained:  Verbal    Consent given by:  Patient    Risks, benefits, and alternatives were discussed: yes      Risks discussed:  Bleeding and incomplete drainage    Alternatives discussed:  No treatment  Universal protocol:     Patient identity confirmed:  Verbally with patient  Location:     Type:  Abscess    Location:  Lower extremity    Lower extremity location:  Leg    Leg location:  L upper leg  Pre-procedure details:     Skin preparation:  Antiseptic wash  Sedation:     Sedation type:  None  Anesthesia:     Anesthesia method:  Local infiltration    Local anesthetic:  Lidocaine 1% w/o epi  Procedure type:     Complexity:  Complex  Procedure details:     Ultrasound guidance: no      Incision types:  Cruciate    Incision depth:  Dermal    Wound management:  Probed and deloculated, irrigated with saline and extensive cleaning    Drainage:  Serosanguinous and purulent    Drainage amount:  Copious    Wound treatment:  Wound left open    Packing materials:  None  Post-procedure details:     Procedure completion:  Tolerated  Comments:      Very large cavity noted to the left thigh.        MDM       Patient is a 79-year-old female who presents with complaints of left-sided lower extremity wound.  States been on antibiotics for roughly a week now.  Has a large abscess to the left thigh.  This was opened up in the emergency room but was of larger size.  I did speak with general surgery who recommended CT.  CT shows a large abscess.  They will take to the OR for debridement.  Antibiotics have been ordered and given.  Will need admission to the hospital further workup management.  Also spoke with podiatry about the foot wound that has been noted.  Will need admission for further workup management.          Sepsis criteria was met in the emergency department and the Sepsis protocol (including antibiotic administration)  was initiated.      SIRS criteria considered:   1.  Temperature > 100.4 or <96.8    2.  Heart Rate > 90    3.  Respiratory Rate > 22    4.  WBC > 12K or <4K.             Severe Sepsis:     Respiratory: Mechanical Ventilation or Bipap  Hypotension: SBP > 90 or MAP < 65  Renal: Creatinine > 2  Metabolic: Lactic Acid > 2  Hematologic: Platelets < 100K or INR > 1.5  Hepatic: BILI  >  2  CNS: Sudden AMS     Septic Shock:     Severe Sepsis + Persistent hypotension or Lactic Acid > 4     Normal saline bolus, Antibiotics, and final disposition was based on these definitions.        Sepsis was recognized at 1035    Antibiotics were ordered.     30 mL/kg bolus was not indicated.         Total Critical Care time of 35 minutes. Total critical care time documented does not include time spent on separately billed procedures for services of nurses or physician assistants. I personally saw and examined the patient. I have reviewed all diagnostic interpretations and treatment plans as written. I was present for the key portions of any procedures performed and the inclusive time noted in any critical care statement. Critical care time includes patient management by me, time spent at the patients bedside,  time to review lab and imaging results, discussing patient care, documentation in the medical record, and time spent with family or caregiver.    Patient Care Considerations:          Consultants/Shared Management Plan:    Hospitalist: I have discussed the case with Dr. Schulte who agrees to accept the patient for admission.  Consultant: I have discussed the case with Dr. Go who agrees to consult on the patient.  Consultant: I have discussed the case with Dr. Altamirano who states recommends antibiotics and CT    Social Determinants of Health:          Disposition and Care Coordination:    Admit:   Through independent evaluation of the patient's history, physical, and imperical data, the patient meets criteria for inpatient  admission to the hospital.        Final diagnoses:   Abscess of left lower extremity        ED Disposition       ED Disposition   Decision to Admit    Condition   --    Comment   Level of Care: Med/Surg [1]   Diagnosis: Wound cellulitis [[id number]]   Admitting Physician: VIANEY BALBUENA [[id number]]   Attending Physician: VIANEY BALBUENA [[id number]]   Certification: I Certify That Inpatient Hospital Services Are Medically Necessary For Greater Than 2 Midnights                 This medical record created using voice recognition software.             Chester Dickinson MD  12/24/24 1937

## 2024-12-25 ENCOUNTER — APPOINTMENT (OUTPATIENT)
Dept: MRI IMAGING | Facility: HOSPITAL | Age: 79
End: 2024-12-25
Payer: MEDICARE

## 2024-12-25 PROBLEM — G62.9 NEUROPATHY: Status: ACTIVE | Noted: 2024-12-25

## 2024-12-25 PROBLEM — M79.672 LEFT FOOT PAIN: Status: ACTIVE | Noted: 2024-12-25

## 2024-12-25 PROBLEM — L97.522 ULCER OF LEFT FOOT WITH FAT LAYER EXPOSED: Status: ACTIVE | Noted: 2024-12-25

## 2024-12-25 LAB
ALBUMIN SERPL-MCNC: 3.3 G/DL (ref 3.5–5.2)
ALBUMIN/GLOB SERPL: 1.1 G/DL
ALP SERPL-CCNC: 45 U/L (ref 39–117)
ALT SERPL W P-5'-P-CCNC: 32 U/L (ref 1–33)
ANION GAP SERPL CALCULATED.3IONS-SCNC: 12.5 MMOL/L (ref 5–15)
AST SERPL-CCNC: 40 U/L (ref 1–32)
BASOPHILS # BLD AUTO: 0.03 10*3/MM3 (ref 0–0.2)
BASOPHILS NFR BLD AUTO: 0.2 % (ref 0–1.5)
BILIRUB SERPL-MCNC: 0.2 MG/DL (ref 0–1.2)
BUN SERPL-MCNC: 36 MG/DL (ref 8–23)
BUN/CREAT SERPL: 24.2 (ref 7–25)
CALCIUM SPEC-SCNC: 8.4 MG/DL (ref 8.6–10.5)
CHLORIDE SERPL-SCNC: 106 MMOL/L (ref 98–107)
CO2 SERPL-SCNC: 20.5 MMOL/L (ref 22–29)
CREAT SERPL-MCNC: 1.49 MG/DL (ref 0.57–1)
DEPRECATED RDW RBC AUTO: 70.7 FL (ref 37–54)
EGFRCR SERPLBLD CKD-EPI 2021: 35.4 ML/MIN/1.73
EOSINOPHIL # BLD AUTO: 0 10*3/MM3 (ref 0–0.4)
EOSINOPHIL NFR BLD AUTO: 0 % (ref 0.3–6.2)
ERYTHROCYTE [DISTWIDTH] IN BLOOD BY AUTOMATED COUNT: 19.3 % (ref 12.3–15.4)
GLOBULIN UR ELPH-MCNC: 3.1 GM/DL
GLUCOSE BLDC GLUCOMTR-MCNC: 129 MG/DL (ref 70–99)
GLUCOSE SERPL-MCNC: 129 MG/DL (ref 65–99)
HCT VFR BLD AUTO: 25.7 % (ref 34–46.6)
HGB BLD-MCNC: 7.8 G/DL (ref 12–15.9)
IMM GRANULOCYTES # BLD AUTO: 0.15 10*3/MM3 (ref 0–0.05)
IMM GRANULOCYTES NFR BLD AUTO: 1.2 % (ref 0–0.5)
LYMPHOCYTES # BLD AUTO: 1.66 10*3/MM3 (ref 0.7–3.1)
LYMPHOCYTES NFR BLD AUTO: 12.8 % (ref 19.6–45.3)
MAGNESIUM SERPL-MCNC: 2 MG/DL (ref 1.6–2.4)
MCH RBC QN AUTO: 30.1 PG (ref 26.6–33)
MCHC RBC AUTO-ENTMCNC: 30.4 G/DL (ref 31.5–35.7)
MCV RBC AUTO: 99.2 FL (ref 79–97)
MONOCYTES # BLD AUTO: 1.09 10*3/MM3 (ref 0.1–0.9)
MONOCYTES NFR BLD AUTO: 8.4 % (ref 5–12)
NEUTROPHILS NFR BLD AUTO: 10.05 10*3/MM3 (ref 1.7–7)
NEUTROPHILS NFR BLD AUTO: 77.4 % (ref 42.7–76)
NRBC BLD AUTO-RTO: 0.2 /100 WBC (ref 0–0.2)
PHOSPHATE SERPL-MCNC: 4.6 MG/DL (ref 2.5–4.5)
PLATELET # BLD AUTO: 273 10*3/MM3 (ref 140–450)
PMV BLD AUTO: 11.2 FL (ref 6–12)
POTASSIUM SERPL-SCNC: 5 MMOL/L (ref 3.5–5.2)
PROT SERPL-MCNC: 6.4 G/DL (ref 6–8.5)
RBC # BLD AUTO: 2.59 10*6/MM3 (ref 3.77–5.28)
SODIUM SERPL-SCNC: 139 MMOL/L (ref 136–145)
VANCOMYCIN SERPL-MCNC: 15 MCG/ML (ref 5–40)
WBC NRBC COR # BLD AUTO: 12.98 10*3/MM3 (ref 3.4–10.8)

## 2024-12-25 PROCEDURE — 94761 N-INVAS EAR/PLS OXIMETRY MLT: CPT

## 2024-12-25 PROCEDURE — 84100 ASSAY OF PHOSPHORUS: CPT | Performed by: STUDENT IN AN ORGANIZED HEALTH CARE EDUCATION/TRAINING PROGRAM

## 2024-12-25 PROCEDURE — 80202 ASSAY OF VANCOMYCIN: CPT | Performed by: STUDENT IN AN ORGANIZED HEALTH CARE EDUCATION/TRAINING PROGRAM

## 2024-12-25 PROCEDURE — 25510000002 GADOBENATE DIMEGLUMINE 529 MG/ML SOLUTION: Performed by: STUDENT IN AN ORGANIZED HEALTH CARE EDUCATION/TRAINING PROGRAM

## 2024-12-25 PROCEDURE — 94640 AIRWAY INHALATION TREATMENT: CPT

## 2024-12-25 PROCEDURE — 25010000002 MORPHINE PER 10 MG: Performed by: STUDENT IN AN ORGANIZED HEALTH CARE EDUCATION/TRAINING PROGRAM

## 2024-12-25 PROCEDURE — 94799 UNLISTED PULMONARY SVC/PX: CPT

## 2024-12-25 PROCEDURE — 73720 MRI LWR EXTREMITY W/O&W/DYE: CPT

## 2024-12-25 PROCEDURE — 85025 COMPLETE CBC W/AUTO DIFF WBC: CPT | Performed by: STUDENT IN AN ORGANIZED HEALTH CARE EDUCATION/TRAINING PROGRAM

## 2024-12-25 PROCEDURE — 83735 ASSAY OF MAGNESIUM: CPT | Performed by: STUDENT IN AN ORGANIZED HEALTH CARE EDUCATION/TRAINING PROGRAM

## 2024-12-25 PROCEDURE — 25010000002 CEFEPIME PER 500 MG: Performed by: STUDENT IN AN ORGANIZED HEALTH CARE EDUCATION/TRAINING PROGRAM

## 2024-12-25 PROCEDURE — 80053 COMPREHEN METABOLIC PANEL: CPT | Performed by: STUDENT IN AN ORGANIZED HEALTH CARE EDUCATION/TRAINING PROGRAM

## 2024-12-25 PROCEDURE — A9577 INJ MULTIHANCE: HCPCS | Performed by: STUDENT IN AN ORGANIZED HEALTH CARE EDUCATION/TRAINING PROGRAM

## 2024-12-25 PROCEDURE — 99232 SBSQ HOSP IP/OBS MODERATE 35: CPT | Performed by: STUDENT IN AN ORGANIZED HEALTH CARE EDUCATION/TRAINING PROGRAM

## 2024-12-25 PROCEDURE — 99221 1ST HOSP IP/OBS SF/LOW 40: CPT | Performed by: PODIATRIST

## 2024-12-25 RX ORDER — MORPHINE SULFATE 2 MG/ML
1 INJECTION, SOLUTION INTRAMUSCULAR; INTRAVENOUS ONCE
Status: COMPLETED | OUTPATIENT
Start: 2024-12-25 | End: 2024-12-25

## 2024-12-25 RX ORDER — METOPROLOL SUCCINATE 50 MG/1
50 TABLET, EXTENDED RELEASE ORAL 2 TIMES DAILY
Status: DISCONTINUED | OUTPATIENT
Start: 2024-12-25 | End: 2024-12-27 | Stop reason: HOSPADM

## 2024-12-25 RX ORDER — ARFORMOTEROL TARTRATE 15 UG/2ML
15 SOLUTION RESPIRATORY (INHALATION)
Status: DISCONTINUED | OUTPATIENT
Start: 2024-12-25 | End: 2024-12-27 | Stop reason: HOSPADM

## 2024-12-25 RX ORDER — SODIUM CHLORIDE 9 MG/ML
INJECTION, SOLUTION INTRAVENOUS
Status: DISPENSED
Start: 2024-12-25 | End: 2024-12-25

## 2024-12-25 RX ORDER — MORPHINE SULFATE 2 MG/ML
1 INJECTION, SOLUTION INTRAMUSCULAR; INTRAVENOUS EVERY 4 HOURS PRN
Status: DISCONTINUED | OUTPATIENT
Start: 2024-12-25 | End: 2024-12-27 | Stop reason: HOSPADM

## 2024-12-25 RX ORDER — ALBUTEROL SULFATE 0.83 MG/ML
2.5 SOLUTION RESPIRATORY (INHALATION) EVERY 6 HOURS PRN
Status: DISCONTINUED | OUTPATIENT
Start: 2024-12-25 | End: 2024-12-27 | Stop reason: HOSPADM

## 2024-12-25 RX ADMIN — CEFEPIME 1000 MG: 1 INJECTION, POWDER, FOR SOLUTION INTRAMUSCULAR; INTRAVENOUS at 22:03

## 2024-12-25 RX ADMIN — METOPROLOL SUCCINATE 50 MG: 50 TABLET, EXTENDED RELEASE ORAL at 08:37

## 2024-12-25 RX ADMIN — CEFEPIME 1000 MG: 1 INJECTION, POWDER, FOR SOLUTION INTRAMUSCULAR; INTRAVENOUS at 10:51

## 2024-12-25 RX ADMIN — Medication 10 ML: at 22:00

## 2024-12-25 RX ADMIN — METOPROLOL SUCCINATE 50 MG: 50 TABLET, EXTENDED RELEASE ORAL at 22:03

## 2024-12-25 RX ADMIN — GADOBENATE DIMEGLUMINE 17 ML: 529 INJECTION, SOLUTION INTRAVENOUS at 13:28

## 2024-12-25 RX ADMIN — Medication 1 TABLET: at 08:36

## 2024-12-25 RX ADMIN — ALBUTEROL SULFATE 2.5 MG: 2.5 SOLUTION RESPIRATORY (INHALATION) at 03:57

## 2024-12-25 RX ADMIN — MORPHINE SULFATE 1 MG: 2 INJECTION, SOLUTION INTRAMUSCULAR; INTRAVENOUS at 06:40

## 2024-12-25 RX ADMIN — ARFORMOTEROL TARTRATE 15 MCG: 15 SOLUTION RESPIRATORY (INHALATION) at 07:38

## 2024-12-25 RX ADMIN — ASPIRIN 81 MG: 81 TABLET, COATED ORAL at 08:36

## 2024-12-25 RX ADMIN — ARFORMOTEROL TARTRATE 15 MCG: 15 SOLUTION RESPIRATORY (INHALATION) at 19:34

## 2024-12-25 RX ADMIN — Medication 10 ML: at 08:37

## 2024-12-25 RX ADMIN — Medication 1 EACH: at 05:55

## 2024-12-25 RX ADMIN — FAMOTIDINE 40 MG: 20 TABLET, FILM COATED ORAL at 08:36

## 2024-12-25 RX ADMIN — MORPHINE SULFATE 1 MG: 2 INJECTION, SOLUTION INTRAMUSCULAR; INTRAVENOUS at 17:52

## 2024-12-25 RX ADMIN — MORPHINE SULFATE 1 MG: 2 INJECTION, SOLUTION INTRAMUSCULAR; INTRAVENOUS at 11:24

## 2024-12-25 RX ADMIN — MORPHINE SULFATE 1 MG: 2 INJECTION, SOLUTION INTRAMUSCULAR; INTRAVENOUS at 15:33

## 2024-12-25 RX ADMIN — ALBUTEROL SULFATE 2.5 MG: 2.5 SOLUTION RESPIRATORY (INHALATION) at 19:40

## 2024-12-25 RX ADMIN — COLLAGENASE SANTYL 1 APPLICATION: 250 OINTMENT TOPICAL at 17:53

## 2024-12-25 NOTE — PROGRESS NOTES
[facility]   Hospitalist Progress Note  Date: 2024  Patient Name: Ann-Marie Hughes  : 1945  MRN: [id number]  Date of admission: 2024  Room/Bed: [facility]/      Subjective   Subjective     Chief Complaint: Left lower extremity pain    Summary:Ann-Marie Hughes is a 79 y.o. female with coronary artery disease status post CABG with left GSV harvest, hypertension, diabetes who presented to the emergency room due to ongoing left lower extremity pain.  Patient was seen by general surgery and is now status post I&D.  Patient is with a necrotic appearing ulcer on the dorsum of her left foot.  MRI ordered    Interval Followup:   Patient is complaining of drainage from her left lower extremity.  We discussed that some drainage to be expected.  We discussed that we are ordering MRI.  No other complaints    All systems reviewed and negative except for what is outlined above.      Objective   Objective     Vitals:   Temp:  [97.3 °F (36.3 °C)-98.2 °F (36.8 °C)] 97.9 °F (36.6 °C)  Heart Rate:  [60-72] 63  Resp:  [16-24] 24  BP: (127-172)/(43-75) 158/50  Flow (L/min) (Oxygen Therapy):  [2] 2    Physical Exam   General: Awake, alert, NAD  Cardiovascular: RRR, no murmurs   Pulmonary: no conversational dyspnea.  Normal work of breathing  Musculoskeletal: Necrotic appearing ulcer on the dorsum of her left foot  Neuro: Alert and oriented  Psych: Mood and affect appropriate    Result Review    Result Review:  I have personally reviewed these results:  [x]  Laboratory      Lab 24  0320 24  1436 24  1053   WBC 12.98*  --  15.57*   HEMOGLOBIN 7.8*  --  8.7*   HEMATOCRIT 25.7*  --  27.7*   PLATELETS 273  --  309   NEUTROS ABS 10.05*  --  12.22*   IMMATURE GRANS (ABS) 0.15*  --  0.11*   LYMPHS ABS 1.66  --  1.75   MONOS ABS 1.09*  --  1.33*   EOS ABS 0.00  --  0.09   MCV 99.2*  --  97.5*   SED RATE  --   --  52*   CRP  --   --  10.05*   LACTATE  --   --  1.8   PROTIME  --  16.1*  --          Lab  12/25/24  0320 12/24/24  1053   SODIUM 139 139   POTASSIUM 5.0 4.3   CHLORIDE 106 103   CO2 20.5* 20.8*   ANION GAP 12.5 15.2*   BUN 36* 36*   CREATININE 1.49* 1.58*   EGFR 35.4* 33.0*   GLUCOSE 129* 123*   CALCIUM 8.4* 8.9   MAGNESIUM 2.0  --    PHOSPHORUS 4.6*  --          Lab 12/25/24  0320 12/24/24  1053   TOTAL PROTEIN 6.4 7.1   ALBUMIN 3.3* 3.7   GLOBULIN 3.1 3.4   ALT (SGPT) 32 8   AST (SGOT) 40* 19   BILIRUBIN 0.2 0.4   ALK PHOS 45 49         Lab 12/24/24  1436   PROTIME 16.1*   INR 1.26*                 Brief Urine Lab Results  (Last result in the past 365 days)        Color   Clarity   Blood   Leuk Est   Nitrite   Protein   CREAT   Urine HCG        07/31/24 1427 Yellow   Clear   Negative   Moderate (2+)   Negative   Negative                 [x]  Microbiology   Microbiology Results (last 10 days)       Procedure Component Value - Date/Time    Wound Culture - Swab, Leg, Left [[id number]] Collected: 12/24/24 1526    Lab Status: Preliminary result Specimen: Swab from Leg, Left Updated: 12/25/24 0838     Wound Culture No growth     Gram Stain Few (2+) WBCs seen      No organisms seen    AFB Culture - Swab, Leg, Left [[id number]] Collected: 12/24/24 1526    Lab Status: Preliminary result Specimen: Swab from Leg, Left Updated: 12/25/24 1218     AFB Stain No acid fast bacilli seen    Blood Culture - Blood, Hand, Right [[id number]]  (Normal) Collected: 12/24/24 1129    Lab Status: Preliminary result Specimen: Blood from Hand, Right Updated: 12/25/24 1145     Blood Culture No growth at 24 hours    Narrative:      Less than seven (7) mL's of blood was collected.  Insufficient quantity may yield false negative results.    Wound Culture - Swab, Foot, Left [[id number]] Collected: 12/24/24 1116    Lab Status: Preliminary result Specimen: Swab from Foot, Left Updated: 12/25/24 0837     Wound Culture Growth present, too young to evaluate     Gram Stain Few (2+) Gram positive cocci in pairs and clusters      Few (2+) Gram  positive bacilli resembling diphtheroids      Few (2+) WBCs seen    Wound Culture - Swab, Thigh, Left [[id number]] Collected: 12/24/24 1116    Lab Status: Preliminary result Specimen: Swab from Thigh, Left Updated: 12/25/24 0837     Wound Culture No growth     Gram Stain Moderate (3+) WBCs seen      Moderate (3+) Gram negative bacilli      Few (2+) Gram positive cocci in pairs and clusters    Blood Culture - Blood, Arm, Left [[id number]]  (Normal) Collected: 12/24/24 1053    Lab Status: Preliminary result Specimen: Blood from Arm, Left Updated: 12/25/24 1115     Blood Culture No growth at 24 hours          [x]  Radiology  CT Lower Extremity Left With Contrast    Result Date: 12/24/2024  Impression: 1.Subcutaneous abscess at the anterior-medial aspect of the distal left thigh measuring up to approximately 6.5 cm. Surrounding skin and subcutaneous findings suspicious for cellulitis. 2.No definite findings of acute osseous abnormality. Status post knee arthroplasty without definite findings of acute hardware complication or visible knee effusion. 3.Calcific atherosclerosis with suspected multifocal mild to moderate luminal narrowing in the left iliac and proximal lower extremity arteries. The popliteus artery is not well evaluated due to artifact from the knee hardware. There does appear to be patent opacification of the visualized tibial and peroneal arteries. Electronically Signed: Chester Cesar  12/24/2024 1:09 PM EST  Workstation ID: [id number]    XR Foot 3+ View Left    Result Date: 12/24/2024  Impression: 1. Soft tissue swelling of the dorsum of the foot. No conventional radiographic evidence of osteomyelitis. Electronically Signed: Andrea Knutson MD  12/24/2024 11:13 AM EST  Workstation ID: [id number]   []  EKG/Telemetry   []  Cardiology/Vascular   []  Pathology  []  Old records  []  Other:    Assessment & Plan        Assessment and Plan:    Left lower extremity abscess  CAD with CABG with left GSV harvest 1.5 years  ago  Hypertension  Diabetes  CKD stage IIIb  Rule out osteomyelitis     Plan:   Patient admitted to the hospital for further workup and management of the above  Left foot x-ray negative for osteomyelitis  MRI ordered  CT LLE revealing abscess of the distal left thigh status post incision and drainage by General Surgery  Continue vancomycin and cefepime  Podiatry consulted  Blood cultures no growth today  Follow-up wound culture  CBC, CMP, mag, Phos in AM.         Discussed with RN.    VTE Prophylaxis:  Pharmacologic & mechanical VTE prophylaxis orders are present.        CODE STATUS:        Electronically signed by Peyman Garcia MD, 12/25/2024, 12:52 EST.

## 2024-12-25 NOTE — CONSULTS
Psychiatric   HISTORY AND PHYSICAL    Patient Name: Ann-Marie Hughes  : 1945  MRN: [id number]  Primary Care Physician:  Romario Valdez DO  Date of admission: 2024    Subjective   Subjective     Chief Complaint: Ulceration on dorsal Left foot    HPI:    Ann-Marie Hughes is a 79 y.o. female with a past medical history of coronary artery disease with CABG with left GSV harvest approximately 1.5 years ago, hypertension, diabetes who presented to the emergency department due to ongoing left lower extremity pain.  Patient states she fell several weeks ago and developed a wound on the dorsal aspect of her left foot.  She then noticed increasing swelling and redness along the medial aspect of the left knee.  Patient underwent I&D in the ED, however, CT reveals subcu abscess measuring approximately 6.5 cm with surrounding subcutaneous suspicious for cellulitis.  General surgery consulted and patient admitted to hospitalist service for further workup and management of left lower extremity abscess.     The patient underwent an I&D of her left medial leg by Dr. Altamirano yesterday.    The patient states she is feeling much better today than she did yesterday    Patient denies any fevers, chills, nausea, vomiting, shortness of breathe, nor any other constitutional signs nor symptoms.    Personal History     Past Medical History:   Diagnosis Date    Acne rosacea 2021    DR.JEFF MOON     Acute respiratory failure with hypoxia 2023    Arteriosclerosis of abdominal aorta     B12 deficiency 2021    DJD (degenerative joint disease)     Hx of smoking 2021    QUIT IN  AT AGE 31    Hyperlipidemia 2021    Hypertension     Hypothyroidism     Metabolic syndrome 2021    NSTEMI (non-ST elevated myocardial infarction) 2023    Pericardial effusion 10/10/2023    KAREN (stress urinary incontinence, female) 2021    UTI (urinary tract infection) 2021     Vitamin D deficiency 08/17/2021       Past Surgical History:   Procedure Laterality Date    BACK SURGERY  2013    CARDIAC CATHETERIZATION N/A 07/17/2023    Procedure: Left Heart Cath;  Surgeon: Dinh Andres MD;  Location:  [facility];  Service: Cardiovascular;  Laterality: N/A;    CARPAL TUNNEL RELEASE      COLONOSCOPY  2011    CORONARY ARTERY BYPASS GRAFT WITH MITRAL VALVE REPAIR/REPLACEMENT N/A 07/20/2023    Procedure: REZA STERNOTOMY OFF-PUMP CORONARY ARTERY BYPASS GRAFT TIMES        USING LEFFT INTERNAL MAMMARY ARTERY AND     GREATER SAPHENOUS VEIN GRAFT PER EMDOSCOPIC VEIN HARVESTING, MAZE PROCEDURE AND PRP;  Surgeon: Shane Alexander MD;  Location: [facility];  Service: Cardiothoracic;  Laterality: N/A;    KNEE SURGERY      LUMBAR DISCECTOMY      NECK SURGERY      Cervical    POSTERIOR LUMBAR/THORACIC SPINE FUSION         Family History: family history includes Arthritis in her father and mother; Heart attack in her mother; Heart disease in her mother. Otherwise pertinent FHx was reviewed and not pertinent to current issue.    Social History:  reports that she quit smoking about 49 years ago. Her smoking use included cigarettes. She started smoking about 61 years ago. She has a 12 pack-year smoking history. She has never used smokeless tobacco. She reports that she does not drink alcohol and does not use drugs.    Home Medications:  Probiotic Product, albuterol sulfate HFA, apixaban, aspirin, atorvastatin, buPROPion XL, dapagliflozin Propanediol, digoxin, folic acid, furosemide, levothyroxine, metFORMIN ER, metoprolol succinate XL, montelukast, sacubitril-valsartan, spironolactone, and sulfamethoxazole-trimethoprim      Allergies:  Allergies   Allergen Reactions    Penicillins Palpitations     1. When was your reaction? > 10 years ago  2. Did your reaction happen after the first dose or after several doses? After first dose  3. Did your reaction require ED or hospital care to  manage your reaction? Do not know  4. Did your reaction require treatment with epinephrine? Do not know  5. Have you taken amoxicillin (Amoxil) or amoxicillin-clavulanate (Augmentin) without issue since? Yes  6. Have you taken cephalexin (Keflex) without issue since?  Do not know        Objective   Objective     Vitals:   Temp:  [97.3 °F (36.3 °C)-97.9 °F (36.6 °C)] 97.7 °F (36.5 °C)  Heart Rate:  [60-72] 65  Resp:  [16-24] 24  BP: (143-172)/(47-74) 143/74  Flow (L/min) (Oxygen Therapy):  [2] 2  Physical Exam      GEN:   A&Ox3, Patient seen at bedside in no apparent distress.    Physical Exam  Vitals and nursing note reviewed. Exam conducted with a chaperone present (Family).   Cardiovascular:      Pulses:           Dorsalis pedis pulses are 1+ on the right side and 1+ on the left side.        Posterior tibial pulses are 1+ on the right side and 1+ on the left side.   Feet:      Right foot:      Protective Sensation: 10 sites tested.        Left foot:      Protective Sensation: 10 sites tested.        Skin integrity: Ulcer present.         Foot/Ankle Exam    GENERAL  Diabetic foot exam performed    Appearance:  appears stated age and elderly  Orientation:  AAOx3  Affect:  appropriate    VASCULAR     Right Foot Vascularity   Dorsalis pedis:  1+  Posterior tibial:  1+  Skin temperature:  cool  Edema grading:  None  CFT:  < 3 seconds  Pedal hair growth:  Absent  Varicosities:  moderate varicosities     Left Foot Vascularity   Dorsalis pedis:  1+  Posterior tibial:  1+  Skin temperature:  cool  Edema gradin+ and non-pitting  CFT:  < 3 seconds  Pedal hair growth:  Absent  Varicosities:  moderate varicosities     NEUROLOGIC     Right Foot Neurologic   Light touch sensation: diminished  Vibratory sensation: diminished  Hot/Cold sensation: diminished  Protective Sensation using [place]-Rea Monofilament:   Sites intact: 4  Sites tested: 10     Left Foot Neurologic   Light touch sensation: diminished  Vibratory  sensation: diminished  Hot/Cold sensation:  diminished  Protective Sensation using [place]-Rea Monofilament:   Sites intact: 4  Sites tested: 10    MUSCLE STRENGTH     Right Foot Muscle Strength   Foot dorsiflexion:  4-  Foot plantar flexion:  4-  Foot inversion:  4-  Foot eversion:  4-     Left Foot Muscle Strength   Foot dorsiflexion:  4-  Foot plantar flexion:  4-  Foot inversion:  4-  Foot eversion:  4-    RANGE OF MOTION     Right Foot Range of Motion   Foot and ankle ROM within normal limits       Left Foot Range of Motion   Foot and ankle ROM within normal limits      DERMATOLOGIC      Right Foot Dermatologic   Skin  Right foot skin is intact.      Left Foot Dermatologic   Skin  Positive for ulcer.         MRI Foot Left With & Without Contrast    Result Date: 12/25/2024  Narrative: MRI FOOT LEFT W WO CONTRAST Date of Exam: 12/25/2024 12:49 PM EST Indication: concern for osteo.  Comparison: Routine radiograph 12/24/2024 Technique:  Routine multiplanar/multisequence sequence images of the left foot were obtained before and after the uneventful administration of Multihance.  Findings: Motion artifact obscures the images. There is generalized soft tissue edema/cellulitis. No loculated fluid collections are identified to suggest abscess. There is fatty infiltration of the intrinsic muscles of the foot. The visualized flexor, extensor and peroneal tendons are intact. No definite abnormal marrow signal or enhancement to suggest osteomyelitis. The exam is limited by motion artifact. Soft tissue defect in the dorsum of the foot.     Impression: Impression: 1. Generalized soft tissue edema/cellulitis in the left foot. 2. No definite osteomyelitis. The exam is limited by motion artifact. Electronically Signed: Eric Martins MD  12/25/2024 1:55 PM EST  Workstation ID: [id number]    CT Lower Extremity Left With Contrast    Result Date: 12/24/2024  Narrative: CT LOWER EXTREMITY LEFT W CONTRAST Date of Exam: 12/24/2024  12:12 PM EST Indication: Left thigh abscess and cellulitis. Comparison: None available. Technique: Axial CT images were obtained of the left lower extremity after the uneventful intravenous administration of iodinated contrast.  Reconstructed coronal and sagittal images were also obtained. Automated exposure control and iterative construction methods were used. Findings: At the anterior-medial aspect of the distal left thigh, there appears to be a subcutaneous collection with fat and fluid attenuating components, several tiny foci of gas, and irregular peripheral enhancement suspicious for a subcutaneous abscess. This measures approximately 3.9 x 3.6 cm in maximal axial dimensions and approximately 6.5 cm craniocaudal. There is overlying skin thickening and enhancement suspicious for cellulitis. There is also surrounding subcutaneous edema. There is some mild diffuse subcu edema throughout the lower leg, primarily along the medial aspect. The abscess appears superficial to the medial thigh musculature. There does not appear to be significant intramuscular edema or perifascial fluid on this exam. No other significant localized collection in the visualized lower extremity. Status post bilateral knee arthroplasties. No significant knee effusion is seen on this exam. No definite hip effusion. There is spinal fusion hardware in the lower lumbar spine, incompletely visualized. No definite acute hardware complication is identified at the knee. No definite periosteal reaction, fracture, or other findings of acute osseous abnormality. There is moderate osteoarthritis of the left hip. There are moderate degenerative changes at the sacroiliac joints and pubic symphysis. There is calcific atherosclerosis of the visualized iliac and lower extremity arteries there is suspected multifocal mild to moderate luminal narrowing in the left iliac and proximal lower extremity arteries. The popliteus artery is not well evaluated due to  artifact from the knee hardware. There does appear to be some patent opacification of the visualized tibial and peroneal arteries.     Impression: Impression: 1.Subcutaneous abscess at the anterior-medial aspect of the distal left thigh measuring up to approximately 6.5 cm. Surrounding skin and subcutaneous findings suspicious for cellulitis. 2.No definite findings of acute osseous abnormality. Status post knee arthroplasty without definite findings of acute hardware complication or visible knee effusion. 3.Calcific atherosclerosis with suspected multifocal mild to moderate luminal narrowing in the left iliac and proximal lower extremity arteries. The popliteus artery is not well evaluated due to artifact from the knee hardware. There does appear to be patent opacification of the visualized tibial and peroneal arteries. Electronically Signed: Chester Arsenio  12/24/2024 1:09 PM EST  Workstation ID: [id number]    XR Foot 3+ View Left    Result Date: 12/24/2024  Narrative: XR FOOT 3+ VW LEFT Date of Exam: 12/24/2024 10:58 AM EST Indication: wound 11/9/2024 Comparison: 11/9/2024 Findings: There is no acute fracture or dislocation. No bony erosion or abnormal periosteal reaction. There is degenerative spurring along the dorsal aspect of the tarsal bones. There is enthesophyte formation of the calcaneus. There is soft tissue swelling along dorsum of the foot. No radiopaque foreign body.     Impression: Impression: 1. Soft tissue swelling of the dorsum of the foot. No conventional radiographic evidence of osteomyelitis. Electronically Signed: Andrea Knutson MD  12/24/2024 11:13 AM EST  Workstation ID: [id number]    Duplex Venous Lower Extremity - Left CAR    Result Date: 12/16/2024  Narrative:   Normal left lower extremity venous duplex scan.   Left 4 x 3 cm distal medial thigh nonvascular mixed density fluid collection of uncertain etiology.  Consider hematoma or abscess.  Clinical and/or alternative imaging correlation would  be appropriate.        Result Review    Result Review:  I have personally reviewed the results from the time of this admission to 12/25/2024 19:12 EST and agree with these findings:  [x]  Laboratory  []  Microbiology  [x]  Radiology  []  EKG/Telemetry   []  Cardiology/Vascular   []  Pathology  []  Old records  []  Other:      WBC   Date Value Ref Range Status   12/25/2024 12.98 (H) 3.40 - 10.80 10*3/mm3 Final     RBC   Date Value Ref Range Status   12/25/2024 2.59 (L) 3.77 - 5.28 10*6/mm3 Final     Hemoglobin   Date Value Ref Range Status   12/25/2024 7.8 (L) 12.0 - 15.9 g/dL Final     Hematocrit   Date Value Ref Range Status   12/25/2024 25.7 (L) 34.0 - 46.6 % Final     MCV   Date Value Ref Range Status   12/25/2024 99.2 (H) 79.0 - 97.0 fL Final     MCH   Date Value Ref Range Status   12/25/2024 30.1 26.6 - 33.0 pg Final     MCHC   Date Value Ref Range Status   12/25/2024 30.4 (L) 31.5 - 35.7 g/dL Final     RDW   Date Value Ref Range Status   12/25/2024 19.3 (H) 12.3 - 15.4 % Final     RDW-SD   Date Value Ref Range Status   12/25/2024 70.7 (H) 37.0 - 54.0 fl Final     MPV   Date Value Ref Range Status   12/25/2024 11.2 6.0 - 12.0 fL Final     Platelets   Date Value Ref Range Status   12/25/2024 273 140 - 450 10*3/mm3 Final     Neutrophil %   Date Value Ref Range Status   12/25/2024 77.4 (H) 42.7 - 76.0 % Final     Lymphocyte %   Date Value Ref Range Status   12/25/2024 12.8 (L) 19.6 - 45.3 % Final     Monocyte %   Date Value Ref Range Status   12/25/2024 8.4 5.0 - 12.0 % Final     Eosinophil %   Date Value Ref Range Status   12/25/2024 0.0 (L) 0.3 - 6.2 % Final     Basophil %   Date Value Ref Range Status   12/25/2024 0.2 0.0 - 1.5 % Final     Immature Grans %   Date Value Ref Range Status   12/25/2024 1.2 (H) 0.0 - 0.5 % Final     Neutrophils, Absolute   Date Value Ref Range Status   12/25/2024 10.05 (H) 1.70 - 7.00 10*3/mm3 Final     Lymphocytes, Absolute   Date Value Ref Range Status   12/25/2024 1.66 0.70 -  3.10 10*3/mm3 Final     Monocytes, Absolute   Date Value Ref Range Status   12/25/2024 1.09 (H) 0.10 - 0.90 10*3/mm3 Final     Eosinophils, Absolute   Date Value Ref Range Status   12/25/2024 0.00 0.00 - 0.40 10*3/mm3 Final     Basophils, Absolute   Date Value Ref Range Status   12/25/2024 0.03 0.00 - 0.20 10*3/mm3 Final     Immature Grans, Absolute   Date Value Ref Range Status   12/25/2024 0.15 (H) 0.00 - 0.05 10*3/mm3 Final     nRBC   Date Value Ref Range Status   12/25/2024 0.2 0.0 - 0.2 /100 WBC Final        Lab Results   Component Value Date    GLUCOSE 129 (H) 12/25/2024    BUN 36 (H) 12/25/2024    CREATININE 1.49 (H) 12/25/2024    EGFRIFNONA 70 12/15/2021    BCR 24.2 12/25/2024    K 5.0 12/25/2024    CO2 20.5 (L) 12/25/2024    CALCIUM 8.4 (L) 12/25/2024    PROTENTOTREF 6.0 08/04/2023    ALBUMIN 3.3 (L) 12/25/2024    LABIL2 1.5 08/04/2023    AST 40 (H) 12/25/2024    ALT 32 12/25/2024        Assessment & Plan   Assessment / Plan       Active Hospital Problems:  Active Hospital Problems    Diagnosis     **Wound cellulitis     Neuropathy     Ulcer of left foot with fat layer exposed     Left foot pain     Abscess of left lower extremity        Plan:     Comprehensive lower extremity examination and evaluation was performed.    Discussed findings and treatment plan including risks, benefits, and treatment options with patient in detail. Patient agreed with treatment plan.    Diabetic foot exam performed and documented this date, compliant with CQM required standards. Detail of findings as noted in physical exam.  Lower extremity Neurologic exam for diabetic patient performed and documented this date, compliant with PQRS required standards. Detail of findings as noted in physical exam.  Advised patient importance of good routine lower extremity hygiene. Advised patient importance of evaluating for intact skin and pain free nail borders.  Advised patient to use mirror to evaluate plantar/ soles of feet for better  visualization. Advised patient monitor and phone office to be seen if any cracking to skin, open lesions, painful nail borders or if nails become elongated prior to next visit. Advised patient importance of daily cleansing of lower extremities, followed by good skin cream to maintain normal hydration of skin. Also advised patient importance of close daily monitoring of blood sugar. Advised to regulate diet and medications to maintain control of blood sugar in optimal range. Contact primary care provider if difficulties maintaining blood sugar levels.  Advised Patient of presence of Diabetes Mellitus condition.  Advised Patient risk of progression and worsening or improvement, then return of condition.  Will monitor condition for any change in future. Treat with most appropriate treatment pending status of condition.  Counseled and advised patient extensively on nature and ramifications of diabetes. Standard instructions given to patient for good diabetic foot care and maintenance. Advised importance of careful monitoring to avoid break down and complications secondary to diabetes. Advised patient importance of strict maintenance of blood sugar control. Advised patient of possible ominous results from neglect of condition, i.e.: amputation/ loss of digits, feet and legs, or even death.  Patient states understands counseling, will monitor closely, continue good hygiene and routine diabetic foot care. Patient will contact office is questions or problems.      Patient is to monitor for recurrence and any new symptoms and to contact Dr. Go's office for a follow-up appointment.      The patient states understanding and agreement with this plan.    Discussed with the patient's nurse.    Wound care orders are written for daily changes with Santyl and normal saline damp gauze with Mepilex.    No surgical interventions are needed but the patient was foot at this time.    Patient is to follow-up with [facility] after discharge from hospital.    Thank you for including me in the care of this patient    VTE Prophylaxis:  Pharmacologic & mechanical VTE prophylaxis orders are present.        CODE STATUS:         Electronically signed by Andres Go DPM, 12/25/24, 3:47 PM EST.

## 2024-12-25 NOTE — PROGRESS NOTES
[facility]     Surgery Progress Note    Patient Name: Ann-Marie Hughes  :    1945  MRN:    [id number]  Date of admission:  2024  Length of Stay: 1 days    Subjective   79-year-old female with large left lower extremity abscess status post incision and drainage with sharp excisional debridement 7 x 7 x 4 cm ()    No acute events overnight.  Pain well-controlled.  Not yet ambulating.  Denies any nausea, vomiting, fevers, chills.  Objective     Current Facility-Administered Medications:     albuterol (PROVENTIL) nebulizer solution 0.083% 2.5 mg/3mL, 2.5 mg, Nebulization, Q6H PRN, Familia Foote MD, 2.5 mg at 24 0357    [Held by provider] apixaban (ELIQUIS) tablet 5 mg, 5 mg, Oral, Q12H, Sergo Altamirano MD    arformoterol (BROVANA) nebulizer solution 15 mcg, 15 mcg, Nebulization, BID - RT, Familia Foote MD    aspirin EC tablet 81 mg, 81 mg, Oral, Daily, Sergo Altamirano MD    benzocaine-menthol (CHLORASEPTIC) lozenge 1 each, 1 lozenge, Mouth/Throat, TID PRN, Familia Foote MD, 1 each at 24 0555    sennosides-docusate (PERICOLACE) 8.6-50 MG per tablet 2 tablet, 2 tablet, Oral, BID PRN **AND** polyethylene glycol (MIRALAX) packet 17 g, 17 g, Oral, Daily PRN **AND** bisacodyl (DULCOLAX) EC tablet 5 mg, 5 mg, Oral, Daily PRN **AND** bisacodyl (DULCOLAX) suppository 10 mg, 10 mg, Rectal, Daily PRN, Sergo Altamirano MD    cefepime 1000 mg IVPB in 100 mL NS (VTB), 1,000 mg, Intravenous, Q12H, Sergo Altamirano MD, 1,000 mg at 24 2207    famotidine (PEPCID) tablet 40 mg, 40 mg, Oral, Daily, Sergo Altamirano MD    metoprolol succinate XL (TOPROL-XL) 24 hr tablet 50 mg, 50 mg, Oral, BID, Familia Foote MD    morphine injection 1 mg, 1 mg, Intravenous, Q4H PRN, Familia Foote MD    multivitamin with minerals 1 tablet, 1 tablet, Oral, Daily, Sergo Altamirano MD    ondansetron (ZOFRAN) injection 4 mg, 4 mg, Intravenous, Q6H PRN, Sergo Altamirano MD    Pharmacy  to dose vancomycin, , Not Applicable, Continuous PRN, Sergo Altamirano MD    prothrombin complex conc human (KCentra) IV solution 2,288 Units, 2,288 Units, Intravenous, Once, Sergo Altamirano MD    sodium chloride 0.9 % flush 10 mL, 10 mL, Intravenous, Q12H, Sergo Altamirano MD, 10 mL at 12/24/24 2000    sodium chloride 0.9 % flush 10 mL, 10 mL, Intravenous, PRN, Sergo Altamirano MD    sodium chloride 0.9 % infusion 40 mL, 40 mL, Intravenous, PRN, Sergo Altamirano MD    Current Diet:    Dietary Orders (From admission, onward)       Start     Ordered    12/24/24 1657  Diet: Regular/House; Fluid Consistency: Thin (IDDSI 0)  Diet Effective Now        References:    Diet Order Definitions   Question Answer Comment   Diets: Regular/House    Fluid Consistency: Thin (IDDSI 0)        12/24/24 1656                    Vitals:   Temp:  [97.5 °F (36.4 °C)-98.2 °F (36.8 °C)] 97.7 °F (36.5 °C)  Heart Rate:  [60-74] 72  Resp:  [16-24] 21  BP: (127-186)/(43-76) 166/70  Flow (L/min) (Oxygen Therapy):  [2] 2  Physical Exam   General: no acute distress, resting in bed  Respiratory:  non-labored breathing  Cardiovascular:  RRR, non-tachycardic  Abdomen:  soft, non-distended, non-tender  Musculoskeletal: Left lower extremity Ace wrap in place, compartments soft    LABS:  CBC          11/9/2024    11:48 12/24/2024    10:53 12/25/2024    03:20   CBC   WBC 11.61  15.57  12.98    RBC 3.13  2.84  2.59    Hemoglobin 9.9  8.7  7.8    Hematocrit 32.4  27.7  25.7    .5  97.5  99.2    MCH 31.6  30.6  30.1    MCHC 30.6  31.4  30.4    RDW 18.9  19.4  19.3    Platelets 253  309  273      BMP          11/9/2024    11:48 12/24/2024    10:53 12/25/2024    03:20   BMP   BUN 27  36  36    Creatinine 1.23  1.58  1.49    Sodium 142  139  139    Potassium 4.1  4.3  5.0    Chloride 107  103  106    CO2 25.5  20.8  20.5    Calcium 8.9  8.9  8.4      CMP          11/9/2024    11:48 12/24/2024    10:53 12/25/2024    03:20   CMP   Glucose 136  123  129     BUN 27  36  36    Creatinine 1.23  1.58  1.49    EGFR 44.8  33.0  35.4    Sodium 142  139  139    Potassium 4.1  4.3  5.0    Chloride 107  103  106    Calcium 8.9  8.9  8.4    Total Protein 7.2  7.1  6.4    Albumin 4.3  3.7  3.3    Globulin 2.9  3.4  3.1    Total Bilirubin 1.1  0.4  0.2    Alkaline Phosphatase 57  49  45    AST (SGOT) 26  19  40    ALT (SGPT) 15  8  32    Albumin/Globulin Ratio 1.5  1.1  1.1    BUN/Creatinine Ratio 22.0  22.8  24.2    Anion Gap 9.5  15.2  12.5        Lab Results (last 24 hours)       Procedure Component Value Units Date/Time    POC Glucose Once [[id number]]  (Abnormal) Collected: 12/24/24 1454    Specimen: Blood Updated: 12/25/24 0619     Glucose 129 mg/dL      Comment: Serial Number: [id number]:  [id number]       Comprehensive Metabolic Panel [[id number]]  (Abnormal) Collected: 12/25/24 0320    Specimen: Blood from Hand, Right Updated: 12/25/24 0426     Glucose 129 mg/dL      BUN 36 mg/dL      Creatinine 1.49 mg/dL      Sodium 139 mmol/L      Potassium 5.0 mmol/L      Chloride 106 mmol/L      CO2 20.5 mmol/L      Calcium 8.4 mg/dL      Total Protein 6.4 g/dL      Albumin 3.3 g/dL      ALT (SGPT) 32 U/L      AST (SGOT) 40 U/L      Alkaline Phosphatase 45 U/L      Total Bilirubin 0.2 mg/dL      Globulin 3.1 gm/dL      A/G Ratio 1.1 g/dL      BUN/Creatinine Ratio 24.2     Anion Gap 12.5 mmol/L      eGFR 35.4 mL/min/1.73     Narrative:      GFR Categories in Chronic Kidney Disease (CKD)      GFR Category          GFR (mL/min/1.73)    Interpretation  G1                     90 or greater         Normal or high (1)  G2                      60-89                Mild decrease (1)  G3a                   45-59                Mild to moderate decrease  G3b                   30-44                Moderate to severe decrease  G4                    15-29                Severe decrease  G5                    14 or less           Kidney failure          (1)In the absence of evidence of kidney  disease, neither GFR category G1 or G2 fulfill the criteria for CKD.    eGFR calculation 2021 CKD-EPI creatinine equation, which does not include race as a factor    Magnesium [[id number]]  (Normal) Collected: 12/25/24 0320    Specimen: Blood from Hand, Right Updated: 12/25/24 0426     Magnesium 2.0 mg/dL     Phosphorus [[id number]]  (Abnormal) Collected: 12/25/24 0320    Specimen: Blood from Hand, Right Updated: 12/25/24 0426     Phosphorus 4.6 mg/dL     Vancomycin, Random [[id number]]  (Normal) Collected: 12/25/24 0320    Specimen: Blood from Hand, Right Updated: 12/25/24 0420     Vancomycin Random 15.00 mcg/mL     Narrative:      Therapeutic Ranges for Vancomycin    Vancomycin Random   5.0-40.0 mcg/mL  Vancomycin Trough   5.0-20.0 mcg/mL  Vancomycin Peak     20.0-40.0 mcg/mL    CBC Auto Differential [[id number]]  (Abnormal) Collected: 12/25/24 0320    Specimen: Blood from Hand, Right Updated: 12/25/24 0356     WBC 12.98 10*3/mm3      RBC 2.59 10*6/mm3      Hemoglobin 7.8 g/dL      Hematocrit 25.7 %      MCV 99.2 fL      MCH 30.1 pg      MCHC 30.4 g/dL      RDW 19.3 %      RDW-SD 70.7 fl      MPV 11.2 fL      Platelets 273 10*3/mm3      Neutrophil % 77.4 %      Lymphocyte % 12.8 %      Monocyte % 8.4 %      Eosinophil % 0.0 %      Basophil % 0.2 %      Immature Grans % 1.2 %      Neutrophils, Absolute 10.05 10*3/mm3      Lymphocytes, Absolute 1.66 10*3/mm3      Monocytes, Absolute 1.09 10*3/mm3      Eosinophils, Absolute 0.00 10*3/mm3      Basophils, Absolute 0.03 10*3/mm3      Immature Grans, Absolute 0.15 10*3/mm3      nRBC 0.2 /100 WBC     Wound Culture - Swab, Leg, Left [[id number]] Collected: 12/24/24 1526    Specimen: Swab from Leg, Left Updated: 12/24/24 1623     Gram Stain Few (2+) WBCs seen      No organisms seen    Anaerobic Culture - Swab, Leg, Left [[id number]] Collected: 12/24/24 1526    Specimen: Swab from Leg, Left Updated: 12/24/24 1546    Fungus Culture - Swab, Leg, Left [[id number]] Collected: 12/24/24  1526    Specimen: Swab from Leg, Left Updated: 12/24/24 1546    AFB Culture - Swab, Leg, Left [[id number]] Collected: 12/24/24 1526    Specimen: Swab from Leg, Left Updated: 12/24/24 1546    Protime-INR [[id number]]  (Abnormal) Collected: 12/24/24 1436    Specimen: Blood from Hand, Right Updated: 12/24/24 1454     Protime 16.1 Seconds      INR 1.26    Narrative:      Suggested Therapeutic Ranges For Oral Anticoagulant Therapy:  Level of Therapy                      INR Target Range  Standard Dose                            2.0-3.0  High Dose                                2.5-3.5  Patients not receiving anticoagulant  Therapy Normal Range                     0.86-1.15    Extra Tubes [[id number]] Collected: 12/24/24 1000    Specimen: Blood, Venous Line Updated: 12/24/24 1340    Narrative:      The following orders were created for panel order Extra Tubes.  Procedure                               Abnormality         Status                     ---------                               -----------         ------                     Gold Top - SST[[id number]]                                   Final result               Green Top (Gel)[[id number]]                                                             Light Blue Top[[id number]]                                   Final result                 Please view results for these tests on the individual orders.    Wound Culture - Swab, Foot, Left [[id number]] Collected: 12/24/24 1116    Specimen: Swab from Foot, Left Updated: 12/24/24 1330     Gram Stain Few (2+) Gram positive cocci in pairs and clusters      Few (2+) Gram positive bacilli resembling diphtheroids      Few (2+) WBCs seen    Wound Culture - Swab, Thigh, Left [[id number]] Collected: 12/24/24 1116    Specimen: Swab from Thigh, Left Updated: 12/24/24 1327     Gram Stain Moderate (3+) WBCs seen      Moderate (3+) Gram negative bacilli      Few (2+) Gram positive cocci in pairs and clusters    Light Blue Top [[id number]]  Collected: 12/24/24 1313    Specimen: Blood Updated: 12/24/24 1315     Extra Tube Hold for add-ons.     Comment: Auto resulted       Gold Top - SST [[id number]] Collected: 12/24/24 1000    Specimen: Blood Updated: 12/24/24 1201     Extra Tube Hold for add-ons.     Comment: Auto resulted.       Comprehensive Metabolic Panel [[id number]]  (Abnormal) Collected: 12/24/24 1053    Specimen: Blood Updated: 12/24/24 1140     Glucose 123 mg/dL      BUN 36 mg/dL      Creatinine 1.58 mg/dL      Sodium 139 mmol/L      Potassium 4.3 mmol/L      Chloride 103 mmol/L      CO2 20.8 mmol/L      Calcium 8.9 mg/dL      Total Protein 7.1 g/dL      Albumin 3.7 g/dL      ALT (SGPT) 8 U/L      AST (SGOT) 19 U/L      Alkaline Phosphatase 49 U/L      Total Bilirubin 0.4 mg/dL      Globulin 3.4 gm/dL      A/G Ratio 1.1 g/dL      BUN/Creatinine Ratio 22.8     Anion Gap 15.2 mmol/L      eGFR 33.0 mL/min/1.73     Narrative:      GFR Categories in Chronic Kidney Disease (CKD)      GFR Category          GFR (mL/min/1.73)    Interpretation  G1                     90 or greater         Normal or high (1)  G2                      60-89                Mild decrease (1)  G3a                   45-59                Mild to moderate decrease  G3b                   30-44                Moderate to severe decrease  G4                    15-29                Severe decrease  G5                    14 or less           Kidney failure          (1)In the absence of evidence of kidney disease, neither GFR category G1 or G2 fulfill the criteria for CKD.    eGFR calculation 2021 CKD-EPI creatinine equation, which does not include race as a factor    C-reactive Protein [[id number]]  (Abnormal) Collected: 12/24/24 1053    Specimen: Blood Updated: 12/24/24 1140     C-Reactive Protein 10.05 mg/dL     Blood Culture - Blood, Hand, Right [[id number]] Collected: 12/24/24 1129    Specimen: Blood from Hand, Right Updated: 12/24/24 1131    Lactic Acid, Plasma [[id number]]   (Normal) Collected: 12/24/24 1053    Specimen: Blood Updated: 12/24/24 1125     Lactate 1.8 mmol/L     Sedimentation Rate [[id number]]  (Abnormal) Collected: 12/24/24 1053    Specimen: Blood Updated: 12/24/24 1112     Sed Rate 52 mm/hr     CBC & Differential [[id number]]  (Abnormal) Collected: 12/24/24 1053    Specimen: Blood Updated: 12/24/24 1105    Narrative:      The following orders were created for panel order CBC & Differential.  Procedure                               Abnormality         Status                     ---------                               -----------         ------                     CBC Auto Differential[[id number]]        Abnormal            Final result                 Please view results for these tests on the individual orders.    CBC Auto Differential [[id number]]  (Abnormal) Collected: 12/24/24 1053    Specimen: Blood Updated: 12/24/24 1105     WBC 15.57 10*3/mm3      RBC 2.84 10*6/mm3      Hemoglobin 8.7 g/dL      Hematocrit 27.7 %      MCV 97.5 fL      MCH 30.6 pg      MCHC 31.4 g/dL      RDW 19.4 %      RDW-SD 69.3 fl      MPV 11.6 fL      Platelets 309 10*3/mm3      Neutrophil % 78.6 %      Lymphocyte % 11.2 %      Monocyte % 8.5 %      Eosinophil % 0.6 %      Basophil % 0.4 %      Immature Grans % 0.7 %      Neutrophils, Absolute 12.22 10*3/mm3      Lymphocytes, Absolute 1.75 10*3/mm3      Monocytes, Absolute 1.33 10*3/mm3      Eosinophils, Absolute 0.09 10*3/mm3      Basophils, Absolute 0.07 10*3/mm3      Immature Grans, Absolute 0.11 10*3/mm3      nRBC 0.1 /100 WBC     Blood Culture - Blood, Arm, Left [[id number]] Collected: 12/24/24 1053    Specimen: Blood from Arm, Left Updated: 12/24/24 1102            IMAGING:  Imaging Results (Last 24 Hours)       Procedure Component Value Units Date/Time    CT Lower Extremity Left With Contrast [[id number]] Collected: 12/24/24 1257     Updated: 12/24/24 1311    Narrative:      CT LOWER EXTREMITY LEFT W CONTRAST    Date of Exam: 12/24/2024 12:12  PM EST    Indication: Left thigh abscess and cellulitis.    Comparison: None available.    Technique: Axial CT images were obtained of the left lower extremity after the uneventful intravenous administration of iodinated contrast.  Reconstructed coronal and sagittal images were also obtained. Automated exposure control and iterative   construction methods were used.      Findings:  At the anterior-medial aspect of the distal left thigh, there appears to be a subcutaneous collection with fat and fluid attenuating components, several tiny foci of gas, and irregular peripheral enhancement suspicious for a subcutaneous abscess. This   measures approximately 3.9 x 3.6 cm in maximal axial dimensions and approximately 6.5 cm craniocaudal. There is overlying skin thickening and enhancement suspicious for cellulitis. There is also surrounding subcutaneous edema. There is some mild diffuse   subcu edema throughout the lower leg, primarily along the medial aspect.    The abscess appears superficial to the medial thigh musculature. There does not appear to be significant intramuscular edema or perifascial fluid on this exam. No other significant localized collection in the visualized lower extremity. Status post   bilateral knee arthroplasties. No significant knee effusion is seen on this exam. No definite hip effusion. There is spinal fusion hardware in the lower lumbar spine, incompletely visualized. No definite acute hardware complication is identified at the   knee. No definite periosteal reaction, fracture, or other findings of acute osseous abnormality. There is moderate osteoarthritis of the left hip. There are moderate degenerative changes at the sacroiliac joints and pubic symphysis.    There is calcific atherosclerosis of the visualized iliac and lower extremity arteries there is suspected multifocal mild to moderate luminal narrowing in the left iliac and proximal lower extremity arteries. The popliteus artery is not  well evaluated   due to artifact from the knee hardware. There does appear to be some patent opacification of the visualized tibial and peroneal arteries.      Impression:      Impression:  1.Subcutaneous abscess at the anterior-medial aspect of the distal left thigh measuring up to approximately 6.5 cm. Surrounding skin and subcutaneous findings suspicious for cellulitis.  2.No definite findings of acute osseous abnormality. Status post knee arthroplasty without definite findings of acute hardware complication or visible knee effusion.  3.Calcific atherosclerosis with suspected multifocal mild to moderate luminal narrowing in the left iliac and proximal lower extremity arteries. The popliteus artery is not well evaluated due to artifact from the knee hardware. There does appear to be   patent opacification of the visualized tibial and peroneal arteries.            Electronically Signed: Chester Arsenio    12/24/2024 1:09 PM EST    Workstation ID: [id number]    XR Foot 3+ View Left [[id number]] Collected: 12/24/24 1112     Updated: 12/24/24 1115    Narrative:      XR FOOT 3+ VW LEFT    Date of Exam: 12/24/2024 10:58 AM EST    Indication: wound 11/9/2024    Comparison: 11/9/2024    Findings:  There is no acute fracture or dislocation. No bony erosion or abnormal periosteal reaction. There is degenerative spurring along the dorsal aspect of the tarsal bones. There is enthesophyte formation of the calcaneus. There is soft tissue swelling along   dorsum of the foot. No radiopaque foreign body.      Impression:      Impression:    1. Soft tissue swelling of the dorsum of the foot. No conventional radiographic evidence of osteomyelitis.      Electronically Signed: Andrea Knutson MD    12/24/2024 11:13 AM EST    Workstation ID: [id number]            [x]  Laboratory  []  Microbiology  []  Radiology  []  EKG/Telemetry   []  Cardiology/Vascular   []  Pathology  []  Old records    Assessment / Plan   Assessment:   Active Hospital  Problems    Diagnosis     **Wound cellulitis     Abscess of left lower extremity          Plan:    Abscess of left lower extremity  -Afebrile, vitals stable, leukocytosis improved to 12,000  -Status post incision and drainage with debridement of nonviable subcutaneous tissue/skin (12/24)  -Wound culture pending  -Continue broad-spectrum IV antibiotics  -Maintain wound packing until tomorrow, will consult wound care nursing for consideration of wound VAC placement  -Maintain Ace wrap  -No plans for any acute surgical intervention, will continue to follow  Electronically signed by Sergo Altamirano MD, 12/25/24, 6:22 AM EST.

## 2024-12-25 NOTE — PROGRESS NOTES
"[facility] Clinical Pharmacy Services: Vancomycin Monitoring Note    Ann-Marie Hughes is a 79 y.o. female who is on day  of pharmacy to dose vancomycin for Skin and Soft Tissue.    Previous Vancomycin Dose:   1750 mg IV x1 on  @1250   Imaging Reviewed?: Yes   CT Left Lower Extremity: Subcutaneous abscess at the anterior-medial aspect of the distal left thigh measuring up to approximately 6.5 cm. Surrounding skin and subcutaneous findings suspicious for cellulitis. No definite findings of acute osseous abnormality. Status post knee arthroplasty without definite findings of acute hardware complication or visible knee effusion. Calcific atherosclerosis with suspected multifocal mild to moderate luminal narrowing in the left iliac and proximal lower extremity arteries. The popliteus artery is not well evaluated due to artifact from the knee hardware. There does appear to be patent opacification of the visualized tibial and peroneal arteries.   XR Left foot: soft tissue swelling of the dorsum of the foot; no conventional radiographic evidence of osteomyelitis    Updated Cultures and Sensitivities:    Blood cx : in process   Wound cx, left thigh swab: few GPC in pairs and clusters on gram stain   Wound cx, left foot swab: few GPC in pairs and clusters and GPB on gram stain   Wound cx, left leg swab: no organisms seen on gram stain    Vitals/Labs  Ht: 162.6 cm (64\"); Wt: 85.6 kg (188 lb 11.4 oz)   Temp (24hrs), Av.8 °F (36.6 °C), Min:97.3 °F (36.3 °C), Max:98.2 °F (36.8 °C)   Estimated Creatinine Clearance: 31.9 mL/min (A) (by C-G formula based on SCr of 1.49 mg/dL (H)).     Results from last 7 days   Lab Units 24  0320 24  1053   VANCOMYCIN RM mcg/mL 15.00  --    CREATININE mg/dL 1.49* 1.58*   WBC 10*3/mm3 12.98* 15.57*     Assessment/Plan  Current Vancomycin Dose: pulse dosing with 1000 mg IV once on  at 0830  given vancomycin level of 15 mcg/mL this " morning; can consider scheduling doses if renal function stabilizes  Next Vanc Random ordered for 12/26 at 0600  We will continue to monitor patient changes and renal function     Thank you for involving pharmacy in this patient's care. Please contact pharmacy with any questions or concerns.    Ellen Evans RPH  Clinical Pharmacist

## 2024-12-26 LAB
ALBUMIN SERPL-MCNC: 3.1 G/DL (ref 3.5–5.2)
ANION GAP SERPL CALCULATED.3IONS-SCNC: 10 MMOL/L (ref 5–15)
BUN SERPL-MCNC: 35 MG/DL (ref 8–23)
BUN/CREAT SERPL: 22.7 (ref 7–25)
CALCIUM SPEC-SCNC: 8.7 MG/DL (ref 8.6–10.5)
CHLORIDE SERPL-SCNC: 107 MMOL/L (ref 98–107)
CO2 SERPL-SCNC: 21 MMOL/L (ref 22–29)
CREAT SERPL-MCNC: 1.54 MG/DL (ref 0.57–1)
DEPRECATED RDW RBC AUTO: 69 FL (ref 37–54)
EGFRCR SERPLBLD CKD-EPI 2021: 34 ML/MIN/1.73
ERYTHROCYTE [DISTWIDTH] IN BLOOD BY AUTOMATED COUNT: 19.3 % (ref 12.3–15.4)
GLUCOSE SERPL-MCNC: 136 MG/DL (ref 65–99)
HCT VFR BLD AUTO: 26.1 % (ref 34–46.6)
HGB BLD-MCNC: 7.9 G/DL (ref 12–15.9)
MAGNESIUM SERPL-MCNC: 1.9 MG/DL (ref 1.6–2.4)
MCH RBC QN AUTO: 29.7 PG (ref 26.6–33)
MCHC RBC AUTO-ENTMCNC: 30.3 G/DL (ref 31.5–35.7)
MCV RBC AUTO: 98.1 FL (ref 79–97)
NT-PROBNP SERPL-MCNC: ABNORMAL PG/ML (ref 0–1800)
PHOSPHATE SERPL-MCNC: 3.2 MG/DL (ref 2.5–4.5)
PLATELET # BLD AUTO: 256 10*3/MM3 (ref 140–450)
PMV BLD AUTO: 11 FL (ref 6–12)
POTASSIUM SERPL-SCNC: 4.6 MMOL/L (ref 3.5–5.2)
RBC # BLD AUTO: 2.66 10*6/MM3 (ref 3.77–5.28)
SODIUM SERPL-SCNC: 138 MMOL/L (ref 136–145)
VANCOMYCIN SERPL-MCNC: 17 MCG/ML (ref 5–40)
WBC NRBC COR # BLD AUTO: 13.77 10*3/MM3 (ref 3.4–10.8)

## 2024-12-26 PROCEDURE — 83735 ASSAY OF MAGNESIUM: CPT | Performed by: STUDENT IN AN ORGANIZED HEALTH CARE EDUCATION/TRAINING PROGRAM

## 2024-12-26 PROCEDURE — 94799 UNLISTED PULMONARY SVC/PX: CPT

## 2024-12-26 PROCEDURE — 80202 ASSAY OF VANCOMYCIN: CPT | Performed by: INTERNAL MEDICINE

## 2024-12-26 PROCEDURE — 83880 ASSAY OF NATRIURETIC PEPTIDE: CPT | Performed by: INTERNAL MEDICINE

## 2024-12-26 PROCEDURE — 94664 DEMO&/EVAL PT USE INHALER: CPT

## 2024-12-26 PROCEDURE — 99233 SBSQ HOSP IP/OBS HIGH 50: CPT | Performed by: INTERNAL MEDICINE

## 2024-12-26 PROCEDURE — 80069 RENAL FUNCTION PANEL: CPT | Performed by: STUDENT IN AN ORGANIZED HEALTH CARE EDUCATION/TRAINING PROGRAM

## 2024-12-26 PROCEDURE — 25010000002 VANCOMYCIN 5 G RECONSTITUTED SOLUTION: Performed by: INTERNAL MEDICINE

## 2024-12-26 PROCEDURE — 25010000002 CEFEPIME PER 500 MG: Performed by: INTERNAL MEDICINE

## 2024-12-26 PROCEDURE — 25810000003 SODIUM CHLORIDE 0.9 % SOLUTION: Performed by: INTERNAL MEDICINE

## 2024-12-26 PROCEDURE — 85027 COMPLETE CBC AUTOMATED: CPT | Performed by: STUDENT IN AN ORGANIZED HEALTH CARE EDUCATION/TRAINING PROGRAM

## 2024-12-26 PROCEDURE — 94760 N-INVAS EAR/PLS OXIMETRY 1: CPT

## 2024-12-26 PROCEDURE — 94761 N-INVAS EAR/PLS OXIMETRY MLT: CPT

## 2024-12-26 RX ORDER — FUROSEMIDE 40 MG/1
40 TABLET ORAL EVERY OTHER DAY
Status: DISCONTINUED | OUTPATIENT
Start: 2024-12-26 | End: 2024-12-27 | Stop reason: HOSPADM

## 2024-12-26 RX ORDER — ATORVASTATIN CALCIUM 40 MG/1
40 TABLET, FILM COATED ORAL DAILY
Status: DISCONTINUED | OUTPATIENT
Start: 2024-12-26 | End: 2024-12-27 | Stop reason: HOSPADM

## 2024-12-26 RX ORDER — ACETAMINOPHEN 325 MG/1
650 TABLET ORAL EVERY 6 HOURS PRN
Status: DISCONTINUED | OUTPATIENT
Start: 2024-12-26 | End: 2024-12-27 | Stop reason: HOSPADM

## 2024-12-26 RX ORDER — BUPROPION HYDROCHLORIDE 150 MG/1
300 TABLET ORAL DAILY
Status: DISCONTINUED | OUTPATIENT
Start: 2024-12-26 | End: 2024-12-27 | Stop reason: HOSPADM

## 2024-12-26 RX ORDER — HYDRALAZINE HYDROCHLORIDE 20 MG/ML
10 INJECTION INTRAMUSCULAR; INTRAVENOUS ONCE
Status: COMPLETED | OUTPATIENT
Start: 2024-12-27 | End: 2024-12-27

## 2024-12-26 RX ORDER — SPIRONOLACTONE 25 MG/1
25 TABLET ORAL DAILY
Status: DISCONTINUED | OUTPATIENT
Start: 2024-12-26 | End: 2024-12-27 | Stop reason: HOSPADM

## 2024-12-26 RX ORDER — DIGOXIN 125 MCG
125 TABLET ORAL EVERY OTHER DAY
Status: DISCONTINUED | OUTPATIENT
Start: 2024-12-26 | End: 2024-12-27 | Stop reason: HOSPADM

## 2024-12-26 RX ORDER — LEVOTHYROXINE SODIUM 75 UG/1
75 TABLET ORAL
Status: DISCONTINUED | OUTPATIENT
Start: 2024-12-26 | End: 2024-12-27 | Stop reason: HOSPADM

## 2024-12-26 RX ADMIN — FAMOTIDINE 40 MG: 20 TABLET, FILM COATED ORAL at 08:12

## 2024-12-26 RX ADMIN — SPIRONOLACTONE 25 MG: 25 TABLET ORAL at 10:00

## 2024-12-26 RX ADMIN — Medication 10 ML: at 20:26

## 2024-12-26 RX ADMIN — DIGOXIN 125 MCG: 125 TABLET ORAL at 10:00

## 2024-12-26 RX ADMIN — CEFEPIME 1000 MG: 1 INJECTION, POWDER, FOR SOLUTION INTRAMUSCULAR; INTRAVENOUS at 22:07

## 2024-12-26 RX ADMIN — SACUBITRIL AND VALSARTAN 1 TABLET: 24; 26 TABLET, FILM COATED ORAL at 20:26

## 2024-12-26 RX ADMIN — METOPROLOL SUCCINATE 50 MG: 50 TABLET, EXTENDED RELEASE ORAL at 08:12

## 2024-12-26 RX ADMIN — VANCOMYCIN HYDROCHLORIDE 750 MG: 5 INJECTION, POWDER, LYOPHILIZED, FOR SOLUTION INTRAVENOUS at 08:17

## 2024-12-26 RX ADMIN — ARFORMOTEROL TARTRATE 15 MCG: 15 SOLUTION RESPIRATORY (INHALATION) at 08:31

## 2024-12-26 RX ADMIN — Medication 1 TABLET: at 08:12

## 2024-12-26 RX ADMIN — FUROSEMIDE 40 MG: 40 TABLET ORAL at 10:00

## 2024-12-26 RX ADMIN — ARFORMOTEROL TARTRATE 15 MCG: 15 SOLUTION RESPIRATORY (INHALATION) at 18:33

## 2024-12-26 RX ADMIN — Medication 10 ML: at 08:12

## 2024-12-26 RX ADMIN — ATORVASTATIN CALCIUM 40 MG: 40 TABLET, FILM COATED ORAL at 10:00

## 2024-12-26 RX ADMIN — BUPROPION HYDROCHLORIDE 300 MG: 150 TABLET, EXTENDED RELEASE ORAL at 10:00

## 2024-12-26 RX ADMIN — METOPROLOL SUCCINATE 50 MG: 50 TABLET, EXTENDED RELEASE ORAL at 20:25

## 2024-12-26 RX ADMIN — ACETAMINOPHEN 650 MG: 325 TABLET ORAL at 23:03

## 2024-12-26 RX ADMIN — CEFEPIME 1000 MG: 1 INJECTION, POWDER, FOR SOLUTION INTRAMUSCULAR; INTRAVENOUS at 14:19

## 2024-12-26 RX ADMIN — ASPIRIN 81 MG: 81 TABLET, COATED ORAL at 08:12

## 2024-12-26 RX ADMIN — SACUBITRIL AND VALSARTAN 1 TABLET: 24; 26 TABLET, FILM COATED ORAL at 10:00

## 2024-12-26 RX ADMIN — EMPAGLIFLOZIN 25 MG: 10 TABLET, FILM COATED ORAL at 09:59

## 2024-12-26 RX ADMIN — LEVOTHYROXINE SODIUM 75 MCG: 75 TABLET ORAL at 10:00

## 2024-12-26 NOTE — SIGNIFICANT NOTE
submitted request to Vendor in preparation for home VAC need at DC.   Pending vendor decision.   Unclear DC plan at this time per chart review.   NPWT was placed today but wound nurse team.

## 2024-12-26 NOTE — SIGNIFICANT NOTE
[facility]   Wound Evaluation / Progress Note       Patient Name: Ann-Marie Hughes    : 1945    MRN: [id number]  Today's Date: 2024                     Admit Date: 2024    Visit Dx:    ICD-10-CM ICD-9-CM   1. Abscess of left lower extremity  L02.416 682.6       Patient Active Problem List   Diagnosis    Arthritis    Chronic pain syndrome    Diabetes mellitus, type II    Gastric reflux    Herniated disc    Primary hypertension    Hypothyroidism    Pain in joint, multiple sites    Hyperlipidemia    B12 deficiency    Vitamin D deficiency    Acne rosacea    Hx of smoking    Stress incontinence of urine    Arteriosclerosis of abdominal aorta    Iron deficiency anemia secondary to inadequate dietary iron intake    Seasonal allergies    Hyponatremia    Leg length discrepancy    Reactive depression    Encounter for subsequent annual wellness visit (AWV) in Medicare patient    Class 1 obesity with alveolar hypoventilation, serious comorbidity, and body mass index (BMI) of 32.0 to 32.9 in adult    Chronic HFrEF (heart failure with reduced ejection fraction)    CAD status post CABG x3 vessels    PAF (paroxysmal atrial fibrillation)    Encounter for medical examination to establish care    Autoimmune hemolytic anemia    Need for RSV vaccination    Stage 3b chronic kidney disease    Large granular lymphocyte disorder    Cervicogenic headache    Aortic stenosis, mild    Contusion of left foot    Cellulitis of foot    Cellulitis of left lower extremity    Wound cellulitis    Abscess of left lower extremity    Neuropathy    Ulcer of left foot with fat layer exposed    Left foot pain        Past Medical History:   Diagnosis Date    Acne rosacea 2021    DR.JEFF MOON     Acute respiratory failure with hypoxia 2023    Arteriosclerosis of abdominal aorta     B12 deficiency 2021    DJD (degenerative joint disease)     Hx of smoking 2021    QUIT IN  AT AGE 31     Hyperlipidemia 08/17/2021    Hypertension     Hypothyroidism     Metabolic syndrome 08/17/2021    NSTEMI (non-ST elevated myocardial infarction) 07/17/2023    Pericardial effusion 10/10/2023    KAREN (stress urinary incontinence, female) 08/17/2021    UTI (urinary tract infection) 08/17/2021    Vitamin D deficiency 08/17/2021        Past Surgical History:   Procedure Laterality Date    BACK SURGERY  2013    CARDIAC CATHETERIZATION N/A 07/17/2023    Procedure: Left Heart Cath;  Surgeon: Dinh Andres MD;  Location: [facility] CATH INVASIVE LOCATION;  Service: Cardiovascular;  Laterality: N/A;    CARPAL TUNNEL RELEASE      COLONOSCOPY  2011    CORONARY ARTERY BYPASS GRAFT WITH MITRAL VALVE REPAIR/REPLACEMENT N/A 07/20/2023    Procedure: REZA STERNOTOMY OFF-PUMP CORONARY ARTERY BYPASS GRAFT TIMES        USING LEFFT INTERNAL MAMMARY ARTERY AND     GREATER SAPHENOUS VEIN GRAFT PER EMDOSCOPIC VEIN HARVESTING, MAZE PROCEDURE AND PRP;  Surgeon: Shane Alexander MD;  [facility];  Service: Cardiothoracic;  Laterality: N/A;    INCISION AND DRAINAGE LEG N/A 12/24/2024    Procedure: INCISION AND DRAINAGE LOWER EXTREMITY; Plain text: incision to remove pus from left leg;  Surgeon: Sergo Altamirano MD;  Location: [facility] MAIN OR;  Service: General;  Laterality: N/A;    KNEE SURGERY      LUMBAR DISCECTOMY      NECK SURGERY      Cervical    POSTERIOR LUMBAR/THORACIC SPINE FUSION           Physical Assessment:  Wound 12/24/24 1528 Left anterior knee (Active)   Wound Image   12/26/24 1400   Dressing Appearance intact;moist drainage 12/26/24 1400   Closure Unable to assess 12/26/24 0740   Base red;moist;yellow 12/26/24 1400   Red (%), Wound Tissue Color 95 12/26/24 1400   Yellow (%), Wound Tissue Color 5 12/26/24 1400   Periwound intact;redness;warm 12/26/24 1400   Periwound Temperature warm 12/26/24 1400   Periwound Skin Turgor soft 12/26/24 1400   Edges open 12/26/24 1400   Wound Length (cm) 6 cm 12/26/24 1400   Wound  Width (cm) 4.2 cm 12/26/24 1400   Wound Depth (cm) 6 cm 12/26/24 1400   Wound Surface Area (cm^2) 25.2 cm^2 12/26/24 1400   Wound Volume (cm^3) 151.2 cm^3 12/26/24 1400   Drainage Characteristics/Odor serosanguineous 12/26/24 1400   Drainage Amount small 12/26/24 1400   Care, Wound irrigated with;sterile normal saline;negative pressure wound therapy 12/26/24 1400   Dressing Care dressing applied;petroleum-based;gauze;foam;transparent film 12/26/24 1400   Periwound Care barrier film applied 12/26/24 1400       Wound 12/24/24 1400 Left anterior foot (Active)   Wound Image   12/26/24 1400   Dressing Appearance intact;moist drainage 12/26/24 1400   Closure Unable to assess 12/26/24 0740   Base red;black;yellow;slough 12/26/24 1400   Black (%), Wound Tissue Color 60 12/26/24 1400   Red (%), Wound Tissue Color 30 12/26/24 1400   Yellow (%), Wound Tissue Color 10 12/26/24 1400   Periwound intact;redness 12/26/24 1400   Periwound Temperature warm 12/26/24 1400   Periwound Skin Turgor soft 12/26/24 1400   Edges open 12/26/24 1400   Drainage Characteristics/Odor yellow 12/26/24 1400   Drainage Amount scant 12/26/24 1400   Care, Wound cleansed with;sterile normal saline;antimicrobial agent applied 12/26/24 1400   Dressing Care dressing changed;gauze, antimicrobial;silicone border foam 12/26/24 1400   Periwound Care absorptive dressing applied 12/26/24 1400       Wound 12/26/24 0230 Left upper arm skin tear (Active)   Dressing Appearance intact;dry 12/26/24 0740   Closure Unable to assess 12/26/24 0740   Base unable to visualize 12/26/24 0740   Periwound intact 12/26/24 0740   Periwound Temperature warm 12/26/24 0740   Periwound Skin Turgor soft 12/26/24 0740   Care, Wound cleansed with;sterile normal saline 12/26/24 0304   Dressing Care silicone border foam 12/26/24 0304       Wound 12/26/24 1006 Left lower arm (Active)   Dressing Appearance moist drainage 12/26/24 1008   Closure None 12/26/24 1008   Red (%), Wound Tissue  Color 100 12/26/24 1008   Periwound ecchymotic;intact 12/26/24 1008   Periwound Temperature warm 12/26/24 1008   Periwound Skin Turgor soft 12/26/24 1008   Edges other (see comments) 12/26/24 1008   Drainage Characteristics/Odor bleeding controlled 12/26/24 1008   Drainage Amount scant 12/26/24 1008   Care, Wound cleansed with;sterile normal saline 12/26/24 1008   Dressing Care dressing applied;non-adherent;silicone border foam 12/26/24 1008   Periwound Care absorptive dressing applied 12/26/24 1008        Wound Check / Follow-up:  Seen today on 5 MALATHI for wound RN trigger and evaluation. Resting in bed and agreeable to assessment. Dressing removed to left dorsal foot. Wound is necrotic with slough and scant amount of red tissue present. Irrigated with normal saline. Recommend to continue with santyl application and cover with gauze moisten with 1/2 strength dakins and secure with a vented silicone border dressing.    Moist saline gauze removed from left thigh I&D wound. Measurements as above. Wound is pink, red, moist with scant amount of slough drainage. Irrigated with normal saline. Petroloum based non-adherent gauze applied to fascia covering visible muscle tissue. Black foam then applied to wound and secured with drape. Mushroom foam applied to outer aspect of VAC dressing. Foam compressed to 125 mmHg. No air leaks noted. Bolster padded and secured to left thigh. Tolerated well.    Recommend following skin tear protocol for skin tear to right arm and left arm.    Process started for home VAC unit placement related to possible d/c tomorrow.    Impression: Chronic wound to left dorsal foot, surgical I&D to left thigh, skin tear to right arm and left hand    Short term goals:  Negative pressure therapy, daily dressing changes, regain tissue integrity    Jose Miguel Puentes RN,[facility]    12/26/2024    14:52 EST

## 2024-12-26 NOTE — PROGRESS NOTES
[facility]     Surgery Progress Note    Patient Name: Ann-Marie Hughes  :    1945  MRN:    [id number]  Date of admission:  2024  Length of Stay: 2 days    Subjective   79-year-old female with large left lower extremity abscess status post incision and drainage with sharp excisional debridement 7 x 7 x 4 cm ()     No acute events overnight.  Pain well-controlled.  Not yet ambulating in halls.  Denies any nausea, vomiting, fevers, chills.  Objective     Current Facility-Administered Medications:     albuterol (PROVENTIL) nebulizer solution 0.083% 2.5 mg/3mL, 2.5 mg, Nebulization, Q6H PRN, Familia Foote MD, 2.5 mg at 24    [Held by provider] apixaban (ELIQUIS) tablet 5 mg, 5 mg, Oral, Q12H, Sergo Altamirano MD    arformoterol (BROVANA) nebulizer solution 15 mcg, 15 mcg, Nebulization, BID - RT, Familia Foote MD, 15 mcg at 24 0831    aspirin EC tablet 81 mg, 81 mg, Oral, Daily, Sergo Altamirano MD, 81 mg at 24 0812    atorvastatin (LIPITOR) tablet 40 mg, 40 mg, Oral, Daily, Sergo Schulte MD, 40 mg at 24 1000    benzocaine-menthol (CHLORASEPTIC) lozenge 1 each, 1 lozenge, Mouth/Throat, TID PRN, Familia Foote MD, 1 each at 24 0555    sennosides-docusate (PERICOLACE) 8.6-50 MG per tablet 2 tablet, 2 tablet, Oral, BID PRN **AND** polyethylene glycol (MIRALAX) packet 17 g, 17 g, Oral, Daily PRN **AND** bisacodyl (DULCOLAX) EC tablet 5 mg, 5 mg, Oral, Daily PRN **AND** bisacodyl (DULCOLAX) suppository 10 mg, 10 mg, Rectal, Daily PRN, Sergo Altamirano MD    buPROPion XL (WELLBUTRIN XL) 24 hr tablet 300 mg, 300 mg, Oral, Daily, Sergo Schulte MD, 300 mg at 24 1000    cefepime 1000 mg IVPB in 100 mL NS (VTB), 1,000 mg, Intravenous, Q12H, Sergo Schulte MD, 1,000 mg at 24 2203    collagenase ointment 1 Application, 1 Application, Topical, Q24H, Andres Go DPM, 1 Application at 24 8496    digoxin  (LANOXIN) tablet 125 mcg, 125 mcg, Oral, Every Other Day, Sergo Schulte MD, 125 mcg at 12/26/24 1000    empagliflozin (JARDIANCE) tablet 25 mg, 25 mg, Oral, Daily, Sergo Schulte MD, 25 mg at 12/26/24 0959    famotidine (PEPCID) tablet 40 mg, 40 mg, Oral, Daily, Sergo Altamirano MD, 40 mg at 12/26/24 0812    furosemide (LASIX) tablet 40 mg, 40 mg, Oral, Every Other Day, Sergo Schulte MD, 40 mg at 12/26/24 1000    levothyroxine (SYNTHROID, LEVOTHROID) tablet 75 mcg, 75 mcg, Oral, Q AM, Sergo Schulte MD, 75 mcg at 12/26/24 1000    metoprolol succinate XL (TOPROL-XL) 24 hr tablet 50 mg, 50 mg, Oral, BID, Familia Foote MD, 50 mg at 12/26/24 0812    morphine injection 1 mg, 1 mg, Intravenous, Q4H PRN, Familia Foote MD, 1 mg at 12/25/24 1533    multivitamin with minerals 1 tablet, 1 tablet, Oral, Daily, Sergo Altamirano MD, 1 tablet at 12/26/24 0812    ondansetron (ZOFRAN) injection 4 mg, 4 mg, Intravenous, Q6H PRN, Sergo Altamirano MD    Pharmacy to dose vancomycin, , Not Applicable, Continuous PRN, Sergo Schulte MD    prothrombin complex conc human (KCentra) IV solution 2,288 Units, 2,288 Units, Intravenous, Once, Sergo Schulte MD    sacubitril-valsartan (ENTRESTO) 24-26 MG tablet 1 tablet, 1 tablet, Oral, BID, Sergo Schulte MD, 1 tablet at 12/26/24 1000    sodium chloride 0.9 % flush 10 mL, 10 mL, Intravenous, Q12H, Sergo Altamirano MD, 10 mL at 12/26/24 0812    sodium chloride 0.9 % flush 10 mL, 10 mL, Intravenous, PRN, Sergo Altamirano MD    sodium chloride 0.9 % infusion 40 mL, 40 mL, Intravenous, PRN, Sergo Altamirano MD    sodium hypochlorite (HYSEPT) 0.25 % topical solution, , Topical, Q24H, Sergo Schulte MD    spironolactone (ALDACTONE) tablet 25 mg, 25 mg, Oral, Daily, Sergo Schulte MD, 25 mg at 12/26/24 1000    vancomycin (VANCOCIN) 1,000 mg in sodium chloride 0.9 % 250 mL IVPB-VTB, 1,000 mg, Intravenous, Once, Eris  Sergo Varela MD    Current Diet:    Dietary Orders (From admission, onward)       Start     Ordered    12/24/24 1657  Diet: Regular/House; Fluid Consistency: Thin (IDDSI 0)  Diet Effective Now        References:    Diet Order Definitions   Question Answer Comment   Diets: Regular/House    Fluid Consistency: Thin (IDDSI 0)        12/24/24 1656                    Vitals:   Temp:  [97.4 °F (36.3 °C)-98.2 °F (36.8 °C)] 97.4 °F (36.3 °C)  Heart Rate:  [55-67] 67  Resp:  [16-24] 20  BP: (143-174)/(57-74) 168/57  Flow (L/min) (Oxygen Therapy):  [1-2] 1  Physical Exam   General: no acute distress, resting in bed  Respiratory:  non-labored breathing  Cardiovascular:  RRR, non-tachycardic  Abdomen:  soft, non-distended, non-tender  Musculoskeletal: Dressing changed this morning at bedside, left lower extremity medial thigh wound with healthy appearing base with muscle exposed    LABS:  CBC          12/24/2024    10:53 12/25/2024    03:20 12/26/2024    03:20   CBC   WBC 15.57  12.98  13.77    RBC 2.84  2.59  2.66    Hemoglobin 8.7  7.8  7.9    Hematocrit 27.7  25.7  26.1    MCV 97.5  99.2  98.1    MCH 30.6  30.1  29.7    MCHC 31.4  30.4  30.3    RDW 19.4  19.3  19.3    Platelets 309  273  256      BMP          12/24/2024    10:53 12/25/2024    03:20 12/26/2024    03:20   BMP   BUN 36  36  35    Creatinine 1.58  1.49  1.54    Sodium 139  139  138    Potassium 4.3  5.0  4.6    Chloride 103  106  107    CO2 20.8  20.5  21.0    Calcium 8.9  8.4  8.7      CMP          12/24/2024    10:53 12/25/2024    03:20 12/26/2024    03:20   CMP   Glucose 123  129  136    BUN 36  36  35    Creatinine 1.58  1.49  1.54    EGFR 33.0  35.4  34.0    Sodium 139  139  138    Potassium 4.3  5.0  4.6    Chloride 103  106  107    Calcium 8.9  8.4  8.7    Total Protein 7.1  6.4     Albumin 3.7  3.3  3.1    Globulin 3.4  3.1     Total Bilirubin 0.4  0.2     Alkaline Phosphatase 49  45     AST (SGOT) 19  40     ALT (SGPT) 8  32     Albumin/Globulin Ratio  1.1  1.1     BUN/Creatinine Ratio 22.8  24.2  22.7    Anion Gap 15.2  12.5  10.0        Lab Results (last 24 hours)       Procedure Component Value Units Date/Time    Blood Culture - Blood, Arm, Left [[id number]]  (Normal) Collected: 12/24/24 1053    Specimen: Blood from Arm, Left Updated: 12/26/24 1115     Blood Culture No growth at 2 days    BNP [[id number]]  (Abnormal) Collected: 12/26/24 0320    Specimen: Blood from Arm, Right Updated: 12/26/24 1015     proBNP 36,310.0 pg/mL     Narrative:      This assay is used as an aid in the diagnosis of individuals suspected of having heart failure. It can be used as an aid in the diagnosis of acute decompensated heart failure (ADHF) in patients presenting with signs and symptoms of ADHF to the emergency department (ED). In addition, NT-proBNP of <300 pg/mL indicates ADHF is not likely.    Age Range Result Interpretation  NT-proBNP Concentration (pg/mL:      <50             Positive            >450                   Gray                 300-450                    Negative             <300    50-75           Positive            >900                  Gray                300-900                  Negative            <300      >75             Positive            >1800                  Gray                300-1800                  Negative            <300    Wound Culture - Swab, Foot, Left [[id number]] Collected: 12/24/24 1116    Specimen: Swab from Foot, Left Updated: 12/26/24 0835     Wound Culture Culture in progress     Gram Stain Few (2+) Gram positive cocci in pairs and clusters      Few (2+) Gram positive bacilli resembling diphtheroids      Few (2+) WBCs seen    Wound Culture - Swab, Leg, Left [[id number]] Collected: 12/24/24 1526    Specimen: Swab from Leg, Left Updated: 12/26/24 0824     Wound Culture No growth at 2 days     Gram Stain Few (2+) WBCs seen      No organisms seen    Wound Culture - Swab, Thigh, Left [[id number]] Collected: 12/24/24 1116    Specimen: Swab from  Thigh, Left Updated: 12/26/24 0823     Wound Culture No growth at 2 days     Gram Stain Moderate (3+) WBCs seen      Moderate (3+) Gram negative bacilli      Few (2+) Gram positive cocci in pairs and clusters    Vancomycin, Random [[id number]]  (Normal) Collected: 12/26/24 0320    Specimen: Blood from Arm, Right Updated: 12/26/24 0412     Vancomycin Random 17.00 mcg/mL     Narrative:      Therapeutic Ranges for Vancomycin    Vancomycin Random   5.0-40.0 mcg/mL  Vancomycin Trough   5.0-20.0 mcg/mL  Vancomycin Peak     20.0-40.0 mcg/mL    Renal Function Panel [[id number]]  (Abnormal) Collected: 12/26/24 0320    Specimen: Blood from Arm, Right Updated: 12/26/24 0355     Glucose 136 mg/dL      BUN 35 mg/dL      Creatinine 1.54 mg/dL      Sodium 138 mmol/L      Potassium 4.6 mmol/L      Chloride 107 mmol/L      CO2 21.0 mmol/L      Calcium 8.7 mg/dL      Albumin 3.1 g/dL      Phosphorus 3.2 mg/dL      Anion Gap 10.0 mmol/L      BUN/Creatinine Ratio 22.7     eGFR 34.0 mL/min/1.73     Narrative:      GFR Categories in Chronic Kidney Disease (CKD)      GFR Category          GFR (mL/min/1.73)    Interpretation  G1                     90 or greater         Normal or high (1)  G2                      60-89                Mild decrease (1)  G3a                   45-59                Mild to moderate decrease  G3b                   30-44                Moderate to severe decrease  G4                    15-29                Severe decrease  G5                    14 or less           Kidney failure          (1)In the absence of evidence of kidney disease, neither GFR category G1 or G2 fulfill the criteria for CKD.    eGFR calculation 2021 CKD-EPI creatinine equation, which does not include race as a factor    Magnesium [[id number]]  (Normal) Collected: 12/26/24 0320    Specimen: Blood from Arm, Right Updated: 12/26/24 0351     Magnesium 1.9 mg/dL     CBC (No Diff) [[id number]]  (Abnormal) Collected: 12/26/24 0320    Specimen: Blood  from Arm, Right Updated: 12/26/24 0331     WBC 13.77 10*3/mm3      RBC 2.66 10*6/mm3      Hemoglobin 7.9 g/dL      Hematocrit 26.1 %      MCV 98.1 fL      MCH 29.7 pg      MCHC 30.3 g/dL      RDW 19.3 %      RDW-SD 69.0 fl      MPV 11.0 fL      Platelets 256 10*3/mm3     AFB Culture - Swab, Leg, Left [[id number]] Collected: 12/24/24 1526    Specimen: Swab from Leg, Left Updated: 12/25/24 1218     AFB Stain No acid fast bacilli seen    Blood Culture - Blood, Hand, Right [[id number]]  (Normal) Collected: 12/24/24 1129    Specimen: Blood from Hand, Right Updated: 12/25/24 1145     Blood Culture No growth at 24 hours    Narrative:      Less than seven (7) mL's of blood was collected.  Insufficient quantity may yield false negative results.            IMAGING:  Imaging Results (Last 24 Hours)       Procedure Component Value Units Date/Time    MRI Foot Left With & Without Contrast [[id number]] Collected: 12/25/24 1351     Updated: 12/25/24 1357    Narrative:        MRI FOOT LEFT W WO CONTRAST    Date of Exam: 12/25/2024 12:49 PM EST    Indication: concern for osteo.     Comparison: Routine radiograph 12/24/2024    Technique:  Routine multiplanar/multisequence sequence images of the left foot were obtained before and after the uneventful administration of Multihance.        Findings:  Motion artifact obscures the images. There is generalized soft tissue edema/cellulitis. No loculated fluid collections are identified to suggest abscess. There is fatty infiltration of the intrinsic muscles of the foot. The visualized flexor, extensor   and peroneal tendons are intact. No definite abnormal marrow signal or enhancement to suggest osteomyelitis. The exam is limited by motion artifact. Soft tissue defect in the dorsum of the foot.      Impression:      Impression:    1. Generalized soft tissue edema/cellulitis in the left foot.    2. No definite osteomyelitis. The exam is limited by motion artifact.        Electronically Signed:  Eric Martins MD    12/25/2024 1:55 PM EST    Workstation ID: [id number]            [x]  Laboratory  []  Microbiology  []  Radiology  []  EKG/Telemetry   []  Cardiology/Vascular   []  Pathology  []  Old records    Assessment / Plan   Assessment:   Active Hospital Problems    Diagnosis     **Wound cellulitis     Neuropathy     Ulcer of left foot with fat layer exposed     Left foot pain     Abscess of left lower extremity          Plan:    Abscess of left lower extremity   -Afebrile, vitals stable, leukocytosis 13,000  -Status post incision and drainage with debridement of nonviable subcutaneous tissue/skin (12/24)   -Awaiting final wound cultures  -Continue broad-spectrum IV antibiotics  -Wound care consult placed for dressing changes and consideration of wound VAC placement  -No plans for any further surgical intervention at this time, please call with any questions or concerns     Electronically signed by Sergo Altamirano MD, 12/26/24, 11:39 AM EST.

## 2024-12-26 NOTE — PLAN OF CARE
Goal Outcome Evaluation:              Outcome Evaluation: VSS. UOP. Up x1 assist. Wnd vac placed today. No c/o pain. IV abx continued.

## 2024-12-26 NOTE — PROGRESS NOTES
"[facility] Clinical Pharmacy Services: Vancomycin Monitoring Note    Ann-Marie Hughes is a 79 y.o. female who is on day 3 of pharmacy to dose vancomycin for Skin and Soft Tissue.    Previous Vancomycin Dose:   1750 mg IV x1 on  @1250   Imaging Reviewed?: Yes   CT Left Lower Extremity: Subcutaneous abscess at the anterior-medial aspect of the distal left thigh measuring up to approximately 6.5 cm. Surrounding skin and subcutaneous findings suspicious for cellulitis. No definite findings of acute osseous abnormality. Status post knee arthroplasty without definite findings of acute hardware complication or visible knee effusion. Calcific atherosclerosis with suspected multifocal mild to moderate luminal narrowing in the left iliac and proximal lower extremity arteries. The popliteus artery is not well evaluated due to artifact from the knee hardware. There does appear to be patent opacification of the visualized tibial and peroneal arteries.   XR Left foot: soft tissue swelling of the dorsum of the foot; no conventional radiographic evidence of osteomyelitis    Updated Cultures and Sensitivities:    Blood cx : NG at 24 hours   Wound cx, left thigh swab: few GPC in pairs and clusters on gram stain   Wound cx, left foot swab: few GPC in pairs and clusters and GPB on gram stain   Wound cx, left leg swab: no organisms seen on gram stain    Vitals/Labs  Ht: 162.6 cm (64\"); Wt: 80.1 kg (176 lb 9.4 oz)   Temp (24hrs), Av °F (36.7 °C), Min:97.7 °F (36.5 °C), Max:98.2 °F (36.8 °C)   Estimated Creatinine Clearance: 29.9 mL/min (A) (by C-G formula based on SCr of 1.54 mg/dL (H)).     Results from last 7 days   Lab Units 24  0320 24  0320 24  1053   VANCOMYCIN RM mcg/mL 17.00 15.00  --    CREATININE mg/dL 1.54* 1.49* 1.58*   WBC 10*3/mm3 13.77* 12.98* 15.57*     Assessment/Plan  Current Vancomycin Dose: vancomycin level this AM was 17 mcg/mL and serum creatinine " trending up will provide a reduced dose of 750 mg today.  NOTE: dose on 12/25 of 1000 mg was confirmed per RN as given/ MAR documentation was not completed properly.  Next Vanc Random ordered for 12/27 at 0600  We will continue to monitor patient changes and renal function     Thank you for involving pharmacy in this patient's care. Please contact pharmacy with any questions or concerns.    Amber Olivarez  Clinical Pharmacist

## 2024-12-26 NOTE — PROGRESS NOTES
[facility]   Hospitalist Progress Note  Date: 2024  Patient Name: Ann-Marie Hughes  : 1945  MRN: [id number]  Date of admission: 2024  Room/Bed: [facility]/      Subjective   Subjective     Chief Complaint: Left lower extremity pain    Summary:Ann-Marie Hughes is a 79 y.o. female with coronary artery disease status post CABG with left GSV harvest, hypertension, diabetes who presented to the emergency room due to ongoing left lower extremity pain.  Patient was seen by general surgery and is now status post I&D.  Patient is with a necrotic appearing ulcer on the dorsum of her left foot.  MRI ordered    Interval Followup:   No acute events overnight, patient states her pain is tolerable,      Objective   Objective     Vitals:   Temp:  [97.4 °F (36.3 °C)-98.2 °F (36.8 °C)] 97.4 °F (36.3 °C)  Heart Rate:  [55-67] 67  Resp:  [16-24] 20  BP: (143-174)/(57-74) 168/57  Flow (L/min) (Oxygen Therapy):  [1-2] 1    Physical Exam   GEN: No acute distress  HEENT: Moist mucous membranes  LUNGS: Equal chest rise bilaterally  CARDIAC: Regular rate and rhythm  NEURO: Moving all 4 extremities spontaneously  SKIN: No obvious breakdown    Result Review    Result Review:  I have personally reviewed these results:  [x]  Laboratory      Lab 24  0320 24  0320 24  1436 24  1053   WBC 13.77* 12.98*  --  15.57*   HEMOGLOBIN 7.9* 7.8*  --  8.7*   HEMATOCRIT 26.1* 25.7*  --  27.7*   PLATELETS 256 273  --  309   NEUTROS ABS  --  10.05*  --  12.22*   IMMATURE GRANS (ABS)  --  0.15*  --  0.11*   LYMPHS ABS  --  1.66  --  1.75   MONOS ABS  --  1.09*  --  1.33*   EOS ABS  --  0.00  --  0.09   MCV 98.1* 99.2*  --  97.5*   SED RATE  --   --   --  52*   CRP  --   --   --  10.05*   LACTATE  --   --   --  1.8   PROTIME  --   --  16.1*  --          Lab 24  0320 24  0320 24  1053   SODIUM 138 139 139   POTASSIUM 4.6 5.0 4.3   CHLORIDE 107 106 103   CO2 21.0* 20.5* 20.8*   ANION GAP 10.0  12.5 15.2*   BUN 35* 36* 36*   CREATININE 1.54* 1.49* 1.58*   EGFR 34.0* 35.4* 33.0*   GLUCOSE 136* 129* 123*   CALCIUM 8.7 8.4* 8.9   MAGNESIUM 1.9 2.0  --    PHOSPHORUS 3.2 4.6*  --          Lab 12/26/24  0320 12/25/24  0320 12/24/24  1053   TOTAL PROTEIN  --  6.4 7.1   ALBUMIN 3.1* 3.3* 3.7   GLOBULIN  --  3.1 3.4   ALT (SGPT)  --  32 8   AST (SGOT)  --  40* 19   BILIRUBIN  --  0.2 0.4   ALK PHOS  --  45 49         Lab 12/26/24  0320 12/24/24  1436   PROBNP 36,310.0*  --    PROTIME  --  16.1*   INR  --  1.26*                 Brief Urine Lab Results  (Last result in the past 365 days)        Color   Clarity   Blood   Leuk Est   Nitrite   Protein   CREAT   Urine HCG        07/31/24 1427 Yellow   Clear   Negative   Moderate (2+)   Negative   Negative                 [x]  Microbiology   Microbiology Results (last 10 days)       Procedure Component Value - Date/Time    Wound Culture - Swab, Leg, Left [[id number]] Collected: 12/24/24 1526    Lab Status: Preliminary result Specimen: Swab from Leg, Left Updated: 12/26/24 0824     Wound Culture No growth at 2 days     Gram Stain Few (2+) WBCs seen      No organisms seen    AFB Culture - Swab, Leg, Left [[id number]] Collected: 12/24/24 1526    Lab Status: Preliminary result Specimen: Swab from Leg, Left Updated: 12/25/24 1218     AFB Stain No acid fast bacilli seen    Blood Culture - Blood, Hand, Right [[id number]]  (Normal) Collected: 12/24/24 1129    Lab Status: Preliminary result Specimen: Blood from Hand, Right Updated: 12/26/24 1145     Blood Culture No growth at 2 days    Narrative:      Less than seven (7) mL's of blood was collected.  Insufficient quantity may yield false negative results.    Wound Culture - Swab, Foot, Left [[id number]] Collected: 12/24/24 1116    Lab Status: Preliminary result Specimen: Swab from Foot, Left Updated: 12/26/24 0835     Wound Culture Culture in progress     Gram Stain Few (2+) Gram positive cocci in pairs and clusters      Few (2+) Gram  positive bacilli resembling diphtheroids      Few (2+) WBCs seen    Wound Culture - Swab, Thigh, Left [[id number]] Collected: 12/24/24 1116    Lab Status: Preliminary result Specimen: Swab from Thigh, Left Updated: 12/26/24 0823     Wound Culture No growth at 2 days     Gram Stain Moderate (3+) WBCs seen      Moderate (3+) Gram negative bacilli      Few (2+) Gram positive cocci in pairs and clusters    Blood Culture - Blood, Arm, Left [[id number]]  (Normal) Collected: 12/24/24 1053    Lab Status: Preliminary result Specimen: Blood from Arm, Left Updated: 12/26/24 1115     Blood Culture No growth at 2 days          [x]  Radiology  MRI Foot Left With & Without Contrast    Result Date: 12/25/2024  Impression: 1. Generalized soft tissue edema/cellulitis in the left foot. 2. No definite osteomyelitis. The exam is limited by motion artifact. Electronically Signed: Eric Martnis MD  12/25/2024 1:55 PM EST  Workstation ID: [id number]    CT Lower Extremity Left With Contrast    Result Date: 12/24/2024  Impression: 1.Subcutaneous abscess at the anterior-medial aspect of the distal left thigh measuring up to approximately 6.5 cm. Surrounding skin and subcutaneous findings suspicious for cellulitis. 2.No definite findings of acute osseous abnormality. Status post knee arthroplasty without definite findings of acute hardware complication or visible knee effusion. 3.Calcific atherosclerosis with suspected multifocal mild to moderate luminal narrowing in the left iliac and proximal lower extremity arteries. The popliteus artery is not well evaluated due to artifact from the knee hardware. There does appear to be patent opacification of the visualized tibial and peroneal arteries. Electronically Signed: Chester Cesar  12/24/2024 1:09 PM EST  Workstation ID: [id number]    XR Foot 3+ View Left    Result Date: 12/24/2024  Impression: 1. Soft tissue swelling of the dorsum of the foot. No conventional radiographic evidence of osteomyelitis.  Electronically Signed: Andrea Knutson MD  12/24/2024 11:13 AM EST  Workstation ID: [id number]   []  EKG/Telemetry   []  Cardiology/Vascular   []  Pathology  []  Old records  []  Other:    Assessment & Plan        Assessment and Plan:    Left lower extremity abscess  CAD with CABG with left GSV harvest 1.5 years ago  Hypertension  Diabetes  CKD stage IIIb  Osteomyelitis of the foot ruled out     Plan:   Patient admitted to the hospital for further workup and management of the above  Left foot x-ray negative for osteomyelitis  MRI negative for osteomyelitis of the foot  CT LLE revealing abscess of the distal left thigh status post incision and drainage by General Surgery, wound care evaluation pending for possible wound VAC placement  Continue vancomycin and cefepime for now, culture sensitivity data pending  Podiatry consulted, appreciate their recommendations  Blood cultures no growth today  CBC, CMP reviewed  CBC, CMP, mag, Phos in AM.  12/26/2024        Discussed with RN.    VTE Prophylaxis:  Pharmacologic & mechanical VTE prophylaxis orders are present.        CODE STATUS:   Code Status (Patient has no pulse and is not breathing): CPR (Attempt to Resuscitate)  Medical Interventions (Patient has pulse or is breathing): Full Support    Time spent: Time spent involving patient care including face-to-face encounter 51 minutes    Electronically signed by Sergo Schulte MD, 12/26/2024, 12:38 EST.

## 2024-12-26 NOTE — PLAN OF CARE
Goal Outcome Evaluation:  Plan of Care Reviewed With: patient        Progress: no change  Outcome Evaluation: IV abx given as ordered. patient has SOA with exertion. no complaints of pain during this shift. patient up to bedside commode with x1 assist. no acute events overnight.

## 2024-12-26 NOTE — SIGNIFICANT NOTE
12/26/24 1400   Physical Therapy Time and Intention   Session Not Performed patient/family declined evaluation  (Wants to try to get some rest)

## 2024-12-27 ENCOUNTER — READMISSION MANAGEMENT (OUTPATIENT)
Dept: CALL CENTER | Facility: HOSPITAL | Age: 79
End: 2024-12-27
Payer: MEDICARE

## 2024-12-27 VITALS
OXYGEN SATURATION: 96 % | DIASTOLIC BLOOD PRESSURE: 60 MMHG | SYSTOLIC BLOOD PRESSURE: 168 MMHG | HEIGHT: 64 IN | HEART RATE: 54 BPM | WEIGHT: 186.73 LBS | RESPIRATION RATE: 16 BRPM | BODY MASS INDEX: 31.88 KG/M2 | TEMPERATURE: 97.7 F

## 2024-12-27 LAB
ALBUMIN SERPL-MCNC: 2.9 G/DL (ref 3.5–5.2)
ALBUMIN/GLOB SERPL: 0.9 G/DL
ALP SERPL-CCNC: 55 U/L (ref 39–117)
ALT SERPL W P-5'-P-CCNC: 35 U/L (ref 1–33)
ANION GAP SERPL CALCULATED.3IONS-SCNC: 13.9 MMOL/L (ref 5–15)
AST SERPL-CCNC: 32 U/L (ref 1–32)
BACTERIA SPEC AEROBE CULT: NORMAL
BASOPHILS # BLD AUTO: 0.1 10*3/MM3 (ref 0–0.2)
BASOPHILS NFR BLD AUTO: 0.8 % (ref 0–1.5)
BILIRUB SERPL-MCNC: 0.3 MG/DL (ref 0–1.2)
BUN SERPL-MCNC: 37 MG/DL (ref 8–23)
BUN/CREAT SERPL: 24.7 (ref 7–25)
CALCIUM SPEC-SCNC: 9.1 MG/DL (ref 8.6–10.5)
CHLORIDE SERPL-SCNC: 101 MMOL/L (ref 98–107)
CO2 SERPL-SCNC: 19.1 MMOL/L (ref 22–29)
CREAT SERPL-MCNC: 1.5 MG/DL (ref 0.57–1)
DEPRECATED RDW RBC AUTO: 73.7 FL (ref 37–54)
EGFRCR SERPLBLD CKD-EPI 2021: 35.1 ML/MIN/1.73
EOSINOPHIL # BLD AUTO: 0.1 10*3/MM3 (ref 0–0.4)
EOSINOPHIL NFR BLD AUTO: 0.8 % (ref 0.3–6.2)
ERYTHROCYTE [DISTWIDTH] IN BLOOD BY AUTOMATED COUNT: 19.8 % (ref 12.3–15.4)
GLOBULIN UR ELPH-MCNC: 3.2 GM/DL
GLUCOSE SERPL-MCNC: 139 MG/DL (ref 65–99)
GRAM STN SPEC: NORMAL
HCT VFR BLD AUTO: 27.1 % (ref 34–46.6)
HGB BLD-MCNC: 7.9 G/DL (ref 12–15.9)
IMM GRANULOCYTES # BLD AUTO: 0.27 10*3/MM3 (ref 0–0.05)
IMM GRANULOCYTES NFR BLD AUTO: 2.3 % (ref 0–0.5)
LYMPHOCYTES # BLD AUTO: 1.68 10*3/MM3 (ref 0.7–3.1)
LYMPHOCYTES NFR BLD AUTO: 14.1 % (ref 19.6–45.3)
MAGNESIUM SERPL-MCNC: 1.7 MG/DL (ref 1.6–2.4)
MCH RBC QN AUTO: 29.8 PG (ref 26.6–33)
MCHC RBC AUTO-ENTMCNC: 29.2 G/DL (ref 31.5–35.7)
MCV RBC AUTO: 102.3 FL (ref 79–97)
MONOCYTES # BLD AUTO: 1.07 10*3/MM3 (ref 0.1–0.9)
MONOCYTES NFR BLD AUTO: 9 % (ref 5–12)
NEUTROPHILS NFR BLD AUTO: 73 % (ref 42.7–76)
NEUTROPHILS NFR BLD AUTO: 8.73 10*3/MM3 (ref 1.7–7)
NRBC BLD AUTO-RTO: 0.6 /100 WBC (ref 0–0.2)
PHOSPHATE SERPL-MCNC: 2.7 MG/DL (ref 2.5–4.5)
PLATELET # BLD AUTO: 263 10*3/MM3 (ref 140–450)
PMV BLD AUTO: 11.1 FL (ref 6–12)
POTASSIUM SERPL-SCNC: 4.2 MMOL/L (ref 3.5–5.2)
PROT SERPL-MCNC: 6.1 G/DL (ref 6–8.5)
RBC # BLD AUTO: 2.65 10*6/MM3 (ref 3.77–5.28)
SODIUM SERPL-SCNC: 134 MMOL/L (ref 136–145)
VANCOMYCIN SERPL-MCNC: 15.15 MCG/ML (ref 5–40)
WBC NRBC COR # BLD AUTO: 11.95 10*3/MM3 (ref 3.4–10.8)

## 2024-12-27 PROCEDURE — 94799 UNLISTED PULMONARY SVC/PX: CPT

## 2024-12-27 PROCEDURE — 94761 N-INVAS EAR/PLS OXIMETRY MLT: CPT

## 2024-12-27 PROCEDURE — 25810000003 SODIUM CHLORIDE 0.9 % SOLUTION 250 ML FLEX CONT: Performed by: INTERNAL MEDICINE

## 2024-12-27 PROCEDURE — 84100 ASSAY OF PHOSPHORUS: CPT | Performed by: INTERNAL MEDICINE

## 2024-12-27 PROCEDURE — 80202 ASSAY OF VANCOMYCIN: CPT | Performed by: INTERNAL MEDICINE

## 2024-12-27 PROCEDURE — 25010000002 HYDRALAZINE PER 20 MG: Performed by: STUDENT IN AN ORGANIZED HEALTH CARE EDUCATION/TRAINING PROGRAM

## 2024-12-27 PROCEDURE — 94664 DEMO&/EVAL PT USE INHALER: CPT

## 2024-12-27 PROCEDURE — 80053 COMPREHEN METABOLIC PANEL: CPT | Performed by: INTERNAL MEDICINE

## 2024-12-27 PROCEDURE — 97161 PT EVAL LOW COMPLEX 20 MIN: CPT

## 2024-12-27 PROCEDURE — 83735 ASSAY OF MAGNESIUM: CPT | Performed by: INTERNAL MEDICINE

## 2024-12-27 PROCEDURE — 25010000002 CEFEPIME PER 500 MG: Performed by: INTERNAL MEDICINE

## 2024-12-27 PROCEDURE — 25010000002 VANCOMYCIN 1 G RECONSTITUTED SOLUTION 1 EACH VIAL: Performed by: INTERNAL MEDICINE

## 2024-12-27 PROCEDURE — 85025 COMPLETE CBC W/AUTO DIFF WBC: CPT | Performed by: INTERNAL MEDICINE

## 2024-12-27 PROCEDURE — 99239 HOSP IP/OBS DSCHRG MGMT >30: CPT | Performed by: INTERNAL MEDICINE

## 2024-12-27 PROCEDURE — 97605 NEG PRS WND THER DME<=50SQCM: CPT

## 2024-12-27 RX ORDER — DOXYCYCLINE 100 MG/1
100 CAPSULE ORAL EVERY 12 HOURS SCHEDULED
Qty: 42 CAPSULE | Refills: 0 | Status: SHIPPED | OUTPATIENT
Start: 2024-12-28 | End: 2025-01-18

## 2024-12-27 RX ORDER — GUAIFENESIN/DEXTROMETHORPHAN 100-10MG/5
5 SYRUP ORAL EVERY 6 HOURS PRN
Status: DISCONTINUED | OUTPATIENT
Start: 2024-12-27 | End: 2024-12-27 | Stop reason: HOSPADM

## 2024-12-27 RX ORDER — MULTIPLE VITAMINS W/ MINERALS TAB 9MG-400MCG
1 TAB ORAL
Status: DISCONTINUED | OUTPATIENT
Start: 2024-12-28 | End: 2024-12-27 | Stop reason: HOSPADM

## 2024-12-27 RX ORDER — DOXYCYCLINE 100 MG/1
100 CAPSULE ORAL EVERY 12 HOURS SCHEDULED
Status: DISCONTINUED | OUTPATIENT
Start: 2024-12-28 | End: 2024-12-27 | Stop reason: HOSPADM

## 2024-12-27 RX ORDER — FUROSEMIDE 40 MG/1
40 TABLET ORAL DAILY
Qty: 90 TABLET | Refills: 3 | Status: SHIPPED | OUTPATIENT
Start: 2024-12-27

## 2024-12-27 RX ADMIN — EMPAGLIFLOZIN 25 MG: 10 TABLET, FILM COATED ORAL at 09:24

## 2024-12-27 RX ADMIN — SACUBITRIL AND VALSARTAN 1 TABLET: 24; 26 TABLET, FILM COATED ORAL at 09:22

## 2024-12-27 RX ADMIN — VANCOMYCIN HYDROCHLORIDE 1000 MG: 1 INJECTION, POWDER, LYOPHILIZED, FOR SOLUTION INTRAVENOUS at 09:22

## 2024-12-27 RX ADMIN — ARFORMOTEROL TARTRATE 15 MCG: 15 SOLUTION RESPIRATORY (INHALATION) at 09:46

## 2024-12-27 RX ADMIN — FAMOTIDINE 40 MG: 20 TABLET, FILM COATED ORAL at 09:23

## 2024-12-27 RX ADMIN — GUAIFENESIN AND DEXTROMETHORPHAN 5 ML: 100; 10 SYRUP ORAL at 05:03

## 2024-12-27 RX ADMIN — CEFEPIME 1000 MG: 1 INJECTION, POWDER, FOR SOLUTION INTRAMUSCULAR; INTRAVENOUS at 11:11

## 2024-12-27 RX ADMIN — Medication 10 ML: at 09:28

## 2024-12-27 RX ADMIN — ATORVASTATIN CALCIUM 40 MG: 40 TABLET, FILM COATED ORAL at 09:24

## 2024-12-27 RX ADMIN — SPIRONOLACTONE 25 MG: 25 TABLET ORAL at 09:24

## 2024-12-27 RX ADMIN — METOPROLOL SUCCINATE 50 MG: 50 TABLET, EXTENDED RELEASE ORAL at 09:23

## 2024-12-27 RX ADMIN — Medication 1 TABLET: at 09:29

## 2024-12-27 RX ADMIN — HYDRALAZINE HYDROCHLORIDE 10 MG: 20 INJECTION INTRAMUSCULAR; INTRAVENOUS at 00:04

## 2024-12-27 RX ADMIN — COLLAGENASE SANTYL 1 APPLICATION: 250 OINTMENT TOPICAL at 09:22

## 2024-12-27 RX ADMIN — ASPIRIN 81 MG: 81 TABLET, COATED ORAL at 09:23

## 2024-12-27 RX ADMIN — BUPROPION HYDROCHLORIDE 300 MG: 150 TABLET, EXTENDED RELEASE ORAL at 09:23

## 2024-12-27 NOTE — PLAN OF CARE
Goal Outcome Evaluation:  Plan of Care Reviewed With: patient, spouse        Progress: no change  Outcome Evaluation: Patient presents with deficits in balance, endurance, transfers, and ambulation. Patient will benefit from skilled PT services to address these mobility deficits and decrease risk of falls.    Anticipated Discharge Disposition (PT): home with home health

## 2024-12-27 NOTE — DISCHARGE SUMMARY
[facility]         HOSPITALIST  DISCHARGE SUMMARY    Patient Name: Ann-Marie Hughes  : 1945  MRN: [id number]    Date of Admission: 2024  Date of Discharge:  2024  Primary Care Physician: Romario Valdez,     Consults       Date and Time Order Name Status Description    2024  1:56 PM Inpatient Podiatry Consult Completed     2024  1:56 PM Inpatient General Surgery Consult      2024 11:45 AM Inpatient Hospitalist Consult      2024 11:24 AM Inpatient Podiatry Consult      2024 11:24 AM IP Consult to General Surgery              Active and Resolved Hospital Problems:  Left lower extremity abscess  CAD with CABG with left GSV harvest 1.5 years ago  Hypertension  Diabetes  CKD stage IIIb  Osteomyelitis of the foot ruled out    Hospital Course     Hospital Course:  Ann-Marie Hughes is a 79 y.o. female with coronary artery disease status post CABG with left GSV harvest, hypertension, diabetes who presented to the emergency room due to ongoing left lower extremity pain.  Patient was seen by general surgery and is now status post I&D.  Patient is with a necrotic appearing ulcer on the dorsum of her left foot.  MRI ordered and negative for osteomyelitis.  Patient will follow-up with podiatry as an outpatient for this.  Patient will be on antibiotics for 3 weeks giving pyomyositis status postdebridement.  Patient being discharged with wound VAC.  Patient should follow-up with PCP in 3 to 7 days.  Patient should follow-up with general surgery in 2 weeks.  Patient is discharged home today in stable condition.    Day of Discharge     Vital Signs:  Temp:  [97.5 °F (36.4 °C)-97.8 °F (36.6 °C)] 97.7 °F (36.5 °C)  Heart Rate:  [53-66] 54  Resp:  [16-20] 16  BP: (140-199)/(60-78) 168/60  Flow (L/min) (Oxygen Therapy):  [1] 1    Physical Exam:   GEN: No acute distress  HEENT: Moist mucous membranes  LUNGS: Equal chest rise bilaterally  CARDIAC:  Regular rate and rhythm  NEURO: Moving all 4 extremities spontaneously  SKIN: Status post I&D of the thigh    Discharge Details        Discharge Medications        New Medications        Instructions Start Date   amoxicillin-clavulanate 875-125 MG per tablet  Commonly known as: AUGMENTIN   1 tablet, Oral, Every 12 Hours Scheduled   Start Date: December 28, 2024     doxycycline 100 MG capsule  Commonly known as: MONODOX   100 mg, Oral, Every 12 Hours Scheduled   Start Date: December 28, 2024            Changes to Medications        Instructions Start Date   furosemide 40 MG tablet  Commonly known as: LASIX  What changed: when to take this   40 mg, Oral, Daily             Continue These Medications        Instructions Start Date   albuterol sulfate  (90 Base) MCG/ACT inhaler  Commonly known as: PROVENTIL HFA;VENTOLIN HFA;PROAIR HFA   2 puffs, Inhalation, Every 4 Hours PRN      apixaban 5 MG tablet tablet  Commonly known as: Eliquis   5 mg, Oral, Every 12 Hours      aspirin 81 MG EC tablet   81 mg, Daily      atorvastatin 40 MG tablet  Commonly known as: LIPITOR   40 mg, Oral, Daily      buPROPion  MG 24 hr tablet  Commonly known as: WELLBUTRIN XL   300 mg, Oral, Daily      dapagliflozin Propanediol 10 MG tablet  Commonly known as: Farxiga   10 mg, Oral, Daily      digoxin 125 MCG tablet  Commonly known as: LANOXIN   125 mcg, Every Other Day      Entresto 24-26 MG tablet  Generic drug: sacubitril-valsartan   1 tablet, Oral, 2 Times Daily      folic acid 1 MG tablet  Commonly known as: FOLVITE   1 mg, Oral, Daily      metFORMIN  MG 24 hr tablet  Commonly known as: GLUCOPHAGE-XR   500 mg, Oral, 2 Times Daily      metoprolol succinate XL 50 MG 24 hr tablet  Commonly known as: TOPROL-XL   50 mg, 2 Times Daily      montelukast 10 MG tablet  Commonly known as: SINGULAIR   10 mg, Oral, Daily      [facility] PO   1 capsule, Daily      spironolactone 25 MG tablet  Commonly known as: ALDACTONE   25  mg, Oral, Daily      Synthroid 75 MCG tablet  Generic drug: levothyroxine   75 mcg, Oral, Daily             Stop These Medications      sulfamethoxazole-trimethoprim 800-160 MG per tablet  Commonly known as: Bactrim DS              Allergies   Allergen Reactions    Penicillins Palpitations     1. When was your reaction? > 10 years ago  2. Did your reaction happen after the first dose or after several doses? After first dose  3. Did your reaction require ED or hospital care to manage your reaction? Do not know  4. Did your reaction require treatment with epinephrine? Do not know  5. Have you taken amoxicillin (Amoxil) or amoxicillin-clavulanate (Augmentin) without issue since? Yes  6. Have you taken cephalexin (Keflex) without issue since?  Do not know        Discharge Disposition:  Home-Health Care c    Diet:  Hospital:  Diet Order   Procedures    Diet: Regular/House; Fluid Consistency: Thin (IDDSI 0)       Discharge Activity:       CODE STATUS:  Code Status and Medical Interventions: CPR (Attempt to Resuscitate); Full Support   Ordered at: 12/26/24 0945     Code Status (Patient has no pulse and is not breathing):    CPR (Attempt to Resuscitate)     Medical Interventions (Patient has pulse or is breathing):    Full Support       Future Appointments   Date Time Provider Department Center   12/30/2024  9:45 AM Romario Valdez DO [facility]   1/3/2025  8:30 AM Romario Valdez DO [facility] [place]   1/6/2025 10:30 AM Agustin Ayala [place] [facility] [place]   1/8/2025  9:30 AM Sara Lubin [place]N [facility] [place]   4/4/2025 11:00 AM Mahogany Tinsley APRN [facility] [place] [facility]       Additional Instructions for the Follow-ups that You Need to Schedule       Discharge Follow-up with PCP   As directed       Currently Documented PCP:    Romario Valdez DO    PCP Phone Number:    [phone]     Follow Up Details: 3 to 7 days        Discharge Follow-up with Specified Provider: general surgery;  2 Weeks   As directed      To: general surgery   Follow Up: 2 Weeks                Pertinent  and/or Most Recent Results     IMAGING:  MRI Foot Left With & Without Contrast    Result Date: 12/25/2024  MRI FOOT LEFT W WO CONTRAST Date of Exam: 12/25/2024 12:49 PM EST Indication: concern for osteo.  Comparison: Routine radiograph 12/24/2024 Technique:  Routine multiplanar/multisequence sequence images of the left foot were obtained before and after the uneventful administration of Multihance.  Findings: Motion artifact obscures the images. There is generalized soft tissue edema/cellulitis. No loculated fluid collections are identified to suggest abscess. There is fatty infiltration of the intrinsic muscles of the foot. The visualized flexor, extensor and peroneal tendons are intact. No definite abnormal marrow signal or enhancement to suggest osteomyelitis. The exam is limited by motion artifact. Soft tissue defect in the dorsum of the foot.     Impression: 1. Generalized soft tissue edema/cellulitis in the left foot. 2. No definite osteomyelitis. The exam is limited by motion artifact. Electronically Signed: Eric Martins MD  12/25/2024 1:55 PM EST  Workstation ID: [id number]    CT Lower Extremity Left With Contrast    Result Date: 12/24/2024  CT LOWER EXTREMITY LEFT W CONTRAST Date of Exam: 12/24/2024 12:12 PM EST Indication: Left thigh abscess and cellulitis. Comparison: None available. Technique: Axial CT images were obtained of the left lower extremity after the uneventful intravenous administration of iodinated contrast.  Reconstructed coronal and sagittal images were also obtained. Automated exposure control and iterative construction methods were used. Findings: At the anterior-medial aspect of the distal left thigh, there appears to be a subcutaneous collection with fat and fluid attenuating components, several tiny foci of gas, and irregular peripheral enhancement suspicious for a subcutaneous abscess. This  measures approximately 3.9 x 3.6 cm in maximal axial dimensions and approximately 6.5 cm craniocaudal. There is overlying skin thickening and enhancement suspicious for cellulitis. There is also surrounding subcutaneous edema. There is some mild diffuse subcu edema throughout the lower leg, primarily along the medial aspect. The abscess appears superficial to the medial thigh musculature. There does not appear to be significant intramuscular edema or perifascial fluid on this exam. No other significant localized collection in the visualized lower extremity. Status post bilateral knee arthroplasties. No significant knee effusion is seen on this exam. No definite hip effusion. There is spinal fusion hardware in the lower lumbar spine, incompletely visualized. No definite acute hardware complication is identified at the knee. No definite periosteal reaction, fracture, or other findings of acute osseous abnormality. There is moderate osteoarthritis of the left hip. There are moderate degenerative changes at the sacroiliac joints and pubic symphysis. There is calcific atherosclerosis of the visualized iliac and lower extremity arteries there is suspected multifocal mild to moderate luminal narrowing in the left iliac and proximal lower extremity arteries. The popliteus artery is not well evaluated due to artifact from the knee hardware. There does appear to be some patent opacification of the visualized tibial and peroneal arteries.     Impression: 1.Subcutaneous abscess at the anterior-medial aspect of the distal left thigh measuring up to approximately 6.5 cm. Surrounding skin and subcutaneous findings suspicious for cellulitis. 2.No definite findings of acute osseous abnormality. Status post knee arthroplasty without definite findings of acute hardware complication or visible knee effusion. 3.Calcific atherosclerosis with suspected multifocal mild to moderate luminal narrowing in the left iliac and proximal lower  extremity arteries. The popliteus artery is not well evaluated due to artifact from the knee hardware. There does appear to be patent opacification of the visualized tibial and peroneal arteries. Electronically Signed: Chester Cesar  12/24/2024 1:09 PM EST  Workstation ID: [id number]    XR Foot 3+ View Left    Result Date: 12/24/2024  XR FOOT 3+ VW LEFT Date of Exam: 12/24/2024 10:58 AM EST Indication: wound 11/9/2024 Comparison: 11/9/2024 Findings: There is no acute fracture or dislocation. No bony erosion or abnormal periosteal reaction. There is degenerative spurring along the dorsal aspect of the tarsal bones. There is enthesophyte formation of the calcaneus. There is soft tissue swelling along dorsum of the foot. No radiopaque foreign body.     Impression: 1. Soft tissue swelling of the dorsum of the foot. No conventional radiographic evidence of osteomyelitis. Electronically Signed: Andrea Knutson MD  12/24/2024 11:13 AM EST  Workstation ID: [id number]    Duplex Venous Lower Extremity - Left CAR    Result Date: 12/16/2024    Normal left lower extremity venous duplex scan.   Left 4 x 3 cm distal medial thigh nonvascular mixed density fluid collection of uncertain etiology.  Consider hematoma or abscess.  Clinical and/or alternative imaging correlation would be appropriate.       LAB RESULTS:      Lab 12/27/24  0413 12/26/24  0320 12/25/24  0320 12/24/24  1436 12/24/24  1053   WBC 11.95* 13.77* 12.98*  --  15.57*   HEMOGLOBIN 7.9* 7.9* 7.8*  --  8.7*   HEMATOCRIT 27.1* 26.1* 25.7*  --  27.7*   PLATELETS 263 256 273  --  309   NEUTROS ABS 8.73*  --  10.05*  --  12.22*   IMMATURE GRANS (ABS) 0.27*  --  0.15*  --  0.11*   LYMPHS ABS 1.68  --  1.66  --  1.75   MONOS ABS 1.07*  --  1.09*  --  1.33*   EOS ABS 0.10  --  0.00  --  0.09   .3* 98.1* 99.2*  --  97.5*   SED RATE  --   --   --   --  52*   CRP  --   --   --   --  10.05*   LACTATE  --   --   --   --  1.8   PROTIME  --   --   --  16.1*  --          Lab  12/27/24  0413 12/26/24  0320 12/25/24  0320 12/24/24  1053   SODIUM 134* 138 139 139   POTASSIUM 4.2 4.6 5.0 4.3   CHLORIDE 101 107 106 103   CO2 19.1* 21.0* 20.5* 20.8*   ANION GAP 13.9 10.0 12.5 15.2*   BUN 37* 35* 36* 36*   CREATININE 1.50* 1.54* 1.49* 1.58*   EGFR 35.1* 34.0* 35.4* 33.0*   GLUCOSE 139* 136* 129* 123*   CALCIUM 9.1 8.7 8.4* 8.9   MAGNESIUM 1.7 1.9 2.0  --    PHOSPHORUS 2.7 3.2 4.6*  --          Lab 12/27/24  0413 12/26/24  0320 12/25/24  0320 12/24/24  1053   TOTAL PROTEIN 6.1  --  6.4 7.1   ALBUMIN 2.9* 3.1* 3.3* 3.7   GLOBULIN 3.2  --  3.1 3.4   ALT (SGPT) 35*  --  32 8   AST (SGOT) 32  --  40* 19   BILIRUBIN 0.3  --  0.2 0.4   ALK PHOS 55  --  45 49         Lab 12/26/24  0320 12/24/24  1436   PROBNP 36,310.0*  --    PROTIME  --  16.1*   INR  --  1.26*                 Brief Urine Lab Results  (Last result in the past 365 days)        Color   Clarity   Blood   Leuk Est   Nitrite   Protein   CREAT   Urine HCG        07/31/24 1427 Yellow   Clear   Negative   Moderate (2+)   Negative   Negative                 Microbiology Results (last 10 days)       Procedure Component Value - Date/Time    Anaerobic Culture - Swab, Leg, Left [[id number]]  (Normal) Collected: 12/24/24 1526    Lab Status: Preliminary result Specimen: Swab from Leg, Left Updated: 12/27/24 0727     Anaerobic Culture Screening for Anaerobes    Wound Culture - Swab, Leg, Left [[id number]] Collected: 12/24/24 1526    Lab Status: Final result Specimen: Swab from Leg, Left Updated: 12/27/24 0758     Wound Culture No growth at 3 days     Gram Stain Few (2+) WBCs seen      No organisms seen    AFB Culture - Swab, Leg, Left [[id number]] Collected: 12/24/24 1526    Lab Status: Preliminary result Specimen: Swab from Leg, Left Updated: 12/25/24 1218     AFB Stain No acid fast bacilli seen    Blood Culture - Blood, Hand, Right [[id number]]  (Normal) Collected: 12/24/24 1129    Lab Status: Preliminary result Specimen: Blood from Hand, Right Updated:  12/27/24 1145     Blood Culture No growth at 3 days    Narrative:      Less than seven (7) mL's of blood was collected.  Insufficient quantity may yield false negative results.    Wound Culture - Swab, Foot, Left [[id number]] Collected: 12/24/24 1116    Lab Status: Final result Specimen: Swab from Foot, Left Updated: 12/27/24 0751     Wound Culture Light growth (2+) Normal Skin Lissette     Gram Stain Few (2+) Gram positive cocci in pairs and clusters      Few (2+) Gram positive bacilli resembling diphtheroids      Few (2+) WBCs seen    Wound Culture - Swab, Thigh, Left [[id number]] Collected: 12/24/24 1116    Lab Status: Final result Specimen: Swab from Thigh, Left Updated: 12/27/24 0757     Wound Culture No growth at 3 days     Gram Stain Moderate (3+) WBCs seen      Moderate (3+) Gram negative bacilli      Few (2+) Gram positive cocci in pairs and clusters    Blood Culture - Blood, Arm, Left [[id number]]  (Normal) Collected: 12/24/24 1053    Lab Status: Preliminary result Specimen: Blood from Arm, Left Updated: 12/27/24 1115     Blood Culture No growth at 3 days          Results for orders placed during the hospital encounter of 12/16/24    Duplex Venous Lower Extremity - Left CAR    Interpretation Summary    Normal left lower extremity venous duplex scan.    Left 4 x 3 cm distal medial thigh nonvascular mixed density fluid collection of uncertain etiology.  Consider hematoma or abscess.  Clinical and/or alternative imaging correlation would be appropriate.    Results for orders placed during the hospital encounter of 12/16/24    Duplex Venous Lower Extremity - Left CAR    Interpretation Summary    Normal left lower extremity venous duplex scan.    Left 4 x 3 cm distal medial thigh nonvascular mixed density fluid collection of uncertain etiology.  Consider hematoma or abscess.  Clinical and/or alternative imaging correlation would be appropriate.    Results for orders placed during the hospital encounter of  08/06/24    Adult Transthoracic Echo Complete W/ Cont if Necessary Per Protocol    Interpretation Summary    Left ventricular systolic function is normal. Calculated left ventricular EF = 54.8%    Left ventricular wall thickness is consistent with borderline concentric hypertrophy.    Left ventricular diastolic function is consistent with (grade Ia w/high LAP) impaired relaxation.    The left atrial cavity is mildly dilated.    Mild aortic valve stenosis is present.    Estimated right ventricular systolic pressure from tricuspid regurgitation is normal (<35 mmHg).      Time spent on Discharge including face to face service: Greater than 30 minutes      Electronically signed by Sergo Schulte MD, 12/27/24, 1:22 PM EST.

## 2024-12-27 NOTE — THERAPY EVALUATION
Acute Care - Physical Therapy Initial Evaluation  [facility]     Patient Name: Ann-Marie Hughes  : 1945  MRN: [id number]  Today's Date: 2024      Visit Dx:     ICD-10-CM ICD-9-CM   1. Abscess of left lower extremity  L02.416 682.6   2. Difficulty walking  R26.2 719.7     Patient Active Problem List   Diagnosis    Arthritis    Chronic pain syndrome    Diabetes mellitus, type II    Gastric reflux    Herniated disc    Primary hypertension    Hypothyroidism    Pain in joint, multiple sites    Hyperlipidemia    B12 deficiency    Vitamin D deficiency    Acne rosacea    Hx of smoking    Stress incontinence of urine    Arteriosclerosis of abdominal aorta    Iron deficiency anemia secondary to inadequate dietary iron intake    Seasonal allergies    Hyponatremia    Leg length discrepancy    Reactive depression    Encounter for subsequent annual wellness visit (AWV) in Medicare patient    Class 1 obesity with alveolar hypoventilation, serious comorbidity, and body mass index (BMI) of 32.0 to 32.9 in adult    Chronic HFrEF (heart failure with reduced ejection fraction)    CAD status post CABG x3 vessels    PAF (paroxysmal atrial fibrillation)    Encounter for medical examination to establish care    Autoimmune hemolytic anemia    Need for RSV vaccination    Stage 3b chronic kidney disease    Large granular lymphocyte disorder    Cervicogenic headache    Aortic stenosis, mild    Contusion of left foot    Cellulitis of foot    Cellulitis of left lower extremity    Wound cellulitis    Abscess of left lower extremity    Neuropathy    Ulcer of left foot with fat layer exposed    Left foot pain     Past Medical History:   Diagnosis Date    Acne rosacea 2021    DR.JEFF MOON     Acute respiratory failure with hypoxia 2023    Arteriosclerosis of abdominal aorta     B12 deficiency 2021    DJD (degenerative joint disease)     Hx of smoking 2021    QUIT IN  AT AGE 31    Hyperlipidemia  08/17/2021    Hypertension     Hypothyroidism     Metabolic syndrome 08/17/2021    NSTEMI (non-ST elevated myocardial infarction) 07/17/2023    Pericardial effusion 10/10/2023    KAREN (stress urinary incontinence, female) 08/17/2021    UTI (urinary tract infection) 08/17/2021    Vitamin D deficiency 08/17/2021     Past Surgical History:   Procedure Laterality Date    BACK SURGERY  2013    CARDIAC CATHETERIZATION N/A 07/17/2023    Procedure: Left Heart Cath;  Surgeon: Dinh Andres MD;  Location: [facility] CATH INVASIVE LOCATION;  Service: Cardiovascular;  Laterality: N/A;    CARPAL TUNNEL RELEASE      COLONOSCOPY  2011    CORONARY ARTERY BYPASS GRAFT WITH MITRAL VALVE REPAIR/REPLACEMENT N/A 07/20/2023    Procedure: REZA STERNOTOMY OFF-PUMP CORONARY ARTERY BYPASS GRAFT TIMES        USING LEFFT INTERNAL MAMMARY ARTERY AND     GREATER SAPHENOUS VEIN GRAFT PER EMDOSCOPIC VEIN HARVESTING, MAZE PROCEDURE AND PRP;  Surgeon: Shane Alexander MD;  Location: [facility] CVOR;  Service: Cardiothoracic;  Laterality: N/A;    INCISION AND DRAINAGE LEG N/A 12/24/2024    Procedure: INCISION AND DRAINAGE LOWER EXTREMITY; Plain text: incision to remove pus from left leg;  Surgeon: Sergo Altamirano MD;  Location: [facility] MAIN OR;  Service: General;  Laterality: N/A;    KNEE SURGERY      LUMBAR DISCECTOMY      NECK SURGERY      Cervical    POSTERIOR LUMBAR/THORACIC SPINE FUSION       PT Assessment (Last 12 Hours)       PT Evaluation and Treatment       Row Name 12/27/24 1300          Physical Therapy Time and Intention    Document Type evaluation  -AV     Mode of Treatment individual therapy;physical therapy  -AV       Row Name 12/27/24 1300          General Information    Patient Profile Reviewed yes  -AV     Patient Observations alert;cooperative;agree to therapy  -AV     Prior Level of Function independent:;all household mobility;gait;transfer;ADL's  Ambulated with rollator. No home O2.  -AV     Equipment Currently Used at Home  rollator  -AV     Existing Precautions/Restrictions fall;other (see comments)  wound vac  -AV       Row Name 12/27/24 1300          Living Environment    Current Living Arrangements home  -AV     People in Home spouse  -AV       Row Name 12/27/24 1300          Pain    Pre/Posttreatment Pain Comment No reports of pain but does endorse rubbing during ambulation. Nurse notified following session  -AV       Row Name 12/27/24 1300          Cognition    Orientation Status (Cognition) oriented x 3  -AV       Row Name 12/27/24 1300          Range of Motion (ROM)    Range of Motion bilateral lower extremities;ROM is WFL  -AV       Row Name 12/27/24 1300          Strength (Manual Muscle Testing)    Strength (Manual Muscle Testing) bilateral lower extremities;strength is WFL  -AV       Row Name 12/27/24 1300          Bed Mobility    Bed Mobility supine-sit  -AV     Supine-Sit [place] (Bed Mobility) contact guard  -AV     Assistive Device (Bed Mobility) head of bed elevated;bed rails  -AV       Row Name 12/27/24 1300          Transfers    Transfers sit-stand transfer;stand-sit transfer  -AV       Row Name 12/27/24 1300          Sit-Stand Transfer    Sit-Stand [place] (Transfers) standby assist  -AV     Assistive Device (Sit-Stand Transfers) walker, 4-wheeled  -AV       Row Name 12/27/24 1300          Stand-Sit Transfer    Stand-Sit [place] (Transfers) standby assist  -AV     Assistive Device (Stand-Sit Transfers) walker, 4-wheeled  -AV       Row Name 12/27/24 1300          Gait/Stairs (Locomotion)    Gait/Stairs Locomotion gait/ambulation independence;gait/ambulation assistive device;distance ambulated  -AV     [place] Level (Gait) contact guard;standby assist  -AV     Assistive Device (Gait) walker, 4-wheeled  -AV     Distance in Feet (Gait) 150  -AV     Pattern (Gait) step-through  -AV     Bilateral Gait Deviations forward flexed posture  -AV       Row Name 12/27/24 1300          Safety Issues/Impairments  Affecting Functional Mobility    Impairments Affecting Function (Mobility) balance;endurance/activity tolerance  -AV       Row Name 12/27/24 1300          Balance    Balance Assessment standing dynamic balance  -AV     Dynamic Standing Balance contact guard;standby assist  -AV     Position/Device Used, Standing Balance supported;walker, 4-wheeled  -AV       Row Name             Wound 12/24/24 1528 Left anterior knee    Wound - Properties Group Placement Date: 12/24/24  -KH Placement Time: 1528  -KH Side: Left  -KH Orientation: anterior  -KH Location: knee  -KH Primary Wound Type: Incision  -KH    Retired Wound - Properties Group Placement Date: 12/24/24  -KH Placement Time: 1528 -KH Side: Left  -KH Orientation: anterior  -KH Location: knee  -KH Primary Wound Type: Incision  -KH    Retired Wound - Properties Group Placement Date: 12/24/24  -KH Placement Time: 1528 -KH Side: Left  -KH Orientation: anterior  -KH Location: knee  -KH Primary Wound Type: Incision  -KH    Retired Wound - Properties Group Date first assessed: 12/24/24  -KH Time first assessed: 1528  -KH Side: Left  -KH Location: knee  -KH Primary Wound Type: Incision  -KH      Row Name             Wound 12/24/24 1400 Left anterior foot    Wound - Properties Group Placement Date: 12/24/24  -TL Placement Time: 1400  -TL Side: Left  -TL Orientation: anterior  -TL Location: foot  -TL Present on Original Admission: Y  -TL    Retired Wound - Properties Group Placement Date: 12/24/24  -TL Placement Time: 1400  -TL Present on Original Admission: Y  -TL Side: Left  -TL Orientation: anterior  -TL Location: foot  -TL    Retired Wound - Properties Group Placement Date: 12/24/24  -TL Placement Time: 1400  -TL Present on Original Admission: Y  -TL Side: Left  -TL Orientation: anterior  -TL Location: foot  -TL    Retired Wound - Properties Group Date first assessed: 12/24/24  -TL Time first assessed: 1400  -TL Present on Original Admission: Y  -TL Side: Left  -TL  Location: foot  -TL      Row Name             Wound 12/25/24 1058    Wound - Properties Group Placement Date: 12/25/24  -RP Placement Time: 1058  -RP Primary Wound Type: Skin tear  -RP Number of Sutures Placed: 0  -RP    Retired Wound - Properties Group Placement Date: 12/25/24  -RP Placement Time: 1058  -RP Primary Wound Type: Skin tear  -RP Number of Sutures Placed: 0  -RP    Retired Wound - Properties Group Placement Date: 12/25/24  -RP Placement Time: 1058 -RP Primary Wound Type: Skin tear  -RP Number of Sutures Placed: 0  -RP    Retired Wound - Properties Group Date first assessed: 12/25/24  -RP Time first assessed: 1058 -RP Primary Wound Type: Skin tear  -RP Number of Sutures Placed: 0  -RP      Row Name             Wound 12/26/24 0230 Left upper arm skin tear    Wound - Properties Group Placement Date: 12/26/24  -JK Placement Time: 0230  -JK Side: Left  -JK Orientation: upper  -JK Location: arm  -JK Primary Wound Type: Skin tear  -JK Type: skin tear  -JK    Retired Wound - Properties Group Placement Date: 12/26/24  -JK Placement Time: 0230  -JK Side: Left  -JK Orientation: upper  -JK Location: arm  -JK Primary Wound Type: Skin tear  -JK Type: skin tear  -JK    Retired Wound - Properties Group Placement Date: 12/26/24  -JK Placement Time: 0230  -JK Side: Left  -JK Orientation: upper  -JK Location: arm  -JK Primary Wound Type: Skin tear  -JK Type: skin tear  -JK    Retired Wound - Properties Group Date first assessed: 12/26/24  -JK Time first assessed: 0230  -JK Side: Left  -JK Location: arm  -JK Primary Wound Type: Skin tear  -JK Type: skin tear  -JK      Row Name             Wound 12/26/24 1006 Left lower arm    Wound - Properties Group Placement Date: 12/26/24  -FW Placement Time: 1006  -FW Side: Left  -FW Orientation: lower  -FW Location: arm  -FW Primary Wound Type: Skin tear  -FW    Retired Wound - Properties Group Placement Date: 12/26/24  -FW Placement Time: 1006  -FW Side: Left  -FW Orientation:  lower  -FW Location: arm  -FW Primary Wound Type: Skin tear  -FW    Retired Wound - Properties Group Placement Date: 12/26/24  -FW Placement Time: 1006  -FW Side: Left  -FW Orientation: lower  -FW Location: arm  -FW Primary Wound Type: Skin tear  -FW    Retired Wound - Properties Group Date first assessed: 12/26/24  -FW Time first assessed: 1006  -FW Side: Left  -FW Location: arm  -FW Primary Wound Type: Skin tear  -FW      Row Name             NPWT (Negative Pressure Wound Therapy) 12/26/24 Left Thigh    NPWT (Negative Pressure Wound Therapy) - Properties Group Placement Date: 12/26/24  -BM Location: Left Thigh  -BM    Retired NPWT (Negative Pressure Wound Therapy) - Properties Group Placement Date: 12/26/24  -BM Location: Left Thigh  -BM    Retired NPWT (Negative Pressure Wound Therapy) - Properties Group Placement Date: 12/26/24  -BM Location: Left Thigh  -BM    Retired NPWT (Negative Pressure Wound Therapy) - Properties Group Placement Date: 12/26/24  -BM Location: Left Thigh  -BM      Row Name 12/27/24 1300          Plan of Care Review    Plan of Care Reviewed With patient;spouse  -AV     Progress no change  -AV     Outcome Evaluation Patient presents with deficits in balance, endurance, transfers, and ambulation. Patient will benefit from skilled PT services to address these mobility deficits and decrease risk of falls.  -AV       Row Name 12/27/24 1300          Positioning and Restraints    Pre-Treatment Position in bed  -AV     Post Treatment Position chair  -AV     In Chair reclined;call light within reach;encouraged to call for assist;with family/caregiver;notified nsg  -AV       Row Name 12/27/24 1300          Therapy Assessment/Plan (PT)    Rehab Potential (PT) good  -AV     Criteria for Skilled Interventions Met (PT) yes;meets criteria  -AV     Therapy Frequency (PT) daily  -AV     Predicted Duration of Therapy Intervention (PT) 10 days  -AV     Problem List (PT) problems related to;balance;mobility   -AV     Activity Limitations Related to Problem List (PT) unable to transfer safely;unable to ambulate safely  -AV       Row Name 12/27/24 1300          PT Evaluation Complexity    History, PT Evaluation Complexity 1-2 personal factors and/or comorbidities  -AV     Examination of Body Systems (PT Eval Complexity) total of 4 or more elements  -AV     Clinical Presentation (PT Evaluation Complexity) stable  -AV     Clinical Decision Making (PT Evaluation Complexity) low complexity  -AV     Overall Complexity (PT Evaluation Complexity) low complexity  -AV       Row Name 12/27/24 1300          Therapy Plan Review/Discharge Plan (PT)    Therapy Plan Review (PT) evaluation/treatment results reviewed;patient  -AV       Row Name 12/27/24 1300          Physical Therapy Goals    Bed Mobility Goal Selection (PT) bed mobility, PT goal 1  -AV     Transfer Goal Selection (PT) transfer, PT goal 1  -AV     Gait Training Goal Selection (PT) gait training, PT goal 1  -AV       Row Name 12/27/24 1300          Bed Mobility Goal 1 (PT)    Activity/Assistive Device (Bed Mobility Goal 1, PT) sit to supine/supine to sit  -AV     [place] Level/Cues Needed (Bed Mobility Goal 1, PT) modified independence  -AV     Time Frame (Bed Mobility Goal 1, PT) 10 days  -AV       Row Name 12/27/24 1300          Transfer Goal 1 (PT)    Activity/Assistive Device (Transfer Goal 1, PT) sit-to-stand/stand-to-sit;bed-to-chair/chair-to-bed;other (see comments)  Rollator  -AV     [place] Level/Cues Needed (Transfer Goal 1, PT) modified independence  -AV     Time Frame (Transfer Goal 1, PT) 10 days  -AV       Row Name 12/27/24 1300          Gait Training Goal 1 (PT)    Activity/Assistive Device (Gait Training Goal 1, PT) gait (walking locomotion);assistive device use;other (see comments)  rollator  -AV     [place] Level (Gait Training Goal 1, PT) modified independence  -AV     Distance (Gait Training Goal 1, PT) 250  -AV     Time Frame (Gait  Training Goal 1, PT) 10 days  -AV               User Key  (r) = Recorded By, (t) = Taken By, (c) = Cosigned By      Initials Name Provider Type    Buffy Dhillon, RN Registered Nurse    Jose Miguel Barry, RN Registered Nurse    Florence Hazel RN Registered Nurse    Salvatore Bailey, RIOS Physical Therapist    Luz Elena Murphy RN Registered Nurse    Amy Glasgow RN Registered Nurse    Danyell Huffman RN Registered Nurse                    Physical Therapy Education       Title: PT OT SLP Therapies (In Progress)       Topic: Physical Therapy (In Progress)       Point: Mobility training (Done)       Learning Progress Summary            Patient Acceptance, E,TB, VU by AV at 12/27/2024 1327                      Point: Home exercise program (Not Started)       Learner Progress:  Not documented in this visit.              Point: Body mechanics (Done)       Learning Progress Summary            Patient Acceptance, E,TB, VU by AV at 12/27/2024 1327                      Point: Precautions (Done)       Learning Progress Summary            Patient Acceptance, E,TB, VU by AV at 12/27/2024 1327                                      User Key       Initials Effective Dates Name Provider Type Discipline     06/11/21 -  Salvatore Mercado, RIOS Physical Therapist PT                  PT Recommendation and Plan  Anticipated Discharge Disposition (PT): home with home health  Planned Therapy Interventions (PT): balance training, bed mobility training, gait training, home exercise program, neuromuscular re-education, strengthening, transfer training  Therapy Frequency (PT): daily  Plan of Care Reviewed With: patient, spouse  Progress: no change  Outcome Evaluation: Patient presents with deficits in balance, endurance, transfers, and ambulation. Patient will benefit from skilled PT services to address these mobility deficits and decrease risk of falls.   Outcome Measures       Row Name 12/27/24 1300             How  much help from another person do you currently need...    Turning from your back to your side while in flat bed without using bedrails? 4  -AV      Moving from lying on back to sitting on the side of a flat bed without bedrails? 3  -AV      Moving to and from a bed to a chair (including a wheelchair)? 3  -AV      Standing up from a chair using your arms (e.g., wheelchair, bedside chair)? 3  -AV      Climbing 3-5 steps with a railing? 2  -AV      To walk in hospital room? 3  -AV      AM-PAC 6 Clicks Score (PT) 18  -AV         Functional Assessment    Outcome Measure Options AM-PAC 6 Clicks Basic Mobility (PT)  -AV                User Key  (r) = Recorded By, (t) = Taken By, (c) = Cosigned By      Initials Name Provider Type    Salvatore Bailey, PT Physical Therapist                     Time Calculation:    PT Charges       Row Name 12/27/24 1326             Time Calculation    PT Received On 12/27/24  -AV      PT Goal Re-Cert Due Date 01/05/25  -AV         Untimed Charges    PT Eval/Re-eval Minutes 35  -AV         Total Minutes    Untimed Charges Total Minutes 35  -AV       Total Minutes 35  -AV                User Key  (r) = Recorded By, (t) = Taken By, (c) = Cosigned By      Initials Name Provider Type    Salvatore Bailey, PT Physical Therapist                  Therapy Charges for Today       Code Description Service Date Service Provider Modifiers Qty    [id number] HC PT EVAL LOW COMPLEXITY 3 12/27/2024 Salvatore Mercado, PT GP 1            PT G-Codes  Outcome Measure Options: AM-PAC 6 Clicks Basic Mobility (PT)  AM-PAC 6 Clicks Score (PT): 18    Salvatore Mercado PT  12/27/2024

## 2024-12-27 NOTE — OUTREACH NOTE
Prep Survey      Flowsheet Row Responses   [facility] patient discharged from? [facility]   Is LACE score < 7 ? No   Eligibility [facility]   Date of Admission 12/24/24   Date of Discharge 12/27/24   Discharge Disposition Home-Health Care [facility]   Discharge diagnosis Wound cellulitis  [INCISION AND DRAINAGE LOWER EXTREMITY,  Plain text: incision to remove pus from left leg]   Does the patient have one of the following disease processes/diagnoses(primary or secondary)? General Surgery   Does the patient have Home health ordered? Yes   What is the Home health agency?  [facility]   Is there a DME ordered? Yes   What DME was ordered? Wound vac--[employer]   Comments regarding appointments Discharge Follow-up with Specified Provider: general surgery,  2 Weeks   Medication alerts for this patient see AVS   Prep survey completed? Yes            Joy HOGUE - Registered Nurse

## 2024-12-27 NOTE — SIGNIFICANT NOTE
[facility]   Wound Evaluation / Progress Note       Patient Name: Ann-Marie Hughes    : 1945    MRN: [id number]  Today's Date: 2024                     Admit Date: 2024    Visit Dx:    ICD-10-CM ICD-9-CM   1. Abscess of left lower extremity  L02.416 682.6       Patient Active Problem List   Diagnosis    Arthritis    Chronic pain syndrome    Diabetes mellitus, type II    Gastric reflux    Herniated disc    Primary hypertension    Hypothyroidism    Pain in joint, multiple sites    Hyperlipidemia    B12 deficiency    Vitamin D deficiency    Acne rosacea    Hx of smoking    Stress incontinence of urine    Arteriosclerosis of abdominal aorta    Iron deficiency anemia secondary to inadequate dietary iron intake    Seasonal allergies    Hyponatremia    Leg length discrepancy    Reactive depression    Encounter for subsequent annual wellness visit (AWV) in Medicare patient    Class 1 obesity with alveolar hypoventilation, serious comorbidity, and body mass index (BMI) of 32.0 to 32.9 in adult    Chronic HFrEF (heart failure with reduced ejection fraction)    CAD status post CABG x3 vessels    PAF (paroxysmal atrial fibrillation)    Encounter for medical examination to establish care    Autoimmune hemolytic anemia    Need for RSV vaccination    Stage 3b chronic kidney disease    Large granular lymphocyte disorder    Cervicogenic headache    Aortic stenosis, mild    Contusion of left foot    Cellulitis of foot    Cellulitis of left lower extremity    Wound cellulitis    Abscess of left lower extremity    Neuropathy    Ulcer of left foot with fat layer exposed    Left foot pain        Past Medical History:   Diagnosis Date    Acne rosacea 2021    DR.JEFF MOON     Acute respiratory failure with hypoxia 2023    Arteriosclerosis of abdominal aorta     B12 deficiency 2021    DJD (degenerative joint disease)     Hx of smoking 2021    QUIT IN  AT AGE 31     Hyperlipidemia 08/17/2021    Hypertension     Hypothyroidism     Metabolic syndrome 08/17/2021    NSTEMI (non-ST elevated myocardial infarction) 07/17/2023    Pericardial effusion 10/10/2023    KAREN (stress urinary incontinence, female) 08/17/2021    UTI (urinary tract infection) 08/17/2021    Vitamin D deficiency 08/17/2021        Past Surgical History:   Procedure Laterality Date    BACK SURGERY  2013    CARDIAC CATHETERIZATION N/A 07/17/2023    Procedure: Left Heart Cath;  Surgeon: Dinh Andres MD;  Location: [facility] CATH INVASIVE LOCATION;  Service: Cardiovascular;  Laterality: N/A;    CARPAL TUNNEL RELEASE      COLONOSCOPY  2011    CORONARY ARTERY BYPASS GRAFT WITH MITRAL VALVE REPAIR/REPLACEMENT N/A 07/20/2023    Procedure: REZA STERNOTOMY OFF-PUMP CORONARY ARTERY BYPASS GRAFT TIMES        USING LEFFT INTERNAL MAMMARY ARTERY AND     GREATER SAPHENOUS VEIN GRAFT PER EMDOSCOPIC VEIN HARVESTING, MAZE PROCEDURE AND PRP;  Surgeon: Shane Alexander MD;  [facility];  Service: Cardiothoracic;  Laterality: N/A;    INCISION AND DRAINAGE LEG N/A 12/24/2024    Procedure: INCISION AND DRAINAGE LOWER EXTREMITY; Plain text: incision to remove pus from left leg;  Surgeon: Sergo Altamirano MD;  Location: [facility] MAIN OR;  Service: General;  Laterality: N/A;    KNEE SURGERY      LUMBAR DISCECTOMY      NECK SURGERY      Cervical    POSTERIOR LUMBAR/THORACIC SPINE FUSION           Physical Assessment:  Wound 12/24/24 1528 Left anterior knee (Active)   Wound Image   12/27/24 1100   Dressing Appearance intact;dry 12/27/24 1100   Base red;moist;yellow;pink 12/27/24 1100   Red (%), Wound Tissue Color 95 12/27/24 1100   Yellow (%), Wound Tissue Color 5 12/27/24 1100   Periwound intact;redness 12/27/24 1100   Periwound Temperature warm 12/27/24 1100   Periwound Skin Turgor soft 12/27/24 1100   Edges open 12/27/24 1100   Wound Length (cm) 6 cm 12/27/24 1100   Wound Width (cm) 4.8 cm 12/27/24 1100   Wound Depth (cm) 6  cm 12/27/24 1100   Wound Surface Area (cm^2) 28.8 cm^2 12/27/24 1100   Wound Volume (cm^3) 172.8 cm^3 12/27/24 1100   Undermining [Depth (cm)/Location] 2 cm from 1700 to 1900 12/27/24 1100   Drainage Characteristics/Odor serosanguineous 12/27/24 1100   Drainage Amount scant 12/27/24 1100   Care, Wound irrigated with;sterile normal saline;negative pressure wound therapy 12/27/24 1100   Dressing Care dressing changed;petroleum-based;non-adherent;foam;transparent film 12/27/24 1100   Periwound Care barrier film applied 12/27/24 1100      NPWT (Negative Pressure Wound Therapy) 12/26/24 Left Thigh (Active)   Therapy Setting continuous therapy 12/27/24 1141   Dressing foam, black 12/27/24 1141   Contact Layer Vaseline-embedded gauze 12/27/24 1141   Pressure Setting 125 mmHg 12/27/24 1141   Sponges Inserted 2 12/27/24 1141   Sponges Removed 2 12/27/24 1141   Finger sweep complete Yes 12/27/24 1141        Wound Check / Follow-up:  Seen today on [facility] for routine VAC dressing change. VAC functioning appropriately upon arrival. Dressing removed with use of adhesive remover. Wound base is moist, pink, with some scattered slough. Measurements above. Adaptic dressing applied to base of wound over muscle fasciae, skin prep applied. Black foam inserted into base of wound. Second piece of black foam added as mushroom and trac pad attached. Seal obtained at 125 mmHg of pressure. No issues noted.    Home VAC unit approved and home health set-up. Discussed troubleshooting of home VAC and need to remove foam dressing if a leak occurs and unable to resolve after 2 hours and pack wound with moist saline gauze and change daily until VAC is reapplied. Verbalized understanding. Also provided estimated cost of home VAC unit. Agreeable at this time. Will apply home VAC unit once discharge orders are written.    Impression: Surgical I&D wound to left thigh    Short term goals:  Wound VAC management, daily dressing changes to left dorsal foot;  regain skin integrity    Jose Miguel Puentes RN,[facility]    12/27/2024    11:43 EST

## 2024-12-27 NOTE — PROGRESS NOTES
"[facility] Clinical Pharmacy Services: Vancomycin Monitoring Note    Ann-Marie Hughes is a 79 y.o. female who is on day  of pharmacy to dose vancomycin for Skin and Soft Tissue.    Previous Vancomycin Dose:   750 mg IV x1 on  @0817   Imaging Reviewed?: Yes      Updated Cultures and Sensitivities:    Blood cx : NGTD   Wound cx, left thigh swab:   Moderate (3+) Gram negative bacilli P     Few (2+) Gram positive cocci in pairs and clusters      Wound cx, left foot swab:   Few (2+) Gram positive cocci in pairs and clusters P     Few (2+) Gram positive bacilli resembling diphtheroids      Wound cx, left leg swab: in process   AFB cx,left leg swab in process  Vitals/Labs  Ht: 162.6 cm (64\"); Wt: 84.7 kg (186 lb 11.7 oz)   Temp (24hrs), Av.6 °F (36.4 °C), Min:97.4 °F (36.3 °C), Max:97.8 °F (36.6 °C)   Estimated Creatinine Clearance: 31.5 mL/min (A) (by C-G formula based on SCr of 1.5 mg/dL (H)).     Results from last 7 days   Lab Units 24  0413 24  0320 24  0320   VANCOMYCIN RM mcg/mL 15.15 17.00 15.00   CREATININE mg/dL 1.50* 1.54* 1.49*   WBC 10*3/mm3 11.95* 13.77* 12.98*     Assessment/Plan  Current vancomycin dose- vancomycin 1gm iv q24, to provide the following parameters         AUC24,ss: 532 mg/L.hr         Probability of AUC24 > 400: 99 %         Ctrough,ss: 16.7 mg/L         Probability of Ctrough,ss > 20: 16 %         Probability of nephrotoxicity (Lodise ELIOT ): 12 %  NOTE: dose on  of 1000 mg was confirmed per RN as given/ MAR documentation was not completed properly.    We will continue to monitor patient changes and renal function     Thank you for involving pharmacy in this patient's care. Please contact pharmacy with any questions or concerns.    Keyonna Vaca  Clinical Pharmacist      "

## 2024-12-27 NOTE — PROGRESS NOTES
[facility]     Surgery Progress Note    Patient Name: Ann-Marie Hughes  :    1945  MRN:    [id number]  Date of admission:  2024  Length of Stay: 3 days    Subjective   79-year-old female with large left lower extremity abscess status post incision and drainage with sharp excisional debridement 7 x 7 x 4 cm ()     No acute events overnight.  Patient underwent bedside wound VAC placement yesterday.  Denies any issues at this time.  Objective     Current Facility-Administered Medications:     acetaminophen (TYLENOL) tablet 650 mg, 650 mg, Oral, Q6H PRN, Familia Foote MD, 650 mg at 24 2303    albuterol (PROVENTIL) nebulizer solution 0.083% 2.5 mg/3mL, 2.5 mg, Nebulization, Q6H PRN, Familia Foote MD, 2.5 mg at 24    [Held by provider] apixaban (ELIQUIS) tablet 5 mg, 5 mg, Oral, Q12H, Sergo Altamirano MD    arformoterol (BROVANA) nebulizer solution 15 mcg, 15 mcg, Nebulization, BID - RT, Familia Foote MD, 15 mcg at 24 1833    aspirin EC tablet 81 mg, 81 mg, Oral, Daily, Sergo Altamirano MD, 81 mg at 24 0923    atorvastatin (LIPITOR) tablet 40 mg, 40 mg, Oral, Daily, Sergo Schulte MD, 40 mg at 24 0924    benzocaine-menthol (CHLORASEPTIC) lozenge 1 each, 1 lozenge, Mouth/Throat, TID PRN, Familia Foote MD, 1 each at 24 0555    sennosides-docusate (PERICOLACE) 8.6-50 MG per tablet 2 tablet, 2 tablet, Oral, BID PRN **AND** polyethylene glycol (MIRALAX) packet 17 g, 17 g, Oral, Daily PRN **AND** bisacodyl (DULCOLAX) EC tablet 5 mg, 5 mg, Oral, Daily PRN **AND** bisacodyl (DULCOLAX) suppository 10 mg, 10 mg, Rectal, Daily PRN, Sergo Altamirano MD    buPROPion XL (WELLBUTRIN XL) 24 hr tablet 300 mg, 300 mg, Oral, Daily, Sergo Schulte MD, 300 mg at 24 0923    cefepime 1000 mg IVPB in 100 mL NS (VTB), 1,000 mg, Intravenous, Q12H, Sergo Schulte MD, 1,000 mg at 24    collagenase ointment 1  Application, 1 Application, Topical, Q24H, Andres Go, DPM, 1 Application at 12/27/24 0922    digoxin (LANOXIN) tablet 125 mcg, 125 mcg, Oral, Every Other Day, Sergo Schulte MD, 125 mcg at 12/26/24 1000    empagliflozin (JARDIANCE) tablet 25 mg, 25 mg, Oral, Daily, Sergo Schulte MD, 25 mg at 12/27/24 0924    famotidine (PEPCID) tablet 40 mg, 40 mg, Oral, Daily, Sergo Altamirano MD, 40 mg at 12/27/24 0923    furosemide (LASIX) tablet 40 mg, 40 mg, Oral, Every Other Day, Sergo Schulte MD, 40 mg at 12/26/24 1000    guaiFENesin-dextromethorphan (ROBITUSSIN DM) 100-10 MG/5ML syrup 5 mL, 5 mL, Oral, Q6H PRN, Familia Foote MD, 5 mL at 12/27/24 0503    levothyroxine (SYNTHROID, LEVOTHROID) tablet 75 mcg, 75 mcg, Oral, Q AM, Sergo Schulte MD, 75 mcg at 12/26/24 1000    metoprolol succinate XL (TOPROL-XL) 24 hr tablet 50 mg, 50 mg, Oral, BID, Familia Foote MD, 50 mg at 12/27/24 0923    morphine injection 1 mg, 1 mg, Intravenous, Q4H PRN, Familia Foote MD, 1 mg at 12/25/24 1533    multivitamin with minerals 1 tablet, 1 tablet, Oral, Daily, Sergo Altamirano MD, 1 tablet at 12/27/24 0929    ondansetron (ZOFRAN) injection 4 mg, 4 mg, Intravenous, Q6H PRN, Sergo Altamirano MD    Pharmacy to dose vancomycin, , Not Applicable, Continuous PRN, Sergo Schulte MD    prothrombin complex conc human (KCentra) IV solution 2,288 Units, 2,288 Units, Intravenous, Once, Sergo Schulte MD    sacubitril-valsartan (ENTRESTO) 24-26 MG tablet 1 tablet, 1 tablet, Oral, BID, Sergo Schulte MD, 1 tablet at 12/27/24 0922    sodium chloride 0.9 % flush 10 mL, 10 mL, Intravenous, Q12H, Sergo Altamirano MD, 10 mL at 12/27/24 0928    sodium chloride 0.9 % flush 10 mL, 10 mL, Intravenous, PRN, Sergo Altamirano MD    sodium chloride 0.9 % infusion 40 mL, 40 mL, Intravenous, PRN, Sergo Altamirano MD    sodium hypochlorite (HYSEPT) 0.25 % topical solution, , Topical,  Q24H, Sergo Schulte MD    spironolactone (ALDACTONE) tablet 25 mg, 25 mg, Oral, Daily, Sergo Schulte MD, 25 mg at 12/27/24 0924    vancomycin (VANCOCIN) 1,000 mg in sodium chloride 0.9 % 250 mL IVPB-VTB, 1,000 mg, Intravenous, Q24H, Sergo Schulte MD, Last Rate: 250 mL/hr at 12/27/24 0922, 1,000 mg at 12/27/24 0922    Current Diet:    Dietary Orders (From admission, onward)       Start     Ordered    12/24/24 1657  Diet: Regular/House; Fluid Consistency: Thin (IDDSI 0)  Diet Effective Now        References:    Diet Order Definitions   Question Answer Comment   Diets: Regular/House    Fluid Consistency: Thin (IDDSI 0)        12/24/24 1656                    Vitals:   Temp:  [97.4 °F (36.3 °C)-97.8 °F (36.6 °C)] 97.5 °F (36.4 °C)  Heart Rate:  [53-67] 53  Resp:  [16-20] 16  BP: (140-199)/(57-78) 140/78  Flow (L/min) (Oxygen Therapy):  [1] 1  Physical Exam   General: no acute distress, resting in bed  Respiratory:  non-labored breathing  Cardiovascular:  RRR, non-tachycardic  Abdomen:  soft, non-distended, non-tender  Musculoskeletal: Left medial thigh wound VAC in place with good seal, no surrounding induration or significant erythema    LABS:  CBC          12/25/2024    03:20 12/26/2024    03:20 12/27/2024    04:13   CBC   WBC 12.98  13.77  11.95    RBC 2.59  2.66  2.65    Hemoglobin 7.8  7.9  7.9    Hematocrit 25.7  26.1  27.1    MCV 99.2  98.1  102.3    MCH 30.1  29.7  29.8    MCHC 30.4  30.3  29.2    RDW 19.3  19.3  19.8    Platelets 273  256  263      BMP          12/25/2024    03:20 12/26/2024    03:20 12/27/2024    04:13   BMP   BUN 36  35  37    Creatinine 1.49  1.54  1.50    Sodium 139  138  134    Potassium 5.0  4.6  4.2    Chloride 106  107  101    CO2 20.5  21.0  19.1    Calcium 8.4  8.7  9.1      CMP          12/25/2024    03:20 12/26/2024    03:20 12/27/2024    04:13   CMP   Glucose 129  136  139    BUN 36  35  37    Creatinine 1.49  1.54  1.50    EGFR 35.4  34.0  35.1    Sodium  139  138  134    Potassium 5.0  4.6  4.2    Chloride 106  107  101    Calcium 8.4  8.7  9.1    Total Protein 6.4   6.1    Albumin 3.3  3.1  2.9    Globulin 3.1   3.2    Total Bilirubin 0.2   0.3    Alkaline Phosphatase 45   55    AST (SGOT) 40   32    ALT (SGPT) 32   35    Albumin/Globulin Ratio 1.1   0.9    BUN/Creatinine Ratio 24.2  22.7  24.7    Anion Gap 12.5  10.0  13.9        Lab Results (last 24 hours)       Procedure Component Value Units Date/Time    Wound Culture - Swab, Leg, Left [[id number]] Collected: 12/24/24 1526    Specimen: Swab from Leg, Left Updated: 12/27/24 0758     Wound Culture No growth at 3 days     Gram Stain Few (2+) WBCs seen      No organisms seen    Wound Culture - Swab, Thigh, Left [[id number]] Collected: 12/24/24 1116    Specimen: Swab from Thigh, Left Updated: 12/27/24 0757     Wound Culture No growth at 3 days     Gram Stain Moderate (3+) WBCs seen      Moderate (3+) Gram negative bacilli      Few (2+) Gram positive cocci in pairs and clusters    Wound Culture - Swab, Foot, Left [[id number]] Collected: 12/24/24 1116    Specimen: Swab from Foot, Left Updated: 12/27/24 0751     Wound Culture Light growth (2+) Normal Skin Lissette     Gram Stain Few (2+) Gram positive cocci in pairs and clusters      Few (2+) Gram positive bacilli resembling diphtheroids      Few (2+) WBCs seen    Anaerobic Culture - Swab, Leg, Left [[id number]]  (Normal) Collected: 12/24/24 1526    Specimen: Swab from Leg, Left Updated: 12/27/24 0727     Anaerobic Culture Screening for Anaerobes    Vancomycin, Random [[id number]]  (Normal) Collected: 12/27/24 0413    Specimen: Blood from Hand, Left Updated: 12/27/24 0557     Vancomycin Random 15.15 mcg/mL     Narrative:      Therapeutic Ranges for Vancomycin    Vancomycin Random   5.0-40.0 mcg/mL  Vancomycin Trough   5.0-20.0 mcg/mL  Vancomycin Peak     20.0-40.0 mcg/mL    Phosphorus [[id number]]  (Normal) Collected: 12/27/24 0413    Specimen: Blood from Hand, Left Updated:  12/27/24 0522     Phosphorus 2.7 mg/dL     Comprehensive Metabolic Panel [[id number]]  (Abnormal) Collected: 12/27/24 0413    Specimen: Blood from Hand, Left Updated: 12/27/24 0522     Glucose 139 mg/dL      BUN 37 mg/dL      Creatinine 1.50 mg/dL      Sodium 134 mmol/L      Potassium 4.2 mmol/L      Chloride 101 mmol/L      CO2 19.1 mmol/L      Calcium 9.1 mg/dL      Total Protein 6.1 g/dL      Albumin 2.9 g/dL      ALT (SGPT) 35 U/L      AST (SGOT) 32 U/L      Alkaline Phosphatase 55 U/L      Total Bilirubin 0.3 mg/dL      Globulin 3.2 gm/dL      A/G Ratio 0.9 g/dL      BUN/Creatinine Ratio 24.7     Anion Gap 13.9 mmol/L      eGFR 35.1 mL/min/1.73     Narrative:      GFR Categories in Chronic Kidney Disease (CKD)      GFR Category          GFR (mL/min/1.73)    Interpretation  G1                     90 or greater         Normal or high (1)  G2                      60-89                Mild decrease (1)  G3a                   45-59                Mild to moderate decrease  G3b                   30-44                Moderate to severe decrease  G4                    15-29                Severe decrease  G5                    14 or less           Kidney failure          (1)In the absence of evidence of kidney disease, neither GFR category G1 or G2 fulfill the criteria for CKD.    eGFR calculation 2021 CKD-EPI creatinine equation, which does not include race as a factor    Magnesium [[id number]]  (Normal) Collected: 12/27/24 0413    Specimen: Blood from Hand, Left Updated: 12/27/24 0522     Magnesium 1.7 mg/dL     CBC & Differential [[id number]]  (Abnormal) Collected: 12/27/24 0413    Specimen: Blood from Hand, Left Updated: 12/27/24 0508    Narrative:      The following orders were created for panel order CBC & Differential.  Procedure                               Abnormality         Status                     ---------                               -----------         ------                     CBC Auto  Differential[[id number]]        Abnormal            Final result                 Please view results for these tests on the individual orders.    CBC Auto Differential [[id number]]  (Abnormal) Collected: 12/27/24 0413    Specimen: Blood from Hand, Left Updated: 12/27/24 0508     WBC 11.95 10*3/mm3      RBC 2.65 10*6/mm3      Hemoglobin 7.9 g/dL      Hematocrit 27.1 %      .3 fL      MCH 29.8 pg      MCHC 29.2 g/dL      RDW 19.8 %      RDW-SD 73.7 fl      MPV 11.1 fL      Platelets 263 10*3/mm3      Neutrophil % 73.0 %      Lymphocyte % 14.1 %      Monocyte % 9.0 %      Eosinophil % 0.8 %      Basophil % 0.8 %      Immature Grans % 2.3 %      Neutrophils, Absolute 8.73 10*3/mm3      Lymphocytes, Absolute 1.68 10*3/mm3      Monocytes, Absolute 1.07 10*3/mm3      Eosinophils, Absolute 0.10 10*3/mm3      Basophils, Absolute 0.10 10*3/mm3      Immature Grans, Absolute 0.27 10*3/mm3      nRBC 0.6 /100 WBC     Blood Culture - Blood, Hand, Right [[id number]]  (Normal) Collected: 12/24/24 1129    Specimen: Blood from Hand, Right Updated: 12/26/24 1145     Blood Culture No growth at 2 days    Narrative:      Less than seven (7) mL's of blood was collected.  Insufficient quantity may yield false negative results.    Blood Culture - Blood, Arm, Left [[id number]]  (Normal) Collected: 12/24/24 1053    Specimen: Blood from Arm, Left Updated: 12/26/24 1115     Blood Culture No growth at 2 days    BNP [[id number]]  (Abnormal) Collected: 12/26/24 0320    Specimen: Blood from Arm, Right Updated: 12/26/24 1015     proBNP 36,310.0 pg/mL     Narrative:      This assay is used as an aid in the diagnosis of individuals suspected of having heart failure. It can be used as an aid in the diagnosis of acute decompensated heart failure (ADHF) in patients presenting with signs and symptoms of ADHF to the emergency department (ED). In addition, NT-proBNP of <300 pg/mL indicates ADHF is not likely.    Age Range Result Interpretation  NT-proBNP  Concentration (pg/mL:      <50             Positive            >450                   Gray                 300-450                    Negative             <300    50-75           Positive            >900                  Gray                300-900                  Negative            <300      >75             Positive            >1800                  Gray                300-1800                  Negative            <300            IMAGING:  Imaging Results (Last 24 Hours)       ** No results found for the last 24 hours. **            [x]  Laboratory  []  Microbiology  []  Radiology  []  EKG/Telemetry   []  Cardiology/Vascular   []  Pathology  []  Old records    Assessment / Plan   Assessment:   Active Hospital Problems    Diagnosis     **Wound cellulitis     Neuropathy     Ulcer of left foot with fat layer exposed     Left foot pain     Abscess of left lower extremity          Plan:    Abscess of left lower extremity   -Afebrile, vitals stable, leukocytosis decreased to 11,000  -Status post incision and drainage with debridement of nonviable subcutaneous tissue/skin (12/24)   -Paperwork for home VAC filled out; patient and  prefer home health nursing to do VAC changes at home over outpatient nursing services  -Plan for follow-up with me as an outpatient 2 weeks after discharge  -No plans for any surgical intervention, will sign off but please call with any questions or concerns     Electronically signed by Sergo Altamirano MD, 12/27/24, 9:32 AM EST.

## 2024-12-27 NOTE — PLAN OF CARE
Goal Outcome Evaluation:  Plan of Care Reviewed With: patient, spouse        Progress: improving  Outcome Evaluation: Patient discharging home today. Patient and family agreeable to discharge. Discharge instructions reviewed with patient and family. All questions answered and discharge instructions provided to patient. Wound vac changed to home device, verified all orderds in place for home wound VAC managment. VSS.

## 2024-12-27 NOTE — PLAN OF CARE
Goal Outcome Evaluation:  Plan of Care Reviewed With: patient        Progress: no change  Outcome Evaluation: VSS. Wound vac in place. IV abx given as ordered. patient up with x 1 asst. Hydralazine given x1 for elevated BP.

## 2024-12-29 ENCOUNTER — TRANSITIONAL CARE MANAGEMENT TELEPHONE ENCOUNTER (OUTPATIENT)
Dept: CALL CENTER | Facility: HOSPITAL | Age: 79
End: 2024-12-29
Payer: MEDICARE

## 2024-12-29 LAB
BACTERIA SPEC AEROBE CULT: NORMAL
BACTERIA SPEC AEROBE CULT: NORMAL
BACTERIA SPEC ANAEROBE CULT: ABNORMAL
FUNGUS WND CULT: NORMAL
MYCOBACTERIUM SPEC CULT: NORMAL
NIGHT BLUE STAIN TISS: NORMAL

## 2024-12-29 NOTE — OUTREACH NOTE
Call Center TCM Note      Flowsheet Row Responses   [facility] patient discharged from? [facility]   Does the patient have one of the following disease processes/diagnoses(primary or secondary)? General Surgery   TCM attempt successful? Yes   Call start time 1157   Call end time 1200   List who call center can speak with pt   Medication alerts for this patient antibiotics.   Meds reviewed with patient/caregiver? Yes   Is the patient having any side effects they believe may be caused by any medication additions or changes? No   Does the patient have all medications related to this admission filled (includes all antibiotics, pain medications, etc.) Yes   Is the patient taking all medications as directed (includes completed medication regime)? Yes   Comments Hospital f/u scheduled for 12- @ 9:45 am.   Does the patient have an appointment with their PCP within 7-14 days of discharge? Yes   Has home health visited the patient within 72 hours of discharge? Yes   What DME was ordered? Wound vac in place.   Psychosocial issues? No   Did the patient receive a copy of their discharge instructions? Yes   Nursing interventions Reviewed instructions with patient   What is the patient's perception of their health status since discharge? Improving   Is the patient/caregiver able to teach back signs and symptoms of incisional infection? Increased redness, swelling or pain at the incisonal site, Increased drainage or bleeding, Incisional warmth, Pus or odor from incision, Fever   Is the patient/caregiver able to teach back the hierarchy of who to call/visit for symptoms/problems? PCP, Specialist, Home health nurse, Urgent Care, ED, 911 Yes   TCM call completed? Yes   Wrap up additional comments Pt reports wound is healing. Wound vac in place. HH is visiting. Pt has pcp f/u scheduled. Pt has all medications.   Call end time 1200   Is the patient interested in additional calls from an ambulatory ? No             Anita Fletcher RN    12/29/2024, 12:01 EST

## 2024-12-30 ENCOUNTER — TELEPHONE (OUTPATIENT)
Dept: FAMILY MEDICINE CLINIC | Facility: CLINIC | Age: 79
End: 2024-12-30

## 2024-12-30 NOTE — TELEPHONE ENCOUNTER
[facility] called to report they will see her x3 week for wound care for her wound to her left lower extremity.  Order will be faxed over for to be signed.

## 2024-12-31 LAB
FUNGUS WND CULT: NORMAL
MYCOBACTERIUM SPEC CULT: NORMAL
NIGHT BLUE STAIN TISS: NORMAL

## 2025-01-03 ENCOUNTER — TELEPHONE (OUTPATIENT)
Dept: ONCOLOGY | Facility: HOSPITAL | Age: 80
End: 2025-01-03
Payer: MEDICARE

## 2025-01-03 ENCOUNTER — TELEPHONE (OUTPATIENT)
Dept: FAMILY MEDICINE CLINIC | Facility: CLINIC | Age: 80
End: 2025-01-03

## 2025-01-03 ENCOUNTER — OFFICE VISIT (OUTPATIENT)
Dept: FAMILY MEDICINE CLINIC | Facility: CLINIC | Age: 80
End: 2025-01-03
Payer: MEDICARE

## 2025-01-03 VITALS
HEART RATE: 56 BPM | SYSTOLIC BLOOD PRESSURE: 157 MMHG | TEMPERATURE: 97.4 F | BODY MASS INDEX: 29.23 KG/M2 | OXYGEN SATURATION: 100 % | WEIGHT: 170.3 LBS | DIASTOLIC BLOOD PRESSURE: 69 MMHG

## 2025-01-03 DIAGNOSIS — L02.416 ABSCESS OF LEFT LOWER EXTREMITY: Primary | ICD-10-CM

## 2025-01-03 DIAGNOSIS — L97.522 ULCER OF LEFT FOOT WITH FAT LAYER EXPOSED: ICD-10-CM

## 2025-01-03 DIAGNOSIS — Z09 HOSPITAL DISCHARGE FOLLOW-UP: ICD-10-CM

## 2025-01-03 PROCEDURE — 3077F SYST BP >= 140 MM HG: CPT | Performed by: FAMILY MEDICINE

## 2025-01-03 PROCEDURE — 1126F AMNT PAIN NOTED NONE PRSNT: CPT | Performed by: FAMILY MEDICINE

## 2025-01-03 PROCEDURE — 99495 TRANSJ CARE MGMT MOD F2F 14D: CPT | Performed by: FAMILY MEDICINE

## 2025-01-03 PROCEDURE — 1111F DSCHRG MED/CURRENT MED MERGE: CPT | Performed by: FAMILY MEDICINE

## 2025-01-03 PROCEDURE — 3078F DIAST BP <80 MM HG: CPT | Performed by: FAMILY MEDICINE

## 2025-01-03 NOTE — TELEPHONE ENCOUNTER
ATTEMPTED TO CALL PATIENT; REMINDER TO GET LABS PRIOR TO UPCOMING APPT W/ [facility] ON 1/6/25, VM DIDN'T , PHONE KEPT RINGING.

## 2025-01-03 NOTE — TELEPHONE ENCOUNTER
Aria from [facility] called and would like to know if they can put the wound vac on her foot as well.  She would just bridge the one on her leg to her foot as well.  Please advise.    Dr. Valdez stated no wound vac is needed for her foot.  She just needs to keep the wound clean and it will heal in about a week.

## 2025-01-03 NOTE — ASSESSMENT & PLAN NOTE
This appears to be doing better.  She has a follow-up with general surgery a week from today.  Hopefully at that time they can remove the wound VAC.  She will continue with her course of antibiotics.

## 2025-01-03 NOTE — PROGRESS NOTES
Transitional Care Follow Up Visit  Subjective     Ann-Marie Leanne Hughes is a 79 y.o. female who presents for a transitional care management visit.    Within 48 business hours after discharge our office contacted her via telephone to coordinate her care and needs.      I reviewed and discussed the details of that call along with the discharge summary, hospital problems, inpatient lab results, inpatient diagnostic studies, and consultation reports with Ann-Marie.     Current outpatient and discharge medications have been reconciled for the patient.  Reviewed by: Romario Valdez DO          12/27/2024     5:02 PM   Date of TCM Phone Call   [facility]   Date of Admission 12/24/2024   Date of Discharge 12/27/2024   [place] [facility] [place]     Risk for Readmission (LACE) Score: 13 (12/27/2024  6:00 AM)      History of Present Illness  Hospital [id number] had a soft tissue lesion on her left distal medial thigh.  She states has gotten progressively worse larger and red and warm.  Eventually one of her ER visits they decided to incise and drain it and admit her to the hospital.  She was subsidy admitted December 24 through December 27, 2024.  All of her cultures were negative.  She was placed on appropriate antibiotics and a wound VAC.  Currently she has home health with a wound care nurse.  She does have a follow-up to see Dr. Altamirano a week from today.  She denies any fevers or chills no nausea or vomiting.  She is still taking her current antibiotics.  Her chart reflects a call from the transition of of care nurse 48 hours after discharge.    Wound left foot-the wound on the dorsum of her left foot continues to get slightly smaller ever so slowly.  There is no surrounding warmth or redness.     Course During Hospital Stay:  1/24 - 1/27/2024     The following portions of the patient's history were reviewed and updated as appropriate: allergies, current medications, past  family history, past medical history, past social history, past surgical history, and problem list.    Review of Systems   Constitutional:  Negative for chills and fever.   Respiratory:  Negative for cough, chest tightness, shortness of breath and wheezing.    Cardiovascular:  Negative for chest pain and palpitations.   Gastrointestinal:  Negative for diarrhea and vomiting.   Skin:  Positive for wound.       Objective   /69 (BP Location: Right arm, Patient Position: Sitting, Cuff Size: Adult)   Pulse 56   Temp 97.4 °F (36.3 °C) (Temporal)   Wt 77.2 kg (170 lb 4.8 oz)   SpO2 100%   BMI 29.23 kg/m²   Physical Exam  Vitals and nursing note reviewed.   Constitutional:       General: She is not in acute distress.     Appearance: Normal appearance. She is obese.   HENT:      Head: Normocephalic.   Cardiovascular:      Rate and Rhythm: Normal rate and regular rhythm.      Heart sounds: Normal heart sounds. No murmur heard.  Pulmonary:      Effort: Pulmonary effort is normal.      Breath sounds: Normal breath sounds. No wheezing or rhonchi.   Musculoskeletal:        Legs:    Neurological:      Mental Status: She is alert.         Assessment & Plan   Diagnoses and all orders for this visit:    1. Abscess of left lower extremity (Primary)  Assessment & Plan:  This appears to be doing better.  She has a follow-up with general surgery a week from today.  Hopefully at that time they can remove the wound VAC.  She will continue with her course of antibiotics.      2. Ulcer of left foot with fat layer exposed  Assessment & Plan:  Her left foot continues to look good.  The eschar is no longer present at this time.  Though keep this clean with a nonstick dressing.  This will probably take several weeks to heal completely.      3. Hospital discharge follow-up  Assessment & Plan:  Since her acute hospital stay she is improving.  Who wound does seem to be much smaller is difficult though to tell how much drainage she is  having or not.  The area is somewhat sunken in from draining of the abscess without any warmth erythema or or tenderness.  She will continue her antibiotics her wound VAC along with home health and follow-up with her general surgeon in 1 week.

## 2025-01-03 NOTE — ASSESSMENT & PLAN NOTE
Her left foot continues to look good.  The eschar is no longer present at this time.  Though keep this clean with a nonstick dressing.  This will probably take several weeks to heal completely.

## 2025-01-03 NOTE — TELEPHONE ENCOUNTER
Spoke to Aria and relayed  Dr. Valdez stated no wound vac is needed for her foot. She just needs to keep the wound clean and it will heal in about a week.

## 2025-01-03 NOTE — ASSESSMENT & PLAN NOTE
Since her acute hospital stay she is improving.  Who wound does seem to be much smaller is difficult though to tell how much drainage she is having or not.  The area is somewhat sunken in from draining of the abscess without any warmth erythema or or tenderness.  She will continue her antibiotics her wound VAC along with home health and follow-up with her general surgeon in 1 week.

## 2025-01-07 ENCOUNTER — READMISSION MANAGEMENT (OUTPATIENT)
Dept: CALL CENTER | Facility: HOSPITAL | Age: 80
End: 2025-01-07
Payer: MEDICARE

## 2025-01-07 LAB
FUNGUS WND CULT: NORMAL
MYCOBACTERIUM SPEC CULT: NORMAL
NIGHT BLUE STAIN TISS: NORMAL

## 2025-01-07 NOTE — OUTREACH NOTE
General Surgery Week 2 Survey      Flowsheet Row Responses   [facility] patient discharged from? [facility]   Does the patient have one of the following disease processes/diagnoses(primary or secondary)? General Surgery   Week 2 attempt successful? Yes   Call start time 1029   Call end time 1033   Discharge diagnosis Left lower extremity abscess  CAD with CABG with left GSV harvest 1.5 years ago  Hypertension  Diabetes  CKD stage IIIb  Osteomyelitis of the foot ruled out  [INCISION AND DRAINAGE LOWER EXTREMITY, ]   Person spoke with today (if not patient) and relationship Patient   Meds reviewed with patient/caregiver? Yes   Does the patient have all medications related to this admission filled (includes all antibiotics, pain medications, etc.) Yes   Is the patient taking all medications as directed (includes completed medication regime)? Yes   Does the patient have a follow up appointment scheduled with their surgeon? Yes  [1/10]   Has the patient kept scheduled appointments due by today? Yes  [Patient has seen PCP since discharge.]   What is the Home health agency?  [facility]    Has home health visited the patient within 72 hours of discharge? Yes   Home health comments Patient has wound vac managed by    Psychosocial issues? No   Did the patient receive a copy of their discharge instructions? Yes   Nursing interventions Reviewed instructions with patient   What is the patient's perception of their health status since discharge? Improving   Is the patient/caregiver able to teach back signs and symptoms of incisional infection? Increased redness, swelling or pain at the incisonal site, Increased drainage or bleeding   Is the patient/caregiver able to teach back steps to recovery at home? Set small, achievable goals for return to baseline health, Rest and rebuild strength, gradually increase activity, Eat a well-balance diet   If the patient is a current smoker, are they able to teach back resources for  cessation? Not a smoker   Is the patient/caregiver able to teach back the hierarchy of who to call/visit for symptoms/problems? PCP, Specialist, Home health nurse, Urgent Care, ED, 911 Yes   Week 2 call completed? Yes   Is the patient interested in additional calls from an ambulatory ? No   Would this patient benefit from a Referral to Amb Social Work? No   Call end time 1033            DENISE GATES - Registered Nurse

## 2025-01-08 NOTE — PROGRESS NOTES
"Enter Query Response Below      Query Response: Left lower extremity abscess due to diabetes mellitus              If applicable, please update the problem list.       Patient: Ann-Marie Hughes        : 1945  Account: [id number]           Admit Date: 2024        How to Respond to this query:       a. Click New Note     b. Answer query within the yellow box.                c. Update the Problem List, if applicable.      If you have any questions about this query contact me at: [email or web address]     ,     Risk Factors: 79-year-old female with a history of \"coronary artery disease with CABG with left GSV harvest approximately 1.5 years ago, hypertension, diabetes\" per H&P.   Clinical Indicators: Presented on  with \"left-sided wound as well as a foot wound\" per ED documentation. H&P states, \"Patient states she fell several weeks ago and developed a wound on the dorsal aspect of her left foot.  She then noticed increasing swelling and redness along the medial aspect of the left knee.\" Discharge summary Active and Resolved problem list includes, \" Left lower extremity abscess, CAD with CABG with left GSV harvest 1.5 years ago and Diabetes.\" \"Left thigh abscess and cellulitis\" per progress note (12/15). Anerobic culture left leg: Prevotella loescheii. Glucose 123-139 during encounter. Hemoglobin A1C 6.40 (24).   Treatment: I&D of left upper leg in ED, Sharp excisional debridement of left lower extremity abscess. cefepime (), Vancomycin ( & ). WOCN consult and wound vac placement. Discharged on Augmentin and doxycycline.    Please clarify the following:    Left lower extremity abscess due to diabetes mellitus  Cellulitis due to diabetes mellitus  Left lower extremity abscess and cellulitis due to diabetes mellitus  Left lower extremity abscess and cellulitis not due to diabetes mellitus  Other- specify_____________        By submitting this query, we are " merely seeking further clarification of documentation to accurately reflect all conditions that you are monitoring, evaluating, treating or that extend the hospitalization or utilize additional resources of care. Please utilize your independent clinical judgment when addressing the question(s) above.     This query and your response, once completed, will be entered into the legal medical record.    Sincerely,  Shama Bailey RN  Clinical Documentation Integrity Program

## 2025-01-14 LAB
FUNGUS WND CULT: NORMAL
MYCOBACTERIUM SPEC CULT: NORMAL
NIGHT BLUE STAIN TISS: NORMAL

## 2025-01-16 ENCOUNTER — LAB (OUTPATIENT)
Dept: LAB | Facility: HOSPITAL | Age: 80
End: 2025-01-16
Payer: MEDICARE

## 2025-01-16 ENCOUNTER — READMISSION MANAGEMENT (OUTPATIENT)
Dept: CALL CENTER | Facility: HOSPITAL | Age: 80
End: 2025-01-16
Payer: MEDICARE

## 2025-01-16 ENCOUNTER — HOSPITAL ENCOUNTER (OUTPATIENT)
Facility: HOSPITAL | Age: 80
Discharge: HOME | End: 2025-01-16
Payer: MEDICARE

## 2025-01-16 VITALS
HEART RATE: 72 BPM | SYSTOLIC BLOOD PRESSURE: 134 MMHG | WEIGHT: 169 LBS | BODY MASS INDEX: 28.85 KG/M2 | DIASTOLIC BLOOD PRESSURE: 80 MMHG | HEIGHT: 64 IN

## 2025-01-16 DIAGNOSIS — C91.Z0 LARGE GRANULAR LYMPHOCYTE DISORDER: ICD-10-CM

## 2025-01-16 DIAGNOSIS — I48.0 PAF (PAROXYSMAL ATRIAL FIBRILLATION): ICD-10-CM

## 2025-01-16 DIAGNOSIS — I50.22 CHRONIC HFREF (HEART FAILURE WITH REDUCED EJECTION FRACTION): ICD-10-CM

## 2025-01-16 DIAGNOSIS — I10 PRIMARY HYPERTENSION: ICD-10-CM

## 2025-01-16 DIAGNOSIS — I50.22 CHRONIC HFREF (HEART FAILURE WITH REDUCED EJECTION FRACTION): Primary | ICD-10-CM

## 2025-01-16 DIAGNOSIS — E78.2 MIXED HYPERLIPIDEMIA: ICD-10-CM

## 2025-01-16 DIAGNOSIS — D59.10 AUTOIMMUNE HEMOLYTIC ANEMIA: ICD-10-CM

## 2025-01-16 DIAGNOSIS — N18.32 STAGE 3B CHRONIC KIDNEY DISEASE: ICD-10-CM

## 2025-01-16 LAB
ALBUMIN SERPL-MCNC: 4.1 G/DL (ref 3.5–5.2)
ALBUMIN/GLOB SERPL: 1.4 G/DL
ALP SERPL-CCNC: 47 U/L (ref 39–117)
ALT SERPL W P-5'-P-CCNC: 12 U/L (ref 1–33)
ANION GAP SERPL CALCULATED.3IONS-SCNC: 11.3 MMOL/L (ref 5–15)
AST SERPL-CCNC: 23 U/L (ref 1–32)
BASOPHILS # BLD AUTO: 0.06 10*3/MM3 (ref 0–0.2)
BASOPHILS NFR BLD AUTO: 0.8 % (ref 0–1.5)
BILIRUB SERPL-MCNC: 0.8 MG/DL (ref 0–1.2)
BUN SERPL-MCNC: 73 MG/DL (ref 8–23)
BUN/CREAT SERPL: 55.3 (ref 7–25)
CALCIUM SPEC-SCNC: 9.7 MG/DL (ref 8.6–10.5)
CHLORIDE SERPL-SCNC: 102 MMOL/L (ref 98–107)
CO2 SERPL-SCNC: 24.7 MMOL/L (ref 22–29)
CREAT SERPL-MCNC: 1.32 MG/DL (ref 0.57–1)
DACRYOCYTES BLD QL SMEAR: NORMAL
DAT C3: POSITIVE
DAT IGG GEL: POSITIVE
DEPRECATED RDW RBC AUTO: 70.4 FL (ref 37–54)
DIGOXIN SERPL-MCNC: 0.87 NG/ML (ref 0.6–1.2)
EGFRCR SERPLBLD CKD-EPI 2021: 40.9 ML/MIN/1.73
ELLIPTOCYTES BLD QL SMEAR: NORMAL
EOSINOPHIL # BLD AUTO: 0.08 10*3/MM3 (ref 0–0.4)
EOSINOPHIL NFR BLD AUTO: 1 % (ref 0.3–6.2)
ERYTHROCYTE [DISTWIDTH] IN BLOOD BY AUTOMATED COUNT: 20 % (ref 12.3–15.4)
GLOBULIN UR ELPH-MCNC: 3 GM/DL
GLUCOSE SERPL-MCNC: 128 MG/DL (ref 65–99)
HAPTOGLOB SERPL-MCNC: 125 MG/DL (ref 30–200)
HCT VFR BLD AUTO: 28.8 % (ref 34–46.6)
HELMET CELLS: NORMAL
HGB BLD-MCNC: 8.8 G/DL (ref 12–15.9)
HYPOCHROMIA BLD QL: NORMAL
IMM GRANULOCYTES # BLD AUTO: 0.01 10*3/MM3 (ref 0–0.05)
IMM GRANULOCYTES NFR BLD AUTO: 0.1 % (ref 0–0.5)
LYMPHOCYTES # BLD AUTO: 2.44 10*3/MM3 (ref 0.7–3.1)
LYMPHOCYTES NFR BLD AUTO: 31.5 % (ref 19.6–45.3)
MAGNESIUM SERPL-MCNC: 1.8 MG/DL (ref 1.6–2.4)
MCH RBC QN AUTO: 29.3 PG (ref 26.6–33)
MCHC RBC AUTO-ENTMCNC: 30.6 G/DL (ref 31.5–35.7)
MCV RBC AUTO: 96 FL (ref 79–97)
MONOCYTES # BLD AUTO: 0.83 10*3/MM3 (ref 0.1–0.9)
MONOCYTES NFR BLD AUTO: 10.7 % (ref 5–12)
NEUTROPHILS NFR BLD AUTO: 4.33 10*3/MM3 (ref 1.7–7)
NEUTROPHILS NFR BLD AUTO: 55.9 % (ref 42.7–76)
NRBC BLD AUTO-RTO: 0 /100 WBC (ref 0–0.2)
NT-PROBNP SERPL-MCNC: 3459 PG/ML (ref 0–1800)
PLAT MORPH BLD: NORMAL
PLATELET # BLD AUTO: 297 10*3/MM3 (ref 140–450)
PMV BLD AUTO: 11.6 FL (ref 6–12)
POTASSIUM SERPL-SCNC: 4.7 MMOL/L (ref 3.5–5.2)
PROT SERPL-MCNC: 7.1 G/DL (ref 6–8.5)
RBC # BLD AUTO: 3 10*6/MM3 (ref 3.77–5.28)
RETICS # AUTO: 0.02 10*6/MM3 (ref 0.02–0.13)
RETICS/RBC NFR AUTO: 0.65 % (ref 0.7–1.9)
SCHISTOCYTES BLD QL SMEAR: NORMAL
SODIUM SERPL-SCNC: 138 MMOL/L (ref 136–145)
WBC MORPH BLD: NORMAL
WBC NRBC COR # BLD AUTO: 7.75 10*3/MM3 (ref 3.4–10.8)

## 2025-01-16 PROCEDURE — 80162 ASSAY OF DIGOXIN TOTAL: CPT

## 2025-01-16 PROCEDURE — 83880 ASSAY OF NATRIURETIC PEPTIDE: CPT

## 2025-01-16 PROCEDURE — 85025 COMPLETE CBC W/AUTO DIFF WBC: CPT

## 2025-01-16 PROCEDURE — 83010 ASSAY OF HAPTOGLOBIN QUANT: CPT

## 2025-01-16 PROCEDURE — 85045 AUTOMATED RETICULOCYTE COUNT: CPT

## 2025-01-16 PROCEDURE — 83735 ASSAY OF MAGNESIUM: CPT

## 2025-01-16 PROCEDURE — 80053 COMPREHEN METABOLIC PANEL: CPT

## 2025-01-16 PROCEDURE — 86880 COOMBS TEST DIRECT: CPT

## 2025-01-16 PROCEDURE — 85007 BL SMEAR W/DIFF WBC COUNT: CPT

## 2025-01-16 PROCEDURE — 36415 COLL VENOUS BLD VENIPUNCTURE: CPT

## 2025-01-16 RX ORDER — SACUBITRIL AND VALSARTAN 49; 51 MG/1; MG/1
1 TABLET, FILM COATED ORAL 2 TIMES DAILY
Qty: 180 TABLET | Refills: 3 | Status: SHIPPED | OUTPATIENT
Start: 2025-01-16

## 2025-01-16 RX ORDER — SPIRONOLACTONE 25 MG/1
25 TABLET ORAL DAILY
Qty: 90 TABLET | Refills: 3 | Status: SHIPPED | OUTPATIENT
Start: 2025-01-16

## 2025-01-16 RX ORDER — DAPAGLIFLOZIN 10 MG/1
1 TABLET, FILM COATED ORAL DAILY
Qty: 90 TABLET | Refills: 0 | Status: SHIPPED | OUTPATIENT
Start: 2025-01-16

## 2025-01-16 NOTE — PROGRESS NOTES
Office Visit    Chief Complaint  Chronic HFrEF    Subjective            Ann-Marie Hughes presents to [facility]  History of Present Illness  Ms. Ann-Marie Hughes is a 78-year-old female that presented to the office today for heart failure with reduced ejection fraction.  Ms. Hughes was recently admitted to the hospital for infection of her knee.  She does have a wound VAC on that area today.  Patient's son reported that she was volume overloaded upon discharge from the hospital she is currently down 15 pounds since leaving the hospital.  Her blood pressure also remained elevated while in the hospital with a systolic in the 160s and as high as the 190s.  Blood pressures better controlled today.  Her short of air and pedal edema are both improving.  She denies any chest pain, dizziness, orthopnea or syncopal episodes      Past Medical History:   Diagnosis Date    Acne rosacea 08/17/2021    DR.JEFF MOON     Acute respiratory failure with hypoxia 07/31/2023    Arteriosclerosis of abdominal aorta     B12 deficiency 08/17/2021    DJD (degenerative joint disease)     Hx of smoking 08/17/2021    QUIT IN 1976 AT AGE 31    Hyperlipidemia 08/17/2021    Hypertension     Hypothyroidism     Metabolic syndrome 08/17/2021    NSTEMI (non-ST elevated myocardial infarction) 07/17/2023    Pericardial effusion 10/10/2023    KAREN (stress urinary incontinence, female) 08/17/2021    UTI (urinary tract infection) 08/17/2021    Vitamin D deficiency 08/17/2021       Allergies   Allergen Reactions    Penicillins Palpitations     1. When was your reaction? > 10 years ago  2. Did your reaction happen after the first dose or after several doses? After first dose  3. Did your reaction require ED or hospital care to manage your reaction? Do not know  4. Did your reaction require treatment with epinephrine? Do not know  5. Have you taken amoxicillin (Amoxil) or amoxicillin-clavulanate (Augmentin) without issue  since? Yes  6. Have you taken cephalexin (Keflex) without issue since?  Do not know         Past Surgical History:   Procedure Laterality Date    BACK SURGERY      CARDIAC CATHETERIZATION N/A 2023    Procedure: Left Heart Cath;  Surgeon: Dinh Andres MD;  Location: [facility] CATH INVASIVE LOCATION;  Service: Cardiovascular;  Laterality: N/A;    CARPAL TUNNEL RELEASE      COLONOSCOPY      CORONARY ARTERY BYPASS GRAFT WITH MITRAL VALVE REPAIR/REPLACEMENT N/A 2023    Procedure: REZA STERNOTOMY OFF-PUMP CORONARY ARTERY BYPASS GRAFT TIMES        USING LEFFT INTERNAL MAMMARY ARTERY AND     GREATER SAPHENOUS VEIN GRAFT PER EMDOSCOPIC VEIN HARVESTING, MAZE PROCEDURE AND PRP;  Surgeon: Shane Alexander MD;  Location: [facility] CVOR;  Service: Cardiothoracic;  Laterality: N/A;    INCISION AND DRAINAGE LEG N/A 2024    Procedure: INCISION AND DRAINAGE LOWER EXTREMITY; Plain text: incision to remove pus from left leg;  Surgeon: Sergo Altamirano MD;  Location: [facility] MAIN OR;  Service: General;  Laterality: N/A;    KNEE SURGERY      LUMBAR DISCECTOMY      NECK SURGERY      Cervical    POSTERIOR LUMBAR/THORACIC SPINE FUSION          Social History     Tobacco Use    Smoking status: Former     Current packs/day: 0.00     Average packs/day: 1 pack/day for 12.0 years (12.0 ttl pk-yrs)     Types: Cigarettes     Start date:      Quit date:      Years since quittin.0    Smokeless tobacco: Never   Vaping Use    Vaping status: Never Used   Substance Use Topics    Alcohol use: Never    Drug use: Never       Family History   Problem Relation Age of Onset    Heart disease Mother     Arthritis Mother     Heart attack Mother     Arthritis Father         Prior to Admission medications    Medication Sig Start Date End Date Taking? Authorizing Provider   albuterol sulfate  (90 Base) MCG/ACT inhaler Inhale 2 puffs Every 4 (Four) Hours As Needed for Wheezing. 23   Les Moreno, DO    amoxicillin-clavulanate (AUGMENTIN) 875-125 MG per tablet Take 1 tablet by mouth Every 12 (Twelve) Hours for 42 doses. Indications: Infection of the Skin and/or Soft Tissue, Pyomyositis 12/28/24 1/18/25  Sergo Schulte MD   apixaban (Eliquis) 5 MG tablet tablet Take 1 tablet by mouth Every 12 (Twelve) Hours. 12/12/24   Sara Lubin APRN   aspirin 81 MG EC tablet Take 1 tablet by mouth Daily.    ProviderKanwal MD   atorvastatin (LIPITOR) 40 MG tablet TAKE 1 TABLET BY MOUTH EVERY DAY 12/16/24   Romario Valdez DO   buPROPion XL (WELLBUTRIN XL) 300 MG 24 hr tablet TAKE 1 TABLET BY MOUTH EVERY DAY 12/12/24   Romario Valdez DO   dapagliflozin Propanediol (Farxiga) 10 MG tablet Take 10 mg by mouth Daily. 12/12/24   Sara Lubin APRN   digoxin (LANOXIN) 125 MCG tablet Take 1 tablet by mouth Every Other Day.    Kanwal Orozco MD   doxycycline (MONODOX) 100 MG capsule Take 1 capsule by mouth Every 12 (Twelve) Hours for 42 doses. Indications: Infection of the Skin and/or Soft Tissue, Pyomyositis 12/28/24 1/18/25  Sergo Schulte MD   folic acid (FOLVITE) 1 MG tablet Take 1 tablet by mouth Daily. 9/18/24   Manny Soto PA-C   furosemide (LASIX) 40 MG tablet Take 1 tablet by mouth Daily. 12/27/24   Sergo Schulte MD   metFORMIN ER (GLUCOPHAGE-XR) 500 MG 24 hr tablet TAKE 1 TABLET BY MOUTH TWICE DAILY 11/19/24   Romario Valdez DO   metoprolol succinate XL (TOPROL-XL) 50 MG 24 hr tablet Take 1 tablet by mouth 2 (Two) Times a Day.    Kanwal Orozco MD   montelukast (SINGULAIR) 10 MG tablet Take 1 tablet by mouth Daily. 7/31/24   Romario Valdez DO   Probiotic Product (InfraReDx PO) Take 1 capsule by mouth Daily.    Kanwal Orozco MD   sacubitril-valsartan (Entresto) 24-26 MG tablet Take 1 tablet by mouth 2 (Two) Times a Day. 2/19/24   Valerie Valera MD   spironolactone (ALDACTONE) 25 MG tablet TAKE 1 TABLET BY MOUTH  "EVERY DAY 7/8/24   Valerie Valera MD   Synthroid 75 MCG tablet Take 1 tablet by mouth Daily. 7/31/24   Romario Valdez DO        Review of Systems   Constitutional:  Positive for fatigue.   Respiratory:  Positive for shortness of breath. Negative for cough.    Cardiovascular:  Negative for chest pain, palpitations and leg swelling.   Neurological:  Negative for dizziness.        Symptom Course: Improved    Weight Trend: Stable     Objective     /80   Pulse 72   Ht 162.6 cm (64\")   Wt 76.7 kg (169 lb)   BMI 29.01 kg/m²       Physical Exam  Constitutional:       General: She is awake.      Appearance: Normal appearance.   Neck:      Thyroid: No thyromegaly.      Vascular: No carotid bruit or JVD.   Cardiovascular:      Rate and Rhythm: Normal rate and regular rhythm.      Chest Wall: PMI is not displaced.      Pulses: Normal pulses.      Heart sounds: Normal heart sounds, S1 normal and S2 normal. No murmur heard.     No friction rub. No gallop. No S3 or S4 sounds.   Pulmonary:      Effort: Pulmonary effort is normal.      Breath sounds: Normal breath sounds and air entry. No wheezing, rhonchi or rales.   Abdominal:      General: Bowel sounds are normal.      Palpations: Abdomen is soft. There is no mass.      Tenderness: There is no abdominal tenderness.   Musculoskeletal:      Cervical back: Neck supple.      Right lower leg: Edema present.      Left lower leg: Edema present.   Neurological:      Mental Status: She is alert and oriented to person, place, and time.   Psychiatric:         Mood and Affect: Mood normal.         Behavior: Behavior is cooperative.           Result Review :                      Results for orders placed during the hospital encounter of 08/06/24    Adult Transthoracic Echo Complete W/ Cont if Necessary Per Protocol    Interpretation Summary    Left ventricular systolic function is normal. Calculated left ventricular EF = 54.8%    Left ventricular wall thickness is " consistent with borderline concentric hypertrophy.    Left ventricular diastolic function is consistent with (grade Ia w/high LAP) impaired relaxation.    The left atrial cavity is mildly dilated.    Mild aortic valve stenosis is present.    Estimated right ventricular systolic pressure from tricuspid regurgitation is normal (<35 mmHg).     Lab Results   Component Value Date    PROBNP 36,310.0 (H) 12/26/2024    PROBNP 10,980.0 (H) 10/21/2024    PROBNP 4,486.0 (H) 10/07/2024     CMP          12/25/2024    03:20 12/26/2024    03:20 12/27/2024    04:13   CMP   Glucose 129  136  139    BUN 36  35  37    Creatinine 1.49  1.54  1.50    EGFR 35.4  34.0  35.1    Sodium 139  138  134    Potassium 5.0  4.6  4.2    Chloride 106  107  101    Calcium 8.4  8.7  9.1    Total Protein 6.4   6.1    Albumin 3.3  3.1  2.9    Globulin 3.1   3.2    Total Bilirubin 0.2   0.3    Alkaline Phosphatase 45   55    AST (SGOT) 40   32    ALT (SGPT) 32   35    Albumin/Globulin Ratio 1.1   0.9    BUN/Creatinine Ratio 24.2  22.7  24.7    Anion Gap 12.5  10.0  13.9      CBC w/diff          12/25/2024    03:20 12/26/2024    03:20 12/27/2024    04:13   CBC w/Diff   WBC 12.98  13.77  11.95    RBC 2.59  2.66  2.65    Hemoglobin 7.8  7.9  7.9    Hematocrit 25.7  26.1  27.1    MCV 99.2  98.1  102.3    MCH 30.1  29.7  29.8    MCHC 30.4  30.3  29.2    RDW 19.3  19.3  19.8    Platelets 273  256  263    Neutrophil Rel % 77.4   73.0    Immature Granulocyte Rel % 1.2   2.3    Lymphocyte Rel % 12.8   14.1    Monocyte Rel % 8.4   9.0    Eosinophil Rel % 0.0   0.8    Basophil Rel % 0.2   0.8       Lipid Panel          6/28/2024    11:25   Lipid Panel   Total Cholesterol 113    Triglycerides 123    HDL Cholesterol 50    VLDL Cholesterol 22    LDL Cholesterol  41    LDL/HDL Ratio 0.77       Lab Results   Component Value Date    TSH 0.625 06/28/2024    TSH 1.650 03/24/2024    TSH 2.210 12/29/2023      Lab Results   Component Value Date    FREET4 1.74 (H) 12/29/2023  "   FREET4 1.31 01/11/2023    FREET4 1.27 08/17/2022      No results found for: \"DDIMERQUANT\"  Magnesium   Date Value Ref Range Status   12/27/2024 1.7 1.6 - 2.4 mg/dL Final      Digoxin   Date Value Ref Range Status   10/21/2024 0.97 0.60 - 1.20 ng/mL Final      A1C Last 3 Results          6/28/2024    11:25   HGBA1C Last 3 Results   Hemoglobin A1C 6.40                Assessment and Plan        Diagnoses and all orders for this visit:    1. Chronic HFrEF (heart failure with reduced ejection fraction) (Primary)  Assessment & Plan:  Ms. Hughes has heart failure with reduced ejection fraction that has improved over the last year.  Her heart rate and blood pressure have been elevated since her hospitalization.  She reports volume overload at discharge from the hospital.  She is down 15 pounds since her discharge.  She continues to experience short of air and pedal edema although they are improving with the current medication regimen.  Will continue Farxiga, digoxin, metoprolol and spironolactone at the current doses and make the following changes to her heart failure medications:    1.  Increase Entresto 24/26 mg take 1 tablet in the morning and 2 at night for 1 week followed by 2 tablets twice a day.  Prescription will be for Entresto 49/51 mg take 1 tablet twice daily.  2.  Do heart rate blood pressure and weight log and call the office if systolic blood pressure is less than 100 or heart rate is greater than 100.  3.  Do blood work prior to next visit.    Orders:  -     spironolactone (ALDACTONE) 25 MG tablet; Take 1 tablet by mouth Daily.  Dispense: 90 tablet; Refill: 3  -     Comprehensive Metabolic Panel; Future  -     Magnesium; Future  -     CBC & Differential; Future  -     proBNP; Future  -     Digoxin Level; Future    2. Primary hypertension  Assessment & Plan:   Patient's blood pressure has been elevated since her hospitalization.  Her son reports that pressure today is the best that he has been in quite " some time.  Will titrate up Entresto and have her check her blood pressures on a regular basis.      3. Mixed hyperlipidemia  Assessment & Plan:  She utilizes atorvastatin 40 mg daily, continue same.      4. PAF (paroxysmal atrial fibrillation)  Assessment & Plan:  Patient has paroxysmal atrial fibrillation.  Her rate is well-controlled with the current dose of metoprolol.  She is doing well since her recent left atrial appendage ligation and CABG.  She will continue Eliquis for stroke prevention.      5. Stage 3b chronic kidney disease  Assessment & Plan:   Patient's kidney function from her recent hospitalization was on the lower side.  She was volume overloaded at that time.  Will wait for labs that were drawn this morning and make changes accordingly.    Orders:  -     Comprehensive Metabolic Panel; Future  -     Magnesium; Future  -     CBC & Differential; Future  -     proBNP; Future  -     Digoxin Level; Future    Other orders  -     dapagliflozin Propanediol (Farxiga) 10 MG tablet; Take 10 mg by mouth Daily.  Dispense: 90 tablet; Refill: 0  -     sacubitril-valsartan (Entresto) 49-51 MG tablet; Take 1 tablet by mouth 2 (Two) Times a Day.  Dispense: 180 tablet; Refill: 3            Follow Up     Return in about 4 weeks (around 2/13/2025).    Patient was given instructions and counseling regarding her condition or for health maintenance advice. Please see specific information pulled into the AVS if appropriate.     HEART FAIL CL[facility]   01/16/25 10:31 EST    Patient has a chronic health condition that requires close follow-up    Electronically signed by LONA Bocanegra, 01/16/25, 12:47 PM EST.

## 2025-01-16 NOTE — OUTREACH NOTE
General Surgery Week 3 Survey      Flowsheet Row Responses   [facility] facility patient discharged from? [facility]   Does the patient have one of the following disease processes/diagnoses(primary or secondary)? General Surgery   Week 3 attempt successful? No   Unsuccessful attempts Attempt 1            Olga Delgado Registered Nurse

## 2025-01-16 NOTE — ASSESSMENT & PLAN NOTE
Patient has paroxysmal atrial fibrillation.  Her rate is well-controlled with the current dose of metoprolol.  She is doing well since her recent left atrial appendage ligation and CABG.  She will continue Eliquis for stroke prevention.

## 2025-01-16 NOTE — ASSESSMENT & PLAN NOTE
Patient's kidney function from her recent hospitalization was on the lower side.  She was volume overloaded at that time.  Will wait for labs that were drawn this morning and make changes accordingly.   Left ear clogged since having a cold while traveling from [place] last Saturday

## 2025-01-16 NOTE — PATIENT INSTRUCTIONS
1.  Increase Entresto 24/26 mg take 1 tablet in the morning and 2 at night for 1 week followed by 2 tablets twice a day.  Prescription will be for Entresto 49/51 mg take 1 tablet twice daily.  2.  Do heart rate blood pressure and weight log and call the office if systolic blood pressure is less than 100 or heart rate is greater than 100.  3.  Do blood work prior to next visit.

## 2025-01-16 NOTE — ASSESSMENT & PLAN NOTE
Patient's blood pressure has been elevated since her hospitalization.  Her son reports that pressure today is the best that he has been in quite some time.  Will titrate up Entresto and have her check her blood pressures on a regular basis.

## 2025-01-16 NOTE — ASSESSMENT & PLAN NOTE
Ms. Hughes has heart failure with reduced ejection fraction that has improved over the last year.  Her heart rate and blood pressure have been elevated since her hospitalization.  She reports volume overload at discharge from the hospital.  She is down 15 pounds since her discharge.  She continues to experience short of air and pedal edema although they are improving with the current medication regimen.  Will continue Farxiga, digoxin, metoprolol and spironolactone at the current doses and make the following changes to her heart failure medications:    1.  Increase Entresto 24/26 mg take 1 tablet in the morning and 2 at night for 1 week followed by 2 tablets twice a day.  Prescription will be for Entresto 49/51 mg take 1 tablet twice daily.  2.  Do heart rate blood pressure and weight log and call the office if systolic blood pressure is less than 100 or heart rate is greater than 100.  3.  Do blood work prior to next visit.

## 2025-01-17 ENCOUNTER — OFFICE VISIT (OUTPATIENT)
Dept: SURGERY | Facility: CLINIC | Age: 80
End: 2025-01-17
Payer: MEDICARE

## 2025-01-17 ENCOUNTER — PATIENT MESSAGE (OUTPATIENT)
Facility: HOSPITAL | Age: 80
End: 2025-01-17
Payer: MEDICARE

## 2025-01-17 ENCOUNTER — TELEPHONE (OUTPATIENT)
Facility: HOSPITAL | Age: 80
End: 2025-01-17
Payer: MEDICARE

## 2025-01-17 VITALS — BODY MASS INDEX: 28.85 KG/M2 | TEMPERATURE: 97.9 F | WEIGHT: 169 LBS | HEIGHT: 64 IN

## 2025-01-17 DIAGNOSIS — Z98.890 POST-OPERATIVE STATE: Primary | ICD-10-CM

## 2025-01-17 LAB — Lab: NORMAL

## 2025-01-17 RX ORDER — SACUBITRIL AND VALSARTAN 49; 51 MG/1; MG/1
1 TABLET, FILM COATED ORAL 2 TIMES DAILY
Qty: 56 TABLET | Refills: 0 | COMMUNITY
Start: 2025-01-17

## 2025-01-17 NOTE — PROGRESS NOTES
"Chief Complaint  Post-op Follow-up    Subjective    Subjective     Ann-Marie Hughes is a 79 y.o. female who presents to [facility] GENERAL SURGERY    History of Present Illness  79-year-old female who presents today for routine postoperative follow-up after undergoing sharp excisional debridement of a necrotizing soft tissue infection along her left lower extremity approximately 3 weeks ago.  Postoperatively she remained in the hospital for several days before getting a wound VAC placed; she was ultimately discharged home with a home VAC.  Since discharge from the hospital, she has completed her outpatient oral antibiotics and she is undergoing Monday/Wednesday/Friday VAC changes at home with home health nursing.  She denies any nausea, vomiting, fevers, chills.  She is able to bear weight on the left lower extremity and her strength is slowly improving.  Post-op Follow-up      Personal History     Social History     Tobacco Use    Smoking status: Former     Current packs/day: 0.00     Average packs/day: 1 pack/day for 12.0 years (12.0 ttl pk-yrs)     Types: Cigarettes     Start date:      Quit date:      Years since quittin.0    Smokeless tobacco: Never   Vaping Use    Vaping status: Never Used   Substance Use Topics    Alcohol use: Never    Drug use: Never     Allergies   Allergen Reactions    Penicillins Palpitations     1. When was your reaction? > 10 years ago  2. Did your reaction happen after the first dose or after several doses? After first dose  3. Did your reaction require ED or hospital care to manage your reaction? Do not know  4. Did your reaction require treatment with epinephrine? Do not know  5. Have you taken amoxicillin (Amoxil) or amoxicillin-clavulanate (Augmentin) without issue since? Yes  6. Have you taken cephalexin (Keflex) without issue since?  Do not know        Objective    Objective       Vital Signs:   Temp 97.9 °F (36.6 °C)   Ht 162.6 cm (64\")   Wt " 76.7 kg (169 lb)   BMI 29.01 kg/m²       Physical Exam  Constitutional:       Appearance: Normal appearance.   HENT:      Head: Normocephalic and atraumatic.      Mouth/Throat:      Mouth: Mucous membranes are moist.      Pharynx: Oropharynx is clear.   Cardiovascular:      Rate and Rhythm: Normal rate and regular rhythm.   Pulmonary:      Effort: Pulmonary effort is normal. No respiratory distress.   Abdominal:      General: There is no distension.      Palpations: Abdomen is soft.      Tenderness: There is no abdominal tenderness.   Musculoskeletal:         General: No swelling. Normal range of motion.      Cervical back: Normal range of motion and neck supple.      Comments: Left lower extremity medial wound VAC in place with good seal, no surrounding erythema/induration/fluctuance   Skin:     General: Skin is warm and dry.   Neurological:      General: No focal deficit present.      Mental Status: She is alert and oriented to person, place, and time.   Psychiatric:         Mood and Affect: Mood normal.         Behavior: Behavior normal.                  Assessment / Plan      Diagnoses and all orders for this visit:    1. Post-operative state (Primary)    79-year-old female status post sharp excisional debridement along the left medial leg for a necrotizing soft tissue infection.  Doing well postoperatively at this time.  No concern for ongoing infection.  Continue wound VAC changes Monday/Wednesday/Friday with home health nursing.  Plan for follow-up with me in 4 weeks for a wound check.    Follow Up   Return in about 4 weeks (around 2/14/2025).      Patient was given instructions and counseling regarding her condition or for health maintenance advice. Please see specific information pulled into the AVS if appropriate.     Electronically signed by Sergo Altamirano MD, 01/17/25, 3:18 PM EST.

## 2025-01-17 NOTE — TELEPHONE ENCOUNTER
Patient called requesting samples of medication due to pharmacy not having medication in stock. Samples provided and documented.

## 2025-01-21 LAB
FUNGUS WND CULT: NORMAL
MYCOBACTERIUM SPEC CULT: NORMAL
NIGHT BLUE STAIN TISS: NORMAL

## 2025-01-22 ENCOUNTER — READMISSION MANAGEMENT (OUTPATIENT)
Dept: CALL CENTER | Facility: HOSPITAL | Age: 80
End: 2025-01-22
Payer: MEDICARE

## 2025-01-22 NOTE — OUTREACH NOTE
General Surgery Week 3 Survey      Flowsheet Row Responses   [facility] facility patient discharged from? [facility]   Does the patient have one of the following disease processes/diagnoses(primary or secondary)? General Surgery   Week 3 attempt successful? No   Unsuccessful attempts Attempt 2   Discharge diagnosis Left lower extremity abscess  CAD with CABG with left GSV harvest 1.5 years ago  Hypertension  Diabetes  CKD stage IIIb  Osteomyelitis of the foot ruled out            STEPH CHÁVEZ - Registered Nurse

## 2025-01-28 LAB
MYCOBACTERIUM SPEC CULT: NORMAL
NIGHT BLUE STAIN TISS: NORMAL

## 2025-01-28 NOTE — TELEPHONE ENCOUNTER
"Previously prescribed by \"Historical Provider\"     Dr. Brand's last note \"continue with Toprol 50 daily\"    Please advise.     "

## 2025-01-29 ENCOUNTER — TELEPHONE (OUTPATIENT)
Dept: ONCOLOGY | Facility: HOSPITAL | Age: 80
End: 2025-01-29
Payer: MEDICARE

## 2025-01-29 RX ORDER — METOPROLOL SUCCINATE 50 MG/1
75 TABLET, EXTENDED RELEASE ORAL 2 TIMES DAILY
Qty: 270 TABLET | Refills: 3 | Status: SHIPPED | OUTPATIENT
Start: 2025-01-29

## 2025-01-29 NOTE — TELEPHONE ENCOUNTER
Spoke to pt and have her r/s.  Pt stated she has a hematoma and connected to a machine.  She also does not know how to use a my chart visit.  Per pt req appt moved out 6 weeks.  Pt is r/s and aware.

## 2025-01-29 NOTE — TELEPHONE ENCOUNTER
Pharmacy states last refill was picked up 11/7/2024    Last dose instructions 75mg BID    Please advise.

## 2025-02-03 ENCOUNTER — APPOINTMENT (OUTPATIENT)
Dept: GENERAL RADIOLOGY | Facility: HOSPITAL | Age: 80
End: 2025-02-03
Payer: MEDICARE

## 2025-02-03 ENCOUNTER — HOSPITAL ENCOUNTER (EMERGENCY)
Facility: HOSPITAL | Age: 80
Discharge: HOME | End: 2025-02-03
Attending: EMERGENCY MEDICINE | Admitting: EMERGENCY MEDICINE
Payer: MEDICARE

## 2025-02-03 ENCOUNTER — TELEPHONE (OUTPATIENT)
Dept: FAMILY MEDICINE CLINIC | Facility: CLINIC | Age: 80
End: 2025-02-03

## 2025-02-03 VITALS
OXYGEN SATURATION: 96 % | SYSTOLIC BLOOD PRESSURE: 148 MMHG | DIASTOLIC BLOOD PRESSURE: 51 MMHG | BODY MASS INDEX: 29.01 KG/M2 | HEIGHT: 64 IN | HEART RATE: 64 BPM | RESPIRATION RATE: 16 BRPM | TEMPERATURE: 98.5 F

## 2025-02-03 DIAGNOSIS — J40 BRONCHITIS: Primary | ICD-10-CM

## 2025-02-03 LAB
ALBUMIN SERPL-MCNC: 3.6 G/DL (ref 3.5–5.2)
ALBUMIN/GLOB SERPL: 1.1 G/DL
ALP SERPL-CCNC: 60 U/L (ref 39–117)
ALT SERPL W P-5'-P-CCNC: 18 U/L (ref 1–33)
ANION GAP SERPL CALCULATED.3IONS-SCNC: 12.9 MMOL/L (ref 5–15)
ANISOCYTOSIS BLD QL: NORMAL
AST SERPL-CCNC: 26 U/L (ref 1–32)
BASOPHILS # BLD AUTO: 0.04 10*3/MM3 (ref 0–0.2)
BASOPHILS NFR BLD AUTO: 0.4 % (ref 0–1.5)
BILIRUB SERPL-MCNC: 0.5 MG/DL (ref 0–1.2)
BUN SERPL-MCNC: 41 MG/DL (ref 8–23)
BUN/CREAT SERPL: 26.6 (ref 7–25)
CALCIUM SPEC-SCNC: 8.8 MG/DL (ref 8.6–10.5)
CHLORIDE SERPL-SCNC: 102 MMOL/L (ref 98–107)
CO2 SERPL-SCNC: 25.1 MMOL/L (ref 22–29)
CREAT SERPL-MCNC: 1.54 MG/DL (ref 0.57–1)
DEPRECATED RDW RBC AUTO: 82.6 FL (ref 37–54)
EGFRCR SERPLBLD CKD-EPI 2021: 34 ML/MIN/1.73
ELLIPTOCYTES BLD QL SMEAR: NORMAL
EOSINOPHIL # BLD AUTO: 0.19 10*3/MM3 (ref 0–0.4)
EOSINOPHIL NFR BLD AUTO: 2.1 % (ref 0.3–6.2)
ERYTHROCYTE [DISTWIDTH] IN BLOOD BY AUTOMATED COUNT: 22.3 % (ref 12.3–15.4)
FLUAV SUBTYP SPEC NAA+PROBE: NOT DETECTED
FLUBV RNA ISLT QL NAA+PROBE: NOT DETECTED
GEN 5 1HR TROPONIN T REFLEX: 22 NG/L
GLOBULIN UR ELPH-MCNC: 3.3 GM/DL
GLUCOSE SERPL-MCNC: 127 MG/DL (ref 65–99)
HCT VFR BLD AUTO: 29.4 % (ref 34–46.6)
HGB BLD-MCNC: 8.8 G/DL (ref 12–15.9)
HOLD SPECIMEN: NORMAL
HOLD SPECIMEN: NORMAL
IMM GRANULOCYTES # BLD AUTO: 0.04 10*3/MM3 (ref 0–0.05)
IMM GRANULOCYTES NFR BLD AUTO: 0.4 % (ref 0–0.5)
LARGE PLATELETS: NORMAL
LYMPHOCYTES # BLD AUTO: 2.56 10*3/MM3 (ref 0.7–3.1)
LYMPHOCYTES NFR BLD AUTO: 28.6 % (ref 19.6–45.3)
MCH RBC QN AUTO: 30.1 PG (ref 26.6–33)
MCHC RBC AUTO-ENTMCNC: 29.9 G/DL (ref 31.5–35.7)
MCV RBC AUTO: 100.7 FL (ref 79–97)
MONOCYTES # BLD AUTO: 1.05 10*3/MM3 (ref 0.1–0.9)
MONOCYTES NFR BLD AUTO: 11.7 % (ref 5–12)
NEUTROPHILS NFR BLD AUTO: 5.08 10*3/MM3 (ref 1.7–7)
NEUTROPHILS NFR BLD AUTO: 56.8 % (ref 42.7–76)
NRBC BLD AUTO-RTO: 0.3 /100 WBC (ref 0–0.2)
NT-PROBNP SERPL-MCNC: 6896 PG/ML (ref 0–1800)
PLATELET # BLD AUTO: 262 10*3/MM3 (ref 140–450)
PMV BLD AUTO: 10.3 FL (ref 6–12)
POIKILOCYTOSIS BLD QL SMEAR: NORMAL
POTASSIUM SERPL-SCNC: 4.6 MMOL/L (ref 3.5–5.2)
PROT SERPL-MCNC: 6.9 G/DL (ref 6–8.5)
RBC # BLD AUTO: 2.92 10*6/MM3 (ref 3.77–5.28)
RSV RNA NPH QL NAA+NON-PROBE: NOT DETECTED
SARS-COV-2 RNA RESP QL NAA+PROBE: NOT DETECTED
SODIUM SERPL-SCNC: 140 MMOL/L (ref 136–145)
TROPONIN T % DELTA: 0
TROPONIN T NUMERIC DELTA: 0 NG/L
TROPONIN T SERPL HS-MCNC: 22 NG/L
WBC MORPH BLD: NORMAL
WBC NRBC COR # BLD AUTO: 8.96 10*3/MM3 (ref 3.4–10.8)
WHOLE BLOOD HOLD COAG: NORMAL
WHOLE BLOOD HOLD SPECIMEN: NORMAL

## 2025-02-03 PROCEDURE — 84484 ASSAY OF TROPONIN QUANT: CPT

## 2025-02-03 PROCEDURE — 85025 COMPLETE CBC W/AUTO DIFF WBC: CPT

## 2025-02-03 PROCEDURE — 71045 X-RAY EXAM CHEST 1 VIEW: CPT

## 2025-02-03 PROCEDURE — 93005 ELECTROCARDIOGRAM TRACING: CPT | Performed by: EMERGENCY MEDICINE

## 2025-02-03 PROCEDURE — 93005 ELECTROCARDIOGRAM TRACING: CPT

## 2025-02-03 PROCEDURE — 85007 BL SMEAR W/DIFF WBC COUNT: CPT

## 2025-02-03 PROCEDURE — 94640 AIRWAY INHALATION TREATMENT: CPT

## 2025-02-03 PROCEDURE — 87637 SARSCOV2&INF A&B&RSV AMP PRB: CPT

## 2025-02-03 PROCEDURE — 99284 EMERGENCY DEPT VISIT MOD MDM: CPT

## 2025-02-03 PROCEDURE — 94799 UNLISTED PULMONARY SVC/PX: CPT

## 2025-02-03 PROCEDURE — 83880 ASSAY OF NATRIURETIC PEPTIDE: CPT

## 2025-02-03 PROCEDURE — 80053 COMPREHEN METABOLIC PANEL: CPT

## 2025-02-03 PROCEDURE — 36415 COLL VENOUS BLD VENIPUNCTURE: CPT

## 2025-02-03 RX ORDER — DOXYCYCLINE 100 MG/1
100 CAPSULE ORAL 2 TIMES DAILY
Qty: 20 CAPSULE | Refills: 0 | Status: SHIPPED | OUTPATIENT
Start: 2025-02-03 | End: 2025-02-10

## 2025-02-03 RX ORDER — SODIUM CHLORIDE 0.9 % (FLUSH) 0.9 %
10 SYRINGE (ML) INJECTION AS NEEDED
Status: DISCONTINUED | OUTPATIENT
Start: 2025-02-03 | End: 2025-02-04 | Stop reason: HOSPADM

## 2025-02-03 RX ORDER — ALBUTEROL SULFATE 90 UG/1
2 INHALANT RESPIRATORY (INHALATION) EVERY 4 HOURS PRN
Qty: 18 G | Refills: 0 | Status: SHIPPED | OUTPATIENT
Start: 2025-02-03

## 2025-02-03 RX ORDER — IPRATROPIUM BROMIDE AND ALBUTEROL SULFATE 2.5; .5 MG/3ML; MG/3ML
3 SOLUTION RESPIRATORY (INHALATION) ONCE
Status: COMPLETED | OUTPATIENT
Start: 2025-02-03 | End: 2025-02-03

## 2025-02-03 RX ADMIN — IPRATROPIUM BROMIDE AND ALBUTEROL SULFATE 3 ML: .5; 3 SOLUTION RESPIRATORY (INHALATION) at 22:31

## 2025-02-03 NOTE — TELEPHONE ENCOUNTER
She has had respiratory issues for a few days and it has gotten worse.  Her vitals are within normal limits, coughing up thick white mucus.  Lung sounds has rattling through out.

## 2025-02-03 NOTE — ED PROVIDER NOTES
Time: 4:30 PM EST  Date of encounter:  2/3/2025  Independent Historian/Clinical History and Information was obtained by:   Patient and Family    History is limited by: N/A    Chief Complaint   Patient presents with    Shortness of Breath     Patient reports cough SOA x 1 week.  Today Home Health nurse advised her to be seen for PNA, couldn't get into PCP until 10th.  Denies fever, nausea, vomiting, chest pain.         History of Present Illness:  Patient is a 79 y.o. year old female who presents to the emergency department for evaluation of shortness of breath x 1 week.  Patient states she has had a cough.  Patient was seen by her home health nurse today who states that she had decreased breath sounds and wanted her evaluated for pneumonia.  Patient tried to get into her primary care provider but was not able to get an appointment until February 10.  Patient denies any fevers but has had some chills and weakness.  Patient denies any history of COPD or asthma.  Patient has taken Tylenol and sinus medication this morning.  (Bailey Seaver, APRN, FNP-C)    Patient Care Team  Primary Care Provider: Romario Valdez, DO    Past Medical History:     Allergies   Allergen Reactions    Penicillins Palpitations     1. When was your reaction? > 10 years ago  2. Did your reaction happen after the first dose or after several doses? After first dose  3. Did your reaction require ED or hospital care to manage your reaction? Do not know  4. Did your reaction require treatment with epinephrine? Do not know  5. Have you taken amoxicillin (Amoxil) or amoxicillin-clavulanate (Augmentin) without issue since? Yes  6. Have you taken cephalexin (Keflex) without issue since?  Do not know      Past Medical History:   Diagnosis Date    Acne rosacea 08/17/2021    DR.JEFF MOON     Acute respiratory failure with hypoxia 07/31/2023    Arteriosclerosis of abdominal aorta     B12 deficiency 08/17/2021    DJD (degenerative joint disease)      Hx of smoking 08/17/2021    QUIT IN 1976 AT AGE 31    Hyperlipidemia 08/17/2021    Hypertension     Hypothyroidism     Metabolic syndrome 08/17/2021    NSTEMI (non-ST elevated myocardial infarction) 07/17/2023    Pericardial effusion 10/10/2023    KAREN (stress urinary incontinence, female) 08/17/2021    UTI (urinary tract infection) 08/17/2021    Vitamin D deficiency 08/17/2021     Past Surgical History:   Procedure Laterality Date    BACK SURGERY  2013    CARDIAC CATHETERIZATION N/A 07/17/2023    Procedure: Left Heart Cath;  Surgeon: Dinh Andres MD;  Location: [facility] CATH INVASIVE LOCATION;  Service: Cardiovascular;  Laterality: N/A;    CARPAL TUNNEL RELEASE      COLONOSCOPY  2011    CORONARY ARTERY BYPASS GRAFT WITH MITRAL VALVE REPAIR/REPLACEMENT N/A 07/20/2023    Procedure: REZA STERNOTOMY OFF-PUMP CORONARY ARTERY BYPASS GRAFT TIMES        USING LEFFT INTERNAL MAMMARY ARTERY AND     GREATER SAPHENOUS VEIN GRAFT PER EMDOSCOPIC VEIN HARVESTING, MAZE PROCEDURE AND PRP;  Surgeon: Shane Alexander MD;  [facility];  Service: Cardiothoracic;  Laterality: N/A;    INCISION AND DRAINAGE LEG N/A 12/24/2024    Procedure: INCISION AND DRAINAGE LOWER EXTREMITY; Plain text: incision to remove pus from left leg;  Surgeon: Sergo Altamirano MD;  Location: [facility] MAIN OR;  Service: General;  Laterality: N/A;    KNEE SURGERY      LUMBAR DISCECTOMY      NECK SURGERY      Cervical    POSTERIOR LUMBAR/THORACIC SPINE FUSION       Family History   Problem Relation Age of Onset    Heart disease Mother     Arthritis Mother     Heart attack Mother     Arthritis Father        Home Medications:  Prior to Admission medications    Medication Sig Start Date End Date Taking? Authorizing Provider   albuterol sulfate  (90 Base) MCG/ACT inhaler Inhale 2 puffs Every 4 (Four) Hours As Needed for Wheezing. 5/30/23   Les Moreno,    apixaban (Eliquis) 5 MG tablet tablet Take 1 tablet by mouth Every 12 (Twelve)  Hours. 12/12/24   Sara Lubin APRN   aspirin 81 MG EC tablet Take 1 tablet by mouth Daily.    Provider, MD Kanwal   atorvastatin (LIPITOR) 40 MG tablet TAKE 1 TABLET BY MOUTH EVERY DAY 12/16/24   Romario Valdez DO   buPROPion XL (WELLBUTRIN XL) 300 MG 24 hr tablet TAKE 1 TABLET BY MOUTH EVERY DAY 12/12/24   Romario Valdez DO   dapagliflozin Propanediol (Farxiga) 10 MG tablet Take 10 mg by mouth Daily. 1/16/25   Sara Lubin APRN   digoxin (LANOXIN) 125 MCG tablet Take 1 tablet by mouth Every Other Day.    Provider, MD Kanwal   folic acid (FOLVITE) 1 MG tablet Take 1 tablet by mouth Daily. 9/18/24   Manny Soto PA-C   furosemide (LASIX) 40 MG tablet Take 1 tablet by mouth Daily. 12/27/24   Sergo Schulte MD   metFORMIN ER (GLUCOPHAGE-XR) 500 MG 24 hr tablet TAKE 1 TABLET BY MOUTH TWICE DAILY 11/19/24   Romario Valdez DO   metoprolol succinate XL (TOPROL-XL) 50 MG 24 hr tablet Take 1.5 tablets by mouth 2 (Two) Times a Day. 1/29/25   Mahogany Tinsley APRN   montelukast (SINGULAIR) 10 MG tablet Take 1 tablet by mouth Daily. 7/31/24   Romario Valdez DO   Probiotic Product (Fingerprint PO) Take 1 capsule by mouth Daily.    Provider, MD Kanwal   sacubitril-valsartan (Entresto) 49-51 MG tablet Take 1 tablet by mouth 2 (Two) Times a Day. 1/16/25   Sara Lubin APRN   sacubitril-valsartan (Entresto) 49-51 MG tablet Take 1 tablet by mouth 2 (Two) Times a Day. Indications: [id number] exp: 10/31/2026 1/17/25   Sara Lubin APRN   spironolactone (ALDACTONE) 25 MG tablet Take 1 tablet by mouth Daily. 1/16/25   Sara Lubin APRN   Synthroid 75 MCG tablet Take 1 tablet by mouth Daily. 7/31/24   Romario Valdez DO        Social History:   Social History     Tobacco Use    Smoking status: Former     Current packs/day: 0.00     Average packs/day: 1 pack/day for 12.0 years (12.0 ttl pk-yrs)     Types: Cigarettes     Start  "date:      Quit date:      Years since quittin.1    Smokeless tobacco: Never   Vaping Use    Vaping status: Never Used   Substance Use Topics    Alcohol use: Never    Drug use: Never         Review of Systems:  Review of Systems   Respiratory:  Positive for cough and shortness of breath.         Physical Exam:  /51 (BP Location: Right arm, Patient Position: Sitting)   Pulse 64   Temp 98.5 °F (36.9 °C) (Oral)   Resp 16   Ht 162.6 cm (64\")   LMP  (LMP Unknown)   SpO2 96%   BMI 29.01 kg/m²         Physical Exam  Vitals and nursing note reviewed.   Cardiovascular:      Rate and Rhythm: Normal rate and regular rhythm.      Heart sounds: Normal heart sounds.   Pulmonary:      Effort: Pulmonary effort is normal.      Breath sounds: Examination of the right-upper field reveals rhonchi. Examination of the left-upper field reveals rhonchi. Examination of the right-middle field reveals rhonchi. Examination of the left-middle field reveals rhonchi. Examination of the right-lower field reveals rhonchi. Examination of the left-lower field reveals rhonchi.   Abdominal:      General: There is no distension.   Musculoskeletal:         General: Normal range of motion.      Cervical back: Normal range of motion.      Right lower leg: No edema.      Left lower leg: No edema.   Skin:     General: Skin is warm and dry.   Neurological:      Mental Status: She is alert and oriented to person, place, and time.                            Medical Decision Making:      Comorbidities that affect care:    Hypertension    External Notes reviewed:    Previous Clinic Note: Postoperative follow-up visit on 2025 with Dr. Pagan following excision of necrotizing lower extremity lesion      The following orders were placed and all results were independently analyzed by me:  Orders Placed This Encounter   Procedures    COVID-19, FLU A/B, RSV PCR 1 HR TAT - Swab, Nasopharynx    XR Chest 1 View    [place] Draw    " Comprehensive Metabolic Panel    BNP    High Sensitivity Troponin T    CBC Auto Differential    Scan Slide    High Sensitivity Troponin T 1Hr    NPO Diet NPO Type: Strict NPO    Undress & Gown    Continuous Pulse Oximetry    Vital Signs    Oxygen Therapy- Nasal Cannula; Titrate 1-6 LPM Per SpO2; 90 - 95%    ECG 12 Lead ED Triage Standing Order; SOA    Insert Peripheral IV    CBC & Differential    Green Top (Gel)    Lavender Top    Gold Top - SST    Light Blue Top       Medications Given in the Emergency Department:  Medications   sodium chloride 0.9 % flush 10 mL (has no administration in time range)   ipratropium-albuterol (DUO-NEB) nebulizer solution 3 mL (3 mL Nebulization Given 2/3/25 2231)        ED Course:    The patient was initially evaluated in the triage area where orders were placed. The patient was later dispositioned by Les Moreno DO.      The patient was advised to stay for completion of workup which includes but is not limited to communication of labs and radiological results, reassessment and plan. The patient was advised that leaving prior to disposition by a provider could result in critical findings that are not communicated to the patient.     ED Course as of 02/03/25 2250   Mon Feb 03, 2025   1634 PROVIDER IN TRIAGE  Patient was evaluated by me in triage, Bailey Seaver, APRN, FNADELINA.  Orders were placed and patient is currently awaiting final results and disposition.   [AS]      ED Course User Index  [AS] Seaver, Alyce B, APRN       Labs:    Lab Results (last 24 hours)       Procedure Component Value Units Date/Time    COVID-19, FLU A/B, RSV PCR 1 HR TAT - Swab, Nasopharynx [[id number]]  (Normal) Collected: 02/03/25 1637    Specimen: Swab from Nasopharynx Updated: 02/03/25 1734     COVID19 Not Detected     Influenza A PCR Not Detected     Influenza B PCR Not Detected     RSV, PCR Not Detected    Narrative:      Fact sheet for providers: https://www.fda.gov/media/107097/download    Fact sheet for  patients: https://www.[place].gov/media/100286/download    Test performed by PCR.    CBC & Differential [[id number]]  (Abnormal) Collected: 02/03/25 1645    Specimen: Blood from Arm, Right Updated: 02/03/25 1715    Narrative:      The following orders were created for panel order CBC & Differential.  Procedure                               Abnormality         Status                     ---------                               -----------         ------                     CBC Auto Differential[[id number]]        Abnormal            Final result               Scan Slide[[id number]]                                       Final result                 Please view results for these tests on the individual orders.    Comprehensive Metabolic Panel [[id number]]  (Abnormal) Collected: 02/03/25 1645    Specimen: Blood from Arm, Right Updated: 02/03/25 1714     Glucose 127 mg/dL      BUN 41 mg/dL      Creatinine 1.54 mg/dL      Sodium 140 mmol/L      Potassium 4.6 mmol/L      Chloride 102 mmol/L      CO2 25.1 mmol/L      Calcium 8.8 mg/dL      Total Protein 6.9 g/dL      Albumin 3.6 g/dL      ALT (SGPT) 18 U/L      AST (SGOT) 26 U/L      Alkaline Phosphatase 60 U/L      Total Bilirubin 0.5 mg/dL      Globulin 3.3 gm/dL      A/G Ratio 1.1 g/dL      BUN/Creatinine Ratio 26.6     Anion Gap 12.9 mmol/L      eGFR 34.0 mL/min/1.73     Narrative:      GFR Categories in Chronic Kidney Disease (CKD)      GFR Category          GFR (mL/min/1.73)    Interpretation  G1                     90 or greater         Normal or high (1)  G2                      60-89                Mild decrease (1)  G3a                   45-59                Mild to moderate decrease  G3b                   30-44                Moderate to severe decrease  G4                    15-29                Severe decrease  G5                    14 or less           Kidney failure          (1)In the absence of evidence of kidney disease, neither GFR category G1 or G2 fulfill the  criteria for CKD.    eGFR calculation 2021 CKD-EPI creatinine equation, which does not include race as a factor    BNP [[id number]]  (Abnormal) Collected: 02/03/25 1645    Specimen: Blood from Arm, Right Updated: 02/03/25 1709     proBNP 6,896.0 pg/mL     Narrative:      This assay is used as an aid in the diagnosis of individuals suspected of having heart failure. It can be used as an aid in the diagnosis of acute decompensated heart failure (ADHF) in patients presenting with signs and symptoms of ADHF to the emergency department (ED). In addition, NT-proBNP of <300 pg/mL indicates ADHF is not likely.    Age Range Result Interpretation  NT-proBNP Concentration (pg/mL:      <50             Positive            >450                   Gray                 300-450                    Negative             <300    50-75           Positive            >900                  Gray                300-900                  Negative            <300      >75             Positive            >1800                  Gray                300-1800                  Negative            <300    High Sensitivity Troponin T [[id number]]  (Abnormal) Collected: 02/03/25 1645    Specimen: Blood from Arm, Right Updated: 02/03/25 1714     HS Troponin T 22 ng/L     Narrative:      High Sensitive Troponin T Reference Range:  <14.0 ng/L- Negative Female for AMI  <22.0 ng/L- Negative Male for AMI  >=14 - Abnormal Female indicating possible myocardial injury.  >=22 - Abnormal Male indicating possible myocardial injury.   Clinicians would have to utilize clinical acumen, EKG, Troponin, and serial changes to determine if it is an Acute Myocardial Infarction or myocardial injury due to an underlying chronic condition.         CBC Auto Differential [[id number]]  (Abnormal) Collected: 02/03/25 1645    Specimen: Blood from Arm, Right Updated: 02/03/25 1700     WBC 8.96 10*3/mm3      RBC 2.92 10*6/mm3      Hemoglobin 8.8 g/dL      Hematocrit 29.4 %      .7  fL      MCH 30.1 pg      MCHC 29.9 g/dL      RDW 22.3 %      RDW-SD 82.6 fl      MPV 10.3 fL      Platelets 262 10*3/mm3      Neutrophil % 56.8 %      Lymphocyte % 28.6 %      Monocyte % 11.7 %      Eosinophil % 2.1 %      Basophil % 0.4 %      Immature Grans % 0.4 %      Neutrophils, Absolute 5.08 10*3/mm3      Lymphocytes, Absolute 2.56 10*3/mm3      Monocytes, Absolute 1.05 10*3/mm3      Eosinophils, Absolute 0.19 10*3/mm3      Basophils, Absolute 0.04 10*3/mm3      Immature Grans, Absolute 0.04 10*3/mm3      nRBC 0.3 /100 WBC     Scan Slide [[id number]] Collected: 02/03/25 1645    Specimen: Blood from Arm, Right Updated: 02/03/25 1715     Anisocytosis Slight/1+     Elliptocytes Slight/1+     Poikilocytes Mod/2+     WBC Morphology Normal     Large Platelets Slight/1+    High Sensitivity Troponin T 1Hr [[id number]]  (Abnormal) Collected: 02/03/25 1911    Specimen: Blood from Arm, Right Updated: 02/03/25 1947     HS Troponin T 22 ng/L      Troponin T Numeric Delta 0 ng/L      Troponin T % Delta 0    Narrative:      High Sensitive Troponin T Reference Range:  <14.0 ng/L- Negative Female for AMI  <22.0 ng/L- Negative Male for AMI  >=14 - Abnormal Female indicating possible myocardial injury.  >=22 - Abnormal Male indicating possible myocardial injury.   Clinicians would have to utilize clinical acumen, EKG, Troponin, and serial changes to determine if it is an Acute Myocardial Infarction or myocardial injury due to an underlying chronic condition.                  Imaging:    XR Chest 1 View    Result Date: 2/3/2025  XR CHEST 1 VW Date of Exam: 2/3/2025 5:06 PM EST Indication: SOA Triage Protocol Comparison: Chest x-ray 5/25/2024 Findings: Postoperative changes the heart. Postop changes cervical spine. Lungs normally expanded. No pneumothorax or pleural effusion or focal pulmonary parenchymal opacity. Pulmonary vessels are distinct. Moderate spondylosis thoracic spine.     Impression: No acute cardiopulmonary  abnormality. Electronically Signed: Dacia Calderón MD  2/3/2025 5:44 PM EST  Workstation ID: [id number]       Differential Diagnosis and Discussion:      Dyspnea: Differential diagnosis includes but is not limited to metabolic acidosis, neurological disorders, psychogenic, asthma, pneumothorax, upper airway obstruction, COPD, pneumonia, noncardiogenic pulmonary edema, interstitial lung disease, anemia, congestive heart failure, and pulmonary embolism    PROCEDURES:    Labs were collected in the emergency department and all labs were reviewed and interpreted by me.  X-ray were performed in the emergency department and all X-ray impressions were independently interpreted by me.    ECG 12 Lead ED Triage Standing Order; SOA   Preliminary Result   HEART RATE=62  bpm   RR Azzuinuf=644  ms   WV Vggataie=512  ms   P Horizontal Axis=  deg   P Front Axis=7  deg   QRSD Uxuoqvyu=266  ms   QT Kjxsezgn=076  ms   ICaE=789  ms   QRS Axis=31  deg   T Wave Axis=183  deg   - ABNORMAL ECG -   Sinus rhythm   Repol abnrm suggests ischemia, anterolateral   Date and Time of Study:2025-02-03 16:48:16           Procedures    MDM     Amount and/or Complexity of Data Reviewed  Decide to obtain previous medical records or to obtain history from someone other than the patient: yes           Patient Care Considerations:    CT CHEST: I considered ordering a CT scan of the chest, however patient's pulse ox and heart rate were normal.      Consultants/Shared Management Plan:    None    Social Determinants of Health:    Patient is independent, reliable, and has access to care.       Disposition and Care Coordination:    Discharged: The patient is suitable and stable for discharge with no need for consideration of admission.    I have explained the patient´s condition, diagnoses and treatment plan based on the information available to me at this time. I have answered questions and addressed any concerns. The patient has a good  understanding of the  patient´s diagnosis, condition, and treatment plan as can be expected at this point. The vital signs have been stable. The patient´s condition is stable and appropriate for discharge from the emergency department.      The patient will pursue further outpatient evaluation with the primary care physician or other designated or consulting physician as outlined in the discharge instructions. They are agreeable to this plan of care and follow-up instructions have been explained in detail. The patient has received these instructions in written format and has expressed an understanding of the discharge instructions. The patient is aware that any significant change in condition or worsening of symptoms should prompt an immediate return to this or the closest emergency department or call to 911.  I have explained discharge medications and the need for follow up with the patient/caretakers. This was also printed in the discharge instructions. Patient was discharged with the following medications and follow up:      Medication List        New Prescriptions      doxycycline 100 MG capsule  Commonly known as: MONODOX  Take 1 capsule by mouth 2 (Two) Times a Day.            Changed      * albuterol sulfate  (90 Base) MCG/ACT inhaler  Commonly known as: PROVENTIL HFA;VENTOLIN HFA;PROAIR HFA  Inhale 2 puffs Every 4 (Four) Hours As Needed for Wheezing.  What changed: Another medication with the same name was added. Make sure you understand how and when to take each.     * albuterol sulfate  (90 Base) MCG/ACT inhaler  Commonly known as: PROVENTIL HFA;VENTOLIN HFA;PROAIR HFA  Inhale 2 puffs Every 4 (Four) Hours As Needed for Wheezing.  What changed: You were already taking a medication with the same name, and this prescription was added. Make sure you understand how and when to take each.           * This list has 2 medication(s) that are the same as other medications prescribed for you. Read the directions carefully,  and ask your doctor or other care provider to review them with you.                   Where to Get Your Medications        These medications were sent to [facility] - [place], [place] - [address] - [phone] PH - [phone] FX  [address], [place] [address]      Phone: [phone]   albuterol sulfate  (90 Base) MCG/ACT inhaler  doxycycline 100 MG capsule      Romario Valdez, DO  [address] [facility] [place] [id number] [phone]    Schedule an appointment as soon as possible for a visit          Final diagnoses:   Bronchitis        ED Disposition       ED Disposition   Discharge    Condition   Stable    Comment   --               This medical record created using voice recognition software.             Les Moreno DO  02/03/25 2155       Les Moreno,   02/03/25 7196

## 2025-02-04 LAB
MYCOBACTERIUM SPEC CULT: NORMAL
NIGHT BLUE STAIN TISS: NORMAL
QT INTERVAL: 374 MS
QTC INTERVAL: 381 MS

## 2025-02-04 NOTE — TELEPHONE ENCOUNTER
Spoke with patient, she went to ER. Requested follow up next week once she started to feel better. Patient scheduled on Monday.

## 2025-02-10 ENCOUNTER — OFFICE VISIT (OUTPATIENT)
Dept: FAMILY MEDICINE CLINIC | Facility: CLINIC | Age: 80
End: 2025-02-10
Payer: MEDICARE

## 2025-02-10 ENCOUNTER — TELEPHONE (OUTPATIENT)
Dept: FAMILY MEDICINE CLINIC | Facility: CLINIC | Age: 80
End: 2025-02-10

## 2025-02-10 VITALS
SYSTOLIC BLOOD PRESSURE: 111 MMHG | WEIGHT: 166 LBS | HEART RATE: 62 BPM | BODY MASS INDEX: 28.34 KG/M2 | HEIGHT: 64 IN | DIASTOLIC BLOOD PRESSURE: 56 MMHG | OXYGEN SATURATION: 93 % | TEMPERATURE: 97.3 F

## 2025-02-10 DIAGNOSIS — J40 BRONCHITIS: Primary | ICD-10-CM

## 2025-02-10 PROCEDURE — 3078F DIAST BP <80 MM HG: CPT | Performed by: FAMILY MEDICINE

## 2025-02-10 PROCEDURE — 3074F SYST BP LT 130 MM HG: CPT | Performed by: FAMILY MEDICINE

## 2025-02-10 PROCEDURE — 1126F AMNT PAIN NOTED NONE PRSNT: CPT | Performed by: FAMILY MEDICINE

## 2025-02-10 PROCEDURE — 99213 OFFICE O/P EST LOW 20 MIN: CPT | Performed by: FAMILY MEDICINE

## 2025-02-10 RX ORDER — BUPROPION HYDROCHLORIDE 300 MG/1
300 TABLET ORAL DAILY
Qty: 90 TABLET | Refills: 3 | Status: SHIPPED | OUTPATIENT
Start: 2025-02-10

## 2025-02-10 RX ORDER — METHYLPREDNISOLONE 4 MG/1
TABLET ORAL
Qty: 21 TABLET | Refills: 0 | Status: SHIPPED | OUTPATIENT
Start: 2025-02-10

## 2025-02-10 NOTE — PROGRESS NOTES
Chief Complaint   Patient presents with    Follow-up     Bronchitis         Subjective     Ann-Marie Hughes  has a past medical history of Acne rosacea (08/17/2021), Acute respiratory failure with hypoxia (07/31/2023), Arteriosclerosis of abdominal aorta, B12 deficiency (08/17/2021), DJD (degenerative joint disease), smoking (08/17/2021), Hyperlipidemia (08/17/2021), Hypertension, Hypothyroidism, Metabolic syndrome (08/17/2021), NSTEMI (non-ST elevated myocardial infarction) (07/17/2023), Pericardial effusion (10/10/2023), KAREN (stress urinary incontinence, female) (08/17/2021), UTI (urinary tract infection) (08/17/2021), and Vitamin D deficiency (08/17/2021).    Bronchitis-she states for the past 2 weeks she has had a cough.  She states it is productive at times.  She does admit to some wheezing and shortness of breath.  She states at times she has difficulty expectorating this though.  She does have a lot of postnasal drainage.  She denies any fevers or chills.  She was to the emergency room about a week ago.  There her chest x-ray was clear.  Her labs showed a chronic persistent anemia.  Her BNP was a little elevated but it is usually chronically elevated.        PHQ-2 Depression Screening  Little interest or pleasure in doing things?     Feeling down, depressed, or hopeless?     PHQ-2 Total Score     PHQ-9 Depression Screening  Little interest or pleasure in doing things?     Feeling down, depressed, or hopeless?     Trouble falling or staying asleep, or sleeping too much?     Feeling tired or having little energy?     Poor appetite or overeating?     Feeling bad about yourself - or that you are a failure or have let yourself or your family down?     Trouble concentrating on things, such as reading the newspaper or watching television?     Moving or speaking so slowly that other people could have noticed? Or the opposite - being so fidgety or restless that you have been moving around a lot more than usual?      Thoughts that you would be better off dead, or of hurting yourself in some way?     PHQ-9 Total Score     If you checked off any problems, how difficult have these problems made it for you to do your work, take care of things at home, or get along with other people?       Allergies   Allergen Reactions    Penicillins Palpitations     1. When was your reaction? > 10 years ago  2. Did your reaction happen after the first dose or after several doses? After first dose  3. Did your reaction require ED or hospital care to manage your reaction? Do not know  4. Did your reaction require treatment with epinephrine? Do not know  5. Have you taken amoxicillin (Amoxil) or amoxicillin-clavulanate (Augmentin) without issue since? Yes  6. Have you taken cephalexin (Keflex) without issue since?  Do not know        Prior to Admission medications    Medication Sig Start Date End Date Taking? Authorizing Provider   albuterol sulfate  (90 Base) MCG/ACT inhaler Inhale 2 puffs Every 4 (Four) Hours As Needed for Wheezing. 5/30/23  Yes Les Moreno, DO   albuterol sulfate  (90 Base) MCG/ACT inhaler Inhale 2 puffs Every 4 (Four) Hours As Needed for Wheezing. 2/3/25  Yes Les Moreno, DO   apixaban (Eliquis) 5 MG tablet tablet Take 1 tablet by mouth Every 12 (Twelve) Hours. 12/12/24  Yes Sara Lubin APRN   aspirin 81 MG EC tablet Take 1 tablet by mouth Daily.   Yes Kanwal Orozco MD   atorvastatin (LIPITOR) 40 MG tablet TAKE 1 TABLET BY MOUTH EVERY DAY 12/16/24  Yes Romario Valdez, DO   buPROPion XL (WELLBUTRIN XL) 300 MG 24 hr tablet TAKE 1 TABLET BY MOUTH EVERY DAY 12/12/24  Yes Romario Valdez, DO   dapagliflozin Propanediol (Farxiga) 10 MG tablet Take 10 mg by mouth Daily. 1/16/25  Yes Sara Lubin APRN   digoxin (LANOXIN) 125 MCG tablet Take 1 tablet by mouth Every Other Day.   Yes Kanwal Orozco MD   folic acid (FOLVITE) 1 MG tablet Take 1 tablet by mouth Daily. 9/18/24  Yes  Manny Soto PA-C   furosemide (LASIX) 40 MG tablet Take 1 tablet by mouth Daily. 12/27/24  Yes Sergo Schulte MD   metFORMIN ER (GLUCOPHAGE-XR) 500 MG 24 hr tablet TAKE 1 TABLET BY MOUTH TWICE DAILY 11/19/24  Yes Romario Valdez DO   metoprolol succinate XL (TOPROL-XL) 50 MG 24 hr tablet Take 1.5 tablets by mouth 2 (Two) Times a Day. 1/29/25  Yes Mahogany Tinsley APRN   montelukast (SINGULAIR) 10 MG tablet Take 1 tablet by mouth Daily. 7/31/24  Yes Romario Valdez DO   Probiotic Product (Get10 PO) Take 1 capsule by mouth Daily.   Yes ProviderKanwal MD   sacubitril-valsartan (Entresto) 49-51 MG tablet Take 1 tablet by mouth 2 (Two) Times a Day. 1/16/25  Yes Sara Lubin APRN   sacubitril-valsartan (Entresto) 49-51 MG tablet Take 1 tablet by mouth 2 (Two) Times a Day. Indications: [id number] exp: 10/31/2026 1/17/25  Yes Sara Lubin APRN   spironolactone (ALDACTONE) 25 MG tablet Take 1 tablet by mouth Daily. 1/16/25  Yes Sara Lubin APRN   Synthroid 75 MCG tablet Take 1 tablet by mouth Daily. 7/31/24  Yes Romario Valdez DO   doxycycline (MONODOX) 100 MG capsule Take 1 capsule by mouth 2 (Two) Times a Day. 2/3/25 2/10/25 Yes Les Moreno DO        Patient Active Problem List   Diagnosis    Arthritis    Chronic pain syndrome    Diabetes mellitus, type II    Gastric reflux    Herniated disc    Primary hypertension    Hypothyroidism    Pain in joint, multiple sites    Hyperlipidemia    B12 deficiency    Vitamin D deficiency    Acne rosacea    Hx of smoking    Stress incontinence of urine    Arteriosclerosis of abdominal aorta    Iron deficiency anemia secondary to inadequate dietary iron intake    Seasonal allergies    Hyponatremia    Leg length discrepancy    Reactive depression    Encounter for subsequent annual wellness visit (AWV) in Medicare patient    Class 1 obesity with alveolar hypoventilation, serious comorbidity, and body mass index  (BMI) of 32.0 to 32.9 in adult    Chronic HFrEF (heart failure with reduced ejection fraction)    CAD status post CABG x3 vessels    PAF (paroxysmal atrial fibrillation)    Encounter for medical examination to establish care    Autoimmune hemolytic anemia    Need for RSV vaccination    Stage 3b chronic kidney disease    Large granular lymphocyte disorder    Cervicogenic headache    Aortic stenosis, mild    Contusion of left foot    Cellulitis of foot    Cellulitis of left lower extremity    Wound cellulitis    Abscess of left lower extremity    Neuropathy    Ulcer of left foot with fat layer exposed    Left foot pain    Hospital discharge follow-up    Bronchitis        Past Surgical History:   Procedure Laterality Date    BACK SURGERY  2013    CARDIAC CATHETERIZATION N/A 07/17/2023    Procedure: Left Heart Cath;  Surgeon: Dinh Andres MD;  Location: [facility] CATH INVASIVE LOCATION;  Service: Cardiovascular;  Laterality: N/A;    CARPAL TUNNEL RELEASE      COLONOSCOPY  2011    CORONARY ARTERY BYPASS GRAFT WITH MITRAL VALVE REPAIR/REPLACEMENT N/A 07/20/2023    Procedure: REZA STERNOTOMY OFF-PUMP CORONARY ARTERY BYPASS GRAFT TIMES        USING LEFFT INTERNAL MAMMARY ARTERY AND     GREATER SAPHENOUS VEIN GRAFT PER EMDOSCOPIC VEIN HARVESTING, MAZE PROCEDURE AND PRP;  Surgeon: Shane Alexander MD;  [facility];  Service: Cardiothoracic;  Laterality: N/A;    INCISION AND DRAINAGE LEG N/A 12/24/2024    Procedure: INCISION AND DRAINAGE LOWER EXTREMITY; Plain text: incision to remove pus from left leg;  Surgeon: Sergo Altamirano MD;  Location: [facility] OR;  Service: General;  Laterality: N/A;    KNEE SURGERY      LUMBAR DISCECTOMY      NECK SURGERY      Cervical    POSTERIOR LUMBAR/THORACIC SPINE FUSION         Social History     Socioeconomic History    Marital status:      Spouse name: Dinh   Tobacco Use    Smoking status: Former     Current packs/day: 0.00     Average packs/day: 1 pack/day  "for 12.0 years (12.0 ttl pk-yrs)     Types: Cigarettes     Start date:      Quit date:      Years since quittin.1    Smokeless tobacco: Never   Vaping Use    Vaping status: Never Used   Substance and Sexual Activity    Alcohol use: Never    Drug use: Never    Sexual activity: Defer       Family History   Problem Relation Age of Onset    Heart disease Mother     Arthritis Mother     Heart attack Mother     Arthritis Father        Family history, surgical history, past medical history, Allergies and meds reviewed with patient today and updated in [employer] EMR.     ROS:  Review of Systems   Constitutional:  Positive for appetite change. Negative for chills and fever.   HENT:  Positive for postnasal drip and sinus pressure. Negative for congestion and rhinorrhea.    Respiratory:  Positive for cough, chest tightness, shortness of breath and wheezing.        OBJECTIVE:  Vitals:    02/10/25 0955   BP: 111/56   BP Location: Right arm   Patient Position: Sitting   Pulse: 62   Temp: 97.3 °F (36.3 °C)   SpO2: 93%   Weight: 75.3 kg (166 lb)   Height: 162.6 cm (64\")     No results found.   Body mass index is 28.49 kg/m².  No LMP recorded (lmp unknown). Patient is postmenopausal.    The ASCVD Risk score (Manuel DK, et al., 2019) failed to calculate for the following reasons:    The 2019 ASCVD risk score is only valid for ages 40 to 79    Risk score cannot be calculated because patient has a medical history suggesting prior/existing ASCVD     Physical Exam  Vitals and nursing note reviewed.   Constitutional:       General: She is not in acute distress.     Appearance: Normal appearance. She is normal weight.   HENT:      Head: Normocephalic.      Right Ear: Tympanic membrane, ear canal and external ear normal.      Left Ear: Tympanic membrane, ear canal and external ear normal.      Nose: Nose normal.        Mouth/Throat:      Mouth: Mucous membranes are moist.      Pharynx: Oropharynx is clear.   Eyes:      General: No " scleral icterus.     Conjunctiva/sclera: Conjunctivae normal.      Pupils: Pupils are equal, round, and reactive to light.   Cardiovascular:      Rate and Rhythm: Normal rate and regular rhythm.      Pulses: Normal pulses.      Heart sounds: Normal heart sounds. No murmur heard.  Pulmonary:      Effort: Pulmonary effort is normal.      Breath sounds: Wheezing present. No rhonchi or rales.   Musculoskeletal:      Cervical back: Neck supple. No rigidity or tenderness.   Lymphadenopathy:      Cervical: No cervical adenopathy.   Skin:     General: Skin is warm and dry.      Coloration: Skin is not jaundiced.      Findings: No rash.   Neurological:      General: No focal deficit present.      Mental Status: She is alert and oriented to person, place, and time.   Psychiatric:         Mood and Affect: Mood normal.         Thought Content: Thought content normal.         Judgment: Judgment normal.         Procedures    Admission on 02/03/2025, Discharged on 02/03/2025   Component Date Value Ref Range Status    QT Interval 02/03/2025 374  ms Final    QTC Interval 02/03/2025 381  ms Final    Glucose 02/03/2025 127 (H)  65 - 99 mg/dL Final    BUN 02/03/2025 41 (H)  8 - 23 mg/dL Final    Creatinine 02/03/2025 1.54 (H)  0.57 - 1.00 mg/dL Final    Sodium 02/03/2025 140  136 - 145 mmol/L Final    Potassium 02/03/2025 4.6  3.5 - 5.2 mmol/L Final    Chloride 02/03/2025 102  98 - 107 mmol/L Final    CO2 02/03/2025 25.1  22.0 - 29.0 mmol/L Final    Calcium 02/03/2025 8.8  8.6 - 10.5 mg/dL Final    Total Protein 02/03/2025 6.9  6.0 - 8.5 g/dL Final    Albumin 02/03/2025 3.6  3.5 - 5.2 g/dL Final    ALT (SGPT) 02/03/2025 18  1 - 33 U/L Final    AST (SGOT) 02/03/2025 26  1 - 32 U/L Final    Alkaline Phosphatase 02/03/2025 60  39 - 117 U/L Final    Total Bilirubin 02/03/2025 0.5  0.0 - 1.2 mg/dL Final    Globulin 02/03/2025 3.3  gm/dL Final    A/G Ratio 02/03/2025 1.1  g/dL Final    BUN/Creatinine Ratio 02/03/2025 26.6 (H)  7.0 - 25.0  Final    Anion Gap 02/03/2025 12.9  5.0 - 15.0 mmol/L Final    eGFR 02/03/2025 34.0 (L)  >60.0 mL/min/1.73 Final    proBNP 02/03/2025 6,896.0 (H)  0.0 - 1,800.0 pg/mL Final    HS Troponin T 02/03/2025 22 (H)  <14 ng/L Final    COVID19 02/03/2025 Not Detected  Not Detected - Ref. Range Final    Influenza A PCR 02/03/2025 Not Detected  Not Detected Final    Influenza B PCR 02/03/2025 Not Detected  Not Detected Final    RSV, PCR 02/03/2025 Not Detected  Not Detected Final    Extra Tube 02/03/2025 Hold for add-ons.   Final    Auto resulted.    Extra Tube 02/03/2025 hold for add-on   Final    Auto resulted    Extra Tube 02/03/2025 Hold for add-ons.   Final    Auto resulted.    Extra Tube 02/03/2025 Hold for add-ons.   Final    Auto resulted    WBC 02/03/2025 8.96  3.40 - 10.80 10*3/mm3 Final    RBC 02/03/2025 2.92 (L)  3.77 - 5.28 10*6/mm3 Final    Hemoglobin 02/03/2025 8.8 (L)  12.0 - 15.9 g/dL Final    Hematocrit 02/03/2025 29.4 (L)  34.0 - 46.6 % Final    MCV 02/03/2025 100.7 (H)  79.0 - 97.0 fL Final    MCH 02/03/2025 30.1  26.6 - 33.0 pg Final    MCHC 02/03/2025 29.9 (L)  31.5 - 35.7 g/dL Final    RDW 02/03/2025 22.3 (H)  12.3 - 15.4 % Final    RDW-SD 02/03/2025 82.6 (H)  37.0 - 54.0 fl Final    MPV 02/03/2025 10.3  6.0 - 12.0 fL Final    Platelets 02/03/2025 262  140 - 450 10*3/mm3 Final    Neutrophil % 02/03/2025 56.8  42.7 - 76.0 % Final    Lymphocyte % 02/03/2025 28.6  19.6 - 45.3 % Final    Monocyte % 02/03/2025 11.7  5.0 - 12.0 % Final    Eosinophil % 02/03/2025 2.1  0.3 - 6.2 % Final    Basophil % 02/03/2025 0.4  0.0 - 1.5 % Final    Immature Grans % 02/03/2025 0.4  0.0 - 0.5 % Final    Neutrophils, Absolute 02/03/2025 5.08  1.70 - 7.00 10*3/mm3 Final    Lymphocytes, Absolute 02/03/2025 2.56  0.70 - 3.10 10*3/mm3 Final    Monocytes, Absolute 02/03/2025 1.05 (H)  0.10 - 0.90 10*3/mm3 Final    Eosinophils, Absolute 02/03/2025 0.19  0.00 - 0.40 10*3/mm3 Final    Basophils, Absolute 02/03/2025 0.04  0.00 - 0.20  10*3/mm3 Final    Immature Grans, Absolute 02/03/2025 0.04  0.00 - 0.05 10*3/mm3 Final    nRBC 02/03/2025 0.3 (H)  0.0 - 0.2 /100 WBC Final    Anisocytosis 02/03/2025 Slight/1+  None Seen Final    Elliptocytes 02/03/2025 Slight/1+  None Seen Final    Poikilocytes 02/03/2025 Mod/2+  None Seen Final    WBC Morphology 02/03/2025 Normal  Normal Final    Large Platelets 02/03/2025 Slight/1+  None Seen Final    HS Troponin T 02/03/2025 22 (H)  <14 ng/L Final    Troponin T Numeric Delta 02/03/2025 0  ng/L Final    Troponin T % Delta 02/03/2025 0  Abnormal if >/= 20% Final   Lab on 01/16/2025   Component Date Value Ref Range Status    Magnesium 01/16/2025 1.8  1.6 - 2.4 mg/dL Final    Digoxin 01/16/2025 0.87  0.60 - 1.20 ng/mL Final    proBNP 01/16/2025 3,459.0 (H)  0.0 - 1,800.0 pg/mL Final    Glucose 01/16/2025 128 (H)  65 - 99 mg/dL Final    BUN 01/16/2025 73 (H)  8 - 23 mg/dL Final    Creatinine 01/16/2025 1.32 (H)  0.57 - 1.00 mg/dL Final    Sodium 01/16/2025 138  136 - 145 mmol/L Final    Potassium 01/16/2025 4.7  3.5 - 5.2 mmol/L Final    Chloride 01/16/2025 102  98 - 107 mmol/L Final    CO2 01/16/2025 24.7  22.0 - 29.0 mmol/L Final    Calcium 01/16/2025 9.7  8.6 - 10.5 mg/dL Final    Total Protein 01/16/2025 7.1  6.0 - 8.5 g/dL Final    Albumin 01/16/2025 4.1  3.5 - 5.2 g/dL Final    ALT (SGPT) 01/16/2025 12  1 - 33 U/L Final    AST (SGOT) 01/16/2025 23  1 - 32 U/L Final    Alkaline Phosphatase 01/16/2025 47  39 - 117 U/L Final    Total Bilirubin 01/16/2025 0.8  0.0 - 1.2 mg/dL Final    Globulin 01/16/2025 3.0  gm/dL Final    A/G Ratio 01/16/2025 1.4  g/dL Final    BUN/Creatinine Ratio 01/16/2025 55.3 (H)  7.0 - 25.0 Final    Anion Gap 01/16/2025 11.3  5.0 - 15.0 mmol/L Final    eGFR 01/16/2025 40.9 (L)  >60.0 mL/min/1.73 Final    Haptoglobin 01/16/2025 125  30 - 200 mg/dL Final    NANNETTE C3 01/16/2025 Positive   Final    NANNETTE IgG 01/16/2025 Positive   Final    Reticulocyte % 01/16/2025 0.65 (L)  0.70 - 1.90 % Final     Reticulocyte Absolute 01/16/2025 0.0195 (L)  0.0200 - 0.1300 10*6/mm3 Final    WBC 01/16/2025 7.75  3.40 - 10.80 10*3/mm3 Final    RBC 01/16/2025 3.00 (L)  3.77 - 5.28 10*6/mm3 Final    Hemoglobin 01/16/2025 8.8 (L)  12.0 - 15.9 g/dL Final    Hematocrit 01/16/2025 28.8 (L)  34.0 - 46.6 % Final    MCV 01/16/2025 96.0  79.0 - 97.0 fL Final    MCH 01/16/2025 29.3  26.6 - 33.0 pg Final    MCHC 01/16/2025 30.6 (L)  31.5 - 35.7 g/dL Final    RDW 01/16/2025 20.0 (H)  12.3 - 15.4 % Final    RDW-SD 01/16/2025 70.4 (H)  37.0 - 54.0 fl Final    MPV 01/16/2025 11.6  6.0 - 12.0 fL Final    Platelets 01/16/2025 297  140 - 450 10*3/mm3 Final    Neutrophil % 01/16/2025 55.9  42.7 - 76.0 % Final    Lymphocyte % 01/16/2025 31.5  19.6 - 45.3 % Final    Monocyte % 01/16/2025 10.7  5.0 - 12.0 % Final    Eosinophil % 01/16/2025 1.0  0.3 - 6.2 % Final    Basophil % 01/16/2025 0.8  0.0 - 1.5 % Final    Immature Grans % 01/16/2025 0.1  0.0 - 0.5 % Final    Neutrophils, Absolute 01/16/2025 4.33  1.70 - 7.00 10*3/mm3 Final    Lymphocytes, Absolute 01/16/2025 2.44  0.70 - 3.10 10*3/mm3 Final    Monocytes, Absolute 01/16/2025 0.83  0.10 - 0.90 10*3/mm3 Final    Eosinophils, Absolute 01/16/2025 0.08  0.00 - 0.40 10*3/mm3 Final    Basophils, Absolute 01/16/2025 0.06  0.00 - 0.20 10*3/mm3 Final    Immature Grans, Absolute 01/16/2025 0.01  0.00 - 0.05 10*3/mm3 Final    nRBC 01/16/2025 0.0  0.0 - 0.2 /100 WBC Final    Dacrocytes 01/16/2025 Slight/1+  None Seen Final    Elliptocytes 01/16/2025 Mod/2+  None Seen Final    Helmet Cells 01/16/2025 Slight/1+  None Seen Final    Hypochromia 01/16/2025 Mod/2+  None Seen Final    Schistocytes 01/16/2025 Slight/1+  None Seen Final    WBC Morphology 01/16/2025 Normal  Normal Final    Platelet Morphology 01/16/2025 Normal  Normal Final    Consult Global Result 01/16/2025 Comment^Text^TXT   Final    Peripheral Blood:  No significant immunophenotypic abnormalities of myeloid   and lymphoid cells  DISCLAIMER:  REFER TO HARDCOPY OR PDF FOR COMPLETE RESULT.   If synopsis provided, clinical decisions should not be   based on this interfaced synopsis alone.  Performed at:  1 - [facility]  [address] [place] [address] [address], [place]  [address]  :  Meeta Brumfield M.D., Ph.D., Phone:  [phone]       ASSESSMENT/ PLAN:    Diagnoses and all orders for this visit:    1. Bronchitis (Primary)  Comments:  Her choice of antibiotics is fine with her penicillin allergy.  She will continue her albuterol inhaler and add a burst of steroids.    Other orders  -     methylPREDNISolone (MEDROL) 4 MG dose pack; Take as directed on package instructions.  Dispense: 21 tablet; Refill: 0  -     mupirocin (BACTROBAN) 2 % nasal ointment; Administer 1 Application into the nostril(s) as directed by provider 2 (Two) Times a Day.  Dispense: 15 g; Refill: 0  -     buPROPion XL (WELLBUTRIN XL) 300 MG 24 hr tablet; Take 1 tablet by mouth Daily.  Dispense: 90 tablet; Refill: 3        BMI is >= 25 and <30. (Overweight) The following options were offered after discussion;: none (medical contraindication)      Orders Placed Today:     New Medications Ordered This Visit   Medications    methylPREDNISolone (MEDROL) 4 MG dose pack     Sig: Take as directed on package instructions.     Dispense:  21 tablet     Refill:  0    mupirocin (BACTROBAN) 2 % nasal ointment     Sig: Administer 1 Application into the nostril(s) as directed by provider 2 (Two) Times a Day.     Dispense:  15 g     Refill:  0    buPROPion XL (WELLBUTRIN XL) 300 MG 24 hr tablet     Sig: Take 1 tablet by mouth Daily.     Dispense:  90 tablet     Refill:  3        Management Plan:     An After Visit Summary was printed and given to the patient at discharge.    Follow-up: Return in about 3 months (around 5/10/2025), or if symptoms worsen or fail to improve.    Romario Valdez,  2/10/2025 10:17 EST  This note was electronically signed.

## 2025-02-13 RX ORDER — METFORMIN HYDROCHLORIDE 500 MG/1
500 TABLET, EXTENDED RELEASE ORAL 2 TIMES DAILY
Qty: 180 TABLET | Refills: 3 | Status: SHIPPED | OUTPATIENT
Start: 2025-02-13

## 2025-02-14 ENCOUNTER — LAB (OUTPATIENT)
Dept: LAB | Facility: HOSPITAL | Age: 80
End: 2025-02-14
Payer: MEDICARE

## 2025-02-14 DIAGNOSIS — I50.22 CHRONIC HFREF (HEART FAILURE WITH REDUCED EJECTION FRACTION): ICD-10-CM

## 2025-02-14 DIAGNOSIS — N18.32 STAGE 3B CHRONIC KIDNEY DISEASE: ICD-10-CM

## 2025-02-14 LAB
ALBUMIN SERPL-MCNC: 3.9 G/DL (ref 3.5–5.2)
ALBUMIN/GLOB SERPL: 1.2 G/DL
ALP SERPL-CCNC: 63 U/L (ref 39–117)
ALT SERPL W P-5'-P-CCNC: 16 U/L (ref 1–33)
ANION GAP SERPL CALCULATED.3IONS-SCNC: 18.3 MMOL/L (ref 5–15)
AST SERPL-CCNC: 17 U/L (ref 1–32)
BASOPHILS # BLD AUTO: 0.03 10*3/MM3 (ref 0–0.2)
BASOPHILS NFR BLD AUTO: 0.2 % (ref 0–1.5)
BILIRUB SERPL-MCNC: 0.8 MG/DL (ref 0–1.2)
BUN SERPL-MCNC: 101 MG/DL (ref 8–23)
BUN/CREAT SERPL: 61.6 (ref 7–25)
CALCIUM SPEC-SCNC: 10.2 MG/DL (ref 8.6–10.5)
CHLORIDE SERPL-SCNC: 99 MMOL/L (ref 98–107)
CO2 SERPL-SCNC: 20.7 MMOL/L (ref 22–29)
CREAT SERPL-MCNC: 1.64 MG/DL (ref 0.57–1)
DEPRECATED RDW RBC AUTO: 70.2 FL (ref 37–54)
DIGOXIN SERPL-MCNC: 1.1 NG/ML (ref 0.6–1.2)
EGFRCR SERPLBLD CKD-EPI 2021: 31.5 ML/MIN/1.73
EOSINOPHIL # BLD AUTO: 0 10*3/MM3 (ref 0–0.4)
EOSINOPHIL NFR BLD AUTO: 0 % (ref 0.3–6.2)
ERYTHROCYTE [DISTWIDTH] IN BLOOD BY AUTOMATED COUNT: 20.7 % (ref 12.3–15.4)
GLOBULIN UR ELPH-MCNC: 3.2 GM/DL
GLUCOSE SERPL-MCNC: 127 MG/DL (ref 65–99)
HCT VFR BLD AUTO: 26.6 % (ref 34–46.6)
HGB BLD-MCNC: 8.7 G/DL (ref 12–15.9)
IMM GRANULOCYTES # BLD AUTO: 0.11 10*3/MM3 (ref 0–0.05)
IMM GRANULOCYTES NFR BLD AUTO: 0.6 % (ref 0–0.5)
LYMPHOCYTES # BLD AUTO: 2.59 10*3/MM3 (ref 0.7–3.1)
LYMPHOCYTES NFR BLD AUTO: 14.1 % (ref 19.6–45.3)
MAGNESIUM SERPL-MCNC: 1.6 MG/DL (ref 1.6–2.4)
MCH RBC QN AUTO: 30.9 PG (ref 26.6–33)
MCHC RBC AUTO-ENTMCNC: 32.7 G/DL (ref 31.5–35.7)
MCV RBC AUTO: 94.3 FL (ref 79–97)
MONOCYTES # BLD AUTO: 1.41 10*3/MM3 (ref 0.1–0.9)
MONOCYTES NFR BLD AUTO: 7.7 % (ref 5–12)
NEUTROPHILS NFR BLD AUTO: 14.26 10*3/MM3 (ref 1.7–7)
NEUTROPHILS NFR BLD AUTO: 77.4 % (ref 42.7–76)
NRBC BLD AUTO-RTO: 0 /100 WBC (ref 0–0.2)
NT-PROBNP SERPL-MCNC: 5547 PG/ML (ref 0–1800)
PLATELET # BLD AUTO: 482 10*3/MM3 (ref 140–450)
PMV BLD AUTO: 10.7 FL (ref 6–12)
POTASSIUM SERPL-SCNC: 4.8 MMOL/L (ref 3.5–5.2)
PROT SERPL-MCNC: 7.1 G/DL (ref 6–8.5)
RBC # BLD AUTO: 2.82 10*6/MM3 (ref 3.77–5.28)
SODIUM SERPL-SCNC: 138 MMOL/L (ref 136–145)
WBC NRBC COR # BLD AUTO: 18.4 10*3/MM3 (ref 3.4–10.8)

## 2025-02-14 PROCEDURE — 80053 COMPREHEN METABOLIC PANEL: CPT

## 2025-02-14 PROCEDURE — 36415 COLL VENOUS BLD VENIPUNCTURE: CPT

## 2025-02-14 PROCEDURE — 83880 ASSAY OF NATRIURETIC PEPTIDE: CPT

## 2025-02-14 PROCEDURE — 80162 ASSAY OF DIGOXIN TOTAL: CPT

## 2025-02-14 PROCEDURE — 83735 ASSAY OF MAGNESIUM: CPT

## 2025-02-14 PROCEDURE — 85025 COMPLETE CBC W/AUTO DIFF WBC: CPT

## 2025-02-18 NOTE — TELEPHONE ENCOUNTER
Rx Refill Note  Requested Prescriptions     Pending Prescriptions Disp Refills    apixaban (Eliquis) 5 MG tablet tablet 180 tablet 3     Sig: Take 1 tablet by mouth Every 12 (Twelve) Hours.        LAST OFFICE VISIT:  10/8/2024     NEXT OFFICE VISIT:  04/04/2025     Does the medication requests match the last office note:    [x] Yes   [] No    Does this refill request meet protocol details for MA to approve:     [x] Yes   [] No

## 2025-02-18 NOTE — TELEPHONE ENCOUNTER
The [facility] received a fax that requires your attention. The document has been indexed to the patient’s chart for your review.      Reason for sending: EXTERNAL MEDICAL RECORD NOTIFICATION     Documents Description: [facility]-2.18.25    Name of Sender: [facility]     Date Indexed: 2.18.25

## 2025-02-25 ENCOUNTER — HOSPITAL ENCOUNTER (OUTPATIENT)
Facility: HOSPITAL | Age: 80
Discharge: HOME | End: 2025-02-25
Payer: MEDICARE

## 2025-02-25 VITALS
HEART RATE: 68 BPM | WEIGHT: 162 LBS | DIASTOLIC BLOOD PRESSURE: 49 MMHG | BODY MASS INDEX: 27.66 KG/M2 | SYSTOLIC BLOOD PRESSURE: 95 MMHG | HEIGHT: 64 IN

## 2025-02-25 DIAGNOSIS — I50.22 CHRONIC HFREF (HEART FAILURE WITH REDUCED EJECTION FRACTION): Primary | ICD-10-CM

## 2025-02-25 DIAGNOSIS — E78.2 MIXED HYPERLIPIDEMIA: ICD-10-CM

## 2025-02-25 DIAGNOSIS — N18.32 STAGE 3B CHRONIC KIDNEY DISEASE: ICD-10-CM

## 2025-02-25 DIAGNOSIS — I48.0 PAF (PAROXYSMAL ATRIAL FIBRILLATION): ICD-10-CM

## 2025-02-25 DIAGNOSIS — I10 PRIMARY HYPERTENSION: ICD-10-CM

## 2025-02-25 PROCEDURE — G0463 HOSPITAL OUTPT CLINIC VISIT: HCPCS

## 2025-02-25 RX ORDER — SACUBITRIL AND VALSARTAN 49; 51 MG/1; MG/1
1 TABLET, FILM COATED ORAL 2 TIMES DAILY
Qty: 28 TABLET | Refills: 0 | COMMUNITY
Start: 2025-02-25

## 2025-02-25 NOTE — PROGRESS NOTES
Office Visit    Chief Complaint  Chronic HFrEF (heart failure with reduced ejection fraction)    Abner Hughes presents to [facility]  History of Present Illness  Ms. Ann-Marie Hughes is a 78-year-old female that presented to the office for heart failure with reduced ejection fraction.  Patient has been doing fairly well from a cardiac standpoint.  Her biggest complaint is a respiratory infection in which sheis being treated.  She has experienced a fall since last visit.  Patient reports its weakness of her lower extremities.  Patient had a wound VAC on her left lower extremity which limited her mobility.  Denies any dizziness or syncopal episodes related to the fall.  Pressure is on the low side today but patient is not symptomatic.  She denies any chest pain, orthopnea or syncopal episodes.      Past Medical History:   Diagnosis Date    Acne rosacea 08/17/2021    DR.JEFF MOON     Acute respiratory failure with hypoxia 07/31/2023    Arteriosclerosis of abdominal aorta     B12 deficiency 08/17/2021    DJD (degenerative joint disease)     Hx of smoking 08/17/2021    QUIT IN 1976 AT AGE 31    Hyperlipidemia 08/17/2021    Hypertension     Hypothyroidism     Metabolic syndrome 08/17/2021    NSTEMI (non-ST elevated myocardial infarction) 07/17/2023    Pericardial effusion 10/10/2023    KAREN (stress urinary incontinence, female) 08/17/2021    UTI (urinary tract infection) 08/17/2021    Vitamin D deficiency 08/17/2021       Allergies   Allergen Reactions    Penicillins Palpitations     1. When was your reaction? > 10 years ago  2. Did your reaction happen after the first dose or after several doses? After first dose  3. Did your reaction require ED or hospital care to manage your reaction? Do not know  4. Did your reaction require treatment with epinephrine? Do not know  5. Have you taken amoxicillin (Amoxil) or amoxicillin-clavulanate (Augmentin) without  issue since? Yes  6. Have you taken cephalexin (Keflex) without issue since?  Do not know         Past Surgical History:   Procedure Laterality Date    BACK SURGERY      CARDIAC CATHETERIZATION N/A 2023    Procedure: Left Heart Cath;  Surgeon: Dinh Andres MD;  Location: [facility] CATH INVASIVE LOCATION;  Service: Cardiovascular;  Laterality: N/A;    CARPAL TUNNEL RELEASE      COLONOSCOPY      CORONARY ARTERY BYPASS GRAFT WITH MITRAL VALVE REPAIR/REPLACEMENT N/A 2023    Procedure: REZA STERNOTOMY OFF-PUMP CORONARY ARTERY BYPASS GRAFT TIMES        USING LEFFT INTERNAL MAMMARY ARTERY AND     GREATER SAPHENOUS VEIN GRAFT PER EMDOSCOPIC VEIN HARVESTING, MAZE PROCEDURE AND PRP;  Surgeon: Shane Alexander MD;  Location: [facility] CVOR;  Service: Cardiothoracic;  Laterality: N/A;    INCISION AND DRAINAGE LEG N/A 2024    Procedure: INCISION AND DRAINAGE LOWER EXTREMITY; Plain text: incision to remove pus from left leg;  Surgeon: Sergo Altamirano MD;  Location: [facility] MAIN OR;  Service: General;  Laterality: N/A;    KNEE SURGERY      LUMBAR DISCECTOMY      NECK SURGERY      Cervical    POSTERIOR LUMBAR/THORACIC SPINE FUSION          Social History     Tobacco Use    Smoking status: Former     Current packs/day: 0.00     Average packs/day: 1 pack/day for 12.0 years (12.0 ttl pk-yrs)     Types: Cigarettes     Start date:      Quit date:      Years since quittin.1    Smokeless tobacco: Never   Vaping Use    Vaping status: Never Used   Substance Use Topics    Alcohol use: Never    Drug use: Never       Family History   Problem Relation Age of Onset    Heart disease Mother     Arthritis Mother     Heart attack Mother     Arthritis Father         Prior to Admission medications    Medication Sig Start Date End Date Taking? Authorizing Provider   albuterol sulfate  (90 Base) MCG/ACT inhaler Inhale 2 puffs Every 4 (Four) Hours As Needed for Wheezing. 23   Les Moreno, DO    albuterol sulfate  (90 Base) MCG/ACT inhaler Inhale 2 puffs Every 4 (Four) Hours As Needed for Wheezing. 2/3/25   Les Moreno DO   apixaban (Eliquis) 5 MG tablet tablet Take 1 tablet by mouth Every 12 (Twelve) Hours. 2/18/25   Roderick Brand MD   aspirin 81 MG EC tablet Take 1 tablet by mouth Daily.    ProviderKanwal MD   atorvastatin (LIPITOR) 40 MG tablet TAKE 1 TABLET BY MOUTH EVERY DAY 12/16/24   Romario Valdez DO   buPROPion XL (WELLBUTRIN XL) 300 MG 24 hr tablet Take 1 tablet by mouth Daily. 2/10/25   Romario Valdez DO   dapagliflozin Propanediol (Farxiga) 10 MG tablet Take 10 mg by mouth Daily. 1/16/25   Sara Lubin APRN   digoxin (LANOXIN) 125 MCG tablet Take 1 tablet by mouth Every Other Day.    ProviderKanwal MD   folic acid (FOLVITE) 1 MG tablet Take 1 tablet by mouth Daily. 9/18/24   Manny Soto PA-C   furosemide (LASIX) 40 MG tablet Take 1 tablet by mouth Daily. 12/27/24   Sergo Schulte MD   metFORMIN ER (GLUCOPHAGE-XR) 500 MG 24 hr tablet Take 1 tablet by mouth 2 (Two) Times a Day. 2/13/25   Romario Valdez DO   methylPREDNISolone (MEDROL) 4 MG dose pack Take as directed on package instructions. 2/10/25   Romario Valdez DO   metoprolol succinate XL (TOPROL-XL) 50 MG 24 hr tablet Take 1.5 tablets by mouth 2 (Two) Times a Day. 1/29/25   Mahogany Tinsley APRN   montelukast (SINGULAIR) 10 MG tablet Take 1 tablet by mouth Daily. 7/31/24   Romario Valdez DO   mupirocin (BACTROBAN) 2 % nasal ointment Administer 1 Application into the nostril(s) as directed by provider 2 (Two) Times a Day. 2/10/25   Romario Valdez DO   Probiotic Product (Pitchbrite PO) Take 1 capsule by mouth Daily.    ProviderKanwal MD   sacubitril-valsartan (Entresto) 49-51 MG tablet Take 1 tablet by mouth 2 (Two) Times a Day. 1/16/25   Sara Lubin, LONA   sacubitril-valsartan (Entresto) 49-51 MG tablet Take 1  "tablet by mouth 2 (Two) Times a Day. Indications: [id number] exp: 10/31/2026 1/17/25   Sara Lubin APRN   spironolactone (ALDACTONE) 25 MG tablet Take 1 tablet by mouth Daily. 1/16/25   Sara Lubin APRN   Synthroid 75 MCG tablet Take 1 tablet by mouth Daily. 7/31/24   Romario Valdez DO        Review of Systems   Constitutional:  Positive for fatigue.   Respiratory:  Positive for shortness of breath. Negative for cough.    Cardiovascular:  Negative for chest pain, palpitations and leg swelling.   Neurological:  Negative for dizziness.        Symptom Course: Improved    Weight Trend: Fluctuating Minimally     Objective     BP 95/49   Pulse 68   Ht 162.6 cm (64\")   Wt 73.5 kg (162 lb)   BMI 27.81 kg/m²       Physical Exam  Constitutional:       General: She is awake.      Appearance: Normal appearance.   Neck:      Thyroid: No thyromegaly.      Vascular: No carotid bruit or JVD.   Cardiovascular:      Rate and Rhythm: Normal rate and regular rhythm.      Chest Wall: PMI is not displaced.      Pulses: Normal pulses.      Heart sounds: Normal heart sounds, S1 normal and S2 normal. No murmur heard.     No friction rub. No gallop. No S3 or S4 sounds.   Pulmonary:      Effort: Pulmonary effort is normal.      Breath sounds: Normal breath sounds and air entry. No wheezing, rhonchi or rales.   Abdominal:      General: Bowel sounds are normal.      Palpations: Abdomen is soft. There is no mass.      Tenderness: There is no abdominal tenderness.   Musculoskeletal:      Cervical back: Neck supple.      Right lower leg: No edema.      Left lower leg: No edema.   Neurological:      Mental Status: She is alert and oriented to person, place, and time.   Psychiatric:         Mood and Affect: Mood normal.         Behavior: Behavior is cooperative.           Result Review :                      Lab Results   Component Value Date    PROBNP 5,547.0 (H) 02/14/2025    PROBNP 6,896.0 (H) 02/03/2025    PROBNP 3,459.0 " "(H) 01/16/2025     CMP          1/16/2025    09:32 2/3/2025    16:45 2/14/2025    10:17   CMP   Glucose 128  127  127    BUN 73  41  101    Creatinine 1.32  1.54  1.64    EGFR 40.9  34.0  31.5    Sodium 138  140  138    Potassium 4.7  4.6  4.8    Chloride 102  102  99    Calcium 9.7  8.8  10.2    Total Protein 7.1  6.9  7.1    Albumin 4.1  3.6  3.9    Globulin 3.0  3.3  3.2    Total Bilirubin 0.8  0.5  0.8    Alkaline Phosphatase 47  60  63    AST (SGOT) 23  26  17    ALT (SGPT) 12  18  16    Albumin/Globulin Ratio 1.4  1.1  1.2    BUN/Creatinine Ratio 55.3  26.6  61.6    Anion Gap 11.3  12.9  18.3      CBC w/diff          1/16/2025    09:32 2/3/2025    16:45 2/14/2025    10:17   CBC w/Diff   WBC 7.75  8.96  18.40    RBC 3.00  2.92  2.82    Hemoglobin 8.8  8.8  8.7    Hematocrit 28.8  29.4  26.6    MCV 96.0  100.7  94.3    MCH 29.3  30.1  30.9    MCHC 30.6  29.9  32.7    RDW 20.0  22.3  20.7    Platelets 297  262  482    Neutrophil Rel % 55.9  56.8  77.4    Immature Granulocyte Rel % 0.1  0.4  0.6    Lymphocyte Rel % 31.5  28.6  14.1    Monocyte Rel % 10.7  11.7  7.7    Eosinophil Rel % 1.0  2.1  0.0    Basophil Rel % 0.8  0.4  0.2       Lipid Panel          6/28/2024    11:25   Lipid Panel   Total Cholesterol 113    Triglycerides 123    HDL Cholesterol 50    VLDL Cholesterol 22    LDL Cholesterol  41    LDL/HDL Ratio 0.77       Lab Results   Component Value Date    TSH 0.625 06/28/2024    TSH 1.650 03/24/2024    TSH 2.210 12/29/2023      Lab Results   Component Value Date    FREET4 1.74 (H) 12/29/2023    FREET4 1.31 01/11/2023    FREET4 1.27 08/17/2022      No results found for: \"DDIMERQUANT\"  Magnesium   Date Value Ref Range Status   02/14/2025 1.6 1.6 - 2.4 mg/dL Final      Digoxin   Date Value Ref Range Status   02/14/2025 1.10 0.60 - 1.20 ng/mL Final      A1C Last 3 Results          6/28/2024    11:25   HGBA1C Last 3 Results   Hemoglobin A1C 6.40           Results for orders placed during the hospital encounter " of 08/06/24    Adult Transthoracic Echo Complete W/ Cont if Necessary Per Protocol    Interpretation Summary    Left ventricular systolic function is normal. Calculated left ventricular EF = 54.8%    Left ventricular wall thickness is consistent with borderline concentric hypertrophy.    Left ventricular diastolic function is consistent with (grade Ia w/high LAP) impaired relaxation.    The left atrial cavity is mildly dilated.    Mild aortic valve stenosis is present.    Estimated right ventricular systolic pressure from tricuspid regurgitation is normal (<35 mmHg).        Assessment and Plan        Diagnoses and all orders for this visit:    1. Chronic HFrEF (heart failure with reduced ejection fraction) (Primary)  Assessment & Plan:  Patient has heart failure with reduced ejection fraction that has improved over the last year.  Heart rate and blood pressure are low but stable.  She is not symptomatic with the lower pressures.  She continues to experience some lower extremity edema although weight is down 6 pounds and BNP has decreased as well.  Will continue with Farxiga, digoxin, metoprolol, spironolactone and Entresto at the current doses    1.  Continue all medication as prescribed.  2.  Do heart rate blood pressure and weights daily and bring it to your next appointment.  If systolic blood pressure is less than 90 or if experience dizziness or lightheaded decrease Entresto 49/51 mg take a half a tablet twice a day and call the office.  3.  Do blood work in 2 months or prior to appointment with Francheska.    Orders:  -     CBC & Differential; Future  -     Comprehensive Metabolic Panel; Future  -     proBNP; Future  -     Magnesium; Future  -     Digoxin Level; Future    2. Primary hypertension  Assessment & Plan:  Patient is slightly hypotensive today, but she is not symptomatic.  Her home health checks blood pressure.  Patient reports that her pressures are stable.  I have asked her to do a heart rate blood  pressure and weight log and bring it to her next appointment.      3. Mixed hyperlipidemia  Assessment & Plan:  Patient utilizes atorvastatin 40 mg daily, will continue the same.      4. Stage 3b chronic kidney disease  Assessment & Plan:  Her kidney function remains low.  Will continue to monitor with labs.    Orders:  -     CBC & Differential; Future  -     Comprehensive Metabolic Panel; Future  -     proBNP; Future  -     Magnesium; Future  -     Digoxin Level; Future    5. PAF (paroxysmal atrial fibrillation)  Assessment & Plan:  Atrial fibrillation.  Her heart rate is controlled with the current dose of dig and metoprolol.  Will continue Eliquis for stroke prevention.  Patient has been doing well since her left atrial appendage ligation and CABG.    Orders:  -     CBC & Differential; Future  -     Comprehensive Metabolic Panel; Future  -     proBNP; Future  -     Magnesium; Future  -     Digoxin Level; Future    Other orders  -     sacubitril-valsartan (Entresto) 49-51 MG tablet; Take 1 tablet by mouth 2 (Two) Times a Day. Indications: [id number] 01/30/2026  Dispense: 28 tablet; Refill: 0            Follow Up     Return in about 2 months (around 4/25/2025) for Follow-up with Francheska.    Patient was given instructions and counseling regarding her condition or for health maintenance advice. Please see specific information pulled into the AVS if appropriate.     HEART FAIL CLIN HAR   02/25/25 09:08 EST    Patient is a chronic health condition that requires close follow-up    Dictation done with Dragon    Electronically signed by LONA Bocanegra, 02/25/25, 9:58 AM EST.

## 2025-02-25 NOTE — ASSESSMENT & PLAN NOTE
Patient is slightly hypotensive today, but she is not symptomatic.  Her home health checks blood pressure.  Patient reports that her pressures are stable.  I have asked her to do a heart rate blood pressure and weight log and bring it to her next appointment.

## 2025-02-25 NOTE — PATIENT INSTRUCTIONS
1.  Continue all medication as prescribed.  2.  Do heart rate blood pressure and weights daily and bring it to your next appointment.  If systolic blood pressure is less than 90 or if experience dizziness or lightheaded decrease Entresto 49/51 mg take a half a tablet twice a day and call the office.  3.  Do blood work in 2 months or prior to appointment with Micheal

## 2025-02-25 NOTE — ADDENDUM NOTE
Encounter addended by: Dania Aparicio RN on: 2/25/2025 3:36 PM   Actions taken: Charge Capture section accepted

## 2025-02-25 NOTE — ASSESSMENT & PLAN NOTE
Patient has heart failure with reduced ejection fraction that has improved over the last year.  Heart rate and blood pressure are low but stable.  She is not symptomatic with the lower pressures.  She continues to experience some lower extremity edema although weight is down 6 pounds and BNP has decreased as well.  Will continue with Farxiga, digoxin, metoprolol, spironolactone and Entresto at the current doses    1.  Continue all medication as prescribed.  2.  Do heart rate blood pressure and weights daily and bring it to your next appointment.  If systolic blood pressure is less than 90 or if experience dizziness or lightheaded decrease Entresto 49/51 mg take a half a tablet twice a day and call the office.  3.  Do blood work in 2 months or prior to appointment with Micheal

## 2025-02-25 NOTE — ASSESSMENT & PLAN NOTE
Atrial fibrillation.  Her heart rate is controlled with the current dose of dig and metoprolol.  Will continue Eliquis for stroke prevention.  Patient has been doing well since her left atrial appendage ligation and CABG.

## 2025-03-11 ENCOUNTER — LAB (OUTPATIENT)
Dept: LAB | Facility: HOSPITAL | Age: 80
End: 2025-03-11
Payer: MEDICARE

## 2025-03-11 DIAGNOSIS — I48.0 PAF (PAROXYSMAL ATRIAL FIBRILLATION): ICD-10-CM

## 2025-03-11 LAB
ANION GAP SERPL CALCULATED.3IONS-SCNC: 17 MMOL/L (ref 5–15)
BUN SERPL-MCNC: 54 MG/DL (ref 8–23)
BUN/CREAT SERPL: 31.8 (ref 7–25)
CALCIUM SPEC-SCNC: 9.2 MG/DL (ref 8.6–10.5)
CHLORIDE SERPL-SCNC: 103 MMOL/L (ref 98–107)
CO2 SERPL-SCNC: 19 MMOL/L (ref 22–29)
CREAT SERPL-MCNC: 1.7 MG/DL (ref 0.57–1)
DIGOXIN SERPL-MCNC: 1.1 NG/ML (ref 0.6–1.2)
EGFRCR SERPLBLD CKD-EPI 2021: 30.2 ML/MIN/1.73
GLUCOSE SERPL-MCNC: 85 MG/DL (ref 65–99)
POTASSIUM SERPL-SCNC: 4.8 MMOL/L (ref 3.5–5.2)
SODIUM SERPL-SCNC: 139 MMOL/L (ref 136–145)

## 2025-03-11 PROCEDURE — 36415 COLL VENOUS BLD VENIPUNCTURE: CPT

## 2025-03-11 PROCEDURE — 80162 ASSAY OF DIGOXIN TOTAL: CPT

## 2025-03-11 PROCEDURE — 80048 BASIC METABOLIC PNL TOTAL CA: CPT

## 2025-03-13 ENCOUNTER — RESULTS FOLLOW-UP (OUTPATIENT)
Dept: CARDIOLOGY | Facility: CLINIC | Age: 80
End: 2025-03-13
Payer: MEDICARE

## 2025-03-13 DIAGNOSIS — N18.32 STAGE 3B CHRONIC KIDNEY DISEASE: Primary | ICD-10-CM

## 2025-03-14 ENCOUNTER — OFFICE VISIT (OUTPATIENT)
Dept: ONCOLOGY | Facility: HOSPITAL | Age: 80
End: 2025-03-14
Payer: MEDICARE

## 2025-03-14 ENCOUNTER — LAB (OUTPATIENT)
Dept: ONCOLOGY | Facility: HOSPITAL | Age: 80
End: 2025-03-14
Payer: MEDICARE

## 2025-03-14 VITALS
SYSTOLIC BLOOD PRESSURE: 117 MMHG | TEMPERATURE: 97.8 F | HEART RATE: 59 BPM | DIASTOLIC BLOOD PRESSURE: 59 MMHG | WEIGHT: 164.68 LBS | OXYGEN SATURATION: 97 % | BODY MASS INDEX: 28.12 KG/M2 | RESPIRATION RATE: 16 BRPM | HEIGHT: 64 IN

## 2025-03-14 DIAGNOSIS — D64.9 ANEMIA, UNSPECIFIED TYPE: Primary | ICD-10-CM

## 2025-03-14 DIAGNOSIS — C91.Z0 LARGE GRANULAR LYMPHOCYTE DISORDER: ICD-10-CM

## 2025-03-14 DIAGNOSIS — N18.32 STAGE 3B CHRONIC KIDNEY DISEASE: ICD-10-CM

## 2025-03-14 DIAGNOSIS — E53.8 B12 DEFICIENCY: ICD-10-CM

## 2025-03-14 DIAGNOSIS — D59.10 AUTOIMMUNE HEMOLYTIC ANEMIA: Primary | ICD-10-CM

## 2025-03-14 LAB
ALBUMIN SERPL-MCNC: 4 G/DL (ref 3.5–5.2)
ALBUMIN/GLOB SERPL: 1.4 G/DL
ALP SERPL-CCNC: 57 U/L (ref 39–117)
ALT SERPL W P-5'-P-CCNC: 16 U/L (ref 1–33)
ANION GAP SERPL CALCULATED.3IONS-SCNC: 12.2 MMOL/L (ref 5–15)
AST SERPL-CCNC: 20 U/L (ref 1–32)
BASOPHILS # BLD AUTO: 0.04 10*3/MM3 (ref 0–0.2)
BASOPHILS NFR BLD AUTO: 0.4 % (ref 0–1.5)
BILIRUB SERPL-MCNC: 0.7 MG/DL (ref 0–1.2)
BUN SERPL-MCNC: 63 MG/DL (ref 8–23)
BUN/CREAT SERPL: 35.6 (ref 7–25)
CALCIUM SPEC-SCNC: 9.5 MG/DL (ref 8.6–10.5)
CHLORIDE SERPL-SCNC: 103 MMOL/L (ref 98–107)
CO2 SERPL-SCNC: 21.8 MMOL/L (ref 22–29)
CREAT SERPL-MCNC: 1.77 MG/DL (ref 0.57–1)
DAT C3: NEGATIVE
DAT IGG GEL: POSITIVE
DEPRECATED RDW RBC AUTO: 80.9 FL (ref 37–54)
EGFRCR SERPLBLD CKD-EPI 2021: 28.8 ML/MIN/1.73
EOSINOPHIL # BLD AUTO: 0.14 10*3/MM3 (ref 0–0.4)
EOSINOPHIL NFR BLD AUTO: 1.2 % (ref 0.3–6.2)
ERYTHROCYTE [DISTWIDTH] IN BLOOD BY AUTOMATED COUNT: 21.8 % (ref 12.3–15.4)
FERRITIN SERPL-MCNC: 804 NG/ML (ref 13–150)
GLOBULIN UR ELPH-MCNC: 2.9 GM/DL
GLUCOSE SERPL-MCNC: 120 MG/DL (ref 65–99)
HCT VFR BLD AUTO: 25.2 % (ref 34–46.6)
HGB BLD-MCNC: 8.1 G/DL (ref 12–15.9)
IMM GRANULOCYTES # BLD AUTO: 0.05 10*3/MM3 (ref 0–0.05)
IMM GRANULOCYTES NFR BLD AUTO: 0.4 % (ref 0–0.5)
IRON 24H UR-MRATE: 81 MCG/DL (ref 37–145)
IRON SATN MFR SERPL: 26 % (ref 20–50)
LDH SERPL-CCNC: 142 U/L (ref 135–214)
LYMPHOCYTES # BLD AUTO: 2.61 10*3/MM3 (ref 0.7–3.1)
LYMPHOCYTES NFR BLD AUTO: 23.2 % (ref 19.6–45.3)
MCH RBC QN AUTO: 32.9 PG (ref 26.6–33)
MCHC RBC AUTO-ENTMCNC: 32.1 G/DL (ref 31.5–35.7)
MCV RBC AUTO: 102.4 FL (ref 79–97)
MONOCYTES # BLD AUTO: 1.16 10*3/MM3 (ref 0.1–0.9)
MONOCYTES NFR BLD AUTO: 10.3 % (ref 5–12)
NEUTROPHILS NFR BLD AUTO: 64.5 % (ref 42.7–76)
NEUTROPHILS NFR BLD AUTO: 7.23 10*3/MM3 (ref 1.7–7)
NRBC BLD AUTO-RTO: 0.4 /100 WBC (ref 0–0.2)
PLATELET # BLD AUTO: 299 10*3/MM3 (ref 140–450)
PMV BLD AUTO: 10.8 FL (ref 6–12)
POTASSIUM SERPL-SCNC: 4.3 MMOL/L (ref 3.5–5.2)
PROT SERPL-MCNC: 6.9 G/DL (ref 6–8.5)
RBC # BLD AUTO: 2.46 10*6/MM3 (ref 3.77–5.28)
RETICS # AUTO: 0.08 10*6/MM3 (ref 0.02–0.13)
RETICS/RBC NFR AUTO: 3.16 % (ref 0.7–1.9)
SODIUM SERPL-SCNC: 137 MMOL/L (ref 136–145)
TIBC SERPL-MCNC: 316 MCG/DL (ref 298–536)
TRANSFERRIN SERPL-MCNC: 212 MG/DL (ref 200–360)
WBC NRBC COR # BLD AUTO: 11.23 10*3/MM3 (ref 3.4–10.8)

## 2025-03-14 PROCEDURE — 86880 COOMBS TEST DIRECT: CPT | Performed by: NURSE PRACTITIONER

## 2025-03-14 PROCEDURE — 85025 COMPLETE CBC W/AUTO DIFF WBC: CPT | Performed by: NURSE PRACTITIONER

## 2025-03-14 PROCEDURE — 85045 AUTOMATED RETICULOCYTE COUNT: CPT | Performed by: NURSE PRACTITIONER

## 2025-03-14 PROCEDURE — 36415 COLL VENOUS BLD VENIPUNCTURE: CPT | Performed by: NURSE PRACTITIONER

## 2025-03-14 PROCEDURE — 83615 LACTATE (LD) (LDH) ENZYME: CPT | Performed by: NURSE PRACTITIONER

## 2025-03-14 PROCEDURE — 84466 ASSAY OF TRANSFERRIN: CPT | Performed by: NURSE PRACTITIONER

## 2025-03-14 PROCEDURE — 82728 ASSAY OF FERRITIN: CPT | Performed by: NURSE PRACTITIONER

## 2025-03-14 PROCEDURE — 83540 ASSAY OF IRON: CPT | Performed by: NURSE PRACTITIONER

## 2025-03-14 PROCEDURE — 80053 COMPREHEN METABOLIC PANEL: CPT | Performed by: NURSE PRACTITIONER

## 2025-03-14 RX ORDER — DAPAGLIFLOZIN 10 MG/1
1 TABLET, FILM COATED ORAL DAILY
Qty: 90 TABLET | Refills: 0 | Status: SHIPPED | OUTPATIENT
Start: 2025-03-14

## 2025-03-14 NOTE — PROGRESS NOTES
Chief Complaint/Reason for Referral:  FOLLOW UP 2 -    Romario Valdez*  Romario Valdez, DO    Records Obtained:  Records of the patients history including those obtained from [facility] and patient information were reviewed and summarized in detail.    Subjective    History of Present Illness    The patient is a very pleasant 79-year-old  female who was last seen by Dr. Ayala in October 2024 for autoimmune hemolytic anemia (AIHA).  Previously, was seen by Dr. Can at [facility] in [place], but he retired. She also has a history of chronic kidney disease (CKD)-stage 3B, and large granular lymphocyte (LGL) leukemia, CHF, DM2, GERD, hypertenison, hypothyroidism, HLD, B-12 deficiency, CAD, YANE, AF, aortic stenosis, left leg ulcer with wounds.   It appears she saw Dr. Valdez her PCP in February for bronchitis and was started on Prednisone taper. It does not sound like she was still on the Prednisone taper for the AIHA. Previously, was on Prednisone 5 mg QD when she saw Dr. Can [facility] oncology, then decrease to 5 mg QOD in October and then was decrease to 5 mg every 3rd day on 10/29/2024 when she saw Dr. Ayala.    She had been on a prednisone taper, secondary to the AIH, but is not sure if she is still taking the Prednisone.  She was to have had repeat lab work in 2 months and follow up with Dr. Ayala in December. Patient reports had ER admission, found to have flu, had a fall and then developed 2 left leg wounds and got lost to follow up. Recommendation was to decrease the Prednisone taper slowly and at that time was taking Prednisone 5 mg every other day and was to go to every 3rd day and repeat the lab work with CBC, NANNETTE, haptoglobin, retic in 1 month an if stable to continue the taper. Also was discussed the possibility of Rituxan in the future. Since October visit with Dr. Ayala, hemoglobin has been in the range of 7.8 to 9.4 range and last hemoglobin of 8.7 on 2/14/2025. NANNETTE was  positive in January 2025. Negative on 9/18/2024. Haptoglobin was 125 on 1/16/2025. Last LDH on 9/18/2024 of 148. Retic % of 0.65, absolute retic low at 0.0195 on 1/16/2025.      She reports experiences persistent fatigue. She has not observed any episodes of bleeding or dark urine. Her bruising remains consistent, and she notes that her urine appears cloudy.      She has a follow-up appointment with Dr. Altamirano, a surgeon, on Monday for prior debridement of the necrotizing soft tissue infection of the left lower extremity back in December of 2024.  She had surgery on her knee in December after a fall and also had the flu. She still has a home health nurse come in 3 times a week to change her bandages. She is not currently taking B12 injections.     She continues her prednisone regimen, although she is uncertain of the dosage. During her last visit, Ms. Garner decided not to alter the treatment plan as the patient's condition appeared stable. She believes she is taking the steroid daily and expresses a desire to discontinue it. Unsure if she is taking folic acid as well.     MEDICATIONS  Current: prednisone    Results  Laboratory Studies  Flow cytometry in January 2025 did not show any significant abnormalities of myeloid or lymphoid cells. Lab work in early February 2025 showed white count of 8.96, hemoglobin of 8.8, platelet count of 262. Haptoglobin was 125 in January 2025, which is normal range. NANNETTE C3 was positive in January 2025. NANNETTE IgG is positive in January 2025. Direct antiglobulin was positive in August 2023.         Oncology/Hematology History    No history exists.       Review of Systems   Constitutional:  Positive for fatigue.   HENT: Negative.     Eyes: Negative.    Respiratory:  Positive for shortness of breath.    Cardiovascular: Negative.    Gastrointestinal: Negative.    Endocrine: Negative.    Genitourinary: Negative.    Musculoskeletal: Negative.    Allergic/Immunologic: Negative.    Neurological:   Positive for weakness.   Hematological:  Bruises/bleeds easily.   Psychiatric/Behavioral: Negative.         Current Outpatient Medications on File Prior to Visit   Medication Sig Dispense Refill    albuterol sulfate  (90 Base) MCG/ACT inhaler Inhale 2 puffs Every 4 (Four) Hours As Needed for Wheezing. 18 g 0    albuterol sulfate  (90 Base) MCG/ACT inhaler Inhale 2 puffs Every 4 (Four) Hours As Needed for Wheezing. 18 g 0    apixaban (Eliquis) 5 MG tablet tablet Take 1 tablet by mouth Every 12 (Twelve) Hours. 180 tablet 3    aspirin 81 MG EC tablet Take 1 tablet by mouth Daily.      atorvastatin (LIPITOR) 40 MG tablet TAKE 1 TABLET BY MOUTH EVERY DAY 90 tablet 3    buPROPion XL (WELLBUTRIN XL) 300 MG 24 hr tablet Take 1 tablet by mouth Daily. 90 tablet 3    dapagliflozin Propanediol (Farxiga) 10 MG tablet Take 10 mg by mouth Daily. 90 tablet 0    digoxin (LANOXIN) 125 MCG tablet Take 1 tablet by mouth Every Other Day.      folic acid (FOLVITE) 1 MG tablet Take 1 tablet by mouth Daily. 90 tablet 1    furosemide (LASIX) 40 MG tablet Take 1 tablet by mouth Daily. 90 tablet 3    metFORMIN ER (GLUCOPHAGE-XR) 500 MG 24 hr tablet Take 1 tablet by mouth 2 (Two) Times a Day. 180 tablet 3    methylPREDNISolone (MEDROL) 4 MG dose pack Take as directed on package instructions. 21 tablet 0    metoprolol succinate XL (TOPROL-XL) 50 MG 24 hr tablet Take 1.5 tablets by mouth 2 (Two) Times a Day. 270 tablet 3    montelukast (SINGULAIR) 10 MG tablet Take 1 tablet by mouth Daily. 90 tablet 3    mupirocin (BACTROBAN) 2 % nasal ointment Administer 1 Application into the nostril(s) as directed by provider 2 (Two) Times a Day. 15 g 0    Probiotic Product (AmberAds PO) Take 1 capsule by mouth Daily.      sacubitril-valsartan (Entresto) 49-51 MG tablet Take 1 tablet by mouth 2 (Two) Times a Day. 180 tablet 3    sacubitril-valsartan (Entresto) 49-51 MG tablet Take 1 tablet by mouth 2 (Two) Times a Day. Indications:  [id number] 01/30/2026 28 tablet 0    spironolactone (ALDACTONE) 25 MG tablet Take 1 tablet by mouth Daily. 90 tablet 3    Synthroid 75 MCG tablet Take 1 tablet by mouth Daily. 90 tablet 3     No current facility-administered medications on file prior to visit.       Allergies   Allergen Reactions    Penicillins Palpitations     1. When was your reaction? > 10 years ago  2. Did your reaction happen after the first dose or after several doses? After first dose  3. Did your reaction require ED or hospital care to manage your reaction? Do not know  4. Did your reaction require treatment with epinephrine? Do not know  5. Have you taken amoxicillin (Amoxil) or amoxicillin-clavulanate (Augmentin) without issue since? Yes  6. Have you taken cephalexin (Keflex) without issue since?  Do not know      Past Medical History:   Diagnosis Date    Acne rosacea 08/17/2021    DR.JEFF MOON     Acute respiratory failure with hypoxia 07/31/2023    Arteriosclerosis of abdominal aorta     B12 deficiency 08/17/2021    DJD (degenerative joint disease)     Hx of smoking 08/17/2021    QUIT IN 1976 AT AGE 31    Hyperlipidemia 08/17/2021    Hypertension     Hypothyroidism     Metabolic syndrome 08/17/2021    NSTEMI (non-ST elevated myocardial infarction) 07/17/2023    Pericardial effusion 10/10/2023    KAREN (stress urinary incontinence, female) 08/17/2021    UTI (urinary tract infection) 08/17/2021    Vitamin D deficiency 08/17/2021     Past Surgical History:   Procedure Laterality Date    BACK SURGERY  2013    CARDIAC CATHETERIZATION N/A 07/17/2023    Procedure: Left Heart Cath;  Surgeon: Dinh Andres MD;  Location: [facility] CATH INVASIVE LOCATION;  Service: Cardiovascular;  Laterality: N/A;    CARPAL TUNNEL RELEASE      COLONOSCOPY  2011    CORONARY ARTERY BYPASS GRAFT WITH MITRAL VALVE REPAIR/REPLACEMENT N/A 07/20/2023    Procedure: REZA STERNOTOMY OFF-PUMP CORONARY ARTERY BYPASS GRAFT TIMES        USING RASHELFT INTERNAL MAMMARY ARTERY AND      GREATER SAPHENOUS VEIN GRAFT PER EMDOSCOPIC VEIN HARVESTING, MAZE PROCEDURE AND PRP;  Surgeon: Shane Alexander MD;  Location: [facility] CVOR;  Service: Cardiothoracic;  Laterality: N/A;    INCISION AND DRAINAGE LEG N/A 2024    Procedure: INCISION AND DRAINAGE LOWER EXTREMITY; Plain text: incision to remove pus from left leg;  Surgeon: Sergo Altamirano MD;  Location: [facility] MAIN OR;  Service: General;  Laterality: N/A;    KNEE SURGERY      LUMBAR DISCECTOMY      NECK SURGERY      Cervical    POSTERIOR LUMBAR/THORACIC SPINE FUSION       Social History     Socioeconomic History    Marital status:      Spouse name: Dinh   Tobacco Use    Smoking status: Former     Current packs/day: 0.00     Average packs/day: 1 pack/day for 12.0 years (12.0 ttl pk-yrs)     Types: Cigarettes     Start date:      Quit date:      Years since quittin.2    Smokeless tobacco: Never   Vaping Use    Vaping status: Never Used   Substance and Sexual Activity    Alcohol use: Never    Drug use: Never    Sexual activity: Defer     Family History   Problem Relation Age of Onset    Heart disease Mother     Arthritis Mother     Heart attack Mother     Arthritis Father      Immunization History   Administered Date(s) Administered    Arexvy (RSV, Adults 60+ yrs) 2024    COVID-19 (MODERNA) 12YRS+ (SPIKEVAX) 10/30/2024    COVID-19 (MODERNA) 1st,2nd,3rd Dose Monovalent 2021, 2021, 2021    COVID-19 (PFIZER) 12YRS+ (COMIRNATY) 2023    Fluzone High-Dose 65+YRS 10/30/2024    Fluzone High-Dose 65+yrs 2021, 2023    PPD Test 2013, 2016    Pneumococcal Conjugate 13-Valent (PCV13) 2018    Pneumococcal Polysaccharide (PPSV23) 2016    Td (TDVAX) 2000    Tdap 2015    Zostavax 10/01/2013       Tobacco Use: Medium Risk (3/14/2025)    Patient History     Smoking Tobacco Use: Former     Smokeless Tobacco Use: Never     Passive Exposure: Not on file  "      Objective     Physical Exam  Vitals and nursing note reviewed.   Constitutional:       Appearance: Normal appearance.   HENT:      Head: Normocephalic.      Nose: Nose normal.   Eyes:      Extraocular Movements: Extraocular movements intact.   Cardiovascular:      Rate and Rhythm: Normal rate.      Pulses: Normal pulses.   Pulmonary:      Effort: Pulmonary effort is normal.      Breath sounds: Normal breath sounds.   Skin:     General: Skin is warm and dry.   Neurological:      General: No focal deficit present.      Mental Status: She is alert and oriented to person, place, and time. Mental status is at baseline.      Motor: Weakness present.   Psychiatric:         Mood and Affect: Mood normal.         Behavior: Behavior normal.         Thought Content: Thought content normal.         Judgment: Judgment normal.         Vitals:    03/14/25 0921   BP: 117/59   Pulse: 59   Resp: 16   Temp: 97.8 °F (36.6 °C)   TempSrc: Temporal   SpO2: 97%   Weight: 74.7 kg (164 lb 10.9 oz)   Height: 162.6 cm (64.02\")   PainSc: 0-No pain       Wt Readings from Last 3 Encounters:   03/14/25 74.7 kg (164 lb 10.9 oz)   02/25/25 73.5 kg (162 lb)   02/10/25 75.3 kg (166 lb)               ECOG score: 0         ECOG: (0) Fully Active - Able to Carry On All Pre-disease Performance Without Restriction  Fall Risk Assessment was completed, and patient is at high risk for falls.  PHQ-9 Total Score:         The patient is  experiencing fatigue. Fatigue score: 1    PT/OT Functional Screening: PT fx screen : No needs identified  Speech Functional Screening: Speech fx screen : No needs identified  Rehab to be ordered: Rehab to be ordered : No needs identified        Result Review :  The following data was reviewed by: LONA Foster on 03/14/2025:  Lab Results   Component Value Date    HGB 8.7 (L) 02/14/2025    HCT 26.6 (L) 02/14/2025    MCV 94.3 02/14/2025     (H) 02/14/2025    WBC 18.40 (H) 02/14/2025    NEUTROABS 14.26 (H) " "02/14/2025    LYMPHSABS 2.59 02/14/2025    MONOSABS 1.41 (H) 02/14/2025    EOSABS 0.00 02/14/2025    BASOSABS 0.03 02/14/2025     Lab Results   Component Value Date    GLUCOSE 85 03/11/2025    BUN 54 (H) 03/11/2025    CREATININE 1.70 (H) 03/11/2025     03/11/2025    K 4.8 03/11/2025     03/11/2025    CO2 19.0 (L) 03/11/2025    CALCIUM 9.2 03/11/2025    PROTEINTOT 7.1 02/14/2025    ALBUMIN 3.9 02/14/2025    BILITOT 0.8 02/14/2025    ALKPHOS 63 02/14/2025    AST 17 02/14/2025    ALT 16 02/14/2025     Lab Results   Component Value Date     09/18/2024    Ferritin 748.50 (H) 09/18/2024    Folate >20.00 09/18/2024     Lab Results   Component Value Date    IRON 94 09/18/2024    LABIRON 27 09/18/2024    TRANSFERRIN 230 09/18/2024    TIBC 343 09/18/2024     Lab Results   Component Value Date     09/18/2024    FERRITIN 748.50 (H) 09/18/2024    XBXWEQID31 415 09/18/2024    FOLATE >20.00 09/18/2024     No results found for: \"PSA\", \"CEA\", \"AFP\", \"\", \"\"         Assessment and Plan:  Diagnoses and all orders for this visit:    1. Autoimmune hemolytic anemia (Primary)  -     CBC & Differential  -     Comprehensive Metabolic Panel  -     Lactate Dehydrogenase  -     Reticulocytes  -     Direct Antiglobulin Test (Direct Cristina)  -     Iron Profile  -     Ferritin    2. Large granular lymphocyte disorder  -     CBC & Differential  -     Comprehensive Metabolic Panel  -     Lactate Dehydrogenase  -     Reticulocytes  -     Direct Antiglobulin Test (Direct Cristina)  -     Iron Profile  -     Ferritin    3. Stage 3b chronic kidney disease  -     CBC & Differential  -     Comprehensive Metabolic Panel  -     Lactate Dehydrogenase  -     Reticulocytes  -     Direct Antiglobulin Test (Direct Cristina)  -     Iron Profile  -     Ferritin        Assessment & Plan  --Autoimmune Hemolytic Anemia.   Previously seen by Dr. Can at [facility] who retired, now following with Dr. Ayala. Last seen in October 2024. Was to " have a 2 month follow up with labs prior, but has been lost to follow up due to sepsis, fall and the flu in December of 2024. Unsure if she is still taking the Prednisone as instructed in October for a Prednisone taper of 5 mg to be taken every 3rd day. Discussed with Dr. Ayala and consideration for Rituxan.      She reports feeling fine but a little tired all the time, with no increase in tiredness. No bleeding episodes, dark urine, or jaundice were noted. Bruising remains the same. Labs will be drawn today to assess her condition. She is instructed to call the office with the details of her prednisone dosage once she verifies it at home. She is not taking the Prednisone at home she reports.     Plan to recheck labs today with CBC, CMP, LDH, haptoglobin, NANNETTE, NANNETTE igG, ferritin, iron profile, retic. Recent hemglobin trends have been in the upper 7 - 8 range since December. Today of 8.1 on the hemoglobin. Iron studies are acceptable. LDH normal.  Haptoglobin is pending.     Recommend taking folic acid 1 mg QD is she is not. Plan to recheck lab work with folate and B-12.     --Chronic Kidney Disease (CKD).  Her CKD is stable. No recent lab work was available, so labs will be drawn today to monitor her kidney function.    --CHF: following with cardiology. Patient was on Farxiga, digoxin, metroprolol, spironolactone, entresto and was do BP, weights    --Large Granular Lymphocyte (LGL) Leukemia.  Flow cytometry in January 2025 showed no significant abnormalities. Labs will be drawn today to ensure continued stability.    --Fatigue.  She reports persistent fatigue. She is not currently taking B12 injections. Labs will be drawn today to evaluate potential causes.    Left leg wounds: she is following with Dr. Altamirano.     PROCEDURE  The patient underwent knee surgery in December 2024 following a fall.        I spent 45 minutes caring for Ann-Marie on this date of service. This time includes time spent by me in the following  activities:preparing for the visit, reviewing tests, obtaining and/or reviewing a separately obtained history, performing a medically appropriate examination and/or evaluation , counseling and educating the patient/family/caregiver, ordering medications, tests, or procedures, referring and communicating with other health care professionals , documenting information in the medical record, and independently interpreting results and communicating that information with the patient/family/caregiver    Patient Follow Up:  2 months with Dr. Ayala.     Patient was given instructions and counseling regarding her condition or for health maintenance advice. Please see specific information pulled into the AVS if appropriate.     LONA Foster    3/14/2025    .tob    Patient or patient representative verbalized consent for the use of Ambient Listening during the visit with  LONA Foster for chart documentation. 3/14/2025  10:36 EDT

## 2025-03-17 ENCOUNTER — OFFICE VISIT (OUTPATIENT)
Dept: SURGERY | Facility: CLINIC | Age: 80
End: 2025-03-17
Payer: MEDICARE

## 2025-03-17 ENCOUNTER — OFFICE VISIT (OUTPATIENT)
Dept: FAMILY MEDICINE CLINIC | Facility: CLINIC | Age: 80
End: 2025-03-17
Payer: MEDICARE

## 2025-03-17 VITALS
BODY MASS INDEX: 28 KG/M2 | WEIGHT: 164 LBS | DIASTOLIC BLOOD PRESSURE: 81 MMHG | SYSTOLIC BLOOD PRESSURE: 141 MMHG | HEIGHT: 64 IN | HEART RATE: 66 BPM | TEMPERATURE: 97.3 F

## 2025-03-17 VITALS — HEIGHT: 64 IN | WEIGHT: 164 LBS | BODY MASS INDEX: 28 KG/M2

## 2025-03-17 DIAGNOSIS — D59.10 AUTOIMMUNE HEMOLYTIC ANEMIA: ICD-10-CM

## 2025-03-17 DIAGNOSIS — D59.10 AUTOIMMUNE HEMOLYTIC ANEMIA: Primary | ICD-10-CM

## 2025-03-17 DIAGNOSIS — R74.01 ELEVATED ALT MEASUREMENT: Primary | ICD-10-CM

## 2025-03-17 DIAGNOSIS — D64.9 ANEMIA, UNSPECIFIED TYPE: ICD-10-CM

## 2025-03-17 DIAGNOSIS — Z51.89 VISIT FOR WOUND CHECK: Primary | ICD-10-CM

## 2025-03-17 DIAGNOSIS — N39.0 URINARY TRACT INFECTION WITHOUT HEMATURIA, SITE UNSPECIFIED: Primary | ICD-10-CM

## 2025-03-17 LAB
BACTERIA UR QL AUTO: ABNORMAL /HPF
BILIRUB UR QL STRIP: NEGATIVE
CLARITY UR: ABNORMAL
COLOR UR: ABNORMAL
GLUCOSE UR STRIP-MCNC: ABNORMAL MG/DL
HAPTOGLOB SERPL-MCNC: 176 MG/DL (ref 30–200)
HGB UR QL STRIP.AUTO: NEGATIVE
HOLD SPECIMEN: NORMAL
HYALINE CASTS UR QL AUTO: ABNORMAL /LPF
KETONES UR QL STRIP: NEGATIVE
LEUKOCYTE ESTERASE UR QL STRIP.AUTO: ABNORMAL
NITRITE UR QL STRIP: POSITIVE
PH UR STRIP.AUTO: <=5 [PH] (ref 5–8)
PROT UR QL STRIP: ABNORMAL
RBC # UR STRIP: ABNORMAL /HPF
REF LAB TEST METHOD: ABNORMAL
SP GR UR STRIP: 1.02 (ref 1–1.03)
SQUAMOUS #/AREA URNS HPF: ABNORMAL /HPF
UROBILINOGEN UR QL STRIP: ABNORMAL
WBC # UR STRIP: ABNORMAL /HPF

## 2025-03-17 PROCEDURE — 99213 OFFICE O/P EST LOW 20 MIN: CPT | Performed by: STUDENT IN AN ORGANIZED HEALTH CARE EDUCATION/TRAINING PROGRAM

## 2025-03-17 PROCEDURE — 1159F MED LIST DOCD IN RCRD: CPT | Performed by: STUDENT IN AN ORGANIZED HEALTH CARE EDUCATION/TRAINING PROGRAM

## 2025-03-17 PROCEDURE — 3077F SYST BP >= 140 MM HG: CPT | Performed by: STUDENT IN AN ORGANIZED HEALTH CARE EDUCATION/TRAINING PROGRAM

## 2025-03-17 PROCEDURE — 87186 SC STD MICRODIL/AGAR DIL: CPT | Performed by: STUDENT IN AN ORGANIZED HEALTH CARE EDUCATION/TRAINING PROGRAM

## 2025-03-17 PROCEDURE — 81001 URINALYSIS AUTO W/SCOPE: CPT | Performed by: STUDENT IN AN ORGANIZED HEALTH CARE EDUCATION/TRAINING PROGRAM

## 2025-03-17 PROCEDURE — 1160F RVW MEDS BY RX/DR IN RCRD: CPT | Performed by: STUDENT IN AN ORGANIZED HEALTH CARE EDUCATION/TRAINING PROGRAM

## 2025-03-17 PROCEDURE — 99214 OFFICE O/P EST MOD 30 MIN: CPT | Performed by: STUDENT IN AN ORGANIZED HEALTH CARE EDUCATION/TRAINING PROGRAM

## 2025-03-17 PROCEDURE — 87077 CULTURE AEROBIC IDENTIFY: CPT | Performed by: STUDENT IN AN ORGANIZED HEALTH CARE EDUCATION/TRAINING PROGRAM

## 2025-03-17 PROCEDURE — 87086 URINE CULTURE/COLONY COUNT: CPT | Performed by: STUDENT IN AN ORGANIZED HEALTH CARE EDUCATION/TRAINING PROGRAM

## 2025-03-17 PROCEDURE — 1126F AMNT PAIN NOTED NONE PRSNT: CPT | Performed by: STUDENT IN AN ORGANIZED HEALTH CARE EDUCATION/TRAINING PROGRAM

## 2025-03-17 PROCEDURE — 3079F DIAST BP 80-89 MM HG: CPT | Performed by: STUDENT IN AN ORGANIZED HEALTH CARE EDUCATION/TRAINING PROGRAM

## 2025-03-17 RX ORDER — CEPHALEXIN 500 MG/1
500 CAPSULE ORAL 3 TIMES DAILY
Qty: 15 CAPSULE | Refills: 0 | Status: SHIPPED | OUTPATIENT
Start: 2025-03-17 | End: 2025-03-22

## 2025-03-17 RX ORDER — FOLIC ACID 1 MG/1
1 TABLET ORAL DAILY
Qty: 90 TABLET | Refills: 1 | Status: SHIPPED | OUTPATIENT
Start: 2025-03-17

## 2025-03-17 NOTE — ADDENDUM NOTE
Subjective   Aakash Martinez is a 77 y.o. male.     Chief Complaint   Patient presents with   • Urgent Care Follow Up     seen on 3/6 at [facility] after smashing hand in car door         History of Present Illness     Patient notes that his right hand, got caught inside a car door.  Patient notes that he smashes.  Patient notes he's had significant bruising.  He also had an open wound.  He was seen at the urgent care couple days ago, and noted to have an x-ray.  X-ray was negative of any fractures.  Patient does still have swelling.  There is ecchymosis as well.  Patient's currently taken Plavix.  Patient unable take any NSAIDs.  He has only taken Tylenol for pain, and it appears that his pain has been controlled with Tylenol.    The following portions of the patient's history were reviewed and updated as appropriate: allergies, current medications, past family history, past medical history, past social history, past surgical history and problem list.    Review of Systems   Constitutional: Negative for chills and fever.   HENT: Negative for congestion, rhinorrhea, sinus pain and sore throat.    Eyes: Negative for photophobia and visual disturbance.   Respiratory: Negative for cough, chest tightness and shortness of breath.    Cardiovascular: Negative for chest pain and palpitations.   Gastrointestinal: Negative for diarrhea, nausea and vomiting.   Genitourinary: Negative for dysuria, frequency and urgency.   Musculoskeletal: Positive for arthralgias and joint swelling.   Skin: Positive for color change and wound.   Neurological: Negative for dizziness and syncope.       Objective   Physical Exam   Constitutional: He is oriented to person, place, and time. He appears well-developed and well-nourished.   HENT:   Head: Normocephalic and atraumatic.   Eyes: EOM are normal.   Neck: Normal range of motion. Neck supple.   Musculoskeletal: He exhibits tenderness.        Right hand: He exhibits decreased range of motion and  Addended by: KEVIN BHATTI on: 3/17/2025 09:43 AM     Modules accepted: Orders     tenderness. He exhibits no bony tenderness.   Neurological: He is alert and oriented to person, place, and time.   Skin:   Wound on the right hand.  Right hand is swollen, has ecchymosis.     Psychiatric: He has a normal mood and affect. His behavior is normal.   Nursing note and vitals reviewed.      Assessment/Plan   Aakahs was seen today for urgent care follow up.    Diagnoses and all orders for this visit:    Contusion of right hand, subsequent encounter    Laceration of right hand without foreign body, subsequent encounter    Wound appears to be clean and dry.  There is ecchymosis on the hand.  At this time patient only taking Tylenol for pain.  Pain is well-controlled with Tylenol.  We'll wrap the wound and have addressed with bacitracin.  Patient advised to follow-up in 5 days for reevaluation.    No Follow-up on file.    Dictated utilizing Dragon Voice Recognition Software

## 2025-03-17 NOTE — PROGRESS NOTES
"Chief Complaint  Follow-up (2 MONTH)    Subjective    Subjective     Ann-Marie Hughes is a 79 y.o. female who presents to [facility] GENERAL SURGERY    History of Present Illness  79-year-old female who previously underwent excisional debridement of a large area along her medial left lower thigh/knee approximately 3 months ago for a necrotizing soft tissue infection presents today for wound check.  Since last evaluated the office, her wound VAC has been discontinued and she has been doing local wound care at home with home health nursing.  She denies any redness or drainage from her wound.  She notes no issues with mobility and feels her strength is at baseline.      Personal History     Social History     Tobacco Use    Smoking status: Former     Current packs/day: 0.00     Average packs/day: 1 pack/day for 12.0 years (12.0 ttl pk-yrs)     Types: Cigarettes     Start date:      Quit date:      Years since quittin.2    Smokeless tobacco: Never   Vaping Use    Vaping status: Never Used   Substance Use Topics    Alcohol use: Never    Drug use: Never     Allergies   Allergen Reactions    Penicillins Palpitations     1. When was your reaction? > 10 years ago  2. Did your reaction happen after the first dose or after several doses? After first dose  3. Did your reaction require ED or hospital care to manage your reaction? Do not know  4. Did your reaction require treatment with epinephrine? Do not know  5. Have you taken amoxicillin (Amoxil) or amoxicillin-clavulanate (Augmentin) without issue since? Yes  6. Have you taken cephalexin (Keflex) without issue since?  Do not know        Objective    Objective   Vital Signs:   Ht 162.6 cm (64.02\")   Wt 74.4 kg (164 lb)   BMI 28.13 kg/m²       Physical Exam  Constitutional:       Appearance: Normal appearance.   HENT:      Head: Normocephalic and atraumatic.      Mouth/Throat:      Mouth: Mucous membranes are moist.      Pharynx: Oropharynx " is clear.   Cardiovascular:      Rate and Rhythm: Normal rate and regular rhythm.   Pulmonary:      Effort: Pulmonary effort is normal. No respiratory distress.   Abdominal:      General: There is no distension.      Palpations: Abdomen is soft.      Tenderness: There is no abdominal tenderness.   Musculoskeletal:         General: No swelling. Normal range of motion.      Cervical back: Normal range of motion and neck supple.      Comments: Left medial thigh/knee wound well-healed with small 1.5 cm area of scabbing, no surrounding erythema, no fluctuance, no expressible discharge   Skin:     General: Skin is warm and dry.   Neurological:      General: No focal deficit present.      Mental Status: She is alert and oriented to person, place, and time.   Psychiatric:         Mood and Affect: Mood normal.         Behavior: Behavior normal.                  Assessment / Plan      Diagnoses and all orders for this visit:    1. Visit for wound check (Primary)    79-year-old female status post sharp excisional debridement of a large necrotizing soft tissue infection along the left lower extremity.  Wound VAC discontinued since last visit.  No clinical concern for infection or nonhealing wound at this time.  Recommend continued Aquacel placement over scabbing at home multiple times per week.  Okay for ambulation as tolerated.  She may otherwise follow-up with me again in clinic as needed.    Follow Up   Return if symptoms worsen or fail to improve.      Patient was given instructions and counseling regarding her condition or for health maintenance advice. Please see specific information pulled into the AVS if appropriate.     Electronically signed by Sergo Altamirano MD, 03/17/25, 9:19 AM EDT.

## 2025-03-17 NOTE — PROGRESS NOTES
"Chief Complaint  Urinary Tract Infection (Started about 2 days ago burning  and back pain )    Subjective      Ann-Marie Hughes is a 79 y.o. female who presents to [facility]     Same day visit:    Patient comes with dysuria. No associated symptoms. Pt was unable to give a urine sample. Will start empiric treatment. Pt refers using Augmentin and keflex in the past without reaction. Even thought there is a label of penicillin allergies with symptom as tachycardia this might not be a real allergic reaction to medication. Pt instructed to start keflex and if allergic reaction instructed to go to the ED. Pt with CKD so others first line medications were contraindicated. Pt was made aware. Also, pt with CKD on multiple medications as metformin. Pt at risk of acidosis. Education given. Pt needs to talk to primary physician to have a medication review as pt is high risk of multiple side effects. Pt refers she will follow with primary physician in 2-3 days.   Go to the ED if symptoms worsen.     Objective   Vital Signs:   Vitals:    03/17/25 0940   BP: 141/81   Pulse: 66   Temp: 97.3 °F (36.3 °C)   TempSrc: Temporal   Weight: 74.4 kg (164 lb)   Height: 162.6 cm (64.02\")     Body mass index is 28.14 kg/m².    Wt Readings from Last 3 Encounters:   03/17/25 74.4 kg (164 lb)   03/17/25 74.4 kg (164 lb)   03/14/25 74.7 kg (164 lb 10.9 oz)     BP Readings from Last 3 Encounters:   03/17/25 141/81   03/14/25 117/59   02/25/25 95/49       Health Maintenance   Topic Date Due    ZOSTER VACCINE (1 of 2) 11/26/2013    DIABETIC EYE EXAM  06/27/2023    URINE MICROALBUMIN-CREATININE RATIO (uACR)  08/17/2023    ANNUAL WELLNESS VISIT  09/27/2023    HEMOGLOBIN A1C  12/28/2024    BMI FOLLOWUP  12/29/2024    TDAP/TD VACCINES (3 - Td or Tdap) 03/17/2026 (Originally 1/28/2025)    COVID-19 Vaccine (6 - Moderna risk 2024-25 season) 04/30/2025    LIPID PANEL  06/28/2025    DXA SCAN  07/26/2026    RSV Vaccine - " Adults  Completed    INFLUENZA VACCINE  Completed    Pneumococcal Vaccine 50+  Completed       Physical Exam  HENT:      Mouth/Throat:      Mouth: Mucous membranes are moist.   Skin:     General: Skin is warm.      Capillary Refill: Capillary refill takes more than 3 seconds.   Neurological:      Mental Status: She is alert and oriented to person, place, and time.            Assessment & Plan  Urinary tract infection without hematuria, site unspecified  Keflex for 5 days  Follow with primary care physician   Orders:    Urinalysis With Culture If Indicated -; Future         BMI is >= 25 and <30. (Overweight) The following options were offered after discussion;: weight loss educational material (shared in after visit summary), exercise counseling/recommendations, and nutrition counseling/recommendations     I spent 20 minutes caring for Ann-Marie on this date of service. This time includes time spent by me in the following activities:preparing for the visit, reviewing tests, obtaining and/or reviewing a separately obtained history, performing a medically appropriate examination and/or evaluation, counseling and educating the patient/family/caregiver, ordering medications, tests, or procedures, referring and communicating with other health care professionals, documenting information in the medical record, independently interpreting results and communicating that information with the patient/family/caregiver, care coordination.    FOLLOW UP  Return in about 2 days (around 3/19/2025).  Patient was given instructions and counseling regarding her condition or for health maintenance advice. Please see specific information pulled into the AVS if appropriate.       David Santizo MD  03/17/25  10:18 EDT    CURRENT & DISCONTINUED MEDICATIONS  Current Outpatient Medications   Medication Instructions    albuterol sulfate  (90 Base) MCG/ACT inhaler 2 puffs, Inhalation, Every 4 Hours PRN    albuterol sulfate   (90 Base) MCG/ACT inhaler 2 puffs, Inhalation, Every 4 Hours PRN    apixaban (ELIQUIS) 5 mg, Oral, Every 12 Hours Scheduled    aspirin 81 mg, Daily    atorvastatin (LIPITOR) 40 mg, Oral, Daily    buPROPion XL (WELLBUTRIN XL) 300 mg, Oral, Daily    cephalexin (KEFLEX) 500 mg, Oral, 3 Times Daily    dapagliflozin Propanediol (FARXIGA) 10 mg, Oral, Daily    digoxin (LANOXIN) 125 mcg, Every Other Day    folic acid (FOLVITE) 1 mg, Oral, Daily    furosemide (LASIX) 40 mg, Oral, Daily    metFORMIN ER (GLUCOPHAGE-XR) 500 mg, Oral, 2 Times Daily    methylPREDNISolone (MEDROL) 4 MG dose pack Take as directed on package instructions.    metoprolol succinate XL (TOPROL-XL) 75 mg, Oral, 2 Times Daily    montelukast (SINGULAIR) 10 mg, Oral, Daily    mupirocin (BACTROBAN) 2 % nasal ointment 1 Application, Nasal, 2 Times Daily    Probiotic Product (DynaOptics PO) 1 capsule, Daily    sacubitril-valsartan (Entresto) 49-51 MG tablet 1 tablet, Oral, 2 Times Daily    sacubitril-valsartan (Entresto) 49-51 MG tablet 1 tablet, Oral, 2 Times Daily    spironolactone (ALDACTONE) 25 mg, Oral, Daily    Synthroid 75 mcg, Oral, Daily       There are no discontinued medications.

## 2025-03-18 ENCOUNTER — TELEPHONE (OUTPATIENT)
Dept: ONCOLOGY | Facility: HOSPITAL | Age: 80
End: 2025-03-18
Payer: MEDICARE

## 2025-03-18 DIAGNOSIS — D59.10 AUTOIMMUNE HEMOLYTIC ANEMIA: Primary | ICD-10-CM

## 2025-03-18 NOTE — TELEPHONE ENCOUNTER
Pt is aware she has an apt in two months and to keep her apt with him. Pt is also aware to get labs done every two weeks up to her apt with Lucy. Pt verbalized understanding and states she would go to [facility].

## 2025-03-20 LAB — BACTERIA SPEC AEROBE CULT: ABNORMAL

## 2025-03-24 ENCOUNTER — TELEPHONE (OUTPATIENT)
Dept: FAMILY MEDICINE CLINIC | Facility: CLINIC | Age: 80
End: 2025-03-24
Payer: MEDICARE

## 2025-03-24 NOTE — TELEPHONE ENCOUNTER
[facility]  -   Wanting to drop visits to 2x per week.  Please contact-   [phone] - Alicia

## 2025-04-01 ENCOUNTER — LAB (OUTPATIENT)
Dept: LAB | Facility: HOSPITAL | Age: 80
End: 2025-04-01
Payer: MEDICARE

## 2025-04-01 DIAGNOSIS — I48.0 PAF (PAROXYSMAL ATRIAL FIBRILLATION): ICD-10-CM

## 2025-04-01 DIAGNOSIS — D59.10 AUTOIMMUNE HEMOLYTIC ANEMIA: ICD-10-CM

## 2025-04-01 DIAGNOSIS — N18.32 STAGE 3B CHRONIC KIDNEY DISEASE: ICD-10-CM

## 2025-04-01 DIAGNOSIS — I50.22 CHRONIC HFREF (HEART FAILURE WITH REDUCED EJECTION FRACTION): ICD-10-CM

## 2025-04-01 LAB
ALBUMIN SERPL-MCNC: 4.1 G/DL (ref 3.5–5.2)
ALBUMIN/GLOB SERPL: 1.5 G/DL
ALP SERPL-CCNC: 67 U/L (ref 39–117)
ALT SERPL W P-5'-P-CCNC: 11 U/L (ref 1–33)
ANION GAP SERPL CALCULATED.3IONS-SCNC: 11.9 MMOL/L (ref 5–15)
ANISOCYTOSIS BLD QL: NORMAL
AST SERPL-CCNC: 20 U/L (ref 1–32)
BASOPHILS # BLD AUTO: 0.06 10*3/MM3 (ref 0–0.2)
BASOPHILS NFR BLD AUTO: 0.6 % (ref 0–1.5)
BILIRUB SERPL-MCNC: 0.6 MG/DL (ref 0–1.2)
BUN SERPL-MCNC: 48 MG/DL (ref 8–23)
BUN/CREAT SERPL: 31.6 (ref 7–25)
CALCIUM SPEC-SCNC: 9.5 MG/DL (ref 8.6–10.5)
CHLORIDE SERPL-SCNC: 108 MMOL/L (ref 98–107)
CO2 SERPL-SCNC: 20.1 MMOL/L (ref 22–29)
CREAT SERPL-MCNC: 1.52 MG/DL (ref 0.57–1)
DACRYOCYTES BLD QL SMEAR: NORMAL
DEPRECATED RDW RBC AUTO: 83 FL (ref 37–54)
DIGOXIN SERPL-MCNC: 1.1 NG/ML (ref 0.6–1.2)
EGFRCR SERPLBLD CKD-EPI 2021: 34.5 ML/MIN/1.73
ELLIPTOCYTES BLD QL SMEAR: NORMAL
EOSINOPHIL # BLD AUTO: 0.24 10*3/MM3 (ref 0–0.4)
EOSINOPHIL NFR BLD AUTO: 2.3 % (ref 0.3–6.2)
ERYTHROCYTE [DISTWIDTH] IN BLOOD BY AUTOMATED COUNT: 21 % (ref 12.3–15.4)
FOLATE SERPL-MCNC: >20 NG/ML (ref 4.78–24.2)
GLOBULIN UR ELPH-MCNC: 2.8 GM/DL
GLUCOSE SERPL-MCNC: 133 MG/DL (ref 65–99)
HAPTOGLOB SERPL-MCNC: 122 MG/DL (ref 30–200)
HAV IGM SERPL QL IA: NORMAL
HBV CORE IGM SERPL QL IA: NORMAL
HBV SURFACE AG SERPL QL IA: NORMAL
HCT VFR BLD AUTO: 27.6 % (ref 34–46.6)
HCV AB SER QL: NORMAL
HGB BLD-MCNC: 8.7 G/DL (ref 12–15.9)
IMM GRANULOCYTES # BLD AUTO: 0.02 10*3/MM3 (ref 0–0.05)
IMM GRANULOCYTES NFR BLD AUTO: 0.2 % (ref 0–0.5)
LYMPHOCYTES # BLD AUTO: 3.03 10*3/MM3 (ref 0.7–3.1)
LYMPHOCYTES NFR BLD AUTO: 28.7 % (ref 19.6–45.3)
MACROCYTES BLD QL SMEAR: NORMAL
MAGNESIUM SERPL-MCNC: 2 MG/DL (ref 1.6–2.4)
MCH RBC QN AUTO: 34 PG (ref 26.6–33)
MCHC RBC AUTO-ENTMCNC: 31.5 G/DL (ref 31.5–35.7)
MCV RBC AUTO: 107.8 FL (ref 79–97)
MONOCYTES # BLD AUTO: 0.95 10*3/MM3 (ref 0.1–0.9)
MONOCYTES NFR BLD AUTO: 9 % (ref 5–12)
NEUTROPHILS NFR BLD AUTO: 59.2 % (ref 42.7–76)
NEUTROPHILS NFR BLD AUTO: 6.25 10*3/MM3 (ref 1.7–7)
NRBC BLD AUTO-RTO: 0.5 /100 WBC (ref 0–0.2)
NT-PROBNP SERPL-MCNC: 4987 PG/ML (ref 0–1800)
OVALOCYTES BLD QL SMEAR: NORMAL
PLAT MORPH BLD: NORMAL
PLATELET # BLD AUTO: 306 10*3/MM3 (ref 140–450)
PMV BLD AUTO: 10.7 FL (ref 6–12)
POIKILOCYTOSIS BLD QL SMEAR: NORMAL
POTASSIUM SERPL-SCNC: 4.4 MMOL/L (ref 3.5–5.2)
PROT SERPL-MCNC: 6.9 G/DL (ref 6–8.5)
RBC # BLD AUTO: 2.56 10*6/MM3 (ref 3.77–5.28)
SODIUM SERPL-SCNC: 140 MMOL/L (ref 136–145)
VIT B12 BLD-MCNC: 398 PG/ML (ref 211–946)
WBC MORPH BLD: NORMAL
WBC NRBC COR # BLD AUTO: 10.55 10*3/MM3 (ref 3.4–10.8)

## 2025-04-01 PROCEDURE — 85025 COMPLETE CBC W/AUTO DIFF WBC: CPT

## 2025-04-01 PROCEDURE — 83010 ASSAY OF HAPTOGLOBIN QUANT: CPT | Performed by: NURSE PRACTITIONER

## 2025-04-01 PROCEDURE — 80162 ASSAY OF DIGOXIN TOTAL: CPT

## 2025-04-01 PROCEDURE — 85007 BL SMEAR W/DIFF WBC COUNT: CPT

## 2025-04-01 PROCEDURE — 36415 COLL VENOUS BLD VENIPUNCTURE: CPT | Performed by: NURSE PRACTITIONER

## 2025-04-01 PROCEDURE — 80053 COMPREHEN METABOLIC PANEL: CPT

## 2025-04-01 PROCEDURE — 82746 ASSAY OF FOLIC ACID SERUM: CPT | Performed by: NURSE PRACTITIONER

## 2025-04-01 PROCEDURE — 80074 ACUTE HEPATITIS PANEL: CPT | Performed by: NURSE PRACTITIONER

## 2025-04-01 PROCEDURE — 83735 ASSAY OF MAGNESIUM: CPT

## 2025-04-01 PROCEDURE — 83880 ASSAY OF NATRIURETIC PEPTIDE: CPT

## 2025-04-01 PROCEDURE — 82607 VITAMIN B-12: CPT | Performed by: NURSE PRACTITIONER

## 2025-04-02 ENCOUNTER — RESULTS FOLLOW-UP (OUTPATIENT)
Dept: LAB | Facility: HOSPITAL | Age: 80
End: 2025-04-02
Payer: MEDICARE

## 2025-04-02 NOTE — TELEPHONE ENCOUNTER
ASAF patient regarding results and recommendations. Voiced understanding.   Patient states she always has SOA no worse than normal Denies any edema. Advised to keep apt on Friday.

## 2025-04-04 ENCOUNTER — OFFICE VISIT (OUTPATIENT)
Dept: CARDIOLOGY | Facility: CLINIC | Age: 80
End: 2025-04-04
Payer: MEDICARE

## 2025-04-04 VITALS
HEART RATE: 72 BPM | BODY MASS INDEX: 28.17 KG/M2 | HEIGHT: 64 IN | SYSTOLIC BLOOD PRESSURE: 94 MMHG | DIASTOLIC BLOOD PRESSURE: 45 MMHG | WEIGHT: 165 LBS

## 2025-04-04 DIAGNOSIS — E78.2 MIXED HYPERLIPIDEMIA: ICD-10-CM

## 2025-04-04 DIAGNOSIS — I50.22 CHRONIC HFREF (HEART FAILURE WITH REDUCED EJECTION FRACTION): ICD-10-CM

## 2025-04-04 DIAGNOSIS — I25.810 CORONARY ARTERY DISEASE INVOLVING AUTOLOGOUS VEIN CORONARY BYPASS GRAFT WITHOUT ANGINA PECTORIS: Primary | ICD-10-CM

## 2025-04-04 DIAGNOSIS — I48.0 PAF (PAROXYSMAL ATRIAL FIBRILLATION): ICD-10-CM

## 2025-04-04 DIAGNOSIS — I10 PRIMARY HYPERTENSION: ICD-10-CM

## 2025-04-04 DIAGNOSIS — I35.0 AORTIC STENOSIS, MILD: ICD-10-CM

## 2025-04-04 PROBLEM — Z00.00 ENCOUNTER FOR MEDICAL EXAMINATION TO ESTABLISH CARE: Status: RESOLVED | Noted: 2023-08-29 | Resolved: 2025-04-04

## 2025-04-04 PROBLEM — M79.672 LEFT FOOT PAIN: Status: RESOLVED | Noted: 2024-12-25 | Resolved: 2025-04-04

## 2025-04-04 NOTE — PROGRESS NOTES
Chief Complaint  Follow-up, Hypertension, Coronary Artery Disease, Atrial Fibrillation, and Aortic Stenosis    Subjective            History of Present Illness  Ann-Marie Hughes is a an 79-year-old female patient who presents to the office today for follow-up.    History of Present Illness  The patient presents for a regular follow-up visit for coronary artery disease, atrial fibrillation, heart failure, blood pressure and cholesterol management, and aortic valve monitoring.    She reports overall good tolerance to her current medication regimen. However, she expresses concern about potential side effects of metoprolol, citing a friend's experience of leg weakness. Although she does not experience similar symptoms, she describes a sensation of instability in her knees during ambulation, as if they might buckle under her weight. She is not experiencing any cardiac symptoms at present.    She has lost weight since her last visit.    MEDICATIONS  Eliquis 5 mg twice daily, aspirin 81 mg daily, atorvastatin 40 mg daily, Farxiga 10 mg daily, digoxin 125 mcg every other day, Lasix 40 mg daily, spironolactone 25 mg daily, Entresto 49-51 mg twice daily, metoprolol 75 mg twice daily.        [facility]  Past Medical History:   Diagnosis Date    Acne rosacea 08/17/2021    DR.JEFF MOON     Acute respiratory failure with hypoxia 07/31/2023    Arteriosclerosis of abdominal aorta     B12 deficiency 08/17/2021    DJD (degenerative joint disease)     Hx of smoking 08/17/2021    QUIT IN 1976 AT AGE 31    Hyperlipidemia 08/17/2021    Hypertension     Hypothyroidism     Metabolic syndrome 08/17/2021    NSTEMI (non-ST elevated myocardial infarction) 07/17/2023    Pericardial effusion 10/10/2023    KAREN (stress urinary incontinence, female) 08/17/2021    UTI (urinary tract infection) 08/17/2021    Vitamin D deficiency 08/17/2021         ALLERGY  Allergies   Allergen Reactions    Penicillins Palpitations     1. When was your reaction? >  10 years ago  2. Did your reaction happen after the first dose or after several doses? After first dose  3. Did your reaction require ED or hospital care to manage your reaction? Do not know  4. Did your reaction require treatment with epinephrine? Do not know  5. Have you taken amoxicillin (Amoxil) or amoxicillin-clavulanate (Augmentin) without issue since? Yes  6. Have you taken cephalexin (Keflex) without issue since?  Do not know           SURGICALHX  Past Surgical History:   Procedure Laterality Date    BACK SURGERY      CARDIAC CATHETERIZATION N/A 2023    Procedure: Left Heart Cath;  Surgeon: Dinh Andres MD;  Location: [facility] CATH INVASIVE LOC[facility];  Service: Cardiovascular;  Laterality: N/A;    CARPAL TUNNEL RELEASE      COLONOSCOPY      CORONARY ARTERY BYPASS GRAFT WITH MITRAL VALVE REPAIR/REPLACEMENT N/A 2023    Procedure: REZA STERNOTOMY OFF-PUMP CORONARY ARTERY BYPASS GRAFT TIMES        USING LEFFT INTERNAL MAMMARY ARTERY AND     GREATER SAPHENOUS VEIN GRAFT PER EMDOSCOPIC VEIN HARVESTING, MAZE PROCEDURE AND PRP;  Surgeon: Shane Alexander MD;  Location: [facility];  Service: Cardiothoracic;  Laterality: N/A;    INCISION AND DRAINAGE LEG N/A 2024    Procedure: INCISION AND DRAINAGE LOWER EXTREMITY; Plain text: incision to remove pus from left leg;  Surgeon: Sergo Altamirano MD;  Location: [facility] OR;  Service: General;  Laterality: N/A;    KNEE SURGERY      LUMBAR DISCECTOMY      NECK SURGERY      Cervical    POSTERIOR LUMBAR/THORACIC SPINE FUSION            SOC  Social History     Socioeconomic History    Marital status:      Spouse name: Dinh   Tobacco Use    Smoking status: Former     Current packs/day: 0.00     Average packs/day: 1 pack/day for 12.0 years (12.0 ttl pk-yrs)     Types: Cigarettes     Start date:      Quit date:      Years since quittin.2    Smokeless tobacco: Never   Vaping Use    Vaping status: Never Used   Substance and  Sexual Activity    Alcohol use: Never    Drug use: Never    Sexual activity: Defer         FAMHX  Family History   Problem Relation Age of Onset    Heart disease Mother     Arthritis Mother     Heart attack Mother     Arthritis Father           MEDSIGOCHELSY  Current Outpatient Medications on File Prior to Visit   Medication Sig    albuterol sulfate  (90 Base) MCG/ACT inhaler Inhale 2 puffs Every 4 (Four) Hours As Needed for Wheezing.    albuterol sulfate  (90 Base) MCG/ACT inhaler Inhale 2 puffs Every 4 (Four) Hours As Needed for Wheezing.    apixaban (Eliquis) 5 MG tablet tablet Take 1 tablet by mouth Every 12 (Twelve) Hours.    aspirin 81 MG EC tablet Take 1 tablet by mouth Daily.    atorvastatin (LIPITOR) 40 MG tablet TAKE 1 TABLET BY MOUTH EVERY DAY    buPROPion XL (WELLBUTRIN XL) 300 MG 24 hr tablet Take 1 tablet by mouth Daily.    dapagliflozin Propanediol (Farxiga) 10 MG tablet Take 10 mg by mouth Daily.    digoxin (LANOXIN) 125 MCG tablet Take 1 tablet by mouth Every Other Day.    folic acid (FOLVITE) 1 MG tablet Take 1 tablet by mouth Daily.    furosemide (LASIX) 40 MG tablet Take 1 tablet by mouth Daily.    metFORMIN ER (GLUCOPHAGE-XR) 500 MG 24 hr tablet Take 1 tablet by mouth 2 (Two) Times a Day.    metoprolol succinate XL (TOPROL-XL) 50 MG 24 hr tablet Take 1.5 tablets by mouth 2 (Two) Times a Day.    montelukast (SINGULAIR) 10 MG tablet Take 1 tablet by mouth Daily.    Probiotic Product (DNAtriX PO) Take 1 capsule by mouth Daily.    sacubitril-valsartan (Entresto) 49-51 MG tablet Take 1 tablet by mouth 2 (Two) Times a Day.    sacubitril-valsartan (Entresto) 49-51 MG tablet Take 1 tablet by mouth 2 (Two) Times a Day. Indications: [id number] 01/30/2026    spironolactone (ALDACTONE) 25 MG tablet Take 1 tablet by mouth Daily.    Synthroid 75 MCG tablet Take 1 tablet by mouth Daily.    [DISCONTINUED] methylPREDNISolone (MEDROL) 4 MG dose pack Take as directed on package instructions.  "   [DISCONTINUED] mupirocin (BACTROBAN) 2 % nasal ointment Administer 1 Application into the nostril(s) as directed by provider 2 (Two) Times a Day.     No current facility-administered medications on file prior to visit.         Objective   BP 94/45   Pulse 72   Ht 162.6 cm (64.02\")   Wt 74.8 kg (165 lb)   BMI 28.31 kg/m²       Physical Exam  HENT:      Head: Normocephalic.   Neck:      Vascular: No carotid bruit.   Cardiovascular:      Rate and Rhythm: Normal rate and regular rhythm.      Pulses: Normal pulses.      Heart sounds: Murmur heard.   Pulmonary:      Effort: Pulmonary effort is normal.      Breath sounds: Normal breath sounds.   Musculoskeletal:      Cervical back: Neck supple.      Right lower leg: No edema.      Left lower leg: No edema.   Skin:     General: Skin is dry.   Neurological:      Mental Status: She is alert and oriented to person, place, and time.   Psychiatric:         Behavior: Behavior normal.       Result Review :   The following data was reviewed by: LONA Heath on 04/04/2025:  proBNP   Date Value Ref Range Status   04/01/2025 4,987.0 (H) 0.0 - 1,800.0 pg/mL Final     CMP          3/14/2025    10:13 4/1/2025    10:48   CMP   Glucose 120  133    BUN 63  48    Creatinine 1.77  1.52    EGFR 28.8  34.5    Sodium 137  140    Potassium 4.3  4.4    Chloride 103  108    Calcium 9.5  9.5    Total Protein 6.9  6.9    Albumin 4.0  4.1    Globulin 2.9  2.8    Total Bilirubin 0.7  0.6    Alkaline Phosphatase 57  67    AST (SGOT) 20  20    ALT (SGPT) 16  11    Albumin/Globulin Ratio 1.4  1.5    BUN/Creatinine Ratio 35.6  31.6    Anion Gap 12.2  11.9      CBC w/diff          3/14/2025    10:13 4/1/2025    10:48   CBC w/Diff   WBC 11.23  10.55    RBC 2.46  2.56    Hemoglobin 8.1  8.7    Hematocrit 25.2  27.6    .4  107.8    MCH 32.9  34.0    MCHC 32.1  31.5    RDW 21.8  21.0    Platelets 299  306    Neutrophil Rel % 64.5  59.2    Immature Granulocyte Rel % 0.4  0.2    Lymphocyte " "Rel % 23.2  28.7    Monocyte Rel % 10.3  9.0    Eosinophil Rel % 1.2  2.3    Basophil Rel % 0.4  0.6       Lab Results   Component Value Date    TSH 0.625 06/28/2024      Lab Results   Component Value Date    FREET4 1.74 (H) 12/29/2023      No results found for: \"DDIMERQUANT\"  Magnesium   Date Value Ref Range Status   04/01/2025 2.0 1.6 - 2.4 mg/dL Final      Digoxin   Date Value Ref Range Status   04/01/2025 1.10 0.60 - 1.20 ng/mL Final      Lab Results   Component Value Date    TROPONINT 22 (H) 02/03/2025           Lipid Panel          6/28/2024    11:25   Lipid Panel   Total Cholesterol 113    Triglycerides 123    HDL Cholesterol 50    VLDL Cholesterol 22    LDL Cholesterol  41    LDL/HDL Ratio 0.77        Results for orders placed during the hospital encounter of 08/06/24    Adult Transthoracic Echo Complete W/ Cont if Necessary Per Protocol    Interpretation Summary    Left ventricular systolic function is normal. Calculated left ventricular EF = 54.8%    Left ventricular wall thickness is consistent with borderline concentric hypertrophy.    Left ventricular diastolic function is consistent with (grade Ia w/high LAP) impaired relaxation.    The left atrial cavity is mildly dilated.    Mild aortic valve stenosis is present.    Estimated right ventricular systolic pressure from tricuspid regurgitation is normal (<35 mmHg).      [id number] Score: 7        Assessment and Plan    Diagnoses and all orders for this visit:    1. CAD status post CABG x3 vessels (Primary)    2. PAF (paroxysmal atrial fibrillation)    3. Chronic HFrEF (heart failure with reduced ejection fraction)    4. Primary hypertension    5. Mixed hyperlipidemia  -     Lipid Panel; Future  -     Hepatic Function Panel; Future    6. Aortic stenosis, mild      Assessment & Plan  1. Coronary Artery Disease.  She is currently on aspirin 81 mg daily for coronary artery disease management. Her weight has decreased from 182 in October 2024 to 165 today. " She is advised to continue her current medication regimen and maintain a balanced diet to support her cardiovascular health.    2. Atrial Fibrillation.  She is on Eliquis 5 mg twice daily for anticoagulation and metoprolol 75 mg twice daily to regulate heart rate. The plan is to wean off metoprolol over four weeks: 75 mg nightly for one week, 50 mg nightly for the next week, 25 mg nightly for the following week, and then discontinue. She will monitor her blood pressure and heart rate twice daily for two weeks using the provided log forms. A follow-up appointment is scheduled in four weeks to start a new medication in place of the metoprolol.    3. Heart Failure.  She is on Farxiga 10 mg daily, spironolactone 25 mg daily, Lasix 40 mg daily, Entresto 49-51 mg twice daily, and digoxin 125 mcg every other day for heart failure management. Her kidney function has improved, and her sodium and potassium levels remain normal. She is advised to continue her current medication regimen.    4. Blood Pressure Management.  Her blood pressure is on the lower end of the normal range today. She will monitor her blood pressure twice daily for two weeks using the provided log forms to ensure it remains within the normal range during the metoprolol weaning process.    5. Cholesterol Management.  She is on atorvastatin 40 mg daily for cholesterol lowering. Her cholesterol levels were within the desired range in June 2024. Fasting lab orders have been placed to repeat cholesterol levels before her next visit.    6. Mild Heart Murmur and Aortic Valve Monitoring.  The last echocardiogram in August 2024 showed mild aortic valve stenosis. The condition appears stable on today's exam. Echocardiograms will be repeated every 3 to 5 years.    Follow-up  The patient will follow up in 4 weeks.    PROCEDURE  The patient has a history of open heart surgery and stenting.      Follow Up   Return in about 4 weeks (around 5/2/2025) for Follow up with  Mahogany ZAMORANO & 6 month follow up with Dr Brand.    Patient was given instructions and counseling regarding her condition or for health maintenance advice. Please see specific information pulled into the AVS if appropriate.     Ann-Marie Hughes  reports that she quit smoking about 49 years ago. Her smoking use included cigarettes. She started smoking about 61 years ago. She has a 12 pack-year smoking history. She has never used smokeless tobacco.          Patient or patient representative verbalized consent for the use of Ambient Listening during the visit with  LONA Heath for chart documentation. 4/4/2025  13:09 EDT    LONA Heath  04/04/25  11:02 EDT    Dictated Utilizing Dragon Dictation

## 2025-04-24 ENCOUNTER — TELEPHONE (OUTPATIENT)
Dept: FAMILY MEDICINE CLINIC | Facility: CLINIC | Age: 80
End: 2025-04-24
Payer: MEDICARE

## 2025-04-24 NOTE — TELEPHONE ENCOUNTER
Zainab ding/ Rmei calling regarding update:    Re-cert-  1X per week for 2 weeks.  -    Wound-  Diabetic Ulcer to Left Foot-  Adding Medihoney, cleanse w/ normal saline. Silver Dressing,  changing border gauze every 3 days.            If any questions please call Zainab ding/ Remi-  [phone]

## 2025-04-25 ENCOUNTER — HOSPITAL ENCOUNTER (OUTPATIENT)
Facility: HOSPITAL | Age: 80
Discharge: HOME | End: 2025-04-25
Payer: MEDICARE

## 2025-04-25 VITALS
WEIGHT: 163 LBS | BODY MASS INDEX: 27.83 KG/M2 | HEIGHT: 64 IN | DIASTOLIC BLOOD PRESSURE: 63 MMHG | HEART RATE: 66 BPM | SYSTOLIC BLOOD PRESSURE: 105 MMHG

## 2025-04-25 DIAGNOSIS — I50.32 HEART FAILURE WITH IMPROVED EJECTION FRACTION (HFIMPEF): ICD-10-CM

## 2025-04-25 PROCEDURE — G0463 HOSPITAL OUTPT CLINIC VISIT: HCPCS

## 2025-04-25 RX ORDER — METOPROLOL SUCCINATE 25 MG/1
25 TABLET, EXTENDED RELEASE ORAL 2 TIMES DAILY
Qty: 60 TABLET | Refills: 1 | Status: SHIPPED | OUTPATIENT
Start: 2025-04-25

## 2025-04-25 RX ORDER — SACUBITRIL AND VALSARTAN 49; 51 MG/1; MG/1
1 TABLET, FILM COATED ORAL 2 TIMES DAILY
Qty: 84 TABLET | Refills: 0 | COMMUNITY
Start: 2025-04-25

## 2025-04-25 NOTE — PROGRESS NOTES
[facility]    Romario Valdez, DO  [address] [place] [address]    Thank you for asking me to see Ann-Marie Hughes.     History of Present Illness    HFimpEF    Subjective     Ann-Marie Hughes is a 79 y.o. 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 LVEF on 3/27/24 was 30% and improved to 54% on repeat echo on 8/6/24 with GDMT optimization. Other pertinent past medical history includes hypertension, hyperlipidemia, coronary artery disease (post three-vessel CABG), paroxysmal A-fib (Eliquis), mild aortic stenosis, type 2 diabetes, CKD stage IIIb, and autoimmune hemolytic anemia.    She appears well compensated today and in no acute distress. She denies chest pain, dizziness, lightheadedness, swelling, and SOA at rest. She does endorse SOA with moderate exertion but states that this is at baseline. Her weights have been stable and BNP continues to downtrend.     She was recently seen at her cardiologist office this month and expressed concerns of fatigue and BLE weakness. The decision was made to wean pt off metoprolol and onto another medication. Today she feels much better with the current metoprolol dose at 25mg BID, will recommend continuing at this dose.      Review of Systems   Constitutional: Negative for weight gain.   Cardiovascular:  Positive for dyspnea on exertion. Negative for chest pain and leg swelling.   Respiratory:  Negative for shortness of breath.    Neurological:  Negative for dizziness and light-headedness.        Patient Active Problem List   Diagnosis    Arthritis    Chronic pain syndrome    Diabetes mellitus, type II    Gastric reflux    Herniated disc    Primary hypertension    Hypothyroidism    Pain in joint, multiple sites    Hyperlipidemia    B12 deficiency    Vitamin D deficiency    Acne rosacea    Hx of smoking    Stress incontinence of urine    Arteriosclerosis of abdominal aorta    Iron  deficiency anemia secondary to inadequate dietary iron intake    Seasonal allergies    Hyponatremia    Leg length discrepancy    Reactive depression    Encounter for subsequent annual wellness visit (AWV) in Medicare patient    Class 1 obesity with alveolar hypoventilation, serious comorbidity, and body mass index (BMI) of 32.0 to 32.9 in adult    Chronic HFrEF (heart failure with reduced ejection fraction)    CAD status post CABG x3 vessels    PAF (paroxysmal atrial fibrillation)    Autoimmune hemolytic anemia    Need for RSV vaccination    Stage 3b chronic kidney disease    Large granular lymphocyte disorder    Cervicogenic headache    Aortic stenosis, mild    Contusion of left foot    Cellulitis of foot    Cellulitis of left lower extremity    Wound cellulitis    Abscess of left lower extremity    Neuropathy    Ulcer of left foot with fat layer exposed    Hospital discharge follow-up    Bronchitis    Heart failure with improved ejection fraction (HFimpEF)     family history includes Arthritis in her father and mother; Heart attack in her mother; Heart disease in her mother.   reports that she quit smoking about 49 years ago. Her smoking use included cigarettes. She started smoking about 61 years ago. She has a 12 pack-year smoking history. She has never used smokeless tobacco. She reports that she does not drink alcohol and does not use drugs.  Allergies   Allergen Reactions    Penicillins Palpitations     1. When was your reaction? > 10 years ago  2. Did your reaction happen after the first dose or after several doses? After first dose  3. Did your reaction require ED or hospital care to manage your reaction? Do not know  4. Did your reaction require treatment with epinephrine? Do not know  5. Have you taken amoxicillin (Amoxil) or amoxicillin-clavulanate (Augmentin) without issue since? Yes  6. Have you taken cephalexin (Keflex) without issue since?  Do not know      Physical Activity: Inactive (12/26/2024)     Exercise Vital Sign     Days of Exercise per Week: 0 days     Minutes of Exercise per Session: 0 min          Current Outpatient Medications:     albuterol sulfate  (90 Base) MCG/ACT inhaler, Inhale 2 puffs Every 4 (Four) Hours As Needed for Wheezing., Disp: 18 g, Rfl: 0    apixaban (Eliquis) 5 MG tablet tablet, Take 1 tablet by mouth Every 12 (Twelve) Hours., Disp: 180 tablet, Rfl: 3    aspirin 81 MG EC tablet, Take 1 tablet by mouth Daily., Disp: , Rfl:     atorvastatin (LIPITOR) 40 MG tablet, TAKE 1 TABLET BY MOUTH EVERY DAY, Disp: 90 tablet, Rfl: 3    buPROPion XL (WELLBUTRIN XL) 300 MG 24 hr tablet, Take 1 tablet by mouth Daily., Disp: 90 tablet, Rfl: 3    dapagliflozin Propanediol (Farxiga) 10 MG tablet, Take 10 mg by mouth Daily., Disp: 90 tablet, Rfl: 0    digoxin (LANOXIN) 125 MCG tablet, Take 1 tablet by mouth Every Other Day., Disp: , Rfl:     folic acid (FOLVITE) 1 MG tablet, Take 1 tablet by mouth Daily., Disp: 90 tablet, Rfl: 1    furosemide (LASIX) 40 MG tablet, Take 1 tablet by mouth Daily., Disp: 90 tablet, Rfl: 3    metFORMIN ER (GLUCOPHAGE-XR) 500 MG 24 hr tablet, Take 1 tablet by mouth 2 (Two) Times a Day., Disp: 180 tablet, Rfl: 3    metoprolol succinate XL (TOPROL-XL) 25 MG 24 hr tablet, Take 1 tablet by mouth 2 (Two) Times a Day., Disp: 60 tablet, Rfl: 1    montelukast (SINGULAIR) 10 MG tablet, Take 1 tablet by mouth Daily., Disp: 90 tablet, Rfl: 3    Probiotic Product ([employer] ), Take 1 capsule by mouth Daily., Disp: , Rfl:     sacubitril-valsartan (Entresto) 49-51 MG tablet, Take 1 tablet by mouth 2 (Two) Times a Day., Disp: 180 tablet, Rfl: 3    spironolactone (ALDACTONE) 25 MG tablet, Take 1 tablet by mouth Daily., Disp: 90 tablet, Rfl: 3    Synthroid 75 MCG tablet, Take 1 tablet by mouth Daily., Disp: 90 tablet, Rfl: 3    albuterol sulfate  (90 Base) MCG/ACT inhaler, Inhale 2 puffs Every 4 (Four) Hours As Needed for Wheezing., Disp: 18 g, Rfl: 0     sacubitril-valsartan (Entresto) 49-51 MG tablet, Take 1 tablet by mouth 2 (Two) Times a Day. Indications: [id number], Disp: 84 tablet, Rfl: 0    Objective   Vital Sign Review:   Vitals:    25 0855   BP: 105/63   Pulse: 66     Body mass index is 27.96 kg/m².      2555   Weight: 73.9 kg (163 lb)     Physical Exam:  Constitutional:       Appearance: Not in distress.   Pulmonary:      Effort: Pulmonary effort is normal.      Breath sounds: Normal breath sounds.   Cardiovascular:      Normal rate. Regular rhythm.   Edema:     Peripheral edema absent.       DATA REVIEWED:   EKG:    ---------------------------------------------------  ECHO:  Results for orders placed during the hospital encounter of 24    Adult Transthoracic Echo Complete W/ Cont if Necessary Per Protocol    Interpretation Summary    Left ventricular systolic function is normal. Calculated left ventricular EF = 54.8%    Left ventricular wall thickness is consistent with borderline concentric hypertrophy.    Left ventricular diastolic function is consistent with (grade Ia w/high LAP) impaired relaxation.    The left atrial cavity is mildly dilated.    Mild aortic valve stenosis is present.    Estimated right ventricular systolic pressure from tricuspid regurgitation is normal (<35 mmHg).    --------------------------------------------------  RHC/LHC  Results for orders placed during the hospital encounter of 23    Cardiac Catheterization/Vascular Study    Conclusion  [facility]  CARDIAC CATHETERIZATION PROCEDURE REPORT    Patient: Ann-Marie Hughes  : 1945  MRN: [id number]    Procedure Date:  23    Referring Physician:  Zuleyka Brand MD    Interventional Cardiologist:  Dinh Andres MD    Indication:  Angina pectoris, other  Cardiomyopathy with LV ejection fraction of 30 to 35%  Positive stress test showing large inferolateral wall ischemia    Clinical Presentation:  Ms. Hughes was recently  diagnosed with cardiomyopathy and congestive heart failure with LV ejection fraction 30 to 35%.  She had a SPECT stress test done recently which showed large inferolateral wall ischemia.  Overnight, she came because of chest pain.  She was noted to have mildly elevated troponin.  Today, she was brought to the Cath Lab to identify ischemic culprits.    Procedure performed:  Left heart catheterization  Selective coronary angiography    Findings:  1. Coronary Artery Anatomy:  Dominance: Right  Left Main: Long segment which is free of any stenosis.  Left Anterior Descending Artery: Medium caliber vessel giving rise to various diagonal and septal  branches.  Proximal LAD is free of any stenosis.  Mid LAD has a focal lesion which is angiographically 70% severity.  Some calcifications noted in this lesion.  Further down LAD has nonobstructive lesion which is angiographically 40% severity with calcification.  First large diagonal branch has a 50% stenosis in its ostium.  Distal and apical LAD are free of any stenosis.  Left Circumflex Artery: Nondominant vessel giving rise to 1 large OM branch.  Proximal left circumflex artery has luminal irregularities with calcification.  Mid left circumflex was artery has a 95% focal lesion which compensates blood supply to the terminal OM branch.  Right Coronary Artery: Large dominant vessel giving rise to right PDA and PLV branches.  Proximal RCA has a focal lesion which is angiographically 80% severity.  Mid RCA is subtotally occluded with a lesion angiographically 99% severity with heavy calcification.  ANEUDY I flow is noted in distal RCA, PDA and PLV branches.  Some branches of the PDA were noted to be filled by left-to-right collaterals.    2. Hemodynamics:  The opening aortic pressure is 128/61 with a mean of 85 mmHg.  The left ventricular end-diastolic pressure is 7 mmHg.  There was no gradient across aortic valve on pullback  The closing aortic pressure is 143/54 with a  mean of 89 mmHg    3. Left Ventriculogram: Not performed due to chronic kidney disease    Conclusions:  Multivessel coronary artery disease including subtotal occlusion of mid right coronary artery, 95% stenosis of the mid left circumflex artery and 70% lesion involving mid LAD artery  Ischemic cardiomyopathy LV ejection fraction of 30 to 35% per echocardiogram with normal LVEDP.    Recommendations:  We will discuss angiograms with cardiothoracic surgeons to consider coronary artery bypass grafting due to multivessel coronary artery disease, decreased LV systolic function and diabetes mellitus    Complications:  None.    Estimated Blood Loss:  Minimal.    Dinh Andres MD    07/17/23  11:54 EDT    ---------------------------------------------------------------------------  CXR/Imaging:   Imaging Results (Most Recent)       None        ----------------------------------------------------------------------------  CT:   No radiology results for the last 30 days.  --------------------------------------------------------------------------------------------------------------    Laboratory evaluations:  Lab Results   Component Value Date    GLUCOSE 133 (H) 04/01/2025    BUN 48 (H) 04/01/2025    CREATININE 1.52 (H) 04/01/2025    EGFRIFNONA 70 12/15/2021    BCR 31.6 (H) 04/01/2025    K 4.4 04/01/2025    CO2 20.1 (L) 04/01/2025    CALCIUM 9.5 04/01/2025    ALBUMIN 4.1 04/01/2025    AST 20 04/01/2025    ALT 11 04/01/2025     Lab Results   Component Value Date    WBC 10.55 04/01/2025    HGB 8.7 (L) 04/01/2025    HCT 27.6 (L) 04/01/2025    .8 (H) 04/01/2025     04/01/2025     Lab Results   Component Value Date    HGBA1C 6.40 (H) 06/28/2024     Lab Results   Component Value Date    HGBA1C 6.40 (H) 06/28/2024    HGBA1C 6.00 (H) 12/29/2023    HGBA1C 5.90 (H) 07/18/2023     Lab Results   Component Value Date    MICROALBUR 74.4 08/17/2022    CREATININE 1.52 (H) 04/01/2025     Lab Results   Component Value Date    IRON  81 03/14/2025    [facility] 316 03/14/2025    FERRITIN 804.00 (H) 03/14/2025       Assessment & Plan      HFimpEF  Previously reduced LVEF on 3/27/24 was 30% and improved to 54% on repeat echo on 8/6/24   Pt appears euvolemic on exam today and is well compensated  Metoprolol recently weaned per primary cardiology d/t fatigue and BLE weakness. Pt is currently taking Metoprolol 25mg BID and is feeling much better, recommend to continue current Metoprolol dose   Little room for additional GDMT up-titration  Kidney function and electrolytes remain stable  Will plan to follow up in 3 months to evaluate BB toleration     NYHA stage C FC-III     Clinical status was assessed and has improved.  Treatment intent: curative     Today, patient appears euvolemic and with  adequate  perfusion. HR is: normal and is at goal. BP/MAP was reviewed and there is not room for medication up-titration. LVEF:  54% .     GDMT Assessment: The patient is currently on quadruple therapy. We do not have room to optimize their medications based on HD assessment today, GFR, and electrolytes. GDMT has been optimized.     GDMT changes recommended today: No changes to medication therapy.    BB:   continue Metoprolol Succinate  25mg bid  Monitor heart rate. Please call the [facility] for HR<50, dizziness, lightheadedness, syncope    A/A/A:   continue Entresto 49/51mg BID    SGLT2-I:  continue Dapagliflozin (Farxiga) 10mg daily  Call for S/Sx genital mycotic infections  Do not take if oral intake is inadequate, NPO, or with GI illness    MRA:  continue Spironolactone  25mg daily  Recommended quarterly assessment of GFR and electrolytes for the first 12 months  After the patient has been on MRA therapy for 12 months without high-risk features, recommended q 6 monthly BMPs and assessment, per guidelines.   Avoid potassium supplementation  Avoid salt substitutes containing potassium  Please hold this medication if diarrhea, vomiting, or infection with fever and sweating  occurs    -DIURETIC:   furosemide (LASIX) 40 mg every day    Directions for when to call the clinic reviewed with the patient to include weight gain of 2 to 3 pounds in 24 hours, weight gain of 5 to 10 pounds within 7 days; worsening shortness of breath; worsening lower extremity edema or abdominal distention.     -Fluid restriction:   -requested  -2000 ml  -Patient has been asked to weigh daily and bring log to next clinic visit    -Sodium restriction:   -1,500 mg Na+ restriction was discussed.    Labs/Diagnostics/Referrals:    Labs -Pro-BNP, BMP prior to next office visit   Imaging -No orders placed today   Referrals No referrals placed today   Currently Pending: N/A     Lifestyle Advice:   - call office if I gain more than 2 pounds in one day or 5 pounds in one week   - keep legs up while sitting   - use salt in moderation   - watch for swelling in feet, ankles and legs every day   - weigh myself daily   -call for concerning s/sx   -- activity or exercise based on tolerance encouraged     CODE STATUS: FULL      Return in about 3 months (around 7/25/2025).    LONA Morales on 04/25/2025

## 2025-04-25 NOTE — PATIENT INSTRUCTIONS
Keep taking the metoprolol 25mg twice daily. Keep your follow up appointments with cardiology as well.     Directions for when to call the clinic reviewed with the patient to include weight gain of 2 to 3 pounds in 24 hours, weight gain of 5 to 10 pounds within 7 days; worsening shortness of breath; worsening lower extremity edema or abdominal distention.     Please check HR/BP/weight daily and bring log with you to next visit. Please call the office if HR is less than 50, SBP less than 100, or with any dizziness or lightheadedness.    Please get labs before your next visit.

## 2025-04-25 NOTE — ADDENDUM NOTE
Encounter addended by: Francheska Gerardo APRN on: 4/25/2025 10:40 AM   Actions taken: Problem List reviewed, Allergies reviewed, Medication List reviewed

## 2025-04-28 ENCOUNTER — LAB (OUTPATIENT)
Dept: LAB | Facility: HOSPITAL | Age: 80
End: 2025-04-28
Payer: MEDICARE

## 2025-04-28 DIAGNOSIS — E78.2 MIXED HYPERLIPIDEMIA: ICD-10-CM

## 2025-04-28 LAB
ALBUMIN SERPL-MCNC: 4.1 G/DL (ref 3.5–5.2)
ALP SERPL-CCNC: 66 U/L (ref 39–117)
ALT SERPL W P-5'-P-CCNC: 13 U/L (ref 1–33)
AST SERPL-CCNC: 20 U/L (ref 1–32)
BILIRUB CONJ SERPL-MCNC: 0.2 MG/DL (ref 0–0.3)
BILIRUB INDIRECT SERPL-MCNC: 0.3 MG/DL
BILIRUB SERPL-MCNC: 0.5 MG/DL (ref 0–1.2)
CHOLEST SERPL-MCNC: 96 MG/DL (ref 0–200)
HDLC SERPL-MCNC: 30 MG/DL (ref 40–60)
LDLC SERPL CALC-MCNC: 44 MG/DL (ref 0–100)
LDLC/HDLC SERPL: 1.39 {RATIO}
PROT SERPL-MCNC: 7.5 G/DL (ref 6–8.5)
TRIGL SERPL-MCNC: 122 MG/DL (ref 0–150)
VLDLC SERPL-MCNC: 22 MG/DL (ref 5–40)

## 2025-04-28 PROCEDURE — 80061 LIPID PANEL: CPT

## 2025-04-28 PROCEDURE — 36415 COLL VENOUS BLD VENIPUNCTURE: CPT

## 2025-04-28 PROCEDURE — 80076 HEPATIC FUNCTION PANEL: CPT

## 2025-04-29 ENCOUNTER — OFFICE VISIT (OUTPATIENT)
Dept: CARDIOLOGY | Facility: CLINIC | Age: 80
End: 2025-04-29
Payer: MEDICARE

## 2025-04-29 VITALS
BODY MASS INDEX: 27.62 KG/M2 | HEART RATE: 64 BPM | SYSTOLIC BLOOD PRESSURE: 129 MMHG | HEIGHT: 64 IN | DIASTOLIC BLOOD PRESSURE: 68 MMHG | WEIGHT: 161.8 LBS

## 2025-04-29 DIAGNOSIS — I50.32 HEART FAILURE WITH IMPROVED EJECTION FRACTION (HFIMPEF): ICD-10-CM

## 2025-04-29 DIAGNOSIS — I48.0 PAF (PAROXYSMAL ATRIAL FIBRILLATION): Primary | ICD-10-CM

## 2025-04-29 DIAGNOSIS — I10 PRIMARY HYPERTENSION: ICD-10-CM

## 2025-04-29 NOTE — PROGRESS NOTES
Chief Complaint  Follow-up, Hypertension, Hyperlipidemia, Atrial Fibrillation, and Aortic Stenosis    Subjective            History of Present Illness  Ann-Marie Hughes is a 79-year-old female patient who presents to the office today for follow up.    History of Present Illness  The patient presents for a follow-up visit.    She was previously seen on 04/04/2025, at which time she expressed a desire to discontinue metoprolol due to potential side effects, including leg weakness. A gradual tapering off the medication was initiated, and she has been monitoring her blood pressure and heart rate, both of which have remained within normal parameters. She reports no significant change in her overall health status since discontinuing metoprolol. She has not experienced any new or exacerbated symptoms. Her current medication regimen includes Lasix 40 mg daily, Entresto 49/51 mg, Farxiga 10 mg daily, digoxin every other day, and spironolactone 25 mg daily.    MEDICATIONS  Current: Lasix 40 mg daily, Entresto 49/51 mg, Farxiga 10 mg daily, digoxin (every other day), spironolactone 25 mg daily        PMH  Past Medical History:   Diagnosis Date    Acne rosacea 08/17/2021    DR.JEFF MOON     Acute respiratory failure with hypoxia 07/31/2023    Arteriosclerosis of abdominal aorta     B12 deficiency 08/17/2021    DJD (degenerative joint disease)     Hx of smoking 08/17/2021    QUIT IN 1976 AT AGE 31    Hyperlipidemia 08/17/2021    Hypertension     Hypothyroidism     Metabolic syndrome 08/17/2021    NSTEMI (non-ST elevated myocardial infarction) 07/17/2023    Pericardial effusion 10/10/2023    KAREN (stress urinary incontinence, female) 08/17/2021    UTI (urinary tract infection) 08/17/2021    Vitamin D deficiency 08/17/2021         ALLERGY  Allergies   Allergen Reactions    Penicillins Palpitations     1. When was your reaction? > 10 years ago  2. Did your reaction happen after the first dose or after several doses? After  first dose  3. Did your reaction require ED or hospital care to manage your reaction? Do not know  4. Did your reaction require treatment with epinephrine? Do not know  5. Have you taken amoxicillin (Amoxil) or amoxicillin-clavulanate (Augmentin) without issue since? Yes  6. Have you taken cephalexin (Keflex) without issue since?  Do not know           SURGICALHX  Past Surgical History:   Procedure Laterality Date    BACK SURGERY      CARDIAC CATHETERIZATION N/A 2023    Procedure: Left Heart Cath;  Surgeon: Dinh Andres MD;  Location: [facility] CATH INVASIVE LOCATION;  Service: Cardiovascular;  Laterality: N/A;    CARPAL TUNNEL RELEASE      COLONOSCOPY      CORONARY ARTERY BYPASS GRAFT WITH MITRAL VALVE REPAIR/REPLACEMENT N/A 2023    Procedure: REZA STERNOTOMY OFF-PUMP CORONARY ARTERY BYPASS GRAFT TIMES        USING LEFFT INTERNAL MAMMARY ARTERY AND     GREATER SAPHENOUS VEIN GRAFT PER EMDOSCOPIC VEIN HARVESTING, MAZE PROCEDURE AND PRP;  Surgeon: Shane Alexander MD;  Location: [facility] CV[facility];  Service: Cardiothoracic;  Laterality: N/A;    INCISION AND DRAINAGE LEG N/A 2024    Procedure: INCISION AND DRAINAGE LOWER EXTREMITY; Plain text: incision to remove pus from left leg;  Surgeon: Sergo Altamirano MD;  Location: [facility] MAIN OR;  Service: General;  Laterality: N/A;    KNEE SURGERY      LUMBAR DISCECTOMY      NECK SURGERY      Cervical    POSTERIOR LUMBAR/THORACIC SPINE FUSION            SOC  Social History     Socioeconomic History    Marital status:      Spouse name: Dinh   Tobacco Use    Smoking status: Former     Current packs/day: 0.00     Average packs/day: 1 pack/day for 12.0 years (12.0 ttl pk-yrs)     Types: Cigarettes     Start date:      Quit date:      Years since quittin.3    Smokeless tobacco: Never   Vaping Use    Vaping status: Never Used   Substance and Sexual Activity    Alcohol use: Never    Drug use: Never    Sexual activity: Defer  "        FAMHX  Family History   Problem Relation Age of Onset    Heart disease Mother     Arthritis Mother     Heart attack Mother     Arthritis Father           MEDSIGOCHELSY  Current Outpatient Medications on File Prior to Visit   Medication Sig    albuterol sulfate  (90 Base) MCG/ACT inhaler Inhale 2 puffs Every 4 (Four) Hours As Needed for Wheezing.    albuterol sulfate  (90 Base) MCG/ACT inhaler Inhale 2 puffs Every 4 (Four) Hours As Needed for Wheezing.    apixaban (Eliquis) 5 MG tablet tablet Take 1 tablet by mouth Every 12 (Twelve) Hours.    aspirin 81 MG EC tablet Take 1 tablet by mouth Daily.    atorvastatin (LIPITOR) 40 MG tablet TAKE 1 TABLET BY MOUTH EVERY DAY    buPROPion XL (WELLBUTRIN XL) 300 MG 24 hr tablet Take 1 tablet by mouth Daily.    dapagliflozin Propanediol (Farxiga) 10 MG tablet Take 10 mg by mouth Daily.    digoxin (LANOXIN) 125 MCG tablet Take 1 tablet by mouth Every Other Day.    folic acid (FOLVITE) 1 MG tablet Take 1 tablet by mouth Daily.    furosemide (LASIX) 40 MG tablet Take 1 tablet by mouth Daily.    metFORMIN ER (GLUCOPHAGE-XR) 500 MG 24 hr tablet Take 1 tablet by mouth 2 (Two) Times a Day.    montelukast (SINGULAIR) 10 MG tablet Take 1 tablet by mouth Daily.    Probiotic Product (VideoStep PO) Take 1 capsule by mouth Daily.    sacubitril-valsartan (Entresto) 49-51 MG tablet Take 1 tablet by mouth 2 (Two) Times a Day.    sacubitril-valsartan (Entresto) 49-51 MG tablet Take 1 tablet by mouth 2 (Two) Times a Day. Indications: [id number] 11/30/2026    spironolactone (ALDACTONE) 25 MG tablet Take 1 tablet by mouth Daily.    Synthroid 75 MCG tablet Take 1 tablet by mouth Daily.    metoprolol succinate XL (TOPROL-XL) 25 MG 24 hr tablet Take 1 tablet by mouth 2 (Two) Times a Day. (Patient not taking: Reported on 4/29/2025)     No current facility-administered medications on file prior to visit.         Objective   /68   Pulse 64   Ht 162.6 cm (64.02\")   Wt " 73.4 kg (161 lb 12.8 oz)   BMI 27.76 kg/m²       Physical Exam  HENT:      Head: Normocephalic.   Neck:      Vascular: No carotid bruit.   Cardiovascular:      Rate and Rhythm: Normal rate and regular rhythm.      Pulses: Normal pulses.      Heart sounds: Normal heart sounds. No murmur heard.  Pulmonary:      Effort: Pulmonary effort is normal.      Breath sounds: Normal breath sounds.   Musculoskeletal:      Cervical back: Neck supple.      Right lower leg: No edema.      Left lower leg: No edema.   Skin:     General: Skin is dry.   Neurological:      Mental Status: She is alert and oriented to person, place, and time.   Psychiatric:         Behavior: Behavior normal.       ECG 12 Lead    Date/Time: 4/28/2025 12:59 PM  Performed by: Mahogany Tinsley APRN    Authorized by: Mahogany Tinsley APRN  Comparison: compared with previous ECG from 2/3/2025  Similar to previous ECG  Rhythm: paced  Ectopy: multifocal PVCs  Rate: tachycardic  BPM: 113  Conduction: right bundle branch block  ST Segments: ST segments normal  T Waves: T waves normal  Other: no other findings    Clinical impression: abnormal EKG          Result Review :   The following data was reviewed by: LONA Heath on 04/29/2025:  proBNP   Date Value Ref Range Status   04/01/2025 4,987.0 (H) 0.0 - 1,800.0 pg/mL Final     CMP          3/14/2025    10:13 4/1/2025    10:48 4/28/2025    08:28   CMP   Glucose 120  133     BUN 63  48     Creatinine 1.77  1.52     EGFR 28.8  34.5     Sodium 137  140     Potassium 4.3  4.4     Chloride 103  108     Calcium 9.5  9.5     Total Protein 6.9  6.9  7.5    Albumin 4.0  4.1  4.1    Globulin 2.9  2.8     Total Bilirubin 0.7  0.6  0.5    Alkaline Phosphatase 57  67  66    AST (SGOT) 20  20  20    ALT (SGPT) 16  11  13    Albumin/Globulin Ratio 1.4  1.5     BUN/Creatinine Ratio 35.6  31.6     Anion Gap 12.2  11.9       CBC w/diff          2/14/2025    10:17 3/14/2025    10:13 4/1/2025    10:48   CBC w/Diff  "  WBC 18.40  11.23  10.55    RBC 2.82  2.46  2.56    Hemoglobin 8.7  8.1  8.7    Hematocrit 26.6  25.2  27.6    MCV 94.3  102.4  107.8    MCH 30.9  32.9  34.0    MCHC 32.7  32.1  31.5    RDW 20.7  21.8  21.0    Platelets 482  299  306    Neutrophil Rel % 77.4  64.5  59.2    Immature Granulocyte Rel % 0.6  0.4  0.2    Lymphocyte Rel % 14.1  23.2  28.7    Monocyte Rel % 7.7  10.3  9.0    Eosinophil Rel % 0.0  1.2  2.3    Basophil Rel % 0.2  0.4  0.6       Lab Results   Component Value Date    TSH 0.625 06/28/2024      Lab Results   Component Value Date    FREET4 1.74 (H) 12/29/2023      No results found for: \"DDIMERQUANT\"  Magnesium   Date Value Ref Range Status   04/01/2025 2.0 1.6 - 2.4 mg/dL Final      Digoxin   Date Value Ref Range Status   04/01/2025 1.10 0.60 - 1.20 ng/mL Final      Lab Results   Component Value Date    TROPONINT 22 (H) 02/03/2025           Lipid Panel          6/28/2024    11:25 4/28/2025    08:28   Lipid Panel   Total Cholesterol 113  96    Triglycerides 123  122    HDL Cholesterol 50  30    VLDL Cholesterol 22  22    LDL Cholesterol  41  44    LDL/HDL Ratio 0.77  1.39        Results for orders placed during the hospital encounter of 08/06/24    Adult Transthoracic Echo Complete W/ Cont if Necessary Per Protocol    Interpretation Summary    Left ventricular systolic function is normal. Calculated left ventricular EF = 54.8%    Left ventricular wall thickness is consistent with borderline concentric hypertrophy.    Left ventricular diastolic function is consistent with (grade Ia w/high LAP) impaired relaxation.    The left atrial cavity is mildly dilated.    Mild aortic valve stenosis is present.    Estimated right ventricular systolic pressure from tricuspid regurgitation is normal (<35 mmHg).           Assessment and Plan    Diagnoses and all orders for this visit:    1. PAF (paroxysmal atrial fibrillation) (Primary)    2. Heart failure with improved ejection fraction (HFimpEF)    3. Primary " hypertension      Assessment & Plan  1. Heart failure with reduced ejection fraction.  Her ejection fraction had previously decreased to 30% in March 2024 but has since improved to 54.8% as of August 2024.  Her blood pressure readings have been consistently in the low 100s to 120s systolic and 50s to 60s diastolic, with a heart rate ranging from 60s to 70s. She will continue her current medication regimen, including Lasix 40 mg daily, Entresto 49/51 mg, Farxiga 10 mg daily, digoxin every other day, and spironolactone 25 mg daily, provided she continues to tolerate them well. She is scheduled for a follow-up appointment with the heart failure clinic in July 2025 and with Dr. Brand in October 2025. These appointments will be maintained unless her symptoms worsen, in which case an earlier consultation may be necessary.    2. Medication management.  She has successfully tapered off metoprolol due to potential side effects, including leg weakness. Her blood pressure and heart rate have remained stable off the medication. She is advised to continue monitoring her blood pressure, heart rate, and weight at home, as previously discussed with heart failure clinic. If she experiences any persistent elevations in these readings, she should inform us or Francheska ZAMORANO. If she experiences high blood pressure or heart rate, chest pressure, or worsening shortness of breath, the reintroduction of metoprolol may be considered.      Follow Up   Return for Follow up with Dr Brand as scheduled.    Patient was given instructions and counseling regarding her condition or for health maintenance advice. Please see specific information pulled into the AVS if appropriate.     Ann-Marie Hughes  reports that she quit smoking about 49 years ago. Her smoking use included cigarettes. She started smoking about 61 years ago. She has a 12 pack-year smoking history. She has never used smokeless tobacco.         Patient or patient representative  verbalized consent for the use of Ambient Listening during the visit with  LONA Heath for chart documentation. 5/6/2025  04:43 EDT    LONA Heath  04/29/25  11:38 EDT    Dictated Utilizing Dragon Dictation

## 2025-05-05 ENCOUNTER — OUTSIDE FACILITY SERVICE (OUTPATIENT)
Dept: FAMILY MEDICINE CLINIC | Facility: CLINIC | Age: 80
End: 2025-05-05
Payer: MEDICARE

## 2025-05-07 ENCOUNTER — TELEPHONE (OUTPATIENT)
Facility: HOSPITAL | Age: 80
End: 2025-05-07
Payer: MEDICARE

## 2025-05-07 RX ORDER — METOPROLOL SUCCINATE 25 MG/1
12.5 TABLET, EXTENDED RELEASE ORAL DAILY
Start: 2025-05-07

## 2025-05-07 NOTE — TELEPHONE ENCOUNTER
Have her take Toprol XL 12.5 mg nightly   Monitor BP and HR twice daily for the next week and send in log for review

## 2025-05-07 NOTE — TELEPHONE ENCOUNTER
Proceed with seeing Dr Ndiaye and give kenalog. Shot.    SW patient. Patient states last 2 days HR running 110-120s.   BP this /60. BP yesterday 110/70

## 2025-05-07 NOTE — TELEPHONE ENCOUNTER
Patient called stating that for the last few days her pulse is been very high , she stated that she recently stopped taking Metoprolol and is wondering if she might need to go back on it.

## 2025-05-09 DIAGNOSIS — D59.10 AUTOIMMUNE HEMOLYTIC ANEMIA: Primary | ICD-10-CM

## 2025-05-12 ENCOUNTER — LAB (OUTPATIENT)
Dept: LAB | Facility: HOSPITAL | Age: 80
End: 2025-05-12
Payer: MEDICARE

## 2025-05-12 ENCOUNTER — OFFICE VISIT (OUTPATIENT)
Dept: FAMILY MEDICINE CLINIC | Facility: CLINIC | Age: 80
End: 2025-05-12
Payer: MEDICARE

## 2025-05-12 VITALS
HEIGHT: 64 IN | BODY MASS INDEX: 27.66 KG/M2 | TEMPERATURE: 97.8 F | DIASTOLIC BLOOD PRESSURE: 68 MMHG | HEART RATE: 72 BPM | WEIGHT: 162 LBS | OXYGEN SATURATION: 97 % | SYSTOLIC BLOOD PRESSURE: 116 MMHG

## 2025-05-12 DIAGNOSIS — I10 PRIMARY HYPERTENSION: Primary | ICD-10-CM

## 2025-05-12 DIAGNOSIS — I25.810 CORONARY ARTERY DISEASE INVOLVING AUTOLOGOUS VEIN CORONARY BYPASS GRAFT WITHOUT ANGINA PECTORIS: ICD-10-CM

## 2025-05-12 DIAGNOSIS — E03.9 ACQUIRED HYPOTHYROIDISM: ICD-10-CM

## 2025-05-12 DIAGNOSIS — E11.9 TYPE 2 DIABETES MELLITUS WITHOUT COMPLICATION, WITHOUT LONG-TERM CURRENT USE OF INSULIN: ICD-10-CM

## 2025-05-12 DIAGNOSIS — E78.2 MIXED HYPERLIPIDEMIA: ICD-10-CM

## 2025-05-12 DIAGNOSIS — I48.0 PAF (PAROXYSMAL ATRIAL FIBRILLATION): ICD-10-CM

## 2025-05-12 DIAGNOSIS — E53.8 B12 DEFICIENCY: ICD-10-CM

## 2025-05-12 DIAGNOSIS — D59.10 AUTOIMMUNE HEMOLYTIC ANEMIA: ICD-10-CM

## 2025-05-12 DIAGNOSIS — E55.9 VITAMIN D DEFICIENCY: ICD-10-CM

## 2025-05-12 LAB
25(OH)D3 SERPL-MCNC: 39.4 NG/ML (ref 30–100)
ALBUMIN SERPL-MCNC: 4.4 G/DL (ref 3.5–5.2)
ALBUMIN/GLOB SERPL: 1.6 G/DL
ALP SERPL-CCNC: 73 U/L (ref 39–117)
ALT SERPL W P-5'-P-CCNC: 7 U/L (ref 1–33)
ANION GAP SERPL CALCULATED.3IONS-SCNC: 12.6 MMOL/L (ref 5–15)
ANISOCYTOSIS BLD QL: NORMAL
AST SERPL-CCNC: 17 U/L (ref 1–32)
BASOPHILS # BLD AUTO: 0.06 10*3/MM3 (ref 0–0.2)
BASOPHILS NFR BLD AUTO: 0.5 % (ref 0–1.5)
BILIRUB SERPL-MCNC: 0.4 MG/DL (ref 0–1.2)
BUN SERPL-MCNC: 68 MG/DL (ref 8–23)
BUN/CREAT SERPL: 37.2 (ref 7–25)
C3 FRG RBC-MCNC: NORMAL
CALCIUM SPEC-SCNC: 9.6 MG/DL (ref 8.6–10.5)
CHLORIDE SERPL-SCNC: 106 MMOL/L (ref 98–107)
CO2 SERPL-SCNC: 19.4 MMOL/L (ref 22–29)
CREAT SERPL-MCNC: 1.83 MG/DL (ref 0.57–1)
DACRYOCYTES BLD QL SMEAR: NORMAL
DAT C3: NEGATIVE
DAT IGG GEL: POSITIVE
DEPRECATED RDW RBC AUTO: 68.6 FL (ref 37–54)
EGFRCR SERPLBLD CKD-EPI 2021: 27.6 ML/MIN/1.73
ELLIPTOCYTES BLD QL SMEAR: NORMAL
EOSINOPHIL # BLD AUTO: 0.29 10*3/MM3 (ref 0–0.4)
EOSINOPHIL NFR BLD AUTO: 2.6 % (ref 0.3–6.2)
ERYTHROCYTE [DISTWIDTH] IN BLOOD BY AUTOMATED COUNT: 18.1 % (ref 12.3–15.4)
GLOBULIN UR ELPH-MCNC: 2.8 GM/DL
GLUCOSE SERPL-MCNC: 123 MG/DL (ref 65–99)
HAPTOGLOB SERPL-MCNC: 123 MG/DL (ref 30–200)
HBA1C MFR BLD: 6.1 % (ref 4.8–5.6)
HCT VFR BLD AUTO: 29.1 % (ref 34–46.6)
HGB BLD-MCNC: 9.2 G/DL (ref 12–15.9)
IMM GRANULOCYTES # BLD AUTO: 0.03 10*3/MM3 (ref 0–0.05)
IMM GRANULOCYTES NFR BLD AUTO: 0.3 % (ref 0–0.5)
LDH SERPL-CCNC: 119 U/L (ref 135–214)
LYMPHOCYTES # BLD AUTO: 2.92 10*3/MM3 (ref 0.7–3.1)
LYMPHOCYTES NFR BLD AUTO: 26.4 % (ref 19.6–45.3)
MACROCYTES BLD QL SMEAR: NORMAL
MCH RBC QN AUTO: 32.7 PG (ref 26.6–33)
MCHC RBC AUTO-ENTMCNC: 31.6 G/DL (ref 31.5–35.7)
MCV RBC AUTO: 103.6 FL (ref 79–97)
MICROCYTES BLD QL: NORMAL
MONOCYTES # BLD AUTO: 1.11 10*3/MM3 (ref 0.1–0.9)
MONOCYTES NFR BLD AUTO: 10 % (ref 5–12)
NEUTROPHILS NFR BLD AUTO: 6.64 10*3/MM3 (ref 1.7–7)
NEUTROPHILS NFR BLD AUTO: 60.2 % (ref 42.7–76)
NRBC BLD AUTO-RTO: 0.2 /100 WBC (ref 0–0.2)
PLATELET # BLD AUTO: 297 10*3/MM3 (ref 140–450)
PMV BLD AUTO: 11 FL (ref 6–12)
POIKILOCYTOSIS BLD QL SMEAR: NORMAL
POTASSIUM SERPL-SCNC: 5 MMOL/L (ref 3.5–5.2)
PROT SERPL-MCNC: 7.2 G/DL (ref 6–8.5)
RBC # BLD AUTO: 2.81 10*6/MM3 (ref 3.77–5.28)
RETICS # AUTO: 0.03 10*6/MM3 (ref 0.02–0.13)
RETICS/RBC NFR AUTO: 0.91 % (ref 0.7–1.9)
SMALL PLATELETS BLD QL SMEAR: ADEQUATE
SODIUM SERPL-SCNC: 138 MMOL/L (ref 136–145)
TSH SERPL DL<=0.05 MIU/L-ACNC: 0.77 UIU/ML (ref 0.27–4.2)
WBC MORPH BLD: NORMAL
WBC NRBC COR # BLD AUTO: 11.05 10*3/MM3 (ref 3.4–10.8)

## 2025-05-12 PROCEDURE — 85025 COMPLETE CBC W/AUTO DIFF WBC: CPT

## 2025-05-12 PROCEDURE — 3074F SYST BP LT 130 MM HG: CPT | Performed by: FAMILY MEDICINE

## 2025-05-12 PROCEDURE — 85007 BL SMEAR W/DIFF WBC COUNT: CPT

## 2025-05-12 PROCEDURE — 82306 VITAMIN D 25 HYDROXY: CPT | Performed by: FAMILY MEDICINE

## 2025-05-12 PROCEDURE — 99214 OFFICE O/P EST MOD 30 MIN: CPT | Performed by: FAMILY MEDICINE

## 2025-05-12 PROCEDURE — 1126F AMNT PAIN NOTED NONE PRSNT: CPT | Performed by: FAMILY MEDICINE

## 2025-05-12 PROCEDURE — 83010 ASSAY OF HAPTOGLOBIN QUANT: CPT

## 2025-05-12 PROCEDURE — 36415 COLL VENOUS BLD VENIPUNCTURE: CPT

## 2025-05-12 PROCEDURE — 3078F DIAST BP <80 MM HG: CPT | Performed by: FAMILY MEDICINE

## 2025-05-12 PROCEDURE — 83036 HEMOGLOBIN GLYCOSYLATED A1C: CPT | Performed by: FAMILY MEDICINE

## 2025-05-12 PROCEDURE — 36415 COLL VENOUS BLD VENIPUNCTURE: CPT | Performed by: FAMILY MEDICINE

## 2025-05-12 PROCEDURE — 80053 COMPREHEN METABOLIC PANEL: CPT

## 2025-05-12 PROCEDURE — 83615 LACTATE (LD) (LDH) ENZYME: CPT

## 2025-05-12 PROCEDURE — 85045 AUTOMATED RETICULOCYTE COUNT: CPT

## 2025-05-12 PROCEDURE — 84443 ASSAY THYROID STIM HORMONE: CPT | Performed by: FAMILY MEDICINE

## 2025-05-12 PROCEDURE — 86880 COOMBS TEST DIRECT: CPT

## 2025-05-12 NOTE — ASSESSMENT & PLAN NOTE
She does see cardiology on a regular basis.  Recently they discontinued and weaned her off her metoprolol.  Shortly thereafter she noticed some increase in her heart rate and restarted back at a lower dose.  Since then she has done fine.

## 2025-05-12 NOTE — ASSESSMENT & PLAN NOTE
Her blood pressure is good here today.  Is also being [facility].  She will continue her current meds.  She had recent labs that were good.

## 2025-05-12 NOTE — PROGRESS NOTES
Chief Complaint   Patient presents with    Follow-up     3 month     Hypertension    Hypothyroidism    Hyperlipidemia    Diabetes        Subjective     Ann-Mariedami Hughes  has a past medical history of Acne rosacea (08/17/2021), Acute respiratory failure with hypoxia (07/31/2023), Arteriosclerosis of abdominal aorta, B12 deficiency (08/17/2021), DJD (degenerative joint disease), smoking (08/17/2021), Hyperlipidemia (08/17/2021), Hypothyroidism, Metabolic syndrome (08/17/2021), NSTEMI (non-ST elevated myocardial infarction) (07/17/2023), Pericardial effusion (10/10/2023), KAREN (stress urinary incontinence, female) (08/17/2021), UTI (urinary tract infection) (08/17/2021), and Vitamin D deficiency (08/17/2021).    HPI    DM2: She is checking her blood sugars every day and says they run between 140-170. She is currently on metformin and Farxiga. She denies any increased thirst/urination, and any increased blurriness.     Afib: She says she is taking eliquis daily along with digoxin. Cardiology is monitoring her digoxin levels. She denies heart fluttering/palpitations.    HTN: She says she is taking her blood pressures every day, but isn't sure what they run on average. She keeps a log at  home. It is 116/68 today. She is on Lasix, Spirnolactone, metoprolol, and Entresto.     Hypothyroidism: Takes her synthroid every morning as directed.    CAD: She denies any recent episodes of angina.    Allergies/SOB: She feels like her Singulair isn't working as well. She has had an increase in shortness of breath and runny nose. She doesn't use her albuterol often.       PHQ-2 Depression Screening  Little interest or pleasure in doing things?     Feeling down, depressed, or hopeless?     PHQ-2 Total Score     PHQ-9 Depression Screening  Little interest or pleasure in doing things?     Feeling down, depressed, or hopeless?     Trouble falling or staying asleep, or sleeping too much?     Feeling tired or having little energy?      Poor appetite or overeating?     Feeling bad about yourself - or that you are a failure or have let yourself or your family down?     Trouble concentrating on things, such as reading the newspaper or watching television?     Moving or speaking so slowly that other people could have noticed? Or the opposite - being so fidgety or restless that you have been moving around a lot more than usual?     Thoughts that you would be better off dead, or of hurting yourself in some way?     PHQ-9 Total Score     If you checked off any problems, how difficult have these problems made it for you to do your work, take care of things at home, or get along with other people?       Allergies   Allergen Reactions    Penicillins Palpitations     1. When was your reaction? > 10 years ago  2. Did your reaction happen after the first dose or after several doses? After first dose  3. Did your reaction require ED or hospital care to manage your reaction? Do not know  4. Did your reaction require treatment with epinephrine? Do not know  5. Have you taken amoxicillin (Amoxil) or amoxicillin-clavulanate (Augmentin) without issue since? Yes  6. Have you taken cephalexin (Keflex) without issue since?  Do not know        Prior to Admission medications    Medication Sig Start Date End Date Taking? Authorizing Provider   albuterol sulfate  (90 Base) MCG/ACT inhaler Inhale 2 puffs Every 4 (Four) Hours As Needed for Wheezing. 5/30/23  Yes Les Moreno, DO   albuterol sulfate  (90 Base) MCG/ACT inhaler Inhale 2 puffs Every 4 (Four) Hours As Needed for Wheezing. 2/3/25  Yes Les Moreno DO   apixaban (Eliquis) 5 MG tablet tablet Take 1 tablet by mouth Every 12 (Twelve) Hours. 2/18/25  Yes Roderick Brand MD   aspirin 81 MG EC tablet Take 1 tablet by mouth Daily.   Yes ProviderKanwal MD   atorvastatin (LIPITOR) 40 MG tablet TAKE 1 TABLET BY MOUTH EVERY DAY 12/16/24  Yes Romario Valdez, DO   buPROPion XL (WELLBUTRIN XL) 300  MG 24 hr tablet Take 1 tablet by mouth Daily. 2/10/25  Yes Romario Valdez DO   dapagliflozin Propanediol (Farxiga) 10 MG tablet Take 10 mg by mouth Daily. 3/14/25  Yes Mahogany Tinsley APRN   digoxin (LANOXIN) 125 MCG tablet Take 1 tablet by mouth Every Other Day.   Yes ProviderKanwal MD   folic acid (FOLVITE) 1 MG tablet Take 1 tablet by mouth Daily. 3/17/25  Yes Eboni Acosta APRN   furosemide (LASIX) 40 MG tablet Take 1 tablet by mouth Daily. 12/27/24  Yes Sergo Schulte MD   metFORMIN ER (GLUCOPHAGE-XR) 500 MG 24 hr tablet Take 1 tablet by mouth 2 (Two) Times a Day. 2/13/25  Yes Romario Valdez DO   metoprolol succinate XL (TOPROL-XL) 25 MG 24 hr tablet Take 0.5 tablets by mouth Daily. 5/7/25  Yes Mahogany Tinsley APRN   montelukast (SINGULAIR) 10 MG tablet Take 1 tablet by mouth Daily. 7/31/24  Yes Romario Valdez DO   Probiotic Product (Scoreoid PO) Take 1 capsule by mouth Daily.   Yes Provider, MD Kanwal   sacubitril-valsartan (Entresto) 49-51 MG tablet Take 1 tablet by mouth 2 (Two) Times a Day. 1/16/25  Yes Sara Lubin APRN   sacubitril-valsartan (Entresto) 49-51 MG tablet Take 1 tablet by mouth 2 (Two) Times a Day. Indications: [id number] 11/30/2026 4/25/25  Yes Francheska Gerardo APRN   spironolactone (ALDACTONE) 25 MG tablet Take 1 tablet by mouth Daily. 1/16/25  Yes Sara Lubin APRN   Synthroid 75 MCG tablet Take 1 tablet by mouth Daily. 7/31/24  Yes Romario Valdez DO        Patient Active Problem List   Diagnosis    Arthritis    Chronic pain syndrome    Diabetes mellitus, type II    Gastric reflux    Herniated disc    Primary hypertension    Hypothyroidism    Pain in joint, multiple sites    Hyperlipidemia    B12 deficiency    Vitamin D deficiency    Acne rosacea    Hx of smoking    Stress incontinence of urine    Arteriosclerosis of abdominal aorta    Iron deficiency anemia secondary to inadequate dietary  iron intake    Seasonal allergies    Hyponatremia    Leg length discrepancy    Reactive depression    Encounter for subsequent annual wellness visit (AWV) in Medicare patient    Class 1 obesity with alveolar hypoventilation, serious comorbidity, and body mass index (BMI) of 32.0 to 32.9 in adult    Chronic HFrEF (heart failure with reduced ejection fraction)    CAD status post CABG x3 vessels    PAF (paroxysmal atrial fibrillation)    Autoimmune hemolytic anemia    Need for RSV vaccination    Stage 3b chronic kidney disease    Large granular lymphocyte disorder    Cervicogenic headache    Aortic stenosis, mild    Contusion of left foot    Cellulitis of foot    Cellulitis of left lower extremity    Wound cellulitis    Abscess of left lower extremity    Neuropathy    Ulcer of left foot with fat layer exposed    Hospital discharge follow-up    Bronchitis    Heart failure with improved ejection fraction (HFimpEF)        Past Surgical History:   Procedure Laterality Date    BACK SURGERY  2013    CARDIAC CATHETERIZATION N/A 07/17/2023    Procedure: Left Heart Cath;  Surgeon: Dinh Andres MD;  Location: [facility] CATH INVASIVE LOCATION;  Service: Cardiovascular;  Laterality: N/A;    CARPAL TUNNEL RELEASE      COLONOSCOPY  2011    CORONARY ARTERY BYPASS GRAFT WITH MITRAL VALVE REPAIR/REPLACEMENT N/A 07/20/2023    Procedure: REZA STERNOTOMY OFF-PUMP CORONARY ARTERY BYPASS GRAFT TIMES        USING LEFFT INTERNAL MAMMARY ARTERY AND     GREATER SAPHENOUS VEIN GRAFT PER EMDOSCOPIC VEIN HARVESTING, MAZE PROCEDURE AND PRP;  Surgeon: Shane Alexander MD;  Location: [facility];  Service: Cardiothoracic;  Laterality: N/A;    INCISION AND DRAINAGE LEG N/A 12/24/2024    Procedure: INCISION AND DRAINAGE LOWER EXTREMITY; Plain text: incision to remove pus from left leg;  Surgeon: Sergo Altamirano MD;  Location: [facility] MAIN OR;  Service: General;  Laterality: N/A;    KNEE SURGERY      LUMBAR DISCECTOMY      NECK SURGERY       "Cervical    POSTERIOR LUMBAR/THORACIC SPINE FUSION         Social History     Socioeconomic History    Marital status:      Spouse name: Dinh   Tobacco Use    Smoking status: Former     Current packs/day: 0.00     Average packs/day: 1 pack/day for 12.0 years (12.0 ttl pk-yrs)     Types: Cigarettes     Start date:      Quit date:      Years since quittin.3    Smokeless tobacco: Never   Vaping Use    Vaping status: Never Used   Substance and Sexual Activity    Alcohol use: Never    Drug use: Never    Sexual activity: Defer       Family History   Problem Relation Age of Onset    Heart disease Mother     Arthritis Mother     Heart attack Mother     Arthritis Father        Family history, surgical history, past medical history, Allergies and meds reviewed with patient today and updated in [employer] EMR.     ROS:  Review of Systems   Constitutional:  Positive for fatigue.   Eyes:  Negative for blurred vision.   Respiratory:  Positive for shortness of breath. Negative for chest tightness.    Gastrointestinal:  Negative for abdominal pain and blood in stool.   Endocrine: Negative for polydipsia and polyuria.   Genitourinary:  Negative for dysuria and hematuria.   Neurological:  Negative for light-headedness and headache.       OBJECTIVE:  Vitals:    25 1152   BP: 116/68   BP Location: Left arm   Patient Position: Sitting   Pulse: 72   Temp: 97.8 °F (36.6 °C)   SpO2: 97%   Weight: 73.5 kg (162 lb)   Height: 162.6 cm (64.02\")     No results found.   Body mass index is 27.79 kg/m².  No LMP recorded (lmp unknown). Patient is postmenopausal.    The ASCVD Risk score (Manuel DK, et al., 2019) failed to calculate for the following reasons:    The 2019 ASCVD risk score is only valid for ages 40 to 79    Risk score cannot be calculated because patient has a medical history suggesting prior/existing ASCVD     Physical Exam  Vitals and nursing note reviewed.   Constitutional:       General: She is not in acute " distress.     Appearance: Normal appearance.   HENT:      Head: Normocephalic.      Right Ear: Tympanic membrane, ear canal and external ear normal.      Left Ear: Tympanic membrane, ear canal and external ear normal.      Nose: Nose normal.      Mouth/Throat:      Mouth: Mucous membranes are moist.      Pharynx: Oropharynx is clear.   Eyes:      General: No scleral icterus.     Conjunctiva/sclera: Conjunctivae normal.      Pupils: Pupils are equal, round, and reactive to light.   Cardiovascular:      Rate and Rhythm: Normal rate and regular rhythm. Rhythm irregular.      Pulses: Normal pulses.      Heart sounds: Murmur heard.      Systolic murmur is present with a grade of 1/6.      Diastolic murmur is present.   Pulmonary:      Effort: Pulmonary effort is normal.      Breath sounds: Normal breath sounds. No wheezing, rhonchi or rales.   Musculoskeletal:      Cervical back: Neck supple. No rigidity or tenderness.      Right lower leg: No edema.      Left lower leg: No edema.   Lymphadenopathy:      Cervical: No cervical adenopathy.   Skin:     General: Skin is warm and dry.      Coloration: Skin is not jaundiced.      Findings: No rash.   Neurological:      General: No focal deficit present.      Mental Status: She is alert and oriented to person, place, and time.   Psychiatric:         Mood and Affect: Mood normal.         Thought Content: Thought content normal.         Judgment: Judgment normal.         Procedures    Lab on 04/28/2025   Component Date Value Ref Range Status    Total Cholesterol 04/28/2025 96  0 - 200 mg/dL Final    Triglycerides 04/28/2025 122  0 - 150 mg/dL Final    HDL Cholesterol 04/28/2025 30 (L)  40 - 60 mg/dL Final    LDL Cholesterol  04/28/2025 44  0 - 100 mg/dL Final    VLDL Cholesterol 04/28/2025 22  5 - 40 mg/dL Final    LDL/HDL Ratio 04/28/2025 1.39   Final    Total Protein 04/28/2025 7.5  6.0 - 8.5 g/dL Final    Albumin 04/28/2025 4.1  3.5 - 5.2 g/dL Final    ALT (SGPT) 04/28/2025 13   1 - 33 U/L Final    AST (SGOT) 04/28/2025 20  1 - 32 U/L Final    Alkaline Phosphatase 04/28/2025 66  39 - 117 U/L Final    Total Bilirubin 04/28/2025 0.5  0.0 - 1.2 mg/dL Final    Bilirubin, Direct 04/28/2025 0.2  0.0 - 0.3 mg/dL Final    Bilirubin, Indirect 04/28/2025 0.3  mg/dL Final       ASSESSMENT/ PLAN:    Diagnoses and all orders for this visit:    1. Primary hypertension (Primary)  Assessment & Plan:  Her blood pressure is good here today.  Is also being [facility].  She will continue her current meds.  She had recent labs that were good.      2. PAF (paroxysmal atrial fibrillation)  Assessment & Plan:  She does see cardiology on a regular basis.  Recently they discontinued and weaned her off her metoprolol.  Shortly thereafter she noticed some increase in her heart rate and restarted back at a lower dose.  Since then she has done fine.      3. Mixed hyperlipidemia  Assessment & Plan:   She had a recent lipid profile through cardiology.  Her lipid profile was fine.      4. CAD status post CABG x3 vessels    5. Acquired hypothyroidism  -     TSH Rfx On Abnormal To Free T4    6. Type 2 diabetes mellitus without complication, without long-term current use of insulin  Assessment & Plan:  Her blood sugars are little bit higher than expected.  Will update her A1c and address any changes based upon those results.    Orders:  -     Hemoglobin A1c    7. B12 deficiency    8. Vitamin D deficiency  -     Vitamin D,25-Hydroxy               Orders Placed Today:     No orders of the defined types were placed in this encounter.       Management Plan:     An After Visit Summary was printed and given to the patient at discharge.    Follow-up: Return in about 4 months (around 9/12/2025).    Romario Valdez DO 5/12/2025 12:23 EDT  This note was electronically signed.

## 2025-05-13 ENCOUNTER — OFFICE VISIT (OUTPATIENT)
Dept: ONCOLOGY | Facility: HOSPITAL | Age: 80
End: 2025-05-13
Payer: MEDICARE

## 2025-05-13 ENCOUNTER — RESULTS FOLLOW-UP (OUTPATIENT)
Dept: FAMILY MEDICINE CLINIC | Facility: CLINIC | Age: 80
End: 2025-05-13
Payer: MEDICARE

## 2025-05-13 VITALS
HEART RATE: 69 BPM | SYSTOLIC BLOOD PRESSURE: 114 MMHG | DIASTOLIC BLOOD PRESSURE: 55 MMHG | BODY MASS INDEX: 27.83 KG/M2 | HEIGHT: 64 IN | OXYGEN SATURATION: 98 % | RESPIRATION RATE: 20 BRPM | WEIGHT: 163 LBS | TEMPERATURE: 97.7 F

## 2025-05-13 DIAGNOSIS — E53.8 B12 DEFICIENCY: ICD-10-CM

## 2025-05-13 DIAGNOSIS — D59.10 AUTOIMMUNE HEMOLYTIC ANEMIA: Primary | ICD-10-CM

## 2025-05-13 PROCEDURE — G0463 HOSPITAL OUTPT CLINIC VISIT: HCPCS | Performed by: INTERNAL MEDICINE

## 2025-05-14 NOTE — ASSESSMENT & PLAN NOTE
Patient states that she is no longer taking her prednisone.  Hemoglobin remains decreased but stable and asymptomatic.  She has multiple other confounding comorbidities including chronic kidney disease, B12 deficiency.  At this point no intervention required.  We discussed watching for jaundice or dark cola colored urine or increasing fatigue.  Otherwise I will plan to see her back in 3 months for continued surveillance with lab work prior including NANNETTE, reticulocyte count, haptoglobin, LDH and CMP.

## 2025-05-15 RX ORDER — LEVOTHYROXINE SODIUM 75 MCG
75 TABLET ORAL DAILY
Qty: 90 TABLET | Refills: 3 | Status: SHIPPED | OUTPATIENT
Start: 2025-05-15

## 2025-06-17 ENCOUNTER — HOSPITAL ENCOUNTER (OUTPATIENT)
Dept: CT IMAGING | Facility: HOSPITAL | Age: 80
Discharge: HOME | End: 2025-06-17
Admitting: FAMILY MEDICINE
Payer: MEDICARE

## 2025-06-17 ENCOUNTER — OFFICE VISIT (OUTPATIENT)
Dept: FAMILY MEDICINE CLINIC | Facility: CLINIC | Age: 80
End: 2025-06-17
Payer: MEDICARE

## 2025-06-17 VITALS
BODY MASS INDEX: 27.96 KG/M2 | SYSTOLIC BLOOD PRESSURE: 93 MMHG | HEIGHT: 64 IN | DIASTOLIC BLOOD PRESSURE: 46 MMHG | HEART RATE: 72 BPM | TEMPERATURE: 97.5 F

## 2025-06-17 DIAGNOSIS — R29.898 WEAKNESS OF LEFT LEG: ICD-10-CM

## 2025-06-17 DIAGNOSIS — M25.562 ACUTE PAIN OF LEFT KNEE: Primary | ICD-10-CM

## 2025-06-17 PROCEDURE — 1125F AMNT PAIN NOTED PAIN PRSNT: CPT | Performed by: FAMILY MEDICINE

## 2025-06-17 PROCEDURE — 70450 CT HEAD/BRAIN W/O DYE: CPT

## 2025-06-17 PROCEDURE — 3078F DIAST BP <80 MM HG: CPT | Performed by: FAMILY MEDICINE

## 2025-06-17 PROCEDURE — 3074F SYST BP LT 130 MM HG: CPT | Performed by: FAMILY MEDICINE

## 2025-06-17 PROCEDURE — 99214 OFFICE O/P EST MOD 30 MIN: CPT | Performed by: FAMILY MEDICINE

## 2025-06-17 NOTE — ASSESSMENT & PLAN NOTE
She does have some weakness in her left leg primarily of her hip flexor.  This is more likely from her lumbar spine rather than a CVA.  She is currently on aspirin and Eliquis.  Will get in CT of her head to make sure that is fine I imagine we will not find anything acute.  Thus then she would need an MRI of her lumbar spine.

## 2025-06-17 NOTE — PROGRESS NOTES
Chief Complaint   Patient presents with    Knee Pain        Subjective     Ann-Marie Hughes  has a past medical history of Acne rosacea (08/17/2021), Acute respiratory failure with hypoxia (07/31/2023), Arteriosclerosis of abdominal aorta, B12 deficiency (08/17/2021), DJD (degenerative joint disease), smoking (08/17/2021), Hyperlipidemia (08/17/2021), Hypothyroidism, Metabolic syndrome (08/17/2021), NSTEMI (non-ST elevated myocardial infarction) (07/17/2023), Pericardial effusion (10/10/2023), KAREN (stress urinary incontinence, female) (08/17/2021), UTI (urinary tract infection) (08/17/2021), and Vitamin D deficiency (08/17/2021).    Knee Pain   The incident occurred 5 to 7 days ago. The incident occurred at home. There was no injury mechanism. The pain is present in the left knee. The pain is at a severity of 10/10. The pain is severe. The pain has been Intermittent since onset. Nothing aggravates the symptoms. She has tried ice and acetaminophen for the symptoms. The treatment provided no relief.       PHQ-2 Depression Screening  Little interest or pleasure in doing things?     Feeling down, depressed, or hopeless?     PHQ-2 Total Score     PHQ-9 Depression Screening  Little interest or pleasure in doing things?     Feeling down, depressed, or hopeless?     Trouble falling or staying asleep, or sleeping too much?     Feeling tired or having little energy?     Poor appetite or overeating?     Feeling bad about yourself - or that you are a failure or have let yourself or your family down?     Trouble concentrating on things, such as reading the newspaper or watching television?     Moving or speaking so slowly that other people could have noticed? Or the opposite - being so fidgety or restless that you have been moving around a lot more than usual?     Thoughts that you would be better off dead, or of hurting yourself in some way?     PHQ-9 Total Score     If you checked off any problems, how difficult have these  problems made it for you to do your work, take care of things at home, or get along with other people?       Allergies   Allergen Reactions    Penicillins Palpitations     1. When was your reaction? > 10 years ago  2. Did your reaction happen after the first dose or after several doses? After first dose  3. Did your reaction require ED or hospital care to manage your reaction? Do not know  4. Did your reaction require treatment with epinephrine? Do not know  5. Have you taken amoxicillin (Amoxil) or amoxicillin-clavulanate (Augmentin) without issue since? Yes  6. Have you taken cephalexin (Keflex) without issue since?  Do not know        Prior to Admission medications    Medication Sig Start Date End Date Taking? Authorizing Provider   albuterol sulfate  (90 Base) MCG/ACT inhaler Inhale 2 puffs Every 4 (Four) Hours As Needed for Wheezing. 2/3/25  Yes Les Moreno, DO   apixaban (Eliquis) 5 MG tablet tablet Take 1 tablet by mouth Every 12 (Twelve) Hours. 2/18/25  Yes Roderick Barnd MD   aspirin 81 MG EC tablet Take 1 tablet by mouth Daily.   Yes Kanwal Orozco MD   atorvastatin (LIPITOR) 40 MG tablet TAKE 1 TABLET BY MOUTH EVERY DAY 12/16/24  Yes Romario Valdez, DO   buPROPion XL (WELLBUTRIN XL) 300 MG 24 hr tablet Take 1 tablet by mouth Daily. 2/10/25  Yes Romario Valdez DO   dapagliflozin Propanediol (Farxiga) 10 MG tablet Take 10 mg by mouth Daily. 3/14/25  Yes Mahogany Tinsley APRN   digoxin (LANOXIN) 125 MCG tablet Take 1 tablet by mouth Every Other Day.   Yes Kanwal Orzoco MD   folic acid (FOLVITE) 1 MG tablet Take 1 tablet by mouth Daily. 3/17/25  Yes Eboni Acosta APRN   furosemide (LASIX) 40 MG tablet Take 1 tablet by mouth Daily. 12/27/24  Yes Sergo Schulte MD   metFORMIN ER (GLUCOPHAGE-XR) 500 MG 24 hr tablet Take 1 tablet by mouth 2 (Two) Times a Day. 2/13/25  Yes Romario Valdez DO   metoprolol succinate XL (TOPROL-XL) 25 MG 24 hr  tablet Take 0.5 tablets by mouth Daily. 5/7/25  Yes Mahogany Tinsley APRN   montelukast (SINGULAIR) 10 MG tablet Take 1 tablet by mouth Daily. 7/31/24  Yes Romario Valdez DO   Probiotic Product ([employer] HEALTH PO) Take 1 capsule by mouth Daily.   Yes Provider, MD Kanwal   sacubitril-valsartan (Entresto) 49-51 MG tablet Take 1 tablet by mouth 2 (Two) Times a Day. Indications: [id number] 11/30/2026 4/25/25  Yes Francheska Gerardo APRN   spironolactone (ALDACTONE) 25 MG tablet Take 1 tablet by mouth Daily. 1/16/25  Yes Sara Lubin APRN   Synthroid 75 MCG tablet Take 1 tablet by mouth Daily. 5/15/25  Yes Romario Valdez DO        Patient Active Problem List   Diagnosis    Arthritis    Chronic pain syndrome    Diabetes mellitus, type II    Gastric reflux    Herniated disc    Primary hypertension    Hypothyroidism    Pain in joint, multiple sites    Hyperlipidemia    B12 deficiency    Vitamin D deficiency    Acne rosacea    Hx of smoking    Stress incontinence of urine    Arteriosclerosis of abdominal aorta    Iron deficiency anemia secondary to inadequate dietary iron intake    Seasonal allergies    Hyponatremia    Leg length discrepancy    Reactive depression    Encounter for subsequent annual wellness visit (AWV) in Medicare patient    Class 1 obesity with alveolar hypoventilation, serious comorbidity, and body mass index (BMI) of 32.0 to 32.9 in adult    Chronic HFrEF (heart failure with reduced ejection fraction)    CAD status post CABG x3 vessels    PAF (paroxysmal atrial fibrillation)    Autoimmune hemolytic anemia    Need for RSV vaccination    Stage 3b chronic kidney disease    Large granular lymphocyte disorder    Cervicogenic headache    Aortic stenosis, mild    Contusion of left foot    Cellulitis of foot    Cellulitis of left lower extremity    Wound cellulitis    Abscess of left lower extremity    Neuropathy    Ulcer of left foot with fat layer exposed    Hospital  discharge follow-up    Bronchitis    Heart failure with improved ejection fraction (HFimpEF)    Acute pain of left knee    Weakness of left leg        Past Surgical History:   Procedure Laterality Date    BACK SURGERY  2013    CARDIAC CATHETERIZATION N/A 2023    Procedure: Left Heart Cath;  Surgeon: Dinh Andres MD;  Location: [facility] CATH INVASIVE LOCATION;  Service: Cardiovascular;  Laterality: N/A;    CARPAL TUNNEL RELEASE      COLONOSCOPY      CORONARY ARTERY BYPASS GRAFT WITH MITRAL VALVE REPAIR/REPLACEMENT N/A 2023    Procedure: ERZA STERNOTOMY OFF-PUMP CORONARY ARTERY BYPASS GRAFT TIMES        USING LEFFT INTERNAL MAMMARY ARTERY AND     GREATER SAPHENOUS VEIN GRAFT PER EMDOSCOPIC VEIN HARVESTING, MAZE PROCEDURE AND PRP;  Surgeon: Shane Alexander MD;  Location: [facility] CVOR;  Service: Cardiothoracic;  Laterality: N/A;    INCISION AND DRAINAGE LEG N/A 2024    Procedure: INCISION AND DRAINAGE LOWER EXTREMITY; Plain text: incision to remove pus from left leg;  Surgeon: Sergo Altamirano MD;  Location: [facility] MAIN OR;  Service: General;  Laterality: N/A;    KNEE SURGERY      LUMBAR DISCECTOMY      NECK SURGERY      Cervical    POSTERIOR LUMBAR/THORACIC SPINE FUSION         Social History     Socioeconomic History    Marital status:      Spouse name: Dinh   Tobacco Use    Smoking status: Former     Current packs/day: 0.00     Average packs/day: 1 pack/day for 12.0 years (12.0 ttl pk-yrs)     Types: Cigarettes     Start date:      Quit date:      Years since quittin.4    Smokeless tobacco: Never   Vaping Use    Vaping status: Never Used   Substance and Sexual Activity    Alcohol use: Never    Drug use: Never    Sexual activity: Defer       Family History   Problem Relation Age of Onset    Heart disease Mother     Arthritis Mother     Heart attack Mother     Arthritis Father        Family history, surgical history, past medical history, Allergies and meds reviewed  "with patient today and updated in [employer] EMR.     ROS:  Review of Systems   Musculoskeletal:  Positive for arthralgias. Negative for joint swelling.       OBJECTIVE:  Vitals:    06/17/25 1516   BP: 93/46   BP Location: Left arm   Patient Position: Sitting   Pulse: 72   Temp: 97.5 °F (36.4 °C)   Height: 162.6 cm (64.02\")     No results found.   Body mass index is 27.96 kg/m².  No LMP recorded (lmp unknown). Patient is postmenopausal.    The ASCVD Risk score (Manuel LAGUNA, et al., 2019) failed to calculate for the following reasons:    The 2019 ASCVD risk score is only valid for ages 40 to 79    Risk score cannot be calculated because patient has a medical history suggesting prior/existing ASCVD     Physical Exam  Vitals and nursing note reviewed.   Constitutional:       General: She is not in acute distress.     Appearance: Normal appearance. She is normal weight.   HENT:      Head: Normocephalic.   Musculoskeletal:      Left lower leg: Tenderness present. No swelling. No edema.        Legs:    Neurological:      Mental Status: She is alert.      Sensory: Sensation is intact.      Motor: Motor function is intact.      Comments: Weakness of left hip flexor         Procedures    No visits with results within 30 Day(s) from this visit.   Latest known visit with results is:   Office Visit on 05/12/2025   Component Date Value Ref Range Status    Hemoglobin A1C 05/12/2025 6.10 (H)  4.80 - 5.60 % Final    TSH 05/12/2025 0.768  0.270 - 4.200 uIU/mL Final    25 Hydroxy, Vitamin D 05/12/2025 39.4  30.0 - 100.0 ng/ml Final       ASSESSMENT/ PLAN:    Diagnoses and all orders for this visit:    1. Acute pain of left knee (Primary)  Assessment & Plan:  Will get an x-ray of the left knee.  She did have a left total knee replacement at least 15 years ago.  She only fell after starting having pain due to some weakness in the leg.    Orders:  -     MRI Lumbar Spine Without Contrast; Future  -     XR Knee 3 View Left; Future    2. Weakness " of left leg  Assessment & Plan:  She does have some weakness in her left leg primarily of her hip flexor.  This is more likely from her lumbar spine rather than a CVA.  She is currently on aspirin and Eliquis.  Will get in CT of her head to make sure that is fine I imagine we will not find anything acute.  Thus then she would need an MRI of her lumbar spine.    Orders:  -     CT Head Without Contrast; Future  -     MRI Lumbar Spine Without Contrast; Future               Orders Placed Today:     No orders of the defined types were placed in this encounter.       Management Plan:     An After Visit Summary was printed and given to the patient at discharge.    Follow-up: Return for f/u after imaging.    Romario Valdez DO 6/17/2025 15:40 EDT  This note was electronically signed.

## 2025-06-17 NOTE — ASSESSMENT & PLAN NOTE
Will get an x-ray of the left knee.  She did have a left total knee replacement at least 15 years ago.  She only fell after starting having pain due to some weakness in the leg.

## 2025-06-19 ENCOUNTER — TRANSCRIBE ORDERS (OUTPATIENT)
Dept: ADMINISTRATIVE | Facility: HOSPITAL | Age: 80
End: 2025-06-19
Payer: MEDICARE

## 2025-06-19 DIAGNOSIS — N18.4 CHRONIC KIDNEY DISEASE, STAGE IV (SEVERE): Primary | ICD-10-CM

## 2025-06-26 ENCOUNTER — HOSPITAL ENCOUNTER (OUTPATIENT)
Dept: MRI IMAGING | Facility: HOSPITAL | Age: 80
Discharge: HOME | End: 2025-06-26
Payer: MEDICARE

## 2025-06-26 ENCOUNTER — HOSPITAL ENCOUNTER (OUTPATIENT)
Dept: GENERAL RADIOLOGY | Facility: HOSPITAL | Age: 80
Discharge: HOME | End: 2025-06-26
Payer: MEDICARE

## 2025-06-26 ENCOUNTER — HOSPITAL ENCOUNTER (OUTPATIENT)
Dept: ULTRASOUND IMAGING | Facility: HOSPITAL | Age: 80
Discharge: HOME | End: 2025-06-26
Payer: MEDICARE

## 2025-06-26 DIAGNOSIS — M25.562 ACUTE PAIN OF LEFT KNEE: ICD-10-CM

## 2025-06-26 DIAGNOSIS — N18.4 CHRONIC KIDNEY DISEASE, STAGE IV (SEVERE): ICD-10-CM

## 2025-06-26 DIAGNOSIS — R29.898 WEAKNESS OF LEFT LEG: ICD-10-CM

## 2025-06-26 PROCEDURE — 76775 US EXAM ABDO BACK WALL LIM: CPT

## 2025-06-26 PROCEDURE — 73562 X-RAY EXAM OF KNEE 3: CPT

## 2025-06-26 PROCEDURE — 72148 MRI LUMBAR SPINE W/O DYE: CPT

## 2025-06-27 DIAGNOSIS — M25.569 KNEE PAIN, UNSPECIFIED CHRONICITY, UNSPECIFIED LATERALITY: Primary | ICD-10-CM

## 2025-07-02 ENCOUNTER — TELEPHONE (OUTPATIENT)
Dept: ONCOLOGY | Facility: HOSPITAL | Age: 80
End: 2025-07-02
Payer: MEDICARE

## 2025-07-02 NOTE — TELEPHONE ENCOUNTER
LEFT MESSAGE FOR PATIENT TO GET LABS DONE ON THURSDAY 7/3/25 BECAUSE IT TAKES A FEW DAYS TO GET LAB RESULTS BACK

## 2025-07-03 ENCOUNTER — TELEPHONE (OUTPATIENT)
Dept: FAMILY MEDICINE CLINIC | Facility: CLINIC | Age: 80
End: 2025-07-03
Payer: MEDICARE

## 2025-07-03 ENCOUNTER — LAB (OUTPATIENT)
Dept: LAB | Facility: HOSPITAL | Age: 80
End: 2025-07-03
Payer: MEDICARE

## 2025-07-03 DIAGNOSIS — E53.8 B12 DEFICIENCY: ICD-10-CM

## 2025-07-03 DIAGNOSIS — D59.10 AUTOIMMUNE HEMOLYTIC ANEMIA: ICD-10-CM

## 2025-07-03 DIAGNOSIS — I50.32 HEART FAILURE WITH IMPROVED EJECTION FRACTION (HFIMPEF): ICD-10-CM

## 2025-07-03 DIAGNOSIS — I10 PRIMARY HYPERTENSION: Primary | ICD-10-CM

## 2025-07-03 LAB
ALBUMIN SERPL-MCNC: 4.3 G/DL (ref 3.5–5.2)
ALBUMIN/GLOB SERPL: 1.4 G/DL
ALP SERPL-CCNC: 71 U/L (ref 39–117)
ALT SERPL W P-5'-P-CCNC: 17 U/L (ref 1–33)
ANION GAP SERPL CALCULATED.3IONS-SCNC: 13.3 MMOL/L (ref 5–15)
ANISOCYTOSIS BLD QL: NORMAL
AST SERPL-CCNC: 34 U/L (ref 1–32)
BASOPHILS # BLD AUTO: 0.08 10*3/MM3 (ref 0–0.2)
BASOPHILS NFR BLD AUTO: 0.7 % (ref 0–1.5)
BILIRUB SERPL-MCNC: 0.5 MG/DL (ref 0–1.2)
BUN SERPL-MCNC: 63.9 MG/DL (ref 8–23)
BUN/CREAT SERPL: 24.6 (ref 7–25)
C3 FRG RBC-MCNC: NORMAL
CALCIUM SPEC-SCNC: 9.2 MG/DL (ref 8.6–10.5)
CHLORIDE SERPL-SCNC: 106 MMOL/L (ref 98–107)
CO2 SERPL-SCNC: 19.7 MMOL/L (ref 22–29)
CREAT SERPL-MCNC: 2.6 MG/DL (ref 0.57–1)
DAT C3: NEGATIVE
DAT IGG GEL: NEGATIVE
DEPRECATED RDW RBC AUTO: 75.7 FL (ref 37–54)
EGFRCR SERPLBLD CKD-EPI 2021: 18.1 ML/MIN/1.73
EOSINOPHIL # BLD AUTO: 0.14 10*3/MM3 (ref 0–0.4)
EOSINOPHIL NFR BLD AUTO: 1.1 % (ref 0.3–6.2)
ERYTHROCYTE [DISTWIDTH] IN BLOOD BY AUTOMATED COUNT: 19.2 % (ref 12.3–15.4)
GLOBULIN UR ELPH-MCNC: 3.1 GM/DL
GLUCOSE SERPL-MCNC: 130 MG/DL (ref 65–99)
HAPTOGLOB SERPL-MCNC: 129 MG/DL (ref 30–200)
HCT VFR BLD AUTO: 27.6 % (ref 34–46.6)
HGB BLD-MCNC: 8.5 G/DL (ref 12–15.9)
HYPOCHROMIA BLD QL: NORMAL
IMM GRANULOCYTES # BLD AUTO: 0.05 10*3/MM3 (ref 0–0.05)
IMM GRANULOCYTES NFR BLD AUTO: 0.4 % (ref 0–0.5)
LDH SERPL-CCNC: 145 U/L (ref 135–214)
LYMPHOCYTES # BLD AUTO: 2.67 10*3/MM3 (ref 0.7–3.1)
LYMPHOCYTES NFR BLD AUTO: 21.8 % (ref 19.6–45.3)
MACROCYTES BLD QL SMEAR: NORMAL
MCH RBC QN AUTO: 32.9 PG (ref 26.6–33)
MCHC RBC AUTO-ENTMCNC: 30.8 G/DL (ref 31.5–35.7)
MCV RBC AUTO: 107 FL (ref 79–97)
MONOCYTES # BLD AUTO: 1.13 10*3/MM3 (ref 0.1–0.9)
MONOCYTES NFR BLD AUTO: 9.2 % (ref 5–12)
NEUTROPHILS NFR BLD AUTO: 66.8 % (ref 42.7–76)
NEUTROPHILS NFR BLD AUTO: 8.19 10*3/MM3 (ref 1.7–7)
NRBC BLD AUTO-RTO: 0.2 /100 WBC (ref 0–0.2)
NT-PROBNP SERPL-MCNC: 1565 PG/ML (ref 0–1800)
OVALOCYTES BLD QL SMEAR: NORMAL
PLATELET # BLD AUTO: 257 10*3/MM3 (ref 140–450)
PMV BLD AUTO: 11.6 FL (ref 6–12)
POIKILOCYTOSIS BLD QL SMEAR: NORMAL
POTASSIUM SERPL-SCNC: 6 MMOL/L (ref 3.5–5.2)
PROT SERPL-MCNC: 7.4 G/DL (ref 6–8.5)
RBC # BLD AUTO: 2.58 10*6/MM3 (ref 3.77–5.28)
RETICS # AUTO: 0.05 10*6/MM3 (ref 0.02–0.13)
RETICS/RBC NFR AUTO: 1.85 % (ref 0.7–1.9)
SMALL PLATELETS BLD QL SMEAR: ADEQUATE
SODIUM SERPL-SCNC: 139 MMOL/L (ref 136–145)
VIT B12 BLD-MCNC: 1004 PG/ML (ref 211–946)
WBC MORPH BLD: NORMAL
WBC NRBC COR # BLD AUTO: 12.26 10*3/MM3 (ref 3.4–10.8)

## 2025-07-03 PROCEDURE — 83010 ASSAY OF HAPTOGLOBIN QUANT: CPT

## 2025-07-03 PROCEDURE — 83880 ASSAY OF NATRIURETIC PEPTIDE: CPT

## 2025-07-03 PROCEDURE — 85045 AUTOMATED RETICULOCYTE COUNT: CPT

## 2025-07-03 PROCEDURE — 86880 COOMBS TEST DIRECT: CPT

## 2025-07-03 PROCEDURE — 85007 BL SMEAR W/DIFF WBC COUNT: CPT

## 2025-07-03 PROCEDURE — 83615 LACTATE (LD) (LDH) ENZYME: CPT

## 2025-07-03 PROCEDURE — 36415 COLL VENOUS BLD VENIPUNCTURE: CPT

## 2025-07-03 PROCEDURE — 82607 VITAMIN B-12: CPT

## 2025-07-03 PROCEDURE — 85025 COMPLETE CBC W/AUTO DIFF WBC: CPT

## 2025-07-03 PROCEDURE — 80053 COMPREHEN METABOLIC PANEL: CPT

## 2025-07-03 NOTE — TELEPHONE ENCOUNTER
Lab called and stated that pt potassium was 6 .  was notified and put an order in for pt to get checked 07/4/2025. Pt notified .

## 2025-07-05 ENCOUNTER — LAB (OUTPATIENT)
Facility: HOSPITAL | Age: 80
End: 2025-07-05
Payer: MEDICARE

## 2025-07-05 DIAGNOSIS — I10 PRIMARY HYPERTENSION: ICD-10-CM

## 2025-07-05 DIAGNOSIS — D59.10 AUTOIMMUNE HEMOLYTIC ANEMIA: ICD-10-CM

## 2025-07-05 LAB
BASOPHILS # BLD AUTO: 0.07 10*3/MM3 (ref 0–0.2)
BASOPHILS NFR BLD AUTO: 0.6 % (ref 0–1.5)
DEPRECATED RDW RBC AUTO: 73.4 FL (ref 37–54)
EOSINOPHIL # BLD AUTO: 0.16 10*3/MM3 (ref 0–0.4)
EOSINOPHIL NFR BLD AUTO: 1.5 % (ref 0.3–6.2)
ERYTHROCYTE [DISTWIDTH] IN BLOOD BY AUTOMATED COUNT: 18.8 % (ref 12.3–15.4)
HCT VFR BLD AUTO: 27.3 % (ref 34–46.6)
HGB BLD-MCNC: 8.5 G/DL (ref 12–15.9)
IMM GRANULOCYTES # BLD AUTO: 0.05 10*3/MM3 (ref 0–0.05)
IMM GRANULOCYTES NFR BLD AUTO: 0.5 % (ref 0–0.5)
LYMPHOCYTES # BLD AUTO: 2.91 10*3/MM3 (ref 0.7–3.1)
LYMPHOCYTES NFR BLD AUTO: 26.7 % (ref 19.6–45.3)
MCH RBC QN AUTO: 32.9 PG (ref 26.6–33)
MCHC RBC AUTO-ENTMCNC: 31.1 G/DL (ref 31.5–35.7)
MCV RBC AUTO: 105.8 FL (ref 79–97)
MONOCYTES # BLD AUTO: 1.1 10*3/MM3 (ref 0.1–0.9)
MONOCYTES NFR BLD AUTO: 10.1 % (ref 5–12)
NEUTROPHILS NFR BLD AUTO: 6.6 10*3/MM3 (ref 1.7–7)
NEUTROPHILS NFR BLD AUTO: 60.6 % (ref 42.7–76)
NRBC BLD AUTO-RTO: 0 /100 WBC (ref 0–0.2)
PLATELET # BLD AUTO: 254 10*3/MM3 (ref 140–450)
PMV BLD AUTO: 10.6 FL (ref 6–12)
POTASSIUM SERPL-SCNC: 5.6 MMOL/L (ref 3.5–5.2)
RBC # BLD AUTO: 2.58 10*6/MM3 (ref 3.77–5.28)
WBC NRBC COR # BLD AUTO: 10.89 10*3/MM3 (ref 3.4–10.8)

## 2025-07-05 PROCEDURE — 84132 ASSAY OF SERUM POTASSIUM: CPT

## 2025-07-05 PROCEDURE — 36415 COLL VENOUS BLD VENIPUNCTURE: CPT

## 2025-07-05 PROCEDURE — 85025 COMPLETE CBC W/AUTO DIFF WBC: CPT

## 2025-07-08 ENCOUNTER — OFFICE VISIT (OUTPATIENT)
Dept: ONCOLOGY | Facility: HOSPITAL | Age: 80
End: 2025-07-08
Payer: MEDICARE

## 2025-07-08 VITALS
SYSTOLIC BLOOD PRESSURE: 125 MMHG | WEIGHT: 162.48 LBS | HEIGHT: 64 IN | DIASTOLIC BLOOD PRESSURE: 46 MMHG | HEART RATE: 66 BPM | BODY MASS INDEX: 27.74 KG/M2 | TEMPERATURE: 97.7 F | RESPIRATION RATE: 16 BRPM | OXYGEN SATURATION: 94 %

## 2025-07-08 DIAGNOSIS — E53.8 B12 DEFICIENCY: ICD-10-CM

## 2025-07-08 DIAGNOSIS — D59.10 AUTOIMMUNE HEMOLYTIC ANEMIA: ICD-10-CM

## 2025-07-08 DIAGNOSIS — D50.8 IRON DEFICIENCY ANEMIA SECONDARY TO INADEQUATE DIETARY IRON INTAKE: ICD-10-CM

## 2025-07-08 DIAGNOSIS — C91.Z0 LARGE GRANULAR LYMPHOCYTE DISORDER: Primary | ICD-10-CM

## 2025-07-08 PROCEDURE — G0463 HOSPITAL OUTPT CLINIC VISIT: HCPCS | Performed by: INTERNAL MEDICINE

## 2025-07-08 NOTE — PROGRESS NOTES
Chief Complaint  Large granular lymphocyte disorder    Romario Valdez*  Romario Valdez, DO    Subjective          Ann-Marie Leanne Hughes presents to [facility] for follow-up of anemia.  This is multifactorial including autoimmune hemolytic anemia requiring immunosuppression, B12 deficiency, prior iron deficiency, chronic kidney disease.  She is on oral B12 supplements now.  She denies obvious blood loss.  Her renal function has been worsening and she follows up with nephrology in the near future.  She required treatment for hyperkalemia recently as well.      Current Outpatient Medications on File Prior to Visit   Medication Sig Dispense Refill    albuterol sulfate  (90 Base) MCG/ACT inhaler Inhale 2 puffs Every 4 (Four) Hours As Needed for Wheezing. 18 g 0    apixaban (Eliquis) 5 MG tablet tablet Take 1 tablet by mouth Every 12 (Twelve) Hours. 180 tablet 3    aspirin 81 MG EC tablet Take 1 tablet by mouth Daily.      atorvastatin (LIPITOR) 40 MG tablet TAKE 1 TABLET BY MOUTH EVERY DAY 90 tablet 3    buPROPion XL (WELLBUTRIN XL) 300 MG 24 hr tablet Take 1 tablet by mouth Daily. 90 tablet 3    dapagliflozin Propanediol (Farxiga) 10 MG tablet Take 10 mg by mouth Daily. 90 tablet 0    digoxin (LANOXIN) 125 MCG tablet Take 1 tablet by mouth Every Other Day.      folic acid (FOLVITE) 1 MG tablet Take 1 tablet by mouth Daily. 90 tablet 1    furosemide (LASIX) 40 MG tablet Take 1 tablet by mouth Daily. 90 tablet 3    metFORMIN ER (GLUCOPHAGE-XR) 500 MG 24 hr tablet Take 1 tablet by mouth 2 (Two) Times a Day. 180 tablet 3    metoprolol succinate XL (TOPROL-XL) 25 MG 24 hr tablet Take 0.5 tablets by mouth Daily.      montelukast (SINGULAIR) 10 MG tablet Take 1 tablet by mouth Daily. 90 tablet 3    Probiotic Product ([employer] PO) Take 1 capsule by mouth Daily.      sacubitril-valsartan (Entresto) 49-51 MG tablet Take 1 tablet by mouth 2 (Two) Times a  Day. Indications: [id number] 11/30/2026 84 tablet 0    spironolactone (ALDACTONE) 25 MG tablet Take 1 tablet by mouth Daily. 90 tablet 3    Synthroid 75 MCG tablet Take 1 tablet by mouth Daily. 90 tablet 3     No current facility-administered medications on file prior to visit.       Allergies   Allergen Reactions    Penicillins Palpitations     1. When was your reaction? > 10 years ago  2. Did your reaction happen after the first dose or after several doses? After first dose  3. Did your reaction require ED or hospital care to manage your reaction? Do not know  4. Did your reaction require treatment with epinephrine? Do not know  5. Have you taken amoxicillin (Amoxil) or amoxicillin-clavulanate (Augmentin) without issue since? Yes  6. Have you taken cephalexin (Keflex) without issue since?  Do not know      Past Medical History:   Diagnosis Date    Acne rosacea 08/17/2021    DR.JEFF MOON     Acute respiratory failure with hypoxia 07/31/2023    Arteriosclerosis of abdominal aorta     B12 deficiency 08/17/2021    DJD (degenerative joint disease)     Hx of smoking 08/17/2021    QUIT IN 1976 AT AGE 31    Hyperlipidemia 08/17/2021    Hypothyroidism     Metabolic syndrome 08/17/2021    NSTEMI (non-ST elevated myocardial infarction) 07/17/2023    Pericardial effusion 10/10/2023    KAREN (stress urinary incontinence, female) 08/17/2021    UTI (urinary tract infection) 08/17/2021    Vitamin D deficiency 08/17/2021     Past Surgical History:   Procedure Laterality Date    BACK SURGERY  2013    CARDIAC CATHETERIZATION N/A 07/17/2023    Procedure: Left Heart Cath;  Surgeon: Dinh Andres MD;  Location: [facility] CATH INVASIVE LOCATION;  Service: Cardiovascular;  Laterality: N/A;    CARPAL TUNNEL RELEASE      COLONOSCOPY  2011    CORONARY ARTERY BYPASS GRAFT WITH MITRAL VALVE REPAIR/REPLACEMENT N/A 07/20/2023    Procedure: REZA STERNOTOMY OFF-PUMP CORONARY ARTERY BYPASS GRAFT TIMES        USING LEFFT INTERNAL MAMMARY ARTERY  "AND     GREATER SAPHENOUS VEIN GRAFT PER EMDOSCOPIC VEIN HARVESTING, MAZE PROCEDURE AND PRP;  Surgeon: Shane Alexander MD;  Location: [facility];  Service: Cardiothoracic;  Laterality: N/A;    INCISION AND DRAINAGE LEG N/A 2024    Procedure: INCISION AND DRAINAGE LOWER EXTREMITY; Plain text: incision to remove pus from left leg;  Surgeon: Sergo Altamirano MD;  Location: [facility] MAIN OR;  Service: General;  Laterality: N/A;    KNEE SURGERY      LUMBAR DISCECTOMY      NECK SURGERY      Cervical    POSTERIOR LUMBAR/THORACIC SPINE FUSION       Social History     Socioeconomic History    Marital status:      Spouse name: Dinh   Tobacco Use    Smoking status: Former     Current packs/day: 0.00     Average packs/day: 1 pack/day for 12.0 years (12.0 ttl pk-yrs)     Types: Cigarettes     Start date:      Quit date:      Years since quittin.5    Smokeless tobacco: Never   Vaping Use    Vaping status: Never Used   Substance and Sexual Activity    Alcohol use: Never    Drug use: Never    Sexual activity: Defer     Family History   Problem Relation Age of Onset    Heart disease Mother     Arthritis Mother     Heart attack Mother     Arthritis Father        Objective   Physical Exam  Vitals reviewed. Exam conducted with a chaperone present.   Cardiovascular:      Rate and Rhythm: Normal rate and regular rhythm.      Heart sounds: Normal heart sounds. No murmur heard.     No gallop.   Pulmonary:      Effort: Pulmonary effort is normal.      Breath sounds: Normal breath sounds.   Abdominal:      General: Bowel sounds are normal.   Lymphadenopathy:      Cervical: No cervical adenopathy.   Psychiatric:         Mood and Affect: Mood normal.         Behavior: Behavior normal.         Vitals:    25 0933   BP: 125/46   Pulse: 66   Resp: 16   Temp: 97.7 °F (36.5 °C)   TempSrc: Oral   SpO2: 94%   Weight: 73.7 kg (162 lb 7.7 oz)   Height: 162.6 cm (64.02\")   PainSc: 3    PainLoc: Generalized " "    ECOG score: 2         PHQ-9 Total Score:                      Result Review :   The following data was reviewed by: Agustin Ayala MD on 07/08/2025:  Lab Results   Component Value Date    HGB 8.5 (L) 07/05/2025    HCT 27.3 (L) 07/05/2025    .8 (H) 07/05/2025     07/05/2025    WBC 10.89 (H) 07/05/2025    NEUTROABS 6.60 07/05/2025    LYMPHSABS 2.91 07/05/2025    MONOSABS 1.10 (H) 07/05/2025    EOSABS 0.16 07/05/2025    BASOSABS 0.07 07/05/2025     Lab Results   Component Value Date    GLUCOSE 130 (H) 07/03/2025    BUN 63.9 (H) 07/03/2025    CREATININE 2.60 (H) 07/03/2025     07/03/2025    K 5.6 (H) 07/05/2025     07/03/2025    CO2 19.7 (L) 07/03/2025    CALCIUM 9.2 07/03/2025    PROTEINTOT 7.4 07/03/2025    ALBUMIN 4.3 07/03/2025    BILITOT 0.5 07/03/2025    ALKPHOS 71 07/03/2025    AST 34 (H) 07/03/2025    ALT 17 07/03/2025     Lab Results   Component Value Date    MG 2.0 04/01/2025    PHOS 2.7 12/27/2024    FREET4 1.74 (H) 12/29/2023    TSH 0.768 05/12/2025     Lab Results   Component Value Date    IRON 81 03/14/2025    LABIRON 26 03/14/2025    TRANSFERRIN 212 03/14/2025    TIBC 316 03/14/2025     Lab Results   Component Value Date     07/03/2025    FERRITIN 804.00 (H) 03/14/2025    BLTKIWBQ80 1,004 (H) 07/03/2025    FOLATE >20.00 04/01/2025     No results found for: \"PSA\", \"CEA\", \"AFP\", \"\", \"\"            Assessment and Plan    Diagnoses and all orders for this visit:    1. Large granular lymphocyte disorder (Primary)  Assessment & Plan:  Noted on prior flow cytometry but negative most recently.  Total white count is normal at 10.8.      2. B12 deficiency  Assessment & Plan:  Patient is on oral B12 replacement.  B12 level is actually a little on the high side.  Continue same.    Orders:  -     Vitamin B12; Future    3. Autoimmune hemolytic anemia  Assessment & Plan:  She is off of treatment.  Direct antibody test is negative.  Haptoglobin, reticulocyte count and " Pt lives in a private house w/ spouse, 4 steps to enter, 1st floor s/u. bilirubin are all normal.  Monitor.    Orders:  -     CBC & Differential; Future  -     Comprehensive Metabolic Panel; Future  -     Folate; Future  -     Lactate Dehydrogenase; Future  -     Haptoglobin; Future  -     Reticulocytes; Future    4. Iron deficiency anemia secondary to inadequate dietary iron intake  Assessment & Plan:  Hemoglobin is trending down.  This is likely multifactorial.  Her creatinine has worsened recently now 2.6 and I suspect a large component of this is anemia of chronic kidney disease.  She sees nephrology later this week.  Repeat CBC next visit.              Patient Follow Up: 6 months    Patient was given instructions and counseling regarding her condition or for health maintenance advice. Please see specific information pulled into the AVS if appropriate.     Agustin Ayala MD    7/8/2025

## 2025-07-08 NOTE — ASSESSMENT & PLAN NOTE
She is off of treatment.  Direct antibody test is negative.  Haptoglobin, reticulocyte count and bilirubin are all normal.  Monitor.

## 2025-07-08 NOTE — ASSESSMENT & PLAN NOTE
Hemoglobin is trending down.  This is likely multifactorial.  Her creatinine has worsened recently now 2.6 and I suspect a large component of this is anemia of chronic kidney disease.  She sees nephrology later this week.  Repeat CBC next visit.

## 2025-07-08 NOTE — ASSESSMENT & PLAN NOTE
Patient is on oral B12 replacement.  B12 level is actually a little on the high side.  Continue same.

## 2025-07-21 ENCOUNTER — TRANSCRIBE ORDERS (OUTPATIENT)
Dept: ADMINISTRATIVE | Facility: HOSPITAL | Age: 80
End: 2025-07-21
Payer: MEDICARE

## 2025-07-21 ENCOUNTER — LAB (OUTPATIENT)
Dept: LAB | Facility: HOSPITAL | Age: 80
End: 2025-07-21
Payer: MEDICARE

## 2025-07-21 DIAGNOSIS — E87.5 HYPERPOTASSEMIA: ICD-10-CM

## 2025-07-21 DIAGNOSIS — E87.5 HYPERPOTASSEMIA: Primary | ICD-10-CM

## 2025-07-21 LAB
ANION GAP SERPL CALCULATED.3IONS-SCNC: 11 MMOL/L (ref 5–15)
BUN SERPL-MCNC: 50 MG/DL (ref 8–23)
BUN/CREAT SERPL: 19.4 (ref 7–25)
CALCIUM SPEC-SCNC: 9.2 MG/DL (ref 8.6–10.5)
CHLORIDE SERPL-SCNC: 105 MMOL/L (ref 98–107)
CO2 SERPL-SCNC: 23 MMOL/L (ref 22–29)
CREAT SERPL-MCNC: 2.58 MG/DL (ref 0.57–1)
EGFRCR SERPLBLD CKD-EPI 2021: 18.3 ML/MIN/1.73
GLUCOSE SERPL-MCNC: 154 MG/DL (ref 65–99)
POTASSIUM SERPL-SCNC: 4.6 MMOL/L (ref 3.5–5.2)
SODIUM SERPL-SCNC: 139 MMOL/L (ref 136–145)

## 2025-07-21 PROCEDURE — 36415 COLL VENOUS BLD VENIPUNCTURE: CPT

## 2025-07-21 PROCEDURE — 80048 BASIC METABOLIC PNL TOTAL CA: CPT

## 2025-07-28 ENCOUNTER — HOSPITAL ENCOUNTER (OUTPATIENT)
Facility: HOSPITAL | Age: 80
Discharge: HOME | End: 2025-07-28
Admitting: NURSE PRACTITIONER
Payer: MEDICARE

## 2025-07-28 VITALS
WEIGHT: 157 LBS | SYSTOLIC BLOOD PRESSURE: 103 MMHG | HEIGHT: 64 IN | DIASTOLIC BLOOD PRESSURE: 55 MMHG | HEART RATE: 75 BPM | BODY MASS INDEX: 26.8 KG/M2

## 2025-07-28 DIAGNOSIS — I50.32 HEART FAILURE WITH IMPROVED EJECTION FRACTION (HFIMPEF): Primary | ICD-10-CM

## 2025-07-28 PROCEDURE — 99214 OFFICE O/P EST MOD 30 MIN: CPT | Performed by: NURSE PRACTITIONER

## 2025-07-28 PROCEDURE — G0463 HOSPITAL OUTPT CLINIC VISIT: HCPCS | Performed by: NURSE PRACTITIONER

## 2025-07-28 NOTE — ADDENDUM NOTE
Encounter addended by: Ayesha Marcos CMA on: 7/28/2025 10:44 AM   Actions taken: Charge Capture section accepted

## 2025-07-28 NOTE — PATIENT INSTRUCTIONS
Will decrease spironolactone to 25 mg on Monday, Wednesday, and Friday.  Continue other medications as prescribed.  Keep upcoming appointment with nephrology.  Will schedule echocardiogram.  Call the clinic if you have any questions or concerns before your next appointment.

## 2025-07-28 NOTE — PROGRESS NOTES
[facility]    Romario Valdez, DO  [address] [place] [address]    Thank you for asking me to see Ann-Marie Hughes.     History of Present Illness    1. HFimpEF    Subjective     Ann-Marie Hughes is a 79 y.o. female who presents today for follow-up for HFimpEF.  After GDMT optimization, LVEF improved from 30% on 03/27/2024 to 54% on 08/06/2024.  This was her last echocardiogram.  Her past medical history also consists of hypertension, hyperlipidemia, coronary artery disease (status post three-vessel CABG), paroxysmal atrial fibrillation (Eliquis), mild aortic valve stenosis, type 2 diabetes, CKD stage IV and autoimmune hemolytic anemia.    She states today that she has been feeling very well.  She denies any chest pain or pressure.  She denies orthopnea, paroxysmal nocturnal dyspnea, syncope or presyncopal episodes.  BNP is now just over 1500, which has been consistently trending down.  Her renal function, however has decreased with a GFR of 18.3.  She does follow nephrology closely.    Review of Systems   Cardiovascular:  Positive for dyspnea on exertion and leg swelling. Negative for chest pain, claudication, cyanosis, irregular heartbeat, near-syncope, orthopnea, palpitations, paroxysmal nocturnal dyspnea and syncope.   Respiratory:  Positive for shortness of breath.         Patient Active Problem List   Diagnosis    Arthritis    Chronic pain syndrome    Diabetes mellitus, type II    Gastric reflux    Herniated disc    Primary hypertension    Hypothyroidism    Pain in joint, multiple sites    Hyperlipidemia    B12 deficiency    Vitamin D deficiency    Acne rosacea    Hx of smoking    Stress incontinence of urine    Arteriosclerosis of abdominal aorta    Iron deficiency anemia secondary to inadequate dietary iron intake    Seasonal allergies    Hyponatremia    Leg length discrepancy    Reactive depression    Encounter for subsequent annual  wellness visit (AWV) in Medicare patient    Class 1 obesity with alveolar hypoventilation, serious comorbidity, and body mass index (BMI) of 32.0 to 32.9 in adult    Chronic HFrEF (heart failure with reduced ejection fraction)    CAD status post CABG x3 vessels    PAF (paroxysmal atrial fibrillation)    Autoimmune hemolytic anemia    Need for RSV vaccination    Stage 3b chronic kidney disease    Large granular lymphocyte disorder    Cervicogenic headache    Aortic stenosis, mild    Contusion of left foot    Cellulitis of foot    Cellulitis of left lower extremity    Wound cellulitis    Abscess of left lower extremity    Neuropathy    Ulcer of left foot with fat layer exposed    Hospital discharge follow-up    Bronchitis    Heart failure with improved ejection fraction (HFimpEF)    Acute pain of left knee    Weakness of left leg     family history includes Arthritis in her father and mother; Heart attack in her mother; Heart disease in her mother.   reports that she quit smoking about 49 years ago. Her smoking use included cigarettes. She started smoking about 61 years ago. She has a 12 pack-year smoking history. She has never used smokeless tobacco. She reports that she does not drink alcohol and does not use drugs.  Allergies   Allergen Reactions    Penicillins Palpitations     1. When was your reaction? > 10 years ago  2. Did your reaction happen after the first dose or after several doses? After first dose  3. Did your reaction require ED or hospital care to manage your reaction? Do not know  4. Did your reaction require treatment with epinephrine? Do not know  5. Have you taken amoxicillin (Amoxil) or amoxicillin-clavulanate (Augmentin) without issue since? Yes  6. Have you taken cephalexin (Keflex) without issue since?  Do not know      Physical Activity: Inactive (12/26/2024)    Exercise Vital Sign     Days of Exercise per Week: 0 days     Minutes of Exercise per Session: 0 min          Current Outpatient  Medications:     albuterol sulfate  (90 Base) MCG/ACT inhaler, Inhale 2 puffs Every 4 (Four) Hours As Needed for Wheezing., Disp: 18 g, Rfl: 0    apixaban (Eliquis) 5 MG tablet tablet, Take 1 tablet by mouth Every 12 (Twelve) Hours., Disp: 180 tablet, Rfl: 3    aspirin 81 MG EC tablet, Take 1 tablet by mouth Daily., Disp: , Rfl:     atorvastatin (LIPITOR) 40 MG tablet, TAKE 1 TABLET BY MOUTH EVERY DAY, Disp: 90 tablet, Rfl: 3    buPROPion XL (WELLBUTRIN XL) 300 MG 24 hr tablet, Take 1 tablet by mouth Daily., Disp: 90 tablet, Rfl: 3    dapagliflozin Propanediol (Farxiga) 10 MG tablet, Take 10 mg by mouth Daily., Disp: 90 tablet, Rfl: 0    digoxin (LANOXIN) 125 MCG tablet, Take 1 tablet by mouth Every Other Day., Disp: , Rfl:     folic acid (FOLVITE) 1 MG tablet, Take 1 tablet by mouth Daily., Disp: 90 tablet, Rfl: 1    furosemide (LASIX) 40 MG tablet, Take 1 tablet by mouth Daily., Disp: 90 tablet, Rfl: 3    metFORMIN ER (GLUCOPHAGE-XR) 500 MG 24 hr tablet, Take 1 tablet by mouth 2 (Two) Times a Day., Disp: 180 tablet, Rfl: 3    metoprolol succinate XL (TOPROL-XL) 25 MG 24 hr tablet, Take 0.5 tablets by mouth Daily., Disp: , Rfl:     montelukast (SINGULAIR) 10 MG tablet, Take 1 tablet by mouth Daily., Disp: 90 tablet, Rfl: 3    Probiotic Product (Solafeet ), Take 1 capsule by mouth Daily., Disp: , Rfl:     sacubitril-valsartan (Entresto) 49-51 MG tablet, Take 1 tablet by mouth 2 (Two) Times a Day. Indications: [id number] 11/30/2026, Disp: 84 tablet, Rfl: 0    spironolactone (ALDACTONE) 25 MG tablet, Take 1 tablet by mouth Daily., Disp: 90 tablet, Rfl: 3    Synthroid 75 MCG tablet, Take 1 tablet by mouth Daily., Disp: 90 tablet, Rfl: 3    Objective   Vital Sign Review:   Vitals:    07/28/25 0947   BP: 103/55   Pulse: 75     Body mass index is 26.93 kg/m².      07/28/25  0947   Weight: 71.2 kg (157 lb)     Physical Exam:  Vitals reviewed.   Constitutional:       Appearance: Well-groomed and not in  distress.   Pulmonary:      Effort: Pulmonary effort is normal.      Breath sounds: Normal breath sounds.   Cardiovascular:      PMI at left midclavicular line. Normal rate. Regular rhythm. Normal S1. Normal S2.       Murmurs: There is a grade 1/6 systolic murmur.      No gallop.  No click. No rub.   Pulses:     Intact distal pulses.   Edema:     Peripheral edema present.     Ankle: bilateral trace edema of the ankle.     Feet: bilateral trace edema of the feet.  Skin:     General: Skin is warm and dry.   Neurological:      Mental Status: Alert.   Psychiatric:         Behavior: Behavior is cooperative.        DATA REVIEWED:   EKG:    ---------------------------------------------------  ECHO:  Most Recent Echo Result    Adult Transthoracic Echo Complete W/ Cont if Necessary Per Protocol 2024  1:29 PM    Interpretation Summary    Left ventricular systolic function is normal. Calculated left ventricular EF = 54.8%    Left ventricular wall thickness is consistent with borderline concentric hypertrophy.    Left ventricular diastolic function is consistent with (grade Ia w/high LAP) impaired relaxation.    The left atrial cavity is mildly dilated.    Mild aortic valve stenosis is present.    Estimated right ventricular systolic pressure from tricuspid regurgitation is normal (<35 mmHg).    Signed by: Roderick Brand MD on 2024 11:16 AM      --------------------------------------------------  RHC/LHC  Results for orders placed during the hospital encounter of 23    Cardiac Catheterization/Vascular Study    Conclusion  [facility]  CARDIAC CATHETERIZATION PROCEDURE REPORT    Patient: Ann-Marie Hughes  : 1945  MRN: [id number]    Procedure Date:  23    Referring Physician:  Zuleyka Brand MD    Interventional Cardiologist:  Dinh Andres MD    Indication:  Angina pectoris, other  Cardiomyopathy with LV ejection fraction of 30 to 35%  Positive stress test showing large inferolateral wall  ischemia    Clinical Presentation:  Ms. Hughes was recently diagnosed with cardiomyopathy and congestive heart failure with LV ejection fraction 30 to 35%.  She had a SPECT stress test done recently which showed large inferolateral wall ischemia.  Overnight, she came because of chest pain.  She was noted to have mildly elevated troponin.  Today, she was brought to the Cath Lab to identify ischemic culprits.    Procedure performed:  Left heart catheterization  Selective coronary angiography      Access Sites:  Right radial artery    Findings:  1. Coronary Artery Anatomy:  Dominance: Right  Left Main: Long segment which is free of any stenosis.  Left Anterior Descending Artery: Medium caliber vessel giving rise to various diagonal and septal  branches.  Proximal LAD is free of any stenosis.  Mid LAD has a focal lesion which is angiographically 70% severity.  Some calcifications noted in this lesion.  Further down LAD has nonobstructive lesion which is angiographically 40% severity with calcification.  First large diagonal branch has a 50% stenosis in its ostium.  Distal and apical LAD are free of any stenosis.  Left Circumflex Artery: Nondominant vessel giving rise to 1 large OM branch.  Proximal left circumflex artery has luminal irregularities with calcification.  Mid left circumflex was artery has a 95% focal lesion which compensates blood supply to the terminal OM branch.  Right Coronary Artery: Large dominant vessel giving rise to right PDA and PLV branches.  Proximal RCA has a focal lesion which is angiographically 80% severity.  Mid RCA is subtotally occluded with a lesion angiographically 99% severity with heavy calcification.  ANEUDY I flow is noted in distal RCA, PDA and PLV branches.  Some branches of the PDA were noted to be filled by left-to-right collaterals.    2. Hemodynamics:  The opening aortic pressure is 128/61 with a mean of 85 mmHg.  The left ventricular end-diastolic pressure is 7  mmHg.  There was no gradient across aortic valve on pullback  The closing aortic pressure is 143/54 with a mean of 89 mmHg    3. Left Ventriculogram: Not performed due to chronic kidney disease      Conclusions:  Multivessel coronary artery disease including subtotal occlusion of mid right coronary artery, 95% stenosis of the mid left circumflex artery and 70% lesion involving mid LAD artery  Ischemic cardiomyopathy LV ejection fraction of 30 to 35% per echocardiogram with normal LVEDP.    Recommendations:  We will discuss angiograms with cardiothoracic surgeons to consider coronary artery bypass grafting due to multivessel coronary artery disease, decreased LV systolic function and diabetes mellitus      Procedure Details:  Informed consent was obtained with an explanation of the risks, benefits and alternatives of the procedure. The patient was brought to the Cardiac Catheterization Laboratory and was prepped and draped in a standard sterile fashion. Moderate sedation with Fentanyl and Versed was administered by the circulating nurse. Lidocaine 2% was used to anesthetize the right radial artery and a 5/6 Slender sheath was placed.  A 5 F TGR catheter was then advanced over a 0.035 guidewire into the ascending aorta.  This catheter was used to engage the right and left coronary arteries with diagnostic angiography obtained in all appropriate projections by injection of non-ionic contrast.  The same catheter was advanced over a wire into the left ventricle.  Left ventricular hemodynamics were documented.  Left ventriculography was not performed due to chronic kidney disease, to conserve the amount of contrast dye used.  Gradient across aortic valve as documented by pullback technique.  The catheter was then removed over a guidewire. The radial artery sheath was removed without difficulty and TR Band was placed over the access site with excellent hemostasis.    Patient tolerated the procedure well without any  complications, and transferred to the post procedure area for recovery in a stable condition.    Cumulative fluoroscopy time: 4 min    Cumulative air kerma: 695 mGy    Total amount of contrast used: 50 ml of Isovue      Complications:  None.    Estimated Blood Loss:  Minimal.    Dinh Andres MD    07/17/23  11:54 EDT    ---------------------------------------------------------------------------  CXR/Imaging:   Imaging Results (Most Recent)       None          ----------------------------------------------------------------------------  CT:   No radiology results for the last 30 days.  --------------------------------------------------------------------------------------------------------------    Laboratory evaluations:  Lab Results   Component Value Date    GLUCOSE 154 (H) 07/21/2025    BUN 50.0 (H) 07/21/2025    CREATININE 2.58 (H) 07/21/2025    EGFRIFNONA 70 12/15/2021    BCR 19.4 07/21/2025    K 4.6 07/21/2025    CO2 23.0 07/21/2025    CALCIUM 9.2 07/21/2025    ALBUMIN 4.3 07/03/2025    AST 34 (H) 07/03/2025    ALT 17 07/03/2025     Lab Results   Component Value Date    WBC 10.89 (H) 07/05/2025    HGB 8.5 (L) 07/05/2025    HCT 27.3 (L) 07/05/2025    .8 (H) 07/05/2025     07/05/2025     Lab Results   Component Value Date    HGBA1C 6.10 (H) 05/12/2025     Lab Results   Component Value Date    HGBA1C 6.10 (H) 05/12/2025    HGBA1C 6.40 (H) 06/28/2024    HGBA1C 6.00 (H) 12/29/2023     Lab Results   Component Value Date    MICROALBUR 74.4 08/17/2022    CREATININE 2.58 (H) 07/21/2025     Lab Results   Component Value Date    IRON 81 03/14/2025    TIBC 316 03/14/2025    FERRITIN 804.00 (H) 03/14/2025         Assessment & Plan      1. HFimpEF     NYHA stage C FC-III     Clinical status was assessed and has remained stable.  Treatment intent: curative     Today, Patient appears euvolemic. and with Moderate perfusion. HR is: normal and is at goal. BP/MAP was reviewed and there is not room for medication  up-titration. LVEF: Patient's last echocardiogram was on 08/06/2024 and revealed 54.8%. .     GDMT Assessment: The patient is currently on quad therapy. We do not have room to optimize their medications based on HD assessment today, GFR, and electrolytes.     GDMT changes recommended today: Decrease spironolactone to every M/W/F.    BB:   continue Metoprolol Succinate 25mg bid  Monitor heart rate. Please call the [facility] for HR<50, dizziness, lightheadedness, syncope    A/A/A:   continue Entresto 49/51mg BID  Occasional monitoring of Chem-7 is recommended; call for the development of a new cough or S/Sx angioedema    SGLT2-I:  continue Dapagliflozin (Farxiga) 10mg daily  Call for S/Sx genital mycotic infections  Do not take if oral intake is inadequate, NPO, or with GI illness    MRA:  continue Spironolactone 25mg but decrease to every M/W/F.  Initiation instructions: Obtain a CHEM7 after 4-5 doses. We will call with the results. RTC 1 month for hemodynamic assessment and repeat laboratory monitoring.  Recommended quarterly assessment of GFR and electrolytes for the first 12 months  After the patient has been on MRA therapy for 12 months without high-risk features, recommended q 6 monthly BMPs and assessment, per guidelines.  Avoid potassium supplementation.  Avoid salt substitutes containing potassium  Please hold this medication if diarrhea, vomiting, or infection with fever and sweating occurs    -DIURETIC:   furosemide (LASIX) 40 mg every day    Directions for when to call the clinic reviewed with the patient to include weight gain of 2 to 3 pounds in 24 hours, weight gain of 5 to 10 pounds within 7 days; worsening shortness of breath; worsening lower extremity edema or abdominal distention.     -Fluid restriction:   -Requested 2000 ml/day  -Patient has been asked to weigh daily and bring log to next clinic visit    -Sodium restriction:   -1,500 mg Na+ restriction was  discussed.    Labs/Diagnostics/Referrals:    Labs -None ordered today   Imaging -2D TTE   Referrals No referrals placed today   Currently Pending: N/A     Lifestyle Advice:   - call office if I gain more than 2 pounds in one day or 5 pounds in one week   - keep legs up while sitting   - use salt in moderation   - watch for swelling in feet, ankles and legs every day   - weigh myself daily   -call for concerning s/sx   -- activity or exercise based on tolerance encouraged     CODE STATUS: FULL      Return in about 3 months (around 10/28/2025) for Next scheduled follow up.    Electronically signed by LONA Roach, 07/28/25, 10:10 AM EDT.

## 2025-07-29 RX ORDER — MONTELUKAST SODIUM 10 MG/1
10 TABLET ORAL DAILY
Qty: 90 TABLET | Refills: 0 | Status: SHIPPED | OUTPATIENT
Start: 2025-07-29

## 2025-08-20 ENCOUNTER — APPOINTMENT (OUTPATIENT)
Dept: GENERAL RADIOLOGY | Facility: HOSPITAL | Age: 80
End: 2025-08-20
Payer: MEDICARE

## 2025-08-20 ENCOUNTER — HOSPITAL ENCOUNTER (INPATIENT)
Facility: HOSPITAL | Age: 80
LOS: 1 days | Discharge: HOME | End: 2025-08-21
Attending: EMERGENCY MEDICINE | Admitting: FAMILY MEDICINE
Payer: MEDICARE

## 2025-08-20 PROBLEM — R57.9 SHOCK: Status: ACTIVE | Noted: 2025-08-20

## 2025-08-20 PROBLEM — I95.9 HYPOTENSION: Status: ACTIVE | Noted: 2025-08-20

## 2025-08-20 PROBLEM — I48.91 ATRIAL FIBRILLATION WITH RVR: Status: ACTIVE | Noted: 2025-08-20

## 2025-08-21 ENCOUNTER — APPOINTMENT (OUTPATIENT)
Dept: CARDIOLOGY | Facility: HOSPITAL | Age: 80
End: 2025-08-21
Payer: MEDICARE

## 2025-08-21 ENCOUNTER — RESULTS FOLLOW-UP (OUTPATIENT)
Dept: TELEMETRY | Facility: HOSPITAL | Age: 80
End: 2025-08-21
Payer: MEDICARE

## 2025-08-21 ENCOUNTER — READMISSION MANAGEMENT (OUTPATIENT)
Dept: CALL CENTER | Facility: HOSPITAL | Age: 80
End: 2025-08-21
Payer: MEDICARE

## 2025-08-21 ENCOUNTER — TELEPHONE (OUTPATIENT)
Dept: CARDIOLOGY | Facility: CLINIC | Age: 80
End: 2025-08-21
Payer: MEDICARE

## 2025-08-22 ENCOUNTER — TRANSITIONAL CARE MANAGEMENT TELEPHONE ENCOUNTER (OUTPATIENT)
Dept: CALL CENTER | Facility: HOSPITAL | Age: 80
End: 2025-08-22
Payer: MEDICARE

## 2025-08-26 ENCOUNTER — OFFICE VISIT (OUTPATIENT)
Dept: FAMILY MEDICINE CLINIC | Facility: CLINIC | Age: 80
End: 2025-08-26
Payer: MEDICARE

## 2025-08-26 VITALS
HEART RATE: 51 BPM | SYSTOLIC BLOOD PRESSURE: 128 MMHG | TEMPERATURE: 97.5 F | OXYGEN SATURATION: 98 % | BODY MASS INDEX: 28.68 KG/M2 | DIASTOLIC BLOOD PRESSURE: 72 MMHG | HEIGHT: 64 IN | WEIGHT: 168 LBS

## 2025-08-26 DIAGNOSIS — I50.22 CHRONIC HFREF (HEART FAILURE WITH REDUCED EJECTION FRACTION): ICD-10-CM

## 2025-08-26 DIAGNOSIS — I48.0 PAF (PAROXYSMAL ATRIAL FIBRILLATION): ICD-10-CM

## 2025-08-26 DIAGNOSIS — E53.8 B12 DEFICIENCY: ICD-10-CM

## 2025-08-26 DIAGNOSIS — R30.0 DYSURIA: ICD-10-CM

## 2025-08-26 DIAGNOSIS — N18.4 STAGE 4 CHRONIC KIDNEY DISEASE: ICD-10-CM

## 2025-08-26 DIAGNOSIS — Z09 HOSPITAL DISCHARGE FOLLOW-UP: Primary | ICD-10-CM

## 2025-08-26 DIAGNOSIS — D63.8 ANEMIA OF CHRONIC DISEASE: ICD-10-CM

## 2025-08-26 RX ORDER — DIGOXIN 125 MCG
125 TABLET ORAL EVERY OTHER DAY
Qty: 90 TABLET | Refills: 0 | Status: SHIPPED | OUTPATIENT
Start: 2025-08-26

## 2025-08-28 ENCOUNTER — RESULTS FOLLOW-UP (OUTPATIENT)
Dept: FAMILY MEDICINE CLINIC | Facility: CLINIC | Age: 80
End: 2025-08-28
Payer: MEDICARE

## 2025-08-28 DIAGNOSIS — R82.90 ABNORMAL URINALYSIS: Primary | ICD-10-CM

## 2025-08-28 PROCEDURE — 87086 URINE CULTURE/COLONY COUNT: CPT | Performed by: NURSE PRACTITIONER

## 2025-08-28 PROCEDURE — 87077 CULTURE AEROBIC IDENTIFY: CPT | Performed by: NURSE PRACTITIONER

## 2025-08-28 PROCEDURE — 87186 SC STD MICRODIL/AGAR DIL: CPT | Performed by: NURSE PRACTITIONER

## 2025-08-29 RX ORDER — CIPROFLOXACIN 250 MG/1
250 TABLET, FILM COATED ORAL 2 TIMES DAILY
Qty: 10 TABLET | Refills: 0 | Status: SHIPPED | OUTPATIENT
Start: 2025-08-29 | End: 2025-09-03

## (undated) DEVICE — ST. SORBAVIEW ULTIMATE IJ SYSTEM A,C: Brand: CENTURION

## (undated) DEVICE — STERILE POLYISOPRENE POWDER-FREE SURGICAL GLOVES: Brand: PROTEXIS

## (undated) DEVICE — Device

## (undated) DEVICE — ORGANIZER SUT SHELIGH 3T 213013

## (undated) DEVICE — MAJOR-LF: Brand: MEDLINE INDUSTRIES, INC.

## (undated) DEVICE — SPNG GZ WOVN 4X4IN 12PLY 10/BX STRL

## (undated) DEVICE — RADIFOCUS GLIDEWIRE: Brand: GLIDEWIRE

## (undated) DEVICE — HEMOCONCENTRATOR PERFUS LPS06

## (undated) DEVICE — SOL ISO/ALC 70PCT 4OZ

## (undated) DEVICE — BNDG ELAS MATRX V/CLS 6IN 5YD LF

## (undated) DEVICE — LP VESL MAXI 2.5X1MM RED 2PK

## (undated) DEVICE — GLV SURG SENSICARE PI ORTHO SZ6.5 LF STRL

## (undated) DEVICE — STERILE POLYISOPRENE POWDER-FREE SURGICAL GLOVES WITH EMOLLIENT COATING: Brand: PROTEXIS

## (undated) DEVICE — SENSR CERBRL O2 PK/2

## (undated) DEVICE — SYS STBL VAC ACROBAT I

## (undated) DEVICE — PK PERFUS CUST W/CARDIOPLEGIA

## (undated) DEVICE — HEARTSTRING III SYSTEM 4.3MM: Brand: HEARTSTRING III SYSTEM 4.3MM

## (undated) DEVICE — SUT PROLN 3/0 SH D/A 36IN 8522H

## (undated) DEVICE — ST PERFUS M/

## (undated) DEVICE — TR BAND RADIAL ARTERY COMPRESSION DEVICE: Brand: TR BAND

## (undated) DEVICE — SHNT INTRACORONARY CLEARVIEW TEMP 2X14MM

## (undated) DEVICE — BNDG ELAS ELITE V/CLOSE 6IN 5YD LF STRL

## (undated) DEVICE — GOWN,REINF,POLY,SIRUS,BRTH SLV,XLNG/XXL: Brand: MEDLINE

## (undated) DEVICE — CANN RETRGR STYLET RSCP 15F

## (undated) DEVICE — PK SUT OPN HEART POLLOCK CUST

## (undated) DEVICE — GOWN,REINFRCE,POLY,SIRUS,BREATH SLV,XXLG: Brand: MEDLINE

## (undated) DEVICE — SUPER SPONGES,MEDIUM: Brand: KERLIX

## (undated) DEVICE — GW INQWIRE FC PTFE STD J/1.5 .035 260

## (undated) DEVICE — PK ATS CUST W CARDIOTOMY RESEVOIR

## (undated) DEVICE — CORONARY ARTERY BYPASS GRAFT MARKERS, STAINLESS STEEL, DISTAL, WITHOUT HOLDER: Brand: ANASTOMARK CORONARY ARTERY BYPASS GRAFT MARKERS, STAINLESS STEEL, DISTAL

## (undated) DEVICE — DRSNG WND GZ PAD BORDERED 4X8IN STRL

## (undated) DEVICE — DRSNG WND BORDR/ADHS NONADHR/GZ LF 4X14IN STRL

## (undated) DEVICE — VAGINAL PREP TRAY: Brand: MEDLINE INDUSTRIES, INC.

## (undated) DEVICE — APPL CHLORAPREP HI/LITE 26ML ORNG

## (undated) DEVICE — CVR PROB 96IN LF STRL

## (undated) DEVICE — GLIDESHEATH SLENDER STAINLESS STEEL KIT: Brand: GLIDESHEATH SLENDER

## (undated) DEVICE — ST TOURNI COMPL A/ 7IN

## (undated) DEVICE — BIOPATCH™ ANTIMICROBIAL DRESSING WITH CHLORHEXIDINE GLUCONATE IS A HYDROPHILLIC POLYURETHANE ABSORPTIVE FOAM WITH CHLORHEXIDINE GLUCONATE (CHG) WHICH INHIBITS BACTERIAL GROWTH UNDER THE DRESSING. THE DRESSING IS INTENDED TO BE USED TO ABSORB EXUDATE, COVER A WOUND CAUSED BY VASCULAR AND NONVASCULAR PERCUTANEOUS MEDICAL DEVICES DURING SURGERY, AS WELL AS REDUCE LOCAL INFECTION AND COLONIZATION OF MICROORGANISMS.: Brand: BIOPATCH

## (undated) DEVICE — RADIFOCUS OPTITORQUE ANGIOGRAPHIC CATHETER: Brand: OPTITORQUE

## (undated) DEVICE — LABEL SHEET CUSTOM 2X2 YELLOW: Brand: MEDLINE INDUSTRIES, INC.

## (undated) DEVICE — 8 FOOT DISPOSABLE EXTENSION CABLE WITH SAFE CONNECT / ALLIGATOR CLIP

## (undated) DEVICE — GLV SURG BIOGEL LTX PF 7 1/2

## (undated) DEVICE — NEEDLE, QUINCKE, 20GX3.5": Brand: MEDLINE

## (undated) DEVICE — CLAMP INSERT: Brand: STEALTH® CLAMP INSERT

## (undated) DEVICE — DECANTER BAG 9": Brand: MEDLINE INDUSTRIES, INC.

## (undated) DEVICE — PENCL SMOKE/EVAC MEGADYNE TELESCP 10FT

## (undated) DEVICE — BNDG ELAS MATRX V/CLS 4IN 5YD LF

## (undated) DEVICE — IRRIGATOR BULB ASEPTO 60CC STRL

## (undated) DEVICE — CANN AORT ROOT DLP VNT 14G 7F

## (undated) DEVICE — SPONGE,DISSECTOR,K,XRAY,9/16"X1/4",STRL: Brand: MEDLINE

## (undated) DEVICE — SYS PERFUS SEP PLATLT W TIPS CUST

## (undated) DEVICE — TOWEL,OR,DSP,ST,BLUE,STD,4/PK,20PK/CS: Brand: MEDLINE

## (undated) DEVICE — BNDG ELAS ELITE V/CLOSE 4IN 5YD LF STRL

## (undated) DEVICE — ELECTRD BLD EZ CLN MOD XLNG 2.75IN

## (undated) DEVICE — Device: Brand: CLEANCUT ROTATING AORTIC PUNCH

## (undated) DEVICE — BLOWER/MISTER AXIOUS OPCAB W/TBG

## (undated) DEVICE — SUT PROLN 5/0 C1 D/A 36IN 8720H

## (undated) DEVICE — CANN VESL FREE FLO 2MM

## (undated) DEVICE — DRP SLUSH WARMR MACH RECTG 66X44IN

## (undated) DEVICE — SYR LUERLOK 20CC BX/50

## (undated) DEVICE — PK HEART OPN 40

## (undated) DEVICE — BG TRANSF W/COUPLER SPK 600ML

## (undated) DEVICE — CATH LAB PACK: Brand: MEDLINE INDUSTRIES, INC.

## (undated) DEVICE — INTENDED FOR TISSUE SEPARATION, AND OTHER PROCEDURES THAT REQUIRE A SHARP SURGICAL BLADE TO PUNCTURE OR CUT.: Brand: BARD-PARKER ® CARBON RIB-BACK BLADES

## (undated) DEVICE — ACROBAT-I VACUUM POSITIONER SYSTEM: Brand: ACROBAT-I POSITIONER

## (undated) DEVICE — Y-TYPE BLOOD/SOLUTION SET WITH STANDARD BLOOD FILTER, 170 TO 260 MICRON FILTER, LUER ACTIVATED VALVE, MALE LUER LOCK ADAPTER WITH RETRACTABLE COLLAR: Brand: CLEARLINK

## (undated) DEVICE — SUT PROLN 5/0 RB1 36IN M8556

## (undated) DEVICE — PROB CRYOABL CARD CARDIOBLATE/CRYOFLEX 25TO100MM 10CM 1P/U

## (undated) DEVICE — SOL IRR NACL 0.9PCT BO 1000ML